# Patient Record
Sex: MALE | Race: BLACK OR AFRICAN AMERICAN | URBAN - METROPOLITAN AREA
[De-identification: names, ages, dates, MRNs, and addresses within clinical notes are randomized per-mention and may not be internally consistent; named-entity substitution may affect disease eponyms.]

---

## 2017-02-02 ENCOUNTER — OUTPATIENT (OUTPATIENT)
Dept: OUTPATIENT SERVICES | Facility: HOSPITAL | Age: 77
LOS: 1 days | End: 2017-02-02
Payer: MEDICARE

## 2017-02-02 PROCEDURE — 71250 CT THORAX DX C-: CPT

## 2017-02-02 PROCEDURE — 71250 CT THORAX DX C-: CPT | Mod: 26

## 2017-02-23 ENCOUNTER — APPOINTMENT (OUTPATIENT)
Dept: THORACIC SURGERY | Facility: CLINIC | Age: 77
End: 2017-02-23

## 2017-02-23 VITALS
RESPIRATION RATE: 18 BRPM | BODY MASS INDEX: 22.45 KG/M2 | TEMPERATURE: 97.7 F | HEART RATE: 82 BPM | DIASTOLIC BLOOD PRESSURE: 67 MMHG | OXYGEN SATURATION: 99 % | WEIGHT: 152 LBS | SYSTOLIC BLOOD PRESSURE: 121 MMHG

## 2017-11-02 ENCOUNTER — APPOINTMENT (INPATIENT)
Dept: MRI IMAGING | Facility: HOSPITAL | Age: 77
DRG: 093 | End: 2017-11-02
Payer: MEDICARE

## 2017-11-02 ENCOUNTER — HOSPITAL ENCOUNTER (INPATIENT)
Facility: HOSPITAL | Age: 77
LOS: 1 days | Discharge: NURSING, REHAB OR LONG-TERM CARE | DRG: 093 | End: 2017-11-03
Attending: EMERGENCY MEDICINE | Admitting: INTERNAL MEDICINE
Payer: MEDICARE

## 2017-11-02 ENCOUNTER — APPOINTMENT (EMERGENCY)
Dept: CT IMAGING | Facility: HOSPITAL | Age: 77
DRG: 093 | End: 2017-11-02
Payer: MEDICARE

## 2017-11-02 DIAGNOSIS — R27.0 ATAXIA: ICD-10-CM

## 2017-11-02 DIAGNOSIS — R42 DIZZINESS: Primary | ICD-10-CM

## 2017-11-02 LAB
ALBUMIN SERPL BCP-MCNC: 4 G/DL (ref 3.5–5)
ALP SERPL-CCNC: 89 U/L (ref 46–116)
ALT SERPL W P-5'-P-CCNC: 51 U/L (ref 12–78)
ANION GAP BLD CALC-SCNC: 17 MMOL/L (ref 4–13)
ANION GAP SERPL CALCULATED.3IONS-SCNC: 7 MMOL/L (ref 4–13)
AST SERPL W P-5'-P-CCNC: 26 U/L (ref 5–45)
BASOPHILS # BLD AUTO: 0.02 THOUSANDS/ΜL (ref 0–0.1)
BASOPHILS NFR BLD AUTO: 0 % (ref 0–1)
BILIRUB SERPL-MCNC: 0.5 MG/DL (ref 0.2–1)
BUN BLD-MCNC: 8 MG/DL (ref 5–25)
BUN SERPL-MCNC: 9 MG/DL (ref 5–25)
CA-I BLD-SCNC: 1.35 MMOL/L (ref 1.12–1.32)
CALCIUM SERPL-MCNC: 10.1 MG/DL (ref 8.3–10.1)
CHLORIDE BLD-SCNC: 104 MMOL/L (ref 100–108)
CHLORIDE SERPL-SCNC: 105 MMOL/L (ref 100–108)
CO2 SERPL-SCNC: 32 MMOL/L (ref 21–32)
CREAT BLD-MCNC: 0.8 MG/DL (ref 0.6–1.3)
CREAT SERPL-MCNC: 0.87 MG/DL (ref 0.6–1.3)
EOSINOPHIL # BLD AUTO: 0.07 THOUSAND/ΜL (ref 0–0.61)
EOSINOPHIL NFR BLD AUTO: 1 % (ref 0–6)
ERYTHROCYTE [DISTWIDTH] IN BLOOD BY AUTOMATED COUNT: 14.6 % (ref 11.6–15.1)
GFR SERPL CREATININE-BSD FRML MDRD: 87 ML/MIN/1.73SQ M
GFR SERPL CREATININE-BSD FRML MDRD: 98 ML/MIN/1.73SQ M
GLUCOSE SERPL-MCNC: 107 MG/DL (ref 65–140)
GLUCOSE SERPL-MCNC: 141 MG/DL (ref 65–140)
GLUCOSE SERPL-MCNC: 143 MG/DL (ref 65–140)
GLUCOSE SERPL-MCNC: 67 MG/DL (ref 65–140)
HCT VFR BLD AUTO: 47.4 % (ref 36.5–49.3)
HCT VFR BLD CALC: 47 % (ref 36.5–49.3)
HGB BLD-MCNC: 15.6 G/DL (ref 12–17)
HGB BLDA-MCNC: 16 G/DL (ref 12–17)
LIPASE SERPL-CCNC: 95 U/L (ref 73–393)
LYMPHOCYTES # BLD AUTO: 1.85 THOUSANDS/ΜL (ref 0.6–4.47)
LYMPHOCYTES NFR BLD AUTO: 30 % (ref 14–44)
MCH RBC QN AUTO: 27.7 PG (ref 26.8–34.3)
MCHC RBC AUTO-ENTMCNC: 32.9 G/DL (ref 31.4–37.4)
MCV RBC AUTO: 84 FL (ref 82–98)
MONOCYTES # BLD AUTO: 0.44 THOUSAND/ΜL (ref 0.17–1.22)
MONOCYTES NFR BLD AUTO: 7 % (ref 4–12)
NEUTROPHILS # BLD AUTO: 3.81 THOUSANDS/ΜL (ref 1.85–7.62)
NEUTS SEG NFR BLD AUTO: 61 % (ref 43–75)
NRBC BLD AUTO-RTO: 0 /100 WBCS
PCO2 BLD: 27 MMOL/L (ref 21–32)
PLATELET # BLD AUTO: 160 THOUSANDS/UL (ref 149–390)
PMV BLD AUTO: 10.3 FL (ref 8.9–12.7)
POTASSIUM BLD-SCNC: 3.9 MMOL/L (ref 3.5–5.3)
POTASSIUM SERPL-SCNC: 4.3 MMOL/L (ref 3.5–5.3)
PROT SERPL-MCNC: 7.1 G/DL (ref 6.4–8.2)
RBC # BLD AUTO: 5.64 MILLION/UL (ref 3.88–5.62)
SODIUM BLD-SCNC: 143 MMOL/L (ref 136–145)
SODIUM SERPL-SCNC: 144 MMOL/L (ref 136–145)
SPECIMEN SOURCE: ABNORMAL
TROPONIN I SERPL-MCNC: <0.02 NG/ML
WBC # BLD AUTO: 6.22 THOUSAND/UL (ref 4.31–10.16)

## 2017-11-02 PROCEDURE — 82746 ASSAY OF FOLIC ACID SERUM: CPT | Performed by: INTERNAL MEDICINE

## 2017-11-02 PROCEDURE — 80053 COMPREHEN METABOLIC PANEL: CPT | Performed by: EMERGENCY MEDICINE

## 2017-11-02 PROCEDURE — 85025 COMPLETE CBC W/AUTO DIFF WBC: CPT | Performed by: EMERGENCY MEDICINE

## 2017-11-02 PROCEDURE — 70496 CT ANGIOGRAPHY HEAD: CPT

## 2017-11-02 PROCEDURE — 82948 REAGENT STRIP/BLOOD GLUCOSE: CPT

## 2017-11-02 PROCEDURE — 70551 MRI BRAIN STEM W/O DYE: CPT

## 2017-11-02 PROCEDURE — 84484 ASSAY OF TROPONIN QUANT: CPT | Performed by: EMERGENCY MEDICINE

## 2017-11-02 PROCEDURE — 99285 EMERGENCY DEPT VISIT HI MDM: CPT

## 2017-11-02 PROCEDURE — 82607 VITAMIN B-12: CPT | Performed by: INTERNAL MEDICINE

## 2017-11-02 PROCEDURE — 70498 CT ANGIOGRAPHY NECK: CPT

## 2017-11-02 PROCEDURE — 80047 BASIC METABLC PNL IONIZED CA: CPT

## 2017-11-02 PROCEDURE — 36415 COLL VENOUS BLD VENIPUNCTURE: CPT | Performed by: EMERGENCY MEDICINE

## 2017-11-02 PROCEDURE — 85014 HEMATOCRIT: CPT

## 2017-11-02 PROCEDURE — 93005 ELECTROCARDIOGRAM TRACING: CPT | Performed by: EMERGENCY MEDICINE

## 2017-11-02 PROCEDURE — 83690 ASSAY OF LIPASE: CPT | Performed by: EMERGENCY MEDICINE

## 2017-11-02 RX ORDER — LOSARTAN POTASSIUM 100 MG/1
100 TABLET ORAL DAILY
COMMUNITY
End: 2019-06-10 | Stop reason: SDUPTHER

## 2017-11-02 RX ORDER — IPRATROPIUM BROMIDE AND ALBUTEROL SULFATE 2.5; .5 MG/3ML; MG/3ML
3 SOLUTION RESPIRATORY (INHALATION) EVERY 6 HOURS PRN
Status: DISCONTINUED | OUTPATIENT
Start: 2017-11-02 | End: 2017-11-03 | Stop reason: HOSPADM

## 2017-11-02 RX ORDER — ASPIRIN 325 MG
325 TABLET, DELAYED RELEASE (ENTERIC COATED) ORAL DAILY
Status: DISCONTINUED | OUTPATIENT
Start: 2017-11-03 | End: 2017-11-03 | Stop reason: HOSPADM

## 2017-11-02 RX ORDER — ASPIRIN 81 MG/1
81 TABLET, CHEWABLE ORAL DAILY
Status: DISCONTINUED | OUTPATIENT
Start: 2017-11-03 | End: 2017-11-02

## 2017-11-02 RX ORDER — PRAVASTATIN SODIUM 40 MG
40 TABLET ORAL
Status: DISCONTINUED | OUTPATIENT
Start: 2017-11-03 | End: 2017-11-03 | Stop reason: HOSPADM

## 2017-11-02 RX ORDER — LOSARTAN POTASSIUM 50 MG/1
100 TABLET ORAL DAILY
Status: DISCONTINUED | OUTPATIENT
Start: 2017-11-03 | End: 2017-11-03 | Stop reason: HOSPADM

## 2017-11-02 RX ORDER — ROSUVASTATIN CALCIUM 5 MG/1
5 TABLET, COATED ORAL DAILY
COMMUNITY
End: 2019-02-27 | Stop reason: CLARIF

## 2017-11-02 RX ORDER — NICOTINE 21 MG/24HR
1 PATCH, TRANSDERMAL 24 HOURS TRANSDERMAL DAILY
Status: DISCONTINUED | OUTPATIENT
Start: 2017-11-03 | End: 2017-11-03 | Stop reason: HOSPADM

## 2017-11-02 RX ORDER — MECLIZINE HYDROCHLORIDE 25 MG/1
50 TABLET ORAL ONCE
Status: COMPLETED | OUTPATIENT
Start: 2017-11-02 | End: 2017-11-02

## 2017-11-02 RX ORDER — LOSARTAN POTASSIUM 25 MG/1
25 TABLET ORAL DAILY
Status: DISCONTINUED | OUTPATIENT
Start: 2017-11-02 | End: 2017-11-03

## 2017-11-02 RX ORDER — IPRATROPIUM BROMIDE AND ALBUTEROL SULFATE 2.5; .5 MG/3ML; MG/3ML
3 SOLUTION RESPIRATORY (INHALATION)
Status: DISCONTINUED | OUTPATIENT
Start: 2017-11-02 | End: 2017-11-02

## 2017-11-02 RX ADMIN — IOHEXOL 85 ML: 350 INJECTION, SOLUTION INTRAVENOUS at 14:50

## 2017-11-02 RX ADMIN — FLUTICASONE PROPIONATE AND SALMETEROL 1 PUFF: 50; 250 POWDER RESPIRATORY (INHALATION) at 21:08

## 2017-11-02 RX ADMIN — MECLIZINE HYDROCHLORIDE 50 MG: 25 TABLET ORAL at 15:14

## 2017-11-02 NOTE — H&P
History and Physical - [facility]    Patient Information: Alice Luke During 78 y o  male MRN: [id number]  Unit/Bed#: ED 12 Encounter: [id number]  Admitting Physician: Sanford Espinosa MD  PCP: Raquel Smith  Date of Admission:  11/02/17    Assessment/Plan:    Hospital Problem List:     Principal Problem:    Dizziness      Plan for the Primary Problem(s):  · Ataxia, persistent, with no other focal weakness identified, at this time unclear etiology, this seems to be of acute occurrence, the CT angio seems unremarkable, will put in for an MRI, start full-dose aspirin, blood pressure control  · Patient does not check his blood sugars at home so is not sure reveals hypoglycemic episodes, is on oral medications though cannot recall his all a stat A1c  · Neurology evaluation, telemetry monitoring, will hold off on cardiac workup for now will await Neurology input before the  · PT OT  · Continue the statin, p r n  bronchodilators, history of COPD not in exacerbation  · Will check folic acid N55 level  VTE Prophylaxis: lovenox  Anticipated Length of Stay:  Patient will be admitted on an Inpatient basis with an anticipated length of stay of  > 2 midnights  Justification for Hospital Stay:  Ataxia    Total Time for Visit, including Counseling / Coordination of Care: 45 minutes  Greater than 50% of this total time spent on direct patient counseling and coordination of care  N  Chief Complaint:   I kept on losing balance since yesterday    History of Present Illness:    Alice Chandler is a 78 y o  male who presents with initially what he describes as dizziness, on further questioning the patient mentions that what he primarily is having is loss of balance, which started roughly around 7-8 a o'clock in the evening yesterday, continued to have persistent symptoms, but then he went to bed, woke up with similar symptoms which continued and then he decided to come to the emergency room for further evaluation    His past history significant for COPD, patient does not mention a rescue inhaler as part of his medications, is on Spiriva and Advair though, most of his care is in [address] [place] [address] PCP  Also mentions he has hyperlipidemia and a very low dose of a statin, no other significant history mention by him, no cardiac events, no acute neurological events in the past   He smokes, 1-2 cigars a day as per him, occasionally more  At this time denies chest pain, palpitations, nausea, vomiting, headaches, visual disturbances, while lying in bed he does not have any symptoms, he denies having any vertiginous symptoms, also the symptoms do not get worse with movements of the head, no nystagmus noted  CT angio of the head and neck was done which showed no evidence of significant carotid stenosis, no large vessel occlusion, no large aneurysm  No long segment occlusion narrowing of pseudoaneurysm or dissection flap  Review of Systems:    Review of Systems   Constitutional: Negative for activity change and appetite change  HENT: Negative for congestion and dental problem  Eyes: Negative for discharge  Respiratory: Negative for apnea  Cardiovascular: Negative for chest pain  Gastrointestinal: Negative for abdominal distention  Endocrine: Negative for cold intolerance  Genitourinary: Negative for difficulty urinating  Musculoskeletal: Negative for arthralgias  Allergic/Immunologic: Negative for environmental allergies  Neurological: Negative for dizziness  Hematological: Negative for adenopathy  Past Medical and Surgical History:     Past Medical History:   Diagnosis Date    COPD [facility]chronic obstructive pulmonary disease) ([facility] [address] )     Hyperlipidemia     Hypertension        History reviewed  No pertinent surgical history  Meds/Allergies:    Prior to Admission medications    Medication Sig Start Date End Date Taking?  Authorizing Provider   fluticasone-salmeterol (ADVAIR) 250-50 mcg/dose inhaler Inhale 1 puff every 12 (twelve) hours   Yes Historical Provider, MD   losartan (COZAAR) 100 MG tablet Take 100 mg by mouth daily   Yes Historical Provider, MD   rosuvastatin (CRESTOR) 5 mg tablet Take 5 mg by mouth daily   Yes Historical Provider, MD   SAXagliptin-MetFORMIN ER (KOMBIGLYZE XR) 5-1000 MG TB24 Take by mouth   Yes Historical Provider, MD   tiotropium (SPIRIVA) 18 mcg inhalation capsule Place 18 mcg into inhaler and inhale daily   Yes Historical Provider, MD     I have reviewed home medications with patient personally  Allergies: No Known Allergies    Social History:     Marital Status: Single   Occupation:     History   Alcohol Use No     History   Smoking Status    Current Every Day Smoker    Types: Pipe   Smokeless Tobacco    Never Used     Comment: "5 loads of pipe a day"      History   Drug Use No       Family History:    non-contributory    Physical Exam:     Vitals:   Blood Pressure: 150/80 (11/02/17 1615)  Pulse: 66 (11/02/17 1615)  Temperature: 98 9 °F (37 2 °C) (11/02/17 1317)  Temp Source: Oral (11/02/17 1317)  Respirations: 18 (11/02/17 1615)  Height: 5' 10" (177 8 cm) (11/02/17 1317)  Weight - Scale: 68 5 kg (151 lb) (11/02/17 1317)  SpO2: 98 % (11/02/17 1615)    Physical Exam   Constitutional: He is oriented to person, place, and time  No distress  HENT:   Head: Normocephalic and atraumatic  Eyes: Pupils are equal, round, and reactive to light  Neck: No tracheal deviation present  Cardiovascular: Normal rate  No murmur heard  Pulmonary/Chest: No respiratory distress  He has no wheezes  Abdominal: He exhibits no distension  There is no tenderness  Musculoskeletal: He exhibits no edema  Neurological: He is alert and oriented to person, place, and time  No cranial nerve deficit  Skin: He is not diaphoretic  Psychiatric: He has a normal mood and affect  Additional Data:     Lab Results: I have personally reviewed pertinent reports          Results from last 7 days  Lab Units 11/02/17  1358 11/02/17  1347   WBC Thousand/uL  --  6 22   HEMOGLOBIN g/dL  --  15 6   I STAT HEMOGLOBIN g/dl 16 0  --    HEMATOCRIT %  --  47 4   PLATELETS Thousands/uL  --  160   NEUTROS PCT %  --  61   LYMPHS PCT %  --  30   MONOS PCT %  --  7   EOS PCT %  --  1       Results from last 7 days  Lab Units 11/02/17  1358 11/02/17  1347   SODIUM mmol/L  --  144   POTASSIUM mmol/L  --  4 3   CHLORIDE mmol/L  --  105   CO2 mmol/L  --  32   BUN mg/dL  --  9   CREATININE mg/dL  --  0 87   CALCIUM mg/dL  --  10 1   TOTAL PROTEIN g/dL  --  7 1   BILIRUBIN TOTAL mg/dL  --  0 50   ALK PHOS U/L  --  89   ALT U/L  --  51   AST U/L  --  26   GLUCOSE RANDOM mg/dL  --  143*   GLUCOSE, ISTAT mg/dl 141*  --            Imaging: I have personally reviewed pertinent reports  Cta Head And Neck With And Without Contrast    Result Date: 11/2/2017  Narrative: CTA NECK AND BRAIN WITH AND WITHOUT CONTRAST INDICATION: Visual loss COMPARISON:   None  TECHNIQUE:  Routine CT imaging of the Brain without contrast   Post contrast imaging was performed after administration of iodinated contrast through the neck and brain  Post contrast axial 0 625 mm images timed to opacify the arterial system  3D rendering was performed on an independent workstation  MIP reconstructions performed  Coronal reconstructions were performed of the noncontrast portion of the brain  Radiation dose length product (DLP) for this visit:  1937 mGy-cm   This examination, like all CT scans performed in the [facility], was performed utilizing techniques to minimize radiation dose exposure, including the use of iterative reconstruction and automated exposure control  IV Contrast:  85 mL of iohexol (OMNIPAQUE)     IMAGE QUALITY:   Diagnostic FINDINGS: NONCONTRAST BRAIN PARENCHYMA:  No intracranial mass, mass effect or midline shift  No acute intracranial hemorrhage  No CT signs of acute infarction       VENTRICLES AND EXTRA-AXIAL SPACES:  Normal for patient's age  VISUALIZED ORBITS AND PARANASAL SINUSES:  Unremarkable  CALVARIUM AND EXTRACRANIAL SOFT TISSUES:   Normal  CERVICAL VASCULATURE AORTIC ARCH AND GREAT VESSELS:  Normal aortic arch and great vessel origins  Normal visualized subclavian vessels  RIGHT VERTEBRAL ARTERY CERVICAL SEGMENT:  Normal origin  The vessel is normal in caliber throughout the neck  LEFT VERTEBRAL ARTERY CERVICAL SEGMENT:  Normal origin  The vessel is normal in caliber throughout the neck  The left vertebral artery is dominant RIGHT EXTRACRANIAL CAROTID SEGMENT:  There is mild atherosclerotic plaquing seen within the bulb  There is no evidence of foot significant carotid stenosis     LEFT EXTRACRANIAL CAROTID SEGMENT:  Normal caliber common carotid artery  Normal bifurcation and cervical internal carotid artery  No stenosis or dissection  There is marked tortuosity of the left internal carotid artery and its craniocervical portion NASCET criteria was used to determine the degree of internal carotid artery diameter stenosis  INTRACRANIAL VASCULATURE INTERNAL CAROTID ARTERIES:  The petrous portion of the right internal carotid artery is patent There is moderate plaquing seen within the right internal carotid artery in its cavernous portion without significant narrowing  In the left carotid artery appears unremarkable in its petrous portion There is moderate to plaquing seen within the left internal carotid artery in the cavernous portion  ANTERIOR CIRCULATION:  Symmetric A1 segments and anterior cerebral arteries with normal enhancement  Normal anterior communicating artery  MIDDLE CEREBRAL ARTERY CIRCULATION:  M1 segment and middle cerebral artery branches demonstrate normal enhancement bilaterally  DISTAL VERTEBRAL ARTERIES:  Normal distal vertebral arteries  Posterior inferior cerebellar artery origins are normal  Normal vertebral basilar junction  BASILAR ARTERY:  Basilar artery is normal in caliber    Normal superior cerebellar arteries  POSTERIOR CEREBRAL ARTERIES: Both posterior cerebral arteries arises from the basilar tip  Both arteries demonstrate normal flow-related enhancement  The posterior communicating arteries are small  There is a small infundibulum measuring 1 mm noted at  the origin of the left posterior communicating DURAL VENOUS SINUSES:  Normal  NON VASCULAR ANATOMY BONY STRUCTURES:  No acute osseous abnormality  SOFT TISSUES OF THE NECK:  Normal         THORACIC INLET:  Marked emphysema seen     Impression: No evidence of significant carotid stenosis by Nascet criteria No large vessel occlusion seen No large aneurysm seen Small left 1 to 2 mm infundibulum at the origin of the left posterior communicating artery There is no long segment occlusion, narrowing or pseudoaneurysm or dissection flap    Workstation performed: [id number]       EKG, Pathology, and Other Studies Reviewed on Admission:   · EKG:     Allscripts / Epic Records Reviewed: No     ** Please Note: This note has been constructed using a voice recognition system   **

## 2017-11-02 NOTE — ED NOTES
Sofi Vazquez gave verbal orders to observe patient walk the unit  Pt walked approximately 100 feet  Pt stated he felt slightly dizzy during ambulation  MD made aware of results        Romina Sampson RN  11/02/17 8373

## 2017-11-02 NOTE — CONSULTS
Consultation - Neurology   Vlad Chandler 78 y o  male MRN: [id number]  Unit/Bed#: ED 16 Encounter: [id number]      Physician Requesting Consult: Verner Breen, DO  Reason for Consult:  Ataxia    HPI: Vlad Chandler is a right handed  78 y o  male who  I am asked to see for the above complaints  He states that he was in his usual state of health yesterday afternoon  He states in the evening he went to make dinner and he felt unsteady on his feet  He did not fall but noticed a definite change in his balance  He denied any symptoms of vertigo or lightheadedness  He denied any associated symptoms of vision disturbance such as double vision or blurred vision and denied any localizing symptoms of numbness or weakness  He denied any headache  He denies any palpitations  He denied any history of similar symptoms  He states that for quite some time he has had urinary urgency and this has not changed  He denies any significant pain in his neck or back  Review of Systems:  Pertinent review of systems as per HPI, otherwise negative  Historical Information   Past Medical History:   Diagnosis Date    [facility]ronic obstructive pulmonary disease) ([facility] [address] )     Hyperlipidemia     Hypertension      History reviewed  No pertinent surgical history  Social History   History   Smoking Status    Current Every Day Smoker    Types: Pipe   Smokeless Tobacco    Never Used     Comment: "5 loads of pipe a day"      History   Alcohol Use No     History   Drug Use No       Family History:   History reviewed  No pertinent family history      No Known Allergies    Meds:  All current active meds have been reviewed    Scheduled Meds:  [START ON 11/3/2017] aspirin 325 mg Oral Daily   [START ON 11/3/2017] enoxaparin 40 mg Subcutaneous Daily   insulin lispro 1-6 Units Subcutaneous TID AC   losartan 25 mg Oral Daily   [START ON 11/3/2017] nicotine 1 patch Transdermal Daily     PRN Meds: ipratropium-albuterol    Physical Exam:   Objective Vitals:   Vitals:    11/02/17 1615   BP: 150/80   Pulse: 66   Resp: 18   Temp:    SpO2: 98%   ,Body mass index is 21 67 kg/m²  Patient was examined in emergency department bed  General appearance: Cooperative in no acute distress  Head & neck head is atraumatic and normocephalic  Neck is supple with full range of motion  Cardiovascular: Carotid arteries-no carotid bruits  Neurologic:   Mental status-the patient is awake alert and oriented without aphasia or apraxia  Other high cortical  intellectual functions are intact  CN: Visual fields are full to confrontation, disks are flat  Extraocular movements are full without nystagmus  Pupils are 3 mm and reactive  Face is symmetrical to light touch  Movements of facial expression move symmetrically  Hearing is normal to finger rub bilaterally  Soft palate lifts symmetrically  Shoulder shrug is symmetrical  Tongue is midline without atrophy  Motor: No drift is noted on arm extension  Strength is full in the upper and lower extremities with normal bulk and tone  Sensory: Intact to temperature and vibratory sensation in the upper and lower extremities bilaterally  Cortical function is intact  Coordination: Finger to nose testing is performed accurately  Romberg reveals increased sway  Gait reveals a wide-based unsteady gait  He is unable to tandem walk   Reflexes:  1+ in the right biceps triceps and brachioradialis regions, 2 in the left mild this regions, 3 at the right knee and 3+ at the left knee with crossed adductors, 1 at the right ankle and 2 at the left ankle  Toes are downgoing on the right and upgoing on the left    Call's positive on the left negative on the right    Lab Results:Lab Results:   CBC:   Results from last 7 days  Lab Units 11/02/17  1358 11/02/17  1347   WBC Thousand/uL  --  6 22   RBC Million/uL  --  5 64*   HEMOGLOBIN g/dL  --  15 6   I STAT HEMOGLOBIN g/dl 16 0  --    HEMATOCRIT %  --  47 4   MCV fL  --  84   PLATELETS Thousands/uL  --  160   , BMP/CMP:   Results from last 7 days  Lab Units 11/02/17  1358 11/02/17  1347   SODIUM mmol/L  --  144   POTASSIUM mmol/L  --  4 3   CHLORIDE mmol/L  --  105   CO2 mmol/L  --  32   ANION GAP mmol/L  --  7   BUN mg/dL  --  9   CREATININE mg/dL  --  0 87   GLUCOSE RANDOM mg/dL  --  143*   GLUCOSE, ISTAT mg/dl 141*  --    CALCIUM mg/dL  --  10 1   AST U/L  --  26   ALT U/L  --  51   ALK PHOS U/L  --  89   TOTAL PROTEIN g/dL  --  7 1   ALBUMIN g/dL  --  4 0   BILIRUBIN TOTAL mg/dL  --  0 50   EGFR ml/min/1 73sq m 87 98     I have personally reviewed pertinent reports  EEG, Echo, Pathology, and Other Studies: I have personally reviewed pertinent films in PACS CTA of the head failed to demonstrate any significant abnormalities    Family, was not present at the bedside for history and examination  Assessment:  1  Ataxia with long tract findings left greater than right, possible brainstem versus cerebellar CVA  Can't exclude underlying cervical myelopathy although the acute nature of his symptoms without trauma does not sound typical  2  Diabetes  3  Hypertension    Plan:  Agree with stroke pathway  Agree with MRI head and full-dose aspirin  If MRI brain demonstrates no acute change to consider MRI of the cervical spine      Tania Saenz MD  11/2/2017,5:29 PM    Dictation voice to text software has been used in the creation of this document

## 2017-11-03 ENCOUNTER — APPOINTMENT (INPATIENT)
Dept: MRI IMAGING | Facility: HOSPITAL | Age: 77
DRG: 093 | End: 2017-11-03
Payer: MEDICARE

## 2017-11-03 VITALS
BODY MASS INDEX: 21.34 KG/M2 | OXYGEN SATURATION: 99 % | HEART RATE: 78 BPM | SYSTOLIC BLOOD PRESSURE: 167 MMHG | DIASTOLIC BLOOD PRESSURE: 93 MMHG | WEIGHT: 149.03 LBS | HEIGHT: 70 IN | RESPIRATION RATE: 18 BRPM | TEMPERATURE: 98.3 F

## 2017-11-03 LAB
ANION GAP SERPL CALCULATED.3IONS-SCNC: 8 MMOL/L (ref 4–13)
ATRIAL RATE: 77 BPM
BUN SERPL-MCNC: 9 MG/DL (ref 5–25)
CALCIUM SERPL-MCNC: 10 MG/DL (ref 8.3–10.1)
CHLORIDE SERPL-SCNC: 108 MMOL/L (ref 100–108)
CHOLEST SERPL-MCNC: 78 MG/DL (ref 50–200)
CO2 SERPL-SCNC: 27 MMOL/L (ref 21–32)
CREAT SERPL-MCNC: 0.87 MG/DL (ref 0.6–1.3)
ERYTHROCYTE [DISTWIDTH] IN BLOOD BY AUTOMATED COUNT: 14.8 % (ref 11.6–15.1)
ERYTHROCYTE [SEDIMENTATION RATE] IN BLOOD: 1 MM/HOUR (ref 0–10)
FOLATE SERPL-MCNC: >20 NG/ML (ref 3.1–17.5)
GFR SERPL CREATININE-BSD FRML MDRD: 98 ML/MIN/1.73SQ M
GLUCOSE SERPL-MCNC: 103 MG/DL (ref 65–140)
GLUCOSE SERPL-MCNC: 120 MG/DL (ref 65–140)
GLUCOSE SERPL-MCNC: 63 MG/DL (ref 65–140)
GLUCOSE SERPL-MCNC: 90 MG/DL (ref 65–140)
HCT VFR BLD AUTO: 41.9 % (ref 36.5–49.3)
HDLC SERPL-MCNC: 45 MG/DL (ref 40–60)
HGB BLD-MCNC: 13.9 G/DL (ref 12–17)
LDLC SERPL CALC-MCNC: 27 MG/DL (ref 0–100)
MCH RBC QN AUTO: 27.9 PG (ref 26.8–34.3)
MCHC RBC AUTO-ENTMCNC: 33.2 G/DL (ref 31.4–37.4)
MCV RBC AUTO: 84 FL (ref 82–98)
P AXIS: 84 DEGREES
PLATELET # BLD AUTO: 141 THOUSANDS/UL (ref 149–390)
PMV BLD AUTO: 10.2 FL (ref 8.9–12.7)
POTASSIUM SERPL-SCNC: 3.9 MMOL/L (ref 3.5–5.3)
PR INTERVAL: 172 MS
QRS AXIS: 78 DEGREES
QRSD INTERVAL: 76 MS
QT INTERVAL: 348 MS
QTC INTERVAL: 393 MS
RBC # BLD AUTO: 4.99 MILLION/UL (ref 3.88–5.62)
SODIUM SERPL-SCNC: 143 MMOL/L (ref 136–145)
T WAVE AXIS: 89 DEGREES
TRIGL SERPL-MCNC: 29 MG/DL
TSH SERPL DL<=0.05 MIU/L-ACNC: 1.25 UIU/ML (ref 0.36–3.74)
VENTRICULAR RATE: 77 BPM
VIT B12 SERPL-MCNC: 1727 PG/ML (ref 100–900)
WBC # BLD AUTO: 4.68 THOUSAND/UL (ref 4.31–10.16)

## 2017-11-03 PROCEDURE — 80048 BASIC METABOLIC PNL TOTAL CA: CPT | Performed by: INTERNAL MEDICINE

## 2017-11-03 PROCEDURE — 85652 RBC SED RATE AUTOMATED: CPT | Performed by: PSYCHIATRY & NEUROLOGY

## 2017-11-03 PROCEDURE — 84425 ASSAY OF VITAMIN B-1: CPT | Performed by: INTERNAL MEDICINE

## 2017-11-03 PROCEDURE — 82948 REAGENT STRIP/BLOOD GLUCOSE: CPT

## 2017-11-03 PROCEDURE — 85027 COMPLETE CBC AUTOMATED: CPT | Performed by: INTERNAL MEDICINE

## 2017-11-03 PROCEDURE — 80061 LIPID PANEL: CPT | Performed by: INTERNAL MEDICINE

## 2017-11-03 PROCEDURE — 97162 PT EVAL MOD COMPLEX 30 MIN: CPT

## 2017-11-03 PROCEDURE — 84443 ASSAY THYROID STIM HORMONE: CPT | Performed by: PSYCHIATRY & NEUROLOGY

## 2017-11-03 PROCEDURE — 84165 PROTEIN E-PHORESIS SERUM: CPT | Performed by: PSYCHIATRY & NEUROLOGY

## 2017-11-03 PROCEDURE — 86618 LYME DISEASE ANTIBODY: CPT | Performed by: PSYCHIATRY & NEUROLOGY

## 2017-11-03 PROCEDURE — 72141 MRI NECK SPINE W/O DYE: CPT

## 2017-11-03 PROCEDURE — G8979 MOBILITY GOAL STATUS: HCPCS

## 2017-11-03 PROCEDURE — G8978 MOBILITY CURRENT STATUS: HCPCS

## 2017-11-03 RX ORDER — METFORMIN HYDROCHLORIDE EXTENDED-RELEASE TABLETS 1000 MG/1
1000 TABLET, FILM COATED, EXTENDED RELEASE ORAL
Qty: 30 TABLET | Refills: 0 | Status: SHIPPED | OUTPATIENT
Start: 2017-11-03 | End: 2019-02-27 | Stop reason: CLARIF

## 2017-11-03 RX ORDER — ASPIRIN 81 MG/1
81 TABLET ORAL DAILY
Qty: 30 TABLET | Refills: 0 | Status: SHIPPED | OUTPATIENT
Start: 2017-11-03

## 2017-11-03 RX ORDER — LANCETS
EACH MISCELLANEOUS
Qty: 100 EACH | Refills: 0 | Status: SHIPPED | OUTPATIENT
Start: 2017-11-03 | End: 2019-06-10

## 2017-11-03 RX ADMIN — TIOTROPIUM BROMIDE 18 MCG: 18 CAPSULE ORAL; RESPIRATORY (INHALATION) at 09:51

## 2017-11-03 RX ADMIN — LOSARTAN POTASSIUM 100 MG: 50 TABLET, FILM COATED ORAL at 09:49

## 2017-11-03 RX ADMIN — ASPIRIN 325 MG: 325 TABLET, DELAYED RELEASE ORAL at 09:49

## 2017-11-03 RX ADMIN — FLUTICASONE PROPIONATE AND SALMETEROL 1 PUFF: 50; 250 POWDER RESPIRATORY (INHALATION) at 09:50

## 2017-11-03 RX ADMIN — PRAVASTATIN SODIUM 40 MG: 40 TABLET ORAL at 16:44

## 2017-11-03 RX ADMIN — ENOXAPARIN SODIUM 40 MG: 40 INJECTION SUBCUTANEOUS at 09:49

## 2017-11-03 NOTE — DISCHARGE INSTRUCTIONS
Metformin (By mouth)   Metformin Hydrochloride (met-FOR-min nataliia-droe-KLOR-rolan)  Treats type 2 diabetes  Brand Name(s): Fortamet, Glucophage, Glucophage XR, Glumetza, Riomet   There may be other brand names for this medicine  When This Medicine Should Not Be Used: This medicine is not right for everyone  Do not use if you had an allergic reaction to metformin, or if you have severe kidney problems or metabolic acidosis  How to Use This Medicine:   Liquid, Tablet, Long Acting Tablet  · Take your medicine as directed  Your dose may need to be changed several times to find what works best for you  · It is best to take this medicine with food or milk  · Swallow the extended-release tablet whole  Do not crush, break, or chew it  Tell your doctor if you have trouble swallowing the tablets whole  · Measure the oral liquid medicine with a marked measuring spoon, oral syringe, or medicine cup  · Read and follow the patient instructions that come with this medicine  Talk to your doctor or pharmacist if you have any questions  · Missed dose: Take a dose as soon as you remember  If it is almost time for your next dose, wait until then and take a regular dose  Do not take extra medicine to make up for a missed dose  · Store the medicine in a closed container at room temperature, away from heat, moisture, and direct light  Drugs and Foods to Avoid:   Ask your doctor or pharmacist before using any other medicine, including over-the-counter medicines, vitamins, and herbal products  · Some medicines can affect how metformin works   Tell your doctor if you are using any of the following:  ¨ Acetazolamide  ¨ Dichlorphenamide  ¨ Diuretics (water pills)  ¨ Estrogen or birth control pills  ¨ Heart or blood pressure medicine  ¨ Isoniazid  ¨ Nicotinic acid  ¨ Phenothiazine medicine  ¨ Phenytoin  ¨ Steroid medicine  ¨ Thyroid medicine  ¨ Topiramate  ¨ Zonisamide  Warnings While Using This Medicine:   · Tell your doctor if you are pregnant or breastfeeding, or if you have heart or blood vessel disease, heart failure, blood circulation problems, kidney disease, liver disease, anemia, an adrenal gland or pituitary gland disorder, or a vitamin B12 deficiency  Tell your doctor if you had a heart attack  Tell your doctor if you drink alcohol  · Too much of this medicine can cause a rare, but serious condition called lactic acidosis  · Part of the extended-release tablet may pass in your stool  This is normal   · Make sure any doctor or dentist who treats you knows that you are using this medicine  You may need to stop using this medicine before you have surgery, an x-ray, CT scan, or other medical test   · Your doctor will do lab tests at regular visits to check on the effects of this medicine  Keep all appointments  · Keep all medicine out of the reach of children  Never share your medicine with anyone  Possible Side Effects While Using This Medicine:   Call your doctor right away if you notice any of these side effects:  · Allergic reaction: Itching or hives, swelling in your face or hands, swelling or tingling in your mouth or throat, chest tightness, trouble breathing  · Confusion, fast heartbeat, increased hunger, shakiness  · Fever or chills  · Stomach pain, nausea, vomiting, muscle pain or cramping  · Trouble breathing, slow heartbeat, lightheadedness, dizziness  · Unusual tiredness or weakness  If you notice these less serious side effects, talk with your doctor:   · Diarrhea, gas, nausea  If you notice other side effects that you think are caused by this medicine, tell your doctor  Call your doctor for medical advice about side effects  You may report side effects to FDA at 0-101-FDA-1171  © 2017 [employer] Information is for End User's use only and may not be sold, redistributed or otherwise used for commercial purposes  The above information is an  only   It is not intended as medical advice for individual conditions or treatments  Talk to your doctor, nurse or pharmacist before following any medical regimen to see if it is safe and effective for you

## 2017-11-03 NOTE — ED PROVIDER NOTES
History  Chief Complaint   Patient presents with    Dizziness     Pt c/o dizziness since yesterday evening  Pt states that he "feels off when walking" but no visual changes  68-year-old male patient presents to the emergency department for evaluation of ataxia  The patient states that while walking at home he feels unbalanced  The patient states feeling dizzy, cannot describe the dizziness is anything in particular but denies as himself spinning or the room spinning  On initial evaluation, the patient had an NIH stroke scale 0  There is no neurologic abnormalities noted  Because the patient's symptoms, the patient will have a CT scan of the head and neck done with and without contrast   Patient full lab work done  Patient be treated for vertigo and if CT scan is normal and labs are normal patient will be ambulated  DuringAmbulation the patient was off balance still patient be admitted to the hospital for evaluation of possible posterior circulation abnormality causing the patient's consistent ataxia        History provided by:  Patient   used: No    Dizziness   Quality:  Imbalance  Severity:  Mild  Onset quality:  Gradual  Timing:  Constant  Progression:  Worsening  Chronicity:  New  Context: bending over, head movement, physical activity and standing up    Relieved by:  Nothing  Worsened by:  Nothing  Associated symptoms: no blood in stool, no diarrhea, no headaches, no palpitations, no shortness of breath, no syncope, no vision changes, no vomiting and no weakness    Risk factors: no anemia        Prior to Admission Medications   Prescriptions Last Dose Informant Patient Reported? Taking?    SAXagliptin-MetFORMIN ER (KOMBIGLYZE XR) 5-1000 MG TB24   Yes Yes   Sig: Take by mouth   fluticasone-salmeterol (ADVAIR) 250-50 mcg/dose inhaler   Yes Yes   Sig: Inhale 1 puff every 12 (twelve) hours   losartan (COZAAR) 100 MG tablet   Yes Yes   Sig: Take 100 mg by mouth daily rosuvastatin (CRESTOR) 5 mg tablet   Yes Yes   Sig: Take 5 mg by mouth daily   tiotropium (SPIRIVA) 18 mcg inhalation capsule   Yes Yes   Sig: Place 18 mcg into inhaler and inhale daily      Facility-Administered Medications: None       Past Medical History:   Diagnosis Date    COPD (chronic obstructive pulmonary disease) (HCC)     Hyperlipidemia     Hypertension        History reviewed  No pertinent surgical history  History reviewed  No pertinent family history  I have reviewed and agree with the history as documented  Social History   Substance Use Topics    Smoking status: Current Every Day Smoker     Types: Pipe    Smokeless tobacco: Never Used      Comment: "5 loads of pipe a day"     Alcohol use No        Review of Systems   Respiratory: Negative for shortness of breath  Cardiovascular: Negative for palpitations and syncope  Gastrointestinal: Negative for blood in stool, diarrhea and vomiting  Neurological: Positive for dizziness  Negative for weakness and headaches  All other systems reviewed and are negative  Physical Exam  ED Triage Vitals [11/02/17 1317]   Temperature Pulse Respirations Blood Pressure SpO2   98 9 °F (37 2 °C) 91 20 156/84 98 %      Temp Source Heart Rate Source Patient Position - Orthostatic VS BP Location FiO2 (%)   Oral Monitor Sitting Left arm --      Pain Score       No Pain           Orthostatic Vital Signs  Vitals:    11/02/17 2300 11/03/17 0300 11/03/17 0700 11/03/17 1100   BP: 156/83 156/81 163/88 (!) 187/91   Pulse: 67 67 73 79   Patient Position - Orthostatic VS: Lying Lying Lying Lying       Physical Exam   Constitutional: He is oriented to person, place, and time  He appears well-developed and well-nourished  No distress  HENT:   Head: Normocephalic and atraumatic  Right Ear: External ear normal    Left Ear: External ear normal    Eyes: Conjunctivae and EOM are normal  Right eye exhibits no discharge  Left eye exhibits no discharge   No scleral icterus  Neck: Normal range of motion  Neck supple  No JVD present  No tracheal deviation present  No thyromegaly present  Cardiovascular: Normal rate and regular rhythm  Pulmonary/Chest: Effort normal and breath sounds normal  No stridor  No respiratory distress  He has no wheezes  He has no rales  Abdominal: Soft  Bowel sounds are normal  He exhibits no distension  There is no tenderness  Musculoskeletal: Normal range of motion  He exhibits no edema, tenderness or deformity  Neurological: He is alert and oriented to person, place, and time  No cranial nerve deficit  Coordination normal    Skin: Skin is warm and dry  He is not diaphoretic  Psychiatric: He has a normal mood and affect  His behavior is normal    Nursing note and vitals reviewed        ED Medications  Medications   fluticasone-salmeterol (ADVAIR) 250-50 mcg/dose inhaler 1 puff (1 puff Inhalation Given 11/3/17 0950)   losartan (COZAAR) tablet 100 mg (100 mg Oral Given 11/3/17 0949)   pravastatin (PRAVACHOL) tablet 40 mg (not administered)   tiotropium (SPIRIVA) capsule for inhaler 18 mcg (18 mcg Inhalation Given 11/3/17 0951)   ipratropium-albuterol (DUO-NEB) 0 5-2 5 mg/mL inhalation solution 3 mL (not administered)   aspirin (ECOTRIN) EC tablet 325 mg (325 mg Oral Given 11/3/17 0949)   insulin lispro (HumaLOG) 100 units/mL subcutaneous injection 1-6 Units (1 Units Subcutaneous Not Given 11/3/17 1155)   nicotine (NICODERM CQ) 14 mg/24hr TD 24 hr patch 1 patch (1 patch Transdermal Not Given 11/3/17 0951)   enoxaparin (LOVENOX) subcutaneous injection 40 mg (40 mg Subcutaneous Given 11/3/17 0949)   meclizine (ANTIVERT) tablet 50 mg (50 mg Oral Given 11/2/17 1514)   iohexol (OMNIPAQUE) 350 MG/ML injection (MULTI-DOSE) 85 mL (85 mL Intravenous Given 11/2/17 1450)       Diagnostic Studies  Results Reviewed     Procedure Component Value Units Date/Time    Lipid Panel with Direct LDL reflex [[id number]]  (Normal) Collected:  11/03/17 0547    Lab Status:  Final result Specimen:  Blood from Arm, Right Updated:  11/03/17 0627     Cholesterol 78 mg/dL      Triglycerides 29 mg/dL      HDL, Direct 45 mg/dL      LDL Calculated 27 mg/dL     Narrative:         Triglyceride:        Normal               <150 mg/dl        Borderline High     150-199 mg/dl        High               200-499 mg/dl        Very High           >499 mg/dl      Cholesterol:       Desirable         <200 mg/dl       Borderline High   200-239 mg/dl       High             >239 mg/dl      HDL Cholesterol:       High    >59 mg/dL       Low     <41 mg/dL      HDL Cholesterol:       High    >59 mg/dL       Low     <41 mg/dL      This screening LDL is a calculated result  It does not have the accuracy of the Direct Measured LDL in the monitoring of patients with hyperlipidemia and/or statin therapy  Direct Measure LDL ([id number]) must be ordered separately in these patients  Basic metabolic panel [[id number]] Collected:  11/03/17 0553    Lab Status:  Final result Specimen:  Blood from Arm, Right Updated:  11/03/17 2983     Sodium 143 mmol/L      Potassium 3 9 mmol/L      Chloride 108 mmol/L      CO2 27 mmol/L      Anion Gap 8 mmol/L      BUN 9 mg/dL      Creatinine 0 87 mg/dL      Glucose 103 mg/dL      Calcium 10 0 mg/dL      eGFR 98 ml/min/1 73sq m     Narrative:         National Kidney Disease Education Program recommendations are as follows:  GFR calculation is accurate only with a steady state creatinine  Chronic Kidney disease less than 60 ml/min/1 73 sq  meters  Kidney failure less than 15 ml/min/1 73 sq  meters      CBC [[id number]]  (Abnormal) Collected:  11/03/17 0553    Lab Status:  Final result Specimen:  Blood from Arm, Right Updated:  11/03/17 0603     WBC 4 68 Thousand/uL      RBC 4 99 Million/uL      Hemoglobin 13 9 g/dL      Hematocrit 41 9 %      MCV 84 fL      MCH 27 9 pg      MCHC 33 2 g/dL      RDW 14 8 %      Platelets 047 (L) Thousands/uL      MPV 10 2 fL     Vitamin B1, whole blood [[id number]] Collected:  11/03/17 0553    Lab Status: In process Specimen:  Blood from Arm, Right Updated:  11/03/17 0600    Folate [[id number]]  (Abnormal) Collected:  11/02/17 1347    Lab Status:  Final result Specimen:  Blood from Arm, Left Updated:  11/03/17 0025     Folate >20 0 (H) ng/mL     Vitamin B12 [[id number]]  (Abnormal) Collected:  11/02/17 1347    Lab Status:  Final result Specimen:  Blood from Arm, Left Updated:  11/03/17 0025     Vitamin B-12 1,727 (H) pg/mL     Fingerstick Glucose (POCT) [[id number]]  (Normal) Collected:  11/02/17 1709    Lab Status:  Final result Updated:  11/02/17 1710     POC Glucose 67 mg/dl     Lipase [[id number]]  (Normal) Collected:  11/02/17 1347    Lab Status:  Final result Specimen:  Blood from Arm, Left Updated:  11/02/17 1435     Lipase 95 u/L     Troponin I [[id number]]  (Normal) Collected:  11/02/17 1347    Lab Status:  Final result Specimen:  Blood from Arm, Left Updated:  11/02/17 1414     Troponin I <0 02 ng/mL     Narrative:         [employer] Chemistry analyzer 99% cutoff is > 0 04 ng/mL in network labs    o cTnI 99% cutoff is useful only when applied to patients in the clinical setting of myocardial ischemia  o cTnI 99% cutoff should be interpreted in the context of clinical history, ECG findings and possibly cardiac imaging to establish correct diagnosis  o cTnI 99% cutoff may be suggestive but clearly not indicative of a coronary event without the clinical setting of myocardial ischemia      Comprehensive metabolic panel [[id number]]  (Abnormal) Collected:  11/02/17 1347    Lab Status:  Final result Specimen:  Blood from Arm, Left Updated:  11/02/17 1410     Sodium 144 mmol/L      Potassium 4 3 mmol/L      Chloride 105 mmol/L      CO2 32 mmol/L      Anion Gap 7 mmol/L      BUN 9 mg/dL      Creatinine 0 87 mg/dL      Glucose 143 (H) mg/dL      Calcium 10 1 mg/dL      AST 26 U/L      ALT 51 U/L      Alkaline Phosphatase 89 U/L      Total Protein 7 1 g/dL      Albumin 4 0 g/dL      Total Bilirubin 0 50 mg/dL      eGFR 98 ml/min/1 73sq m     Narrative:         National Kidney Disease Education Program recommendations are as follows:  GFR calculation is accurate only with a steady state creatinine  Chronic Kidney disease less than 60 ml/min/1 73 sq  meters  Kidney failure less than 15 ml/min/1 73 sq  meters  POCT Chem 8+ [[id number]]  (Abnormal) Collected:  11/02/17 1358    Lab Status:  Final result Updated:  11/02/17 1403     SODIUM, I-STAT 143 mmol/l      Potassium, i-STAT 3 9 mmol/L      Chloride, istat 104 mmol/L      CO2, i-STAT 27 mmol/L      Anion Gap, Istat 17 (H) mmol/L      Calcium, Ionized i-STAT 1 35 (H) mmol/L      BUN, I-STAT 8 mg/dl      Creatinine, i-STAT 0 8 mg/dl      eGFR 87 ml/min/1 73sq m      Glucose, i-STAT 141 (H) mg/dl      Hct, i-STAT 47 %      Hgb, i-STAT 16 0 g/dl      Specimen Type VENOUS    CBC and differential [[id number]]  (Abnormal) Collected:  11/02/17 1347    Lab Status:  Final result Specimen:  Blood from Arm, Left Updated:  11/02/17 1355     WBC 6 22 Thousand/uL      RBC 5 64 (H) Million/uL      Hemoglobin 15 6 g/dL      Hematocrit 47 4 %      MCV 84 fL      MCH 27 7 pg      MCHC 32 9 g/dL      RDW 14 6 %      MPV 10 3 fL      Platelets 483 Thousands/uL      nRBC 0 /100 WBCs      Neutrophils Relative 61 %      Lymphocytes Relative 30 %      Monocytes Relative 7 %      Eosinophils Relative 1 %      Basophils Relative 0 %      Neutrophils Absolute 3 81 Thousands/µL      Lymphocytes Absolute 1 85 Thousands/µL      Monocytes Absolute 0 44 Thousand/µL      Eosinophils Absolute 0 07 Thousand/µL      Basophils Absolute 0 02 Thousands/µL                  MRI brain wo contrast   Final Result by Elyssa Santamaria MD (11/02 2039)   1  No MR evidence of acute ischemia  2  Diffuse periventricular, subcortical and deep white matter T2/FLAIR signal abnormality which could relate to microangiopathy although other white matter etiologies are not excluded     3  Mild diffuse brain volume loss/atrophy  Workstation performed: [id number]         CTA head and neck with and without contrast   Final Result by Mitch Alatorre MD (11/02 1537)      No evidence of significant carotid stenosis by Nascet criteria      No large vessel occlusion seen      No large aneurysm seen      Small left 1 to 2 mm infundibulum at the origin of the left posterior communicating artery         There is no long segment occlusion, narrowing or pseudoaneurysm or dissection flap                  Workstation performed: [id number]         MRI inpatient order    (Results Pending)              Procedures  Procedures       Phone Contacts  ED Phone Contact    ED Course  ED Course                                MDM  Number of Diagnoses or Management Options  Ataxia: new and requires workup  Dizziness: new and requires workup     Amount and/or Complexity of Data Reviewed  Clinical lab tests: ordered and reviewed  Tests in the radiology section of CPT®: ordered and reviewed  Decide to obtain previous medical records or to obtain history from someone other than the patient: yes  Review and summarize past medical records: yes  Independent visualization of images, tracings, or specimens: yes    Patient Progress  Patient progress: stable    The patient presented with a condition in which there was a high probability of imminent or life-threatening deterioration, and critical care services (excluding separately billable procedures) totalled 30-74 minutes          Disposition  Final diagnoses:   Dizziness   Ataxia     Time reflects when diagnosis was documented in both MDM as applicable and the Disposition within this note     Time User Action Codes Description Comment    11/2/2017  4:27 PM Christina Harris Add [R42] Dizziness     11/2/2017  4:27 PM Christina Harris Add [R27 0] Ataxia       ED Disposition     ED Disposition Condition Comment    Admit  Case was discussed with Tresa Wilson and the patient's admission status was agreed to be Admission Status: inpatient status to the service of Dr Vinita Merlos   Follow-up Information    None       Current Discharge Medication List      CONTINUE these medications which have NOT CHANGED    Details   fluticasone-salmeterol (ADVAIR) 250-50 mcg/dose inhaler Inhale 1 puff every 12 (twelve) hours      losartan (COZAAR) 100 MG tablet Take 100 mg by mouth daily      rosuvastatin (CRESTOR) 5 mg tablet Take 5 mg by mouth daily      SAXagliptin-MetFORMIN ER (KOMBIGLYZE XR) 5-1000 MG TB24 Take by mouth      tiotropium (SPIRIVA) 18 mcg inhalation capsule Place 18 mcg into inhaler and inhale daily           No discharge procedures on file      ED Provider  Electronically Signed by           Krys Palacios DO  11/03/17 1831

## 2017-11-03 NOTE — SOCIAL WORK
Cm met with patient at bedside, patient alert and oriented during interview  Patient reports being independent with ADL's, no dme, no vna, str  Patient reports residing alone in a private home  Patient reports having family supports does not drive and follows up with pcp in [place]  Patient reprots filling his prescriptions at [facility] and able to fill his prescriptions  Patient reports being receptive to sn vna for vital checks  Patient reports no additional needs, friend to transport patient home when ready

## 2017-11-03 NOTE — DISCHARGE SUMMARY
Discharge Summary - [facility]    Patient Information: Roosevelt Fung During 78 y o  male MRN: [id number]  Unit/Bed#: -01 Encounter: [id number]    Discharging Physician / Practitioner: Lanette Santos MD  PCP: Kaley Arroyo  Admission Date: 11/2/2017  Discharge Date: 11/03/17    Reason for Admission:  Ataxia    Discharge Diagnoses:     Principal Problem:    Dizziness  Resolved Problems:    * No resolved hospital problems  *      Consultations During Hospital Stay:  Neurology    Hospital Course:     Roosevelt Fung During is a 78 y o  male patient who originally presented to the hospital on 11/2/2017 due to gait dysfunction ataxia  CVA ruled out MRI of the brain showed no evidence of any acute CVA, MRI cervical spine initially was recommended as per Neurology, is getting the MRI now  He also recommended some blood work including sed rate ZULLY Lyme titer electrophoreses and TSH  Patient mentions that he wants to get this blood work done outpatient after he sees Neurology  Will give him information to make an appointment  He does mention that he took his diabetes medications and did not have his breakfast and then by the time he had lunch she was already having the symptoms, he is on glipitin which could cause hypoglycemia, could have been reason for his symptomatology though  TIA/CVA ruled out    Will continue baby aspirin though at this time hold off on statin to the time he sees Neurology LDL is acceptable his also physically very active  Will give him prescription for glucometer, lancets and testing strips to check sugars at least 3 times a week  Discussed this with him  His blood sugars have been in the 60s occasionally will discontinue glipitin at the time of discharge just given a prescription for metformin, highly encouraged him to check sugars and maintain a log also spoke with his family, he needs to take the log for his PCP appointment      Symptoms could have been related to hypoglycemia at home    He had a sooner than expected recovery will be discharged after 1 midnight    MRI cervical spine was also completed- Severe spondylosis and osteoarthritis  Hypertrophic bony changes in degenerative disc disease produce  Potentially significant foraminal stenosis bilateral C4-C5, right C5-6, bilateral C6-7 and C7-T1 level  Correlate for bilateral C5, right C6,   bilateral C7 and C8 radiculitis  Significant canal stenosis (6 mm) at the C3-4 level where edema/scoliosis is noted within the cord  I had a discussion with the patient in the presence of Neurology, this time no concerning physical exam findings, will follow up outpatient with neurology, also will provide with Neurosurgery contact information  Currently symptoms likely not related to these findings    Discharge Day Visit / Exam:     Subjective:  Feels well overall no new complaints  Vitals: Blood Pressure: (!) 187/91 (11/03/17 1100)  Pulse: 79 (11/03/17 1100)  Temperature: 98 6 °F (37 °C) (11/03/17 1100)  Temp Source: Oral (11/03/17 1100)  Respirations: 17 (11/03/17 1100)  Height: 5' 10" (177 8 cm) (11/02/17 2025)  Weight - Scale: 67 6 kg (149 lb 0 5 oz) (11/02/17 2025)  SpO2: 98 % (11/03/17 1100)  Exam:   Physical Exam  General elderly gentleman this time complete resolution of symptoms  CVS S1-S2 heard no rubs murmurs  Chest clear to auscultation adventitious sounds  Neuro nonfocal at baseline  Abdomen soft nontender no organomegaly  Had INT no pallor cyanosis    Discharge instructions/Information to patient and family:   See after visit summary for information provided to patient and family  Provisions for Follow-Up Care:  See after visit summary for information related to follow-up care and any pertinent home health orders  Disposition:   Home     Discharge Statement:  I spent 30 minutes discharging the patient  This time was spent on the day of discharge  I had direct contact with the patient on the day of discharge   Greater than 50% of the total time was spent examining patient, answering all patient questions, arranging and discussing plan of care with patient as well as directly providing post-discharge instructions  Additional time then spent on discharge activities  Discharge Medications:  See after visit summary for reconciled discharge medications provided to patient and family        ** Please Note: This note has been constructed using a voice recognition system **

## 2017-11-03 NOTE — PLAN OF CARE

## 2017-11-03 NOTE — CASE MANAGEMENT
Prudencio Flannery RN Registered Nurse Signed   Case Management Date of Service: 11/3/2017  6:28 AM           Initial Clinical Review     Admission: Date/Time/Statement: 11/2/17 @ 1628 Inpatient Written      Orders Placed This Encounter   Procedures    Inpatient Admission (expected length of stay for this patient is greater than two midnights)       Standing Status:   Standing       Number of Occurrences:   1       Order Specific Question:   Admitting Physician       Answer:   Heri Naranjo [[id number]]       Order Specific Question:   Level of Care       Answer:   Med Surg [16]       Order Specific Question:   Estimated length of stay       Answer:   More than 2 Midnights       Order Specific Question:   Certification       Answer:   I certify that inpatient services are medically necessary for this patient for a duration of greater than two midnights  See H&P and MD Progress Notes for additional information about the patient's course of treatment             ED: Date/Time/Mode of Arrival:             ED Arrival Information      Expected Arrival Acuity Means of Arrival Escorted By Service Admission Type     - 11/2/2017 13:03 Urgent Walk-In Self General Medicine Urgent     Arrival Complaint     DIZZINESS             Chief Complaint:        Chief Complaint   Patient presents with    Dizziness       Pt c/o dizziness since yesterday evening  Pt states that he "feels off when walking" but no visual changes          History of Illness: Isabel Zhou a 78 y  o  male who presents with initially what he describes as dizziness, on further questioning the patient mentions that what he primarily is having is loss of balance, which started roughly around 7-8 a o'clock in the evening yesterday, continued to have persistent symptoms, but then he went to bed, woke up with similar symptoms which continued and then he decided to come to the emergency room for further evaluation   His past history significant for COPD, patient does not mention a rescue inhaler as part of his medications, is on Spiriva and Advair though, most of his care is in [place] PCP  Rubi Alfaro mentions he has hyperlipidemia and a very low dose of a statin, no other significant history mention by him, no cardiac events, no acute neurological events in the past   He smokes, 1-2 cigars a day as per him, occasionally more   At this time denies chest pain, palpitations, nausea, vomiting, headaches, visual disturbances, while lying in bed he does not have any symptoms, he denies having any vertiginous symptoms, also the symptoms do not get worse with movements of the head, no nystagmus noted  CT angio of the head and neck was done which showed no evidence of significant carotid stenosis, no large vessel occlusion, no large aneurysm   No long segment occlusion narrowing of pseudoaneurysm or dissection flap      ED Vital Signs:            ED Triage Vitals [11/02/17 1317]   Temperature Pulse Respirations Blood Pressure SpO2   98 9 °F (37 2 °C) 91 20 156/84 98 %       Temp Source Heart Rate Source Patient Position - Orthostatic VS BP Location FiO2 (%)   Oral Monitor Sitting Left arm --       Pain Score           No Pain                Wt Readings from Last 1 Encounters:   11/02/17 67 6 kg (149 lb 0 5 oz)         Vital Signs (abnormal): wnl     Abnormal Labs/Diagnostic Test Results: gluc  143  MRI brain-    1  No MR evidence of acute ischemia  2  Diffuse periventricular, subcortical and deep white matter T2/FLAIR signal abnormality which could relate to microangiopathy although other white matter etiologies are not excluded    3  Mild diffuse brain volume loss/atrophy       CTA head and neck - wn      ED Treatment:              Medication Administration from 11/02/2017 1303 to 11/02/2017 2711        Date/Time Order Dose Route Action Action by Comments       11/02/2017 1514 meclizine (ANTIVERT) tablet 50 mg 50 mg Oral Given Reggie Blackmon, RN         11/02/2017 1450 iohexol (OMNIPAQUE) 350 MG/ML injection (MULTI-DOSE) 85 mL 85 mL Intravenous Given Emliy Molina         11/02/2017 1713 insulin lispro (HumaLOG) 100 units/mL subcutaneous injection 1-6 Units 1 Units Subcutaneous Not Given Kristin Kaiser RN               Past Medical/Surgical History: Active Ambulatory Problems     Diagnosis Date Noted    No Active Ambulatory Problems           Resolved Ambulatory Problems     Diagnosis Date Noted    No Resolved Ambulatory Problems           Past Medical History:   Diagnosis Date    COPD (chronic obstructive pulmonary disease) (HCC)      Hyperlipidemia      Hypertension           Admitting Diagnosis: Dizziness [R42]  Ataxia [R27 0]     Age/Sex: 78 y o  male     Assessment/Plan: Hospital Problem List:      Principal Problem:    Dizziness        Plan for the Primary Problem(s):  · Ataxia, persistent, with no other focal weakness identified, at this time unclear etiology, this seems to be of acute occurrence, the CT angio seems unremarkable, will put in for an MRI, start full-dose aspirin, blood pressure control  · Patient does not check his blood sugars at home so is not sure reveals hypoglycemic episodes, is on oral medications though cannot recall his all a stat A1c  · Neurology evaluation, telemetry monitoring, will hold off on cardiac workup for now will await Neurology input before the  · PT OT  · Continue the statin, p r n  bronchodilators, history of COPD not in exacerbation    · Will check folic acid R06 level      VTE Prophylaxis: lovenox  Anticipated Length of Stay: Dereje Huang will be admitted on an Inpatient basis with an anticipated length of stay of  > 2 midnights    Justification for Hospital Stay:  Ataxia        Admission Orders:  Scheduled Meds:   aspirin 325 mg Oral Daily   enoxaparin 40 mg Subcutaneous Daily   fluticasone-salmeterol 1 puff Inhalation Q12H [facility]   insulin lispro 1-6 Units Subcutaneous TID AC   losartan 100 mg Oral Daily   losartan 25 mg Oral Daily   nicotine 1 patch Transdermal Daily   pravastatin 40 mg Oral Daily With Dinner   tiotropium 18 mcg Inhalation Daily      Continuous Infusions:    PRN Meds: ipratropium-albuterol      Fingerstick ac and hs   Cons carb diet   Up w/ assist   Tele   OT PT eval   SCD  Neuro consult   11/3 Vit B1, cbc , bmp,lipid profile   plt ct 141     Neuro consult   Assessment:  1  Ataxia with long tract findings left greater than right, possible brainstem versus cerebellar CVA   Can't exclude underlying cervical myelopathy although the acute nature of his symptoms without trauma does not sound typical  2  Diabetes  3  Hypertension     Plan:  Agree with stroke pathway   Agree with MRI head and full-dose aspirin  If MRI brain demonstrates no acute change to consider MRI of the cervical spine        NEW ORDER:  Neuro checks         [address] [place] [address] in the [facility] by Eloy Huggins for 2017  Network Utilization Review Department  Phone: [phone]; Fax [phone]  ATTENTION: The Network Utilization Review Department is now centralized for our 7 Facilities  Make a note that we have a new phone and fax numbers for our Department  Please call with any questions or concerns to [phone] and carefully follow the prompts so that you are directed to the right person  All voicemails are confidential  Fax any determinations, approvals, denials, and requests for initial or continue stay review clinical to [phone]  Due to HIGH CALL volume, it would be easier if you could please send faxed requests to expedite your requests and in part, help us provide discharge notifications faster

## 2017-11-03 NOTE — PLAN OF CARE
Problem: PHYSICAL THERAPY ADULT  Goal: Performs mobility at highest level of function for planned discharge setting  See evaluation for individualized goals  Treatment/Interventions: Functional transfer training, LE strengthening/ROM, Elevations, Therapeutic exercise, Gait training, Patient/family training  Equipment Recommended:  (none )       See flowsheet documentation for full assessment, interventions and recommendations  Prognosis: Good  Problem List: Decreased strength, Impaired balance  Assessment: Pt is 78 y o  male seen for PT evaluation on 11/3/2017 s/p admit to [facility] on 11/2/2017 w/ Dizziness  PT consulted to assess pt's functional mobility and d/c needs  Order placed for PT eval and tx, w/ up w/ A order  Comorbidities affecting pt's physical performance at time of assessment include: ataxia and COPD  PTA, pt was independent w/ all functional mobility w/ no assistive device , ambulates community distances and elevations, ambulates unrestricted distances and all terrain, has 5 PAULA, retired and works out at [employer]  Personal factors affecting pt at time of IE include: inability to navigate community distances, unable to perform physical activity and loss of balance with higher level balance tasks including attaining tandem position bilaterally and attempting single leg stance bilaterally  Please find objective findings from PT assessment regarding body systems outlined above with impairments and limitations including impaired balance and gait deviations, as well as mobility assessment (tolerance to 275  feet of ambulation on level surface area in ernst)  The following objective measures performed on IE also reveal limitations: Barthel Index: 85/100  Pt's clinical presentation is currently evolving seen in pt's presentation of tolerance to only 275 feet of ambulation, on telemetry monitoring and previous reports of dizziness initiating 2 days ago   Pt to benefit from continued PT tx to address deficits as defined above and maximize level of functional independent mobility and consistency  From PT/mobility standpoint, recommendation at time of d/c would be Home PT vs  anticipate no needs and home independently w/ no skilled acute PT needs pending progress in order to facilitate return to PLOF  Barriers to Discharge: None     Recommendation: Home independently (vs home with PT )     PT - OK to Discharge: Yes    See flowsheet documentation for full assessment

## 2017-11-03 NOTE — CASE MANAGEMENT
Initial Clinical Review    Admission: Date/Time/Statement: 11/2/17 @ 1628     Orders Placed This Encounter   Procedures    Inpatient Admission (expected length of stay for this patient is greater than two midnights)     Standing Status:   Standing     Number of Occurrences:   1     Order Specific Question:   Admitting Physician     Answer:   Slime Dukes [[id number]]     Order Specific Question:   Level of Care     Answer:   Med Surg [16]     Order Specific Question:   Estimated length of stay     Answer:   More than 2 Midnights     Order Specific Question:   Certification     Answer:   I certify that inpatient services are medically necessary for this patient for a duration of greater than two midnights  See H&P and MD Progress Notes for additional information about the patient's course of treatment  ED: Date/Time/Mode of Arrival:   ED Arrival Information     Expected Arrival Acuity Means of Arrival Escorted By Service Admission Type    - 11/2/2017 13:03 Urgent Walk-In Self General Medicine Urgent    Arrival Complaint    DIZZINESS          Chief Complaint:   Chief Complaint   Patient presents with    Dizziness     Pt c/o dizziness since yesterday evening  Pt states that he "feels off when walking" but no visual changes  History of Illness: Greta Chandler is a 78 y o  male who presents with initially what he describes as dizziness, on further questioning the patient mentions that what he primarily is having is loss of balance, which started roughly around 7-8 a o'clock in the evening yesterday, continued to have persistent symptoms, but then he went to bed, woke up with similar symptoms which continued and then he decided to come to the emergency room for further evaluation  His past history significant for COPD, patient does not mention a rescue inhaler as part of his medications, is on Spiriva and Advair though, most of his care is in [place] PCP    Also mentions he has hyperlipidemia and a very low dose of a statin, no other significant history mention by him, no cardiac events, no acute neurological events in the past   He smokes, 1-2 cigars a day as per him, occasionally more  At this time denies chest pain, palpitations, nausea, vomiting, headaches, visual disturbances, while lying in bed he does not have any symptoms, he denies having any vertiginous symptoms, also the symptoms do not get worse with movements of the head, no nystagmus noted  CT angio of the head and neck was done which showed no evidence of significant carotid stenosis, no large vessel occlusion, no large aneurysm  No long segment occlusion narrowing of pseudoaneurysm or dissection flap  ED Vital Signs:   ED Triage Vitals [11/02/17 1317]   Temperature Pulse Respirations Blood Pressure SpO2   98 9 °F (37 2 °C) 91 20 156/84 98 %      Temp Source Heart Rate Source Patient Position - Orthostatic VS BP Location FiO2 (%)   Oral Monitor Sitting Left arm --      Pain Score       No Pain        Wt Readings from Last 1 Encounters:   11/02/17 67 6 kg (149 lb 0 5 oz)       Vital Signs (abnormal): wnl    Abnormal Labs/Diagnostic Test Results: gluc  143  MRI brain-   1  No MR evidence of acute ischemia  2  Diffuse periventricular, subcortical and deep white matter T2/FLAIR signal abnormality which could relate to microangiopathy although other white matter etiologies are not excluded    3  Mild diffuse brain volume loss/atrophy      CTA head and neck - wnll      ED Treatment:   Medication Administration from 11/02/2017 1303 to 11/02/2017 1937       Date/Time Order Dose Route Action Action by Comments     11/02/2017 1514 meclizine (ANTIVERT) tablet 50 mg 50 mg Oral Given Julio Luo RN      11/02/2017 1450 iohexol (OMNIPAQUE) 350 MG/ML injection (MULTI-DOSE) 85 mL 85 mL Intravenous Given Ancelmowinsanto Butt      11/02/2017 1713 insulin lispro (HumaLOG) 100 units/mL subcutaneous injection 1-6 Units 1 Units Subcutaneous Not Given Julio STEPHANE Luo           Past Medical/Surgical History: Active Ambulatory Problems     Diagnosis Date Noted    No Active Ambulatory Problems     Resolved Ambulatory Problems     Diagnosis Date Noted    No Resolved Ambulatory Problems     Past Medical History:   Diagnosis Date    COPD (chronic obstructive pulmonary disease) ([facility] [address] )     Hyperlipidemia     Hypertension        Admitting Diagnosis: Dizziness [R42]  Ataxia [R27 0]    Age/Sex: 78 y o  male    Assessment/Plan: Hospital Problem List:      Principal Problem:    Dizziness        Plan for the Primary Problem(s):  · Ataxia, persistent, with no other focal weakness identified, at this time unclear etiology, this seems to be of acute occurrence, the CT angio seems unremarkable, will put in for an MRI, start full-dose aspirin, blood pressure control  · Patient does not check his blood sugars at home so is not sure reveals hypoglycemic episodes, is on oral medications though cannot recall his all a stat A1c  · Neurology evaluation, telemetry monitoring, will hold off on cardiac workup for now will await Neurology input before the  · PT OT  · Continue the statin, p r n  bronchodilators, history of COPD not in exacerbation  · Will check folic acid C04 level      VTE Prophylaxis: lovenox  Anticipated Length of Stay:  Patient will be admitted on an Inpatient basis with an anticipated length of stay of  > 2 midnights     Justification for Hospital Stay:  Ataxia       Admission Orders:  Scheduled Meds:   aspirin 325 mg Oral Daily   enoxaparin 40 mg Subcutaneous Daily   fluticasone-salmeterol 1 puff Inhalation Q12H MARIANO   insulin lispro 1-6 Units Subcutaneous TID AC   losartan 100 mg Oral Daily   losartan 25 mg Oral Daily   nicotine 1 patch Transdermal Daily   pravastatin 40 mg Oral Daily With Dinner   tiotropium 18 mcg Inhalation Daily     Continuous Infusions:    PRN Meds: ipratropium-albuterol     Fingerstick ac and hs   Cons carb diet   Up w/ assist   Tele   OT PT eval   SCD  Neuro consult   11/3 Vit B1, cbc , bmp,lipid profile   plt ct 141    Neuro consult   Assessment:  1  Ataxia with long tract findings left greater than right, possible brainstem versus cerebellar CVA  Can't exclude underlying cervical myelopathy although the acute nature of his symptoms without trauma does not sound typical  2  Diabetes  3  Hypertension     Plan:  Agree with stroke pathway    Agree with MRI head and full-dose aspirin  If MRI brain demonstrates no acute change to consider MRI of the cervical spine

## 2017-11-03 NOTE — PHYSICAL THERAPY NOTE
PHYSICAL THERAPY EVALUATION          Patient Name: Ruthie French During  Today's Date: 11/3/2017       11/03/17 1400   Note Type   Note type Eval only   Pain Assessment   Pain Assessment No/denies pain   Pain Score No Pain   Home Living   Type of [address] Bed/bath upstairs;Stairs to enter with rails  (5 PAULA w/HR; 9 steps to lower level w/HR; 5 steps to main )   Bathroom Shower/Tub Tub/shower unit   Bathroom Toilet Standard   Bathroom Accessibility Accessible   Home Equipment (none as per pt report )   Additional Comments Pt reported onset of "dizziness and staggering" on Wednesday evening  Pt reported that his symptoms and dizziness are "getting better   "   Prior Function   Level of [place] Independent with ADLs and functional mobility   Lives With Other (Comment)  (pt is alone on weekdays; wife is home on weekends)   Receives Help From [facility] [place], [address] in the last 6 months 0  (none as per pt report )   Vocational Retired   Restrictions/Precautions   Wells Argelia Bearing Precautions Per Order No   Braces or Orthoses Other (Comment)  (none as per pt report )   Other Precautions Telemetry; Fall Risk   General   Additional Pertinent History PMH: ataxia, COPD   Family/Caregiver Present No  (friend came to visit at end of PT session )   Cognition   Overall Cognitive Status WFL   Arousal/Participation Alert   Orientation Level Oriented X4   Memory Within functional limits   Following Commands Follows all commands and directions without difficulty   RUE Assessment   RUE Assessment WNL  (strength: grossly graded: 4/5; cervical AROM: WFL )   LUE Assessment   LUE Assessment WNL  (strength: grossly graded: 4/5)   RLE Assessment   RLE Assessment WNL  (strength: grossly graded: hip: 4/5; knee and ankle: 4+/5)   LLE Assessment   LLE Assessment WNL  (strength: grossly graded: hip: 4/5; knee and ankle: 4+/5)   Coordination   Movements are Fluid and Coordinated 1  (finger tip to thumb: intact)   Sensation WFL   Light Touch   RLE Light Touch Grossly intact   LLE Light Touch Grossly intact   Bed Mobility   Rolling R 7  Independent   Rolling L 7  Independent   Supine to Sit 7  Independent   Additional items (no c/o dizziness with sidelying<>supine<>sitting )   Sit to Supine 7  Independent   Additional items (no c/o dizziness with all bed mobility skills)   Additional Comments heel to shin: intact bilaterally  (no LOB with head turns all directions during ambulation)   Transfers   Sit to Stand 6  Modified independent   Stand to Sit 6  Modified independent   Additional Comments (-) Romberg;   (unable to attain R and L single leg stance without assist)   Ambulation/Elevation   Gait pattern Wide LILLIE  (minimal postural sway with 180 degree turns )   Gait Assistance 5  Supervision   Additional items Assist x 1;Verbal cues   Assistive Device None   Distance 275 feet    Stair Management Assistance 5  Supervision   Additional items Verbal cues   Stair Management Technique One rail R;Alternating pattern   Number of Stairs (3 steps (6 inch step) x 4 trials; 3 steps(4 1/2 step) x 1 )   Additional items (pt descended one trial of 3 (4 1/2 inch) steps)   Curbs (no complaints of dizziness with all mobility )   Balance   Static Sitting Good   Dynamic Sitting Good   Static Standing Good   Dynamic Standing Fair +   Ambulatory Fair +   Higher level balance Tandem   Higher level balance rep range (required CGA to attain tandem position bilaterally )   Endurance Deficit   Endurance Deficit No   Activity Tolerance   Activity Tolerance Patient tolerated treatment well   Nurse Made Aware Yes, STEPHANE Lamb Files cleared pt for PT evaluation    Assessment   Prognosis Good   Problem List Decreased strength; Impaired balance   Assessment Pt is 78 y o  male seen for PT evaluation on 11/3/2017 s/p admit to [facility] on 11/2/2017 w/ Dizziness  PT consulted to assess pt's functional mobility and d/c needs  Order placed for PT eval and tx, w/ up w/ A order  Comorbidities affecting pt's physical performance at time of assessment include: ataxia and COPD  PTA, pt was independent w/ all functional mobility w/ no assistive device , ambulates community distances and elevations, ambulates unrestricted distances and all terrain, has 5 PAULA, retired and works out at [employer]  Personal factors affecting pt at time of IE include: inability to navigate community distances, unable to perform physical activity and loss of balance with higher level balance tasks including attaining tandem position bilaterally and attempting single leg stance bilaterally  Please find objective findings from PT assessment regarding body systems outlined above with impairments and limitations including impaired balance and gait deviations, as well as mobility assessment (tolerance to 275  feet of ambulation on level surface area in ernst)  The following objective measures performed on IE also reveal limitations: Barthel Index: 85/100  Pt's clinical presentation is currently evolving seen in pt's presentation of tolerance to only 275 feet of ambulation, on telemetry monitoring and previous reports of dizziness initiating 2 days ago  Pt to benefit from continued PT tx to address deficits as defined above and maximize level of functional independent mobility and consistency  From PT/mobility standpoint, recommendation at time of d/c would be Home PT vs  anticipate no needs and home independently w/ no skilled acute PT needs pending progress in order to facilitate return to PLOF     Barriers to Discharge None   Goals   Patient Goals "to return home"   [facility] Expiration Date 11/13/17   Short Term Goal #1 n 7-10 days: Ambulate > 400 ft  with no assistive device  modified independent w/o LOB and w/ normalized gait pattern 100% of the time, Navigate 14 stairs modified independent with unilateral handrail to facilitate return to previous living environment and Increase static and dynamic standing balance 1/2 grade to decrease risk for falls   Plan   Treatment/Interventions Functional transfer training;LE strengthening/ROM; Elevations; Therapeutic exercise;Gait training;Patient/family training   PT Frequency (3-5x/wk)   Recommendation   Recommendation Home independently  (vs home with PT )   Equipment Recommended (none )   PT - OK to Discharge Yes   Barthel Index   Feeding 10   Bathing 5   Grooming Score 5   Dressing Score 5   Bladder Score 10   Bowels Score 10   Toilet Use Score 10   Transfers (Bed/Chair) Score 15   Mobility (Level Surface) Score 10   Stairs Score 5   Barthel Index Score 85   Junious Kos, PT
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
## 2017-11-06 LAB
ALBUMIN SERPL ELPH-MCNC: 4.12 G/DL (ref 3.5–5)
ALBUMIN SERPL ELPH-MCNC: 63.4 % (ref 52–65)
ALPHA1 GLOB SERPL ELPH-MCNC: 0.31 G/DL (ref 0.1–0.4)
ALPHA1 GLOB SERPL ELPH-MCNC: 4.8 % (ref 2.5–5)
ALPHA2 GLOB SERPL ELPH-MCNC: 0.73 G/DL (ref 0.4–1.2)
ALPHA2 GLOB SERPL ELPH-MCNC: 11.3 % (ref 7–13)
B BURGDOR IGG SER IA-ACNC: 0.32
B BURGDOR IGM SER IA-ACNC: 0.49
BETA GLOB ABNORMAL SERPL ELPH-MCNC: 0.4 G/DL (ref 0.4–0.8)
BETA1 GLOB SERPL ELPH-MCNC: 6.2 % (ref 5–13)
BETA2 GLOB SERPL ELPH-MCNC: 4.9 % (ref 2–8)
BETA2+GAMMA GLOB SERPL ELPH-MCNC: 0.32 G/DL (ref 0.2–0.5)
GAMMA GLOB ABNORMAL SERPL ELPH-MCNC: 0.61 G/DL (ref 0.5–1.6)
GAMMA GLOB SERPL ELPH-MCNC: 9.4 % (ref 12–22)
IGG/ALB SER: 1.73 {RATIO} (ref 1.1–1.8)
PROT PATTERN SERPL ELPH-IMP: ABNORMAL
PROT SERPL-MCNC: 6.5 G/DL (ref 6.4–8.2)

## 2017-11-07 LAB — VIT B1 BLD-SCNC: 55.6 NMOL/L (ref 66.5–200)

## 2018-02-25 ENCOUNTER — FORM ENCOUNTER (OUTPATIENT)
Age: 78
End: 2018-02-25

## 2018-02-26 ENCOUNTER — APPOINTMENT (OUTPATIENT)
Dept: CT IMAGING | Facility: HOSPITAL | Age: 78
End: 2018-02-26

## 2018-02-26 ENCOUNTER — OUTPATIENT (OUTPATIENT)
Dept: OUTPATIENT SERVICES | Facility: HOSPITAL | Age: 78
LOS: 1 days | End: 2018-02-26
Payer: MEDICARE

## 2018-02-26 PROCEDURE — 71250 CT THORAX DX C-: CPT | Mod: 26

## 2018-02-26 PROCEDURE — 71250 CT THORAX DX C-: CPT

## 2018-03-09 ENCOUNTER — APPOINTMENT (OUTPATIENT)
Dept: THORACIC SURGERY | Facility: CLINIC | Age: 78
End: 2018-03-09

## 2018-07-11 ENCOUNTER — OUTPATIENT (OUTPATIENT)
Dept: OUTPATIENT SERVICES | Facility: HOSPITAL | Age: 78
LOS: 1 days | End: 2018-07-11
Payer: MEDICARE

## 2018-07-11 PROCEDURE — A9552: CPT

## 2018-07-11 PROCEDURE — 82962 GLUCOSE BLOOD TEST: CPT

## 2018-07-11 PROCEDURE — 78815 PET IMAGE W/CT SKULL-THIGH: CPT

## 2018-07-11 PROCEDURE — 78815 PET IMAGE W/CT SKULL-THIGH: CPT | Mod: 26

## 2018-07-12 LAB — GLUCOSE BLDC GLUCOMTR-MCNC: 101 MG/DL — HIGH (ref 70–99)

## 2018-08-11 ENCOUNTER — HOSPITAL ENCOUNTER (EMERGENCY)
Facility: HOSPITAL | Age: 78
Discharge: HOME | End: 2018-08-11
Attending: EMERGENCY MEDICINE | Admitting: EMERGENCY MEDICINE
Payer: MEDICARE

## 2018-08-11 VITALS
RESPIRATION RATE: 16 BRPM | OXYGEN SATURATION: 100 % | DIASTOLIC BLOOD PRESSURE: 78 MMHG | SYSTOLIC BLOOD PRESSURE: 127 MMHG | TEMPERATURE: 98.1 F | HEART RATE: 90 BPM

## 2018-08-11 DIAGNOSIS — R42 DIZZINESS: Primary | ICD-10-CM

## 2018-08-11 PROCEDURE — 99283 EMERGENCY DEPT VISIT LOW MDM: CPT

## 2018-08-11 NOTE — ED PROVIDER NOTES
History  Chief Complaint   Patient presents with    Gait Problem     pt co of increased unsteady gait onset baout 7 weeks ago, pt saw neurologist and had MRI done, no dizziness, pt had some ear issues that started 6 weeks ago  Unsteady gait x 7 weeks, constant, progressive  No aggravating or alleviating factors  Just feels slightly off balance  Saw PCP for same and neurology  Had outpt MRI last week  Here requesting results of MRI  No HA, neck pain or stiffness, injury, fever, chills, or focal weakness  No h/o gait instability in past  Currently symptomatic  Prior to Admission Medications   Prescriptions Last Dose Informant Patient Reported? Taking? Lancets (ONETOUCH ULTRASOFT) lancets   No No   SiG needle ,1 8 mm depth   aspirin (ECOTRIN LOW STRENGTH) 81 mg EC tablet   No No   Sig: Take 1 tablet by mouth daily   fluticasone-salmeterol (ADVAIR) 250-50 mcg/dose inhaler   Yes No   Sig: Inhale 1 puff every 12 (twelve) hours   losartan (COZAAR) 100 MG tablet   Yes No   Sig: Take 100 mg by mouth daily   metFORMIN (FORTAMET) 1000 MG (OSM) 24 hr tablet   No No   Sig: Take 1 tablet by mouth daily with breakfast   rosuvastatin (CRESTOR) 5 mg tablet   Yes No   Sig: Take 5 mg by mouth daily   tiotropium (SPIRIVA) 18 mcg inhalation capsule   Yes No   Sig: Place 18 mcg into inhaler and inhale daily      Facility-Administered Medications: None       Past Medical History:   Diagnosis Date    COPD (chronic obstructive pulmonary disease) (HCC)     Hyperlipidemia     Hypertension        History reviewed  No pertinent surgical history  History reviewed  No pertinent family history  I have reviewed and agree with the history as documented  Social History   Substance Use Topics    Smoking status: Current Every Day Smoker     Types: Pipe    Smokeless tobacco: Never Used      Comment: "5 loads of pipe a day"     Alcohol use No        Review of Systems   Constitutional: Negative      Neurological: Positive for dizziness  Negative for tremors, seizures, syncope, facial asymmetry, speech difficulty, weakness, light-headedness, numbness and headaches  All other systems reviewed and are negative  Physical Exam  Physical Exam   Constitutional: He is oriented to person, place, and time  He appears well-developed and well-nourished  HENT:   Head: Normocephalic and atraumatic  Right Ear: External ear normal    Left Ear: External ear normal    Mouth/Throat: Oropharynx is clear and moist    Eyes: EOM are normal  Pupils are equal, round, and reactive to light  Neck: Normal range of motion  Neck supple  No JVD present  Cardiovascular: Normal rate and regular rhythm  Pulmonary/Chest: Effort normal and breath sounds normal  No stridor  Musculoskeletal: Normal range of motion  He exhibits no edema or deformity  Neurological: He is alert and oriented to person, place, and time  He displays normal reflexes  No cranial nerve deficit or sensory deficit  He exhibits normal muscle tone  Coordination normal    Normal gait, able to walk heel-to-toe without difficulty or assistance  No pronator drift  Normal finger-to-nose testing  Skin: Skin is warm and dry  Nursing note and vitals reviewed        Vital Signs  ED Triage Vitals   Temperature Pulse Respirations Blood Pressure SpO2   08/11/18 1306 08/11/18 1304 08/11/18 1304 08/11/18 1304 08/11/18 1304   98 1 °F (36 7 °C) 90 16 127/78 100 %      Temp Source Heart Rate Source Patient Position - Orthostatic VS BP Location FiO2 (%)   08/11/18 1306 08/11/18 1304 08/11/18 1304 08/11/18 1304 --   Oral Monitor Sitting Right arm       Pain Score       08/11/18 1304       No Pain           Vitals:    08/11/18 1304   BP: 127/78   Pulse: 90   Patient Position - Orthostatic VS: Sitting       Visual Acuity      ED Medications  Medications - No data to display    Diagnostic Studies  Results Reviewed     None                 No orders to display Procedures  Procedures       Phone Contacts  ED Phone Contact    ED Course           Identification of Seniors at Risk      Most Recent Value   (ISAR) Identification of Seniors at Risk   Before the illness or injury that brought you to the Emergency, did you need someone to help you on a regular basis? 0 Filed at: 08/11/2018 1305   In the last 24 hours, have you needed more help than usual?  0 Filed at: 08/11/2018 1305   Have you been hospitalized for one or more nights during the past 6 months? 0 Filed at: 08/11/2018 1305   In general, do you see well?  0 Filed at: 08/11/2018 1305   In general, do you have serious problems with your memory? 0 Filed at: 08/11/2018 1305   Do you take more than three different medications every day? 1 Filed at: 08/11/2018 1305   ISAR Score  1 Filed at: 08/11/2018 1305                          [facility]  Number of Diagnoses or Management Options  Dizziness:   Diagnosis management comments: mdm - pt with 7 weeks mild difficulty with balance/gait  Grossly normal gait on exam  I obtained the MRI results from his brain Mri from last week via [facility] in [place] which reveals no acute intracranial process but multiple chronic findings which do not provide an explanation for pts sxs  I gave him a copy of the report  He has f/u with PCP and neuro for further eval/tx  At this time given a normal neuro exam and recent normal MRI of brain I think d/c home is safe and appropriate          Amount and/or Complexity of Data Reviewed  Obtain history from someone other than the patient: yes  Review and summarize past medical records: yes      CritCare Time    Disposition  Final diagnoses:   Dizziness     Time reflects when diagnosis was documented in both MDM as applicable and the Disposition within this note     Time User Action Codes Description Comment    8/11/2018  2:48 PM Severiano Cable Add [R42] Dizziness       ED Disposition     ED Disposition Condition Comment    Discharge  Odalis Burden During discharge to home/self care  Condition at discharge: Stable        Follow-up Information     Follow up With Specialties Details Why Edwin Dennis MD Internal Medicine In 1 week  [address]  [phone]            Patient's Medications   Discharge Prescriptions    No medications on file     No discharge procedures on file      ED Provider  Electronically Signed by           Yamilet Baugh MD  08/11/18 9179

## 2018-08-11 NOTE — DISCHARGE INSTRUCTIONS
Dizziness   WHAT YOU NEED TO KNOW:   Dizziness is a feeling of being off balance or unsteady  Common causes of dizziness are an inner ear fluid imbalance or a lack of oxygen in your blood  Dizziness may be acute (lasts 3 days or less) or chronic (lasts longer than 3 days)  You may have dizzy spells that last from seconds to a few hours  DISCHARGE INSTRUCTIONS:   Return to the emergency department if:   · You have a headache and a stiff neck  · You have shaking chills and a fever  · You vomit over and over with no relief  · Your vomit or bowel movements are red or black  · You have pain in your chest, back, or abdomen  · You have numbness, especially in your face, arms, or legs  · You have trouble moving your arms or legs  · You are confused  Contact your healthcare provider if:   · You have a fever  · Your symptoms do not get better with treatment  · You have questions or concerns about your condition or care  Manage your symptoms:   · Do not drive  or operate heavy machinery when you are dizzy  · Get up slowly  from sitting or lying down  · Drink plenty of liquids  Liquids help prevent dehydration  Ask how much liquid to drink each day and which liquids are best for you  Follow up with your healthcare provider as directed:  Write down your questions so you remember to ask them during your visits  © 2017 [address]  Information is for End User's use only and may not be sold, redistributed or otherwise used for commercial purposes  All illustrations and images included in CareNotes® are the copyrighted property of A [employer] M [employer]  or Eloy Huggins  The above information is an  only  It is not intended as medical advice for individual conditions or treatments  Talk to your doctor, nurse or pharmacist before following any medical regimen to see if it is safe and effective for you

## 2018-11-21 ENCOUNTER — APPOINTMENT (OUTPATIENT)
Dept: CT IMAGING | Facility: HOSPITAL | Age: 78
End: 2018-11-21
Payer: MEDICARE

## 2018-11-21 ENCOUNTER — OUTPATIENT (OUTPATIENT)
Dept: OUTPATIENT SERVICES | Facility: HOSPITAL | Age: 78
LOS: 1 days | End: 2018-11-21
Payer: MEDICARE

## 2018-11-21 PROCEDURE — 71250 CT THORAX DX C-: CPT | Mod: 26

## 2018-11-21 PROCEDURE — 71250 CT THORAX DX C-: CPT

## 2019-01-09 ENCOUNTER — HOSPITAL ENCOUNTER (EMERGENCY)
Facility: HOSPITAL | Age: 79
Discharge: HOME | End: 2019-01-09
Attending: EMERGENCY MEDICINE
Payer: MEDICARE

## 2019-01-09 VITALS
SYSTOLIC BLOOD PRESSURE: 162 MMHG | HEIGHT: 70 IN | HEART RATE: 94 BPM | BODY MASS INDEX: 21.33 KG/M2 | RESPIRATION RATE: 19 BRPM | OXYGEN SATURATION: 98 % | TEMPERATURE: 98.3 F | WEIGHT: 149 LBS | DIASTOLIC BLOOD PRESSURE: 92 MMHG

## 2019-01-09 DIAGNOSIS — E11.42 DIABETIC POLYNEUROPATHY (HCC): Primary | ICD-10-CM

## 2019-01-09 LAB
ANION GAP SERPL CALCULATED.3IONS-SCNC: 10 MMOL/L (ref 4–13)
BASOPHILS # BLD AUTO: 0.01 THOUSANDS/ΜL (ref 0–0.1)
BASOPHILS NFR BLD AUTO: 0 % (ref 0–1)
BILIRUB UR QL STRIP: NEGATIVE
BUN SERPL-MCNC: 13 MG/DL (ref 5–25)
CALCIUM SERPL-MCNC: 9.9 MG/DL (ref 8.3–10.1)
CHLORIDE SERPL-SCNC: 108 MMOL/L (ref 100–108)
CLARITY UR: CLEAR
CO2 SERPL-SCNC: 28 MMOL/L (ref 21–32)
COLOR UR: YELLOW
CREAT SERPL-MCNC: 0.83 MG/DL (ref 0.6–1.3)
EOSINOPHIL # BLD AUTO: 0.09 THOUSAND/ΜL (ref 0–0.61)
EOSINOPHIL NFR BLD AUTO: 2 % (ref 0–6)
ERYTHROCYTE [DISTWIDTH] IN BLOOD BY AUTOMATED COUNT: 14.6 % (ref 11.6–15.1)
GFR SERPL CREATININE-BSD FRML MDRD: 99 ML/MIN/1.73SQ M
GLUCOSE SERPL-MCNC: 83 MG/DL (ref 65–140)
GLUCOSE UR STRIP-MCNC: NEGATIVE MG/DL
HCT VFR BLD AUTO: 41.7 % (ref 36.5–49.3)
HGB BLD-MCNC: 13.7 G/DL (ref 12–17)
HGB UR QL STRIP.AUTO: NEGATIVE
IMM GRANULOCYTES # BLD AUTO: 0.03 THOUSAND/UL (ref 0–0.2)
IMM GRANULOCYTES NFR BLD AUTO: 1 % (ref 0–2)
KETONES UR STRIP-MCNC: NEGATIVE MG/DL
LEUKOCYTE ESTERASE UR QL STRIP: NEGATIVE
LYMPHOCYTES # BLD AUTO: 1.94 THOUSANDS/ΜL (ref 0.6–4.47)
LYMPHOCYTES NFR BLD AUTO: 33 % (ref 14–44)
MCH RBC QN AUTO: 27.4 PG (ref 26.8–34.3)
MCHC RBC AUTO-ENTMCNC: 32.9 G/DL (ref 31.4–37.4)
MCV RBC AUTO: 83 FL (ref 82–98)
MONOCYTES # BLD AUTO: 0.48 THOUSAND/ΜL (ref 0.17–1.22)
MONOCYTES NFR BLD AUTO: 8 % (ref 4–12)
NEUTROPHILS # BLD AUTO: 3.25 THOUSANDS/ΜL (ref 1.85–7.62)
NEUTS SEG NFR BLD AUTO: 56 % (ref 43–75)
NITRITE UR QL STRIP: NEGATIVE
NRBC BLD AUTO-RTO: 0 /100 WBCS
PH UR STRIP.AUTO: 7 [PH] (ref 4.5–8)
PLATELET # BLD AUTO: 154 THOUSANDS/UL (ref 149–390)
PMV BLD AUTO: 10.9 FL (ref 8.9–12.7)
POTASSIUM SERPL-SCNC: 3.9 MMOL/L (ref 3.5–5.3)
PROT UR STRIP-MCNC: NEGATIVE MG/DL
RBC # BLD AUTO: 5 MILLION/UL (ref 3.88–5.62)
SODIUM SERPL-SCNC: 146 MMOL/L (ref 136–145)
SP GR UR STRIP.AUTO: 1.02 (ref 1–1.03)
UROBILINOGEN UR QL STRIP.AUTO: 0.2 E.U./DL
WBC # BLD AUTO: 5.8 THOUSAND/UL (ref 4.31–10.16)

## 2019-01-09 PROCEDURE — 85025 COMPLETE CBC W/AUTO DIFF WBC: CPT | Performed by: EMERGENCY MEDICINE

## 2019-01-09 PROCEDURE — 99283 EMERGENCY DEPT VISIT LOW MDM: CPT

## 2019-01-09 PROCEDURE — 36415 COLL VENOUS BLD VENIPUNCTURE: CPT | Performed by: EMERGENCY MEDICINE

## 2019-01-09 PROCEDURE — 80048 BASIC METABOLIC PNL TOTAL CA: CPT | Performed by: EMERGENCY MEDICINE

## 2019-01-09 PROCEDURE — 81003 URINALYSIS AUTO W/O SCOPE: CPT | Performed by: EMERGENCY MEDICINE

## 2019-01-09 NOTE — ED PROVIDER NOTES
History  Chief Complaint   Patient presents with    Leg Pain     Pt c/o bilateral leg pain, hand pain and neck pain  Pt states he is diabetic  Patient is a 45-year-old male with a past medical history significant for hypertension, hyperlipidemia, diabetes who presents with pain in bilateral hands and feet  Patient reports that in the last month he has developed burning/tingling pain in both hands and feet that is tender even to light touch, constant with waxing and waning, sometimes makes buttoning buttons or time shoes difficult  Also complains in triage of neck pain, though he reports that this neck pain is chronic and unchanged  He is undergoing physical therapy for the neck and back pain at this time  Denies any fevers, chills, chest pain, shortness of breath, increased thirst or urination, trauma  Prior to Admission Medications   Prescriptions Last Dose Informant Patient Reported? Taking? Lancets (ONETOUCH ULTRASOFT) lancets   No No   SiG needle ,1 8 mm depth   aspirin (ECOTRIN LOW STRENGTH) 81 mg EC tablet   No No   Sig: Take 1 tablet by mouth daily   fluticasone-salmeterol (ADVAIR) 250-50 mcg/dose inhaler   Yes No   Sig: Inhale 1 puff every 12 (twelve) hours   losartan (COZAAR) 100 MG tablet   Yes No   Sig: Take 100 mg by mouth daily   metFORMIN (FORTAMET) 1000 MG (OSM) 24 hr tablet   No No   Sig: Take 1 tablet by mouth daily with breakfast   rosuvastatin (CRESTOR) 5 mg tablet   Yes No   Sig: Take 5 mg by mouth daily   tiotropium (SPIRIVA) 18 mcg inhalation capsule   Yes No   Sig: Place 18 mcg into inhaler and inhale daily      Facility-Administered Medications: None       Past Medical History:   Diagnosis Date    COPD (chronic obstructive pulmonary disease) (HCC)     Hyperlipidemia     Hypertension        History reviewed  No pertinent surgical history  History reviewed  No pertinent family history  I have reviewed and agree with the history as documented      Social History Substance Use Topics    Smoking status: Current Every Day Smoker     Types: Pipe    Smokeless tobacco: Never Used      Comment: "5 loads of pipe a day"     Alcohol use No        Review of Systems   Constitutional: Negative for chills and fever  HENT: Negative for congestion and rhinorrhea  Eyes: Negative for photophobia and visual disturbance  Respiratory: Negative for chest tightness and shortness of breath  Cardiovascular: Negative for chest pain and palpitations  Gastrointestinal: Negative for abdominal pain, diarrhea, nausea and vomiting  Endocrine: Negative for polydipsia, polyphagia and polyuria  Genitourinary: Negative for dysuria and hematuria  Musculoskeletal: Negative for gait problem  Bilateral hand and bilateral foot pain   Skin: Negative for pallor and rash  Neurological: Negative for dizziness, weakness, light-headedness and headaches  Physical Exam  Physical Exam   Constitutional: He is oriented to person, place, and time  He appears well-developed and well-nourished  No distress  HENT:   Head: Normocephalic and atraumatic  Right Ear: External ear normal    Left Ear: External ear normal    Nose: Nose normal    Mouth/Throat: Oropharynx is clear and moist  No oropharyngeal exudate  Eyes: Pupils are equal, round, and reactive to light  Conjunctivae and EOM are normal    Neck: Normal range of motion  Neck supple  Cardiovascular: Normal rate, regular rhythm, normal heart sounds and intact distal pulses  Exam reveals no gallop and no friction rub  No murmur heard  Pulmonary/Chest: Effort normal and breath sounds normal  No respiratory distress  He has no wheezes  He has no rales  He exhibits no tenderness  Abdominal: Soft  Bowel sounds are normal  He exhibits no distension  There is no tenderness  Musculoskeletal: Normal range of motion  He exhibits no edema or tenderness     Tender to bilateral hands and bilateral feet even with light touch, can make a fist with both hands and plantar and dorsi-flex both feet without difficulty   Neurological: He is alert and oriented to person, place, and time  No cranial nerve deficit or sensory deficit  Coordination and gait normal  GCS eye subscore is 4  GCS verbal subscore is 5  GCS motor subscore is 6  Moves all 4 extremities equally  Objectively, sensation intact over bilateral hands and feet, though patient reports that it feels tingly   Skin: Skin is warm and dry  He is not diaphoretic  No erythema  Psychiatric: He has a normal mood and affect  His behavior is normal    Nursing note and vitals reviewed  Vital Signs  ED Triage Vitals [01/09/19 1443]   Temperature Pulse Respirations Blood Pressure SpO2   98 3 °F (36 8 °C) 94 19 162/92 98 %      Temp Source Heart Rate Source Patient Position - Orthostatic VS BP Location FiO2 (%)   Oral Monitor Sitting Left arm --      Pain Score       3           Vitals:    01/09/19 1443   BP: 162/92   Pulse: 94   Patient Position - Orthostatic VS: Sitting       Visual Acuity      ED Medications  Medications - No data to display    Diagnostic Studies  Results Reviewed     Procedure Component Value Units Date/Time    Basic metabolic panel [[id number]]  (Abnormal) Collected:  01/09/19 1550    Lab Status:  Final result Specimen:  Blood from Arm, Right Updated:  01/09/19 1613     Sodium 146 (H) mmol/L      Potassium 3 9 mmol/L      Chloride 108 mmol/L      CO2 28 mmol/L      ANION GAP 10 mmol/L      BUN 13 mg/dL      Creatinine 0 83 mg/dL      Glucose 83 mg/dL      Calcium 9 9 mg/dL      eGFR 99 ml/min/1 73sq m     Narrative:         National Kidney Disease Education Program recommendations are as follows:  GFR calculation is accurate only with a steady state creatinine  Chronic Kidney disease less than 60 ml/min/1 73 sq  meters  Kidney failure less than 15 ml/min/1 73 sq  meters      CBC and differential [[id number]] Collected:  01/09/19 1550    Lab Status:  Final result Specimen:  Blood from Arm, Right Updated:  01/09/19 1557     WBC 5 80 Thousand/uL      RBC 5 00 Million/uL      Hemoglobin 13 7 g/dL      Hematocrit 41 7 %      MCV 83 fL      MCH 27 4 pg      MCHC 32 9 g/dL      RDW 14 6 %      MPV 10 9 fL      Platelets 732 Thousands/uL      nRBC 0 /100 WBCs      Neutrophils Relative 56 %      Immat GRANS % 1 %      Lymphocytes Relative 33 %      Monocytes Relative 8 %      Eosinophils Relative 2 %      Basophils Relative 0 %      Neutrophils Absolute 3 25 Thousands/µL      Immature Grans Absolute 0 03 Thousand/uL      Lymphocytes Absolute 1 94 Thousands/µL      Monocytes Absolute 0 48 Thousand/µL      Eosinophils Absolute 0 09 Thousand/µL      Basophils Absolute 0 01 Thousands/µL     UA w Reflex to Microscopic [[id number]] Collected:  01/09/19 1540    Lab Status:  Final result Specimen:  Urine from Urine, Clean Catch Updated:  01/09/19 1546     Color, UA Yellow     Clarity, UA Clear     Specific [place], UA 1 020     pH, UA 7 0     Leukocytes, UA Negative     Nitrite, UA Negative     Protein, UA Negative mg/dl      Glucose, UA Negative mg/dl      Ketones, UA Negative mg/dl      Urobilinogen, UA 0 2 E U /dl      Bilirubin, UA Negative     Blood, UA Negative                 No orders to display              Procedures  Procedures       Phone Contacts  ED Phone Contact    ED Course  ED Course as of Jan 09 2109 Wed Jan 09, 2019   1558 Ketones, UA: Negative   1613 Glucose, Random: 94   8029 Anion Gap: 10           Identification of Seniors at Risk      Most Recent Value   (ISAR) Identification of Seniors at Risk   Before the illness or injury that brought you to the Emergency, did you need someone to help you on a regular basis? 0 Filed at: 01/09/2019 1444   In the last 24 hours, have you needed more help than usual?  0 Filed at: 01/09/2019 1444   Have you been hospitalized for one or more nights during the past 6 months?   0 Filed at: 01/09/2019 1444   In general, do you see well?  0 Filed at: 01/09/2019 1444   In general, do you have serious problems with your memory? 0 Filed at: 01/09/2019 1444   Do you take more than three different medications every day? 1 Filed at: 01/09/2019 1444   ISAR Score  1 Filed at: 01/09/2019 1444                          [facility]  Number of Diagnoses or Management Options  Diabetic polyneuropathy [facility]):   Diagnosis management comments: Assessment and plan:  Clinically nonfocal neurological exam, presenting like diabetic polyneuropathy  Will check labs to rule out DKA  Instructed patient to follow up with his primary care provider, if symptoms continue consider starting Neurontin  CritCare Time    Disposition  Final diagnoses:   Diabetic polyneuropathy ([address] )     Time reflects when diagnosis was documented in both MDM as applicable and the Disposition within this note     Time User Action Codes Description Comment    1/9/2019  4:14 PM Starr Reyes Add [E11 42] Diabetic polyneuropathy [facility])       ED Disposition     ED Disposition Condition Comment    Discharge  Brina Perez During discharge to home/self care      Condition at discharge: Good        Follow-up Information     Follow up With Specialties Details Why Contact Janey Fournier  Schedule an appointment as soon as possible for a visit in 3 days for re-evaluation [facility]  [phone]            Discharge Medication List as of 1/9/2019  4:16 PM      CONTINUE these medications which have NOT CHANGED    Details   aspirin (ECOTRIN LOW STRENGTH) 81 mg EC tablet Take 1 tablet by mouth daily, Starting Fri 11/3/2017, Print      fluticasone-salmeterol (ADVAIR) 250-50 mcg/dose inhaler Inhale 1 puff every 12 (twelve) hours, Historical Med      Lancets (ONETOUCH ULTRASOFT) lancets 28G needle ,1 8 mm depth, Print      losartan (COZAAR) 100 MG tablet Take 100 mg by mouth daily, Historical Med      metFORMIN (FORTAMET) 1000 MG (OSM) 24 hr tablet Take 1 tablet by mouth daily with breakfast, Starting Fri 11/3/2017, Print      rosuvastatin (CRESTOR) 5 mg tablet Take 5 mg by mouth daily, Historical Med      tiotropium (SPIRIVA) 18 mcg inhalation capsule Place 18 mcg into inhaler and inhale daily, Historical Med           No discharge procedures on file      ED Provider  Electronically Signed by           Tonya Woody DO  01/09/19 6029

## 2019-01-09 NOTE — DISCHARGE INSTRUCTIONS
Follow-up with your primary care provider  If this sensation and pain in your hands and feet get worse, your primary care provider may consider starting you on Neurontin  Continue your current diabetes management  Return to the emergency department for the following, but not limited to nausea, vomiting, increase in urination, fevers, abdominal pain, change in your mental status  Diabetic Peripheral Neuropathy   WHAT YOU NEED TO KNOW:   Diabetic peripheral neuropathy (DPN) is damage to the nerves in your arms, hands, legs, and feet  DPN is most common in the legs and feet and can increase your risk for foot ulcers  Nerve pain caused by DPN can limit your mobility, and affect your quality of life  DISCHARGE INSTRUCTIONS:   Return to the emergency department if:   · Your legs or feet start to turn blue or black  · You have a wound that does not heal or is red, swollen, or draining fluid  Contact your healthcare provider if:   · You begin to have symptoms  · Your blood sugar level is higher or lower than healthcare providers have told you it should be  · You have redness, calluses, or sores on your feet  · You have questions or concerns about your condition or care  Medicines:   · Medicine  may be given to decrease nerve pain  · Take your medicine as directed  Contact your healthcare provider if you think your medicine is not helping or if you have side effects  Tell him or her if you are allergic to any medicine  Keep a list of the medicines, vitamins, and herbs you take  Include the amounts, and when and why you take them  Bring the list or the pill bottles to follow-up visits  Carry your medicine list with you in case of an emergency  Control your blood sugar:  Keep your blood sugar levels as close to normal as possible by taking your medicines as directed  Check your blood sugar levels as often as directed   Contact your healthcare provider if your levels are higher than they should be        · Follow the meal plan  that your healthcare or dietitian gave you  This meal plan can help you control your blood sugar and decrease your symptoms  · Exercise regularly  Exercise can help keep your blood sugar level steady and help you manage your weight  Exercise for at least 30 minutes, 5 days a week  Ask your healthcare provider about the best exercise plan for you  Use caution when you exercise if you have decreased feeling in your feet  · Maintain a healthy weight  Ask your healthcare provider how much you should weigh  A healthy weight can help you control your diabetes  Ask him to help you create a weight loss plan if you are overweight  Even a 10 to 15 pound weight loss can help you manage your blood sugar level  · Limit alcohol  Alcohol affects your blood sugar level and can make it harder to manage your diabetes  Women should limit alcohol to 1 drink a day  Men should limit alcohol to 2 drinks a day  A drink of alcohol is 12 ounces of beer, 5 ounces of wine, or 1½ ounces of liquor  Care for your feet:  Check your feet each day for cuts, scratches, calluses, or other wounds  Look for redness and swelling, and feel for warmth  Wear shoes that fit well  Check your shoes for rocks or other objects that can hurt your feet  Do not walk barefoot or wear shoes without socks  Wear cotton socks to help keep your feet dry  Do not smoke:  Nicotine can worsen your symptoms and make it more difficult to manage your diabetes  Do not use e-cigarettes or smokeless tobacco in place of cigarettes or to help you quit  They still contain nicotine  Ask your healthcare provider for information if you currently smoke and need help quitting  Follow up with your healthcare provider as directed: You will need to have your feet checked at least once each year  Write down your questions so you remember to ask them during your visits    © 2017 [address] [employer] Information is for End User's use only and may not be sold, redistributed or otherwise used for commercial purposes  All illustrations and images included in CareNotes® are the copyrighted property of A [employer] M [employer]  or Eloy Huggins  The above information is an  only  It is not intended as medical advice for individual conditions or treatments  Talk to your doctor, nurse or pharmacist before following any medical regimen to see if it is safe and effective for you

## 2019-02-14 ENCOUNTER — HOSPITAL ENCOUNTER (EMERGENCY)
Facility: HOSPITAL | Age: 79
Discharge: HOME | End: 2019-02-14
Attending: EMERGENCY MEDICINE | Admitting: EMERGENCY MEDICINE
Payer: MEDICARE

## 2019-02-14 VITALS
OXYGEN SATURATION: 97 % | SYSTOLIC BLOOD PRESSURE: 153 MMHG | HEART RATE: 98 BPM | DIASTOLIC BLOOD PRESSURE: 71 MMHG | RESPIRATION RATE: 20 BRPM | TEMPERATURE: 98.8 F

## 2019-02-14 DIAGNOSIS — E11.40 DIABETIC NEUROPATHY (HCC): Primary | ICD-10-CM

## 2019-02-14 LAB — GLUCOSE SERPL-MCNC: 136 MG/DL (ref 65–140)

## 2019-02-14 PROCEDURE — 82948 REAGENT STRIP/BLOOD GLUCOSE: CPT

## 2019-02-14 PROCEDURE — 99284 EMERGENCY DEPT VISIT MOD MDM: CPT

## 2019-02-14 RX ORDER — GABAPENTIN 300 MG/1
600 CAPSULE ORAL 3 TIMES DAILY
Qty: 30 CAPSULE | Refills: 0 | Status: SHIPPED | OUTPATIENT
Start: 2019-02-14 | End: 2019-02-27 | Stop reason: SDUPTHER

## 2019-02-14 RX ORDER — TRAMADOL HYDROCHLORIDE 50 MG/1
50 TABLET ORAL EVERY 6 HOURS PRN
Qty: 15 TABLET | Refills: 0 | Status: SHIPPED | OUTPATIENT
Start: 2019-02-14 | End: 2019-02-27

## 2019-02-27 ENCOUNTER — APPOINTMENT (OUTPATIENT)
Dept: LAB | Facility: CLINIC | Age: 79
End: 2019-02-27
Payer: MEDICARE

## 2019-02-27 ENCOUNTER — OFFICE VISIT (OUTPATIENT)
Dept: INTERNAL MEDICINE CLINIC | Facility: CLINIC | Age: 79
End: 2019-02-27
Payer: MEDICARE

## 2019-02-27 VITALS
WEIGHT: 138 LBS | OXYGEN SATURATION: 96 % | DIASTOLIC BLOOD PRESSURE: 86 MMHG | SYSTOLIC BLOOD PRESSURE: 128 MMHG | HEIGHT: 68 IN | HEART RATE: 85 BPM | BODY MASS INDEX: 20.92 KG/M2

## 2019-02-27 DIAGNOSIS — G89.29 CHRONIC BILATERAL LOW BACK PAIN WITHOUT SCIATICA: ICD-10-CM

## 2019-02-27 DIAGNOSIS — N40.1 BENIGN PROSTATIC HYPERPLASIA WITH LOWER URINARY TRACT SYMPTOMS, SYMPTOM DETAILS UNSPECIFIED: ICD-10-CM

## 2019-02-27 DIAGNOSIS — J43.9 PULMONARY EMPHYSEMA, UNSPECIFIED EMPHYSEMA TYPE (HCC): Chronic | ICD-10-CM

## 2019-02-27 DIAGNOSIS — E11.40 TYPE 2 DIABETES MELLITUS WITH DIABETIC NEUROPATHY, WITHOUT LONG-TERM CURRENT USE OF INSULIN (HCC): Chronic | ICD-10-CM

## 2019-02-27 DIAGNOSIS — I10 ESSENTIAL HYPERTENSION: Chronic | ICD-10-CM

## 2019-02-27 DIAGNOSIS — E78.5 HYPERLIPIDEMIA ASSOCIATED WITH TYPE 2 DIABETES MELLITUS (HCC): Chronic | ICD-10-CM

## 2019-02-27 DIAGNOSIS — M48.02 DEGENERATIVE CERVICAL SPINAL STENOSIS: Chronic | ICD-10-CM

## 2019-02-27 DIAGNOSIS — M54.50 CHRONIC BILATERAL LOW BACK PAIN WITHOUT SCIATICA: ICD-10-CM

## 2019-02-27 DIAGNOSIS — E11.40 TYPE 2 DIABETES MELLITUS WITH DIABETIC NEUROPATHY, WITHOUT LONG-TERM CURRENT USE OF INSULIN (HCC): Primary | Chronic | ICD-10-CM

## 2019-02-27 DIAGNOSIS — J44.9 CHRONIC OBSTRUCTIVE PULMONARY DISEASE, UNSPECIFIED COPD TYPE (HCC): Chronic | ICD-10-CM

## 2019-02-27 DIAGNOSIS — E11.69 HYPERLIPIDEMIA ASSOCIATED WITH TYPE 2 DIABETES MELLITUS (HCC): Chronic | ICD-10-CM

## 2019-02-27 DIAGNOSIS — Z74.2 NEED FOR HOME HEALTH CARE: ICD-10-CM

## 2019-02-27 DIAGNOSIS — F43.21 ADJUSTMENT DISORDER WITH DEPRESSED MOOD: ICD-10-CM

## 2019-02-27 DIAGNOSIS — M47.22 OSTEOARTHRITIS OF SPINE WITH RADICULOPATHY, CERVICAL REGION: Chronic | ICD-10-CM

## 2019-02-27 PROBLEM — N40.0 BPH (BENIGN PROSTATIC HYPERPLASIA): Status: ACTIVE | Noted: 2019-02-27

## 2019-02-27 PROBLEM — R42 DIZZINESS: Status: RESOLVED | Noted: 2017-11-02 | Resolved: 2019-02-27

## 2019-02-27 PROBLEM — M47.812 CERVICAL SPONDYLOSIS: Chronic | Status: ACTIVE | Noted: 2019-02-27

## 2019-02-27 LAB
ALBUMIN SERPL BCP-MCNC: 4 G/DL (ref 3.5–5)
ALP SERPL-CCNC: 87 U/L (ref 46–116)
ALT SERPL W P-5'-P-CCNC: 40 U/L (ref 12–78)
ANION GAP SERPL CALCULATED.3IONS-SCNC: 4 MMOL/L (ref 4–13)
AST SERPL W P-5'-P-CCNC: 20 U/L (ref 5–45)
BILIRUB SERPL-MCNC: 0.55 MG/DL (ref 0.2–1)
BUN SERPL-MCNC: 12 MG/DL (ref 5–25)
CALCIUM SERPL-MCNC: 10 MG/DL (ref 8.3–10.1)
CHLORIDE SERPL-SCNC: 111 MMOL/L (ref 100–108)
CHOLEST SERPL-MCNC: 112 MG/DL (ref 50–200)
CO2 SERPL-SCNC: 29 MMOL/L (ref 21–32)
CREAT SERPL-MCNC: 0.77 MG/DL (ref 0.6–1.3)
GFR SERPL CREATININE-BSD FRML MDRD: 102 ML/MIN/1.73SQ M
GLUCOSE SERPL-MCNC: 88 MG/DL (ref 65–140)
HDLC SERPL-MCNC: 53 MG/DL (ref 40–60)
LDLC SERPL CALC-MCNC: 51 MG/DL (ref 0–100)
LEFT EYE DIABETIC RETINOPATHY: ABNORMAL
LEFT EYE IMAGE QUALITY: ABNORMAL
LEFT EYE MACULAR EDEMA: ABNORMAL
LEFT EYE OTHER RETINOPATHY: ABNORMAL
NONHDLC SERPL-MCNC: 59 MG/DL
POTASSIUM SERPL-SCNC: 4 MMOL/L (ref 3.5–5.3)
PROT SERPL-MCNC: 6.7 G/DL (ref 6.4–8.2)
RIGHT EYE DIABETIC RETINOPATHY: ABNORMAL
RIGHT EYE IMAGE QUALITY: ABNORMAL
RIGHT EYE MACULAR EDEMA: ABNORMAL
RIGHT EYE OTHER RETINOPATHY: ABNORMAL
SEVERITY (EYE EXAM): ABNORMAL
SODIUM SERPL-SCNC: 144 MMOL/L (ref 136–145)
TRIGL SERPL-MCNC: 41 MG/DL

## 2019-02-27 PROCEDURE — 83036 HEMOGLOBIN GLYCOSYLATED A1C: CPT

## 2019-02-27 PROCEDURE — 92250 FUNDUS PHOTOGRAPHY W/I&R: CPT | Performed by: INTERNAL MEDICINE

## 2019-02-27 PROCEDURE — 99204 OFFICE O/P NEW MOD 45 MIN: CPT | Performed by: INTERNAL MEDICINE

## 2019-02-27 PROCEDURE — 80061 LIPID PANEL: CPT

## 2019-02-27 PROCEDURE — 36415 COLL VENOUS BLD VENIPUNCTURE: CPT

## 2019-02-27 PROCEDURE — 80053 COMPREHEN METABOLIC PANEL: CPT

## 2019-02-27 RX ORDER — GABAPENTIN 300 MG/1
300 CAPSULE ORAL 3 TIMES DAILY
Qty: 90 CAPSULE | Refills: 5 | Status: SHIPPED | OUTPATIENT
Start: 2019-02-27 | End: 2019-03-13

## 2019-02-27 RX ORDER — SAXAGLIPTIN AND METFORMIN HYDROCHLORIDE 5; 1000 MG/1; MG/1
1 TABLET, FILM COATED, EXTENDED RELEASE ORAL DAILY
COMMUNITY
Start: 2018-12-19 | End: 2019-07-07 | Stop reason: HOSPADM

## 2019-02-27 RX ORDER — PRAVASTATIN SODIUM 40 MG
1 TABLET ORAL DAILY
Refills: 0 | COMMUNITY
Start: 2019-01-31 | End: 2019-05-06 | Stop reason: SDUPTHER

## 2019-02-27 RX ORDER — TAMSULOSIN HYDROCHLORIDE 0.4 MG/1
0.4 CAPSULE ORAL
COMMUNITY
Start: 2018-12-24 | End: 2019-06-10 | Stop reason: SDUPTHER

## 2019-02-27 RX ORDER — AMLODIPINE BESYLATE 5 MG/1
5 TABLET ORAL DAILY
Refills: 0 | COMMUNITY
Start: 2019-01-31 | End: 2019-06-11

## 2019-02-27 RX ORDER — DILTIAZEM HYDROCHLORIDE 180 MG/1
180 CAPSULE, EXTENDED RELEASE ORAL DAILY
COMMUNITY
End: 2019-09-13 | Stop reason: SDUPTHER

## 2019-02-27 NOTE — PROGRESS NOTES
INTERNAL MEDICINE INITIAL OFFICE VISIT  [facility] - [facility] OF [address] [place] [address]    NAME: Blu Cohen During  AGE: 70 y o  SEX: male  : 1940     DATE: 2019     Assessment and Plan:     1  Type 2 diabetes mellitus with diabetic neuropathy, without long-term current use of insulin ([facility] )    Check up-to-date laboratory testing  According to patient's home blood sugar readings, his diabetes is well controlled  At his age with his medical comorbidities would recommend A1c goal of less than 8%  Will prescribe gabapentin 300 mg 3 times daily  Will perform eye exam in the office today  - Hemoglobin A1C; Future  - Lipid panel; Future  - Comprehensive metabolic panel; Future  - Microalbumin / creatinine urine ratio; Future  - IRIS Diabetic eye exam    2  Essential hypertension    Blood pressure is well controlled  Continue current antihypertensives  3  Hyperlipidemia associated with type 2 diabetes mellitus ([facility] )    Continue statin as prescribed  Check up-to-date lipid panel     - gabapentin (NEURONTIN) 300 mg capsule; Take 1 capsule (300 mg total) by mouth 3 (three) times a day  Dispense: 90 capsule; Refill: 5    4  Pulmonary emphysema, unspecified emphysema type ([facility] )    Highly recommend he quit smoking  Will set patient up with pulmonologist   Continue inhalers as prescribed  5  Benign prostatic hyperplasia with lower urinary tract symptoms, symptom details unspecified    Patient's symptoms currently appear well controlled on Flomax  Patient is requesting referral to urologist in this area  - Ambulatory referral to Urology; Future    6  Adjustment disorder with depressed mood    No medications recommended at this time  Routine physical activity encouraged  Discussed hobbies that would be beneficial for the patient to engage in     7  Chronic bilateral low back pain without sciatica  8  Osteoarthritis of spine with radiculopathy, cervical region  9   Degenerative cervical spinal stenosis    Patient recently completed a course of physical therapy  Stretching exercises are recommended  Follow up with Neurology as scheduled  Chief Complaint:     Chief Complaint   Patient presents with   Travis Goetz Establish Care     new pt      History of Present Illness:     Patient presents to establish care  Patient has been retired but was formally working in [place] and had continued to see several his physicians in [place]  The travel was getting too much for him  Patient has a past medical history of type 2 diabetes with diabetic neuropathy, hypertension, hyperlipidemia, BPH, COPD, current tobacco use, cervical and lumbar degenerative disc disease/spinal stenosis  Patient was seeing an Erica Hammonds 16 and Throat doctor, neurologist, orthopedic surgeon, urologist, and pulmonologist along with the primary care physician in [place]  Patient wants to establish with all new specialist in this area  Patient is currently wearing a back brace and has recently completed physical therapy of his neck and back  Patient has been having more difficulty doing simple tasks around the house due to his neuropathy and spine problems  Patient is interested in a home health aide/attendant  Patient's diabetes has been very well controlled according to his home blood sugars  Patient denies any history of diabetic nephropathy or retinopathy  Patient is overdue for an eye exam   Patient is not currently prescribed insulin  Patient checks his blood sugars on a regular basis and also monitor his blood pressure on a regular basis  Patient takes his medications as prescribed  Patient denies any recent falls  Patient states he feels weaker than he does in the past and he attributes this to decreased physical activity from his spinal problems  Patient has been down and depressed more so because of the aging process and losing level once  Patient has no thoughts of hurting himself or hurting others    Patient is not interested in medications for depression  Patient was started on gabapentin 100 mg to help with the neuropathy and that was not helping  Patient went to the emergency department where he was given gabapentin 300 mg 3 times a day and he states that has helped his nerve pain much better and is asking for refill  The following portions of the patient's history were reviewed and updated as appropriate: allergies, current medications, past family history, past medical history, past social history, past surgical history and problem list      Review of Systems:     Review of Systems   Constitutional: Positive for appetite change  Negative for chills, fatigue and fever  HENT: Negative for congestion, hearing loss, postnasal drip, rhinorrhea, sore throat, tinnitus and trouble swallowing  Eyes: Negative for pain, discharge, redness and visual disturbance  Respiratory: Negative for cough, chest tightness, shortness of breath and wheezing  Cardiovascular: Negative for chest pain, palpitations and leg swelling  Gastrointestinal: Negative for abdominal distention, abdominal pain, blood in stool, constipation, diarrhea, nausea and vomiting  Endocrine: Negative for cold intolerance, heat intolerance, polydipsia, polyphagia and polyuria  Genitourinary: Negative for difficulty urinating, dysuria, frequency, hematuria and urgency  Musculoskeletal: Positive for arthralgias, back pain, gait problem, neck pain and neck stiffness  Negative for joint swelling and myalgias  Skin: Negative for color change and rash  Neurological: Positive for weakness  Negative for dizziness, tremors, seizures, syncope, speech difficulty, light-headedness, numbness and headaches  Hematological: Negative for adenopathy  Does not bruise/bleed easily  Psychiatric/Behavioral: Negative for agitation, behavioral problems, confusion, hallucinations, sleep disturbance and suicidal ideas  The patient is not nervous/anxious  Past Medical History:     Past Medical History:   Diagnosis Date    BPH (benign prostatic hyperplasia)     [facility]chronic obstructive [facility] disease) [facility]     [facility]     DDD (degenerative disc disease), cervical     [facility]     Foraminal stenosis of cervical region     Hyperlipidemia     Hypertension       Past Surgical History:     Past Surgical History:   Procedure Laterality Date    OTHER SURGICAL HISTORY      thoracic      Social History:   He reports that he has been smoking pipe  He has smoked for the past 61 00 years  He uses smokeless tobacco  He reports that he does not drink alcohol or use drugs  Family History:   History reviewed  No pertinent family history       Current Medications:     Current Outpatient Medications:     amLODIPine (NORVASC) 5 mg tablet, Take 5 mg by mouth daily, Disp: , Rfl: 0    aspirin (ECOTRIN LOW STRENGTH) 81 mg EC tablet, Take 1 tablet by mouth daily, Disp: 30 tablet, Rfl: 0    diltiazem (TIAZAC) 180 MG 24 hr capsule, Take 180 mg by mouth daily, Disp: , Rfl:     fluticasone-salmeterol (ADVAIR) 250-50 mcg/dose inhaler, Inhale 1 puff every 12 (twelve) hours, Disp: , Rfl:     gabapentin (NEURONTIN) 300 mg capsule, Take 1 capsule (300 mg total) by mouth 3 (three) times a day, Disp: 90 capsule, Rfl: 5    ipratropium-albuterol (COMBIVENT RESPIMAT) inhaler, Inhale 1 puff as needed, Disp: , Rfl:     KOMBIGLYZE XR 5-1000 MG TB24, Take 1 tablet by mouth daily, Disp: , Rfl:     Lancets (ONETOUCH ULTRASOFT) lancets, 28G needle ,1 8 mm depth, Disp: 100 each, Rfl: 0    losartan (COZAAR) 100 MG tablet, Take 100 mg by mouth daily, Disp: , Rfl:     Multiple Vitamin (MULTI-VITAMINS PO), Take by mouth daily, Disp: , Rfl:     pravastatin (PRAVACHOL) 40 mg tablet, Take 1 tablet by mouth daily, Disp: , Rfl: 0    tamsulosin (FLOMAX) 0 4 mg, Take 0 4 mg by mouth daily at bedtime, Disp: , Rfl:     tiotropium (SPIRIVA) 18 mcg inhalation capsule, Place 18 mcg into inhaler and inhale daily, Disp: , Rfl:      Allergies:   No Known Allergies     Physical Exam:     /86 (BP Location: Left arm, Patient Position: Sitting, Cuff Size: Standard)   Pulse 85   Ht 5' 7 5" (1 715 m)   Wt 62 6 kg (138 lb)   SpO2 96%   BMI 21 29 kg/m²     Physical Exam   Constitutional: He is oriented to person, place, and time  He appears well-developed and well-nourished  No distress  Eyes: Conjunctivae are normal  Right eye exhibits no discharge  Left eye exhibits no discharge  No scleral icterus  Neck: Neck supple  No JVD present  No thyromegaly present  Cardiovascular: Normal rate, regular rhythm, normal heart sounds and intact distal pulses  Exam reveals no gallop and no friction rub  Pulses are weak pulses  No murmur heard  Pulses:       Dorsalis pedis pulses are 1+ on the right side, and 1+ on the left side  Posterior tibial pulses are 1+ on the right side, and 1+ on the left side  Pulmonary/Chest: Effort normal and breath sounds normal  No respiratory distress  He has no wheezes  He has no rales  He exhibits no tenderness  Abdominal: Soft  Bowel sounds are normal  He exhibits no distension and no mass  There is no tenderness  There is no rebound and no guarding  Musculoskeletal: Normal range of motion  He exhibits deformity (lumbar and cervical and thoracic paraspinal muscle hypertonicity)  He exhibits no edema  Feet:   Right Foot:   Skin Integrity: Negative for ulcer, skin breakdown, erythema, warmth, callus or dry skin  Left Foot:   Skin Integrity: Negative for ulcer, skin breakdown, erythema, warmth, callus or dry skin  Lymphadenopathy:     He has no cervical adenopathy  Neurological: He is alert and oriented to person, place, and time  A sensory deficit is present  No cranial nerve deficit  Coordination normal    Mild atrophy in bilateral thighs   Skin: Skin is warm and dry  He is not diaphoretic  Psychiatric: He has a normal mood and affect  His behavior is normal    Vitals reviewed  Diabetic Foot Exam  Patient's shoes and socks removed  Right Foot/Ankle   Right Foot Inspection  Skin Exam: skin normal and skin intact no dry skin, no warmth, no callus, no erythema, no maceration, no abnormal color, no pre-ulcer, no ulcer and no callus                          Toe Exam: ROM and strength within normal limits  Sensory     Proprioception: diminished and intact   Monofilament testing: diminished  Vascular  Capillary refills: < 3 seconds  The right DP pulse is 1+  The right PT pulse is 1+  Left Foot/Ankle  Left Foot Inspection  Skin Exam: skin normal and skin intactno dry skin, no warmth, no erythema, no maceration, normal color, no pre-ulcer, no ulcer and no callus                         Toe Exam: ROM and strength within normal limits                   Sensory     Proprioception: diminished  Monofilament: diminished  Vascular  Capillary refills: < 3 seconds  The left DP pulse is 1+  The left PT pulse is 1+  Assign Risk Category:  No deformity present;  Loss of protective sensation; Weak pulses       Risk: 1    Zi Allen DO  MEDICAL [address]

## 2019-02-27 NOTE — PATIENT INSTRUCTIONS
Diabetic Peripheral Neuropathy   WHAT YOU NEED TO KNOW:   Diabetic peripheral neuropathy (DPN) is damage to the nerves in your arms, hands, legs, and feet  DPN is most common in the legs and feet and can increase your risk for foot ulcers  Nerve pain caused by DPN can limit your mobility, and affect your quality of life  DISCHARGE INSTRUCTIONS:   Return to the emergency department if:   · Your legs or feet start to turn blue or black  · You have a wound that does not heal or is red, swollen, or draining fluid  Contact your healthcare provider if:   · You begin to have symptoms  · Your blood sugar level is higher or lower than healthcare providers have told you it should be  · You have redness, calluses, or sores on your feet  · You have questions or concerns about your condition or care  Medicines:   · Medicine  may be given to decrease nerve pain  · Take your medicine as directed  Contact your healthcare provider if you think your medicine is not helping or if you have side effects  Tell him or her if you are allergic to any medicine  Keep a list of the medicines, vitamins, and herbs you take  Include the amounts, and when and why you take them  Bring the list or the pill bottles to follow-up visits  Carry your medicine list with you in case of an emergency  Control your blood sugar:  Keep your blood sugar levels as close to normal as possible by taking your medicines as directed  Check your blood sugar levels as often as directed  Contact your healthcare provider if your levels are higher than they should be  · Follow the meal plan  that your healthcare or dietitian gave you  This meal plan can help you control your blood sugar and decrease your symptoms  · Exercise regularly  Exercise can help keep your blood sugar level steady and help you manage your weight  Exercise for at least 30 minutes, 5 days a week  Ask your healthcare provider about the best exercise plan for you   Use caution when you exercise if you have decreased feeling in your feet  · Maintain a healthy weight  Ask your healthcare provider how much you should weigh  A healthy weight can help you control your diabetes  Ask him to help you create a weight loss plan if you are overweight  Even a 10 to 15 pound weight loss can help you manage your blood sugar level  · Limit alcohol  Alcohol affects your blood sugar level and can make it harder to manage your diabetes  Women should limit alcohol to 1 drink a day  Men should limit alcohol to 2 drinks a day  A drink of alcohol is 12 ounces of beer, 5 ounces of wine, or 1½ ounces of liquor  Care for your feet:  Check your feet each day for cuts, scratches, calluses, or other wounds  Look for redness and swelling, and feel for warmth  Wear shoes that fit well  Check your shoes for rocks or other objects that can hurt your feet  Do not walk barefoot or wear shoes without socks  Wear cotton socks to help keep your feet dry  Do not smoke:  Nicotine can worsen your symptoms and make it more difficult to manage your diabetes  Do not use e-cigarettes or smokeless tobacco in place of cigarettes or to help you quit  They still contain nicotine  Ask your healthcare provider for information if you currently smoke and need help quitting  Follow up with your healthcare provider as directed: You will need to have your feet checked at least once each year  Write down your questions so you remember to ask them during your visits  © 2017 [address] [employer] Information is for End User's use only and may not be sold, redistributed or otherwise used for commercial purposes  All illustrations and images included in CareNotes® are the copyrighted property of [employer]  or Eloy Huggins  The above information is an  only  It is not intended as medical advice for individual conditions or treatments   Talk to your doctor, nurse or pharmacist before following any medical regimen to see if it is safe and effective for you

## 2019-02-28 ENCOUNTER — TELEPHONE (OUTPATIENT)
Dept: INTERNAL MEDICINE CLINIC | Facility: CLINIC | Age: 79
End: 2019-02-28

## 2019-02-28 ENCOUNTER — APPOINTMENT (OUTPATIENT)
Dept: LAB | Facility: CLINIC | Age: 79
End: 2019-02-28
Payer: MEDICARE

## 2019-02-28 DIAGNOSIS — M54.50 CHRONIC BILATERAL LOW BACK PAIN WITHOUT SCIATICA: Primary | ICD-10-CM

## 2019-02-28 DIAGNOSIS — G89.29 CHRONIC BILATERAL LOW BACK PAIN WITHOUT SCIATICA: Primary | ICD-10-CM

## 2019-02-28 LAB
CREAT UR-MCNC: 164 MG/DL
EST. AVERAGE GLUCOSE BLD GHB EST-MCNC: 134 MG/DL
HBA1C MFR BLD: 6.3 % (ref 4.2–6.3)
MICROALBUMIN UR-MCNC: 47.7 MG/L (ref 0–20)
MICROALBUMIN/CREAT 24H UR: 29 MG/G CREATININE (ref 0–30)

## 2019-02-28 PROCEDURE — 82570 ASSAY OF URINE CREATININE: CPT

## 2019-02-28 PROCEDURE — 82043 UR ALBUMIN QUANTITATIVE: CPT

## 2019-02-28 NOTE — TELEPHONE ENCOUNTER
Called and notified pt of results   Pt  stated he was aware and will follow up wit [facility] associate

## 2019-02-28 NOTE — TELEPHONE ENCOUNTER
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

## 2019-02-28 NOTE — TELEPHONE ENCOUNTER
Yamile Quintanilla called about receiving Fax; she works with [employer]  Please call with what type of service is needed      P#: [phone]

## 2019-02-28 NOTE — TELEPHONE ENCOUNTER
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

## 2019-02-28 NOTE — TELEPHONE ENCOUNTER
----- Message from Josh Campos DO sent at 2/28/2019  7:31 AM EST -----  Call patient and let him know diabetes is well controlled with A1C 6 3%   Cholesterol is normal  Kidney function is normal

## 2019-03-04 ENCOUNTER — TELEPHONE (OUTPATIENT)
Dept: INTERNAL MEDICINE CLINIC | Facility: CLINIC | Age: 79
End: 2019-03-04

## 2019-03-04 NOTE — TELEPHONE ENCOUNTER
CALLED KAREN AND WAS NOTIFIED AND INFORMED ME SHE ALSO TOLD SHAVONNE AYSHA  DOES NOT REALLY WANT PHY   THERAPY SO HER NEXT VISIT WILL BE FOR D/C

## 2019-03-04 NOTE — TELEPHONE ENCOUNTER
She completed admission for occupational therapy and home health aid, which started yesterday    OT will evaluate  tomorrow    Home health to start Thursday and Friday    Patient's main concern is skills for his hands    which is needed due to necropathy        He's denying pain from neuropathy    But does have discomfort  Jose Maria Po He takes  gabapentin 300 mg BID    Would  set parameters for pain  Jose Maria Po Usually, if more then a 4 they call   Would  want to increase this ? ?? And for OX sat    If lower then 90    they call , is Dr Maria Guadalupe Cabral with that ? ??  Harish Sites like a call back today w/'s parameters    Please

## 2019-03-06 ENCOUNTER — APPOINTMENT (OUTPATIENT)
Dept: CT IMAGING | Facility: HOSPITAL | Age: 79
End: 2019-03-06

## 2019-03-11 ENCOUNTER — TELEPHONE (OUTPATIENT)
Dept: INTERNAL MEDICINE CLINIC | Facility: CLINIC | Age: 79
End: 2019-03-11

## 2019-03-11 NOTE — TELEPHONE ENCOUNTER
Derek Sultana from [facility] called  Patient has been discharged from physical therapy  Occupational therapy and [address] are still coming in     [address]

## 2019-03-13 ENCOUNTER — OFFICE VISIT (OUTPATIENT)
Dept: NEUROLOGY | Facility: CLINIC | Age: 79
End: 2019-03-13
Payer: MEDICARE

## 2019-03-13 VITALS
HEART RATE: 76 BPM | WEIGHT: 138 LBS | DIASTOLIC BLOOD PRESSURE: 76 MMHG | SYSTOLIC BLOOD PRESSURE: 112 MMHG | BODY MASS INDEX: 20.92 KG/M2 | HEIGHT: 68 IN

## 2019-03-13 DIAGNOSIS — A52.11 PROGRESSIVE LOCOMOTOR ATAXIA: ICD-10-CM

## 2019-03-13 DIAGNOSIS — M48.02 DEGENERATIVE CERVICAL SPINAL STENOSIS: Primary | Chronic | ICD-10-CM

## 2019-03-13 DIAGNOSIS — E11.40 TYPE 2 DIABETES MELLITUS WITH DIABETIC NEUROPATHY, WITHOUT LONG-TERM CURRENT USE OF INSULIN (HCC): Chronic | ICD-10-CM

## 2019-03-13 DIAGNOSIS — F43.21 ADJUSTMENT DISORDER WITH DEPRESSED MOOD: ICD-10-CM

## 2019-03-13 PROCEDURE — 99215 OFFICE O/P EST HI 40 MIN: CPT | Performed by: PSYCHIATRY & NEUROLOGY

## 2019-03-13 RX ORDER — GABAPENTIN 300 MG/1
300 CAPSULE ORAL 2 TIMES DAILY
Qty: 90 CAPSULE | Refills: 5
Start: 2019-03-13 | End: 2019-04-22 | Stop reason: ALTCHOICE

## 2019-03-13 RX ORDER — DULOXETIN HYDROCHLORIDE 30 MG/1
30 CAPSULE, DELAYED RELEASE ORAL DAILY
Qty: 30 CAPSULE | Refills: 1 | Status: SHIPPED | OUTPATIENT
Start: 2019-03-13 | End: 2019-04-22 | Stop reason: SDUPTHER

## 2019-03-13 RX ORDER — BLOOD SUGAR DIAGNOSTIC
STRIP MISCELLANEOUS
Refills: 0 | COMMUNITY
Start: 2019-03-11 | End: 2019-06-11 | Stop reason: SDUPTHER

## 2019-03-13 NOTE — PROGRESS NOTES
Consultation - Neurology   Rubén Peter During 70 y o  male MRN: [id number]  Unit/Bed#:  Encounter: [id number]      Physician Requesting Consult: Dr John Clay  Chief complaint     HPI:    Review of Systems:  Review of Systems   Constitutional: Positive for appetite change and fatigue  Negative for fever  HENT: Negative  Negative for hearing loss, tinnitus, trouble swallowing and voice change  Eyes: Negative  Negative for photophobia, pain and visual disturbance  Respiratory: Positive for apnea and shortness of breath  On CPAP   Cardiovascular: Positive for leg swelling  Negative for chest pain and palpitations  Gastrointestinal: Negative  Negative for abdominal pain, constipation, diarrhea, nausea and vomiting  Endocrine: Negative  Negative for cold intolerance and heat intolerance  Genitourinary: Positive for enuresis, frequency and urgency  Negative for dysuria  Musculoskeletal: Positive for back pain, gait problem and neck pain  Negative for myalgias  Occasional Muscle Cramping of lower legs   Skin: Negative  Negative for rash  Neurological: Positive for weakness and numbness  Negative for dizziness, tremors, seizures, syncope, facial asymmetry, speech difficulty, light-headedness and headaches  Hematological: Negative  Does not bruise/bleed easily  Psychiatric/Behavioral: Positive for dysphoric mood and sleep disturbance  Negative for confusion, decreased concentration and hallucinations         Historical Information   Past Medical History:   Diagnosis Date    BPH (benign prostatic hyperplasia)     Chemical exposure     911    [facility]     COPD [facility]chronic obstructive pulmonary disease) [facility]     DDD (degenerative disc disease), cervical     [facility]     Foraminal stenosis of cervical region     Hyperlipidemia     Hypertension      Past Surgical History:   Procedure Laterality Date    ARTHROSCOPY KNEE Bilateral  LUNG SURGERY Left     scraping of left    ROTATOR CUFF REPAIR Bilateral      Social History   Social History     Tobacco Use   Smoking Status Current Every Day Smoker    Years: 61 00    Types: Pipe   Smokeless Tobacco Never Used   Tobacco Comment    "5 loads of pipe a day"      Social History     Substance and Sexual Activity   Alcohol Use No     Social History     Substance and Sexual Activity   Drug Use No       Family History:   Family History   Problem Relation Age of Onset    No Known Problems Mother     No Known Problems Father        No Known Allergies    Meds:  All current active meds have been reviewed    Scheduled Meds:  PRN Meds:     Physical Exam:       Objective   Vitals:   Vitals:    03/13/19 1035   BP: 112/76   BP Location: Left arm   Patient Position: Sitting   Cuff Size: Adult   Pulse: 76   Weight: 62 6 kg (138 lb)   Height: 5' 8" (1 727 m)   ,Body mass index is 20 98 kg/m²  General appearance: Cooperative in no acute distress  Head & neck head is atraumatic and normocephalic  Neck is supple with full range of motion  Cardiovascular: Carotid arteriesno carotid bruits  Assessment:      Plan:              Kathaleen Bence, MA  3/13/2019,10:47 AM    Dictation voice to text software has been used in the creation of this document

## 2019-03-13 NOTE — PROGRESS NOTES
Progress Note - Neurology   Zackary Contreras During 70 y o  male MRN: [id number]  Unit/Bed#:  Encounter: [id number]      Subjective:   Patient is a 30-year-old right-handed gentleman who was seen by me last in November of 2017 while he was hospitalized had significant gait ataxia, and advised outpatient follow-up  Patient had an MRI of the brain during that time which showed evidence of chronic microvascular angiopathy but no evidence of any lacunar CVA  Subsequently patient also had an MRI of the cervical spine which showed multilevel degenerative spondylitic changes and significant osteophytic changes throughout but most significant at C3-C4 causing effacement of the spinal thecal sac  Patient subsequently was followed up with his neurologist in [place] and continues to experience gait imbalance, ambulates with the help of a cane, as well as complaining of tingling numbness more prominently in both his hands with worsening weakness in the lower extremities  He denies any central neurological symptoms such as headaches, blurred vision, speech dysfunction or diplopia  Also denies any vertigo or dizziness  Patient does have chronic neck and low back pain for which he is also followed up with Orthopedic surgery and claims he recently had an MRI of the cervical and lumbar spine done in December at [facility]   Patient also is feeling depressed due to his current condition  Claims his blood pressure and blood sugar under relatively good control at this time  He also denies alcohol use  Patient claims he smokes pipe on a regular basis  ROS:   Review of Systems   Constitutional: Positive for appetite change and fatigue  Negative for fever  HENT: Negative  Negative for hearing loss, tinnitus, trouble swallowing and voice change  Eyes: Negative  Negative for photophobia and pain  Respiratory: Positive for apnea and shortness of breath  Cardiovascular: Positive for leg swelling   Negative for palpitations  Gastrointestinal: Negative  Negative for nausea and vomiting  Endocrine: Negative  Negative for cold intolerance and heat intolerance  Genitourinary: Positive for enuresis, frequency and urgency  Negative for dysuria  Musculoskeletal: Positive for back pain, gait problem and neck pain  Negative for myalgias  Skin: Negative  Negative for rash  Neurological: Positive for weakness and numbness  Negative for dizziness, tremors, seizures, syncope, facial asymmetry, speech difficulty, light-headedness and headaches  Hematological: Negative  Does not bruise/bleed easily  Psychiatric/Behavioral: Negative  Negative for confusion, hallucinations and sleep disturbance  Vitals:   Vitals:    03/13/19 1035   BP: 112/76   BP Location: Left arm   Patient Position: Sitting   Cuff Size: Adult   Pulse: 76   Weight: 62 6 kg (138 lb)   Height: 5' 8" (1 727 m)   ,Body mass index is 20 98 kg/m²      MEDS:      Current Outpatient Medications:     amLODIPine (NORVASC) 5 mg tablet, Take 5 mg by mouth daily, Disp: , Rfl: 0    aspirin (ECOTRIN LOW STRENGTH) 81 mg EC tablet, Take 1 tablet by mouth daily, Disp: 30 tablet, Rfl: 0    diltiazem (TIAZAC) 180 MG 24 hr capsule, Take 180 mg by mouth daily, Disp: , Rfl:     fluticasone-salmeterol (ADVAIR) 250-50 mcg/dose inhaler, Inhale 1 puff every 12 (twelve) hours, Disp: , Rfl:     gabapentin (NEURONTIN) 300 mg capsule, Take 1 capsule (300 mg total) by mouth 2 (two) times a day, Disp: 90 capsule, Rfl: 5    ipratropium-albuterol (COMBIVENT RESPIMAT) inhaler, Inhale 1 puff as needed, Disp: , Rfl:     KOMBIGLYZE XR 5-1000 MG TB24, Take 1 tablet by mouth daily, Disp: , Rfl:     Lancets (ONETOUCH ULTRASOFT) lancets, 28G needle ,1 8 mm depth, Disp: 100 each, Rfl: 0    losartan (COZAAR) 100 MG tablet, Take 100 mg by mouth daily, Disp: , Rfl:     Multiple Vitamin (MULTI-VITAMINS PO), Take by mouth daily, Disp: , Rfl:     ONETOUCH VERIO test strip, , Disp: , Rfl: 0    pravastatin (PRAVACHOL) 40 mg tablet, Take 1 tablet by mouth daily, Disp: , Rfl: 0    tamsulosin (FLOMAX) 0 4 mg, Take 0 4 mg by mouth daily at bedtime, Disp: , Rfl:     tiotropium (SPIRIVA) 18 mcg inhalation capsule, Place 18 mcg into inhaler and inhale daily, Disp: , Rfl:     DULoxetine (CYMBALTA) 30 mg delayed release capsule, Take 1 capsule (30 mg total) by mouth daily, Disp: 30 capsule, Rfl: 1    thiamine 50 MG tablet, Take 1 tablet (50 mg total) by mouth daily, Disp: 30 tablet, Rfl: 3  :    Physical Exam:  General appearance: alert, appears stated age and cooperative  Head: Normocephalic, without obvious abnormality, atraumatic    Patient is alert awake oriented, high functions are intact, speech is fluent  No evidence of any aphasia or dysarthria  Cranial nerve examination reveals visual fields are full to threat, pupils equal and reactive, extraocular movements intact, fundi showed sharp disc margins, sensation in the V1 V2 V3 distribution is symmetric, no obvious facial asymmetry noted,Hearing is preserved, tongue is midline and gag is adequate, shoulder shrug is symmetric bilaterally  Motor examination reveals normal tone and bulk, no evidence of any drift to the outstretched extremities, patient has evidence of weakness affecting the small muscles of the right hand, in the 4/5 range, as well as weakness in the lower extremities left greater than right in the 4+/5 range  Distally there is no evidence of any footdrop  Deep tendon reflexes are prominent in both lower extremities as compared to the upper, and toes are silent  Sensory examination to pinprick and light touch is diminished in the left hand as compared to the right, and affected in both feet including proprioception and vibration  patient does not extinguish double simultaneous stimuli    Coordination patient has evidence of finger-to-nose and knee to shin dysmetria bilaterally more prominent on the left side as compared to the right, with evidence of dysdiadochokinesia  Gait is ataxic, patient ambulates with the help of a cane extremely unsteady mostly at turns  No bruits were appreciable in the neck, there is no evidence of any cervical paraspinal tenderness, mild lumbosacral tenderness was noted  Lab Results: I have personally reviewed pertinent reports  Imaging Studies: I have personally reviewed pertinent reports  Assessment:  1  Progressively worsening gait ataxia with evidence of cervical radiculopathy and cervical myelopathy secondary to cervical spondylosis  2  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  3  Mood disorder  Plan:  Patient has been on gabapentin which does not give him adequate relief and also has noticed some pedal edema and is advised to lower the dose to twice a day  Will add Cymbalta 30 mg at bedtime to see if it helps with the neuropathy symptoms in the hands and the feet  Patient is also advised EMG studies of both upper extremities, recently completed a course of physical therapy is encouraged to continue home exercises, and will attempt to get his MRI results from [facility], done recently of the cervical spine  Patient's B1 level was also slightly low in 2017, will start the patient on thiamine 50 mg daily  He is advised to return back to see me in 1 month  Will require cervical decompression in the near future  3/13/2019,3:10 PM    Dictation voice to text software has been used in the creation of this document  Please consider this in light of any contextual or grammatical errors

## 2019-03-20 ENCOUNTER — APPOINTMENT (OUTPATIENT)
Dept: CT IMAGING | Facility: HOSPITAL | Age: 79
End: 2019-03-20

## 2019-03-28 ENCOUNTER — CONSULT (OUTPATIENT)
Dept: PULMONOLOGY | Facility: CLINIC | Age: 79
End: 2019-03-28
Payer: MEDICARE

## 2019-03-28 ENCOUNTER — TELEPHONE (OUTPATIENT)
Dept: INTERNAL MEDICINE CLINIC | Facility: CLINIC | Age: 79
End: 2019-03-28

## 2019-03-28 VITALS
OXYGEN SATURATION: 97 % | HEIGHT: 72 IN | WEIGHT: 137 LBS | HEART RATE: 84 BPM | DIASTOLIC BLOOD PRESSURE: 70 MMHG | SYSTOLIC BLOOD PRESSURE: 104 MMHG | BODY MASS INDEX: 18.56 KG/M2

## 2019-03-28 DIAGNOSIS — E11.40 TYPE 2 DIABETES MELLITUS WITH DIABETIC NEUROPATHY, WITHOUT LONG-TERM CURRENT USE OF INSULIN (HCC): Primary | Chronic | ICD-10-CM

## 2019-03-28 DIAGNOSIS — J44.9 CHRONIC OBSTRUCTIVE PULMONARY DISEASE, UNSPECIFIED COPD TYPE (HCC): Chronic | ICD-10-CM

## 2019-03-28 DIAGNOSIS — R91.1 LUNG NODULE: Primary | ICD-10-CM

## 2019-03-28 PROCEDURE — 99204 OFFICE O/P NEW MOD 45 MIN: CPT | Performed by: INTERNAL MEDICINE

## 2019-04-08 ENCOUNTER — OFFICE VISIT (OUTPATIENT)
Dept: INTERNAL MEDICINE CLINIC | Facility: CLINIC | Age: 79
End: 2019-04-08
Payer: MEDICARE

## 2019-04-08 VITALS
HEART RATE: 92 BPM | OXYGEN SATURATION: 96 % | WEIGHT: 138.8 LBS | BODY MASS INDEX: 18.82 KG/M2 | SYSTOLIC BLOOD PRESSURE: 142 MMHG | DIASTOLIC BLOOD PRESSURE: 90 MMHG

## 2019-04-08 DIAGNOSIS — M47.22 OSTEOARTHRITIS OF SPINE WITH RADICULOPATHY, CERVICAL REGION: Chronic | ICD-10-CM

## 2019-04-08 DIAGNOSIS — M54.50 CHRONIC BILATERAL LOW BACK PAIN WITHOUT SCIATICA: ICD-10-CM

## 2019-04-08 DIAGNOSIS — R60.0 LOWER EXTREMITY EDEMA: ICD-10-CM

## 2019-04-08 DIAGNOSIS — G89.29 CHRONIC BILATERAL LOW BACK PAIN WITHOUT SCIATICA: ICD-10-CM

## 2019-04-08 DIAGNOSIS — E11.40 TYPE 2 DIABETES MELLITUS WITH DIABETIC NEUROPATHY, WITHOUT LONG-TERM CURRENT USE OF INSULIN (HCC): Primary | Chronic | ICD-10-CM

## 2019-04-08 PROCEDURE — 99213 OFFICE O/P EST LOW 20 MIN: CPT | Performed by: INTERNAL MEDICINE

## 2019-04-09 ENCOUNTER — TELEPHONE (OUTPATIENT)
Dept: INTERNAL MEDICINE CLINIC | Facility: CLINIC | Age: 79
End: 2019-04-09

## 2019-04-11 ENCOUNTER — TELEPHONE (OUTPATIENT)
Dept: INTERNAL MEDICINE CLINIC | Facility: CLINIC | Age: 79
End: 2019-04-11

## 2019-04-17 ENCOUNTER — HOSPITAL ENCOUNTER (OUTPATIENT)
Dept: PULMONOLOGY | Facility: HOSPITAL | Age: 79
Discharge: HOME | End: 2019-04-17
Attending: INTERNAL MEDICINE
Payer: MEDICARE

## 2019-04-17 ENCOUNTER — HOSPITAL ENCOUNTER (OUTPATIENT)
Dept: CT IMAGING | Facility: HOSPITAL | Age: 79
Discharge: HOME | End: 2019-04-17
Attending: INTERNAL MEDICINE
Payer: MEDICARE

## 2019-04-17 ENCOUNTER — PROCEDURE VISIT (OUTPATIENT)
Dept: NEUROLOGY | Facility: CLINIC | Age: 79
End: 2019-04-17
Payer: MEDICARE

## 2019-04-17 DIAGNOSIS — R20.2 TINGLING OF BOTH UPPER EXTREMITIES: Primary | ICD-10-CM

## 2019-04-17 DIAGNOSIS — J44.9 CHRONIC OBSTRUCTIVE PULMONARY DISEASE, UNSPECIFIED COPD TYPE (HCC): ICD-10-CM

## 2019-04-17 DIAGNOSIS — R91.1 LUNG NODULE: ICD-10-CM

## 2019-04-17 PROCEDURE — 95911 NRV CNDJ TEST 9-10 STUDIES: CPT | Performed by: PHYSICAL MEDICINE & REHABILITATION

## 2019-04-17 PROCEDURE — 94729 DIFFUSING CAPACITY: CPT

## 2019-04-17 PROCEDURE — 94726 PLETHYSMOGRAPHY LUNG VOLUMES: CPT | Performed by: INTERNAL MEDICINE

## 2019-04-17 PROCEDURE — 94760 N-INVAS EAR/PLS OXIMETRY 1: CPT

## 2019-04-17 PROCEDURE — 94729 DIFFUSING CAPACITY: CPT | Performed by: INTERNAL MEDICINE

## 2019-04-17 PROCEDURE — 94060 EVALUATION OF WHEEZING: CPT | Performed by: INTERNAL MEDICINE

## 2019-04-17 PROCEDURE — 95886 MUSC TEST DONE W/N TEST COMP: CPT | Performed by: PHYSICAL MEDICINE & REHABILITATION

## 2019-04-17 PROCEDURE — 94727 GAS DIL/WSHOT DETER LNG VOL: CPT

## 2019-04-17 PROCEDURE — 71250 CT THORAX DX C-: CPT

## 2019-04-17 PROCEDURE — 94060 EVALUATION OF WHEEZING: CPT

## 2019-04-17 RX ORDER — ALBUTEROL SULFATE 2.5 MG/3ML
2.5 SOLUTION RESPIRATORY (INHALATION) ONCE
Status: COMPLETED | OUTPATIENT
Start: 2019-04-17 | End: 2019-04-17

## 2019-04-17 RX ADMIN — ALBUTEROL SULFATE 2.5 MG: 2.5 SOLUTION RESPIRATORY (INHALATION) at 12:13

## 2019-04-22 ENCOUNTER — OFFICE VISIT (OUTPATIENT)
Dept: NEUROLOGY | Facility: CLINIC | Age: 79
End: 2019-04-22
Payer: MEDICARE

## 2019-04-22 VITALS
HEART RATE: 76 BPM | BODY MASS INDEX: 20.31 KG/M2 | WEIGHT: 134 LBS | DIASTOLIC BLOOD PRESSURE: 64 MMHG | SYSTOLIC BLOOD PRESSURE: 118 MMHG | HEIGHT: 68 IN

## 2019-04-22 DIAGNOSIS — E11.40 TYPE 2 DIABETES MELLITUS WITH DIABETIC NEUROPATHY, WITHOUT LONG-TERM CURRENT USE OF INSULIN (HCC): Chronic | ICD-10-CM

## 2019-04-22 PROCEDURE — 99213 OFFICE O/P EST LOW 20 MIN: CPT | Performed by: PSYCHIATRY & NEUROLOGY

## 2019-04-22 RX ORDER — DULOXETIN HYDROCHLORIDE 60 MG/1
60 CAPSULE, DELAYED RELEASE ORAL DAILY
Qty: 30 CAPSULE | Refills: 1 | Status: SHIPPED | OUTPATIENT
Start: 2019-04-22 | End: 2019-06-10 | Stop reason: SDUPTHER

## 2019-04-23 ENCOUNTER — TELEPHONE (OUTPATIENT)
Dept: NEUROLOGY | Facility: CLINIC | Age: 79
End: 2019-04-23

## 2019-04-23 ENCOUNTER — TELEPHONE (OUTPATIENT)
Dept: PULMONOLOGY | Facility: CLINIC | Age: 79
End: 2019-04-23

## 2019-04-23 ENCOUNTER — TELEPHONE (OUTPATIENT)
Dept: INTERNAL MEDICINE CLINIC | Facility: CLINIC | Age: 79
End: 2019-04-23

## 2019-04-23 DIAGNOSIS — M48.02 DEGENERATIVE CERVICAL SPINAL STENOSIS: Primary | ICD-10-CM

## 2019-04-29 ENCOUNTER — OFFICE VISIT (OUTPATIENT)
Dept: PULMONOLOGY | Facility: CLINIC | Age: 79
End: 2019-04-29
Payer: MEDICARE

## 2019-04-29 VITALS
WEIGHT: 135 LBS | HEART RATE: 82 BPM | DIASTOLIC BLOOD PRESSURE: 68 MMHG | SYSTOLIC BLOOD PRESSURE: 98 MMHG | HEIGHT: 68 IN | BODY MASS INDEX: 20.46 KG/M2 | OXYGEN SATURATION: 98 %

## 2019-04-29 DIAGNOSIS — G47.33 OSA (OBSTRUCTIVE SLEEP APNEA): ICD-10-CM

## 2019-04-29 DIAGNOSIS — R93.89 ABNORMAL CHEST CT: ICD-10-CM

## 2019-04-29 DIAGNOSIS — R06.02 SHORTNESS OF BREATH: Primary | ICD-10-CM

## 2019-04-29 DIAGNOSIS — Z72.0 TOBACCO ABUSE: ICD-10-CM

## 2019-04-29 DIAGNOSIS — J43.9 PULMONARY EMPHYSEMA, UNSPECIFIED EMPHYSEMA TYPE (HCC): Chronic | ICD-10-CM

## 2019-04-29 PROCEDURE — 99214 OFFICE O/P EST MOD 30 MIN: CPT | Performed by: PHYSICIAN ASSISTANT

## 2019-04-30 ENCOUNTER — TELEPHONE (OUTPATIENT)
Dept: NEUROLOGY | Facility: CLINIC | Age: 79
End: 2019-04-30

## 2019-05-01 ENCOUNTER — TELEPHONE (OUTPATIENT)
Dept: INTERNAL MEDICINE CLINIC | Facility: CLINIC | Age: 79
End: 2019-05-01

## 2019-05-06 DIAGNOSIS — E11.69 HYPERLIPIDEMIA ASSOCIATED WITH TYPE 2 DIABETES MELLITUS (HCC): Primary | ICD-10-CM

## 2019-05-06 DIAGNOSIS — E78.5 HYPERLIPIDEMIA ASSOCIATED WITH TYPE 2 DIABETES MELLITUS (HCC): Primary | ICD-10-CM

## 2019-05-06 RX ORDER — PRAVASTATIN SODIUM 40 MG
TABLET ORAL
Qty: 90 TABLET | Refills: 1 | Status: SHIPPED | OUTPATIENT
Start: 2019-05-06 | End: 2020-01-16 | Stop reason: SDUPTHER

## 2019-05-08 ENCOUNTER — CONSULT (OUTPATIENT)
Dept: NEUROSURGERY | Facility: CLINIC | Age: 79
End: 2019-05-08
Payer: MEDICARE

## 2019-05-08 ENCOUNTER — TELEPHONE (OUTPATIENT)
Dept: PULMONOLOGY | Facility: CLINIC | Age: 79
End: 2019-05-08

## 2019-05-08 VITALS
TEMPERATURE: 97.5 F | HEART RATE: 83 BPM | HEIGHT: 68 IN | SYSTOLIC BLOOD PRESSURE: 123 MMHG | WEIGHT: 131 LBS | DIASTOLIC BLOOD PRESSURE: 70 MMHG | BODY MASS INDEX: 19.85 KG/M2 | RESPIRATION RATE: 16 BRPM

## 2019-05-08 DIAGNOSIS — G95.20 SPINAL CORD COMPRESSION (HCC): ICD-10-CM

## 2019-05-08 DIAGNOSIS — G82.50 QUADRIPARESIS (HCC): ICD-10-CM

## 2019-05-08 DIAGNOSIS — M47.12 OTHER SPONDYLOSIS WITH MYELOPATHY, CERVICAL REGION: Primary | ICD-10-CM

## 2019-05-08 DIAGNOSIS — M48.02 DEGENERATIVE CERVICAL SPINAL STENOSIS: ICD-10-CM

## 2019-05-08 PROCEDURE — 99205 OFFICE O/P NEW HI 60 MIN: CPT | Performed by: NEUROLOGICAL SURGERY

## 2019-05-08 RX ORDER — CEFAZOLIN SODIUM 1 G/50ML
1000 SOLUTION INTRAVENOUS ONCE
Status: CANCELLED | OUTPATIENT
Start: 2019-05-08 | End: 2019-05-08

## 2019-05-08 RX ORDER — CHLORHEXIDINE GLUCONATE 0.12 MG/ML
15 RINSE ORAL ONCE
Status: CANCELLED | OUTPATIENT
Start: 2019-05-08 | End: 2019-05-08

## 2019-05-09 ENCOUNTER — TELEPHONE (OUTPATIENT)
Dept: UROLOGY | Facility: MEDICAL CENTER | Age: 79
End: 2019-05-09

## 2019-05-09 ENCOUNTER — TELEPHONE (OUTPATIENT)
Dept: PULMONOLOGY | Facility: CLINIC | Age: 79
End: 2019-05-09

## 2019-05-14 ENCOUNTER — HOSPITAL ENCOUNTER (OUTPATIENT)
Dept: NON INVASIVE DIAGNOSTICS | Facility: CLINIC | Age: 79
Discharge: HOME | End: 2019-05-14
Payer: MEDICARE

## 2019-05-14 DIAGNOSIS — R06.02 SHORTNESS OF BREATH: ICD-10-CM

## 2019-05-14 PROCEDURE — 93306 TTE W/DOPPLER COMPLETE: CPT | Performed by: INTERNAL MEDICINE

## 2019-05-14 PROCEDURE — 93306 TTE W/DOPPLER COMPLETE: CPT

## 2019-05-16 ENCOUNTER — TELEPHONE (OUTPATIENT)
Dept: UROLOGY | Facility: CLINIC | Age: 79
End: 2019-05-16

## 2019-05-16 ENCOUNTER — CONSULT (OUTPATIENT)
Dept: UROLOGY | Facility: CLINIC | Age: 79
End: 2019-05-16
Payer: MEDICARE

## 2019-05-16 VITALS
HEART RATE: 94 BPM | BODY MASS INDEX: 19.88 KG/M2 | WEIGHT: 131.2 LBS | DIASTOLIC BLOOD PRESSURE: 64 MMHG | SYSTOLIC BLOOD PRESSURE: 118 MMHG | HEIGHT: 68 IN

## 2019-05-16 DIAGNOSIS — N32.81 OVERACTIVE BLADDER: ICD-10-CM

## 2019-05-16 DIAGNOSIS — N40.1 BENIGN PROSTATIC HYPERPLASIA WITH LOWER URINARY TRACT SYMPTOMS, SYMPTOM DETAILS UNSPECIFIED: Primary | ICD-10-CM

## 2019-05-16 DIAGNOSIS — C61 PROSTATE CANCER (HCC): ICD-10-CM

## 2019-05-16 PROCEDURE — 99204 OFFICE O/P NEW MOD 45 MIN: CPT | Performed by: UROLOGY

## 2019-05-17 ENCOUNTER — TELEPHONE (OUTPATIENT)
Dept: UROLOGY | Facility: CLINIC | Age: 79
End: 2019-05-17

## 2019-05-20 ENCOUNTER — APPOINTMENT (OUTPATIENT)
Dept: LAB | Facility: CLINIC | Age: 79
End: 2019-05-20
Payer: MEDICARE

## 2019-05-20 ENCOUNTER — TRANSCRIBE ORDERS (OUTPATIENT)
Dept: LAB | Facility: CLINIC | Age: 79
End: 2019-05-20

## 2019-05-20 DIAGNOSIS — G95.20 SPINAL CORD COMPRESSION (HCC): Primary | ICD-10-CM

## 2019-05-20 DIAGNOSIS — M48.02 DEGENERATIVE CERVICAL SPINAL STENOSIS: ICD-10-CM

## 2019-05-20 DIAGNOSIS — Z01.818 PRE-PROCEDURAL EXAMINATION: ICD-10-CM

## 2019-05-20 DIAGNOSIS — G82.50 QUADRIPARESIS (HCC): ICD-10-CM

## 2019-05-20 DIAGNOSIS — C61 PROSTATE CANCER (HCC): ICD-10-CM

## 2019-05-20 DIAGNOSIS — G95.20 SPINAL CORD COMPRESSION (HCC): ICD-10-CM

## 2019-05-20 DIAGNOSIS — M47.12 OTHER SPONDYLOSIS WITH MYELOPATHY, CERVICAL REGION: ICD-10-CM

## 2019-05-20 LAB
ABO GROUP BLD: NORMAL
ALBUMIN SERPL BCP-MCNC: 3.8 G/DL (ref 3.5–5)
ALP SERPL-CCNC: 84 U/L (ref 46–116)
ALT SERPL W P-5'-P-CCNC: 37 U/L (ref 12–78)
ANION GAP SERPL CALCULATED.3IONS-SCNC: 9 MMOL/L (ref 4–13)
APTT PPP: 44 SECONDS (ref 26–38)
AST SERPL W P-5'-P-CCNC: 13 U/L (ref 5–45)
BASOPHILS # BLD AUTO: 0.01 THOUSANDS/ΜL (ref 0–0.1)
BASOPHILS NFR BLD AUTO: 0 % (ref 0–1)
BILIRUB SERPL-MCNC: 0.6 MG/DL (ref 0.2–1)
BLD GP AB SCN SERPL QL: NEGATIVE
BUN SERPL-MCNC: 16 MG/DL (ref 5–25)
CALCIUM SERPL-MCNC: 9.9 MG/DL (ref 8.3–10.1)
CHLORIDE SERPL-SCNC: 111 MMOL/L (ref 100–108)
CO2 SERPL-SCNC: 29 MMOL/L (ref 21–32)
CREAT SERPL-MCNC: 0.93 MG/DL (ref 0.6–1.3)
EOSINOPHIL # BLD AUTO: 0.06 THOUSAND/ΜL (ref 0–0.61)
EOSINOPHIL NFR BLD AUTO: 1 % (ref 0–6)
ERYTHROCYTE [DISTWIDTH] IN BLOOD BY AUTOMATED COUNT: 14.9 % (ref 11.6–15.1)
EST. AVERAGE GLUCOSE BLD GHB EST-MCNC: 126 MG/DL
GFR SERPL CREATININE-BSD FRML MDRD: 92 ML/MIN/1.73SQ M
GLUCOSE P FAST SERPL-MCNC: 128 MG/DL (ref 65–99)
HBA1C MFR BLD: 6 % (ref 4.2–6.3)
HCT VFR BLD AUTO: 42.9 % (ref 36.5–49.3)
HGB BLD-MCNC: 13.9 G/DL (ref 12–17)
IMM GRANULOCYTES # BLD AUTO: 0.01 THOUSAND/UL (ref 0–0.2)
IMM GRANULOCYTES NFR BLD AUTO: 0 % (ref 0–2)
INR PPP: 1.26 (ref 0.86–1.17)
LYMPHOCYTES # BLD AUTO: 0.95 THOUSANDS/ΜL (ref 0.6–4.47)
LYMPHOCYTES NFR BLD AUTO: 23 % (ref 14–44)
MCH RBC QN AUTO: 27.9 PG (ref 26.8–34.3)
MCHC RBC AUTO-ENTMCNC: 32.4 G/DL (ref 31.4–37.4)
MCV RBC AUTO: 86 FL (ref 82–98)
MONOCYTES # BLD AUTO: 0.31 THOUSAND/ΜL (ref 0.17–1.22)
MONOCYTES NFR BLD AUTO: 7 % (ref 4–12)
NEUTROPHILS # BLD AUTO: 2.88 THOUSANDS/ΜL (ref 1.85–7.62)
NEUTS SEG NFR BLD AUTO: 69 % (ref 43–75)
NRBC BLD AUTO-RTO: 0 /100 WBCS
PLATELET # BLD AUTO: 142 THOUSANDS/UL (ref 149–390)
PMV BLD AUTO: 11 FL (ref 8.9–12.7)
POTASSIUM SERPL-SCNC: 4 MMOL/L (ref 3.5–5.3)
PROT SERPL-MCNC: 6.3 G/DL (ref 6.4–8.2)
PROTHROMBIN TIME: 15.9 SECONDS (ref 11.8–14.2)
RBC # BLD AUTO: 4.99 MILLION/UL (ref 3.88–5.62)
RH BLD: POSITIVE
SODIUM SERPL-SCNC: 149 MMOL/L (ref 136–145)
SPECIMEN EXPIRATION DATE: NORMAL
WBC # BLD AUTO: 4.22 THOUSAND/UL (ref 4.31–10.16)

## 2019-05-20 PROCEDURE — 36415 COLL VENOUS BLD VENIPUNCTURE: CPT

## 2019-05-20 PROCEDURE — 86900 BLOOD TYPING SEROLOGIC ABO: CPT

## 2019-05-20 PROCEDURE — 80053 COMPREHEN METABOLIC PANEL: CPT

## 2019-05-20 PROCEDURE — 85610 PROTHROMBIN TIME: CPT

## 2019-05-20 PROCEDURE — 86850 RBC ANTIBODY SCREEN: CPT

## 2019-05-20 PROCEDURE — 85025 COMPLETE CBC W/AUTO DIFF WBC: CPT

## 2019-05-20 PROCEDURE — 83036 HEMOGLOBIN GLYCOSYLATED A1C: CPT

## 2019-05-20 PROCEDURE — 86901 BLOOD TYPING SEROLOGIC RH(D): CPT

## 2019-05-20 PROCEDURE — 85730 THROMBOPLASTIN TIME PARTIAL: CPT

## 2019-05-24 ENCOUNTER — TELEPHONE (OUTPATIENT)
Dept: INTERNAL MEDICINE CLINIC | Facility: CLINIC | Age: 79
End: 2019-05-24

## 2019-05-29 ENCOUNTER — TELEPHONE (OUTPATIENT)
Dept: UROLOGY | Facility: MEDICAL CENTER | Age: 79
End: 2019-05-29

## 2019-05-29 DIAGNOSIS — N39.0 URINARY TRACT INFECTION WITHOUT HEMATURIA, SITE UNSPECIFIED: Primary | ICD-10-CM

## 2019-05-29 RX ORDER — NITROFURANTOIN 25; 75 MG/1; MG/1
100 CAPSULE ORAL 2 TIMES DAILY
Qty: 14 CAPSULE | Refills: 0 | Status: SHIPPED | OUTPATIENT
Start: 2019-05-29 | End: 2019-06-05

## 2019-06-07 ENCOUNTER — CONSULT (OUTPATIENT)
Dept: INTERNAL MEDICINE CLINIC | Facility: CLINIC | Age: 79
End: 2019-06-07
Payer: MEDICARE

## 2019-06-07 ENCOUNTER — APPOINTMENT (OUTPATIENT)
Dept: LAB | Facility: HOSPITAL | Age: 79
End: 2019-06-07
Attending: NEUROLOGICAL SURGERY
Payer: MEDICARE

## 2019-06-07 VITALS
WEIGHT: 132.2 LBS | DIASTOLIC BLOOD PRESSURE: 86 MMHG | OXYGEN SATURATION: 98 % | HEART RATE: 82 BPM | BODY MASS INDEX: 20.03 KG/M2 | HEIGHT: 68 IN | SYSTOLIC BLOOD PRESSURE: 136 MMHG

## 2019-06-07 DIAGNOSIS — G82.50 QUADRIPARESIS (HCC): ICD-10-CM

## 2019-06-07 DIAGNOSIS — G95.20 SPINAL CORD COMPRESSION (HCC): ICD-10-CM

## 2019-06-07 DIAGNOSIS — M47.12 OTHER SPONDYLOSIS WITH MYELOPATHY, CERVICAL REGION: ICD-10-CM

## 2019-06-07 DIAGNOSIS — G47.33 OSA (OBSTRUCTIVE SLEEP APNEA): ICD-10-CM

## 2019-06-07 DIAGNOSIS — J43.9 PULMONARY EMPHYSEMA, UNSPECIFIED EMPHYSEMA TYPE (HCC): Chronic | ICD-10-CM

## 2019-06-07 DIAGNOSIS — I10 ESSENTIAL HYPERTENSION: Chronic | ICD-10-CM

## 2019-06-07 DIAGNOSIS — Z01.818 PRE-OPERATIVE EXAMINATION: Primary | ICD-10-CM

## 2019-06-07 DIAGNOSIS — E11.40 TYPE 2 DIABETES MELLITUS WITH DIABETIC NEUROPATHY, WITHOUT LONG-TERM CURRENT USE OF INSULIN (HCC): Chronic | ICD-10-CM

## 2019-06-07 DIAGNOSIS — M48.02 DEGENERATIVE CERVICAL SPINAL STENOSIS: ICD-10-CM

## 2019-06-07 DIAGNOSIS — Z01.818 PRE-PROCEDURAL EXAMINATION: ICD-10-CM

## 2019-06-07 PROBLEM — N28.9: Status: ACTIVE | Noted: 2019-04-17

## 2019-06-07 PROBLEM — N28.9: Status: ACTIVE | Noted: 2019-06-07

## 2019-06-07 PROCEDURE — 93000 ELECTROCARDIOGRAM COMPLETE: CPT | Performed by: INTERNAL MEDICINE

## 2019-06-07 PROCEDURE — 99214 OFFICE O/P EST MOD 30 MIN: CPT | Performed by: INTERNAL MEDICINE

## 2019-06-07 PROCEDURE — 87081 CULTURE SCREEN ONLY: CPT

## 2019-06-09 LAB — MRSA NOSE QL CULT: NORMAL

## 2019-06-10 DIAGNOSIS — E11.40 TYPE 2 DIABETES MELLITUS WITH DIABETIC NEUROPATHY, WITHOUT LONG-TERM CURRENT USE OF INSULIN (HCC): Chronic | ICD-10-CM

## 2019-06-10 DIAGNOSIS — I10 ESSENTIAL HYPERTENSION: ICD-10-CM

## 2019-06-10 DIAGNOSIS — N40.1 BENIGN PROSTATIC HYPERPLASIA WITH LOWER URINARY TRACT SYMPTOMS, SYMPTOM DETAILS UNSPECIFIED: Primary | ICD-10-CM

## 2019-06-10 RX ORDER — DULOXETIN HYDROCHLORIDE 60 MG/1
60 CAPSULE, DELAYED RELEASE ORAL DAILY
Qty: 90 CAPSULE | Refills: 3 | Status: SHIPPED | OUTPATIENT
Start: 2019-06-10 | End: 2020-04-20 | Stop reason: SDUPTHER

## 2019-06-10 RX ORDER — LANCETS 33 GAUGE
EACH MISCELLANEOUS
Qty: 100 EACH | Refills: 3 | OUTPATIENT
Start: 2019-06-10

## 2019-06-10 RX ORDER — LOSARTAN POTASSIUM 100 MG/1
100 TABLET ORAL DAILY
Qty: 90 TABLET | Refills: 3 | Status: SHIPPED | OUTPATIENT
Start: 2019-06-10 | End: 2020-07-13

## 2019-06-10 RX ORDER — LOSARTAN POTASSIUM 100 MG/1
TABLET ORAL
Qty: 90 TABLET | Refills: 1 | OUTPATIENT
Start: 2019-06-10

## 2019-06-10 RX ORDER — DULOXETIN HYDROCHLORIDE 60 MG/1
CAPSULE, DELAYED RELEASE ORAL
Qty: 90 CAPSULE | Refills: 1 | OUTPATIENT
Start: 2019-06-10

## 2019-06-10 RX ORDER — TAMSULOSIN HYDROCHLORIDE 0.4 MG/1
0.4 CAPSULE ORAL
Qty: 90 CAPSULE | Refills: 3 | Status: SHIPPED | OUTPATIENT
Start: 2019-06-10 | End: 2020-09-02

## 2019-06-10 RX ORDER — DULOXETIN HYDROCHLORIDE 60 MG/1
CAPSULE, DELAYED RELEASE ORAL
Qty: 30 CAPSULE | Refills: 1 | Status: SHIPPED | OUTPATIENT
Start: 2019-06-10 | End: 2019-06-10 | Stop reason: SDUPTHER

## 2019-06-10 RX ORDER — LANCETS 33 GAUGE
EACH MISCELLANEOUS
Qty: 100 EACH | Refills: 3 | Status: SHIPPED | OUTPATIENT
Start: 2019-06-10 | End: 2019-06-11 | Stop reason: SDUPTHER

## 2019-06-10 RX ORDER — TAMSULOSIN HYDROCHLORIDE 0.4 MG/1
0.4 CAPSULE ORAL
Qty: 90 CAPSULE | Refills: 1 | OUTPATIENT
Start: 2019-06-10

## 2019-06-11 ENCOUNTER — DOCUMENTATION (OUTPATIENT)
Dept: NEUROSURGERY | Facility: CLINIC | Age: 79
End: 2019-06-11

## 2019-06-11 DIAGNOSIS — E11.40 TYPE 2 DIABETES MELLITUS WITH DIABETIC NEUROPATHY, WITHOUT LONG-TERM CURRENT USE OF INSULIN (HCC): Primary | Chronic | ICD-10-CM

## 2019-06-11 DIAGNOSIS — E11.40 TYPE 2 DIABETES MELLITUS WITH DIABETIC NEUROPATHY, WITHOUT LONG-TERM CURRENT USE OF INSULIN (HCC): Chronic | ICD-10-CM

## 2019-06-11 RX ORDER — LANCETS 33 GAUGE
EACH MISCELLANEOUS
Qty: 100 EACH | Refills: 3 | Status: SHIPPED | OUTPATIENT
Start: 2019-06-11 | End: 2020-09-01

## 2019-06-17 ENCOUNTER — DOCUMENTATION (OUTPATIENT)
Dept: NEUROSURGERY | Facility: CLINIC | Age: 79
End: 2019-06-17

## 2019-06-18 ENCOUNTER — APPOINTMENT (INPATIENT)
Dept: RADIOLOGY | Facility: HOSPITAL | Age: 79
DRG: 472 | End: 2019-06-18
Payer: MEDICARE

## 2019-06-18 ENCOUNTER — HOSPITAL ENCOUNTER (INPATIENT)
Facility: HOSPITAL | Age: 79
LOS: 2 days | DRG: 472 | End: 2019-06-20
Attending: NEUROLOGICAL SURGERY | Admitting: NEUROLOGICAL SURGERY
Payer: MEDICARE

## 2019-06-18 ENCOUNTER — ANESTHESIA EVENT (OUTPATIENT)
Dept: PERIOP | Facility: HOSPITAL | Age: 79
DRG: 472 | End: 2019-06-18
Payer: MEDICARE

## 2019-06-18 ENCOUNTER — APPOINTMENT (OUTPATIENT)
Dept: RADIOLOGY | Facility: HOSPITAL | Age: 79
DRG: 472 | End: 2019-06-18
Payer: MEDICARE

## 2019-06-18 ENCOUNTER — ANESTHESIA (OUTPATIENT)
Dept: PERIOP | Facility: HOSPITAL | Age: 79
DRG: 472 | End: 2019-06-18
Payer: MEDICARE

## 2019-06-18 DIAGNOSIS — G95.9 CERVICAL MYELOPATHY (HCC): ICD-10-CM

## 2019-06-18 DIAGNOSIS — G95.20 SPINAL CORD COMPRESSION (HCC): ICD-10-CM

## 2019-06-18 DIAGNOSIS — G82.50 QUADRIPARESIS (HCC): Primary | ICD-10-CM

## 2019-06-18 LAB
ABO GROUP BLD: NORMAL
BASOPHILS # BLD AUTO: 0.01 THOUSANDS/ΜL (ref 0–0.1)
BASOPHILS NFR BLD AUTO: 0 % (ref 0–1)
BLD GP AB SCN SERPL QL: NEGATIVE
EOSINOPHIL # BLD AUTO: 0 THOUSAND/ΜL (ref 0–0.61)
EOSINOPHIL NFR BLD AUTO: 0 % (ref 0–6)
ERYTHROCYTE [DISTWIDTH] IN BLOOD BY AUTOMATED COUNT: 14.9 % (ref 11.6–15.1)
GLUCOSE SERPL-MCNC: 104 MG/DL (ref 65–140)
GLUCOSE SERPL-MCNC: 113 MG/DL (ref 65–140)
GLUCOSE SERPL-MCNC: 142 MG/DL (ref 65–140)
GLUCOSE SERPL-MCNC: 153 MG/DL (ref 65–140)
GLUCOSE SERPL-MCNC: 155 MG/DL (ref 65–140)
HCT VFR BLD AUTO: 41.2 % (ref 36.5–49.3)
HGB BLD-MCNC: 13.3 G/DL (ref 12–17)
IMM GRANULOCYTES # BLD AUTO: 0.04 THOUSAND/UL (ref 0–0.2)
IMM GRANULOCYTES NFR BLD AUTO: 1 % (ref 0–2)
LYMPHOCYTES # BLD AUTO: 0.77 THOUSANDS/ΜL (ref 0.6–4.47)
LYMPHOCYTES NFR BLD AUTO: 10 % (ref 14–44)
MCH RBC QN AUTO: 27.7 PG (ref 26.8–34.3)
MCHC RBC AUTO-ENTMCNC: 32.3 G/DL (ref 31.4–37.4)
MCV RBC AUTO: 86 FL (ref 82–98)
MONOCYTES # BLD AUTO: 0.13 THOUSAND/ΜL (ref 0.17–1.22)
MONOCYTES NFR BLD AUTO: 2 % (ref 4–12)
NEUTROPHILS # BLD AUTO: 6.69 THOUSANDS/ΜL (ref 1.85–7.62)
NEUTS SEG NFR BLD AUTO: 87 % (ref 43–75)
NRBC BLD AUTO-RTO: 0 /100 WBCS
PLATELET # BLD AUTO: 141 THOUSANDS/UL (ref 149–390)
PMV BLD AUTO: 10.1 FL (ref 8.9–12.7)
RBC # BLD AUTO: 4.8 MILLION/UL (ref 3.88–5.62)
RH BLD: POSITIVE
SPECIMEN EXPIRATION DATE: NORMAL
WBC # BLD AUTO: 7.64 THOUSAND/UL (ref 4.31–10.16)

## 2019-06-18 PROCEDURE — 86900 BLOOD TYPING SEROLOGIC ABO: CPT | Performed by: NEUROLOGICAL SURGERY

## 2019-06-18 PROCEDURE — 22854 INSJ BIOMECHANICAL DEVICE: CPT | Performed by: NEUROLOGICAL SURGERY

## 2019-06-18 PROCEDURE — 86901 BLOOD TYPING SEROLOGIC RH(D): CPT | Performed by: NEUROLOGICAL SURGERY

## 2019-06-18 PROCEDURE — 22845 INSERT SPINE FIXATION DEVICE: CPT | Performed by: NEUROLOGICAL SURGERY

## 2019-06-18 PROCEDURE — 86850 RBC ANTIBODY SCREEN: CPT | Performed by: NEUROLOGICAL SURGERY

## 2019-06-18 PROCEDURE — C1713 ANCHOR/SCREW BN/BN,TIS/BN: HCPCS | Performed by: NEUROLOGICAL SURGERY

## 2019-06-18 PROCEDURE — G8979 MOBILITY GOAL STATUS: HCPCS

## 2019-06-18 PROCEDURE — 63081 REMOVE VERT BODY DCMPRN CRVL: CPT | Performed by: NEUROLOGICAL SURGERY

## 2019-06-18 PROCEDURE — G8978 MOBILITY CURRENT STATUS: HCPCS

## 2019-06-18 PROCEDURE — 72040 X-RAY EXAM NECK SPINE 2-3 VW: CPT

## 2019-06-18 PROCEDURE — NC001 PR NO CHARGE: Performed by: NEUROLOGICAL SURGERY

## 2019-06-18 PROCEDURE — 97163 PT EVAL HIGH COMPLEX 45 MIN: CPT

## 2019-06-18 PROCEDURE — 4A11X4G MONITORING OF PERIPHERAL NERVOUS ELECTRICAL ACTIVITY, INTRAOPERATIVE, EXTERNAL APPROACH: ICD-10-PCS | Performed by: NEUROLOGICAL SURGERY

## 2019-06-18 PROCEDURE — 0RB30ZZ EXCISION OF CERVICAL VERTEBRAL DISC, OPEN APPROACH: ICD-10-PCS | Performed by: NEUROLOGICAL SURGERY

## 2019-06-18 PROCEDURE — 82948 REAGENT STRIP/BLOOD GLUCOSE: CPT

## 2019-06-18 PROCEDURE — 22554 ARTHRD ANT NTRBD MIN DSC CRV: CPT | Performed by: NEUROLOGICAL SURGERY

## 2019-06-18 PROCEDURE — 85025 COMPLETE CBC W/AUTO DIFF WBC: CPT | Performed by: PHYSICIAN ASSISTANT

## 2019-06-18 PROCEDURE — 0RG10A0 FUSION OF CERVICAL VERTEBRAL JOINT WITH INTERBODY FUSION DEVICE, ANTERIOR APPROACH, ANTERIOR COLUMN, OPEN APPROACH: ICD-10-PCS | Performed by: NEUROLOGICAL SURGERY

## 2019-06-18 PROCEDURE — 20936 SP BONE AGRFT LOCAL ADD-ON: CPT | Performed by: NEUROLOGICAL SURGERY

## 2019-06-18 DEVICE — BONE GRAFT SUBSTITUTE, FOAM PACK, BETA-TRICALCIUM PHOSPHATE AND TYPE I BOVINE COLLAGEN
Type: IMPLANTABLE DEVICE | Site: SPINE CERVICAL | Status: FUNCTIONAL
Brand: VITOSS

## 2019-06-18 DEVICE — IMPLANT 6240964 ANATOMIC 16X14X9MM
Type: IMPLANTABLE DEVICE | Site: SPINE CERVICAL | Status: FUNCTIONAL
Brand: VERTE-STACK® SPINAL SYSTEM

## 2019-06-18 DEVICE — SCREW 7713515 ZEVO VAR SD 3.5MM X 15MM
Type: IMPLANTABLE DEVICE | Site: SPINE CERVICAL | Status: FUNCTIONAL
Brand: ZEVO™ ANTERIOR CERVICAL PLATE SYSTEM

## 2019-06-18 RX ORDER — ACETAMINOPHEN 325 MG/1
650 TABLET ORAL EVERY 6 HOURS SCHEDULED
Status: DISCONTINUED | OUTPATIENT
Start: 2019-06-18 | End: 2019-06-20 | Stop reason: HOSPADM

## 2019-06-18 RX ORDER — ONDANSETRON 2 MG/ML
INJECTION INTRAMUSCULAR; INTRAVENOUS AS NEEDED
Status: DISCONTINUED | OUTPATIENT
Start: 2019-06-18 | End: 2019-06-18 | Stop reason: SURG

## 2019-06-18 RX ORDER — GABAPENTIN 100 MG/1
100 CAPSULE ORAL EVERY 8 HOURS
Status: DISCONTINUED | OUTPATIENT
Start: 2019-06-18 | End: 2019-06-20 | Stop reason: HOSPADM

## 2019-06-18 RX ORDER — CHLORHEXIDINE GLUCONATE 0.12 MG/ML
15 RINSE ORAL ONCE
Status: COMPLETED | OUTPATIENT
Start: 2019-06-18 | End: 2019-06-18

## 2019-06-18 RX ORDER — DOCUSATE SODIUM 100 MG/1
100 CAPSULE, LIQUID FILLED ORAL 2 TIMES DAILY
Status: DISCONTINUED | OUTPATIENT
Start: 2019-06-18 | End: 2019-06-20 | Stop reason: HOSPADM

## 2019-06-18 RX ORDER — HYDROMORPHONE HCL/PF 1 MG/ML
SYRINGE (ML) INJECTION AS NEEDED
Status: DISCONTINUED | OUTPATIENT
Start: 2019-06-18 | End: 2019-06-18 | Stop reason: SURG

## 2019-06-18 RX ORDER — CEFAZOLIN SODIUM 1 G/50ML
1000 SOLUTION INTRAVENOUS EVERY 8 HOURS
Status: COMPLETED | OUTPATIENT
Start: 2019-06-18 | End: 2019-06-19

## 2019-06-18 RX ORDER — SODIUM CHLORIDE, SODIUM LACTATE, POTASSIUM CHLORIDE, CALCIUM CHLORIDE 600; 310; 30; 20 MG/100ML; MG/100ML; MG/100ML; MG/100ML
125 INJECTION, SOLUTION INTRAVENOUS CONTINUOUS
Status: DISCONTINUED | OUTPATIENT
Start: 2019-06-18 | End: 2019-06-19

## 2019-06-18 RX ORDER — ONDANSETRON 2 MG/ML
4 INJECTION INTRAMUSCULAR; INTRAVENOUS ONCE AS NEEDED
Status: DISCONTINUED | OUTPATIENT
Start: 2019-06-18 | End: 2019-06-18 | Stop reason: HOSPADM

## 2019-06-18 RX ORDER — SUCCINYLCHOLINE/SOD CL,ISO/PF 100 MG/5ML
SYRINGE (ML) INTRAVENOUS AS NEEDED
Status: DISCONTINUED | OUTPATIENT
Start: 2019-06-18 | End: 2019-06-18 | Stop reason: SURG

## 2019-06-18 RX ORDER — TAMSULOSIN HYDROCHLORIDE 0.4 MG/1
0.4 CAPSULE ORAL
Status: DISCONTINUED | OUTPATIENT
Start: 2019-06-18 | End: 2019-06-20 | Stop reason: HOSPADM

## 2019-06-18 RX ORDER — ONDANSETRON 2 MG/ML
4 INJECTION INTRAMUSCULAR; INTRAVENOUS EVERY 6 HOURS PRN
Status: DISCONTINUED | OUTPATIENT
Start: 2019-06-18 | End: 2019-06-20 | Stop reason: HOSPADM

## 2019-06-18 RX ORDER — CEFAZOLIN SODIUM 1 G/50ML
1000 SOLUTION INTRAVENOUS ONCE
Status: COMPLETED | OUTPATIENT
Start: 2019-06-18 | End: 2019-06-18

## 2019-06-18 RX ORDER — LIDOCAINE HYDROCHLORIDE AND EPINEPHRINE 10; 10 MG/ML; UG/ML
INJECTION, SOLUTION INFILTRATION; PERINEURAL AS NEEDED
Status: DISCONTINUED | OUTPATIENT
Start: 2019-06-18 | End: 2019-06-18 | Stop reason: HOSPADM

## 2019-06-18 RX ORDER — SENNOSIDES 8.6 MG
1 TABLET ORAL DAILY
Status: DISCONTINUED | OUTPATIENT
Start: 2019-06-18 | End: 2019-06-20 | Stop reason: HOSPADM

## 2019-06-18 RX ORDER — PROPOFOL 10 MG/ML
INJECTION, EMULSION INTRAVENOUS CONTINUOUS PRN
Status: DISCONTINUED | OUTPATIENT
Start: 2019-06-18 | End: 2019-06-18 | Stop reason: SURG

## 2019-06-18 RX ORDER — LIDOCAINE HYDROCHLORIDE 20 MG/ML
INJECTION, SOLUTION INFILTRATION; PERINEURAL AS NEEDED
Status: DISCONTINUED | OUTPATIENT
Start: 2019-06-18 | End: 2019-06-18 | Stop reason: SURG

## 2019-06-18 RX ORDER — KETAMINE HCL IN NACL, ISO-OSM 100MG/10ML
SYRINGE (ML) INJECTION AS NEEDED
Status: DISCONTINUED | OUTPATIENT
Start: 2019-06-18 | End: 2019-06-18 | Stop reason: SURG

## 2019-06-18 RX ORDER — MAGNESIUM HYDROXIDE 1200 MG/15ML
LIQUID ORAL AS NEEDED
Status: DISCONTINUED | OUTPATIENT
Start: 2019-06-18 | End: 2019-06-18 | Stop reason: HOSPADM

## 2019-06-18 RX ORDER — LABETALOL 20 MG/4 ML (5 MG/ML) INTRAVENOUS SYRINGE
5
Status: COMPLETED | OUTPATIENT
Start: 2019-06-18 | End: 2019-06-18

## 2019-06-18 RX ORDER — CLINDAMYCIN HYDROCHLORIDE 150 MG/1
300 CAPSULE ORAL EVERY 8 HOURS SCHEDULED
Status: DISCONTINUED | OUTPATIENT
Start: 2019-06-19 | End: 2019-06-20 | Stop reason: HOSPADM

## 2019-06-18 RX ORDER — VANCOMYCIN HYDROCHLORIDE 1 G/20ML
INJECTION, POWDER, LYOPHILIZED, FOR SOLUTION INTRAVENOUS AS NEEDED
Status: DISCONTINUED | OUTPATIENT
Start: 2019-06-18 | End: 2019-06-18 | Stop reason: HOSPADM

## 2019-06-18 RX ORDER — PROPOFOL 10 MG/ML
INJECTION, EMULSION INTRAVENOUS AS NEEDED
Status: DISCONTINUED | OUTPATIENT
Start: 2019-06-18 | End: 2019-06-18 | Stop reason: SURG

## 2019-06-18 RX ORDER — METHOCARBAMOL 500 MG/1
500 TABLET, FILM COATED ORAL EVERY 6 HOURS SCHEDULED
Status: DISCONTINUED | OUTPATIENT
Start: 2019-06-18 | End: 2019-06-20 | Stop reason: HOSPADM

## 2019-06-18 RX ORDER — FENTANYL CITRATE 50 UG/ML
INJECTION, SOLUTION INTRAMUSCULAR; INTRAVENOUS AS NEEDED
Status: DISCONTINUED | OUTPATIENT
Start: 2019-06-18 | End: 2019-06-18 | Stop reason: SURG

## 2019-06-18 RX ORDER — HYDROMORPHONE HCL/PF 1 MG/ML
0.5 SYRINGE (ML) INJECTION
Status: DISCONTINUED | OUTPATIENT
Start: 2019-06-18 | End: 2019-06-18 | Stop reason: HOSPADM

## 2019-06-18 RX ORDER — DEXAMETHASONE SODIUM PHOSPHATE 4 MG/ML
INJECTION, SOLUTION INTRA-ARTICULAR; INTRALESIONAL; INTRAMUSCULAR; INTRAVENOUS; SOFT TISSUE AS NEEDED
Status: DISCONTINUED | OUTPATIENT
Start: 2019-06-18 | End: 2019-06-18 | Stop reason: SURG

## 2019-06-18 RX ORDER — LOSARTAN POTASSIUM 50 MG/1
100 TABLET ORAL DAILY
Status: DISCONTINUED | OUTPATIENT
Start: 2019-06-19 | End: 2019-06-20 | Stop reason: HOSPADM

## 2019-06-18 RX ORDER — PREGABALIN 100 MG/1
100 CAPSULE ORAL ONCE
Status: COMPLETED | OUTPATIENT
Start: 2019-06-18 | End: 2019-06-18

## 2019-06-18 RX ORDER — SODIUM CHLORIDE 9 MG/ML
INJECTION, SOLUTION INTRAVENOUS CONTINUOUS PRN
Status: DISCONTINUED | OUTPATIENT
Start: 2019-06-18 | End: 2019-06-18 | Stop reason: SURG

## 2019-06-18 RX ORDER — PANTOPRAZOLE SODIUM 40 MG/1
40 TABLET, DELAYED RELEASE ORAL
Status: DISCONTINUED | OUTPATIENT
Start: 2019-06-19 | End: 2019-06-20 | Stop reason: HOSPADM

## 2019-06-18 RX ORDER — INSULIN GLARGINE 100 [IU]/ML
9 INJECTION, SOLUTION SUBCUTANEOUS
Status: DISCONTINUED | OUTPATIENT
Start: 2019-06-19 | End: 2019-06-18

## 2019-06-18 RX ORDER — OXYCODONE HYDROCHLORIDE 10 MG/1
10 TABLET ORAL EVERY 4 HOURS PRN
Status: DISCONTINUED | OUTPATIENT
Start: 2019-06-18 | End: 2019-06-20 | Stop reason: HOSPADM

## 2019-06-18 RX ORDER — ACETAMINOPHEN 325 MG/1
975 TABLET ORAL ONCE
Status: COMPLETED | OUTPATIENT
Start: 2019-06-18 | End: 2019-06-18

## 2019-06-18 RX ORDER — OXYCODONE HYDROCHLORIDE 5 MG/1
5 TABLET ORAL EVERY 4 HOURS PRN
Status: DISCONTINUED | OUTPATIENT
Start: 2019-06-18 | End: 2019-06-20 | Stop reason: HOSPADM

## 2019-06-18 RX ORDER — OXYCODONE HYDROCHLORIDE 5 MG/1
5 TABLET ORAL ONCE
Status: COMPLETED | OUTPATIENT
Start: 2019-06-18 | End: 2019-06-18

## 2019-06-18 RX ORDER — THIAMINE MONONITRATE (VIT B1) 100 MG
50 TABLET ORAL DAILY
Status: DISCONTINUED | OUTPATIENT
Start: 2019-06-19 | End: 2019-06-20 | Stop reason: HOSPADM

## 2019-06-18 RX ADMIN — HYDROMORPHONE HYDROCHLORIDE 1 MG: 1 INJECTION, SOLUTION INTRAMUSCULAR; INTRAVENOUS; SUBCUTANEOUS at 08:53

## 2019-06-18 RX ADMIN — GABAPENTIN 100 MG: 100 CAPSULE ORAL at 23:33

## 2019-06-18 RX ADMIN — METHOCARBAMOL TABLETS 500 MG: 500 TABLET, COATED ORAL at 23:33

## 2019-06-18 RX ADMIN — CEFAZOLIN SODIUM 1000 MG: 1 SOLUTION INTRAVENOUS at 17:59

## 2019-06-18 RX ADMIN — DOCUSATE SODIUM 100 MG: 100 CAPSULE, LIQUID FILLED ORAL at 18:49

## 2019-06-18 RX ADMIN — Medication 100 MG: at 08:31

## 2019-06-18 RX ADMIN — ACETAMINOPHEN 975 MG: 325 TABLET, FILM COATED ORAL at 08:10

## 2019-06-18 RX ADMIN — SODIUM CHLORIDE, SODIUM LACTATE, POTASSIUM CHLORIDE, AND CALCIUM CHLORIDE: .6; .31; .03; .02 INJECTION, SOLUTION INTRAVENOUS at 08:28

## 2019-06-18 RX ADMIN — LABETALOL 20 MG/4 ML (5 MG/ML) INTRAVENOUS SYRINGE 5 MG: at 12:15

## 2019-06-18 RX ADMIN — INSULIN HUMAN 15 UNITS: 100 INJECTION, SUSPENSION SUBCUTANEOUS at 23:37

## 2019-06-18 RX ADMIN — Medication 50 MG: at 08:33

## 2019-06-18 RX ADMIN — LABETALOL 20 MG/4 ML (5 MG/ML) INTRAVENOUS SYRINGE 5 MG: at 12:33

## 2019-06-18 RX ADMIN — ACETAMINOPHEN 650 MG: 325 TABLET, FILM COATED ORAL at 18:49

## 2019-06-18 RX ADMIN — HYDROMORPHONE HYDROCHLORIDE 1 MG: 1 INJECTION, SOLUTION INTRAMUSCULAR; INTRAVENOUS; SUBCUTANEOUS at 08:38

## 2019-06-18 RX ADMIN — LIDOCAINE HYDROCHLORIDE 5 ML: 20 INJECTION, SOLUTION INFILTRATION; PERINEURAL at 08:31

## 2019-06-18 RX ADMIN — PROPOFOL 150 MG: 10 INJECTION, EMULSION INTRAVENOUS at 08:31

## 2019-06-18 RX ADMIN — SODIUM CHLORIDE, SODIUM LACTATE, POTASSIUM CHLORIDE, AND CALCIUM CHLORIDE 125 ML/HR: .6; .31; .03; .02 INJECTION, SOLUTION INTRAVENOUS at 13:48

## 2019-06-18 RX ADMIN — SODIUM CHLORIDE: 0.9 INJECTION, SOLUTION INTRAVENOUS at 08:40

## 2019-06-18 RX ADMIN — PROPOFOL 130 MCG/KG/MIN: 10 INJECTION, EMULSION INTRAVENOUS at 08:33

## 2019-06-18 RX ADMIN — ONDANSETRON 4 MG: 2 INJECTION INTRAMUSCULAR; INTRAVENOUS at 11:17

## 2019-06-18 RX ADMIN — FENTANYL CITRATE 100 MCG: 50 INJECTION INTRAMUSCULAR; INTRAVENOUS at 08:31

## 2019-06-18 RX ADMIN — CEFAZOLIN SODIUM 1000 MG: 1 SOLUTION INTRAVENOUS at 08:42

## 2019-06-18 RX ADMIN — LABETALOL 20 MG/4 ML (5 MG/ML) INTRAVENOUS SYRINGE 5 MG: at 12:47

## 2019-06-18 RX ADMIN — METHOCARBAMOL TABLETS 500 MG: 500 TABLET, COATED ORAL at 18:49

## 2019-06-18 RX ADMIN — DEXAMETHASONE SODIUM PHOSPHATE 4 MG: 4 INJECTION, SOLUTION INTRAMUSCULAR; INTRAVENOUS at 08:42

## 2019-06-18 RX ADMIN — LABETALOL 20 MG/4 ML (5 MG/ML) INTRAVENOUS SYRINGE 5 MG: at 12:58

## 2019-06-18 RX ADMIN — TAMSULOSIN HYDROCHLORIDE 0.4 MG: 0.4 CAPSULE ORAL at 23:33

## 2019-06-18 RX ADMIN — PREGABALIN 100 MG: 100 CAPSULE ORAL at 08:09

## 2019-06-18 RX ADMIN — CHLORHEXIDINE GLUCONATE 0.12% ORAL RINSE 15 ML: 1.2 LIQUID ORAL at 08:08

## 2019-06-18 RX ADMIN — ACETAMINOPHEN 650 MG: 325 TABLET, FILM COATED ORAL at 23:33

## 2019-06-18 RX ADMIN — OXYCODONE HYDROCHLORIDE 5 MG: 5 TABLET ORAL at 08:09

## 2019-06-19 LAB
ANION GAP SERPL CALCULATED.3IONS-SCNC: 8 MMOL/L (ref 4–13)
BUN SERPL-MCNC: 13 MG/DL (ref 5–25)
CALCIUM SERPL-MCNC: 9.9 MG/DL (ref 8.3–10.1)
CHLORIDE SERPL-SCNC: 106 MMOL/L (ref 100–108)
CO2 SERPL-SCNC: 29 MMOL/L (ref 21–32)
CREAT SERPL-MCNC: 0.66 MG/DL (ref 0.6–1.3)
GFR SERPL CREATININE-BSD FRML MDRD: 109 ML/MIN/1.73SQ M
GLUCOSE SERPL-MCNC: 103 MG/DL (ref 65–140)
GLUCOSE SERPL-MCNC: 110 MG/DL (ref 65–140)
GLUCOSE SERPL-MCNC: 137 MG/DL (ref 65–140)
GLUCOSE SERPL-MCNC: 81 MG/DL (ref 65–140)
GLUCOSE SERPL-MCNC: 91 MG/DL (ref 65–140)
GLUCOSE SERPL-MCNC: 97 MG/DL (ref 65–140)
POTASSIUM SERPL-SCNC: 4 MMOL/L (ref 3.5–5.3)
SODIUM SERPL-SCNC: 143 MMOL/L (ref 136–145)

## 2019-06-19 PROCEDURE — 80048 BASIC METABOLIC PNL TOTAL CA: CPT | Performed by: PHYSICIAN ASSISTANT

## 2019-06-19 PROCEDURE — 97110 THERAPEUTIC EXERCISES: CPT

## 2019-06-19 PROCEDURE — 97530 THERAPEUTIC ACTIVITIES: CPT

## 2019-06-19 PROCEDURE — 97116 GAIT TRAINING THERAPY: CPT

## 2019-06-19 PROCEDURE — 82948 REAGENT STRIP/BLOOD GLUCOSE: CPT

## 2019-06-19 PROCEDURE — 99024 POSTOP FOLLOW-UP VISIT: CPT | Performed by: PHYSICIAN ASSISTANT

## 2019-06-19 RX ORDER — DILTIAZEM HYDROCHLORIDE 120 MG/1
120 CAPSULE, COATED, EXTENDED RELEASE ORAL DAILY
Status: DISCONTINUED | OUTPATIENT
Start: 2019-06-19 | End: 2019-06-20 | Stop reason: HOSPADM

## 2019-06-19 RX ORDER — LABETALOL 20 MG/4 ML (5 MG/ML) INTRAVENOUS SYRINGE
10 EVERY 4 HOURS PRN
Status: DISCONTINUED | OUTPATIENT
Start: 2019-06-19 | End: 2019-06-20 | Stop reason: HOSPADM

## 2019-06-19 RX ADMIN — METHOCARBAMOL TABLETS 500 MG: 500 TABLET, COATED ORAL at 07:25

## 2019-06-19 RX ADMIN — GABAPENTIN 100 MG: 100 CAPSULE ORAL at 07:24

## 2019-06-19 RX ADMIN — ACETAMINOPHEN 650 MG: 325 TABLET, FILM COATED ORAL at 18:32

## 2019-06-19 RX ADMIN — CLINDAMYCIN HYDROCHLORIDE 300 MG: 150 CAPSULE ORAL at 07:24

## 2019-06-19 RX ADMIN — DOCUSATE SODIUM 100 MG: 100 CAPSULE, LIQUID FILLED ORAL at 08:13

## 2019-06-19 RX ADMIN — PANTOPRAZOLE SODIUM 40 MG: 40 TABLET, DELAYED RELEASE ORAL at 07:25

## 2019-06-19 RX ADMIN — ACETAMINOPHEN 650 MG: 325 TABLET, FILM COATED ORAL at 07:25

## 2019-06-19 RX ADMIN — METHOCARBAMOL TABLETS 500 MG: 500 TABLET, COATED ORAL at 12:26

## 2019-06-19 RX ADMIN — Medication 50 MG: at 08:10

## 2019-06-19 RX ADMIN — ACETAMINOPHEN 650 MG: 325 TABLET, FILM COATED ORAL at 12:26

## 2019-06-19 RX ADMIN — CEFAZOLIN SODIUM 1000 MG: 1 SOLUTION INTRAVENOUS at 01:37

## 2019-06-19 RX ADMIN — METHOCARBAMOL TABLETS 500 MG: 500 TABLET, COATED ORAL at 18:32

## 2019-06-19 RX ADMIN — LOSARTAN POTASSIUM 100 MG: 50 TABLET, FILM COATED ORAL at 08:14

## 2019-06-19 RX ADMIN — CLINDAMYCIN HYDROCHLORIDE 300 MG: 150 CAPSULE ORAL at 14:32

## 2019-06-19 RX ADMIN — GABAPENTIN 100 MG: 100 CAPSULE ORAL at 22:06

## 2019-06-19 RX ADMIN — NIFEDIPINE 120 MG: 10 CAPSULE ORAL at 08:14

## 2019-06-19 RX ADMIN — TAMSULOSIN HYDROCHLORIDE 0.4 MG: 0.4 CAPSULE ORAL at 22:06

## 2019-06-19 RX ADMIN — INSULIN HUMAN 15 UNITS: 100 INJECTION, SUSPENSION SUBCUTANEOUS at 22:06

## 2019-06-19 RX ADMIN — SODIUM CHLORIDE, SODIUM LACTATE, POTASSIUM CHLORIDE, AND CALCIUM CHLORIDE 125 ML/HR: .6; .31; .03; .02 INJECTION, SOLUTION INTRAVENOUS at 01:38

## 2019-06-19 RX ADMIN — LABETALOL 20 MG/4 ML (5 MG/ML) INTRAVENOUS SYRINGE 10 MG: at 22:23

## 2019-06-19 RX ADMIN — CLINDAMYCIN HYDROCHLORIDE 300 MG: 150 CAPSULE ORAL at 22:06

## 2019-06-19 RX ADMIN — SENNOSIDES 8.6 MG: 8.6 TABLET, FILM COATED ORAL at 08:13

## 2019-06-19 RX ADMIN — TIOTROPIUM BROMIDE 18 MCG: 18 CAPSULE ORAL; RESPIRATORY (INHALATION) at 08:17

## 2019-06-19 RX ADMIN — GABAPENTIN 100 MG: 100 CAPSULE ORAL at 14:32

## 2019-06-19 RX ADMIN — DOCUSATE SODIUM 100 MG: 100 CAPSULE, LIQUID FILLED ORAL at 18:32

## 2019-06-20 ENCOUNTER — HOSPITAL ENCOUNTER (INPATIENT)
Facility: HOSPITAL | Age: 79
LOS: 17 days | Discharge: NURSING, REHAB OR LONG-TERM CARE | DRG: 559 | End: 2019-07-07
Payer: MEDICARE

## 2019-06-20 VITALS
WEIGHT: 140.87 LBS | OXYGEN SATURATION: 99 % | RESPIRATION RATE: 16 BRPM | HEIGHT: 69 IN | SYSTOLIC BLOOD PRESSURE: 165 MMHG | HEART RATE: 64 BPM | BODY MASS INDEX: 20.87 KG/M2 | DIASTOLIC BLOOD PRESSURE: 82 MMHG | TEMPERATURE: 98.4 F

## 2019-06-20 DIAGNOSIS — Z98.1 S/P SPINAL FUSION: ICD-10-CM

## 2019-06-20 DIAGNOSIS — Z91.89 AT RISK FOR CONSTIPATION: ICD-10-CM

## 2019-06-20 DIAGNOSIS — F32.A DEPRESSION: ICD-10-CM

## 2019-06-20 DIAGNOSIS — R32 URINARY INCONTINENCE: ICD-10-CM

## 2019-06-20 DIAGNOSIS — I10 ESSENTIAL HYPERTENSION: Primary | Chronic | ICD-10-CM

## 2019-06-20 DIAGNOSIS — M79.2 NEUROPATHIC PAIN: ICD-10-CM

## 2019-06-20 DIAGNOSIS — J43.9 PULMONARY EMPHYSEMA, UNSPECIFIED EMPHYSEMA TYPE (HCC): Chronic | ICD-10-CM

## 2019-06-20 DIAGNOSIS — E11.40 TYPE 2 DIABETES MELLITUS WITH DIABETIC NEUROPATHY, WITHOUT LONG-TERM CURRENT USE OF INSULIN (HCC): Chronic | ICD-10-CM

## 2019-06-20 DIAGNOSIS — R52 PAIN: ICD-10-CM

## 2019-06-20 DIAGNOSIS — G95.9 CERVICAL MYELOPATHY (HCC): ICD-10-CM

## 2019-06-20 DIAGNOSIS — F41.9 ANXIETY: ICD-10-CM

## 2019-06-20 DIAGNOSIS — H04.123 DRY EYES: ICD-10-CM

## 2019-06-20 DIAGNOSIS — F17.290 PIPE SMOKER: ICD-10-CM

## 2019-06-20 DIAGNOSIS — E55.9 VITAMIN D INSUFFICIENCY: ICD-10-CM

## 2019-06-20 PROBLEM — K59.00 CONSTIPATION: Status: ACTIVE | Noted: 2019-06-20

## 2019-06-20 PROBLEM — Z78.9 IMPAIRED MOBILITY AND ADLS: Status: ACTIVE | Noted: 2019-06-20

## 2019-06-20 PROBLEM — Z74.09 IMPAIRED MOBILITY AND ADLS: Status: ACTIVE | Noted: 2019-06-20

## 2019-06-20 LAB
ANION GAP SERPL CALCULATED.3IONS-SCNC: 6 MMOL/L (ref 4–13)
BUN SERPL-MCNC: 14 MG/DL (ref 5–25)
CALCIUM SERPL-MCNC: 9.7 MG/DL (ref 8.3–10.1)
CHLORIDE SERPL-SCNC: 108 MMOL/L (ref 100–108)
CO2 SERPL-SCNC: 30 MMOL/L (ref 21–32)
CREAT SERPL-MCNC: 0.76 MG/DL (ref 0.6–1.3)
GFR SERPL CREATININE-BSD FRML MDRD: 102 ML/MIN/1.73SQ M
GLUCOSE SERPL-MCNC: 112 MG/DL (ref 65–140)
GLUCOSE SERPL-MCNC: 129 MG/DL (ref 65–140)
GLUCOSE SERPL-MCNC: 41 MG/DL (ref 65–140)
GLUCOSE SERPL-MCNC: 70 MG/DL (ref 65–140)
GLUCOSE SERPL-MCNC: 75 MG/DL (ref 65–140)
POTASSIUM SERPL-SCNC: 3.3 MMOL/L (ref 3.5–5.3)
SODIUM SERPL-SCNC: 144 MMOL/L (ref 136–145)

## 2019-06-20 PROCEDURE — 97530 THERAPEUTIC ACTIVITIES: CPT

## 2019-06-20 PROCEDURE — 99024 POSTOP FOLLOW-UP VISIT: CPT | Performed by: PHYSICIAN ASSISTANT

## 2019-06-20 PROCEDURE — 82948 REAGENT STRIP/BLOOD GLUCOSE: CPT

## 2019-06-20 PROCEDURE — 80048 BASIC METABOLIC PNL TOTAL CA: CPT | Performed by: PHYSICIAN ASSISTANT

## 2019-06-20 PROCEDURE — NC001 PR NO CHARGE: Performed by: PHYSICIAN ASSISTANT

## 2019-06-20 PROCEDURE — 99223 1ST HOSP IP/OBS HIGH 75: CPT

## 2019-06-20 PROCEDURE — 97167 OT EVAL HIGH COMPLEX 60 MIN: CPT

## 2019-06-20 PROCEDURE — G8987 SELF CARE CURRENT STATUS: HCPCS

## 2019-06-20 PROCEDURE — G8988 SELF CARE GOAL STATUS: HCPCS

## 2019-06-20 PROCEDURE — NC001 PR NO CHARGE

## 2019-06-20 PROCEDURE — 94760 N-INVAS EAR/PLS OXIMETRY 1: CPT

## 2019-06-20 PROCEDURE — 97116 GAIT TRAINING THERAPY: CPT

## 2019-06-20 PROCEDURE — 97110 THERAPEUTIC EXERCISES: CPT

## 2019-06-20 RX ORDER — ACETAMINOPHEN 325 MG/1
650 TABLET ORAL EVERY 6 HOURS SCHEDULED
Status: DISCONTINUED | OUTPATIENT
Start: 2019-06-20 | End: 2019-06-20

## 2019-06-20 RX ORDER — OXYCODONE HYDROCHLORIDE 5 MG/1
5 TABLET ORAL EVERY 4 HOURS PRN
Status: CANCELLED | OUTPATIENT
Start: 2019-06-20

## 2019-06-20 RX ORDER — ALBUTEROL SULFATE 90 UG/1
2 AEROSOL, METERED RESPIRATORY (INHALATION) EVERY 4 HOURS PRN
Status: DISCONTINUED | OUTPATIENT
Start: 2019-06-20 | End: 2019-07-07 | Stop reason: HOSPADM

## 2019-06-20 RX ORDER — HYDRALAZINE HYDROCHLORIDE 25 MG/1
25 TABLET, FILM COATED ORAL EVERY 8 HOURS PRN
Status: DISCONTINUED | OUTPATIENT
Start: 2019-06-20 | End: 2019-07-07 | Stop reason: HOSPADM

## 2019-06-20 RX ORDER — POLYVINYL ALCOHOL 14 MG/ML
1 SOLUTION/ DROPS OPHTHALMIC 4 TIMES DAILY
Status: DISCONTINUED | OUTPATIENT
Start: 2019-06-20 | End: 2019-07-07 | Stop reason: HOSPADM

## 2019-06-20 RX ORDER — GABAPENTIN 100 MG/1
100 CAPSULE ORAL 3 TIMES DAILY
Status: DISCONTINUED | OUTPATIENT
Start: 2019-06-20 | End: 2019-06-21

## 2019-06-20 RX ORDER — ONDANSETRON 2 MG/ML
4 INJECTION INTRAMUSCULAR; INTRAVENOUS EVERY 6 HOURS PRN
Status: CANCELLED | OUTPATIENT
Start: 2019-06-20

## 2019-06-20 RX ORDER — LOSARTAN POTASSIUM 50 MG/1
100 TABLET ORAL DAILY
Status: DISCONTINUED | OUTPATIENT
Start: 2019-06-21 | End: 2019-07-07 | Stop reason: HOSPADM

## 2019-06-20 RX ORDER — ACETAMINOPHEN 325 MG/1
650 TABLET ORAL EVERY 6 HOURS SCHEDULED
Status: CANCELLED | OUTPATIENT
Start: 2019-06-20

## 2019-06-20 RX ORDER — ACETAMINOPHEN 325 MG/1
650 TABLET ORAL 3 TIMES DAILY
Status: DISCONTINUED | OUTPATIENT
Start: 2019-06-20 | End: 2019-07-05

## 2019-06-20 RX ORDER — OXYCODONE HYDROCHLORIDE 5 MG/1
5 TABLET ORAL EVERY 4 HOURS PRN
Status: DISCONTINUED | OUTPATIENT
Start: 2019-06-20 | End: 2019-07-05

## 2019-06-20 RX ORDER — SENNOSIDES 8.6 MG
1 TABLET ORAL DAILY
Status: CANCELLED | OUTPATIENT
Start: 2019-06-21

## 2019-06-20 RX ORDER — DOCUSATE SODIUM 100 MG/1
100 CAPSULE, LIQUID FILLED ORAL 2 TIMES DAILY
Status: CANCELLED | OUTPATIENT
Start: 2019-06-20

## 2019-06-20 RX ORDER — POTASSIUM CHLORIDE 20 MEQ/1
40 TABLET, EXTENDED RELEASE ORAL ONCE
Status: COMPLETED | OUTPATIENT
Start: 2019-06-20 | End: 2019-06-20

## 2019-06-20 RX ORDER — HEPARIN SODIUM 5000 [USP'U]/ML
5000 INJECTION, SOLUTION INTRAVENOUS; SUBCUTANEOUS EVERY 8 HOURS SCHEDULED
Status: DISCONTINUED | OUTPATIENT
Start: 2019-06-20 | End: 2019-07-07 | Stop reason: HOSPADM

## 2019-06-20 RX ORDER — LABETALOL 20 MG/4 ML (5 MG/ML) INTRAVENOUS SYRINGE
10 EVERY 4 HOURS PRN
Status: CANCELLED | OUTPATIENT
Start: 2019-06-20

## 2019-06-20 RX ORDER — DOCUSATE SODIUM 100 MG/1
100 CAPSULE, LIQUID FILLED ORAL 2 TIMES DAILY
Status: DISCONTINUED | OUTPATIENT
Start: 2019-06-20 | End: 2019-07-07 | Stop reason: HOSPADM

## 2019-06-20 RX ORDER — LOSARTAN POTASSIUM 50 MG/1
100 TABLET ORAL DAILY
Status: CANCELLED | OUTPATIENT
Start: 2019-06-21

## 2019-06-20 RX ORDER — FLUTICASONE FUROATE AND VILANTEROL 100; 25 UG/1; UG/1
1 POWDER RESPIRATORY (INHALATION) DAILY
Status: DISCONTINUED | OUTPATIENT
Start: 2019-06-20 | End: 2019-07-07 | Stop reason: HOSPADM

## 2019-06-20 RX ORDER — DILTIAZEM HYDROCHLORIDE 120 MG/1
120 CAPSULE, COATED, EXTENDED RELEASE ORAL DAILY
Status: DISCONTINUED | OUTPATIENT
Start: 2019-06-21 | End: 2019-06-27

## 2019-06-20 RX ORDER — SENNOSIDES 8.6 MG
1 TABLET ORAL DAILY
Status: DISCONTINUED | OUTPATIENT
Start: 2019-06-21 | End: 2019-07-07 | Stop reason: HOSPADM

## 2019-06-20 RX ORDER — HEPARIN SODIUM 5000 [USP'U]/ML
5000 INJECTION, SOLUTION INTRAVENOUS; SUBCUTANEOUS EVERY 8 HOURS SCHEDULED
Status: CANCELLED | OUTPATIENT
Start: 2019-06-20

## 2019-06-20 RX ORDER — POLYETHYLENE GLYCOL 3350 17 G/17G
17 POWDER, FOR SOLUTION ORAL DAILY PRN
Status: DISCONTINUED | OUTPATIENT
Start: 2019-06-20 | End: 2019-06-20 | Stop reason: SDUPTHER

## 2019-06-20 RX ORDER — DILTIAZEM HYDROCHLORIDE 120 MG/1
120 CAPSULE, COATED, EXTENDED RELEASE ORAL DAILY
Status: CANCELLED | OUTPATIENT
Start: 2019-06-21

## 2019-06-20 RX ORDER — PANTOPRAZOLE SODIUM 40 MG/1
40 TABLET, DELAYED RELEASE ORAL
Status: CANCELLED | OUTPATIENT
Start: 2019-06-21

## 2019-06-20 RX ORDER — POLYVINYL ALCOHOL 14 MG/ML
1 SOLUTION/ DROPS OPHTHALMIC
Status: DISCONTINUED | OUTPATIENT
Start: 2019-06-20 | End: 2019-07-07 | Stop reason: HOSPADM

## 2019-06-20 RX ORDER — HEPARIN SODIUM 5000 [USP'U]/ML
5000 INJECTION, SOLUTION INTRAVENOUS; SUBCUTANEOUS EVERY 8 HOURS SCHEDULED
Status: DISCONTINUED | OUTPATIENT
Start: 2019-06-20 | End: 2019-06-20 | Stop reason: HOSPADM

## 2019-06-20 RX ORDER — THIAMINE MONONITRATE (VIT B1) 100 MG
50 TABLET ORAL DAILY
Status: CANCELLED | OUTPATIENT
Start: 2019-06-21

## 2019-06-20 RX ORDER — BISACODYL 10 MG
10 SUPPOSITORY, RECTAL RECTAL DAILY PRN
Status: DISCONTINUED | OUTPATIENT
Start: 2019-06-20 | End: 2019-07-07 | Stop reason: HOSPADM

## 2019-06-20 RX ORDER — GABAPENTIN 100 MG/1
100 CAPSULE ORAL EVERY 8 HOURS
Status: DISCONTINUED | OUTPATIENT
Start: 2019-06-20 | End: 2019-06-20

## 2019-06-20 RX ORDER — POLYETHYLENE GLYCOL 3350 17 G/17G
17 POWDER, FOR SOLUTION ORAL DAILY PRN
Status: DISCONTINUED | OUTPATIENT
Start: 2019-06-20 | End: 2019-07-07 | Stop reason: HOSPADM

## 2019-06-20 RX ORDER — OXYCODONE HYDROCHLORIDE 10 MG/1
10 TABLET ORAL EVERY 4 HOURS PRN
Status: DISCONTINUED | OUTPATIENT
Start: 2019-06-20 | End: 2019-07-05

## 2019-06-20 RX ORDER — METHOCARBAMOL 500 MG/1
500 TABLET, FILM COATED ORAL EVERY 6 HOURS SCHEDULED
Status: DISCONTINUED | OUTPATIENT
Start: 2019-06-20 | End: 2019-06-20

## 2019-06-20 RX ORDER — PANTOPRAZOLE SODIUM 40 MG/1
40 TABLET, DELAYED RELEASE ORAL
Status: DISCONTINUED | OUTPATIENT
Start: 2019-06-21 | End: 2019-07-07 | Stop reason: HOSPADM

## 2019-06-20 RX ORDER — CLINDAMYCIN HYDROCHLORIDE 150 MG/1
300 CAPSULE ORAL EVERY 8 HOURS SCHEDULED
Status: COMPLETED | OUTPATIENT
Start: 2019-06-20 | End: 2019-07-03

## 2019-06-20 RX ORDER — TAMSULOSIN HYDROCHLORIDE 0.4 MG/1
0.4 CAPSULE ORAL
Status: CANCELLED | OUTPATIENT
Start: 2019-06-20

## 2019-06-20 RX ORDER — LIDOCAINE HYDROCHLORIDE 20 MG/ML
JELLY TOPICAL EVERY 4 HOURS PRN
Status: DISCONTINUED | OUTPATIENT
Start: 2019-06-20 | End: 2019-07-07 | Stop reason: HOSPADM

## 2019-06-20 RX ORDER — POLYETHYLENE GLYCOL 3350 17 G/17G
17 POWDER, FOR SOLUTION ORAL DAILY PRN
Status: DISCONTINUED | OUTPATIENT
Start: 2019-06-20 | End: 2019-06-20 | Stop reason: HOSPADM

## 2019-06-20 RX ORDER — DULOXETIN HYDROCHLORIDE 60 MG/1
60 CAPSULE, DELAYED RELEASE ORAL DAILY
Status: DISCONTINUED | OUTPATIENT
Start: 2019-06-20 | End: 2019-07-07 | Stop reason: HOSPADM

## 2019-06-20 RX ORDER — METHOCARBAMOL 500 MG/1
500 TABLET, FILM COATED ORAL EVERY 6 HOURS SCHEDULED
Status: CANCELLED | OUTPATIENT
Start: 2019-06-20

## 2019-06-20 RX ORDER — OXYCODONE HYDROCHLORIDE 10 MG/1
10 TABLET ORAL EVERY 4 HOURS PRN
Status: CANCELLED | OUTPATIENT
Start: 2019-06-20

## 2019-06-20 RX ORDER — PRAVASTATIN SODIUM 10 MG
10 TABLET ORAL
Status: DISCONTINUED | OUTPATIENT
Start: 2019-06-20 | End: 2019-07-07 | Stop reason: HOSPADM

## 2019-06-20 RX ORDER — METHOCARBAMOL 500 MG/1
500 TABLET, FILM COATED ORAL 3 TIMES DAILY
Status: DISCONTINUED | OUTPATIENT
Start: 2019-06-20 | End: 2019-06-22

## 2019-06-20 RX ORDER — CLINDAMYCIN HYDROCHLORIDE 150 MG/1
300 CAPSULE ORAL EVERY 8 HOURS SCHEDULED
Status: CANCELLED | OUTPATIENT
Start: 2019-06-20 | End: 2019-07-03

## 2019-06-20 RX ORDER — POLYETHYLENE GLYCOL 3350 17 G/17G
17 POWDER, FOR SOLUTION ORAL DAILY PRN
Status: CANCELLED | OUTPATIENT
Start: 2019-06-20

## 2019-06-20 RX ORDER — THIAMINE MONONITRATE (VIT B1) 100 MG
50 TABLET ORAL DAILY
Status: DISCONTINUED | OUTPATIENT
Start: 2019-06-21 | End: 2019-07-07 | Stop reason: HOSPADM

## 2019-06-20 RX ORDER — TAMSULOSIN HYDROCHLORIDE 0.4 MG/1
0.4 CAPSULE ORAL
Status: DISCONTINUED | OUTPATIENT
Start: 2019-06-20 | End: 2019-06-22

## 2019-06-20 RX ORDER — ALBUTEROL SULFATE 2.5 MG/3ML
2.5 SOLUTION RESPIRATORY (INHALATION)
Status: DISCONTINUED | OUTPATIENT
Start: 2019-06-20 | End: 2019-06-20

## 2019-06-20 RX ORDER — ONDANSETRON 2 MG/ML
4 INJECTION INTRAMUSCULAR; INTRAVENOUS EVERY 6 HOURS PRN
Status: DISCONTINUED | OUTPATIENT
Start: 2019-06-20 | End: 2019-07-07 | Stop reason: HOSPADM

## 2019-06-20 RX ORDER — GABAPENTIN 100 MG/1
100 CAPSULE ORAL EVERY 8 HOURS
Status: CANCELLED | OUTPATIENT
Start: 2019-06-20

## 2019-06-20 RX ADMIN — METHOCARBAMOL 500 MG: 500 TABLET, FILM COATED ORAL at 17:31

## 2019-06-20 RX ADMIN — HEPARIN SODIUM 5000 UNITS: 5000 INJECTION INTRAVENOUS; SUBCUTANEOUS at 08:23

## 2019-06-20 RX ADMIN — ACETAMINOPHEN 650 MG: 325 TABLET ORAL at 22:35

## 2019-06-20 RX ADMIN — DULOXETINE HYDROCHLORIDE 60 MG: 60 CAPSULE, DELAYED RELEASE ORAL at 17:32

## 2019-06-20 RX ADMIN — ACETAMINOPHEN 650 MG: 325 TABLET, FILM COATED ORAL at 13:17

## 2019-06-20 RX ADMIN — CLINDAMYCIN HYDROCHLORIDE 300 MG: 150 CAPSULE ORAL at 06:31

## 2019-06-20 RX ADMIN — TIOTROPIUM BROMIDE 18 MCG: 18 CAPSULE ORAL; RESPIRATORY (INHALATION) at 08:16

## 2019-06-20 RX ADMIN — SENNOSIDES 8.6 MG: 8.6 TABLET, FILM COATED ORAL at 08:16

## 2019-06-20 RX ADMIN — POLYVINYL ALCOHOL 1 DROP: 14 SOLUTION/ DROPS OPHTHALMIC at 18:53

## 2019-06-20 RX ADMIN — FLUTICASONE FUROATE AND VILANTEROL TRIFENATATE 1 PUFF: 100; 25 POWDER RESPIRATORY (INHALATION) at 18:52

## 2019-06-20 RX ADMIN — NIFEDIPINE 120 MG: 10 CAPSULE ORAL at 08:16

## 2019-06-20 RX ADMIN — ACETAMINOPHEN 650 MG: 325 TABLET ORAL at 17:33

## 2019-06-20 RX ADMIN — GABAPENTIN 100 MG: 100 CAPSULE ORAL at 13:18

## 2019-06-20 RX ADMIN — TAMSULOSIN HYDROCHLORIDE 0.4 MG: 0.4 CAPSULE ORAL at 22:37

## 2019-06-20 RX ADMIN — CLINDAMYCIN HYDROCHLORIDE 300 MG: 150 CAPSULE ORAL at 13:18

## 2019-06-20 RX ADMIN — DOCUSATE SODIUM 100 MG: 100 CAPSULE, LIQUID FILLED ORAL at 08:14

## 2019-06-20 RX ADMIN — GABAPENTIN 100 MG: 100 CAPSULE ORAL at 17:31

## 2019-06-20 RX ADMIN — LOSARTAN POTASSIUM 100 MG: 50 TABLET, FILM COATED ORAL at 08:16

## 2019-06-20 RX ADMIN — ACETAMINOPHEN 650 MG: 325 TABLET, FILM COATED ORAL at 00:34

## 2019-06-20 RX ADMIN — HEPARIN SODIUM 5000 UNITS: 5000 INJECTION INTRAVENOUS; SUBCUTANEOUS at 22:37

## 2019-06-20 RX ADMIN — POTASSIUM CHLORIDE 40 MEQ: 1500 TABLET, EXTENDED RELEASE ORAL at 17:32

## 2019-06-20 RX ADMIN — GABAPENTIN 100 MG: 100 CAPSULE ORAL at 06:31

## 2019-06-20 RX ADMIN — CLINDAMYCIN HYDROCHLORIDE 300 MG: 150 CAPSULE ORAL at 22:36

## 2019-06-20 RX ADMIN — METHOCARBAMOL TABLETS 500 MG: 500 TABLET, COATED ORAL at 00:34

## 2019-06-20 RX ADMIN — POLYVINYL ALCOHOL 1 DROP: 14 SOLUTION/ DROPS OPHTHALMIC at 22:37

## 2019-06-20 RX ADMIN — Medication 50 MG: at 08:15

## 2019-06-20 RX ADMIN — DOCUSATE SODIUM 100 MG: 100 CAPSULE, LIQUID FILLED ORAL at 17:32

## 2019-06-20 RX ADMIN — PRAVASTATIN SODIUM 10 MG: 10 TABLET ORAL at 17:32

## 2019-06-20 RX ADMIN — ACETAMINOPHEN 650 MG: 325 TABLET, FILM COATED ORAL at 06:31

## 2019-06-20 RX ADMIN — POLYETHYLENE GLYCOL 3350 17 G: 17 POWDER, FOR SOLUTION ORAL at 08:16

## 2019-06-20 RX ADMIN — GABAPENTIN 100 MG: 100 CAPSULE ORAL at 22:36

## 2019-06-20 RX ADMIN — METHOCARBAMOL TABLETS 500 MG: 500 TABLET, COATED ORAL at 06:31

## 2019-06-20 RX ADMIN — METHOCARBAMOL TABLETS 500 MG: 500 TABLET, COATED ORAL at 13:18

## 2019-06-20 RX ADMIN — PANTOPRAZOLE SODIUM 40 MG: 40 TABLET, DELAYED RELEASE ORAL at 06:31

## 2019-06-20 RX ADMIN — METHOCARBAMOL 500 MG: 500 TABLET, FILM COATED ORAL at 22:36

## 2019-06-20 NOTE — RESPIRATORY THERAPY NOTE
RT Protocol Note  Shey Blas During 70 y o  male MRN: [id number]  Unit/Bed#: -01 Encounter: [id number]    Assessment    Principal Problem:    Cervical myelopathy ([facility] )      Home Pulmonary Medications:  Advair, Combivent, Spiriva       Past Medical History:   Diagnosis Date    BPH (benign prostatic hyperplasia)     Cancer ([facility] ) 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    Chemical exposure     911    [facility]     CPAP (continuous positive airway pressure) dependence     DDD (degenerative disc disease), cervical     [facility]     Foraminal stenosis of cervical region     Hyperlipidemia     Hypertension     SHIVANI (obstructive sleep apnea)     Overactive bladder      Social History     Socioeconomic History    Marital status: /Civil Union     Spouse name: Not on file    Number of children: 3    Years of education: Not on file    Highest education level: Not on file   Occupational History    Occupation: retired   Social Needs    Financial resource strain: Not on file    Food insecurity:     Worry: Not on file     Inability: Not on file    Transportation needs:     Medical: Not on file     Non-medical: Not on file   Tobacco Use    Smoking status: Current Every Day Smoker     Years: 61 00     Types: Pipe    Smokeless tobacco: Never Used    Tobacco comment: very light smoker   Substance and Sexual Activity    Alcohol use: No    Drug use: No    Sexual activity: Never   Lifestyle    Physical activity:     Days per week: 0 days     Minutes per session: 0 min    Stress:  To some extent   Relationships    Social connections:     Talks on phone: Not on file     Gets together: Not on file     Attends Jew service: Not on file     Active member of club or organization: Not on file     Attends meetings of clubs or organizations: Not on file     Relationship status: Not on file    Intimate partner violence:     Fear of current or ex partner: Not on file Emotionally abused: Not on file     Physically abused: Not on file     Forced sexual activity: Not on file   Other Topics Concern    Not on file   Social History Narrative    Not on file       Subjective    Subjective Data: pt currently in no resp distress  Pt has no complaints about his breathing  BS clear, and SpO2 100% on room air  Will order PRN Albuterol MDI, and continue all other inhalers ordered  Objective    Physical Exam:   Assessment Type: Assess only  General Appearance: Alert, Awake  Respiratory Pattern: Normal  Chest Assessment: Chest expansion symmetrical  Bilateral Breath Sounds: Clear, Diminished  O2 Device: room air    Vitals:  Blood pressure (!) 172/96, pulse 72, temperature 99 1 °F (37 3 °C), temperature source Oral, resp  rate 18, height 5' 9" (1 753 m), weight 63 3 kg (139 lb 8 8 oz), SpO2 100 %  Imaging and other studies: I have personally reviewed pertinent reports  O2 Device: room air     Plan: Continue all inhalers and Albuterol MDI prn             Resp Comments: pt assessed per resp protocol  pt presents with cervical myelopathy  PMH: COPD  Pt takes Advair, Combivent inhaler, and Spiriva at home  + smoking hx

## 2019-06-20 NOTE — CONSULTS
Consultation - Arden Miller During 70 y o  male MRN: [id number]    Unit/Bed#: -01 Encounter: [id number]        History of Present Illness      HPI: Arden Miller During is a 70y o  year old male With a history of DM2, HTN, COPD, lung cancer with VIVIANA lobectomy, pulmonary nodules, SHIVANI, HLD, depression, urinary incontinence/BPH, cervical myelopathy/radiculopathy  Recent MRI of the cervical spine showed cervical spinal stenosis with cord compression  He has been having progressive quadraparesis and therefore underwent a C3/4 ACDF with C4 hemicorpectomy, C3-4 anterior instrumentation/fusion with Dr Marleen Garcia on 6/18/19  Post-operatively he did well albeit some uncontrolled HTN which was treated with IV Labetolol  Patient currently denies CP, SOB, dizziness, N/V/D  Last BM was 3 days ago  He says numbness/extremity strength improved quite a bit since surgery  LUE/LLE have always been weaker than right  Still has some numbness hands/feet he says moderate  Denies issues with voiding  ROS:  As in HPI, otherwise negative 12 point ROS       Historical Information   Past Medical History:   Diagnosis Date    BPH (benign prostatic hyperplasia)     [facility] ) 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    Chemical exposure     911    COPD (chronic obstructive pulmonary disease) (HCC)     CPAP (continuous positive airway pressure) dependence     DDD (degenerative disc disease), cervical     Diabetes mellitus (HCC)     Foraminal stenosis of cervical region     Hyperlipidemia     Hypertension     SHIVANI (obstructive sleep apnea)     Overactive bladder      Past Surgical History:   Procedure Laterality Date    ARTHROSCOPY KNEE Bilateral     COLONOSCOPY      LUNG SURGERY Left     scraping of left    CA ARTHRODESIS ANT INTERBODY INC DISCECTOMY, CERVICAL BELOW C2 Bilateral 6/18/2019    Procedure: C3/4  ACDF WITH  C4 HEMICORPECTOMY, C3-4 ANTERIOR INSTRUMENTATION AND FUSION (NEUROMONITORING);   Surgeon: Adriana Bradshaw MD;  Location: [facility];  Service: Neurosurgery    ROTATOR CUFF REPAIR Bilateral      Social History   Social History     Substance and Sexual Activity   Alcohol Use No     Social History     Substance and Sexual Activity   Drug Use No     Social History     Tobacco Use   Smoking Status Current Every Day Smoker    Years: 61 00    Types: Pipe   Smokeless Tobacco Never Used   Tobacco Comment    very light smoker     Family History   Problem Relation Age of Onset    No Known Problems Mother     No Known Problems Father        Meds/Allergies   current meds:  Current Facility-Administered Medications   Medication Dose Route Frequency    acetaminophen (TYLENOL) tablet 650 mg  650 mg Oral TID    albuterol (PROVENTIL HFA,VENTOLIN HFA) inhaler 2 puff  2 puff Inhalation Q4H PRN    bisacodyl (DULCOLAX) rectal suppository 10 mg  10 mg Rectal Daily PRN    clindamycin (CLEOCIN) capsule 300 mg  300 mg Oral Q8H [address]    [START ON 6/21/2019] diltiazem (CARDIZEM CD) 24 hr capsule 120 mg  120 mg Oral Daily    docusate sodium (COLACE) capsule 100 mg  100 mg Oral BID    DULoxetine (CYMBALTA) delayed release capsule 60 mg  60 mg Oral Daily    fluticasone-vilanterol (BREO ELLIPTA) 100-25 mcg/inh inhaler 1 puff  1 puff Inhalation Daily    gabapentin (NEURONTIN) capsule 100 mg  100 mg Oral TID    heparin (porcine) subcutaneous injection 5,000 Units  5,000 Units Subcutaneous Q8H [address]    hydrALAZINE (APRESOLINE) tablet 25 mg  25 mg Oral Q8H PRN    insulin lispro (HumaLOG) 100 units/mL subcutaneous injection 1-5 Units  1-5 Units Subcutaneous TID AC    insulin lispro (HumaLOG) 100 units/mL subcutaneous injection 1-5 Units  1-5 Units Subcutaneous HS    lidocaine (URO-JET) 2 % urethral/mucosal gel   Topical Q4H PRN    [START ON 6/21/2019] losartan (COZAAR) tablet 100 mg  100 mg Oral Daily    [START ON 6/21/2019] metFORMIN (GLUCOPHAGE) tablet 500 mg  500 mg Oral Daily With Breakfast    methocarbamol (ROBAXIN) tablet 500 mg  500 mg Oral TID    ondansetron (ZOFRAN) injection 4 mg  4 mg Intravenous Q6H PRN    oxyCODONE (ROXICODONE) immediate release tablet 10 mg  10 mg Oral Q4H PRN    oxyCODONE (ROXICODONE) IR tablet 5 mg  5 mg Oral Q4H PRN    [START ON 6/21/2019] pantoprazole (PROTONIX) EC tablet 40 mg  40 mg Oral Daily Before Breakfast    polyethylene glycol (MIRALAX) packet 17 g  17 g Oral Daily PRN    polyethylene glycol (MIRALAX) packet 17 g  17 g Oral Daily PRN    pravastatin (PRAVACHOL) tablet 10 mg  10 mg Oral Daily With Dinner    [START ON 6/21/2019] senna (SENOKOT) tablet 8 6 mg  1 tablet Oral Daily    tamsulosin (FLOMAX) capsule 0 4 mg  0 4 mg Oral HS    [START ON 6/21/2019] thiamine (VITAMIN B1) tablet 50 mg  50 mg Oral Daily    [START ON 6/21/2019] tiotropium (SPIRIVA) capsule for inhaler 18 mcg  18 mcg Inhalation Daily       PTA meds:   Medications Prior to Admission   Medication    aspirin (ECOTRIN LOW STRENGTH) 81 mg EC tablet    diltiazem (TIAZAC) 180 MG 24 hr capsule    DULoxetine (CYMBALTA) 60 mg delayed release capsule    fluticasone-salmeterol (ADVAIR) 250-50 mcg/dose inhaler    glucose blood (ONETOUCH VERIO) test strip    ipratropium-albuterol (COMBIVENT RESPIMAT) inhaler    KOMBIGLYZE XR 5-1000 MG TB24    losartan (COZAAR) 100 MG tablet    Multiple Vitamin (MULTI-VITAMINS PO)    [facility]    pravastatin (PRAVACHOL) 40 mg tablet    tamsulosin (FLOMAX) 0 4 mg    thiamine 50 MG tablet    tiotropium (SPIRIVA) 18 mcg inhalation capsule     No Known Allergies    Objective   Vitals: Blood pressure (!) 172/96, pulse 72, temperature 99 1 °F (37 3 °C), temperature source Oral, resp  rate 18, height 5' 9" (1 753 m), weight 63 3 kg (139 lb 8 8 oz), SpO2 100 %  Physical Exam      General appearance: NAD, conversive  Eyes: No icterus; conjunctiva normal  HENT: oropharynx clear; mucous membranes moist  Neck: dressing dry; +collar present     Lungs: CTA, normal respiratory effort, no retractions, expiratory effort normal  CV: regular rate, no rubs/murmurs/gallops  ABD: soft; NT/ND; +BS  EXT: Right foot with trace edema and some mild edema of left foot  Skin: normal turgor, normal texture, no rashes  Psych: affect normal, No anxiety/depression  Neuro: AAOx3; UE 4/5 R>L, LEs 4+/5 R>L      Lab Results:   Results from last 7 days   Lab Units 06/18/19  1426   WBC Thousand/uL 7 64   HEMOGLOBIN g/dL 13 3   HEMATOCRIT % 41 2   PLATELETS Thousands/uL 141*     Results from last 7 days   Lab Units 06/20/19  0511 06/19/19  0454   SODIUM mmol/L 144 143   POTASSIUM mmol/L 3 3* 4 0   CHLORIDE mmol/L 108 106   CO2 mmol/L 30 29   BUN mg/dL 14 13   CREATININE mg/dL 0 76 0 66   CALCIUM mg/dL 9 7 9 9                 Results from last 7 days   Lab Units 06/20/19  1057 06/20/19  0723 06/19/19  2106   POC GLUCOSE mg/dl 129 70 97       Labs reviewed    Imaging: reviewed  EKG, Pathology, and Other Studies: I have personally reviewed pertinent reports  VTE Prophylaxis: Heparin    Code Status: Level 1 - Full Code     Reviewed with patient their advanced directive wishes while being in hospital during this encounter  Patient demonstrates understanding of the directives they wish and these are in line with what is noted in the current hospital record  Level 1: Full Code    Assessment/Plan      Cervical spinal stenosis/cord compression with progressive quadraparesis; s/p C3/4 ACDF with C4 hemicorpectomy, C3-4 anterior instrumentation/fusion 6/18/19 Marie [facility]):  continue cervical collar; on Clindamycin for infection prophylaxis  Pain control per primary service  Had previously been on Cymbalta for neuropathic sx  DM2: at home he takes Kombiglyze XR 5-1000mg qd and no insulin  Currently on NPH qhs and Lispro WM = will stop both and start Metformin 500mg qam   Continue DM diet and Accuechecks/QID with SSI      HTN:  at home, takes Cardizem 180mg qd, Losartan 100mg qd (was not on Norvasc he says); here on Card 120mg qd, Losartan 100mg qd   Will go up on Card if needed to 180mg qd  Add prn Hydralazine    COPD; hx lung cancer/VIVIANA lobectomy, pulmonary nodules: follows with pulm as OP and takes Advair 250-50 one puff BID/Spiriva one puff daily and prn Combivent  Will place on Breo as substitute for Advair, continue Spiriva and use Albuterol inhaler prn    SHIVANI: supposed to use CPAP but has been noncompliant    HLD: at home on Pravachol    Urinary incontinence/BPH: continue Flomax    Thrombocytopenia: mild; will watch    Hypokalemia: not replaced today so will give Kdur 40meq x 1 now and repeat AM      Counseling / Coordination of Care  Total time spent: At least 60 minutes, with more than 50% spent counseling/coordinating care  Counseling includes discussion with patient re: progress  and discussion with patient of his/her current medical state/information  Coordination of patient's care was performed in conjunction with primary service  Time invested included review of patient's labs, vitals, and management of their comorbidities with continued monitoring  In addition, this patient was discussed with medical team including physician and advanced extenders  The care of the patient was extensively discussed and appropriate treatment plan was formulated unique for this patient  ** Please Note: Dragon 360 Dictation voice to text software may have been used in the creation of this document   **

## 2019-06-21 ENCOUNTER — TELEPHONE (OUTPATIENT)
Dept: NEUROSURGERY | Facility: CLINIC | Age: 79
End: 2019-06-21

## 2019-06-21 ENCOUNTER — TRANSITIONAL CARE MANAGEMENT (OUTPATIENT)
Dept: INTERNAL MEDICINE CLINIC | Facility: CLINIC | Age: 79
End: 2019-06-21

## 2019-06-21 PROBLEM — Z91.89 AT RISK FOR CONSTIPATION: Status: ACTIVE | Noted: 2019-06-21

## 2019-06-21 PROBLEM — Z91.89 AT RISK FOR CONSTIPATION: Status: RESOLVED | Noted: 2019-06-21 | Resolved: 2019-06-21

## 2019-06-21 PROBLEM — F32.A DEPRESSION: Status: ACTIVE | Noted: 2019-06-21

## 2019-06-21 PROBLEM — N31.9 NEUROGENIC BLADDER: Status: ACTIVE | Noted: 2019-06-21

## 2019-06-21 PROBLEM — E55.9 VITAMIN D INSUFFICIENCY: Status: ACTIVE | Noted: 2019-06-21

## 2019-06-21 LAB
25(OH)D3 SERPL-MCNC: 25.8 NG/ML (ref 30–100)
ALBUMIN SERPL BCP-MCNC: 3.1 G/DL (ref 3.5–5)
ALP SERPL-CCNC: 75 U/L (ref 46–116)
ALT SERPL W P-5'-P-CCNC: 31 U/L (ref 12–78)
ANION GAP SERPL CALCULATED.3IONS-SCNC: 6 MMOL/L (ref 4–13)
AST SERPL W P-5'-P-CCNC: 12 U/L (ref 5–45)
BASOPHILS # BLD AUTO: 0.02 THOUSANDS/ΜL (ref 0–0.1)
BASOPHILS NFR BLD AUTO: 0 % (ref 0–1)
BILIRUB SERPL-MCNC: 0.74 MG/DL (ref 0.2–1)
BUN SERPL-MCNC: 12 MG/DL (ref 5–25)
CALCIUM SERPL-MCNC: 10 MG/DL (ref 8.3–10.1)
CHLORIDE SERPL-SCNC: 108 MMOL/L (ref 100–108)
CO2 SERPL-SCNC: 31 MMOL/L (ref 21–32)
CREAT SERPL-MCNC: 0.77 MG/DL (ref 0.6–1.3)
EOSINOPHIL # BLD AUTO: 0.11 THOUSAND/ΜL (ref 0–0.61)
EOSINOPHIL NFR BLD AUTO: 2 % (ref 0–6)
ERYTHROCYTE [DISTWIDTH] IN BLOOD BY AUTOMATED COUNT: 15 % (ref 11.6–15.1)
GFR SERPL CREATININE-BSD FRML MDRD: 102 ML/MIN/1.73SQ M
GLUCOSE SERPL-MCNC: 101 MG/DL (ref 65–140)
GLUCOSE SERPL-MCNC: 104 MG/DL (ref 65–140)
GLUCOSE SERPL-MCNC: 114 MG/DL (ref 65–140)
GLUCOSE SERPL-MCNC: 116 MG/DL (ref 65–140)
GLUCOSE SERPL-MCNC: 118 MG/DL (ref 65–140)
HCT VFR BLD AUTO: 42.5 % (ref 36.5–49.3)
HGB BLD-MCNC: 13.8 G/DL (ref 12–17)
IMM GRANULOCYTES # BLD AUTO: 0.01 THOUSAND/UL (ref 0–0.2)
IMM GRANULOCYTES NFR BLD AUTO: 0 % (ref 0–2)
LYMPHOCYTES # BLD AUTO: 1.72 THOUSANDS/ΜL (ref 0.6–4.47)
LYMPHOCYTES NFR BLD AUTO: 38 % (ref 14–44)
MCH RBC QN AUTO: 27.6 PG (ref 26.8–34.3)
MCHC RBC AUTO-ENTMCNC: 32.5 G/DL (ref 31.4–37.4)
MCV RBC AUTO: 85 FL (ref 82–98)
MONOCYTES # BLD AUTO: 0.49 THOUSAND/ΜL (ref 0.17–1.22)
MONOCYTES NFR BLD AUTO: 11 % (ref 4–12)
NEUTROPHILS # BLD AUTO: 2.24 THOUSANDS/ΜL (ref 1.85–7.62)
NEUTS SEG NFR BLD AUTO: 49 % (ref 43–75)
NRBC BLD AUTO-RTO: 0 /100 WBCS
PLATELET # BLD AUTO: 146 THOUSANDS/UL (ref 149–390)
PMV BLD AUTO: 10.8 FL (ref 8.9–12.7)
POTASSIUM SERPL-SCNC: 4.2 MMOL/L (ref 3.5–5.3)
PROT SERPL-MCNC: 5.8 G/DL (ref 6.4–8.2)
RBC # BLD AUTO: 5 MILLION/UL (ref 3.88–5.62)
SODIUM SERPL-SCNC: 145 MMOL/L (ref 136–145)
WBC # BLD AUTO: 4.59 THOUSAND/UL (ref 4.31–10.16)

## 2019-06-21 PROCEDURE — 80053 COMPREHEN METABOLIC PANEL: CPT

## 2019-06-21 PROCEDURE — 97162 PT EVAL MOD COMPLEX 30 MIN: CPT

## 2019-06-21 PROCEDURE — 97535 SELF CARE MNGMENT TRAINING: CPT

## 2019-06-21 PROCEDURE — 99233 SBSQ HOSP IP/OBS HIGH 50: CPT

## 2019-06-21 PROCEDURE — 97116 GAIT TRAINING THERAPY: CPT

## 2019-06-21 PROCEDURE — 85025 COMPLETE CBC W/AUTO DIFF WBC: CPT

## 2019-06-21 PROCEDURE — 97530 THERAPEUTIC ACTIVITIES: CPT

## 2019-06-21 PROCEDURE — 82306 VITAMIN D 25 HYDROXY: CPT

## 2019-06-21 PROCEDURE — 82948 REAGENT STRIP/BLOOD GLUCOSE: CPT

## 2019-06-21 PROCEDURE — 97167 OT EVAL HIGH COMPLEX 60 MIN: CPT

## 2019-06-21 RX ORDER — GABAPENTIN 100 MG/1
200 CAPSULE ORAL ONCE
Status: COMPLETED | OUTPATIENT
Start: 2019-06-21 | End: 2019-06-21

## 2019-06-21 RX ORDER — GABAPENTIN 100 MG/1
200 CAPSULE ORAL 2 TIMES DAILY
Status: DISCONTINUED | OUTPATIENT
Start: 2019-06-22 | End: 2019-06-24

## 2019-06-21 RX ORDER — MELATONIN
2000 DAILY
Status: DISCONTINUED | OUTPATIENT
Start: 2019-06-21 | End: 2019-07-07 | Stop reason: HOSPADM

## 2019-06-21 RX ORDER — GABAPENTIN 300 MG/1
300 CAPSULE ORAL
Status: DISCONTINUED | OUTPATIENT
Start: 2019-06-22 | End: 2019-06-24

## 2019-06-21 RX ORDER — NICOTINE 21 MG/24HR
21 PATCH, TRANSDERMAL 24 HOURS TRANSDERMAL DAILY
Status: DISCONTINUED | OUTPATIENT
Start: 2019-06-21 | End: 2019-07-07 | Stop reason: HOSPADM

## 2019-06-21 RX ORDER — POLYETHYLENE GLYCOL 3350 17 G/17G
17 POWDER, FOR SOLUTION ORAL DAILY
Status: DISCONTINUED | OUTPATIENT
Start: 2019-06-21 | End: 2019-07-07 | Stop reason: HOSPADM

## 2019-06-21 RX ORDER — LANOLIN ALCOHOL/MO/W.PET/CERES
3 CREAM (GRAM) TOPICAL
Status: DISCONTINUED | OUTPATIENT
Start: 2019-06-21 | End: 2019-07-07 | Stop reason: HOSPADM

## 2019-06-21 RX ADMIN — DOCUSATE SODIUM 100 MG: 100 CAPSULE, LIQUID FILLED ORAL at 08:22

## 2019-06-21 RX ADMIN — GABAPENTIN 100 MG: 100 CAPSULE ORAL at 05:39

## 2019-06-21 RX ADMIN — TIOTROPIUM BROMIDE 18 MCG: 18 CAPSULE ORAL; RESPIRATORY (INHALATION) at 08:23

## 2019-06-21 RX ADMIN — LOSARTAN POTASSIUM 100 MG: 50 TABLET, FILM COATED ORAL at 08:29

## 2019-06-21 RX ADMIN — CLINDAMYCIN HYDROCHLORIDE 300 MG: 150 CAPSULE ORAL at 15:04

## 2019-06-21 RX ADMIN — METHOCARBAMOL 500 MG: 500 TABLET, FILM COATED ORAL at 05:39

## 2019-06-21 RX ADMIN — METHOCARBAMOL 500 MG: 500 TABLET, FILM COATED ORAL at 15:17

## 2019-06-21 RX ADMIN — ACETAMINOPHEN 650 MG: 325 TABLET ORAL at 21:15

## 2019-06-21 RX ADMIN — POLYETHYLENE GLYCOL 3350 17 G: 17 POWDER, FOR SOLUTION ORAL at 08:23

## 2019-06-21 RX ADMIN — HEPARIN SODIUM 5000 UNITS: 5000 INJECTION INTRAVENOUS; SUBCUTANEOUS at 05:40

## 2019-06-21 RX ADMIN — HEPARIN SODIUM 5000 UNITS: 5000 INJECTION INTRAVENOUS; SUBCUTANEOUS at 21:15

## 2019-06-21 RX ADMIN — DILTIAZEM HYDROCHLORIDE 120 MG: 120 CAPSULE, COATED, EXTENDED RELEASE ORAL at 08:28

## 2019-06-21 RX ADMIN — POLYVINYL ALCOHOL 1 DROP: 14 SOLUTION/ DROPS OPHTHALMIC at 11:26

## 2019-06-21 RX ADMIN — METHOCARBAMOL 500 MG: 500 TABLET, FILM COATED ORAL at 21:15

## 2019-06-21 RX ADMIN — CLINDAMYCIN HYDROCHLORIDE 300 MG: 150 CAPSULE ORAL at 21:15

## 2019-06-21 RX ADMIN — FLUTICASONE FUROATE AND VILANTEROL TRIFENATATE 1 PUFF: 100; 25 POWDER RESPIRATORY (INHALATION) at 08:23

## 2019-06-21 RX ADMIN — POLYVINYL ALCOHOL 1 DROP: 14 SOLUTION/ DROPS OPHTHALMIC at 17:33

## 2019-06-21 RX ADMIN — DULOXETINE HYDROCHLORIDE 60 MG: 60 CAPSULE, DELAYED RELEASE ORAL at 08:21

## 2019-06-21 RX ADMIN — HYDRALAZINE HYDROCHLORIDE 25 MG: 25 TABLET ORAL at 21:16

## 2019-06-21 RX ADMIN — TAMSULOSIN HYDROCHLORIDE 0.4 MG: 0.4 CAPSULE ORAL at 21:15

## 2019-06-21 RX ADMIN — POLYVINYL ALCOHOL 1 DROP: 14 SOLUTION/ DROPS OPHTHALMIC at 08:23

## 2019-06-21 RX ADMIN — DOCUSATE SODIUM 100 MG: 100 CAPSULE, LIQUID FILLED ORAL at 17:32

## 2019-06-21 RX ADMIN — THIAMINE HCL TAB 100 MG 50 MG: 100 TAB at 08:21

## 2019-06-21 RX ADMIN — GABAPENTIN 200 MG: 100 CAPSULE ORAL at 22:23

## 2019-06-21 RX ADMIN — METFORMIN HYDROCHLORIDE 500 MG: 500 TABLET ORAL at 08:21

## 2019-06-21 RX ADMIN — MELATONIN 3 MG: at 21:15

## 2019-06-21 RX ADMIN — GABAPENTIN 100 MG: 100 CAPSULE ORAL at 21:15

## 2019-06-21 RX ADMIN — ACETAMINOPHEN 650 MG: 325 TABLET ORAL at 05:39

## 2019-06-21 RX ADMIN — POLYVINYL ALCOHOL 1 DROP: 14 SOLUTION/ DROPS OPHTHALMIC at 21:19

## 2019-06-21 RX ADMIN — NICOTINE 21 MG: 21 PATCH, EXTENDED RELEASE TRANSDERMAL at 15:18

## 2019-06-21 RX ADMIN — HEPARIN SODIUM 5000 UNITS: 5000 INJECTION INTRAVENOUS; SUBCUTANEOUS at 15:04

## 2019-06-21 RX ADMIN — PANTOPRAZOLE SODIUM 40 MG: 40 TABLET, DELAYED RELEASE ORAL at 05:39

## 2019-06-21 RX ADMIN — SENNOSIDES 8.6 MG: 8.6 TABLET, FILM COATED ORAL at 08:22

## 2019-06-21 RX ADMIN — VITAMIN D, TAB 1000IU (100/BT) 2000 UNITS: 25 TAB at 11:28

## 2019-06-21 RX ADMIN — PRAVASTATIN SODIUM 10 MG: 10 TABLET ORAL at 17:32

## 2019-06-21 RX ADMIN — GABAPENTIN 100 MG: 100 CAPSULE ORAL at 15:04

## 2019-06-21 RX ADMIN — CLINDAMYCIN HYDROCHLORIDE 300 MG: 150 CAPSULE ORAL at 05:39

## 2019-06-21 RX ADMIN — ACETAMINOPHEN 650 MG: 325 TABLET ORAL at 15:05

## 2019-06-21 NOTE — ASSESSMENT & PLAN NOTE
Overall management but Medicine  Patient on combination metformin medication previously  Insulin lispro at their discretion, other insulin and orals at their discretion   D/C only on Metformin 500mg qday and diabetic diet

## 2019-06-21 NOTE — TREATMENT PLAN
Individualized Plan of [address] During 70 y o  male MRN: [id number]  Unit/Bed#: -01 Encounter: [id number]     PATIENT INFORMATION  ADMISSION DATE: 6/20/2019  3:30 PM RASHMI CATEGORY:Spinal Cord Dysfunction:  Non-Traumatic:  04 130 Other Non-Traumatic Cervical myelopathy   ADMISSION DIAGNOSIS: Cervical myelopathy EXPECTED LOS: 14 days     MEDICAL/FUNCTIONAL PROGNOSIS  Pre-admit screens and post-admit evaluations reviewed and are consistent  Based on my assessment of the patient's medical conditions and current functional status, the prognosis for attaining medical and functional goals or the IRF stay is:  Good  Patient is appropriate for acute rehabilitation  Medical Goals:   Patient will be medically stable for discharge back to community setting upon completion or acute rehab program and patient/family will be able to manage medical conditions and comorbid conditions with medications and appropriate follow up upon completion of rehab program     Cardiopulmonary function: Ensure cardiopulmonary stability and optimize cardiopulmonary function not only at rest but with activity as patient's activity level significantly increases in acute rehab compared with prior to transfer in preparation for safe discharge from [facility]  Must closely and frequently monitor blood pressure and HR to ensure adequate cardiac output during ADLs and ambulation as patient is at increased risk for orthostatic hypotension/syncope and potential injury if not monitored for and managed adequately  Blood pressure management:    Frequent monitoring of blood pressure with appropriate adjustments in blood pressure medication management to optimize blood pressure control and prevent/limit renal complications  Monitoring impact of blood pressure and side-effects of blood pressure medications at rest and with activity    Hypoxia prevention: Ensure appropriate level of oxygenation at rest and with activity to avoid symptomatic hypoxia, maximize functional performance, and decrease risk of atelectasis/pneumonia through close and frequent monitoring, providing appropriate respiratory treatments (such as incentive spirometry), and when necessary provide/adjust respiratory medications  DM: Patient will improve/maintain blood sugar control to ensure optimal healing and decrease risks of complications associated with DM through medication monitoring, adjustments as indicated, and optimal dietary intake and education  Pain management:  Pain will improve with frequent evaluation of pain, careful adjustments in medications, frequent re-evaluation of patient's pain and medical/neurologic status to ensure optimal pain control, avoidance of potential serious and even life-threatening side-effects and drug interactions, as well as weaning pain medications as soon as possible to decrease risk of short and long-term use  Neurologic Disorder:  cervical myelopathy causing impaired mobility, ADLs, and gait:  intensive skilled therapies with physical therapy and occupational therapy with close oversight and management by rehab specialized physician in acute rehabilitation setting to most expeditiously and effectively improve functional mobility, transfers, upper and lower body strengthening, conditioning, balance, and gait training with appropriate assistive device  Patient will have optimal supervision and management of patient's underlying neurologic disorder with specialized rehabilitation physician during this period of recovery to ensure most expeditious and optimal recovery with decreased risks of fall/injury and other complications including acute worsening of neuro disorder, decrease risk of VTE, PNA, and skin ulceration  Cognitive impairment: intensive skilled therapies including speech language pathology and occupation therapy to evaluate and treat deficits in cognition    Close oversight and management by rehab specialized physician of cognitive deficits and potential confounding factors (prevention of, monitoring for, and if found treatment of possible complications such as infections, as well as optimally managing sleep, mood, and medications which can negatively impact cognition and functional recovery)  Inpatient rehabilitation education/teaching: To be provided to patient and typically family/caregiver (if able to be identified) by all skilled therapists, rehab nursing, case management, and rehab specialized physician to ensure optimal recovery and decrease risks of complications in both acute rehabilitation setting as well as after discharge  Neurogenic bladder:  Appropriate neurogenic bladder management with appropriate toileting program from rehab nursing and staff with oversight management by rehabilitation physicians which include appropriate monitoring and possible adjustments in medications to with goals to optimize bladder function and decrease risk of bladder retention, incontinence, and urinary tract infection  Bowel dysfunction: Appropriate bowel management with appropriate toileting program from rehab nursing and staff with oversight management by rehabilitation physicians which include adjustments in medications to optimize appropriate bowel function and prevent/decrease risk of constipation and bowel obstruction  Skin incisions/wounds: Appropriate skin checks for wound/skin evaluation including evaluation of healing, worsening of wounds, or signs of infection  Wound care management from rehab nursing, wound care nursing, physicians  Ensure frequent appropriate turning, positioning in bed, in chair, when mobilizing, and when appropriate with use of appropriate devices to optimize healing and decrease risk of worsening or new skin breakdown  ANTICIPATED DISCHARGE DISPOSITION AND SERVICES  COMMUNITY SETTING: Home - alone  24-hr caregiver is not available        ANTICIPATED FOLLOW-UP SERVICE:   Outpatient Therapy PT, OT v Home Health Services: PT, OT     DISCIPLINE SPECIFIC PLANS:  Required Disciplines & Services: PT, OT, Rehabilitation Physician, Rehabillitation Nursing, Case Management, Dietary, Respiratory,     ANTICIPATED FUNCTIONAL OUTCOMES:  ADL: Patient will be modified independent with ADLs upon completion of rehab program   Bladder/Bowel: Patient will be modified independent with bladder/bowel management upon completion of rehab program   Transfers: Patient will be modified independent with transfers upon completion of rehab program   Locomotion: Patient will be modified independent with locomotion upon completion of rehab program     DISCHARGE PLANNING NEEDS  Equipment needs: To be determined at team conference  REHAB ANTICIPATED PARTICIPATION RESTRICTIONS:  Uncertain at this time  To be determined closer to discharge

## 2019-06-21 NOTE — PROGRESS NOTES
06/21/19 0901   Patient Data   Rehab Impairment other non traumatic   Etiologic Diagnosis cervical spinal cord compression   Date of Onset 06/18/19   Home Setup   Type of Home Split Level   Method of Entry Stairs;Hand Rail Bilateral  (too wide to reach at same time)   Number of Stairs 6   Number of Stairs in Home 9  (+5 +5 RHR up and L HR down)   In Home Hand Rail Right   Second Floor Bathroom Full   Available Equipment Single Point [facility] Level of Function   Self-Care 2  Needed Some Help - Patient needed a partial assistance from another person to complete activities  Indoor-Mobility (Ambulation) 3  Independent - Patient completed the activities by him/herself, with or without an assistive device, with no assistance from a helper  Stairs 3  Independent - Patient completed the activities by him/herself, with or without an assistive device, with no assistance from a helper  Functional Cognition 3  Independent - Patient completed the activities by him/herself, with or without an assistive device, with no assistance from a helper  Prior Assistance Needed for Driving;Household Chores/Cleaning;Meal Preparation; Shopping   Patient Preference   Nickname (Patient Preference) Lucita Holland   Psychosocial   Psychosocial (WDL) WDL   Restrictions/Precautions   Precautions Bed/chair alarms; Fall Risk;Supervision on toilet/commode;Spinal precautions   Pain Assessment   Pain Assessment 0-10   Pain Score 4   Pain Type Acute pain   Pain Location Shoulder   Pain Orientation Left   Pain Descriptors Aching   Pain Onset Ongoing   Hospital Pain Intervention(s) Repositioned; Ambulation/increased activity   Response to Interventions 4/10   QI: Roll Left and Right   Assistance Needed Supervision   Roll Left and Right CARE Score 4   QI: Sit to Lying   Assistance Needed Incidental touching   Sit to Lying CARE Score 4   QI: Lying to Sitting on Side of Bed   Assistance Needed Incidental touching   Lying to Sitting on Side of Bed CARE Score 4   QI: Sit to Stand   Assistance Needed Physical assistance   Assistance Provided by [place] Less than 25%   Sit to Stand CARE Score 3   QI: Chair/Bed-to-Chair Transfer   Assistance Needed Physical assistance   Assistance Provided by [place] Less than 25%   Chair/Bed-to-Chair Transfer CARE Score 3   QI: Car Transfer   Reason if not Attempted Safety concerns   Car Transfer CARE Score 88   Transfer Bed/Chair/Wheelchair   Limitations Noted In Balance; Endurance;UE Strength;LE Strength   Adaptive Equipment None   Stand Pivot Minimal Assist   Sit to Stand Minimal   Stand to Sit Minimal   Supine to Sit   (CGA)   Sit to Supine   (CGA)   Findings dec standing balance/dec righting reactions   QI: Walk 10 Feet   Assistance Needed Physical assistance; Adaptive equipment   Assistance Provided by [place] Total assistance   Comment CFA   Walk 10 Feet CARE Score 1   QI: Walk 50 Feet with Two Turns   Assistance Needed Physical assistance   Assistance Provided by [place] Total assistance   Comment CFA   Walk 50 Feet with Two Turns CARE Score 1   QI: Walk 150 Feet   Assistance Needed Physical assistance   Assistance Provided by [place] Total assistance   Comment CFA   Walk 150 Feet CARE Score 1   QI: Walking 10 Feet on Uneven Surfaces   Reason if not Attempted Safety concerns   Walking 10 Feet on Uneven Surfaces CARE Score 88   Ambulation   Does the patient walk? 2  Yes   Primary Discharge Mode of Locomotion Walk   Walk Assist Level Minimum Assist;Moderate Assist;Chair Follow   Gait Pattern Inconsistant Analisa; Slow Analisa;Decreased foot clearance; Step through; Improper weight shift   Assist Device Hand Hold   Distance Walked (feet) 100 ft  (150, 300, 350 x2, 700 x1, 120)   Limitations Noted In Balance; Endurance; Heel Strike;Speed;Strength; Other   Findings session foucsed on walking with HHA, first LUE and then RUE; balance seemed better with RUE HHA however this also could be due to practice  walking with sneakers on, gait belt   Pt ed on fall risk factors, safety precautions, how limited c-spine ROM affects how we use vision for balance  Pt demonstrated understanding of pt education  practiced walking overw eighted dowels and all sizes of half bolsters  walking forward, backwards, laterally both directions  ModA for 1 LOB, otherwise Mack  walking with gait belt, no HHA> gait speed ranged from 0 8 - 1 1 m/s, indicating fall risk at this time  Discussed safety precautions and potential use of RW for home  To cont to use no device in therapy to work on balance /righting reactions  Walking (FIM) 1 - Patient requires assist of two people   Wheelchair mobility   QI: Does the patient use a wheelchair? 0  No   QI: 1 Step (Curb)   Reason if not Attempted Safety concerns   1 Step (Curb) CARE Score 88   QI: 4 Steps   Assistance Needed Physical assistance; Adaptive equipment   Assistance Provided by [place] Total assistance   Comment Ax2   4 Steps CARE Score 1   QI: 12 Steps   Reason if not Attempted Safety concerns   12 Steps CARE Score 88   Stairs   Type  Steps   # of Steps 8   Weight Bearing Precautions Fall Risk   Assist Devices Single Rail;Bilateral Rail   Findings reciprocal pattern up and down, [place] 2nd person for safety   Stairs (FIM) 1 - Patient requires assist of two people   Comprehension   QI: Comprehension 4  Undestands: Clear comprehension without cues or repetitions   Comprehension (FIM) 6 - Understands complex/abstract but requires more time   Expression   QI: Expression 4  Express complex messages without difficulty and with speech that is clear and easy to [place]   Expression (FIM) 7 - Expresses complex/abstract ideas in a reasonable time w/o devices or helper     Social Interaction   Social Interaction (FIM) 6 - Interacts appropriately with others BUT requires extra  time   Problem Solving   Problem solving (FIM) 5 - Solves complex problems But requires cues from helper   Memory   Memory (FIM) 5 - Needs cueing reminders <10% RLE Assessment   RLE Assessment   (gross 4/5)   LLE Assessment   LLE Assessment   (gross 4/5)   Sensation   Light Touch Partial deficits in the RUE;Partial deficits in the LUE   Cognition   Arousal/Participation Cooperative   Objective Measure   PT Measure(s) focused on balance/strength/ activity tolerance with walking  stretching bilat calves; bilat ankle PROM WNL  Discharge Information   Patient's Discharge Plan to dc home    Patient's Rehab Expectations to get better to go home   Barriers to Discharge Home Limited Family Support;Decreased Strength;Decreased Endurance; Safety Considerations  (PAULA, balance, fall risk)   Impressions Pt presents s/p C3-C4 anterior cervical decompression and fusion with  C4 hemicorpectomy with deficits in BUE strength/AROM/fine motor control/sensation  Pt also presents with deficits in righting reactions, balance, foot clearance at times and is at inc fall risk  Pt presents with adequate BLE strength and activity tolerance to fully participate in therapy and is highly motivated to get better  Pt reports living alone and have limited help; currently anticipating need for A with all household chores and transportation  Currently pt will need cont skilled PT to progress balance, righitng reactions and UE function to perform self-care tasks in order to reach Shireen goals to return home safely  Lack of caregiver availability is a barrier to home, along with PAULA and steps inside  Pt may need a RW upon d/c to be Shireen, however will need to assess feasibility of this with his split level house  Pt will benefit from skilled PT to progress gait speed, righting reactions, safety and (I) to prepare for dc home      PT Therapy Minutes   PT Time In 0900   PT Time Out 1030   PT Total Time (minutes) 90   PT Mode of treatment - Individual (minutes) 90   PT Mode of treatment - Concurrent (minutes) 0   PT Mode of treatment - Group (minutes) 0   PT Mode of treatment - Co-treat (minutes) 0   PT Mode of Teatment - Total time(minutes) 90 minutes

## 2019-06-21 NOTE — ASSESSMENT & PLAN NOTE
Much improved control; weaned off opiates   Somatic postsurgical, arthritic, muscle spasms, neuropathic   Discharge on:  APAP PRN  Continue gabapentin to 400 mg t i d    Cymbalta can help as well

## 2019-06-21 NOTE — ASSESSMENT & PLAN NOTE
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 and fusion with C4 hemicorpectomy by Dr Derrick Hilton on 6/18/19  Clindamycin for infectious prophylaxis per Neurosurgery  Neurosurgery cleared patient to resume aspirin if he needs it  Patient states he was on aspirin chronically for prevention; patient denies history of heart attack, stroke, stenting; but further review of hx notes he has been on asp for considerable time and was seen recently by PCP > recommend resuming asp 81 mg qday and follow-up with PCP after SNF  Postop day 14 Tuesday July 2nd > neurosurgery follow-up on that date with removal of staples  Cervical spine precautions, cervical collar on at all times  Monitor incision for infection or dehiscence > healing appropriately   Completed acute comprehensive interdisciplinary inpatient rehabilitation to include intensive skilled therapies (PT, OT) as outlined with oversight and management by rehabilitation physician as well as inpatient rehab level nursing, case management and weekly interdisciplinary team meetings     Follow-up with Neurosurgery after discharge

## 2019-06-21 NOTE — ASSESSMENT & PLAN NOTE
Improved  - Colace/Senna BID, miralax daily   - PRN bowel regimen (Miralax PRN/Dulcolax supp PRN)  - monitor for signs and symptoms of obstruction/ileus > not currently; hold stimulant lax if occurs  - Limiting constipating medications if possible  - Ensure adequate hydration

## 2019-06-21 NOTE — ASSESSMENT & PLAN NOTE
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

## 2019-06-21 NOTE — ASSESSMENT & PLAN NOTE
Overall management by Medicine  Losartan 100 mg daily  Patient also on diltiazem 180 mg now > adjustments for Medicine

## 2019-06-21 NOTE — PROGRESS NOTES
[facility]ational [facility] Initial Evaluation     06/21/19 0700   Patient Data   Rehab Impairment other non traumatic   Etiologic Diagnosis cervical spinal cord compression   Date of Onset 06/18/19   Home Setup   Type of Home Split Level   Second Floor Bathroom Full;Tub; Shower;Grab Bars   Available Equipment Shower Chair   Prior IADL Participation   Money Management Identify Money;Estimate Costs;Estimate Change;Combine Bills;Manage Checkbook   Meal Preparation Partial Participation   Laundry Partial Participation   Prior Level of Function   Self-Care 2  Needed Some Help - Patient needed a partial assistance from another person to complete activities  Indoor-Mobility (Ambulation) 3  Independent - Patient completed the activities by him/herself, with or without an assistive device, with no assistance from a helper  Stairs 3  Independent - Patient completed the activities by him/herself, with or without an assistive device, with no assistance from a helper  Functional Cognition 3  Independent - Patient completed the activities by him/herself, with or without an assistive device, with no assistance from a helper  Prior Assistance Needed for Driving;Household Chores/Cleaning   Restrictions/Precautions   Braces or Orthoses C/S Collar   Pain Assessment   Pain Assessment No/denies pain   Pain Score No Pain   QI: Eating   Assistance Needed Physical assistance   Assistance Provided by [place] Less than 25%   Eating CARE Score 3   Eating Assessment   Findings Pt presents with Decreased dexterity to manipulate fork  unable to maintain advanced grasp on fork  Pt requires heavy trunk compensations to bring food to mouth  Pt provided with AE to increase independence but will require further training for b/l UE use and reduce compensation      Eating (FIM) 4 - Patient completes 75%-90% of tube feed tasks   QI: Oral Hygiene   Assistance Needed Physical assistance   Assistance Provided by [place] Less than 25% Comment in stance at sink   Oral Hygiene CARE Score 3   Grooming   Able To Wash/Dry Hands;Brush/Clean Teeth;Wash/Dry Face; Initiate Tasks; Acquire Items   Findings requires assist/set up for mangement of tools  CGA in stance at sink  difficulty brushing hair, poor grasp on comb, may require AE   Grooming (FIM) 4 - Patient completed 3/4  tasks   QI: Shower/Bathe Self   Assistance Needed Physical assistance   Assistance Provided by [place] 25%-49%   Shower/Bathe Self CARE Score 3   Bathing   Assessed Bath Style Sponge Bath   Anticipated D/C Bath Style Tub   Able to Adjust Water Temperature No   Able to Wash/Rinse/Dry (body part) Left Arm;Right Arm;L Upper Leg;R Upper Leg;Chest;Abdomen;Perineal Area; Buttocks   Limitations Noted in Balance; Endurance; Coordination;ROM;Strength   Positioning Standing;Seated   Findings  assit for bathing feet and buttocks  Pt reports using shower chair at home in shower with grab bars  Poor control w/ UE for managing washcloth  Pt reports that recently Pt required assist from [facility] for bathing w/ worsening UE strength and control  CGA in stance at sink   Bathing (FIM) 3 - Patient completes 5/10  6/10 or 7/10 parts   QI: Upper Body Dressing   Assistance Needed Physical assistance   Assistance Provided by [place] 50%-74%   Upper Body Dressing CARE Score 2   QI: Lower Body Dressing   Assistance Needed Physical assistance   Assistance Provided by [place] Less than 25%   Lower Body Dressing CARE Score 3   QI: Putting On/Taking Off Footwear   Assistance Needed Physical assistance   Assistance Provided by [place] 50%-74%   Comment assit for tying shoes  Pt is able to brin gfoot over knee for donning/doffing      Putting On/Taking Off Footwear CARE Score 2   QI: Picking Up Object   Reason if not Attempted Safety concerns   Picking Up Object CARE Score 88   Dressing/Undressing Clothing   UB Dressing (FIM) 3 - Patient completes  50-74% of all tasks   LB Dressing (FIM) 4 - Patient completes 75% of all tasks QI: [address] Needed Physical assistance   Assistance Provided by [place] 50%-74%   Toileting Hygiene CARE Score 2   Toileting   Able to [address] down yes, up yes  Manage Hygiene Bowel   Toileting (FIM) 3 - Patient completes  50-74% of all tasks   Bowel/Bladder Management   QI: Bladder Continence 0  Always continent (no documented incontinence)   Bladder Management (FIM) 5 - [place] sets up supplies within patient reach (ie  urinal)   QI: Bowel Continence 0  Always continent   Bowel Management (FIM) 5 - [place] empties any equipment   QI: Roll Left and Right   Assistance Needed Supervision   Roll Left and Right CARE Score 4   QI: Sit to Lying   Assistance Needed Supervision   Sit to Lying CARE Score 4   QI: Lying to Sitting on Side of Bed   Assistance Needed Physical assistance   Assistance Provided by [place] Less than 25%   Lying to Sitting on Side of Bed CARE Score 3   QI: Sit to Stand   Assistance Needed Physical assistance   Assistance Provided by [place] Less than 25%   Sit to Stand CARE Score 3   QI: Chair/Bed-to-Chair Transfer   Assistance Needed Physical assistance   Assistance Provided by [place] Less than 25%   Chair/Bed-to-Chair Transfer CARE Score 3   Transfer Bed/Chair/Wheelchair   Adaptive Equipment None   Stand Pivot Minimal Assist   Findings CGA hands on hips  pt rpeort susing SPC at home  Pt reports no falls w/ frequent difficulty w/ balance  Reporting "i wopuld have trouble stepping in a straight line "   Bed, Chair, Wheelchair Transfer (FIM) 4 - Patient completes 75% of all tasks   QI: Toilet Transfer   Assistance Needed Physical assistance   Assistance Provided by [place] 25%-49%   Toilet Transfer CARE Score 3   Toilet Transfer   Surface Assessed Standard Toilet   Toilet Transfer (FIM) 3 - [place] needs to lift, boost or assist to stand OR sit   Tub/Shower Transfer   Not Assessed Sponge Bath   Comprehension   QI: Comprehension 4   Undestands: Clear comprehension without cues or repetitions   Comprehension (FIM) 6 - Understands complex/abstract but requires more time   Expression   QI: Expression 4  Express complex messages without difficulty and with speech that is clear and easy to [place]   Expression (FIM) 7 - Expresses complex/abstract ideas in a reasonable time w/o devices or helper  Social Interaction   Social Interaction (FIM) 6 - Interacts appropriately with others BUT requires extra  time   Problem Solving   Problem solving (FIM) 5 - Solves basic problems 90% of time   Memory   Memory (FIM) 6 - Uses memory book   RUE Assessment   RUE Assessment X   AROM - RUE   R Shoulder Flexion 3/4 + range   R Shoulder Extension Near Full AROM against Gravity   R Elbow Flexion WFl   R Elbow Extension WFL   R Wrist Flexion Near full AROM   R Wrist Extension 3/4 range   R Digit Flexion Limited distal MIP,DIP flexion   R Digit Extension WFL   Strength - RUE   R Gross Arm Strength   (Gross grasp: 24,24,24KG  pincher 8 0,8 0,8 0 lat pinch 10 6)   AROM - LUE   L Shoulder Flexion 3/4 + range   L Shoulder Extension Near Full AROM against Gravity   L Elbow Flexion WFL   L Elbow Extension WFL   L Wrist Flexion Near full AROM   L Wrist Extension 3/4 range   L Digit Flexion Limited distal MIP,DIP flexion   L Digit Extension WFL   Strength - LUE   L Gross Arm Strength   (Gross grasp: 18,24,18KG  pincher 7 3,7 6,7 5, lat pinch 7 6)   Coordination   Movements are Fluid and Coordinated 0   Coordination and Movement Description Dysmetria, cogwheel, stiffness, impaired isolation of muscle groups in UE   Sensation   Light Touch Partial deficits in the RUE;Partial deficits in the LUE   Sharp/Dull Partial deficits in the RUE;Partial deficits in the LUE   Stereognosis Partial deficits in the RUE;Partial deficits in the LUE   Cognition   Arousal/Participation Alert; Cooperative   Attention Attends with cues to redirect   Orientation Level Oriented X4   Memory Decreased short term memory;Decreased recall of recent events   Following Commands Follows one step commands with increased time or repetition   Perception   Inattention/Neglect Appears intact   Objective Measure   OT Measure(s) 9 HOLE PEG TEST: R UE 1:38 77 L UE 2:29 93  Discharge Information   Impressions Pt is a 70 y o  male seen for OT evaluation following admission to 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 who underwent C3-C4 anterior cervical decompression and fusion with  C4 hemicorpectomy by Dr Nieves Files on 6/18/19  Pt is in [employer] collar, to be worn at all times  Pt with PHM significant for diabetes mellitus 2, hypertension, hyperlipidemia,lung cancer status post left upper lobectomy,  pulmonary nodules, COPD, obstructive sleep apnea, grade 1 diastolic dysfunction left ventricle, BPH  OT evaluation and assessment of strength, ADL function, and functional transfers completed  Pt reports prior [place] w/ ADLs, IADLs with use of no DME  However reports increasing difficulties with ADLs as symptoms  Pt lives with wife and is unable to provide physical assistance at time of discharge  She works during the week in [employer]  Pt reports 0 falls in the last 6 months  Personal factors affecting the patient at this time include: co-morbidities, dec caregiver support, home alone, fall risk and [place] collar   Pt is currently limited due to the following deficits impacting occupational performance,  activity tolerance, endurance, standing balance/tolerance, UE strength, UE ROM, FMC, GMC, insight and sensation   The patient has shown a decline from prior level of function  The patient participates in identification of personal goals to address in Occupational Therapy  Pt benefits from from skilled OT services for I/ADL retraining to support the ability to safely participate in daily occupations safely and independently in the most least restrictive way   From OT standpoint, Pt demonstrates Good rehab potential to meet LTG's  Pt is a good rehab candidate, Anticipate 2 week LOS length of stay  Occupational Therapy plan of care to include sessions focusing on ADL retraining, [employer] collar management, UE/hand strengthening, UEsensory retraining, [facility],  training, Home safety, Fall prevention, DME Training, and IADL management including medication management      OT Therapy Minutes   OT Time In 0700   OT Time Out 0840   OT Total Time (minutes) 100   OT Mode of treatment - Individual (minutes) 90   OT Mode of treatment - Concurrent (minutes) 10   OT Mode of treatment - Group (minutes) 0   OT Mode of treatment - Co-treat (minutes) 0   OT Mode of Teatment - Total time(minutes) 100 minutes

## 2019-06-21 NOTE — H&P
PHYSICAL MEDICINE AND REHABILITATION H&P/ADMISSION NOTE  Brittni Flores During 70 y o  male MRN: [id number]  Unit/Bed#: -01 Encounter: [id number]     Rehab Diagnosis: Spinal Cord Dysfunction:  Non-Traumatic:  04 130 Other Non-Traumatic Cervical myelopathy    Etiologic Diagnosis:  Cervical myelopathy     Overall Assessment & Plan with Risks of Comorbidities:  26-year-old male with a past medical history of  diabetes mellitus 2, hypertension, hyperlipidemia,  active smoker,lung cancer status post left upper lobectomy,  pulmonary nodules, COPD, obstructive sleep apnea,  only grade 1 diastolic dysfunction left ventricle, BPH who developed progressive 4 limb weakness, sensory changes, and gait imbalance 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 who underwent C3-C4 anterior cervical decompression and fusion with  C4 hemicorpectomy by Dr Jose Maxwell on 6/18/19  Postoperative course notable for pain, hypertension, with significant decline ADLs and ambulation  Patient was evaluated by skilled therapies and is appropriate for admission to acute rehabilitation center  * Cervical myelopathy (HCC)  Assessment & Plan  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 and fusion with C4 hemicorpectomy by Dr Jose Maxwell on 6/18/19  Clindamycin for infectious prophylaxis per Neurosurgery  Holding anti-platelet per Neurosurgery  Postop day 14 is Tuesday July 2nd  KWADWO drain management if necessary  Cervical spine precautions, cervical collar on at all times  Monitor incision for infection or dehiscence  Follow-up with Neurosurgery if needed during course after discharge    Pain  Assessment & Plan  Somatic postsurgical, arthritic, muscle spasms, neuropathic   Continue current management for now:  Scheduled acetaminophen monitor LFTs  Gabapentin 100 mg t i d   Robaxin 500 mg t i d  Discontinue were adjusted as appropriate  Oxycodone 5-10 mg q 4 hours p r n    Cymbalta can help as well   Hold for oversedation, AMS, or RR<12    Patient apparently was on Cymbalta in the past follow-up       Neurogenic bladder  Assessment & Plan  Urinary frequency sounds like neurogenic OAB  PVRs, bladder scans, measure urine outputs to help determine  Continue chronic flomax for now  Monitor for infection, incontinence, retention    Constipation  Assessment & Plan  - Colace/Senna BID  - PRN bowel regimen (Miralax PRN/Dulcolax supp PRN)  - monitor for signs and symptoms of obstruction/ileus > not currently; hold stimulant lax if occurs  - Limiting constipating medications if possible  - Ensure adequate hydration        At risk for venous thromboembolism (VTE)  Assessment & Plan  Heparin and SCDs    BPH (benign prostatic hyperplasia)  Assessment & Plan  Monitor for retention, incontinence, infection  Continue Flomax    SHIVANI (obstructive sleep apnea)  Assessment & Plan  CPAP    COPD (chronic obstructive pulmonary disease) ([facility])  Assessment & Plan  Monitor oxygen with and without activity  Management overall per Medicine  Encourage deep breathing and incentive spirometry  Provide albuteral neb for now with increased congestion recently off chronic meds  Spiriva, as needed albuterol  Home regimen, Advair, Spiriva, Combivent PRN  Follow-up with pulmonology after discharge    Essential hypertension  Assessment & Plan  Blood pressure elevated fair amount recently  Overall management by Medicine  Losartan 100 mg daily  Patient also on diltiazem chronically    Hyperlipidemia associated with type 2 diabetes mellitus ([facility] )  Assessment & Plan  Resume statin at discretion Medicine    Type 2 diabetes mellitus with diabetic neuropathy, without long-term current use of insulin ([facility])  Assessment & Plan  Overall management but Medicine  Patient on combination metformin medication previously  Insulin lispro at their discretion, other insulin and orals at their discretion        # Skin:   - Nursing to turn patient Q2H if not adequately ambulatory, monitor for skin breakdown, rashes, and wounds if applicable     # Diet/Hydration:    Diabetic    Disposition:   Home with [facility] 2-3 weeks from admission    Follow-up:   PCP, PMR, NSx, Urology    CODE: Level 1: Full Code    Restrictions include: Fall precautions     ---------------------------------------------------------------------------------------------------------------------    Chief Complaint:  Bilateral arm pain     History of Present Illness:   68-year-old male with a past medical history of  diabetes mellitus 2, hypertension, hyperlipidemia,  active smoker,lung cancer status post left upper lobectomy,  pulmonary nodules, COPD, obstructive sleep apnea,  only grade 1 diastolic dysfunction left ventricle, BPH who developed progressive 4 limb weakness, sensory changes, and gait imbalance 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 who underwent C3-C4 anterior cervical decompression and fusion with  C4 hemicorpectomy by Dr Cecilia Koroma on 6/18/19  Postoperative course notable for pain, hypertension, with significant decline ADLs and ambulation  Patient was evaluated by skilled therapies and is appropriate for admission to acute rehabilitation center  On evaluation, patient reports some improving bilateral arm strength and dexterity  Patient notes some tingling burning pain in both arms at times but not to bed  Patient notes mild neck pain worse with movements  He reports pain medications adequately helpful  Patient reports having a firm stool earlier today  Patient reports chronic urinary incontinence mixed with some continence daily over last several months which is stable since surgery  Patient denies dysuria but states that his urologist treat him for a urine infection not so distant past   He reports progressive gait imbalance over last half year  Patient states he feels his balance already may be slightly better  Patient notes some increased cough and production of sputum over the last day  He denies increased shortness of breath  Patient denies fever, chills, sweats, nausea, chest pain, calf pain, or other new complaints  Review of Systems:     Complete review of systems obtained  Please see HPI for details with other significant symptoms or history listed here: Otherwise, 14 point review of systems completed and was otherwise unremarkable      OBJECTIVE:    Physical Exam:  Temp:  [98 2 °F (36 8 °C)-99 1 °F (37 3 °C)] 98 4 °F (36 9 °C)  HR:  [65-72] 65  Resp:  [18] 18  BP: (152-172)/(72-97) 152/72  General: Awake, alert in NAD  HENT: NCAT, MMM,   Neck: Supple, no lymphadenopathy, cervical collar in place anterior dressing in place  Respiratory:  Decreased lung sounds at bases with occasional wheeze without significant rales or rhonchi  Cardiovascular: Regular rate and rhythm, no murmurs, rubs, or gallops  Gastrointestinal: Soft, non-tender, non-distended, normoactive bowel sounds  Genitourinary: No byrnes  SkiN/MSK/Extremities:  Distal extremities appropriately warm, normal cap refill distally, no cyanosis or calf edema, no calf tenderness to palpation  Neurologic/Psych:   MENTAL STATUS: awake, oriented to person, place, time, and situation  Affect: Euthymic   CN II-XII: grossly intact   Strength/MMT:  Right proximal upper extremity strength 5-out of 5, right distal upper extremity strength 4 5, left proximal upper extremity strength 5/5, left distal upper extremity strength 4+ over 5, right lower extremity 5-out of 5, left lower extremity 5/5  Finger to nose testing fairly preserved; right greater than left dysdiadochokinesia      Laboratory:    Results from last 7 days   Lab Units 06/21/19  0542 06/18/19  1426   HEMOGLOBIN g/dL 13 8 13 3   HEMATOCRIT % 42 5 41 2   WBC Thousand/uL 4 59 7 64     Results from last 7 days   Lab Units 06/21/19  0542 06/20/19  0511 06/19/19  0454   BUN mg/dL 12 14 13   POTASSIUM mmol/L 4 2 3 3* 4 0   CHLORIDE mmol/L 108 108 106   CREATININE mg/dL 0 77 0 76 0 66 AST U/L 12  --   --    ALT U/L 31  --   --             Wt Readings from Last 1 Encounters:   06/20/19 63 3 kg (139 lb 8 8 oz)     Estimated body mass index is 20 61 kg/m² as calculated from the following:    Height as of this encounter: 5' 9" (1 753 m)  Weight as of this encounter: 63 3 kg (139 lb 8 8 oz)  Imaging: reviewed    Per EMR:  Past Medical History:   Past Surgical History:   Family History:   Social history:   Past Medical History:   Diagnosis Date    BPH (benign prostatic hyperplasia)     Cancer ([facility] ) 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    Chemical exposure     911    COPD [facility]ch[facility]) [facility])     CPAP (continuous positive airway pressure) dependence     DDD (degenerative disc disease), cervical     Diabetes mellitus ([facility] )     Foraminal stenosis of cervical region     Hyperlipidemia     Hypertension     SHIVANI (obstructive sleep apnea)     Overactive bladder     Past Surgical History:   Procedure Laterality Date    ARTHROSCOPY KNEE Bilateral     COLONOSCOPY      LUNG SURGERY Left     scraping of left    PA ARTHRODESIS ANT INTERBODY INC DISCECTOMY, CERVICAL BELOW C2 Bilateral 6/18/2019    Procedure: C3/4  ACDF WITH  C4 HEMICORPECTOMY, C3-4 ANTERIOR INSTRUMENTATION AND FUSION (NEUROMONITORING);   Surgeon: Kirt Pickett MD;  [facility]: [facility];  Service: Neurosurgery    ROTATOR CUFF REPAIR Bilateral      Family History   Problem Relation Age of Onset    No Known Problems Mother     No Known Problems Father       Social History     Socioeconomic History    Marital status: /Civil Union     Spouse name: None    Number of children: 3    Years of education: None    Highest education level: None   Occupational History    Occupation: retired   Social Needs    Financial resource strain: None    Food insecurity:     Worry: None     Inability: None    Transportation needs:     Medical: None     Non-medical: None   Tobacco Use    Smoking status: Current Every Day Smoker     Years: 61 00     Types: Pipe    Smokeless tobacco: Never Used    Tobacco comment: very light smoker   Substance and Sexual Activity    Alcohol use: Not Currently    Drug use: No    Sexual activity: Not Currently   Lifestyle    Physical activity:     Days per week: 0 days     Minutes per session: 0 min    Stress:  To some extent   Relationships    Social connections:     Talks on phone: None     Gets together: None     Attends [employer] service: None     Active member of club or organization: None     Attends meetings of clubs or organizations: None     Relationship status: None    Intimate partner violence:     Fear of current or ex partner: None     Emotionally abused: None     Physically abused: None     Forced sexual activity: None   Other Topics Concern    None   Social History Narrative    None          Current Medical Diagnosis Medications Allergies   Patient Active Problem List   Diagnosis    Type 2 diabetes mellitus with diabetic neuropathy, without long-term current use of insulin ([facility] )    Hyperlipidemia associated with type 2 diabetes mellitus ([facility] )    Essential hypertension    BPH (benign prostatic hyperplasia)    Degenerative cervical spinal stenosis    Cervical spondylosis    COPD (chronic obstructive pulmonary disease) ([facility] )    Adjustment disorder with depressed mood    Progressive locomotor ataxia    Other spondylosis with myelopathy, cervical region    Quadriparesis ([facility] )    Spinal cord compression ([facility] )    Prostate cancer ([facility] )    Overactive bladder    Lesion of native kidney    SHIVANI (obstructive sleep apnea)    Impaired mobility and ADLs    Cervical myelopathy ([facility] )    At risk for venous thromboembolism (VTE)    Pain    Constipation    Neurogenic bladder    Depression      Current Facility-Administered Medications:     acetaminophen (TYLENOL) tablet 650 mg, 650 mg, Oral, TID, Swapna Persaud MD, 650 mg at 06/21/19 0539    albuterol (PROVENTIL HFA,VENTOLIN HFA) inhaler 2 puff, 2 puff, Inhalation, Q4H PRN, Angelica Deng MD    bisacodyl (DULCOLAX) rectal suppository 10 mg, 10 mg, Rectal, Daily PRN, Angelica Deng MD    clindamycin (CLEOCIN) capsule 300 mg, 300 mg, Oral, Q8H [address], Angelica Deng MD, 300 mg at 06/21/19 0539    diltiazem (CARDIZEM CD) 24 hr capsule 120 mg, 120 mg, Oral, Daily, Angelica Deng MD    docusate sodium (COLACE) capsule 100 mg, 100 mg, Oral, BID, Angelica Deng MD, 100 mg at 06/20/19 1732    DULoxetine (CYMBALTA) delayed release capsule 60 mg, 60 mg, Oral, Daily, Angelica Deng MD, 60 mg at 06/20/19 1732    fluticasone-vilanterol (BREO ELLIPTA) 100-25 mcg/inh inhaler 1 puff, 1 puff, Inhalation, Daily, Angelica Deng MD, 1 puff at 06/20/19 1852    gabapentin (NEURONTIN) capsule 100 mg, 100 mg, Oral, TID, Angelica Deng MD, 100 mg at 06/21/19 0539    heparin (porcine) subcutaneous injection 5,000 Units, 5,000 Units, Subcutaneous, Q8H [address], Angelica Deng MD, 5,000 Units at 06/21/19 0540    hydrALAZINE (APRESOLINE) tablet 25 mg, 25 mg, Oral, Q8H PRN, SONJA Walters    insulin lispro (HumaLOG) 100 units/mL subcutaneous injection 1-5 Units, 1-5 Units, Subcutaneous, TID AC **AND** Fingerstick Glucose (POCT), , , TID AC, Angelica Deng MD    insulin lispro (HumaLOG) 100 units/mL subcutaneous injection 1-5 Units, 1-5 Units, Subcutaneous, HS, Angelica Deng MD    lidocaine (URO-JET) 2 % urethral/mucosal gel, , Topical, Q4H PRN, Angelica Deng MD    losartan (COZAAR) tablet 100 mg, 100 mg, Oral, Daily, Angelica Deng MD    metFORMIN (GLUCOPHAGE) tablet 500 mg, 500 mg, Oral, Daily With Breakfast, SONJA Walters    methocarbamol (ROBAXIN) tablet 500 mg, 500 mg, Oral, TID, Angelica Deng MD, 500 mg at 06/21/19 0539    ondansetron (ZOFRAN) injection 4 mg, 4 mg, Intravenous, Q6H PRN, Angelica Deng MD    oxyCODONE (ROXICODONE) immediate release tablet 10 mg, 10 mg, Oral, Q4H PRN, Angelica Deng MD    oxyCODONE (ROXICODONE) IR tablet 5 mg, 5 mg, Oral, Q4H PRN, Joy Eldridge MD    pantoprazole (PROTONIX) EC tablet 40 mg, 40 mg, Oral, Daily Before Breakfast, Joy Eldridge MD, 40 mg at 06/21/19 0539    polyethylene glycol (MIRALAX) packet 17 g, 17 g, Oral, Daily PRN, Joy Eldridge MD    polyethylene glycol [facility]) packet 17 g, 17 g, Oral, Daily, Joy Eldridge MD    polyvinyl alcohol (LIQUIFILM TEARS) 1 4 % ophthalmic solution 1 drop, 1 drop, Both Eyes, 4x Daily, Joy Eldridge MD, 1 drop at 06/20/19 2237    polyvinyl alcohol (LIQUIFILM TEARS) 1 4 % ophthalmic solution 1 drop, 1 drop, Both Eyes, Q1H PRN, Joy Eldridge MD    pravastatin (PRAVACHOL) tablet 10 mg, 10 mg, Oral, Daily With Skipper , MD, 10 mg at 06/20/19 1732    senna (SENOKOT) tablet 8 6 mg, 1 tablet, Oral, Daily, Joy Eldridge MD    [facility]) capsule 0 4 mg, 0 4 mg, Oral, HS, Joy Eldridge MD, 0 4 mg at 06/20/19 2237    thiamine (VITAMIN B1) tablet 50 mg, 50 mg, Oral, Daily, Joy Eldridge MD    tiotropium [facility]) capsule for inhaler 18 mcg, 18 mcg, Inhalation, Daily, Joy Eldridge MD No Known Allergies        Social History per encounter and chart review:  Patient is  but lives only with his wife on the weekends  Patients wife works during the week and stays in [place] during this period of time  He lives in a two-story home with biceps at the 1st floor with additional 9 steps down to lower level living area and then of 5 stairs to main living area  Patient will not have 24 hour supervision/ physical assistance available at time of discharge except when wife is there  Patient does have occasional help from home health aides  Patient continues to smoke pipe 1 or 2 times per day  He denies significant history of alcohol use or illicit drug use  Prior Level of Function:    Modified independent with ADLs, ambulation, and cognition      Current Level of Function:   transfers min assist to supervision, ambulation 100 feet Min assist, lower body dressing max assist, bed mobility Min assist     Potential Barriers to Discharge:  Co-morbidities (see above), new functional deficits,   Weakness, imbalance, pain, cardiopulmonary deficits, limited  adequate functional caregiver support expected after discharge, deconditioning  Tolerance for three hours of therapy per therapy day: adequate     Rehabilitation Prognosis: good   Patient is  but lives only with his wife on the weekends  Patients wife works during the week and stays in [place] during this period of time  He lives in a two-story home with biceps at the 1st floor with additional 9 steps down to lower level living area and then of 5 stairs to main living area  Patient will not have 24 hour supervision/ physical assistance available at time of discharge except when wife is there  Patient does have occasional help from home health aides  Patient continues to smoke pipe 1 or 2 times per day  He denies significant history of alcohol use or illicit drug use  Prior Level of Function:    Modified independent with ADLs, ambulation, and cognition  Current Level of Function:   transfers min assist to supervision, ambulation 100 feet Min assist, lower body dressing max assist, bed mobility Min assist     Potential Barriers to Discharge:  Co-morbidities (see above), new functional deficits,   Weakness, imbalance, pain, cardiopulmonary deficits, limited  adequate functional caregiver support expected after discharge, deconditioning  Tolerance for three hours of therapy per therapy day: adequate     Rehabilitation Prognosis: good       Rehabilitation Plan/Therapy Interventions:  This patient will have close medical and rehabilitation oversight from a physical medicine and rehabilitation physician and if needed an internal medicine physician to manage the complexity of medical issues to optimize health, ability to participate in therapy, quality of life, and functional outcomes  This patient requires 24 hour rehabilitation nursing to address bowel and bladder management, (pain management if listed below), medication administration, positioning/skin monitoring, fall/injury prevention, and VTE prophylaxis  Physical and occupational therapy: Patient requires PT, OT to improve functional mobility, transfers, upper and lower body strengthening, conditioning, balance, and gait training with appropriate assistive device  PT and OT will be provided approximately 5 times per week for 90 minutes per day  Skilled therapists and nursing will also provide patient/family safety education and training  / will work to ensure proper communication between patient (+/- family) and staff regarding the overall rehabilitation and medical process while patient is in the acute rehabilitation center  / will work with patient (and if applicable family and community resources) to optimize safe discharge  Discharge Planning:    Estimated length of stay:   14 days  Family/Patient Goals:  Patient/family's goals: Return to previous home/apartment  Mobility Goals:   Modified independent    Activities of Daily Living (ADLs) Goals:  Modified independent    Rehabilitation and discharge goals discussed with the patient and/or family  Case Managment and Social Work to review patient/family resources and to coordinate Discharge Planning  Patient and Family Education and Training:  Rehabilitation and discharge goals discussed with the patient and/or family  Patient/family education/training needs to be discussed in weekly team meeting  Other equipment:  To be determined    Medical Necessity Criteria for [facility] Admission:  The preadmission screen, post-admission physical evaluation, overall plan of care and admissions order demonstrate a reasonable expectation that the following criteria were met at the time of admission to the [facility]  (See "Specific areas of management and oversight in [facility] setting" for additional details on medical necessity as outlined below)  1  The patient requires active and ongoing therapeutic intervention of multiple therapy disciplines (physical therapy, occupational therapy, speech-language pathology, or prosthetics/orthotics), one of which is physical or occupational therapy  2  Patient requires an intensive rehabilitation therapy program, as defined in Chapter 1, section 110 2 2 of the CMS Medicare Policy Manual  This intensive rehabilitation therapy program will consist of at least 3 hours of therapy per day at least 5 days per week or at least 15 hours of intensive rehabilitation therapy within a 7 consecutive day period, beginning with the date of admission to the [facility]  3  The patient is reasonably expected to actively participate in, and benefit significantly from, the intensive rehabilitation therapy program as defined in Chapter 1, section 110 2 2 of the CMS Medicare Policy Manual at this time of admission to the [facility]  He can reasonably be expected to make measurable improvement (that will be of practical value to improve the patients functional capacity or adaptation to impairments) as a result of the rehabilitation treatment, as defined in section 110 3, and such improvement can be expected to be made within the prescribed period of time  As noted in the CMS Medicare Policy Manual, the patient need not be expected to achieve complete independence in the domain of self-care nor be expected to return to his or her prior level of functioning in order to meet this standard  4  The patient must require physician supervision by a rehabilitation physician   As such, a rehabilitation physician will conduct face-to-face visits with the patient at least 3 days per week throughout the patients stay in the [facility] to assess the patient both medically and functionally, as well as to modify the course of treatment as needed to maximize the patients capacity to benefit from the rehabilitation process  5  The patient requires an intensive and coordinated interdisciplinary approach to providing rehabilitation, as defined in Chapter 1, section 110 2 5 of the CMS Medicare Policy Manual  This will be achieved through periodic team conferences, conducted at least once in a 7-day period, and comprising of an interdisciplinary team of medical professionals consisting of: a rehabilitation physician, registered nurse,  and/or , and a licensed/certified therapist from each therapy discipline involved in treating the patient  Changes Since Pre-admission Assessment: None -This patient's participation in rehab continues to be reasonable, necessary and appropriate  CMS Required Post-Admission Physician Evaluation Elements  History and Physical, including medical history, functional history and active comorbidities as in above text  PostAdmission Physician Evaluation:  The patient has the potential to make improvement and is in need of physical, occupational, and/or therapy services  The patient may also need nutritional services  Given the patient's complex medical condition and risk of further medical complications, rehabilitative services cannot be safely provided at a lower level of care, such as a skilled nursing facility  I have reviewed the patient's functional and medical status at the time of the preadmission screening and they are the same as on the day of this admission  I acknowledge that I have personally performed a full physical examination on this patient within 24 hours of admission  The patient demonstrated understanding the rehabilitation program and the discharge process after we discussed them       Agree in entirety: yes  Minor adaptions: none    Major changes: none    Specific areas of management and oversight in [facility] setting:  Cardiopulmonary function: Ensure cardiopulmonary stability and optimize cardiopulmonary function not only at rest but with activity as patient's activity level significantly increases in acute rehab compared with prior to transfer in preparation for safe discharge from [facility]  Must closely and frequently monitor blood pressure and HR to ensure adequate cardiac output during ADLs and ambulation as patient is at increased risk for orthostatic hypotension/syncope and potential injury if not monitored for and managed adequately  Blood pressure management:    Frequent monitoring of blood pressure with appropriate adjustments in blood pressure medication management to optimize blood pressure control and prevent/limit renal complications  Monitoring impact of blood pressure and side-effects of blood pressure medications at rest and with activity  Hypoxia prevention: Ensure appropriate level of oxygenation at rest and with activity to avoid symptomatic hypoxia, maximize functional performance, and decrease risk of atelectasis/pneumonia through close and frequent monitoring, providing appropriate respiratory treatments (such as incentive spirometry), and when necessary provide/adjust respiratory medications  DM: Patient will improve/maintain blood sugar control to ensure optimal healing and decrease risks of complications associated with DM through medication monitoring, adjustments as indicated, and optimal dietary intake and education  Pain management:  Pain will improve with frequent evaluation of pain, careful adjustments in medications, frequent re-evaluation of patient's pain and medical/neurologic status to ensure optimal pain control, avoidance of potential serious and even life-threatening side-effects and drug interactions, as well as weaning pain medications as soon as possible to decrease risk of short and long-term use       Neurologic Disorder:  cervical myelopathy causing impaired mobility, ADLs, and gait:  intensive skilled therapies with physical therapy and occupational therapy with close oversight and management by rehab specialized physician in acute rehabilitation setting to most expeditiously and effectively improve functional mobility, transfers, upper and lower body strengthening, conditioning, balance, and gait training with appropriate assistive device  Patient will have optimal supervision and management of patient's underlying neurologic disorder with specialized rehabilitation physician during this period of recovery to ensure most expeditious and optimal recovery with decreased risks of fall/injury and other complications including acute worsening of neuro disorder, decrease risk of VTE, PNA, and skin ulceration  Cognitive impairment: intensive skilled therapies including speech language pathology and occupation therapy to evaluate and treat deficits in cognition  Close oversight and management by rehab specialized physician of cognitive deficits and potential confounding factors (prevention of, monitoring for, and if found treatment of possible complications such as infections, as well as optimally managing sleep, mood, and medications which can negatively impact cognition and functional recovery)  Inpatient rehabilitation education/teaching: To be provided to patient and typically family/caregiver (if able to be identified) by all skilled therapists, rehab nursing, case management, and rehab specialized physician to ensure optimal recovery and decrease risks of complications in both acute rehabilitation setting as well as after discharge  Neurogenic bladder:  Appropriate neurogenic bladder management with appropriate toileting program from rehab nursing and staff with oversight management by rehabilitation physicians which include appropriate monitoring and possible adjustments in medications to with goals to optimize bladder function and decrease risk of bladder retention, incontinence, and urinary tract infection  Bowel dysfunction: Appropriate bowel management with appropriate toileting program from rehab nursing and staff with oversight management by rehabilitation physicians which include adjustments in medications to optimize appropriate bowel function and prevent/decrease risk of constipation and bowel obstruction  Skin incisions/wounds: Appropriate skin checks for wound/skin evaluation including evaluation of healing, worsening of wounds, or signs of infection  Wound care management from rehab nursing, wound care nursing, physicians  Ensure frequent appropriate turning, positioning in bed, in chair, when mobilizing, and when appropriate with use of appropriate devices to optimize healing and decrease risk of worsening or new skin breakdown  Vitor Thrasher MD, [facility]  Physical Medicine and Rehabilitation  Brain Injury Medicine    ** Please Note: Fluency Direct voice to text software may have been used in the creation of this document   **

## 2019-06-21 NOTE — ASSESSMENT & PLAN NOTE
Monitor oxygen with and without activity  Management overall per Medicine  Encourage deep breathing and incentive spirometry  Breo, Spiriva, as needed albuterol during ARC course   Home regimen, Advair, Spiriva, Combivent PRN  Follow-up with pulmonology after discharge

## 2019-06-21 NOTE — PLAN OF CARE
Problem: Potential for Falls  Goal: Patient will remain free of falls  Description  INTERVENTIONS:  - Assess patient frequently for physical needs  -  Identify cognitive and physical deficits and behaviors that affect risk of falls  -  [place] fall precautions as indicated by assessment   - Educate patient/family on patient safety including physical limitations  - Instruct patient to call for assistance with activity based on assessment  - Modify environment to reduce risk of injury  - Consider OT/PT consult to assist with strengthening/mobility  Outcome: Progressing     Problem: Nutrition/Hydration-ADULT  Goal: Nutrient/Hydration intake appropriate for improving, restoring or maintaining nutritional needs  Description  Monitor and assess patient's nutrition/hydration status for malnutrition (ex- brittle hair, bruises, dry skin, pale skin and conjunctiva, muscle wasting, smooth red tongue, and disorientation)  Collaborate with interdisciplinary team and initiate plan and interventions as ordered  Monitor patient's weight and dietary intake as ordered or per policy  Utilize nutrition screening tool and intervene per policy  Determine patient's food preferences and provide high-protein, high-caloric foods as appropriate       INTERVENTIONS:  - Monitor oral intake, urinary output, labs, and treatment plans  - Assess nutrition and hydration status and recommend course of action  - Evaluate amount of meals eaten  - Assist patient with eating if necessary   - Allow adequate time for meals  - Recommend/ encourage appropriate diets, oral nutritional supplements, and vitamin/mineral supplements  - Order, calculate, and assess calorie counts as needed  - Recommend, monitor, and adjust tube feedings and TPN/PPN based on assessed needs  - Assess need for intravenous fluids  - Provide specific nutrition/hydration education as appropriate  - Include patient/family/caregiver in decisions related to nutrition  Outcome: Progressing     Problem: PAIN - ADULT  Goal: Verbalizes/displays adequate comfort level or baseline comfort level  Description  Interventions:  - Encourage patient to monitor pain and request assistance  - Assess pain using appropriate pain scale  - Administer analgesics based on type and severity of pain and evaluate response  - Implement non-pharmacological measures as appropriate and evaluate response  - Consider cultural and social influences on pain and pain management  - Notify physician/advanced practitioner if interventions unsuccessful or patient reports new pain  Outcome: Progressing     Problem: INFECTION - ADULT  Goal: Absence or prevention of progression during hospitalization  Description  INTERVENTIONS:  - Assess and monitor for signs and symptoms of infection  - Monitor lab/diagnostic results  - Monitor all insertion sites, i e  indwelling lines, tubes, and drains  - Monitor endotracheal (as able) and nasal secretions for changes in amount and color  - [place] appropriate cooling/warming therapies per order  - Administer medications as ordered  - Instruct and encourage patient and family to use good hand hygiene technique  - Identify and instruct in appropriate isolation precautions for identified infection/condition  Outcome: Progressing     Problem: SAFETY ADULT  Goal: Patient will remain free of falls  Description  INTERVENTIONS:  - Assess patient frequently for physical needs  -  Identify cognitive and physical deficits and behaviors that affect risk of falls    -  [place] fall precautions as indicated by assessment   - Educate patient/family on patient safety including physical limitations  - Instruct patient to call for assistance with activity based on assessment  - Modify environment to reduce risk of injury  - Consider OT/PT consult to assist with strengthening/mobility  Outcome: Progressing  Goal: Maintain or return to baseline ADL function  Description  INTERVENTIONS:  -  Assess patient's ability to carry out ADLs; assess patient's baseline for ADL function and identify physical deficits which impact ability to perform ADLs (bathing, care of mouth/teeth, toileting, grooming, dressing, etc )  - Assess/evaluate cause of self-care deficits   - Assess range of motion  - Assess patient's mobility; develop plan if impaired  - Assess patient's need for assistive devices and provide as appropriate  - Encourage maximum independence but intervene and supervise when necessary  ¯ Involve family in performance of ADLs  ¯ Assess for home care needs following discharge   ¯ Request OT consult to assist with ADL evaluation and planning for discharge  ¯ Provide patient education as appropriate  Outcome: Progressing  Goal: Maintain or return mobility status to optimal level  Description  INTERVENTIONS:  - Assess patient's baseline mobility status (ambulation, transfers, stairs, etc )    - Identify cognitive and physical deficits and behaviors that affect mobility  - Identify mobility aids required to assist with transfers and/or ambulation (gait belt, sit-to-stand, lift, walker, cane, etc )  - [place] fall precautions as indicated by assessment  - Record patient progress and toleration of activity level on Mobility SBAR; progress patient to next Phase/Stage  - Instruct patient to call for assistance with activity based on assessment  - Request Rehabilitation consult to assist with strengthening/weightbearing, etc   Outcome: Progressing     Problem: DISCHARGE PLANNING  Goal: Discharge to home or other facility with appropriate resources  Description  INTERVENTIONS:  - Identify barriers to discharge w/patient and caregiver  - Arrange for needed discharge resources and transportation as appropriate  - Identify discharge learning needs (meds, wound care, etc )  - Arrange for interpretive services to assist at discharge as needed  - Refer to Case Management Department for coordinating discharge planning if the patient needs post-hospital services based on physician/advanced practitioner order or complex needs related to functional status, cognitive ability, or social support system  Outcome: Progressing     Problem: Prexisting or High Potential for Compromised Skin Integrity  Goal: Skin integrity is maintained or improved  Description  INTERVENTIONS:  - Identify patients at risk for skin breakdown  - Assess and monitor skin integrity  - Assess and monitor nutrition and hydration status  - Monitor labs (i e  albumin)  - Assess for incontinence   - Turn and reposition patient  - Assist with mobility/ambulation  - Relieve pressure over bony prominences  - Avoid friction and shearing  - Provide appropriate hygiene as needed including keeping skin clean and dry  - Evaluate need for skin moisturizer/barrier cream  - Collaborate with interdisciplinary team (i e  Nutrition, Rehabilitation, etc )   - Patient/family teaching  Outcome: Progressing

## 2019-06-21 NOTE — PLAN OF CARE
Problem: Potential for Falls  Goal: Patient will remain free of falls  Description  INTERVENTIONS:  - Assess patient frequently for physical needs  -  Identify cognitive and physical deficits and behaviors that affect risk of falls  -  [place] fall precautions as indicated by assessment   - Educate patient/family on patient safety including physical limitations  - Instruct patient to call for assistance with activity based on assessment  - Modify environment to reduce risk of injury  - Consider OT/PT consult to assist with strengthening/mobility  Outcome: Progressing     Problem: Nutrition/Hydration-ADULT  Goal: Nutrient/Hydration intake appropriate for improving, restoring or maintaining nutritional needs  Description  Monitor and assess patient's nutrition/hydration status for malnutrition (ex- brittle hair, bruises, dry skin, pale skin and conjunctiva, muscle wasting, smooth red tongue, and disorientation)  Collaborate with interdisciplinary team and initiate plan and interventions as ordered  Monitor patient's weight and dietary intake as ordered or per policy  Utilize nutrition screening tool and intervene per policy  Determine patient's food preferences and provide high-protein, high-caloric foods as appropriate       INTERVENTIONS:  - Monitor oral intake, urinary output, labs, and treatment plans  - Assess nutrition and hydration status and recommend course of action  - Evaluate amount of meals eaten  - Assist patient with eating if necessary   - Allow adequate time for meals  - Recommend/ encourage appropriate diets, oral nutritional supplements, and vitamin/mineral supplements  - Order, calculate, and assess calorie counts as needed  - Recommend, monitor, and adjust tube feedings and TPN/PPN based on assessed needs  - Assess need for intravenous fluids  - Provide specific nutrition/hydration education as appropriate  - Include patient/family/caregiver in decisions related to nutrition  Outcome: Progressing     Problem: PAIN - ADULT  Goal: Verbalizes/displays adequate comfort level or baseline comfort level  Description  Interventions:  - Encourage patient to monitor pain and request assistance  - Assess pain using appropriate pain scale  - Administer analgesics based on type and severity of pain and evaluate response  - Implement non-pharmacological measures as appropriate and evaluate response  - Consider cultural and social influences on pain and pain management  - Notify physician/advanced practitioner if interventions unsuccessful or patient reports new pain  Outcome: Progressing     Problem: INFECTION - ADULT  Goal: Absence or prevention of progression during hospitalization  Description  INTERVENTIONS:  - Assess and monitor for signs and symptoms of infection  - Monitor lab/diagnostic results  - Monitor all insertion sites, i e  indwelling lines, tubes, and drains  - Monitor endotracheal (as able) and nasal secretions for changes in amount and color  - [place] appropriate cooling/warming therapies per order  - Administer medications as ordered  - Instruct and encourage patient and family to use good hand hygiene technique  - Identify and instruct in appropriate isolation precautions for identified infection/condition  Outcome: Progressing     Problem: SAFETY ADULT  Goal: Patient will remain free of falls  Description  INTERVENTIONS:  - Assess patient frequently for physical needs  -  Identify cognitive and physical deficits and behaviors that affect risk of falls    -  [place] fall precautions as indicated by assessment   - Educate patient/family on patient safety including physical limitations  - Instruct patient to call for assistance with activity based on assessment  - Modify environment to reduce risk of injury  - Consider OT/PT consult to assist with strengthening/mobility  Outcome: Progressing  Goal: Maintain or return to baseline ADL function  Description  INTERVENTIONS:  -  Assess patient's ability to carry out ADLs; assess patient's baseline for ADL function and identify physical deficits which impact ability to perform ADLs (bathing, care of mouth/teeth, toileting, grooming, dressing, etc )  - Assess/evaluate cause of self-care deficits   - Assess range of motion  - Assess patient's mobility; develop plan if impaired  - Assess patient's need for assistive devices and provide as appropriate  - Encourage maximum independence but intervene and supervise when necessary  ¯ Involve family in performance of ADLs  ¯ Assess for home care needs following discharge   ¯ Request OT consult to assist with ADL evaluation and planning for discharge  ¯ Provide patient education as appropriate  Outcome: Progressing  Goal: Maintain or return mobility status to optimal level  Description  INTERVENTIONS:  - Assess patient's baseline mobility status (ambulation, transfers, stairs, etc )    - Identify cognitive and physical deficits and behaviors that affect mobility  - Identify mobility aids required to assist with transfers and/or ambulation (gait belt, sit-to-stand, lift, walker, cane, etc )  - [place] fall precautions as indicated by assessment  - Record patient progress and toleration of activity level on Mobility SBAR; progress patient to next Phase/Stage  - Instruct patient to call for assistance with activity based on assessment  - Request Rehabilitation consult to assist with strengthening/weightbearing, etc   Outcome: Progressing     Problem: DISCHARGE PLANNING  Goal: Discharge to home or other facility with appropriate resources  Description  INTERVENTIONS:  - Identify barriers to discharge w/patient and caregiver  - Arrange for needed discharge resources and transportation as appropriate  - Identify discharge learning needs (meds, wound care, etc )  - Arrange for interpretive services to assist at discharge as needed  - Refer to Case Management Department for coordinating discharge planning if the patient needs post-hospital services based on physician/advanced practitioner order or complex needs related to functional status, cognitive ability, or social support system  Outcome: Progressing

## 2019-06-21 NOTE — PROGRESS NOTES
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; s/p C3/4 ACDF with C4 hemicorpectomy, C3-4 anterior instrumentation/fusion 6/18/19 Gurinder Jordan):  continue cervical collar; on Clindamycin for infection prophylaxis  Pain control per primary service  Had previously been on Cymbalta for neuropathic sx      DM2: at home he takes Kombiglyze XR 5-1000mg qd and no insulin  Metformin started today  Continue DM diet and Accuechecks/QID with SSI      HTN:  at home, takes Cardizem 180mg qd, Losartan 100mg qd (was not on Norvasc he says); here on Card 120mg qd, Losartan 100mg qd  Will go up on Card if needed to 180mg qd  Add prn Hydralazine      COPD; hx lung cancer/VIVIANA lobectomy, pulmonary nodules: follows with pulm as OP and takes Advair 250-50 one puff BID/Spiriva one puff daily and prn Combivent  Will place on Breo as substitute for Advair, continue Spiriva and use Albuterol inhaler prn     SHIVANI: supposed to use CPAP but has been noncompliant     HLD: at home on Pravachol     Urinary incontinence/BPH: continue Flomax     Thrombocytopenia: mild; will watch     Hypokalemia: Potassium normalized after replacement to 4 2  Will repeat Monday  Subjective/ HPI: Patient seen and examined  Patients overnight issues or events were reviewed with nursing or staff during rounds or morning huddle session  New or overnight issues include the following: Hypokalemia: Improved after replacement  Will continue to monitor  DM type 2: Insulin stopped and metformin started  Will monitor blood sugars with this change  HTN: Has been elevated but appears to be trending down today  Continue to monitor trend and adjust medications as needed  Patient denies any current complaints      ROS: GI: denies abdominal pain, change bowel habits or reflux symptoms  Neuro: Denies any headache, new vision changes, new neuropathies,new weaknesses   Respiratory: No Cough, SOB, denies wheeze  Cardiovascular: No CP, palpitations , denies perception of rapid heartbeat  : denies any new urinary burning or frequency    Review of Scheduled Meds:    Current Facility-Administered Medications:  acetaminophen 650 mg Oral TID Be Cook MD   albuterol 2 puff Inhalation Q4H PRN Be Cook MD   bisacodyl 10 mg Rectal Daily PRN Be Cook MD   clindamycin 300 mg Oral [facility] Be Cook MD   diltiazem 120 mg Oral Daily Be Cook MD   docusate sodium 100 mg Oral BID Be Cook MD   DULoxetine 60 mg Oral Daily Be Cook MD   fluticasone-vilanterol 1 puff Inhalation Daily Be Cook MD   gabapentin 100 mg Oral TID Be Cook MD   heparin (porcine) 5,000 Units Subcutaneous [facility] Be Cook MD   hydrALAZINE 25 mg Oral Q8H PRN SONJA Donis   insulin lispro 1-5 Units Subcutaneous TID [facility] Be Cook MD   insulin lispro 1-5 Units Subcutaneous HS Be Cook MD   lidocaine  Topical Q4H PRN Be Cook MD   losartan 100 mg Oral Daily Be Cook MD   metFORMIN 500 mg Oral Daily With Breakfast SONJA Donis   methocarbamol 500 mg Oral TID Be Cook MD   ondansetron 4 mg Intravenous Q6H PRN Be Cook MD   oxyCODONE 10 mg Oral Q4H PRN Be Cook MD   oxyCODONE 5 mg Oral Q4H PRN Be Cook MD   pantoprazole 40 mg Oral Daily Before Breakfast Be Cook MD   polyethylene glycol 17 g Oral Daily PRN Be Cook MD   polyethylene glycol 17 g Oral Daily Be Cook MD   polyvinyl alcohol 1 drop Both Eyes 4x Daily Be Cook MD   polyvinyl alcohol 1 drop Both Eyes Q1H PRN Be Cook MD   pravastatin 10 mg Oral Daily With Adamaris Jorgensen MD   senna 1 tablet Oral Daily Be Cook MD   tamsulosin 0 4 mg Oral HS Dori Pouch Jamal Hull MD   thiamine 50 mg Oral Daily Alpa Jennings MD   tiotropium 18 mcg Inhalation Daily Alpa Jennings MD       Labs:     Results from last 7 days   Lab Units 06/21/19  0542 06/18/19  1426   WBC Thousand/uL 4 59 7 64   HEMOGLOBIN g/dL 13 8 13 3   HEMATOCRIT % 42 5 41 2   PLATELETS Thousands/uL 146* 141*     Results from last 7 days   Lab Units 06/21/19  0542 06/20/19  0511   SODIUM mmol/L 145 144   POTASSIUM mmol/L 4 2 3 3*   CHLORIDE mmol/L 108 108   CO2 mmol/L 31 30   BUN mg/dL 12 14   CREATININE mg/dL 0 77 0 76   CALCIUM mg/dL 10 0 9 7                Results from last 7 days   Lab Units 06/21/19  0639 06/20/19  2054 06/20/19  1719   POC GLUCOSE mg/dl 114 112 75       Imaging:     No orders to display       *Labs reviewed  *Radiology studies reviewed  *Medications reviewed and reconciled as needed  *Please refer to order section for additional ordered labs studies  *Case discussed with primary attending during morning huddle case rounds    Physical Examination:  Vitals:   Vitals:    06/20/19 1927 06/20/19 2041 06/21/19 0526 06/21/19 0828   BP:  161/97 152/72 130/90   BP Location:  Right arm Right arm    Pulse:  66 65    Resp:  18 18    Temp:  98 2 °F (36 8 °C) 98 4 °F (36 9 °C)    TempSrc:  Oral Oral    SpO2: 100% 99% 100%    Weight:       Height:           General Appearance: NAD, conversive  Eyes: No icterus; conjunctiva normal, PERRLA  HENT: oropharynx clear; mucous membranes moist; no ulcerations, normal hard and soft palette  Neck: trachea midline, range of motion full   Supple, no lymphadenopathy or thyromegaly  Lungs: CTA, normal respiratory effort, no retractions, expiratory effort normal  CV: regular rate, no rubs no gallops, PMI normal place and intensity  ABD: soft non tender, no masses , no hepatic or splenomegaly  EXT: DP pulses intact, no lymphadenopathy, no edema  Skin: normal turgor, normal texture, no rash, no ulcers  Psych: affect normal, No anxiety,   Neuro: AAOx3            Total time spent: At least 40 minutes, with more than 50% spent counseling/coordinating care  Counseling includes discussion with patient re: progress  and discussion with patient of his/her current medical state/information  Coordination of patient's care was performed in conjunction with primary service  Time invested included review of patient's labs, vitals, and management of their comorbidities with continued monitoring  In addition, this patient was discussed with medical team including physician and advanced extenders  The care of the patient was extensively discussed and appropriate treatment plan was formulated unique for this patient  ** Please Note: Dragon 360 Dictation voice to text software may have been used in the creation of this document   **

## 2019-06-21 NOTE — SOCIAL WORK
Met with Pt to review rehab routine, and CM role  Pt shared that his home is a split level, with 4STE  Pt shared that there are small flights of stairs in the home, as well  Pts daughter resides in [facility], but his son, Lissett Chandler, [phone], is only 20miles away  Pt would like his son added as a secondary emergency contact, too  CM stated she would take care of that immediately  Pt was working with [facility], [phone], and would like to again, if [facility] is the recommendation  Pt also inquired about a service that would assist with household responsibilities  CM stated she would provide him with HHA info  The only DME Pt reports is a prong cane  Pt shared that he obtains short term medications at the [employer] near his home, but for long term meds, he uses [facility]  Pt was interested in the [facility] meds to beds program prior to d/c  Pt was informed about the team meeting process, as well as potential LOS  Following to assist with d/c planning needs  CM provided Pt with HHA info for his area, as well as [facility]

## 2019-06-21 NOTE — PROGRESS NOTES
Pt alert and able to make needs known  OOB in recliner chair with call bell and bedside table within reach  Voices no c/o's pain or discomfort at this time  Vista collar remains in place  Dressing to incision CDI  Bladder scan performed per order  Will continue to monitor

## 2019-06-22 PROBLEM — G47.00 INSOMNIA: Status: ACTIVE | Noted: 2019-06-22

## 2019-06-22 PROBLEM — R32 URINARY INCONTINENCE: Status: ACTIVE | Noted: 2019-06-21

## 2019-06-22 PROBLEM — F41.9 ANXIETY: Status: ACTIVE | Noted: 2019-06-22

## 2019-06-22 PROBLEM — F17.290 PIPE SMOKER: Status: ACTIVE | Noted: 2019-06-22

## 2019-06-22 LAB
BILIRUB UR QL STRIP: NEGATIVE
CLARITY UR: CLEAR
COLOR UR: YELLOW
GLUCOSE SERPL-MCNC: 113 MG/DL (ref 65–140)
GLUCOSE SERPL-MCNC: 122 MG/DL (ref 65–140)
GLUCOSE SERPL-MCNC: 158 MG/DL (ref 65–140)
GLUCOSE SERPL-MCNC: 89 MG/DL (ref 65–140)
GLUCOSE UR STRIP-MCNC: NEGATIVE MG/DL
HGB UR QL STRIP.AUTO: NEGATIVE
KETONES UR STRIP-MCNC: NEGATIVE MG/DL
LEUKOCYTE ESTERASE UR QL STRIP: NEGATIVE
NITRITE UR QL STRIP: NEGATIVE
PH UR STRIP.AUTO: 7 [PH]
PROT UR STRIP-MCNC: NEGATIVE MG/DL
SP GR UR STRIP.AUTO: 1.01 (ref 1–1.03)
UROBILINOGEN UR QL STRIP.AUTO: 0.2 E.U./DL

## 2019-06-22 PROCEDURE — 97112 NEUROMUSCULAR REEDUCATION: CPT

## 2019-06-22 PROCEDURE — 82948 REAGENT STRIP/BLOOD GLUCOSE: CPT

## 2019-06-22 PROCEDURE — 94660 CPAP INITIATION&MGMT: CPT

## 2019-06-22 PROCEDURE — 97110 THERAPEUTIC EXERCISES: CPT

## 2019-06-22 PROCEDURE — 94760 N-INVAS EAR/PLS OXIMETRY 1: CPT

## 2019-06-22 PROCEDURE — 97530 THERAPEUTIC ACTIVITIES: CPT

## 2019-06-22 PROCEDURE — 97116 GAIT TRAINING THERAPY: CPT

## 2019-06-22 PROCEDURE — 97535 SELF CARE MNGMENT TRAINING: CPT

## 2019-06-22 PROCEDURE — 99231 SBSQ HOSP IP/OBS SF/LOW 25: CPT | Performed by: PHYSICAL MEDICINE & REHABILITATION

## 2019-06-22 PROCEDURE — 81003 URINALYSIS AUTO W/O SCOPE: CPT

## 2019-06-22 RX ORDER — TAMSULOSIN HYDROCHLORIDE 0.4 MG/1
0.4 CAPSULE ORAL
Status: DISCONTINUED | OUTPATIENT
Start: 2019-06-22 | End: 2019-07-07 | Stop reason: HOSPADM

## 2019-06-22 RX ADMIN — CLINDAMYCIN HYDROCHLORIDE 300 MG: 150 CAPSULE ORAL at 21:25

## 2019-06-22 RX ADMIN — MELATONIN 3 MG: at 21:25

## 2019-06-22 RX ADMIN — GABAPENTIN 200 MG: 100 CAPSULE ORAL at 14:05

## 2019-06-22 RX ADMIN — GABAPENTIN 200 MG: 100 CAPSULE ORAL at 06:04

## 2019-06-22 RX ADMIN — POLYETHYLENE GLYCOL 3350 17 G: 17 POWDER, FOR SOLUTION ORAL at 08:36

## 2019-06-22 RX ADMIN — DILTIAZEM HYDROCHLORIDE 120 MG: 120 CAPSULE, COATED, EXTENDED RELEASE ORAL at 08:35

## 2019-06-22 RX ADMIN — POLYVINYL ALCOHOL 1 DROP: 14 SOLUTION/ DROPS OPHTHALMIC at 21:26

## 2019-06-22 RX ADMIN — LOSARTAN POTASSIUM 100 MG: 50 TABLET, FILM COATED ORAL at 08:30

## 2019-06-22 RX ADMIN — DOCUSATE SODIUM 100 MG: 100 CAPSULE, LIQUID FILLED ORAL at 08:35

## 2019-06-22 RX ADMIN — POLYVINYL ALCOHOL 1 DROP: 14 SOLUTION/ DROPS OPHTHALMIC at 08:42

## 2019-06-22 RX ADMIN — HEPARIN SODIUM 5000 UNITS: 5000 INJECTION INTRAVENOUS; SUBCUTANEOUS at 14:05

## 2019-06-22 RX ADMIN — PRAVASTATIN SODIUM 10 MG: 10 TABLET ORAL at 16:42

## 2019-06-22 RX ADMIN — DULOXETINE HYDROCHLORIDE 60 MG: 60 CAPSULE, DELAYED RELEASE ORAL at 08:29

## 2019-06-22 RX ADMIN — INSULIN LISPRO 1 UNITS: 100 INJECTION, SOLUTION INTRAVENOUS; SUBCUTANEOUS at 16:42

## 2019-06-22 RX ADMIN — TIOTROPIUM BROMIDE 18 MCG: 18 CAPSULE ORAL; RESPIRATORY (INHALATION) at 08:42

## 2019-06-22 RX ADMIN — METFORMIN HYDROCHLORIDE 500 MG: 500 TABLET ORAL at 08:29

## 2019-06-22 RX ADMIN — ACETAMINOPHEN 650 MG: 325 TABLET ORAL at 14:05

## 2019-06-22 RX ADMIN — HEPARIN SODIUM 5000 UNITS: 5000 INJECTION INTRAVENOUS; SUBCUTANEOUS at 21:26

## 2019-06-22 RX ADMIN — TAMSULOSIN HYDROCHLORIDE 0.4 MG: 0.4 CAPSULE ORAL at 17:37

## 2019-06-22 RX ADMIN — VITAMIN D, TAB 1000IU (100/BT) 2000 UNITS: 25 TAB at 08:29

## 2019-06-22 RX ADMIN — POLYVINYL ALCOHOL 1 DROP: 14 SOLUTION/ DROPS OPHTHALMIC at 17:37

## 2019-06-22 RX ADMIN — CLINDAMYCIN HYDROCHLORIDE 300 MG: 150 CAPSULE ORAL at 14:05

## 2019-06-22 RX ADMIN — SENNOSIDES 8.6 MG: 8.6 TABLET, FILM COATED ORAL at 08:35

## 2019-06-22 RX ADMIN — CLINDAMYCIN HYDROCHLORIDE 300 MG: 150 CAPSULE ORAL at 06:03

## 2019-06-22 RX ADMIN — NICOTINE 21 MG: 21 PATCH, EXTENDED RELEASE TRANSDERMAL at 08:32

## 2019-06-22 RX ADMIN — POLYVINYL ALCOHOL 1 DROP: 14 SOLUTION/ DROPS OPHTHALMIC at 12:09

## 2019-06-22 RX ADMIN — ACETAMINOPHEN 650 MG: 325 TABLET ORAL at 06:02

## 2019-06-22 RX ADMIN — PANTOPRAZOLE SODIUM 40 MG: 40 TABLET, DELAYED RELEASE ORAL at 06:04

## 2019-06-22 RX ADMIN — ACETAMINOPHEN 650 MG: 325 TABLET ORAL at 21:25

## 2019-06-22 RX ADMIN — HEPARIN SODIUM 5000 UNITS: 5000 INJECTION INTRAVENOUS; SUBCUTANEOUS at 06:04

## 2019-06-22 RX ADMIN — GABAPENTIN 300 MG: 300 CAPSULE ORAL at 21:25

## 2019-06-22 RX ADMIN — THIAMINE HCL TAB 100 MG 50 MG: 100 TAB at 08:32

## 2019-06-22 RX ADMIN — FLUTICASONE FUROATE AND VILANTEROL TRIFENATATE 1 PUFF: 100; 25 POWDER RESPIRATORY (INHALATION) at 08:42

## 2019-06-22 NOTE — PROGRESS NOTES
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; s/p C3/4 ACDF with C4 hemicorpectomy, C3-4 anterior instrumentation/fusion 6/18/19 Pollo Reese):  continue cervical collar; on Clindamycin for infection prophylaxis  Pain control per primary service  Had previously been on Cymbalta for neuropathic sx      DM2: at home he takes Kombiglyze XR 5-1000mg qd and no insulin  Metformin started today  Continue DM diet and Accuechecks/QID with SSI      HTN:  at home, takes Cardizem 180mg qd, Losartan 100mg qd (was not on Norvasc he says); here on Card 120mg qd, Losartan 100mg qd  Will go up on Card if needed to 180mg qd  Add prn Hydralazine      COPD; hx lung cancer/VIVIANA lobectomy, pulmonary nodules: follows with pulm as OP and takes Advair 250-50 one puff BID/Spiriva one puff daily and prn Combivent  Will place on Breo as substitute for Advair, continue Spiriva and use Albuterol inhaler prn     SHIVANI: supposed to use CPAP but has been noncompliant     HLD: at home on Pravachol     Urinary incontinence/BPH: continue Flomax     Thrombocytopenia: mild; will watch     Hypokalemia: Potassium normalized after replacement to 4 2  Will repeat Monday  Subjective/ HPI: Patient seen and examined  Patients overnight issues or events were reviewed with nursing or staff during rounds or morning huddle session  New or overnight issues include the following: Hypokalemia: Improved after replacement  Will continue to monitor  DM type 2: Insulin stopped and metformin started  Will monitor blood sugars with this change  HTN:  Stable  Continue to monitor trend and adjust medications as needed  Patient denies any current complaints      ROS:     GI: denies abdominal pain, change bowel habits or reflux symptoms  Neuro: Denies any headache, new vision changes, new neuropathies,new weaknesses   Respiratory: No Cough, SOB, denies wheeze  Cardiovascular: No CP, palpitations , denies perception of rapid heartbeat  : denies any new urinary burning or frequency    Review of Scheduled Meds:    Current Facility-Administered Medications:  acetaminophen 650 mg Oral TID Cherylene Deans, MD   albuterol 2 puff Inhalation Q4H PRN Cherylene Deans, MD   bisacodyl 10 mg Rectal Daily PRN Cherylene Deans, MD   cholecalciferol 2,000 Units Oral Daily Cherylene Deans, MD   clindamycin 300 mg Oral [facility] Cherylene Deans, MD   diltiazem 120 mg Oral Daily Cherylene Deans, MD   docusate sodium 100 mg Oral BID Cherylene Deans, MD   DULoxetine 60 mg Oral Daily Cherylene Deans, MD   fluticasone-vilanterol 1 puff Inhalation Daily Cherylene Deans, MD   gabapentin 200 mg Oral BID Cherylene Deans, MD   gabapentin 300 mg Oral HS Cherylene Deans, MD   heparin (porcine) 5,000 Units Subcutaneous [facility] Cherylene Deans, MD   hydrALAZINE 25 mg Oral Q8H PRN Earlene Market, CRNP   insulin lispro 1-5 Units [facility]cutaneous [facility] Cherylene Deans, MD   insulin lispro 1-5 Units Subcutaneous HS Cherylene Deans, MD   lidocaine  Topical Q4H PRN Cherylene Deans, MD   losartan 100 mg Oral Daily Cherylene Deans, MD   melatonin 3 mg Oral HS Mery Grewal PA-C   metFORMIN 500 mg Oral Daily With Breakfast Earlene Market, CRNP   nicotine 21 mg Transdermal Daily Mery Grewal PA-C   ondansetron 4 mg Intravenous Q6H PRN Cherylene Deans, MD   oxyCODONE 10 mg Oral Q4H PRN Cherylene Deans, MD   oxyCODONE 5 mg Oral Q4H PRN Cherylene Deans, MD   pantoprazole 40 mg Oral Daily Before Breakfast Cherylene Deans, MD   polyethylene glycol 17 g Oral Daily PRN Cherylene Deans, MD   polyethylene glycol 17 g Oral Daily Cherylene Deans, MD   polyvinyl alcohol 1 drop Both Eyes 4x Daily Cherylene Deans, MD   polyvinyl alcohol 1 drop Both Eyes Q1H PRN Cherylene Deans, MD   pravastatin 10 mg Oral Daily With Idris Gimenez MD   senna 1 tablet Oral Daily Gaviota Kim MD   tamsulosin 0 4 mg Oral HS Gaviota Kim MD   thiamine 50 mg Oral Daily Gaviota Kim MD   tiotropium 18 mcg Inhalation Daily Gaviota Kim MD       Labs:     Results from last 7 days   Lab Units 06/21/19  0542 06/18/19  1426   WBC Thousand/uL 4 59 7 64   HEMOGLOBIN g/dL 13 8 13 3   HEMATOCRIT % 42 5 41 2   PLATELETS Thousands/uL 146* 141*     Results from last 7 days   Lab Units 06/21/19  0542 06/20/19  0511   SODIUM mmol/L 145 144   POTASSIUM mmol/L 4 2 3 3*   CHLORIDE mmol/L 108 108   CO2 mmol/L 31 30   BUN mg/dL 12 14   CREATININE mg/dL 0 77 0 76   CALCIUM mg/dL 10 0 9 7                  Results from last 7 days   Lab Units 06/22/19  0651 06/21/19  2112 06/21/19  1603   POC GLUCOSE mg/dl 122 118 101       Imaging:     No orders to display       *Labs reviewed  *Radiology studies reviewed  *Medications reviewed and reconciled as needed  *Please refer to order section for additional ordered labs studies  *Case discussed with primary attending during morning huddle case rounds    Physical Examination:  Vitals:   Vitals:    06/21/19 1348 06/21/19 2052 06/21/19 2220 06/22/19 0546   BP: 141/89 (!) 175/91 165/93 133/97   BP Location: Right arm Right arm Left arm Left arm   Pulse: 80 70 70 78   Resp: 16 18 18 18   Temp: 99 °F (37 2 °C) 98 8 °F (37 1 °C) 98 6 °F (37 °C) 97 6 °F (36 4 °C)   TempSrc: Oral Oral Oral Oral   SpO2: 99% 100% 98% 99%   Weight:       Height:           General Appearance: NAD, conversive  Eyes: No icterus; conjunctiva normal, PERRLA  HENT: oropharynx clear; mucous membranes moist; no ulcerations, normal hard and soft palette  Neck: trachea midline, range of motion full   Supple, no lymphadenopathy or thyromegaly  Lungs: CTA, normal respiratory effort, no retractions, expiratory effort normal  CV: regular rate, no rubs no gallops, PMI normal place and intensity  ABD: soft non tender, no masses , no hepatic or splenomegaly  EXT: DP pulses intact, no lymphadenopathy, no edema  Skin: normal turgor, normal texture, no rash, no ulcers  Psych: affect normal, No anxiety,   Neuro: AAOx3            Total time spent: At least 40 minutes, with more than 50% spent counseling/coordinating care  Counseling includes discussion with patient re: progress  and discussion with patient of his/her current medical state/information  Coordination of patient's care was performed in conjunction with primary service  Time invested included review of patient's labs, vitals, and management of their comorbidities with continued monitoring  In addition, this patient was discussed with medical team including physician and advanced extenders  The care of the patient was extensively discussed and appropriate treatment plan was formulated unique for this patient  ** Please Note: Dragon 360 Dictation voice to text software may have been used in the creation of this document   **

## 2019-06-22 NOTE — PROGRESS NOTES
PM&R Progress Note:    Subjective: Patient seen face to face briefly  No acute issues  Reviewed with patient negative UA results  Continues to urinate freq at night - condom cath was worn last night  Review of Systems: Pertinent positives in subjective, all other ROS reviewed are negative  Objective:   /97 (BP Location: Left arm)   Pulse 78   Temp 97 6 °F (36 4 °C) (Oral)   Resp 18   Ht 5' 9" (1 753 m)   Wt 63 3 kg (139 lb 8 8 oz)   SpO2 99%   BMI 20 61 kg/m²   Aspen collar in place     Assessment/Plan: Ihsan Pan During is a 70 y o  male s/p ACDF 2/2 cervical myelopathy         -Continue current rehabilitation plan of care to maximize function     -Continue current medical plan of care  Discussed with internal medicine consultants  -Urinary freq: UA negative  Changed timing of flomax to after dinner  Will observe if this helps with excessive overnight voiding  Likely a normal sequelae of post op status          Radha Cutler MD  PM&R Attending

## 2019-06-22 NOTE — ASSESSMENT & PLAN NOTE
Improved  Optimal management of bladder dysfunction as outlined > monitor with adjustments in pain meds/urination meds   Melatonin 3 mg q h s  PRN   Optimize pain management as outlined  Recommend appropriate sleep hygiene: patient counselled on this:   Sleep only as much as necessary to feel rested and then get up or sit up in bed, maintain regular sleep schedule (same bedtime and wake time everyday), do not force sleep, avoid caffeinated beverages after lunch, avoid alcohol at home near bedtime, do not smoke particularly in evening, do not go to bed hungry, adjust bedroom environment so you are comfortable prior to settling down for sleep, deal with concerns or worries before bedtime   Make a list of things to work on for next day so anxiety is reduced at night, exercise regularly when cleared by physician

## 2019-06-22 NOTE — NURSING NOTE
Patient requested condom catheter for during the night so he can get some sleep  Voiding 200+ light yellow urine every hour

## 2019-06-22 NOTE — PROGRESS NOTES
06/22/19 0830   Pain Assessment   Pain Assessment No/denies pain   Restrictions/Precautions   Precautions Bed/chair alarms; Fall Risk;Supervision on toilet/commode;Spinal precautions   Braces or Orthoses C/S Collar   Subjective   Subjective pt states he doesn't feel as good as he did yesterday   QI: Sit to Stand   Assistance Needed Physical assistance   Assistance Provided by [place] Less than 25%   Sit to Stand CARE Score 3   QI: Chair/Bed-to-Chair Transfer   Assistance Needed Physical assistance   Assistance Provided by [place] Less than 25%   Chair/Bed-to-Chair Transfer CARE Score 3   Transfer Bed/Chair/Wheelchair   Limitations Noted In Balance; Coordination; Endurance   Adaptive Equipment None   Stand Pivot Contact Guard;Minimal Assist   Sit to Stand Contact Guard;Minimal Assist   Stand to Sit Contact Guard   Findings instructed on use of hands, pt does not want to use hands with STS at this time   Bed, Chair, Wheelchair Transfer (FIM) 4 - Patient requires steadying assist or light touching   QI: Car Transfer   Reason if not Attempted Safety concerns   Car Transfer CARE Score 88   QI: Walk 10 Feet   Assistance Needed Physical assistance   Assistance Provided by [place] 25%-49%   Walk 10 Feet CARE Score 3   QI: Walk 50 Feet with Two Turns   Assistance Needed Physical assistance   Assistance Provided by [place] 25%-49%   Walk 50 Feet with Two Turns CARE Score 3   QI: Walk 150 Feet   Assistance Needed Physical assistance   Assistance Provided by [place] 25%-49%   Walk 150 Feet CARE Score 3   QI: Walking 10 Feet on Uneven Surfaces   Reason if not Attempted Safety concerns   Walking 10 Feet on Uneven Surfaces CARE Score 88   Ambulation   Does the patient walk? 2  Yes   Primary Discharge Mode of Locomotion Walk   Walk Assist Level Minimum Assist   Gait Pattern Inconsistant Analisa; Slow Analisa;Decreased foot clearance; Lateral deviation; Improper weight shift   Assist Device   (no AD, hands on hips)   Distance Walked (feet) 350 ft  (x1, up to 500' )   Limitations Noted In Balance; Endurance; Safety;Speed;Strength; Heel Strike   Findings notable unsteady gait, increase lateral deviation  reaching for handrail in ernst when unsteady  weakness in LEs reported by pt during longer distances   Walking (FIM) 3 - Patient completes 50 - 74% of all tasks, needs more than steadying or light touch AND distance 150 feet or more, no rest   QI: Wheel 50 Feet with Two Turns   Reason if not Attempted Activity not applicable   Wheel 50 Feet with Two Turns CARE Score 9   QI: Wheel 150 Feet   Reason if not Attempted Activity not applicable   Wheel 498 Feet CARE Score 9   Wheelchair mobility   QI: Does the patient use a wheelchair? 0  No   QI: 1 Step (Curb)   Reason if not Attempted Safety concerns   1 Step (Curb) CARE Score 88   QI: Toilet Transfer   Assistance Needed Incidental touching   Assistance Provided by [place] Less than 25%   Toilet Transfer CARE Score 3   Toilet Transfer   Findings pt stood to urinate x2 during session   Toilet Transfer (FIM) 4 - Patient requires steadying assist or light touching   Therapeutic Interventions   Balance fwd and lateral stepping over weight dowls and varying size bolsters  amb with ball toss  fwd/bwd walking   Equipment Use   NuStep L1 12mins, LEs only   Assessment   Treatment Assessment PT session focused on dynamic gait and balance to decrease fall risk  pt had to use bathroom several times during session  pt with urgency once and rushing into bathroom and demonstrated impaired balance needing hands on A to stabilize  pt educated on knowing limitations to decrease fall risk and to notify staff of needing a rest   pt reported feeling less steady today than yesterday  cont to work with no AD during therapy to improve balance and righting reactions  pt A back to recliner at end of session  Problem List Decreased strength;Decreased endurance; Impaired balance;Decreased mobility; Decreased coordination;Decreased safety awareness;Orthopedic restrictions   Barriers to Discharge Inaccessible home environment;Decreased caregiver support   PT Barriers   Physical Impairment Decreased strength;Decreased endurance; Impaired balance;Decreased mobility; Decreased coordination;Decreased safety awareness;Orthopedic restrictions   Functional Limitation Walking;Transfers;Standing;Stair negotiation;Car transfers   Plan   Treatment/Interventions Functional transfer training;LE strengthening/ROM; Endurance training;Patient/family training;Bed mobility;Gait training   Progress Progressing toward goals   Recommendation   Recommendation Home PT;Outpatient PT; Home with family support   Equipment Recommended Walker  (LRAD)   PT Therapy Minutes   PT Time In 0830   PT Time Out 1000   PT Total Time (minutes) 90   PT Mode of treatment - Individual (minutes) 90   PT Mode of treatment - Concurrent (minutes) 0   PT Mode of treatment - Group (minutes) 0   PT Mode of treatment - Co-treat (minutes) 0   PT Mode of Teatment - Total time(minutes) 90 minutes   Therapy Time missed   Time missed?  No

## 2019-06-22 NOTE — PROGRESS NOTES
Physical Medicine and Rehabilitation Progress Note  Ivelisse Hooper During 70 y o  male MRN: [id number]  Unit/Bed#: [facility] 970-01 Encounter: [id number]    Chief Complaints:  Urinary frequency and incontinence    Subjective/Interval Events:   Patient continues to report significant hard to do with urinary frequency and incontinence  He states this significantly impacts asleep overnight  Patient notes some sensitivity to light touch increased tingling burning pain in his hands  Patient denies muscle spasms  He reports some anxiety at times  Patient denies dysuria, fever, chills, sweats  Patient reports strength, sensation, balance are all improving  He denies lightheadedness, shortness of breath, increased cough, or other new complaints  ROS: A 10-point ROS was performed  Negative except as listed above  Extended discussion today held with patient regarding current condition, medical history, medical/rehabilitation management, mood/coping, and disposition  Overall Assessment/relevant history:  72-year-old male with a past medical history of  diabetes mellitus 2, hypertension, hyperlipidemia,  active smoker,lung cancer status post left upper lobectomy,  pulmonary nodules, COPD, obstructive sleep apnea,  only grade 1 diastolic dysfunction left ventricle, BPH who developed progressive 4 limb weakness, sensory changes, and gait imbalance 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 who underwent C3-C4 anterior cervical decompression and fusion with  C4 hemicorpectomy by Dr Cortez Mohs on 6/18/19  Postoperative course notable for pain, hypertension, with significant decline ADLs and ambulation    Patient was evaluated by skilled therapies and is appropriate for admission to acute rehabilitation center      Functional status on admission to [facility]:  OT:  Bathing, upper body dressing, toileting, toilet transfers moderate assist, eating, grooming, lower body dressing, general transfers min assist  PT:  Ambulation min assist 100 feet but scored as total assist because of 2nd person chair follow    Functional status (recent):    See above    Functional status goal:  Modified independent for ADLs and ambulation     Urinary incontinence  Assessment & Plan  Significant urinary frequency, incontinence > possible neurogenic overactive spastic bladder  > significantly impairing sleep, function, quality of life  > obtain urinalysis for consideration of urinary tract infection - note patient on clindamycin for infectious prophylaxis pressure neurosurgeon  > toileting program  > consider anti spasmodic pending urinalysis  PVRs, bladder scans, measure urine outputs to help determine  Continue chronic flomax for now  Monitor for infection, incontinence, retention  Follow-up with Urology after discharge    * Cervical myelopathy [facility])  Assessment & Plan  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 and fusion with C4 hemicorpectomy by Dr Leslie Olson on 19  Clindamycin for infectious prophylaxis per Neurosurgery  Holding anti-platelet per Neurosurgery  Postop day 14 is   KWADWO drain management if necessary  Cervical spine precautions, cervical collar on at all times  Monitor incision for infection or dehiscence  Follow-up with Neurosurgery if needed during course after discharge    Anxiety  Assessment & Plan  Supportive counseling, psychology consult during course  Gabapentin may be helpful for this as well as pain  Optimize sleep  Counseled on and continue to encourage deep breathing/relaxation/behavioral management techniques:     Deep breathin seconds in, 5 seconds out, 5 times per hour when awake and PRN when experiencing pain or anxiety  Avoid holding breath and tightening muscles and instead breathe slowly and deeply      Insomnia  Assessment & Plan  Largely related to urinary frequency/incontinent  Optimal management of bladder dysfunction as outlined  Melatonin 3 mg q h s    Optimize pain management as outlined  Recommend appropriate sleep hygiene: patient counselled on this:   Sleep only as much as necessary to feel rested and then get up or sit up in bed, maintain regular sleep schedule (same bedtime and wake time everyday), do not force sleep, avoid caffeinated beverages after lunch, avoid alcohol at home near bedtime, do not smoke particularly in evening, do not go to bed hungry, adjust bedroom environment so you are comfortable prior to settling down for sleep, deal with concerns or worries before bedtime  Make a list of things to work on for next day so anxiety is reduced at night, exercise regularly when cleared by physician      Pain  Assessment & Plan  Suboptimal neuropathic pain control  Somatic postsurgical, arthritic, muscle spasms, neuropathic   Continue current management for now:  Scheduled acetaminophen monitor LFTs  Increase gabapentin to 200-200-300 mg daily   D/C Robaxin without significant spasm complaints  Continue Oxycodone 5-10 mg q 4 hours p r n  Cymbalta can help as well   Hold for oversedation, AMS, or RR<12    Patient apparently was on Cymbalta in the past follow-up       Constipation  Assessment & Plan  Improved  - Colace/Senna BID, miralax daily   - PRN bowel regimen (Miralax PRN/Dulcolax supp PRN)  - monitor for signs and symptoms of obstruction/ileus > not currently; hold stimulant lax if occurs  - Limiting constipating medications if possible  - Ensure adequate hydration        At risk for venous thromboembolism (VTE)  Assessment & Plan  Heparin and SCDs    BPH (benign prostatic hyperplasia)  Assessment & Plan  Monitor for retention, incontinence, infection  Continue Flomax    SHIVANI (obstructive sleep apnea)  Assessment & Plan  CPAP    COPD (chronic obstructive pulmonary disease) ([facility])  Assessment & Plan  Monitor oxygen with and without activity  Management overall per Medicine  Encourage deep breathing and incentive spirometry  Provide albuteral neb for now with increased congestion recently off chronic meds  Spiriva, as needed albuterol  Home regimen, Advair, Spiriva, Combivent PRN  Follow-up with pulmonology after discharge    Essential hypertension  Assessment & Plan  Blood pressure elevated fair amount recently  Overall management by Medicine  Losartan 100 mg daily  Patient also on diltiazem chronically    Hyperlipidemia associated with type 2 diabetes mellitus ([facility] [address] )  Assessment & Plan  Resume statin at discretion Medicine    Type 2 diabetes mellitus with diabetic neuropathy, without long-term current use of insulin ([facility] )  Assessment & Plan  Overall management but Medicine  Patient on combination metformin medication previously  Insulin lispro at their discretion, other insulin and orals at their discretion     Pipe smoker  Assessment & Plan  Patient started on nicotine patch per Internal Medicine    Vitamin D insufficiency  Assessment & Plan  2000 units D3 for now daily follow-up with PCP    # Skin:   - Nursing to turn patient Q2H if not adequately ambulatory, monitor for skin breakdown, rashes, and wounds if applicable     # Diet/Hydration:    Diabetic     Disposition:   Home with [facility] 2-3 weeks from admission     Follow-up:   PCP, PMR, NSx, Urology     CODE: Level 1: Full Code     Restrictions include: Fall precautions     ---------------------------------------------------------------------------------------------------------------------    Objective:     Allergies and Medications per EMR    Physical Exam:  Temp:  [98 4 °F (36 9 °C)-99 °F (37 2 °C)] 98 6 °F (37 °C)  HR:  [65-80] 70  Resp:  [16-18] 18  BP: (130-175)/(72-93) 165/93  General: Awake, alert in NAD  HENT:  MMM,   Neck: cervical collar in place anterior dressing in place  Respiratory:  Improving lung sounds at bases without wheeze without significant rales or rhonchi  Cardiovascular: Regular rate and rhythm, no murmurs, rubs, or gallops  Gastrointestinal: Soft, non-tender, non-distended, normoactive bowel sounds  Genitourinary: No byrnes  SkiN/MSK/Extremities:  no calf edema, no calf tenderness to palpation  Neurologic/Psych:   MENTAL STATUS: awake, orientation intact; appropriate wakefulness, able to follow commands appropriately  Affect: Somewhat anxious at times  Strength/MMT:  Right proximal upper extremity strength 5-out of 5, right distal upper extremity strength 4/5, left proximal upper extremity strength 5/5, left distal upper extremity strength 5- over 5, right lower extremity 5-out of 5, left lower extremity 5/5    Physical exam performed, documentation above reviewed and updated if appropriate on relevant date of encounter:   6/21/2019    Diagnostic Studies: reviewed, no new imaging  No results found      Laboratory:    Results from last 7 days   Lab Units 06/21/19  0542 06/18/19  1426   HEMOGLOBIN g/dL 13 8 13 3   HEMATOCRIT % 42 5 41 2   WBC Thousand/uL 4 59 7 64     Results from last 7 days   Lab Units 06/21/19  0542 06/20/19  0511 06/19/19  0454   BUN mg/dL 12 14 13   POTASSIUM mmol/L 4 2 3 3* 4 0   CHLORIDE mmol/L 108 108 106   CREATININE mg/dL 0 77 0 76 0 66   AST U/L 12  --   --    ALT U/L 31  --   --             Patient Active Problem List   Diagnosis    Type 2 diabetes mellitus with diabetic neuropathy, without long-term current use of insulin ([facility])    Hyperlipidemia associated with type 2 diabetes mellitus ([facility] [address] )    Essential hypertension    BPH (benign prostatic hyperplasia)    Degenerative cervical spinal stenosis    Cervical spondylosis    COPD (chronic obstructive pulmonary disease) ([facility])    Adjustment disorder with depressed mood    Progressive locomotor ataxia    Other spondylosis with myelopathy, cervical region    Quadriparesis ([address]    Spinal cord compression ([facility] [address] )    Prostate cancer ([facility] [address] )    Overactive bladder    Lesion of native kidney    SHIVANI (obstructive sleep apnea)    Impaired mobility and ADLs    Cervical myelopathy (HCC)    At risk for venous thromboembolism (VTE)  Pain    Constipation    Urinary incontinence    Depression    Vitamin D insufficiency    Pipe smoker    Insomnia    Anxiety       ** Please Note: Fluency Direct voice to text software may have been used in the creation of this document  **    35 minutes or greater spent face-to-face with patient during this encounter and with coordination of care  Extended discussion today held with patient regarding current condition, medical history, mood, medical/rehabilitation management, and disposition       Sea Escamilla MD, [facility]  Physical Medicine and Rehabilitation  Brain Injury Medicine

## 2019-06-22 NOTE — PLAN OF CARE
Problem: Potential for Falls  Goal: Patient will remain free of falls  Description  INTERVENTIONS:  - Assess patient frequently for physical needs  -  Identify cognitive and physical deficits and behaviors that affect risk of falls  -  [place] fall precautions as indicated by assessment   - Educate patient/family on patient safety including physical limitations  - Instruct patient to call for assistance with activity based on assessment  - Modify environment to reduce risk of injury  - Consider OT/PT consult to assist with strengthening/mobility  Outcome: Progressing     Problem: Nutrition/Hydration-ADULT  Goal: Nutrient/Hydration intake appropriate for improving, restoring or maintaining nutritional needs  Description  Monitor and assess patient's nutrition/hydration status for malnutrition (ex- brittle hair, bruises, dry skin, pale skin and conjunctiva, muscle wasting, smooth red tongue, and disorientation)  Collaborate with interdisciplinary team and initiate plan and interventions as ordered  Monitor patient's weight and dietary intake as ordered or per policy  Utilize nutrition screening tool and intervene per policy  Determine patient's food preferences and provide high-protein, high-caloric foods as appropriate       INTERVENTIONS:  - Monitor oral intake, urinary output, labs, and treatment plans  - Assess nutrition and hydration status and recommend course of action  - Evaluate amount of meals eaten  - Assist patient with eating if necessary   - Allow adequate time for meals  - Recommend/ encourage appropriate diets, oral nutritional supplements, and vitamin/mineral supplements  - Order, calculate, and assess calorie counts as needed  - Recommend, monitor, and adjust tube feedings and TPN/PPN based on assessed needs  - Assess need for intravenous fluids  - Provide specific nutrition/hydration education as appropriate  - Include patient/family/caregiver in decisions related to nutrition  Outcome: Progressing     Problem: PAIN - ADULT  Goal: Verbalizes/displays adequate comfort level or baseline comfort level  Description  Interventions:  - Encourage patient to monitor pain and request assistance  - Assess pain using appropriate pain scale  - Administer analgesics based on type and severity of pain and evaluate response  - Implement non-pharmacological measures as appropriate and evaluate response  - Consider cultural and social influences on pain and pain management  - Notify physician/advanced practitioner if interventions unsuccessful or patient reports new pain  Outcome: Progressing     Problem: INFECTION - ADULT  Goal: Absence or prevention of progression during hospitalization  Description  INTERVENTIONS:  - Assess and monitor for signs and symptoms of infection  - Monitor lab/diagnostic results  - Monitor all insertion sites, i e  indwelling lines, tubes, and drains  - Monitor endotracheal (as able) and nasal secretions for changes in amount and color  - [place] appropriate cooling/warming therapies per order  - Administer medications as ordered  - Instruct and encourage patient and family to use good hand hygiene technique  - Identify and instruct in appropriate isolation precautions for identified infection/condition  Outcome: Progressing     Problem: SAFETY ADULT  Goal: Patient will remain free of falls  Description  INTERVENTIONS:  - Assess patient frequently for physical needs  -  Identify cognitive and physical deficits and behaviors that affect risk of falls    -  [place] fall precautions as indicated by assessment   - Educate patient/family on patient safety including physical limitations  - Instruct patient to call for assistance with activity based on assessment  - Modify environment to reduce risk of injury  - Consider OT/PT consult to assist with strengthening/mobility  Outcome: Progressing  Goal: Maintain or return to baseline ADL function  Description  INTERVENTIONS:  -  Assess patient's ability to carry out ADLs; assess patient's baseline for ADL function and identify physical deficits which impact ability to perform ADLs (bathing, care of mouth/teeth, toileting, grooming, dressing, etc )  - Assess/evaluate cause of self-care deficits   - Assess range of motion  - Assess patient's mobility; develop plan if impaired  - Assess patient's need for assistive devices and provide as appropriate  - Encourage maximum independence but intervene and supervise when necessary  ¯ Involve family in performance of ADLs  ¯ Assess for home care needs following discharge   ¯ Request OT consult to assist with ADL evaluation and planning for discharge  ¯ Provide patient education as appropriate  Outcome: Progressing  Goal: Maintain or return mobility status to optimal level  Description  INTERVENTIONS:  - Assess patient's baseline mobility status (ambulation, transfers, stairs, etc )    - Identify cognitive and physical deficits and behaviors that affect mobility  - Identify mobility aids required to assist with transfers and/or ambulation (gait belt, sit-to-stand, lift, walker, cane, etc )  - [place] fall precautions as indicated by assessment  - Record patient progress and toleration of activity level on Mobility SBAR; progress patient to next Phase/Stage  - Instruct patient to call for assistance with activity based on assessment  - Request Rehabilitation consult to assist with strengthening/weightbearing, etc   Outcome: Progressing     Problem: DISCHARGE PLANNING  Goal: Discharge to home or other facility with appropriate resources  Description  INTERVENTIONS:  - Identify barriers to discharge w/patient and caregiver  - Arrange for needed discharge resources and transportation as appropriate  - Identify discharge learning needs (meds, wound care, etc )  - Arrange for interpretive services to assist at discharge as needed  - Refer to Case Management Department for coordinating discharge planning if the patient needs post-hospital services based on physician/advanced practitioner order or complex needs related to functional status, cognitive ability, or social support system  Outcome: Progressing     Problem: Prexisting or High Potential for Compromised Skin Integrity  Goal: Skin integrity is maintained or improved  Description  INTERVENTIONS:  - Identify patients at risk for skin breakdown  - Assess and monitor skin integrity  - Assess and monitor nutrition and hydration status  - Monitor labs (i e  albumin)  - Assess for incontinence   - Turn and reposition patient  - Assist with mobility/ambulation  - Relieve pressure over bony prominences  - Avoid friction and shearing  - Provide appropriate hygiene as needed including keeping skin clean and dry  - Evaluate need for skin moisturizer/barrier cream  - Collaborate with interdisciplinary team (i e  Nutrition, Rehabilitation, etc )   - Patient/family teaching  Outcome: Progressing

## 2019-06-22 NOTE — ASSESSMENT & PLAN NOTE
Improving   Supportive counseling, psychology consult during course  Gabapentin may be helpful for this as well as pain  Optimize sleep  Counseled on and continue to encourage deep breathing/relaxation/behavioral management techniques:     Deep breathin seconds in, 5 seconds out, 5 times per hour when awake and PRN when experiencing pain or anxiety    Avoid holding breath and tightening muscles and instead breathe slowly and deeply

## 2019-06-22 NOTE — PROGRESS NOTES
06/22/19 1230   Pain Assessment   Pain Assessment No/denies pain   Pain Score No Pain   Restrictions/Precautions   Precautions Bed/chair alarms; Fall Risk;Supervision on toilet/commode;Spinal precautions   Braces or Orthoses C/S Collar   QI: Sit to Stand   Assistance Needed Physical assistance   Assistance Provided by [place] Less than 25%   Sit to Stand CARE Score 3   QI: Chair/Bed-to-Chair Transfer   Assistance Needed Physical assistance   Assistance Provided by [place] 25%-49%   Chair/Bed-to-Chair Transfer CARE Score 3   Transfer Bed/Chair/Wheelchair   Limitations Noted In Balance; Endurance;Problem Solving;LE Strength   Stand Pivot Minimal Assist   Sit to Stand Minimal   Stand to Sit Minimal   Supine to Sit Minimal   Bed, Chair, Wheelchair Transfer (FIM) 3 - [place] needs to lift, boost or assist to stand OR sit   QI: [address] Needed Physical assistance   Assistance Provided by [place] 25%-49%   Comment Pt able to pull pants up/down but noted to be unsteady while in stance requiring hands on assistance   Toileting Hygiene CARE Score 3   Toileting   Able to Pull Clothing down yes, up yes  Able to Manage Clothing Closures Yes   Manage Hygiene Bladder   Limitations Noted In Balance; Coordination; Safety   Toileting (FIM) 4 - Patient requires steadying assist or light touching   Cognition   Overall Cognitive Status Impaired   Arousal/Participation Alert; Cooperative   Attention Attends with cues to redirect   Orientation Level Oriented X4   Memory Decreased short term memory;Decreased recall of precautions   Following Commands Follows all commands and directions without difficulty   Activity Tolerance   Activity Tolerance Patient tolerated treatment well   Assessment   Treatment Assessment Pt engaged in OT treatment sessionw [facility] focus on [facility], in hand manipulation, UE strengthening  Pt utlized button hook to trial donning dress shirt  Pt reports he wears button up shirts every Sunday   Pt utlized mirror for visual feedback and to prevent neck flexion while buttoning shirt  Pt christen to button buttons on sleeveb ut unable to do shirt due to position of buttons on L side of men's shirt Pt had to use LUE to manipulate button hook and pt with decreased coordination  Discussed sewing buttons into holes and using velcro sewn into shirt to eleminate need to use button hook  Pt states he may consider but wants to continue wtih strengthening to improve   Pt given theraputty wtih focus on gross grasp, three jaw dell and lateral pinch grasp patterns  Pt was given HEP to complete in room  Also trialed cutting with knife with built up handle  Pt reports peeling vegetables and chopping vegetables is difficult  Pt shown cutting board with prongs to help stablize fruit/vegetables for cutting/peeling  Will attempt to create cutting board to allow patient to trial  Pt christen to cut theraputty medium soft with built up knife, with increased time and assistance  Pt with several LOB during fxnl mobililty without device  Pt required hands on assistance for balance support   Prognosis Good   Problem List Decreased strength;Decreased range of motion;Decreased endurance; Impaired balance;Decreased safety awareness   Plan   Treatment/Interventions ADL retraining;Functional transfer training;LE strengthening/ROM; Therapeutic exercise; Endurance training;Patient/family training;Equipment eval/education; Compensatory technique education;Gait training   Progress Progressing toward goals   Recommendation   OT Discharge Recommendation   (pending progress)   OT Therapy Minutes   OT Time In 1230   OT Time Out 1400   OT Total Time (minutes) 90   OT Mode of treatment - Individual (minutes) 90   OT Mode of treatment - Concurrent (minutes) 0   OT Mode of treatment - Group (minutes) 0   OT Mode of treatment - Co-treat (minutes) 0   OT Mode of Teatment - Total time(minutes) 90 minutes   Therapy Time missed   Time missed?  No

## 2019-06-23 LAB
GLUCOSE SERPL-MCNC: 104 MG/DL (ref 65–140)
GLUCOSE SERPL-MCNC: 109 MG/DL (ref 65–140)
GLUCOSE SERPL-MCNC: 122 MG/DL (ref 65–140)
GLUCOSE SERPL-MCNC: 142 MG/DL (ref 65–140)

## 2019-06-23 PROCEDURE — 82948 REAGENT STRIP/BLOOD GLUCOSE: CPT

## 2019-06-23 RX ADMIN — POLYVINYL ALCOHOL 1 DROP: 14 SOLUTION/ DROPS OPHTHALMIC at 21:18

## 2019-06-23 RX ADMIN — ACETAMINOPHEN 650 MG: 325 TABLET ORAL at 21:17

## 2019-06-23 RX ADMIN — PANTOPRAZOLE SODIUM 40 MG: 40 TABLET, DELAYED RELEASE ORAL at 06:01

## 2019-06-23 RX ADMIN — FLUTICASONE FUROATE AND VILANTEROL TRIFENATATE 1 PUFF: 100; 25 POWDER RESPIRATORY (INHALATION) at 09:13

## 2019-06-23 RX ADMIN — NICOTINE 21 MG: 21 PATCH, EXTENDED RELEASE TRANSDERMAL at 08:02

## 2019-06-23 RX ADMIN — GABAPENTIN 200 MG: 100 CAPSULE ORAL at 14:10

## 2019-06-23 RX ADMIN — CLINDAMYCIN HYDROCHLORIDE 300 MG: 150 CAPSULE ORAL at 05:59

## 2019-06-23 RX ADMIN — TIOTROPIUM BROMIDE 18 MCG: 18 CAPSULE ORAL; RESPIRATORY (INHALATION) at 07:57

## 2019-06-23 RX ADMIN — POLYVINYL ALCOHOL 1 DROP: 14 SOLUTION/ DROPS OPHTHALMIC at 12:07

## 2019-06-23 RX ADMIN — PRAVASTATIN SODIUM 10 MG: 10 TABLET ORAL at 16:22

## 2019-06-23 RX ADMIN — DULOXETINE HYDROCHLORIDE 60 MG: 60 CAPSULE, DELAYED RELEASE ORAL at 07:53

## 2019-06-23 RX ADMIN — TAMSULOSIN HYDROCHLORIDE 0.4 MG: 0.4 CAPSULE ORAL at 18:32

## 2019-06-23 RX ADMIN — POLYVINYL ALCOHOL 1 DROP: 14 SOLUTION/ DROPS OPHTHALMIC at 18:32

## 2019-06-23 RX ADMIN — DILTIAZEM HYDROCHLORIDE 120 MG: 120 CAPSULE, COATED, EXTENDED RELEASE ORAL at 09:13

## 2019-06-23 RX ADMIN — HEPARIN SODIUM 5000 UNITS: 5000 INJECTION INTRAVENOUS; SUBCUTANEOUS at 21:18

## 2019-06-23 RX ADMIN — GABAPENTIN 200 MG: 100 CAPSULE ORAL at 06:01

## 2019-06-23 RX ADMIN — METFORMIN HYDROCHLORIDE 500 MG: 500 TABLET ORAL at 07:53

## 2019-06-23 RX ADMIN — CLINDAMYCIN HYDROCHLORIDE 300 MG: 150 CAPSULE ORAL at 21:17

## 2019-06-23 RX ADMIN — THIAMINE HCL TAB 100 MG 50 MG: 100 TAB at 07:53

## 2019-06-23 RX ADMIN — ACETAMINOPHEN 650 MG: 325 TABLET ORAL at 06:00

## 2019-06-23 RX ADMIN — GABAPENTIN 300 MG: 300 CAPSULE ORAL at 21:18

## 2019-06-23 RX ADMIN — CLINDAMYCIN HYDROCHLORIDE 300 MG: 150 CAPSULE ORAL at 14:10

## 2019-06-23 RX ADMIN — HEPARIN SODIUM 5000 UNITS: 5000 INJECTION INTRAVENOUS; SUBCUTANEOUS at 06:00

## 2019-06-23 RX ADMIN — POLYVINYL ALCOHOL 1 DROP: 14 SOLUTION/ DROPS OPHTHALMIC at 09:14

## 2019-06-23 RX ADMIN — ACETAMINOPHEN 650 MG: 325 TABLET ORAL at 14:10

## 2019-06-23 RX ADMIN — LOSARTAN POTASSIUM 100 MG: 50 TABLET, FILM COATED ORAL at 07:53

## 2019-06-23 RX ADMIN — VITAMIN D, TAB 1000IU (100/BT) 2000 UNITS: 25 TAB at 07:52

## 2019-06-23 RX ADMIN — HEPARIN SODIUM 5000 UNITS: 5000 INJECTION INTRAVENOUS; SUBCUTANEOUS at 14:10

## 2019-06-23 RX ADMIN — MELATONIN 3 MG: at 21:17

## 2019-06-23 NOTE — PROGRESS NOTES
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; s/p C3/4 ACDF with C4 hemicorpectomy, C3-4 anterior instrumentation/fusion 6/18/19 Donaldo Felix):  continue cervical collar; on Clindamycin for infection prophylaxis  Pain control per primary service  Had previously been on Cymbalta for neuropathic sx      DM2: at home he takes Kombiglyze XR 5-1000mg qd and no insulin  Metformin started today  Continue DM diet and Accuechecks/QID with SSI      HTN:  at home, takes Cardizem 180mg qd, Losartan 100mg qd (was not on Norvasc he says); here on Card 120mg qd, Losartan 100mg qd  Will go up on Card if needed to 180mg qd  Add prn Hydralazine      COPD; hx lung cancer/VIVIANA lobectomy, pulmonary nodules: follows with pulm as OP and takes Advair 250-50 one puff BID/Spiriva one puff daily and prn Combivent  Will place on Breo as substitute for Advair, continue Spiriva and use Albuterol inhaler prn     SHIVANI: supposed to use CPAP but has been noncompliant     HLD: at home on Pravachol     Urinary incontinence/BPH: continue Flomax     Thrombocytopenia: mild; will watch     Hypokalemia: Potassium normalized after replacement to 4 2  Will repeat Monday  Subjective/ HPI: Patient seen and examined  Patients overnight issues or events were reviewed with nursing or staff during rounds or morning huddle session  New or overnight issues include the following: Hypokalemia: Improved after replacement  Will continue to monitor  DM type 2: Insulin stopped and metformin started  Will monitor blood sugars with this change  HTN:  Stable  Continue to monitor trend and adjust medications as needed  Patient denies any current complaints      ROS:     GI: denies abdominal pain, change bowel habits or reflux symptoms  Neuro: Denies any headache, new vision changes, new neuropathies,new weaknesses   Respiratory: No Cough, SOB, denies wheeze  Cardiovascular: No CP, palpitations , denies perception of rapid heartbeat  : denies any new urinary burning or frequency    Review of Scheduled Meds:    Current Facility-Administered Medications:  acetaminophen 650 mg Oral TID Lisa Shows, MD   albuterol 2 puff Inhalation Q4H PRN Lisa Shows, MD   bisacodyl 10 mg Rectal Daily PRN Lisa Shows, MD   cholecalciferol 2,000 Units Oral Daily Lisa Shows, MD   clindamycin 300 mg Oral [facility] Lisa Shows, MD   diltiazem 120 mg Oral Daily Lisa Shows, MD   docusate sodium 100 mg Oral BID Lisa Shows, MD   DULoxetine 60 mg Oral Daily Lisa Shows, MD   fluticasone-vilanterol 1 puff Inhalation Daily Lisa Shows, MD   gabapentin 200 mg Oral BID Lisa Shows, MD   gabapentin 300 mg Oral HS Ilsa Shows, MD   heparin (porcine) 5,000 Units Subcutaneous [facility] Lisa Shows, MD   hydrALAZINE 25 mg Oral Q8H PRN SONJA Keller   insulin lispro 1-5 Units Subcutaneous TID [facility] [place] Lisa Shows, MD   insulin lispro 1-5 Units Subcutaneous HS Lisa Shows, MD   lidocaine  Topical Q4H PRN Lisa Shows, MD   losartan 100 mg Oral Daily Lisa Shows, MD   melatonin 3 mg Oral HS Mery Grewal PA-C   metFORMIN 500 mg Oral Daily With Breakfast SONJA Keller   nicotine 21 mg Transdermal Daily Mery Grewal PA-C   ondansetron 4 mg Intravenous Q6H PRN Lisa Shows, MD   oxyCODONE 10 mg Oral Q4H PRN Lisa Shows, MD   oxyCODONE 5 mg Oral Q4H PRN Lisa Shows, MD   pantoprazole 40 mg Oral Daily Before Breakfast Lisa Shows, MD   polyethylene glycol 17 g Oral Daily PRN Lisa Shows, MD   polyethylene glycol 17 g Oral Daily Lisa Shows, MD   polyvinyl alcohol 1 drop Both Eyes 4x Daily Lisa Shows, MD   polyvinyl alcohol 1 drop Both Eyes Q1H PRN Lisa Shows, MD   pravastatin 10 mg Oral Daily With Chastity Bernal MD   senna 1 tablet Oral Daily Radha John MD   tamsulosin 0 4 mg Oral After Rahul Seth MD   thiamine 50 mg Oral Daily Radha John MD   tiotropium 18 mcg Inhalation Daily Radha John MD       Labs:     Results from last 7 days   Lab Units 06/21/19  0542 06/18/19  1426   WBC Thousand/uL 4 59 7 64   HEMOGLOBIN g/dL 13 8 13 3   HEMATOCRIT % 42 5 41 2   PLATELETS Thousands/uL 146* 141*     Results from last 7 days   Lab Units 06/21/19  0542 06/20/19  0511   SODIUM mmol/L 145 144   POTASSIUM mmol/L 4 2 3 3*   CHLORIDE mmol/L 108 108   CO2 mmol/L 31 30   BUN mg/dL 12 14   CREATININE mg/dL 0 77 0 76   CALCIUM mg/dL 10 0 9 7                  Results from last 7 days   Lab Units 06/23/19  0701 06/22/19  2105 06/22/19  1546   POC GLUCOSE mg/dl 104 113 158*       Imaging:     No orders to display       *Labs reviewed  *Radiology studies reviewed  *Medications reviewed and reconciled as needed  *Please refer to order section for additional ordered labs studies  *Case discussed with primary attending during morning huddle case rounds    Physical Examination:  Vitals:   Vitals:    06/22/19 1332 06/22/19 2037 06/22/19 2235 06/23/19 0521   BP: 124/86 145/91  138/78   BP Location: Left arm Left arm  Left arm   Pulse:  82  79   Resp:  18  18   Temp:  97 9 °F (36 6 °C)  97 6 °F (36 4 °C)   TempSrc:  Oral  Oral   SpO2:  100% 99% 100%   Weight:       Height:           General Appearance: NAD, conversive  Eyes: No icterus; conjunctiva normal, PERRLA  HENT: oropharynx clear; mucous membranes moist; no ulcerations, normal hard and soft palette  Neck: trachea midline, range of motion full   Supple, no lymphadenopathy or thyromegaly  Lungs: CTA, normal respiratory effort, no retractions, expiratory effort normal  CV: regular rate, no rubs no gallops, PMI normal place and intensity  ABD: soft non tender, no masses , no hepatic or splenomegaly  EXT: DP pulses intact, no lymphadenopathy, no edema  Skin: normal turgor, normal texture, no rash, no ulcers  Psych: affect normal, No anxiety,   Neuro: AAOx3            Total time spent: At least 40 minutes, with more than 50% spent counseling/coordinating care  Counseling includes discussion with patient re: progress  and discussion with patient of his/her current medical state/information  Coordination of patient's care was performed in conjunction with primary service  Time invested included review of patient's labs, vitals, and management of their comorbidities with continued monitoring  In addition, this patient was discussed with medical team including physician and advanced extenders  The care of the patient was extensively discussed and appropriate treatment plan was formulated unique for this patient  ** Please Note: Dragon 360 Dictation voice to text software may have been used in the creation of this document   **

## 2019-06-24 LAB
ANION GAP SERPL CALCULATED.3IONS-SCNC: 2 MMOL/L (ref 4–13)
BASOPHILS # BLD AUTO: 0.02 THOUSANDS/ΜL (ref 0–0.1)
BASOPHILS NFR BLD AUTO: 1 % (ref 0–1)
BUN SERPL-MCNC: 17 MG/DL (ref 5–25)
CALCIUM SERPL-MCNC: 10 MG/DL (ref 8.3–10.1)
CHLORIDE SERPL-SCNC: 106 MMOL/L (ref 100–108)
CO2 SERPL-SCNC: 34 MMOL/L (ref 21–32)
CREAT SERPL-MCNC: 0.85 MG/DL (ref 0.6–1.3)
EOSINOPHIL # BLD AUTO: 0.09 THOUSAND/ΜL (ref 0–0.61)
EOSINOPHIL NFR BLD AUTO: 2 % (ref 0–6)
ERYTHROCYTE [DISTWIDTH] IN BLOOD BY AUTOMATED COUNT: 15 % (ref 11.6–15.1)
GFR SERPL CREATININE-BSD FRML MDRD: 98 ML/MIN/1.73SQ M
GLUCOSE SERPL-MCNC: 105 MG/DL (ref 65–140)
GLUCOSE SERPL-MCNC: 108 MG/DL (ref 65–140)
GLUCOSE SERPL-MCNC: 114 MG/DL (ref 65–140)
GLUCOSE SERPL-MCNC: 129 MG/DL (ref 65–140)
GLUCOSE SERPL-MCNC: 150 MG/DL (ref 65–140)
HCT VFR BLD AUTO: 43 % (ref 36.5–49.3)
HGB BLD-MCNC: 13.6 G/DL (ref 12–17)
IMM GRANULOCYTES # BLD AUTO: 0.01 THOUSAND/UL (ref 0–0.2)
IMM GRANULOCYTES NFR BLD AUTO: 0 % (ref 0–2)
LYMPHOCYTES # BLD AUTO: 1.41 THOUSANDS/ΜL (ref 0.6–4.47)
LYMPHOCYTES NFR BLD AUTO: 37 % (ref 14–44)
MCH RBC QN AUTO: 26.9 PG (ref 26.8–34.3)
MCHC RBC AUTO-ENTMCNC: 31.6 G/DL (ref 31.4–37.4)
MCV RBC AUTO: 85 FL (ref 82–98)
MONOCYTES # BLD AUTO: 0.35 THOUSAND/ΜL (ref 0.17–1.22)
MONOCYTES NFR BLD AUTO: 9 % (ref 4–12)
NEUTROPHILS # BLD AUTO: 1.97 THOUSANDS/ΜL (ref 1.85–7.62)
NEUTS SEG NFR BLD AUTO: 51 % (ref 43–75)
NRBC BLD AUTO-RTO: 0 /100 WBCS
PLATELET # BLD AUTO: 161 THOUSANDS/UL (ref 149–390)
PMV BLD AUTO: 10.3 FL (ref 8.9–12.7)
POTASSIUM SERPL-SCNC: 4.5 MMOL/L (ref 3.5–5.3)
RBC # BLD AUTO: 5.05 MILLION/UL (ref 3.88–5.62)
SODIUM SERPL-SCNC: 142 MMOL/L (ref 136–145)
WBC # BLD AUTO: 3.85 THOUSAND/UL (ref 4.31–10.16)

## 2019-06-24 PROCEDURE — 99232 SBSQ HOSP IP/OBS MODERATE 35: CPT

## 2019-06-24 PROCEDURE — 97112 NEUROMUSCULAR REEDUCATION: CPT

## 2019-06-24 PROCEDURE — 80048 BASIC METABOLIC PNL TOTAL CA: CPT | Performed by: PHYSICIAN ASSISTANT

## 2019-06-24 PROCEDURE — 85025 COMPLETE CBC W/AUTO DIFF WBC: CPT | Performed by: PHYSICIAN ASSISTANT

## 2019-06-24 PROCEDURE — 94660 CPAP INITIATION&MGMT: CPT

## 2019-06-24 PROCEDURE — 97535 SELF CARE MNGMENT TRAINING: CPT

## 2019-06-24 PROCEDURE — 97116 GAIT TRAINING THERAPY: CPT

## 2019-06-24 PROCEDURE — 97530 THERAPEUTIC ACTIVITIES: CPT

## 2019-06-24 PROCEDURE — 99222 1ST HOSP IP/OBS MODERATE 55: CPT | Performed by: NURSE PRACTITIONER

## 2019-06-24 PROCEDURE — 82948 REAGENT STRIP/BLOOD GLUCOSE: CPT

## 2019-06-24 RX ORDER — GABAPENTIN 400 MG/1
400 CAPSULE ORAL
Status: DISCONTINUED | OUTPATIENT
Start: 2019-06-24 | End: 2019-07-07 | Stop reason: HOSPADM

## 2019-06-24 RX ORDER — OXYBUTYNIN CHLORIDE 5 MG/1
5 TABLET, EXTENDED RELEASE ORAL DAILY
Status: DISCONTINUED | OUTPATIENT
Start: 2019-06-24 | End: 2019-07-07 | Stop reason: HOSPADM

## 2019-06-24 RX ORDER — GABAPENTIN 300 MG/1
300 CAPSULE ORAL 2 TIMES DAILY
Status: DISCONTINUED | OUTPATIENT
Start: 2019-06-24 | End: 2019-06-28

## 2019-06-24 RX ADMIN — GABAPENTIN 200 MG: 100 CAPSULE ORAL at 05:55

## 2019-06-24 RX ADMIN — DILTIAZEM HYDROCHLORIDE 120 MG: 120 CAPSULE, COATED, EXTENDED RELEASE ORAL at 08:44

## 2019-06-24 RX ADMIN — DOCUSATE SODIUM 100 MG: 100 CAPSULE, LIQUID FILLED ORAL at 17:26

## 2019-06-24 RX ADMIN — CLINDAMYCIN HYDROCHLORIDE 300 MG: 150 CAPSULE ORAL at 05:56

## 2019-06-24 RX ADMIN — TIOTROPIUM BROMIDE 18 MCG: 18 CAPSULE ORAL; RESPIRATORY (INHALATION) at 08:46

## 2019-06-24 RX ADMIN — POLYVINYL ALCOHOL 1 DROP: 14 SOLUTION/ DROPS OPHTHALMIC at 17:28

## 2019-06-24 RX ADMIN — TAMSULOSIN HYDROCHLORIDE 0.4 MG: 0.4 CAPSULE ORAL at 17:26

## 2019-06-24 RX ADMIN — CLINDAMYCIN HYDROCHLORIDE 300 MG: 150 CAPSULE ORAL at 21:34

## 2019-06-24 RX ADMIN — METFORMIN HYDROCHLORIDE 500 MG: 500 TABLET ORAL at 08:44

## 2019-06-24 RX ADMIN — VITAMIN D, TAB 1000IU (100/BT) 2000 UNITS: 25 TAB at 08:39

## 2019-06-24 RX ADMIN — FLUTICASONE FUROATE AND VILANTEROL TRIFENATATE 1 PUFF: 100; 25 POWDER RESPIRATORY (INHALATION) at 08:45

## 2019-06-24 RX ADMIN — POLYVINYL ALCOHOL 1 DROP: 14 SOLUTION/ DROPS OPHTHALMIC at 21:38

## 2019-06-24 RX ADMIN — HEPARIN SODIUM 5000 UNITS: 5000 INJECTION INTRAVENOUS; SUBCUTANEOUS at 05:56

## 2019-06-24 RX ADMIN — NICOTINE 21 MG: 21 PATCH, EXTENDED RELEASE TRANSDERMAL at 08:48

## 2019-06-24 RX ADMIN — PRAVASTATIN SODIUM 10 MG: 10 TABLET ORAL at 16:44

## 2019-06-24 RX ADMIN — POLYVINYL ALCOHOL 1 DROP: 14 SOLUTION/ DROPS OPHTHALMIC at 08:45

## 2019-06-24 RX ADMIN — DULOXETINE HYDROCHLORIDE 60 MG: 60 CAPSULE, DELAYED RELEASE ORAL at 08:39

## 2019-06-24 RX ADMIN — INSULIN LISPRO 1 UNITS: 100 INJECTION, SOLUTION INTRAVENOUS; SUBCUTANEOUS at 11:38

## 2019-06-24 RX ADMIN — HEPARIN SODIUM 5000 UNITS: 5000 INJECTION INTRAVENOUS; SUBCUTANEOUS at 14:59

## 2019-06-24 RX ADMIN — GABAPENTIN 400 MG: 400 CAPSULE ORAL at 21:35

## 2019-06-24 RX ADMIN — PANTOPRAZOLE SODIUM 40 MG: 40 TABLET, DELAYED RELEASE ORAL at 05:55

## 2019-06-24 RX ADMIN — THIAMINE HCL TAB 100 MG 50 MG: 100 TAB at 08:40

## 2019-06-24 RX ADMIN — CLINDAMYCIN HYDROCHLORIDE 300 MG: 150 CAPSULE ORAL at 14:57

## 2019-06-24 RX ADMIN — GABAPENTIN 300 MG: 300 CAPSULE ORAL at 14:59

## 2019-06-24 RX ADMIN — HEPARIN SODIUM 5000 UNITS: 5000 INJECTION INTRAVENOUS; SUBCUTANEOUS at 21:35

## 2019-06-24 RX ADMIN — ACETAMINOPHEN 650 MG: 325 TABLET ORAL at 05:55

## 2019-06-24 RX ADMIN — MELATONIN 3 MG: at 21:35

## 2019-06-24 RX ADMIN — POLYVINYL ALCOHOL 1 DROP: 14 SOLUTION/ DROPS OPHTHALMIC at 12:00

## 2019-06-24 RX ADMIN — OXYBUTYNIN CHLORIDE 5 MG: 5 TABLET, EXTENDED RELEASE ORAL at 12:37

## 2019-06-24 RX ADMIN — LOSARTAN POTASSIUM 100 MG: 50 TABLET, FILM COATED ORAL at 08:42

## 2019-06-24 NOTE — PROGRESS NOTES
06/24/19 0700   Pain Assessment   Pain Assessment No/denies pain   Pain Score No Pain   Restrictions/Precautions   Precautions Fall Risk;Spinal precautions;Supervision on toilet/commode;Bed/chair alarms   ROM Restrictions   (cervical spinal precautions)   Braces or Orthoses C/S Collar   QI: Eating   Assistance Needed Set-up / clean-up   Assistance Provided by [place] No physical assistance   Eating CARE Score 5   Eating Assessment   Findings req A for managing packaging and cutting food even with use of built up handles for fork/knife  Eating (FIM) 5 - Patient needs help to open contianers or set up tray   QI: Oral Hygiene   Assistance Needed Incidental touching;Physical assistance   Assistance Provided by [place]iller Less than 25%   Comment ext time to manage cap of tooth paste   Oral Hygiene CARE Score 3   Grooming   Able To Initiate Tasks; Acquire Items;Comb/Brush Hair;Brush/Clean Teeth;Wash/Dry Face;Wash/Dry Hands   Limitation Noted In Coordination;Strength;Timeliness   Findings completed in stance at sink with incidental A for steadying while reaching for grooming items   Grooming (FIM) 4 - Patient requires steadying assist or light touching   Bathing   Assessed Bath Style Shower   Anticipated D/C Bath Style Tub   Able to Gather/Transport Yes   Able to Adjust Water Temperature No   Able to Wash/Rinse/Dry (body part) Left Arm;Right Arm;L Upper Leg;R Upper Leg;L Lower Leg/Foot;R Lower Leg/Foot;Chest;Abdomen;Perineal Area; Buttocks   Limitations Noted in Balance; Endurance; Safety;ROM;Strength   Positioning Standing;Seated   Adaptive Equipment Tub Bench; Shower Zeo  Completed bathing in shower with [employer] donned and incision covered with gauze and tegaderm to keep protected  ANNETTA collar ordered to be utilized during future sessions  Pt completed majority of bathing while seated on bench 2* dec balance, dec endurance, and dec safety with reaching to bathe LEs   Pt utilizes grab bars for UE support in stance during buttocks bathing  Overall, pt req cuing to maintain cervical spinal precautions and CGA in stance  Pt dropped wash clothe occasionally but able to manage items for bathing task  Bathing (FIM) 4 - Patient requires steadying assist or light touching   Tub/Shower Transfer   Limitations Noted In Balance; Coordination; Safety;ROM;LE Strength   Adaptive Equipment Grab Bars;Seat with Back   Assessed Shower   Findings Pt utilized grab bars to side step into shower for bathing this session  Pt req modA at this time to maintain balance  Shower Transfer (FIM) 3 - [place] needs to lift, boost or assist to stand OR sit   QI: Upper Body Dressing   Assistance Needed Physical assistance   Assistance Provided by [place] 25%-49%   Comment A to adjust down in back   Upper Body Dressing CARE Score 3   QI: Lower Body Dressing   Assistance Needed Physical assistance   Assistance Provided by [place] Less than 25%   Comment cuing to utilized leg cross tech for donning pants over BLEs and CGA in stance for CM over hips  Lower Body Dressing CARE Score 3   QI: Putting On/Taking Off Footwear   Assistance Needed Physical assistance   Assistance Provided by [place] 50%-74%   Comment Pt able to don socks over toes using leg cross tech but unable to maintain grasp to pull socks over heels  Pt req A to stabilize shoe while he pushed foot into and A to adjust at heel  Reports use of shoe horn at baseline  Putting On/Taking Off Footwear CARE Score 2   QI: Picking Up Object   Reason if not Attempted Safety concerns   Picking Up Object CARE Score 88   Dressing/Undressing Clothing   Able to  Obtain Clothing;Store removed clothing   Limitations Noted In Balance; Endurance; Sequencing;Strength;ROM   Positioning Sit Edge Of Bed;Standing   UB Dressing (FIM) 4 - Patient completes 75% of all tasks   LB Dressing (FIM) 2 - Patient completes 25-49% of all tasks   QI: Sit to Stand   Assistance Needed Incidental touching Assistance Provided by [employer] Less than 25%   Sit to Stand CARE Score 3   QI: Chair/Bed-to-Chair Transfer   Assistance Needed Physical assistance   Assistance Provided by [employer] 25%-49%   Chair/Bed-to-Chair Transfer CARE Score 3   Transfer Bed/Chair/Wheelchair   Limitations Noted In Balance; Endurance   Adaptive Equipment None   Findings Completed all mobility and trasnfers with no AD this session  Pt cont to req for grab bars and furniture while woalking req cuing to limit   Bed, Chair, Wheelchair Transfer (FIM) 4 - Patient completes 75% of all tasks   QI: [address] Needed Physical assistance   Assistance Provided by [employer] Less than 25%   Comment CGA for CM over hips   [address] Score 3   Toileting   Able to [address] down yes, up yes  Able to Manage Clothing Closures Yes   Manage Hygiene Bladder   Limitations Noted In Balance; Safety   Toileting (FIM) 4 - Patient requires steadying assist or light touching   QI: Toilet Transfer   Comment urinated in stance req UE support on back wall to maintain balance  Educated on urinating while seated to inc safety  Coordination   Fine Motor Engaged in nuts/bolt activity with focus on [address] [place] coordination and strength with pincer and three jaw dell grasp for inc indep with ADL and IADL tasks  Pt able to manage larger nuts with no noted droppage  As size dec, pt with inc difficulty and reports inc fatigue with task in hand  Cognition   Overall Cognitive Status Impaired   Arousal/Participation Alert; Cooperative   Attention Attends with cues to redirect   Orientation Level Oriented X4   Memory Decreased short term memory;Decreased recall of precautions   Following Commands Follows all commands and directions without difficulty   Activity Tolerance   Activity Tolerance Patient tolerated treatment well   Assessment   Treatment Assessment Pt participated in skilled OT session focusing on ADL retraining, functional transfers, and grasp coordination/strength  Pt cont to demo inconsistency with maintaining cervical spinal precautions as pt attempts to lift chin and turn head to locate items  While removing collar to change pads, RN present to help pt maintain neutral position  ANNETTA shower collar ordered for future ADL sessions  During ext stance, pt req cuing to take seated rest break and to bring awareness to s/s of fatigue including knee flexion and hunched posture  Pt would benefit from cont therapy focusing on ADL retraining, C/S collar management, maintenance of cervical spinal precautions, standing tolerance, dynamic balance, and IADL management  Cont with POC  Prognosis Good   Problem List Decreased strength;Decreased range of motion;Decreased endurance; Impaired balance;Decreased safety awareness   Barriers to Discharge Inaccessible home environment;Decreased caregiver support   Plan   Treatment/Interventions ADL retraining;Functional transfer training; Therapeutic exercise; Endurance training;Equipment eval/education   Progress Progressing toward goals   Recommendation   OT Discharge Recommendation   (pending progress - pt must be mod(I) for home)   OT Therapy Minutes   OT Time In 0700   OT Time Out 0830   OT Total Time (minutes) 90   OT Mode of treatment - Individual (minutes) 90   OT Mode of treatment - Concurrent (minutes) 0   OT Mode of treatment - Group (minutes) 0   OT Mode of treatment - Co-treat (minutes) 0   OT Mode of Teatment - Total time(minutes) 90 minutes   Therapy Time missed   Time missed?  No

## 2019-06-24 NOTE — CONSULTS
UROLOGY CONSULTATION NOTE     Patient Identifiers: Yosef Chandler (MRN [id number])  Service Requesting Consultation: PMR  Service Providing Consultation:  Urology, SONJA Cantor    Date of Service: 6/24/2019  Inpatient consult to Urology  Consult performed by: SONJA Cantor  Consult ordered by: Flory Wellington MD      Reason for Consultation:  Urinary incontinence    ASSESSMENT:     Benign prostatic hyperplasia  · Tamsulosin 0 4 mg p o  Daily  · Monitor urinary output  · PVR q shift x 24 hours    Urinary urgency  · Secondary to above  · Oxybutynin Er 5 mg daily  · Patient will follow up in outpatient setting for further evaluation investigation for urinary symptoms including possible initiation of PTNS therapy  · Maintain adequate hydration  · Ensure complete bladder emptying with urination    Diabetes type 2   · Managed by medicine  · Insulin as managed by Medicine     Prostate cancer  · Most recent PSA performed 5/16/2019 was less than 0 1    History of Present Illness:     Yosef Chandler is a 70 y o  old with a history of diabetes type 2, hypertension, hyperlipidemia, lung cancer status post upper lobe lobectomy, COPD, benign prostatic hyperplasia and is status post C3-C4 anterior cervical decompression and fusion of C4 devonte corpectomy on 06/18/2019 secondary to cervical spondylitic myelopathy  Patient also has a history of prostate cancer with insertion of seeds and radiation therapy  Is currently admitted to Physical Medicine rehab for skilled physical and occupational therapies  Prior to hospitalization, patient reports chronic urinary incontinence and has been seen by Dr Lantigua (Urology) in the past   Most recent PSA was performed 05/16/2019 which was less than 0 1  At his previous office appointment discussion took place regarding PTNS therapy for urinary incontinence  Patient opted to have procedure for cervical spine completed before pursuing treatment for his urinary incontinence  Patient also reports erectile dysfunction  Patient currently denies dysuria, hematuria and urinary frequency  He does report significant urinary urgency and reports sensation of complete bladder emptying with urinating in the toilet  He reports using the urinal does not allow him to empty his bladder completely  Patient reports taking tamsulosin 0 4 mg p o  Daily for the last many years which has helped with his urinary symptoms  Past Medical, Past Surgical History:     Past Medical History:   Diagnosis Date    BPH (benign prostatic hyperplasia)     Cancer ([facility] [address] ) 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    Chemical exposure     911    [facility]ch[facility]     CPAP (continuous positive airway pressure) dependence     DDD (degenerative disc disease), cervical     Diabetes mellitus ([facility])     Foraminal stenosis of cervical region     Hyperlipidemia     Hypertension     SHIVANI (obstructive sleep apnea)     Overactive bladder    :    Past Surgical History:   Procedure Laterality Date    ARTHROSCOPY KNEE Bilateral     COLONOSCOPY      LUNG SURGERY Left     scraping of left    MA ARTHRODESIS ANT INTERBODY INC DISCECTOMY, CERVICAL BELOW C2 Bilateral 6/18/2019    Procedure: C3/4  ACDF WITH  C4 HEMICORPECTOMY, C3-4 ANTERIOR INSTRUMENTATION AND FUSION (NEUROMONITORING);   Surgeon: Jazlyn Herring MD;  Location: [facility];  Service: Neurosurgery    ROTATOR CUFF REPAIR Bilateral    :    Medications, Allergies:     Current Facility-Administered Medications   Medication Dose Route Frequency    acetaminophen (TYLENOL) tablet 650 mg  650 mg Oral TID    albuterol (PROVENTIL HFA,VENTOLIN HFA) inhaler 2 puff  2 puff Inhalation Q4H PRN    bisacodyl (DULCOLAX) rectal suppository 10 mg  10 mg Rectal Daily PRN    cholecalciferol (VITAMIN D3) tablet 2,000 Units  2,000 Units Oral Daily    clindamycin (CLEOCIN) capsule 300 mg  300 mg Oral Q8H [address]    diltiazem (CARDIZEM CD) 24 hr capsule 120 mg  120 mg Oral Daily    docusate sodium (COLACE) capsule 100 mg  100 mg Oral BID    DULoxetine (CYMBALTA) delayed release capsule 60 mg  60 mg Oral Daily    fluticasone-vilanterol (BREO ELLIPTA) 100-25 mcg/inh inhaler 1 puff  1 puff Inhalation Daily    gabapentin (NEURONTIN) capsule 200 mg  200 mg Oral BID    gabapentin (NEURONTIN) capsule 300 mg  300 mg Oral HS    heparin (porcine) subcutaneous injection 5,000 Units  5,000 Units Subcutaneous Q8H [address]    hydrALAZINE (APRESOLINE) tablet 25 mg  25 mg Oral Q8H PRN    insulin lispro (HumaLOG) 100 units/mL subcutaneous injection 1-5 Units  1-5 Units Subcutaneous TID AC    insulin lispro (HumaLOG) 100 units/mL subcutaneous injection 1-5 Units  1-5 Units Subcutaneous HS    lidocaine (URO-JET) 2 % urethral/mucosal gel   Topical Q4H PRN    losartan (COZAAR) tablet 100 mg  100 mg Oral Daily    melatonin tablet 3 mg  3 mg Oral HS    metFORMIN (GLUCOPHAGE) tablet 500 mg  500 mg Oral Daily With Breakfast    nicotine (NICODERM CQ) 21 mg/24 hr TD 24 hr patch 21 mg  21 mg Transdermal Daily    ondansetron (ZOFRAN) injection 4 mg  4 mg Intravenous Q6H PRN    oxybutynin (DITROPAN-XL) 24 hr tablet 5 mg  5 mg Oral Daily    oxyCODONE (ROXICODONE) immediate release tablet 10 mg  10 mg Oral Q4H PRN    oxyCODONE (ROXICODONE) IR tablet 5 mg  5 mg Oral Q4H PRN    pantoprazole (PROTONIX) EC tablet 40 mg  40 mg Oral Daily Before Breakfast    polyethylene glycol (MIRALAX) packet 17 g  17 g Oral Daily PRN    polyethylene glycol (MIRALAX) packet 17 g  17 g Oral Daily    polyvinyl alcohol (LIQUIFILM TEARS) 1 4 % ophthalmic solution 1 drop  1 drop Both Eyes 4x Daily    polyvinyl alcohol (LIQUIFILM TEARS) 1 4 % ophthalmic solution 1 drop  1 drop Both Eyes Q1H PRN    pravastatin (PRAVACHOL) tablet 10 mg  10 mg Oral Daily With Dinner    senna (SENOKOT) tablet 8 6 mg  1 tablet Oral Daily    tamsulosin (FLOMAX) capsule 0 4 mg  0 4 mg Oral After Dinner    thiamine (VITAMIN B1) tablet 50 mg  50 mg Oral Daily    tiotropium (SPIRIVA) capsule for inhaler 18 mcg  18 mcg Inhalation Daily       Allergies:  No Known Allergies:    Social and Family History:   Social History:   Social History     Tobacco Use    Smoking status: Current Every Day Smoker     Years: 61 00     Types: Pipe    Smokeless tobacco: Never Used    Tobacco comment: very light smoker   Substance Use Topics    Alcohol use: Not Currently    Drug use: No        Social History     Tobacco Use   Smoking Status Current Every Day Smoker    Years: 61 00    Types: Pipe   Smokeless Tobacco Never Used   Tobacco Comment    very light smoker       Family History:  Family History   Problem Relation Age of Onset    No Known Problems Mother     No Known Problems Father    :     Review of Systems:     General: Fever, chills, or night sweats: negative  Cardiac: Negative for chest pain  Pulmonary: Negative for shortness of breath  Gastrointestinal: Abdominal pain negative  Nausea, vomiting, or diarrhea negative,  Genitourinary: See HPI above  Patient does not have hematuria  All other systems queried were negative  Physical Exam:   General: Patient is pleasant and in NAD  Awake and alert  /80 (BP Location: Right arm)   Pulse 90   Temp 98 4 °F (36 9 °C) (Oral)   Resp 18   Ht 5' 9" (1 753 m)   Wt 63 3 kg (139 lb 8 8 oz)   SpO2 100%   BMI 20 61 kg/m² Temp (24hrs), Av 5 °F (36 9 °C), Min:98 3 °F (36 8 °C), Max:98 8 °F (37 1 °C)  current; Temperature: 98 4 °F (36 9 °C)  I/O last 24 hours: In: 580 [P O :580]  Out: 2975 [Urine:2975]  Skin: warm, dry, intact  Cardiac: S1S2, HRR, Peripheral edema: negative  Pulmonary: Non-labored breathing  Abdomen: Soft, non-tender, non-distended  No surgical scars  No masses, tenderness, hernias noted  Musculoskeletal: AROM with no joint deformity or tenderness    Neurology: alert, oriented x3, affect appropriate, no focal neurological deficits and moves all extremities well  Genitourinary: Negative CVA tenderness, negative suprapubic tenderness  AYALA: None    Labs:     Lab Results   Component Value Date    HGB 13 8 06/21/2019    HCT 42 5 06/21/2019    WBC 4 59 06/21/2019     (L) 06/21/2019       Lab Results   Component Value Date    K 4 2 06/21/2019     06/21/2019    CO2 31 06/21/2019    BUN 12 06/21/2019    CREATININE 0 77 06/21/2019    CALCIUM 10 0 06/21/2019    GLUCOSE 141 (H) 11/02/2017       Thank you for allowing me to participate in this patients care  Please do not hesitate to call with any additional questions      SONJA Magaña

## 2019-06-24 NOTE — PROGRESS NOTES
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; s/p C3/4 ACDF with C4 hemicorpectomy, C3-4 anterior instrumentation/fusion 6/18/19 Ervin Alfred):  continue cervical collar; on Clindamycin for infection prophylaxis  Pain control per primary service  Had previously been on Cymbalta for neuropathic sx      DM2: at home he takes Kombiglyze XR 5-1000mg qd and no insulin  Continue Metformin 500mg daily  Continue DM diet and Accuechecks/QID with SSI      HTN:  at home, takes Cardizem 180mg qd, Losartan 100mg qd (was not on Norvasc he says); here on Card 120mg qd, Losartan 100mg qd  Blood pressure improved      COPD; hx lung cancer/VIVIANA lobectomy, pulmonary nodules: follows with pulm as OP and takes Advair 250-50 one puff BID/Spiriva one puff daily and prn Combivent  Will place on Breo as substitute for Advair, continue Spiriva and use Albuterol inhaler prn     SHIVANI: supposed to use CPAP but has been noncompliant     HLD: at home on Pravachol     Urinary incontinence/BPH: continue Flomax     Thrombocytopenia: mild; will watch     Hypokalemia: BMP pending  Subjective/ HPI: Patient seen and examined  Patients overnight issues or events were reviewed with nursing or staff during rounds or morning huddle session  New or overnight issues include the following: Hypokalemia: Improved after replacement  Will continue to monitor - BMP pending for today  DM type 2: Insulin stopped and metformin started  Blood sugars well controlled  HTN:  Stable  Continue to monitor trend and adjust medications as needed  Patient denies any current complaints      ROS:     GI: denies abdominal pain, change bowel habits or reflux symptoms  Neuro: Denies any headache, new vision changes, new neuropathies,new weaknesses   Respiratory: No Cough, SOB, denies wheeze  Cardiovascular: No CP, palpitations , denies perception of rapid heartbeat  : denies any new urinary burning or frequency    Review of Scheduled Meds:    Current Facility-Administered Medications:  acetaminophen 650 mg Oral TID Spencer Baer MD   albuterol 2 puff Inhalation Q4H PRN Spencer Baer MD   bisacodyl 10 mg Rectal Daily PRN Spencer Baer MD   cholecalciferol 2,000 Units Oral Daily Spencer Baer MD   clindamycin 300 mg Oral [facility] Spencer Baer MD   diltiazem 120 mg Oral Daily Spencer Baer MD   docusate sodium 100 mg Oral BID Spencer Baer MD   DULoxetine 60 mg Oral Daily Spencer Baer MD   fluticasone-vilanterol 1 puff Inhalation Daily Spencer Baer MD   gabapentin 200 mg Oral BID Spencer Baer MD   gabapentin 300 mg Oral HS Spencer Baer MD   heparin (porcine) 5,000 Units Subcutaneous [facility] Spencer Baer MD   hydrALAZINE 25 mg Oral Q8H PRN ISAAC LouNP   insulin lispro 1-5 Units Subcutaneous TID [facility] Spencer Baer MD   insulin lispro 1-5 Units Subcutaneous HS Spencer Baer MD   lidocaine  Topical Q4H PRN Spencer Baer MD   losartan 100 mg Oral Daily Spencer Baer MD   melatonin 3 mg Oral HS Mery Grewal PA-C   metFORMIN 500 mg Oral Daily With Breakfast SONJA Lou   nicotine 21 mg Transdermal Daily Mery Grewal PA-C   ondansetron 4 mg Intravenous Q6H PRN Spencer Baer MD   oxyCODONE 10 mg Oral Q4H PRN Spencer Baer MD   oxyCODONE 5 mg Oral Q4H PRN Spencer Baer MD   pantoprazole 40 mg Oral Daily Before Breakfast Spencer Baer MD   polyethylene glycol 17 g Oral Daily PRN Spencer Baer MD   polyethylene glycol 17 g Oral Daily Spencer Baer MD   polyvinyl alcohol 1 drop Both Eyes 4x Daily Spencer Baer MD   polyvinyl alcohol 1 drop Both Eyes Q1H PRN Spencer Baer MD   pravastatin 10 mg Oral Daily With Loy Cox MD   senna 1 tablet Oral Daily Madelynjaziel Eisenberg Jenaro Madison MD   tamsulosin 0 4 mg Oral After Salomón Gay MD   thiamine 50 mg Oral Daily Manual MD Joey   tiotropium 18 mcg Inhalation Daily Manual MD Joey       Labs:     Results from last 7 days   Lab Units 06/21/19  0542 06/18/19  1426   WBC Thousand/uL 4 59 7 64   HEMOGLOBIN g/dL 13 8 13 3   HEMATOCRIT % 42 5 41 2   PLATELETS Thousands/uL 146* 141*     Results from last 7 days   Lab Units 06/21/19  0542 06/20/19  0511   SODIUM mmol/L 145 144   POTASSIUM mmol/L 4 2 3 3*   CHLORIDE mmol/L 108 108   CO2 mmol/L 31 30   BUN mg/dL 12 14   CREATININE mg/dL 0 77 0 76   CALCIUM mg/dL 10 0 9 7                  Results from last 7 days   Lab Units 06/24/19  0632 06/23/19 2053 06/23/19  1551   POC GLUCOSE mg/dl 105 122 109       Imaging:     No orders to display       *Labs reviewed  *Radiology studies reviewed  *Medications reviewed and reconciled as needed  *Please refer to order section for additional ordered labs studies  *Case discussed with primary attending during morning huddle case rounds    Physical Examination:  Vitals:   Vitals:    06/24/19 0844 06/24/19 0921 06/24/19 0922 06/24/19 0923   BP: 134/90 140/84 138/82 122/80   BP Location:  Right arm Right arm Right arm   Pulse:  92 95 90   Resp:       Temp:       TempSrc:       SpO2:       Weight:       Height:           General Appearance: NAD, conversive  Eyes: No icterus; conjunctiva normal, PERRLA  HENT: oropharynx clear; mucous membranes moist; no ulcerations, normal hard and soft palette  Neck: trachea midline, range of motion full   Supple, no lymphadenopathy or thyromegaly  Lungs: CTA, normal respiratory effort, no retractions, expiratory effort normal  CV: regular rate, no rubs no gallops, PMI normal place and intensity  ABD: soft non tender, no masses , no hepatic or splenomegaly  EXT: DP pulses intact, no lymphadenopathy, no edema  Skin: normal turgor, normal texture, no rash, no ulcers  Psych: affect normal, No anxiety,   Neuro: AAOx3 Total time spent: At least 40 minutes, with more than 50% spent counseling/coordinating care  Counseling includes discussion with patient re: progress  and discussion with patient of his/her current medical state/information  Coordination of patient's care was performed in conjunction with primary service  Time invested included review of patient's labs, vitals, and management of their comorbidities with continued monitoring  In addition, this patient was discussed with medical team including physician and advanced extenders  The care of the patient was extensively discussed and appropriate treatment plan was formulated unique for this patient  ** Please Note: Dragon 360 Dictation voice to text software may have been used in the creation of this document   **

## 2019-06-24 NOTE — PLAN OF CARE
Problem: SAFETY ADULT  Goal: Maintain or return to baseline ADL function  Description  INTERVENTIONS:  -  Assess patient's ability to carry out ADLs; assess patient's baseline for ADL function and identify physical deficits which impact ability to perform ADLs (bathing, care of mouth/teeth, toileting, grooming, dressing, etc )  - Assess/evaluate cause of self-care deficits   - Assess range of motion  - Assess patient's mobility; develop plan if impaired  - Assess patient's need for assistive devices and provide as appropriate  - Encourage maximum independence but intervene and supervise when necessary  ¯ Involve family in performance of ADLs  ¯ Assess for home care needs following discharge   ¯ Request OT consult to assist with ADL evaluation and planning for discharge  ¯ Provide patient education as appropriate  Outcome: Progressing     Problem: Nutrition/Hydration-ADULT  Goal: Nutrient/Hydration intake appropriate for improving, restoring or maintaining nutritional needs  Description  Monitor and assess patient's nutrition/hydration status for malnutrition (ex- brittle hair, bruises, dry skin, pale skin and conjunctiva, muscle wasting, smooth red tongue, and disorientation)  Collaborate with interdisciplinary team and initiate plan and interventions as ordered  Monitor patient's weight and dietary intake as ordered or per policy  Utilize nutrition screening tool and intervene per policy  Determine patient's food preferences and provide high-protein, high-caloric foods as appropriate       INTERVENTIONS:  - Monitor oral intake, urinary output, labs, and treatment plans  - Assess nutrition and hydration status and recommend course of action  - Evaluate amount of meals eaten  - Assist patient with eating if necessary   - Allow adequate time for meals  - Recommend/ encourage appropriate diets, oral nutritional supplements, and vitamin/mineral supplements  - Order, calculate, and assess calorie counts as needed  - Recommend, monitor, and adjust tube feedings and TPN/PPN based on assessed needs  - Assess need for intravenous fluids  - Provide specific nutrition/hydration education as appropriate  - Include patient/family/caregiver in decisions related to nutrition  6/24/2019 1447 by Gilberto Banks, RN  Outcome: Progressing  6/24/2019 1447 by Gilberto Banks, RN  Outcome: Progressing

## 2019-06-24 NOTE — SOCIAL WORK
Pt requested that CM find info re: his podiatrist, as he believes he has a follow up appt at the end of this week  CM learned that Pt had a referral for a Mariann Montelongo DPM; #[phone]  PCT Dr Jaylen Newby, [phone], to inform them that Pt was unable to keep his appt  CM will provide Pt with the info to call and reschedule at d/c

## 2019-06-24 NOTE — PROGRESS NOTES
06/24/19 0930   Pain Assessment   Pain Assessment No/denies pain   Pain Score No Pain   Restrictions/Precautions   Precautions Bed/chair alarms; Fall Risk;Spinal precautions;Supervision on toilet/commode   Braces or Orthoses C/S Collar   Cognition   Overall Cognitive Status WFL   Arousal/Participation Alert; Cooperative   Subjective   Subjective Pt reports not sleeping well due to frequent urination with byrnes cath t/o the night  Pt is agreeable to session  QI: Sit to Stand   Assistance Needed Physical assistance   Assistance Provided by [place] Less than 25%   Sit to Stand CARE Score 3   QI: Chair/Bed-to-Chair Transfer   Assistance Needed Physical assistance   Assistance Provided by [place] Less than 25%   Chair/Bed-to-Chair Transfer CARE Score 3   Transfer Bed/Chair/Wheelchair   Limitations Noted In Balance; Coordination; Endurance   Adaptive Equipment None   Stand Pivot Contact Guard   Sit to [facility]   Stand to [facility]   Findings Pt required verbal cues for hand placement and agreed to push from/ reach for chair during transfers  QI: Walk 10 Feet   Assistance Needed Physical assistance   Assistance Provided by [place] 25%-49%   Walk 10 Feet CARE Score 3   QI: Walk 50 Feet with Two Turns   Assistance Needed Physical assistance   Assistance Provided by [place] 25%-49%   Walk 50 Feet with Two Turns CARE Score 3   QI: Walk 150 Feet   Assistance Needed Physical assistance   Assistance Provided by [place] 25%-49%   Walk 150 Feet CARE Score 3   Ambulation   Does the patient walk? 2  Yes   Primary Discharge Mode of Locomotion Walk   Walk Assist Level Minimum Assist   Gait Pattern Inconsistant Analisa;Decreased foot clearance; Slow Analisa; Step through; Improper weight shift   Assist Device Other  (Hands on Hips )   Distance Walked (feet) 150 ft  (x2, 350x2, 500, 700)   Limitations Noted In Balance; Endurance; Heel Strike;Speed;Strength   Findings Session focused on walking with Hands on Hips assist for all mobility and dynamic balance activities  Walking (FIM) 3 - Patient completes 50 - 74% of all tasks, needs more than steadying or light touch AND distance 150 feet or more, no rest   Wheelchair mobility   QI: Does the patient use a wheelchair? 0  No   QI: 4 Steps   Assistance Needed Physical assistance; Adaptive equipment   Assistance Provided by [place] 25%-49%   Comment LHR descending, RHR ascending, reciprocal pattern    4 Steps CARE Score 3   QI: 12 Steps   Assistance Needed Adaptive equipment;Physical assistance   Assistance Provided by [place] 25%-49%   Comment LHR descending, RHR ascending, reciprocal pattern    12 Steps CARE Score 3   Stairs   Type Stairs   # of Steps 12  (FF)   Weight Bearing Precautions Fall Risk   Assist Devices Single Rail   Findings Pt Mack/ CGA for stair negotiation with LHR descending, RHR ascending, reciprocal pattern    Stairs (FIM) 3 - Patient completes 50 - 74% of all tasks, needs more than steadying or light touch AND goes up and down full flight (12- 14 stairs)   QI: Toilet Transfer   Assistance Needed Physical assistance; Adaptive equipment   Assistance Provided by [place] 25%-49%   Comment Mack for steadying during toileting tasks    Toilet Transfer CARE Score 3   Toilet Transfer   Surface Assessed Standard Toilet   Limitations Noted In Balance; Endurance;LE Strength; Sequencing   Adaptive Equipment Grab Bar  (RW )   Findings Pt stood to urinate at beginning and end of session  Toilet Transfer (FIM) 3 - Patient completes 50 - 74% of all tasks   Therapeutic Interventions   Flexibility Seated BLE hamstring and gastroc stretch x 4 mins    Balance Ambulation with No AD hands on hips assist, fwd/bwd walk, side stepping, marching in hallway, ambulation over bolsters of varying height, walking around bolsters, ambulation with 2# wt on RLE and the LLE x 150ft each      Assessment   Treatment Assessment Pt participated in skilled PT session with focus on dynamic balance and ambulation to dec risk for falls and stair negotiation  Pt cont to ambulate with unsteady gait at times and requires hands on hips assists for steadying  Pt with inc ambulation distances and no sig LOB noted during ambulation or dynamic balance activities  Pt required hands on assist during toileting due to impaired balance  Pt will cont to benefit from skilled PT with focus on inc balance, dynamic gait activities to inc overall safety and maximize function  Family/Caregiver Present no    Barriers to Discharge Inaccessible home environment;Decreased caregiver support   PT Barriers   Physical Impairment Decreased strength;Decreased endurance; Impaired balance;Decreased mobility; Decreased coordination;Orthopedic restrictions   Functional Limitation Walking;Transfers;Standing;Stair negotiation;Car transfers   Plan   Treatment/Interventions Functional transfer training;LE strengthening/ROM; Elevations; Therapeutic exercise; Endurance training;Patient/family training;Equipment eval/education;Gait training   Progress Progressing toward goals   Recommendation   Recommendation Other (Comment)  (TBD pending pt progress )   Equipment Recommended Walker   PT Therapy Minutes   PT Time In 0930   PT Time Out 1100   PT Total Time (minutes) 90   PT Mode of treatment - Individual (minutes) 90   PT Mode of treatment - Concurrent (minutes) 0   PT Mode of treatment - Group (minutes) 0   PT Mode of treatment - Co-treat (minutes) 0   PT Mode of Teatment - Total time(minutes) 90 minutes   Therapy Time missed   Time missed?  No

## 2019-06-25 LAB
ANION GAP SERPL CALCULATED.3IONS-SCNC: 2 MMOL/L (ref 4–13)
BASOPHILS # BLD AUTO: 0.01 THOUSANDS/ΜL (ref 0–0.1)
BASOPHILS NFR BLD AUTO: 0 % (ref 0–1)
BUN SERPL-MCNC: 18 MG/DL (ref 5–25)
CALCIUM SERPL-MCNC: 10.6 MG/DL (ref 8.3–10.1)
CHLORIDE SERPL-SCNC: 106 MMOL/L (ref 100–108)
CO2 SERPL-SCNC: 33 MMOL/L (ref 21–32)
CREAT SERPL-MCNC: 0.93 MG/DL (ref 0.6–1.3)
EOSINOPHIL # BLD AUTO: 0.16 THOUSAND/ΜL (ref 0–0.61)
EOSINOPHIL NFR BLD AUTO: 4 % (ref 0–6)
ERYTHROCYTE [DISTWIDTH] IN BLOOD BY AUTOMATED COUNT: 15.1 % (ref 11.6–15.1)
GFR SERPL CREATININE-BSD FRML MDRD: 92 ML/MIN/1.73SQ M
GLUCOSE P FAST SERPL-MCNC: 94 MG/DL (ref 65–99)
GLUCOSE SERPL-MCNC: 103 MG/DL (ref 65–140)
GLUCOSE SERPL-MCNC: 103 MG/DL (ref 65–140)
GLUCOSE SERPL-MCNC: 128 MG/DL (ref 65–140)
GLUCOSE SERPL-MCNC: 180 MG/DL (ref 65–140)
GLUCOSE SERPL-MCNC: 94 MG/DL (ref 65–140)
HCT VFR BLD AUTO: 42.4 % (ref 36.5–49.3)
HGB BLD-MCNC: 13.7 G/DL (ref 12–17)
IMM GRANULOCYTES # BLD AUTO: 0.02 THOUSAND/UL (ref 0–0.2)
IMM GRANULOCYTES NFR BLD AUTO: 1 % (ref 0–2)
LYMPHOCYTES # BLD AUTO: 1.75 THOUSANDS/ΜL (ref 0.6–4.47)
LYMPHOCYTES NFR BLD AUTO: 42 % (ref 14–44)
MCH RBC QN AUTO: 27.5 PG (ref 26.8–34.3)
MCHC RBC AUTO-ENTMCNC: 32.3 G/DL (ref 31.4–37.4)
MCV RBC AUTO: 85 FL (ref 82–98)
MONOCYTES # BLD AUTO: 0.45 THOUSAND/ΜL (ref 0.17–1.22)
MONOCYTES NFR BLD AUTO: 11 % (ref 4–12)
NEUTROPHILS # BLD AUTO: 1.71 THOUSANDS/ΜL (ref 1.85–7.62)
NEUTS SEG NFR BLD AUTO: 42 % (ref 43–75)
NRBC BLD AUTO-RTO: 0 /100 WBCS
PLATELET # BLD AUTO: 157 THOUSANDS/UL (ref 149–390)
PMV BLD AUTO: 10.7 FL (ref 8.9–12.7)
POTASSIUM SERPL-SCNC: 4.9 MMOL/L (ref 3.5–5.3)
RBC # BLD AUTO: 4.98 MILLION/UL (ref 3.88–5.62)
SODIUM SERPL-SCNC: 141 MMOL/L (ref 136–145)
WBC # BLD AUTO: 4.1 THOUSAND/UL (ref 4.31–10.16)

## 2019-06-25 PROCEDURE — 97110 THERAPEUTIC EXERCISES: CPT

## 2019-06-25 PROCEDURE — G0515 COGNITIVE SKILLS DEVELOPMENT: HCPCS | Performed by: OCCUPATIONAL THERAPY ASSISTANT

## 2019-06-25 PROCEDURE — 97112 NEUROMUSCULAR REEDUCATION: CPT

## 2019-06-25 PROCEDURE — 97530 THERAPEUTIC ACTIVITIES: CPT

## 2019-06-25 PROCEDURE — 94760 N-INVAS EAR/PLS OXIMETRY 1: CPT

## 2019-06-25 PROCEDURE — 82948 REAGENT STRIP/BLOOD GLUCOSE: CPT

## 2019-06-25 PROCEDURE — 97535 SELF CARE MNGMENT TRAINING: CPT | Performed by: OCCUPATIONAL THERAPY ASSISTANT

## 2019-06-25 PROCEDURE — 94660 CPAP INITIATION&MGMT: CPT

## 2019-06-25 PROCEDURE — 80048 BASIC METABOLIC PNL TOTAL CA: CPT | Performed by: INTERNAL MEDICINE

## 2019-06-25 PROCEDURE — 85025 COMPLETE CBC W/AUTO DIFF WBC: CPT | Performed by: INTERNAL MEDICINE

## 2019-06-25 PROCEDURE — 97530 THERAPEUTIC ACTIVITIES: CPT | Performed by: OCCUPATIONAL THERAPY ASSISTANT

## 2019-06-25 RX ORDER — BISACODYL 10 MG
10 SUPPOSITORY, RECTAL RECTAL ONCE
Status: DISCONTINUED | OUTPATIENT
Start: 2019-06-25 | End: 2019-07-05

## 2019-06-25 RX ADMIN — ACETAMINOPHEN 650 MG: 325 TABLET ORAL at 06:24

## 2019-06-25 RX ADMIN — LOSARTAN POTASSIUM 100 MG: 50 TABLET, FILM COATED ORAL at 08:33

## 2019-06-25 RX ADMIN — OXYBUTYNIN CHLORIDE 5 MG: 5 TABLET, EXTENDED RELEASE ORAL at 08:33

## 2019-06-25 RX ADMIN — DOCUSATE SODIUM 100 MG: 100 CAPSULE, LIQUID FILLED ORAL at 17:15

## 2019-06-25 RX ADMIN — THIAMINE HCL TAB 100 MG 50 MG: 100 TAB at 08:34

## 2019-06-25 RX ADMIN — GABAPENTIN 400 MG: 400 CAPSULE ORAL at 21:24

## 2019-06-25 RX ADMIN — POLYVINYL ALCOHOL 1 DROP: 14 SOLUTION/ DROPS OPHTHALMIC at 11:41

## 2019-06-25 RX ADMIN — FLUTICASONE FUROATE AND VILANTEROL TRIFENATATE 1 PUFF: 100; 25 POWDER RESPIRATORY (INHALATION) at 08:38

## 2019-06-25 RX ADMIN — CLINDAMYCIN HYDROCHLORIDE 300 MG: 150 CAPSULE ORAL at 06:25

## 2019-06-25 RX ADMIN — TAMSULOSIN HYDROCHLORIDE 0.4 MG: 0.4 CAPSULE ORAL at 17:15

## 2019-06-25 RX ADMIN — PRAVASTATIN SODIUM 10 MG: 10 TABLET ORAL at 17:15

## 2019-06-25 RX ADMIN — PANTOPRAZOLE SODIUM 40 MG: 40 TABLET, DELAYED RELEASE ORAL at 06:26

## 2019-06-25 RX ADMIN — VITAMIN D, TAB 1000IU (100/BT) 2000 UNITS: 25 TAB at 08:33

## 2019-06-25 RX ADMIN — ACETAMINOPHEN 650 MG: 325 TABLET ORAL at 21:24

## 2019-06-25 RX ADMIN — POLYETHYLENE GLYCOL 3350 17 G: 17 POWDER, FOR SOLUTION ORAL at 08:32

## 2019-06-25 RX ADMIN — NICOTINE 21 MG: 21 PATCH, EXTENDED RELEASE TRANSDERMAL at 08:37

## 2019-06-25 RX ADMIN — TIOTROPIUM BROMIDE 18 MCG: 18 CAPSULE ORAL; RESPIRATORY (INHALATION) at 08:38

## 2019-06-25 RX ADMIN — GABAPENTIN 300 MG: 300 CAPSULE ORAL at 14:06

## 2019-06-25 RX ADMIN — DOCUSATE SODIUM 100 MG: 100 CAPSULE, LIQUID FILLED ORAL at 08:34

## 2019-06-25 RX ADMIN — INSULIN LISPRO 1 UNITS: 100 INJECTION, SOLUTION INTRAVENOUS; SUBCUTANEOUS at 17:15

## 2019-06-25 RX ADMIN — HEPARIN SODIUM 5000 UNITS: 5000 INJECTION INTRAVENOUS; SUBCUTANEOUS at 21:24

## 2019-06-25 RX ADMIN — POLYVINYL ALCOHOL 1 DROP: 14 SOLUTION/ DROPS OPHTHALMIC at 08:38

## 2019-06-25 RX ADMIN — GABAPENTIN 300 MG: 300 CAPSULE ORAL at 06:26

## 2019-06-25 RX ADMIN — CLINDAMYCIN HYDROCHLORIDE 300 MG: 150 CAPSULE ORAL at 21:24

## 2019-06-25 RX ADMIN — HEPARIN SODIUM 5000 UNITS: 5000 INJECTION INTRAVENOUS; SUBCUTANEOUS at 06:26

## 2019-06-25 RX ADMIN — DILTIAZEM HYDROCHLORIDE 120 MG: 120 CAPSULE, COATED, EXTENDED RELEASE ORAL at 08:37

## 2019-06-25 RX ADMIN — POLYVINYL ALCOHOL 1 DROP: 14 SOLUTION/ DROPS OPHTHALMIC at 17:15

## 2019-06-25 RX ADMIN — HEPARIN SODIUM 5000 UNITS: 5000 INJECTION INTRAVENOUS; SUBCUTANEOUS at 14:06

## 2019-06-25 RX ADMIN — MELATONIN 3 MG: at 21:24

## 2019-06-25 RX ADMIN — CLINDAMYCIN HYDROCHLORIDE 300 MG: 150 CAPSULE ORAL at 14:06

## 2019-06-25 RX ADMIN — METFORMIN HYDROCHLORIDE 500 MG: 500 TABLET ORAL at 08:33

## 2019-06-25 RX ADMIN — POLYVINYL ALCOHOL 1 DROP: 14 SOLUTION/ DROPS OPHTHALMIC at 21:24

## 2019-06-25 RX ADMIN — SENNOSIDES 8.6 MG: 8.6 TABLET, FILM COATED ORAL at 08:34

## 2019-06-25 RX ADMIN — DULOXETINE HYDROCHLORIDE 60 MG: 60 CAPSULE, DELAYED RELEASE ORAL at 08:34

## 2019-06-25 NOTE — PROGRESS NOTES
06/25/19 1415   Pain Assessment   Pain Assessment No/denies pain   Pain Score No Pain   Restrictions/Precautions   Precautions Bed/chair alarms; Fall Risk;Spinal precautions;Supervision on toilet/commode   Weight Bearing Restrictions No   ROM Restrictions Yes  (Cervical spinal precautions  )   Braces or Orthoses C/S Collar   Cognition   Overall Cognitive Status WFL   Arousal/Participation Alert; Cooperative   Attention Attends with cues to redirect   Orientation Level Oriented X4   Memory Decreased recall of precautions   Following Commands Follows one step commands with increased time or repetition   Comments patient forgetful at times; repeats self often    Subjective   Subjective Patient eager to particiapte in PT session    QI: Sit to Stand   Assistance Needed Incidental touching   Assistance Provided by [place] Less than 25%   Sit to Stand CARE Score 3   QI: Chair/Bed-to-Chair Transfer   Assistance Needed Incidental touching   Assistance Provided by [place] Less than 25%   Chair/Bed-to-Chair Transfer CARE Score 3   Transfer Bed/Chair/Wheelchair   Limitations Noted In Balance; Coordination;LE Strength; Endurance   Adaptive Equipment None  (hands on hips )   Stand Pivot Contact Guard   Sit to Stand Contact Guard   Stand to [facility]   Findings session performed with hands on hips; no use of RW however patient appears unsteady and at risk for falls    Bed, Chair, Wheelchair Transfer (FIM) 4 - Patient requires steadying assist or light touching   QI: Walk 10 Feet   Assistance Needed Incidental touching   Assistance Provided by [place] Less than 25%   Walk 10 Feet CARE Score 3   QI: Walk 50 Feet with Two Turns   Assistance Needed Incidental touching   Assistance Provided by [place] Less than 25%   Walk 50 Feet with Two Turns CARE Score 3   QI: Walk 150 Feet   Assistance Needed Incidental touching   Assistance Provided by [place] Less than 25%   Walk 150 Feet CARE Score 3   Ambulation   Does the patient walk?  2  Yes   Primary Discharge Mode of Locomotion Walk   Walk Assist Level Contact Guard   Gait Pattern Antalgic; Inconsistant Analisa;Decreased foot clearance;Narrow LILLIE; Improper weight shift   Assist Device Other  (hands on hips )   Distance Walked (feet) 300 ft  (x2)   Limitations Noted In Balance; Coordination; Heel Strike; Endurance; Sequencing;Speed;Strength;Swing   Walking (FIM) 4 - Patient requires steadying assist or light touching AND distance 150 feet or more, no rest   QI: Toilet Transfer   Assistance Needed Incidental touching   Assistance Provided by [place] Less than 25%   Toilet Transfer CARE Score 3   Toilet Transfer   Surface Assessed Standard Toilet   Limitations Noted In Balance;Confidence; Sequencing;UE Strength;LE Strength   Adaptive Equipment Grab Bar   Positioning Concerns LE Support; Safety   Toilet Transfer (FIM) 4 - Patient requires steadying assist or light touching   Equipment Use   NuStep L2, 16 minutes BUE/LE    Assessment   Treatment Assessment Patient engaged in PT treatment session focusing on increasing overall strength and tolerance to activity  Patient motivated to participate and eager for additional session  Patient performing NuStep activity to inc strength and improve coordination  Gait without RW remains unsteady with shuffled pattern noted at times  Balance declines with turns and dual tasking; continue to train with and without RW to improve balance and determine most appropriate AD for home  At this time, patient remains unsafe to dc home as he will need to achieve mod(I) goals 2* dec caregiver support at home  He continues to benefit from skilled PT intervention to progress functional mobility (I) and safety  Problem List Decreased strength;Decreased endurance; Impaired balance;Decreased mobility; Decreased coordination;Orthopedic restrictions; Impaired sensation   Barriers to Discharge Inaccessible home environment;Decreased caregiver support   PT Barriers   Functional Limitation Standing;Transfers; Walking   Plan   Treatment/Interventions Functional transfer training;LE strengthening/ROM; Therapeutic exercise; Endurance training;Patient/family training;Equipment eval/education;Gait training; Compensatory technique education   Progress Progressing toward goals   Recommendation   Recommendation Other (Comment)  (TBD pending progress)   Equipment Recommended   (TBD )   PT Therapy Minutes   PT Time In 1445   PT Time Out 1530   PT Total Time (minutes) 45   PT Mode of treatment - Individual (minutes) 45   PT Mode of treatment - Concurrent (minutes) 0   PT Mode of treatment - Group (minutes) 0   PT Mode of treatment - Co-treat (minutes) 0   PT Mode of Teatment - Total time(minutes) 45 minutes   Therapy Time missed   Time missed?  No

## 2019-06-25 NOTE — PROGRESS NOTES
06/25/19 1030   Pain Assessment   Pain Assessment No/denies pain   Pain Score No Pain   Restrictions/Precautions   Precautions Cardiac/sternal;Fall Risk;Spinal precautions;Supervision on toilet/commode   Braces or Orthoses C/S Collar   Cognition   Arousal/Participation Alert; Cooperative   Subjective   Subjective Pt agreeable to session stating he likes to get up and moving, does not want to sit around  QI: Sit to Stand   Assistance Needed Adaptive equipment; Incidental touching   Assistance Provided by [place] Less than 25%   Comment w RW    Sit to Stand CARE Score 3   QI: Chair/Bed-to-Chair Transfer   Assistance Needed Adaptive equipment; Incidental touching   Assistance Provided by [place] Less than 25%   Comment W RW    Chair/Bed-to-Chair Transfer CARE Score 3   Transfer Bed/Chair/Wheelchair   Limitations Noted In Balance; Coordination; Endurance   Adaptive Equipment Roller Walker   Stand Pivot Contact Guard   Sit to [employer]   Stand to [employer]   Findings Pt transfer with RW this session CGA  Bed, Chair, Wheelchair Transfer (FIM) 4 - Patient requires steadying assist or light touching   QI: Car Transfer   Assistance Needed Incidental touching; Adaptive equipment   Assistance Provided by [place] Less than 25%   Comment Cues for hand placement  Car Transfer CARE Score 3   QI: Walk 10 Feet   Assistance Needed Incidental touching; Adaptive equipment   Assistance Provided by [place] Less than 25%   Comment CGA with RW    Walk 10 Feet CARE Score 3   QI: Walk 50 Feet with Two Turns   Assistance Needed Adaptive equipment; Incidental touching   Assistance Provided by [place] Less than 25%   Comment CGA RW    Walk 50 Feet with Two Turns CARE Score 3   QI: Walk 150 Feet   Assistance Needed Adaptive equipment; Incidental touching   Assistance Provided by [place] Less than 25%   Comment CGA RW    Walk 150 Feet CARE Score 3   QI: Walking 10 Feet on Uneven Surfaces   Assistance Needed Physical assistance; Adaptive equipment   Assistance Provided by [place] Less than 25%   Comment 400 ft outside B entrance  Walking 10 Feet on Uneven Surfaces CARE Score 3   Ambulation   Does the patient walk? 2  Yes   Primary Discharge Mode of Locomotion Walk   Walk Assist Level Contact Guard   Gait Pattern Inconsistant Analisa;Narrow LILLIE;Step through; Improper weight shift   Assist Device Roller Walker   Distance Walked (feet) 350 ft  (150x2, 400 outside B entrance )   Limitations Noted In Balance; Coordination; Heel Strike; Endurance;Speed   Findings CGA with RW indoors, Mack with RW outside B entrance  Walking (FIM) 4 - Patient requires steadying assist or light touching AND distance 150 feet or more, no rest   Wheelchair mobility   QI: Does the patient use a wheelchair? 0  No   QI: 1 Step (Curb)   Assistance Needed Adaptive equipment; Incidental touching   Assistance Provided by [place] Less than 25%   Comment With RW curb outside B entrance x2    1 Step (Curb) CARE Score 3   QI: 4 Steps   Assistance Needed Adaptive equipment;Physical assistance   Assistance Provided by [place] 25%-49%   Comment LHR reciprocal pattern down, RHR reciprocal pattern up  4 Steps CARE Score 3   QI: 12 Steps   Assistance Needed Adaptive equipment;Physical assistance   Assistance Provided by [place] 25%-49%   Comment LHR reciprocal pattern down, RHR reciprocal pattern up     12 Steps CARE Score 3   Stairs   Type Stairs;Curb;Ramp   # of Steps 12  (FF x2 with rest break between trials  )   Weight Bearing Precautions Fall Risk   Assist Devices Single Rail   Findings LHR reciprocal pattern down, RHR reciprocal pattern up  Stairs (FIM) 3 - Patient completes 50 - 74% of all tasks, needs more than steadying or light touch AND goes up and down full flight (12- 14 stairs)   QI: Toilet Transfer   Assistance Needed Adaptive equipment; Incidental touching   Assistance Provided by [place] Less than 25%   Toilet Transfer CARE Score 3 Toilet Transfer   Surface Assessed Standard Toilet   Limitations Noted In Balance; Endurance;LE Strength; Sequencing   Adaptive Equipment Grab Bar  (RWv )   Positioning Concerns Safety   Toilet Transfer (FIM) 4 - Patient requires steadying assist or light touching   Therapeutic Interventions   Balance Static standing ball toss x2min, static staning x1 min regular stance, static standing x1 min narrow LILLIE  Assessment   Treatment Assessment Pt participated in skilled PT session with focus on inc balance, functional mobility, community ambulation  Pt cont to ambulate CGA with RW requiring cues to stay within walker, poor carryover from earlier session  Pt stated he is interested in hiring home health aide which pt was given handout by CM about different options  Pt states he will need help preparing food and doing [place] chores  Pt also stated he has worked with home PT in the past and would like home PT as he will have a hard time getting to appointments  Pt lives alone and near by son works in [place] so he is not around much to help  Pt wants someone to help with bathing at home as well  Pt stated he will talk to son about setting up a ride from [facility] once he gets d/c date but if not will like an uber or other ride arrangement  Pt advised to not go up and down stairs carrying laundry or other items  Pt progressing and cont to benefit from skilled PT to inc functional ind and decrease caregiver burden  Family/Caregiver Present no    Barriers to Discharge Decreased caregiver support; Inaccessible home environment   PT Barriers   Physical Impairment Decreased strength; Impaired balance;Decreased endurance;Decreased mobility; Decreased coordination;Orthopedic restrictions;Decreased safety awareness   Functional Limitation Standing;Transfers; Walking;Car transfers;Stair negotiation   Plan   Treatment/Interventions Functional transfer training;LE strengthening/ROM; Elevations; Therapeutic exercise; Endurance training;Patient/family training;Equipment eval/education;Gait training   Progress Progressing toward goals   Recommendation   Recommendation Other (Comment)  (TBD )   Equipment Recommended Walker   PT Therapy Minutes   PT Time In 1030   PT Time Out 1130   PT Total Time (minutes) 60   PT Mode of treatment - Individual (minutes) 60   PT Mode of treatment - Concurrent (minutes) 0   PT Mode of treatment - Group (minutes) 0   PT Mode of treatment - Co-treat (minutes) 0   PT Mode of Teatment - Total time(minutes) 60 minutes   Therapy Time missed   Time missed?  No

## 2019-06-25 NOTE — PROGRESS NOTES
06/25/19 0830   Pain Assessment   Pain Assessment No/denies pain   Pain Score No Pain   Restrictions/Precautions   Precautions Bed/chair alarms; Fall Risk;Spinal precautions;Supervision on toilet/commode   Braces or Orthoses C/S Collar   Cognition   Arousal/Participation Alert; Cooperative   Subjective   Subjective Pt reports feeling well and slept better last night  PT agreeable to session  QI: Sit to Stand   Assistance Needed Incidental touching; Adaptive equipment   Assistance Provided by [place] Less than 25%   Comment with RW, CGA/Mack  without AD  Sit to Stand CARE Score 3   QI: Chair/Bed-to-Chair Transfer   Assistance Needed Physical assistance;Verbal cues; Adaptive equipment   Assistance Provided by [place] Less than 25%   Comment CGA with RW, Mack without AD for steadying  Chair/Bed-to-Chair Transfer CARE Score 3   Transfer Bed/Chair/Wheelchair   Limitations Noted In Balance; Coordination; Endurance   Adaptive Equipment Roller Walker;None   Stand Pivot Contact Guard;Minimal Assist   Sit to [employer]   Stand to [employer], Chair, Wheelchair Transfer (FIM) 4 - Patient requires steadying assist or light touching   QI: Walk 10 Feet   Assistance Needed Incidental touching;Physical assistance; Adaptive equipment   Assistance Provided by [place] 25%-49%   Comment CGA with RW, Mack no device hands on hips    Walk 10 Feet CARE Score 3   QI: Walk 50 Feet with Two [address] equipment; Incidental touching;Physical assistance   Assistance Provided by [place] 25%-49%   Comment CGA with RW, Mack no device hands on hips    Walk 50 Feet with Two Turns CARE Score 3   QI: Walk 150 Feet   Assistance Needed Adaptive equipment; Incidental touching;Physical assistance   Assistance Provided by [place] 25%-49%   Comment CGA with RW, Mack no device hands on hips    Walk 150 Feet CARE Score 3   Ambulation   Does the patient walk? 2   Yes   Primary Discharge Mode of Locomotion Walk   Walk Assist Level Contact Guard;Minimum Assist   Gait Pattern Inconsistant Analisa;Narrow LILLIE;Step through; Improper weight shift; Slow Analisa   Assist Device Roller Walker; Other  (Hands on hips )   Distance Walked (feet) 350 ft  (x2 with RW, 150x2 RW, 700x2 no AD )   Limitations Noted In Balance; Coordination; Heel Strike; Endurance;Speed   Findings Pt ambulated CGA level with RW with cues to stay within RW during turns  as pt likes to lean on walker outside LILLIE  Pt cont to ambulate with unsteady gait with No AD Mack hands on hips assist to correct decreased righting reactions  Walking (FIM) 4 - Patient requires steadying assist or light touching AND distance 150 feet or more, no rest   Wheelchair mobility   QI: Does the patient use a wheelchair? 0  No   QI: Toilet Transfer   Assistance Needed Adaptive equipment; Incidental touching   Assistance Provided by [place] Less than 25%   Comment Urinated while standing during session requiring CGA for steadying during urination and clothing management  Toilet Transfer CARE Score 3   Toilet Transfer   Surface Assessed Standard Toilet   Limitations Noted In Balance; Endurance;LE Strength; Sequencing   Adaptive Equipment Grab Bar  (RW )   Positioning Concerns Safety   Findings Pt stood to urinate 2x this session once at beginning and once urgently in gym  Toilet Transfer (FIM) 4 - Patient requires steadying assist or light touching   Therapeutic Interventions   Balance Reaching and stacking cups in cabnient with no UE support, washing hands at sink, ambulation while performing ball toss fwd and [id number], ambulation while marching, ambulation fwd/bwd, cone taps no UE support to focus on control  Assessment   Treatment Assessment Pt participated in skilled PT session with focus on inc balance and functional mobility both with and without an assistive device to dec risk of falls  Pt cont with unsteadiness with ambulation with No RW and functions at CGA/ with RW   Pt with less cueing needed for hand placement during STS this session but cont to require cues to stay within walker during transfer and especially during turning  Pt with urgency to urinate during session and CGA for steadying during toileting  Pt cont to require steadying assist during balance activities and Hands on hips when ambulating with no AD  Pt cont to benefit from skilled PT to improve balance and righting reactions to dec fall risk and inc functional ind as pt lives alone  Family/Caregiver Present no    Barriers to Discharge Inaccessible home environment;Decreased caregiver support   PT Barriers   Physical Impairment Decreased strength;Decreased endurance; Impaired balance;Decreased mobility; Decreased coordination;Decreased safety awareness;Orthopedic restrictions   Functional Limitation Standing;Walking;Transfers;Car transfers;Stair negotiation   Plan   Treatment/Interventions Functional transfer training;LE strengthening/ROM; Elevations; Therapeutic exercise; Endurance training;Patient/family training;Equipment eval/education;Gait training   Progress Progressing toward goals   Recommendation   Recommendation Other (Comment)  (TBD )   Equipment Recommended Walker   PT Therapy Minutes   PT Time In 0830   PT Time Out 0930   PT Total Time (minutes) 60   PT Mode of treatment - Individual (minutes) 60   PT Mode of treatment - Concurrent (minutes) 0   PT Mode of treatment - Group (minutes) 0   PT Mode of treatment - Co-treat (minutes) 0   PT Mode of Teatment - Total time(minutes) 60 minutes   Therapy Time missed   Time missed?  No

## 2019-06-25 NOTE — PROGRESS NOTES
Occupational Therapy Treatment Note:       06/25/19 1230   Pain Assessment   Pain Assessment No/denies pain   Pain Score No Pain   Restrictions/Precautions   Precautions Bed/chair alarms; Fall Risk;Spinal precautions;Supervision on toilet/commode   Weight Bearing Restrictions No   ROM Restrictions Yes  (Cervical spinal precautions  )   Braces or Orthoses C/S Collar   Grooming   Able To Initiate Tasks; Acquire Items; Wash/Dry Hands   Limitation Noted In Coordination; Safety;Strength  (Balance  )   Adaptive Equipment Other  (RW)   Findings Pt completed hand washing while in stance at sink with CGA/CS and no noted LOB; encouraging VCs warranted to utilize soap  QI: Sit to Stand   Assistance Needed Adaptive equipment;Set-up / clean-up; Verbal cues; Incidental touching   Assistance Provided by [place] Less than 25%   Comment RW; VCs warranted for hand placement in order to ensure good safety  Sit to Stand CARE Score 3   QI: Chair/Bed-to-Chair Transfer   Assistance Needed Adaptive equipment;Set-up / clean-up; Verbal cues; Incidental touching   Assistance Provided by [place] Less than 25%   Comment RW; VCs warranted to keep feet within RW, to back up completely to surface transferring to, and to bring RW completely with  Chair/Bed-to-Chair Transfer CARE Score 3   Transfer Bed/Chair/Wheelchair   Limitations Noted In Balance; Coordination; Endurance;Problem Solving;LE Strength   Adaptive Equipment Roller Walker   Stand Pivot Contact Guard   Sit to Stand Minimal  (CGA)   Stand to Sit Minimal  (CGA)   Findings Pt completed functional mobility short distances using RW with CGA and VCs to walk within RW in order to ensure good safety      Bed, Chair, Wheelchair Transfer (FIM) 4 - Patient requires steadying assist or light touching   QI: [address] Needed Adaptive equipment;Set-up / clean-up;Supervision;Verbal cues   Assistance Provided by [place] Less than 25%   Comment Pt completed clothing mgmt over hips while in brief unsupported stance with CS and no noted LOB; VCs warranted to initially gain balance at RW prior in order to ensure good safety  Toileting Hygiene CARE Score 3   Toileting   Able to [address] down yes, up yes  Able to Manage Clothing Closures Other  (None needed; elastic waisted pants  )   Manage Hygiene Bladder   Limitations Noted In Balance; Safety;LE Strength   Adaptive Equipment Other  (RW)   Toileting (FIM) 5 - Patient requires supervision/monitoring  (CS)   QI: Toilet Transfer   Assistance Needed Adaptive equipment;Set-up / [place]; Incidental touching   Assistance Provided by [place] Less than 25%   Comment Using RW and grab bars <> standard height toilet  Toilet Transfer CARE Score 3   Toilet Transfer   Surface Assessed Standard Toilet   Transfer Technique Standard   Limitations Noted In Balance; Endurance; Safety;LE Strength   Adaptive Equipment Grab Bar  (RW)   Toilet Transfer (FIM) 4 - Patient requires steadying assist or light touching   Health Management   Health Management Level of Assistance Maximum assistance;Dependent   Health Management Pt reported at baseline he was independent with medication mgmt and would take pills directly from pill container, with medications seperated in 3 specific bins (am, pm, PRN - ie inhaler), using child safety lids; suggesting pt utilize pill organizer for increased safety as anticipate medications may change, and discussed option of requesting non safety lids from Pharmacy if posing difficult to manage  Pt completed simulated medication mgmt task using morning/noon/evening/bedtime style pill organizer with 1/7 correct despite providing with initial demonstration/VCs for technique  Pt able to manipulate small beads and child safety lids with S when provided with increased time; approx minimal droppage noted of beads (simulated pills)  Instructed pt on compensatory technique of tapping pills out onto washcloth for decreased droppage   Had pt return beads to respective containers upon completion for continued [address] improvement  Cognition   Overall Cognitive Status Impaired   Arousal/Participation Alert; Cooperative   Attention Attends with cues to redirect   Orientation Level Oriented X4   Memory Decreased short term memory;Decreased recall of precautions   Following Commands Follows one step commands with increased time or repetition   Activity Tolerance   Activity Tolerance Patient tolerated treatment well   Assessment   Treatment Assessment OT tx sessions focused on transfers, standing balance, functional mobility, toileting, FMC, and IADL task of medication mgmt  Refer above for details on pt performance  Pt would benefit from continued skilled OT services in order to achieve highest functional abilities  Prognosis Good   Problem List Decreased strength;Decreased range of motion;Decreased endurance; Impaired balance;Decreased mobility; Decreased coordination;Decreased cognition;Decreased safety awareness   Barriers to Discharge Inaccessible home environment;Decreased caregiver support   Plan   Treatment/Interventions ADL retraining;Functional transfer training; Endurance training;Cognitive reorientation;Patient/family training;Equipment eval/education; Compensatory technique education;OT   Progress Progressing toward goals   Recommendation   OT Discharge Recommendation Other (Comment)  (Pending pt progress - must be [place] to return home  )   OT Therapy Minutes   OT Time In 1230   OT Time Out 1330   OT Total Time (minutes) 60   OT Mode of treatment - Individual (minutes) 60   OT Mode of treatment - Concurrent (minutes) 0   OT Mode of treatment - Group (minutes) 0   OT Mode of treatment - Co-treat (minutes) 0   OT Mode of Teatment - Total time(minutes) 60 minutes   Therapy Time missed   Time missed?  No   Anthony Blackman, [address]

## 2019-06-25 NOTE — PLAN OF CARE
Problem: Potential for Falls  Goal: Patient will remain free of falls  Description  INTERVENTIONS:  - Assess patient frequently for physical needs  -  Identify cognitive and physical deficits and behaviors that affect risk of falls  -  [place] fall precautions as indicated by assessment   - Educate patient/family on patient safety including physical limitations  - Instruct patient to call for assistance with activity based on assessment  - Modify environment to reduce risk of injury  - Consider OT/PT consult to assist with strengthening/mobility  Outcome: Progressing     Problem: Nutrition/Hydration-ADULT  Goal: Nutrient/Hydration intake appropriate for improving, restoring or maintaining nutritional needs  Description  Monitor and assess patient's nutrition/hydration status for malnutrition (ex- brittle hair, bruises, dry skin, pale skin and conjunctiva, muscle wasting, smooth red tongue, and disorientation)  Collaborate with interdisciplinary team and initiate plan and interventions as ordered  Monitor patient's weight and dietary intake as ordered or per policy  Utilize nutrition screening tool and intervene per policy  Determine patient's food preferences and provide high-protein, high-caloric foods as appropriate       INTERVENTIONS:  - Monitor oral intake, urinary output, labs, and treatment plans  - Assess nutrition and hydration status and recommend course of action  - Evaluate amount of meals eaten  - Assist patient with eating if necessary   - Allow adequate time for meals  - Recommend/ encourage appropriate diets, oral nutritional supplements, and vitamin/mineral supplements  - Order, calculate, and assess calorie counts as needed  - Recommend, monitor, and adjust tube feedings and TPN/PPN based on assessed needs  - Assess need for intravenous fluids  - Provide specific nutrition/hydration education as appropriate  - Include patient/family/caregiver in decisions related to nutrition  Outcome: Progressing     Problem: PAIN - ADULT  Goal: Verbalizes/displays adequate comfort level or baseline comfort level  Description  Interventions:  - Encourage patient to monitor pain and request assistance  - Assess pain using appropriate pain scale  - Administer analgesics based on type and severity of pain and evaluate response  - Implement non-pharmacological measures as appropriate and evaluate response  - Consider cultural and social influences on pain and pain management  - Notify physician/advanced practitioner if interventions unsuccessful or patient reports new pain  Outcome: Progressing     Problem: INFECTION - ADULT  Goal: Absence or prevention of progression during hospitalization  Description  INTERVENTIONS:  - Assess and monitor for signs and symptoms of infection  - Monitor lab/diagnostic results  - Monitor all insertion sites, i e  indwelling lines, tubes, and drains  - Monitor endotracheal (as able) and nasal secretions for changes in amount and color  - [place] appropriate cooling/warming therapies per order  - Administer medications as ordered  - Instruct and encourage patient and family to use good hand hygiene technique  - Identify and instruct in appropriate isolation precautions for identified infection/condition  Outcome: Progressing     Problem: SAFETY ADULT  Goal: Patient will remain free of falls  Description  INTERVENTIONS:  - Assess patient frequently for physical needs  -  Identify cognitive and physical deficits and behaviors that affect risk of falls    -  [place] fall precautions as indicated by assessment   - Educate patient/family on patient safety including physical limitations  - Instruct patient to call for assistance with activity based on assessment  - Modify environment to reduce risk of injury  - Consider OT/PT consult to assist with strengthening/mobility  Outcome: Progressing  Goal: Maintain or return to baseline ADL function  Description  INTERVENTIONS:  -  Assess patient's ability to carry out ADLs; assess patient's baseline for ADL function and identify physical deficits which impact ability to perform ADLs (bathing, care of mouth/teeth, toileting, grooming, dressing, etc )  - Assess/evaluate cause of self-care deficits   - Assess range of motion  - Assess patient's mobility; develop plan if impaired  - Assess patient's need for assistive devices and provide as appropriate  - Encourage maximum independence but intervene and supervise when necessary  ¯ Involve family in performance of ADLs  ¯ Assess for home care needs following discharge   ¯ Request OT consult to assist with ADL evaluation and planning for discharge  ¯ Provide patient education as appropriate  Outcome: Progressing  Goal: Maintain or return mobility status to optimal level  Description  INTERVENTIONS:  - Assess patient's baseline mobility status (ambulation, transfers, stairs, etc )    - Identify cognitive and physical deficits and behaviors that affect mobility  - Identify mobility aids required to assist with transfers and/or ambulation (gait belt, sit-to-stand, lift, walker, cane, etc )  - [place] fall precautions as indicated by assessment  - Record patient progress and toleration of activity level on Mobility SBAR; progress patient to next Phase/Stage  - Instruct patient to call for assistance with activity based on assessment  - Request Rehabilitation consult to assist with strengthening/weightbearing, etc   Outcome: Progressing     Problem: DISCHARGE PLANNING  Goal: Discharge to home or other facility with appropriate resources  Description  INTERVENTIONS:  - Identify barriers to discharge w/patient and caregiver  - Arrange for needed discharge resources and transportation as appropriate  - Identify discharge learning needs (meds, wound care, etc )  - Arrange for interpretive services to assist at discharge as needed  - Refer to Case Management Department for coordinating discharge planning if the patient needs post-hospital services based on physician/advanced practitioner order or complex needs related to functional status, cognitive ability, or social support system  Outcome: Progressing     Problem: Prexisting or High Potential for Compromised Skin Integrity  Goal: Skin integrity is maintained or improved  Description  INTERVENTIONS:  - Identify patients at risk for skin breakdown  - Assess and monitor skin integrity  - Assess and monitor nutrition and hydration status  - Monitor labs (i e  albumin)  - Assess for incontinence   - Turn and reposition patient  - Assist with mobility/ambulation  - Relieve pressure over bony prominences  - Avoid friction and shearing  - Provide appropriate hygiene as needed including keeping skin clean and dry  - Evaluate need for skin moisturizer/barrier cream  - Collaborate with interdisciplinary team (i e  Nutrition, Rehabilitation, etc )   - Patient/family teaching  Outcome: Progressing
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
## 2019-06-26 LAB
GLUCOSE SERPL-MCNC: 102 MG/DL (ref 65–140)
GLUCOSE SERPL-MCNC: 112 MG/DL (ref 65–140)
GLUCOSE SERPL-MCNC: 128 MG/DL (ref 65–140)
GLUCOSE SERPL-MCNC: 130 MG/DL (ref 65–140)

## 2019-06-26 PROCEDURE — 82948 REAGENT STRIP/BLOOD GLUCOSE: CPT

## 2019-06-26 PROCEDURE — 97110 THERAPEUTIC EXERCISES: CPT

## 2019-06-26 PROCEDURE — 94760 N-INVAS EAR/PLS OXIMETRY 1: CPT

## 2019-06-26 PROCEDURE — 97535 SELF CARE MNGMENT TRAINING: CPT

## 2019-06-26 PROCEDURE — 97112 NEUROMUSCULAR REEDUCATION: CPT

## 2019-06-26 PROCEDURE — 94660 CPAP INITIATION&MGMT: CPT

## 2019-06-26 PROCEDURE — 99232 SBSQ HOSP IP/OBS MODERATE 35: CPT

## 2019-06-26 PROCEDURE — 97116 GAIT TRAINING THERAPY: CPT

## 2019-06-26 PROCEDURE — 97530 THERAPEUTIC ACTIVITIES: CPT

## 2019-06-26 RX ADMIN — LOSARTAN POTASSIUM 100 MG: 50 TABLET, FILM COATED ORAL at 08:48

## 2019-06-26 RX ADMIN — MELATONIN 3 MG: at 21:09

## 2019-06-26 RX ADMIN — HEPARIN SODIUM 5000 UNITS: 5000 INJECTION INTRAVENOUS; SUBCUTANEOUS at 21:08

## 2019-06-26 RX ADMIN — PANTOPRAZOLE SODIUM 40 MG: 40 TABLET, DELAYED RELEASE ORAL at 06:15

## 2019-06-26 RX ADMIN — THIAMINE HCL TAB 100 MG 50 MG: 100 TAB at 08:43

## 2019-06-26 RX ADMIN — DULOXETINE HYDROCHLORIDE 60 MG: 60 CAPSULE, DELAYED RELEASE ORAL at 08:42

## 2019-06-26 RX ADMIN — ACETAMINOPHEN 650 MG: 325 TABLET ORAL at 06:15

## 2019-06-26 RX ADMIN — POLYVINYL ALCOHOL 1 DROP: 14 SOLUTION/ DROPS OPHTHALMIC at 21:16

## 2019-06-26 RX ADMIN — POLYVINYL ALCOHOL 1 DROP: 14 SOLUTION/ DROPS OPHTHALMIC at 12:10

## 2019-06-26 RX ADMIN — HEPARIN SODIUM 5000 UNITS: 5000 INJECTION INTRAVENOUS; SUBCUTANEOUS at 06:15

## 2019-06-26 RX ADMIN — DILTIAZEM HYDROCHLORIDE 120 MG: 120 CAPSULE, COATED, EXTENDED RELEASE ORAL at 08:47

## 2019-06-26 RX ADMIN — GABAPENTIN 300 MG: 300 CAPSULE ORAL at 06:15

## 2019-06-26 RX ADMIN — HEPARIN SODIUM 5000 UNITS: 5000 INJECTION INTRAVENOUS; SUBCUTANEOUS at 14:07

## 2019-06-26 RX ADMIN — ACETAMINOPHEN 650 MG: 325 TABLET ORAL at 14:07

## 2019-06-26 RX ADMIN — GABAPENTIN 400 MG: 400 CAPSULE ORAL at 21:08

## 2019-06-26 RX ADMIN — TAMSULOSIN HYDROCHLORIDE 0.4 MG: 0.4 CAPSULE ORAL at 17:20

## 2019-06-26 RX ADMIN — NICOTINE 21 MG: 21 PATCH, EXTENDED RELEASE TRANSDERMAL at 08:45

## 2019-06-26 RX ADMIN — POLYVINYL ALCOHOL 1 DROP: 14 SOLUTION/ DROPS OPHTHALMIC at 17:20

## 2019-06-26 RX ADMIN — GABAPENTIN 300 MG: 300 CAPSULE ORAL at 14:07

## 2019-06-26 RX ADMIN — OXYBUTYNIN CHLORIDE 5 MG: 5 TABLET, EXTENDED RELEASE ORAL at 08:42

## 2019-06-26 RX ADMIN — DOCUSATE SODIUM 100 MG: 100 CAPSULE, LIQUID FILLED ORAL at 17:20

## 2019-06-26 RX ADMIN — CLINDAMYCIN HYDROCHLORIDE 300 MG: 150 CAPSULE ORAL at 06:15

## 2019-06-26 RX ADMIN — CLINDAMYCIN HYDROCHLORIDE 300 MG: 150 CAPSULE ORAL at 14:07

## 2019-06-26 RX ADMIN — TIOTROPIUM BROMIDE 18 MCG: 18 CAPSULE ORAL; RESPIRATORY (INHALATION) at 10:35

## 2019-06-26 RX ADMIN — PRAVASTATIN SODIUM 10 MG: 10 TABLET ORAL at 17:20

## 2019-06-26 RX ADMIN — POLYVINYL ALCOHOL 1 DROP: 14 SOLUTION/ DROPS OPHTHALMIC at 08:46

## 2019-06-26 RX ADMIN — CLINDAMYCIN HYDROCHLORIDE 300 MG: 150 CAPSULE ORAL at 21:08

## 2019-06-26 RX ADMIN — ACETAMINOPHEN 650 MG: 325 TABLET ORAL at 21:09

## 2019-06-26 RX ADMIN — METFORMIN HYDROCHLORIDE 500 MG: 500 TABLET ORAL at 08:44

## 2019-06-26 RX ADMIN — VITAMIN D, TAB 1000IU (100/BT) 2000 UNITS: 25 TAB at 08:42

## 2019-06-26 RX ADMIN — FLUTICASONE FUROATE AND VILANTEROL TRIFENATATE 1 PUFF: 100; 25 POWDER RESPIRATORY (INHALATION) at 08:46

## 2019-06-26 NOTE — PROGRESS NOTES
06/26/19 1230   Pain Assessment   Pain Assessment No/denies pain   Pain Score No Pain   Restrictions/Precautions   Precautions Bed/chair alarms;Cognitive; Fall Risk;Spinal precautions   Weight Bearing Restrictions No   ROM Restrictions   (cervical spinal precautions)   Braces or Orthoses C/S Collar   QI: Sit to Stand   Assistance Needed Incidental touching   Assistance Provided by [facility] Less than 25%   Comment CGA; impulsive   Sit to Stand CARE Score 3   QI: Chair/Bed-to-Chair Transfer   Assistance Needed Incidental touching   Assistance Provided by [facility] 25%-49%   Comment Jayshree with no AD, CGA with RW   Chair/Bed-to-Chair Transfer CARE Score 3   Transfer Bed/Chair/Wheelchair   Limitations Noted In Balance; Endurance;Problem Solving   Adaptive Equipment Roller Walker   Findings Pt completed transfers and mobility with and without AD this session  Pt does req slight inc in A for mobilty without AD especially with distraction from people in hallway and conversation  Bed, Chair, Wheelchair Transfer (FIM) 4 - Patient completes 75% of all tasks   QI: [address] Needed Incidental touching   Assistance Provided by [facility] Less than 25%   [address] Score 3   Toileting   Able to [address] down yes, up yes  Manage Hygiene Bladder   Limitations Noted In Balance; Safety   Toileting (FIM) 4 - Patient requires steadying assist or light touching   QI: Toilet Transfer   Comment in stance for urination; again provided education on benefits of completing seated as pt cont to be unsteady    Health Management   Health Management Level of Assistance Maximum verbal cues   Health Management COLLAR MANAGEMENT: Pt seated at table to perform pad change and pad cleaning  Pt req A at this time to remove C/S collar and apply ANNETTA collar 2* dec shoulder ROM and inabity to maintain cervical precautions  Pt req MAX cuing to recall steps of removing pads and reapplying   With ext time 2* dec sensation and [facility], pt able to manipulate pads and velcro to change pads  Pt then washed pads in stance at sink with CGA for balance  Exercise Tools   UE Ergometer Tolerated 3min x 2 prograde on SciFit to promote endurance and UE functional coordination for inc indep with task completion and mobilty  Pt positioned to limit GH flexion past 90* to ensure cervical precautions maintained  Pt reported no pain and able to maintain conversation throughout low resistance exercise  Coordination   Fine Motor Completed sorting task with items of varying size and shape to promote pincer and tripod grasp for inc indep with grooming and feeding tasks  Pt completed grasp with both hands at same time with minimal droppage noted  Pt with inc coordination with repetition  Cognition   Overall Cognitive Status Impaired   Arousal/Participation Alert; Cooperative   Attention Attends with cues to redirect   Orientation Level Oriented X4   Memory Decreased recall of precautions   Following Commands Follows one step commands with increased time or repetition   Activity Tolerance   Activity Tolerance Patient tolerated treatment well   Assessment   Treatment Assessment Pt participated in skilled OT session focusing on functional mobility with and without AD, [facility], C/S collar management, toileting, and endurance work  Pt cont to req cuing and re-edu on cervical precautions because pt cont to attempt to turn head and raise/lower chin  Pt cont to report that he has no support for IADL management  Pt would benefit from cont therapy focusing on C/S collar management, dynamic balance, functional transfer safety, and IADL completion including laundry  Cont with POC  Prognosis Good   Problem List Decreased strength;Decreased endurance; Impaired balance;Decreased mobility; Decreased coordination;Orthopedic restrictions; Impaired sensation   Barriers to Discharge Inaccessible home environment;Decreased caregiver support   Plan   Treatment/Interventions ADL retraining;Functional transfer training; Therapeutic exercise; Endurance training   Progress Progressing toward goals   Recommendation   OT Discharge Recommendation   (pending progress - pt must be mod(I) for dc home)   OT Therapy Minutes   OT Time In 1230   OT Time Out 1400   OT Total Time (minutes) 90   OT Mode of treatment - Individual (minutes) 90   OT Mode of treatment - Concurrent (minutes) 0   OT Mode of treatment - Group (minutes) 0   OT Mode of treatment - Co-treat (minutes) 0   OT Mode of Teatment - Total time(minutes) 90 minutes   Therapy Time missed   Time missed?  No

## 2019-06-26 NOTE — PROGRESS NOTES
Physical Medicine and Rehabilitation Progress Note  Gennaro Lloyd During 70 y o  male MRN: [id number]  Unit/Bed#: [facility] 970-01 Encounter: [id number]    Chief Complaints:  Pain in hands     Subjective/Interval Events:   Patient reports some ongoing pain in both hands and sensitivity in hands more so than bilateral arms  Patient reports strength and sensation about the same  Patient reports neck pain slowly improving  He reports still having fair amount of urinary incontinence which can be quite bothersome and impact sleep  He has been using a condom catheter at night  Patient denies dysuria, fever, chills, sweats, shortness of breath, constipation, or other complaints  ROS: A 10-point ROS was performed  Negative except as listed above  Overall Assessment/relevant history:  70-year-old male with a past medical history of  diabetes mellitus 2, hypertension, hyperlipidemia,  active smoker,lung cancer status post left upper lobectomy,  pulmonary nodules, COPD, obstructive sleep apnea,  only grade 1 diastolic dysfunction left ventricle, BPH who developed progressive 4 limb weakness, sensory changes, and gait imbalance 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 who underwent C3-C4 anterior cervical decompression and fusion with  C4 hemicorpectomy by Dr Aftab Lee on 6/18/19  Postoperative course notable for pain, hypertension, with significant decline ADLs and ambulation    Patient was evaluated by skilled therapies and is appropriate for admission to acute rehabilitation center      Functional status on admission to [facility]:  OT:  Bathing, upper body dressing, toileting, toilet transfers moderate assist, eating, grooming, lower body dressing, general transfers min assist  PT:  Ambulation min assist 100 feet but scored as total assist because of 2nd person chair follow    Functional status (recent):    Lower body dressing max assist, bathing, grooming, toileting, upper body dressing Min assist, transfers Min mod assist    Functional status goal:  Modified independent for ADLs and ambulation     Urinary incontinence  Assessment & Plan  Remains significant > urinary frequency, incontinence > possible neurogenic overactive spastic bladder  > significantly impairing sleep, function, quality of life  > U/A  non-infectious  > urology c/s appreciated > starting oxybutynin > monitor for changes  > toileting program  PVRs, bladder scans, measure urine outputs to help determine  Continue chronic flomax for now  Monitor for infection, incontinence, retention  Follow-up with Urology after discharge    * Cervical myelopathy [facility])  Assessment & Plan  Function improving   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 and fusion with C4 hemicorpectomy by Dr Leslie Olson on 19  Clindamycin for infectious prophylaxis per Neurosurgery  Holding anti-platelet per Neurosurgery  Postop day 14 is   KWADWO drain management if necessary  Cervical spine precautions, cervical collar on at all times  Monitor incision for infection or dehiscence  Recommend acute comprehensive interdisciplinary inpatient rehabilitation to include intensive skilled therapies (PT, OT) as outlined with oversight and management by rehabilitation physician as well as inpatient rehab level nursing, case management and weekly interdisciplinary team meetings  Follow-up with Neurosurgery if needed during course after discharge    Anxiety  Assessment & Plan  Stable to improving   Supportive counseling, psychology consult during course  Gabapentin may be helpful for this as well as pain  Optimize sleep  Counseled on and continue to encourage deep breathing/relaxation/behavioral management techniques:     Deep breathin seconds in, 5 seconds out, 5 times per hour when awake and PRN when experiencing pain or anxiety    Avoid holding breath and tightening muscles and instead breathe slowly and deeply      Insomnia  Assessment & Plan  Largely related to urinary frequency/incontinent   Optimal management of bladder dysfunction as outlined > monitor with adjustments in pain meds/urination meds   Melatonin 3 mg q h s  Optimize pain management as outlined  Recommend appropriate sleep hygiene: patient counselled on this:   Sleep only as much as necessary to feel rested and then get up or sit up in bed, maintain regular sleep schedule (same bedtime and wake time everyday), do not force sleep, avoid caffeinated beverages after lunch, avoid alcohol at home near bedtime, do not smoke particularly in evening, do not go to bed hungry, adjust bedroom environment so you are comfortable prior to settling down for sleep, deal with concerns or worries before bedtime  Make a list of things to work on for next day so anxiety is reduced at night, exercise regularly when cleared by physician      Pain  Assessment & Plan  Suboptimal neuropathic pain control stil   Somatic postsurgical, arthritic, muscle spasms, neuropathic   Continue current management for now:  Scheduled acetaminophen monitor LFTs (wnl 6/21)   Increase gabapentin to [phone] daily   Continue Oxycodone 5-10 mg q 4 hours p r n  Cymbalta can help as well   Hold for oversedation, AMS, or RR<12    Patient apparently was on Cymbalta in the past follow-up       Constipation  Assessment & Plan  Improved  - Colace/Senna BID, miralax daily   - PRN bowel regimen (Miralax PRN/Dulcolax supp PRN)  - monitor for signs and symptoms of obstruction/ileus > not currently; hold stimulant lax if occurs  - Limiting constipating medications if possible  - Ensure adequate hydration        At risk for venous thromboembolism (VTE)  Assessment & Plan  Heparin and SCDs    BPH (benign prostatic hyperplasia)  Assessment & Plan  Monitor for retention, incontinence, infection  Continue Flomax    SHIVANI (obstructive sleep apnea)  Assessment & Plan  CPAP    COPD (chronic obstructive pulmonary disease) ([facility])  Assessment & Plan  Monitor oxygen with and without activity  Management overall per Medicine  Encourage deep breathing and incentive spirometry  Provide albuteral neb for now with increased congestion recently off chronic meds  Spiriva, as needed albuterol  Home regimen, Advair, Spiriva, Combivent PRN  Follow-up with pulmonology after discharge    Essential hypertension  Assessment & Plan  Blood pressure elevated fair amount recently  Overall management by Medicine  Losartan 100 mg daily  Patient also on diltiazem chronically    Hyperlipidemia associated with type 2 diabetes mellitus ([facility] [address] )  Assessment & Plan  Resume statin at discretion Medicine    Type 2 diabetes mellitus with diabetic neuropathy, without long-term current use of insulin ([facility] )  Assessment & Plan  Overall management but Medicine  Patient on combination metformin medication previously  Insulin lispro at their discretion, other insulin and orals at their discretion     Pipe smoker  Assessment & Plan  Patient started on nicotine patch per Internal Medicine    Vitamin D insufficiency  Assessment & Plan  2000 units D3 for now daily follow-up with PCP    # Skin:   - Nursing to turn patient Q2H if not adequately ambulatory, monitor for skin breakdown, rashes, and wounds if applicable     # Diet/Hydration:    Diabetic     Disposition:   Home with [facility] 2-3 weeks from admission     Follow-up:   PCP, PMR, NSx, Urology     CODE: Level 1: Full Code     Restrictions include: Fall precautions     ---------------------------------------------------------------------------------------------------------------------    Objective:     Allergies and Medications per EMR    Physical Exam:  Temperature 98 4° pulse 77 blood pressure 152/75 SpO2 100% on room air  General: Awake, alert in NAD  HENT:  MMM,   Neck:  Surgical incision healing appropriately without signs of infection   Respiratory:  clear to auscultation without wheeze without significant rales or rhonchi  Cardiovascular: Regular rate and rhythm, no murmurs, rubs, or gallops  Gastrointestinal: Soft, non-tender, non-distended, normoactive bowel sounds  Genitourinary: No byrnes  SkiN/MSK/Extremities:  no calf edema, no calf tenderness to palpation  Neurologic/Psych:   MENTAL STATUS: awake, cognition stable, appropriate wakefulness  Affect:  Euthymic  Strength/MMT:   unchanged    Physical exam performed, documentation above reviewed and updated if appropriate on relevant date of encounter:   06/24/2019    Diagnostic Studies: reviewed, no new imaging  No results found      Laboratory:    Results from last 7 days   Lab Units 06/24/19  1219 06/21/19  0542   HEMOGLOBIN g/dL 13 6 13 8   HEMATOCRIT % 43 0 42 5   WBC Thousand/uL 3 85* 4 59     Results from last 7 days   Lab Units 06/24/19  1219 06/21/19  0542   BUN mg/dL 17 12   POTASSIUM mmol/L 4 5 4 2   CHLORIDE mmol/L 106 108   CREATININE mg/dL 0 85 0 77   AST U/L  --  12   ALT U/L  --  31            Patient Active Problem List   Diagnosis    Type 2 diabetes mellitus with diabetic neuropathy, without long-term current use of insulin ([facility])    Hyperlipidemia associated with type 2 diabetes mellitus ([address] )    Essential hypertension    BPH (benign prostatic hyperplasia)    Degenerative cervical spinal stenosis    Cervical spondylosis    COPD (chronic obstructive pulmonary disease) ([facility])    Adjustment disorder with depressed mood    Progressive locomotor ataxia    Other spondylosis with myelopathy, cervical region    Quadriparesis ([address] )    Spinal cord compression ([facility] [address] )    Prostate cancer ([facility] [address] )    Overactive bladder    Lesion of native kidney    SHIVANI (obstructive sleep apnea)    Impaired mobility and ADLs    Cervical myelopathy ([facility])    At risk for venous thromboembolism (VTE)    Pain    Constipation    Urinary incontinence    Depression    Vitamin D insufficiency    Pipe smoker    Insomnia    Anxiety       ** Please Note: Fluency Direct voice to text software may have been used in the creation of this document   **       Tracy Tobias MD, [facility]  Physical Medicine and Rehabilitation  Brain Injury Medicine

## 2019-06-26 NOTE — PCC PHYSICAL THERAPY
7/2/2019: Greg cont to make progress in physical therapy  Balance and righting reactions are improving  Pt is ambulating CS level with assistive device (RW and with SPC this morning)  Pt completing 24 stairs CGA level with LHR decending and RHR ascending with reciprocal pattern  Pt SPT and STS transfer at CGA/CS level with occ steadying assist  Pt limitations cont to be dec balance and righting reactions  Activity tolerance has increased  Pt barriers to home include: inaccessable home environment with 6STE single HR (BHR but unable to reach both at same time) and decreased caregiver support  Pt lives with wife but they have had "complications" so she is only home on weekends  She works and lives in [place] during the week  Pt enters home with living room, kitchen and dining room on first level  5 stairs up to bathroom and bedrooms  Spends most of his time on ground level and will have to negotiate 5 stairs up to bathroom, 9 stairs down to basement where laundry is  SHR in home LHR descending, RHR ascending  Pt son in today 7/2/19 for family meeting to discuss SNF placement  6/26/2019: Reece Garcia has made good progress since starting physical therapy  He is ambulating at [facility] level with RW and is CGA for SPT transfer with RW  Pt is eager to engage in therapy sessions and requests extra time if able  Pt limitations include dec balance and righting reactions, dec strength in LE, and dec activity tolerance  Cont to address limitations prior to d/c  Pt barriers to d/c include: dec caregiver support and inaccessible home environemt  Pt looking for HHA to come in 2 days a week for a few hours to help with [place] chores and bathing  CM gave information to pt and informed that HHA will not be covered by insurance  Pt stated his son lives close but works in [place] so he is not available at all times  Pt also spoke of daughter who works but occ when she gets a day off will come to pt's house and sometimes spend the night  Pt will be alone as wife works in [place] during the week  Pt is progressing and will cont to benefit from PT to address physical impairments

## 2019-06-26 NOTE — PROGRESS NOTES
06/26/19 0830   Pain Assessment   Pain Assessment No/denies pain   Pain Score No Pain   Restrictions/Precautions   Precautions Bed/chair alarms; Fall Risk;Spinal precautions;Supervision on toilet/commode   Weight Bearing Restrictions No   ROM Restrictions Yes  (C-spine precautions )   Braces or Orthoses C/S Collar   Cognition   Overall Cognitive Status WFL   Arousal/Participation Alert; Cooperative   Subjective   Subjective Pt states he slept well last night and feels rested  Pt eager to participate in PT session  QI: Sit to Stand   Assistance Needed Adaptive equipment; Incidental touching   Assistance Provided by [place] Less than 25%   Comment with and without RW    Sit to Stand CARE Score 3   QI: Chair/Bed-to-Chair Transfer   Assistance Needed Incidental touching; Adaptive equipment   Assistance Provided by [place] Less than 25%   Comment with and without RW    Chair/Bed-to-Chair Transfer CARE Score 3   Transfer Bed/Chair/Wheelchair   Limitations Noted In Balance; Coordination;LE Strength; Endurance   Adaptive Equipment Roller Walker; Other  (Hands on Hips )   Stand Pivot Contact Guard   Sit to Stand Contact Guard   Stand to Sit Contact Guard   Findings Pt CGA with RW and CGA/Mack without AD    Bed, Chair, Wheelchair Transfer (FIM) 4 - Patient requires steadying assist or light touching   QI: Walk 10 Feet   Assistance Needed Incidental touching;Physical assistance; Adaptive equipment   Assistance Provided by [place] Less than 25%   Comment CGA with and without AD    Walk 10 Feet CARE Score 3   QI: Walk 50 Feet with Two Turns   Assistance Needed Adaptive equipment;Physical assistance; Incidental touching   Assistance Provided by [place] Less than 25%   Comment CGA with and without AD    Walk 50 Feet with Two Turns CARE Score 3   QI: Walk 150 Feet   Assistance Needed Incidental touching;Physical assistance; Adaptive equipment   Assistance Provided by [place] Less than 25%   Comment CGA with and without AD     Walk 150 Feet CARE Score 3   Ambulation   Does the patient walk? 2  Yes   Primary Discharge Mode of Locomotion Walk   Walk Assist Level Contact Guard   Gait Pattern Inconsistant Analisa;Decreased foot clearance; Step through;Narrow LILLIE; Improper weight shift   Assist Device [employer]; Other  (Hands on Hips )   Distance Walked (feet) 350 ft  (x2 no AD, 150x2 no AD, 700x2 RW )   Limitations Noted In Balance; Coordination; Heel Strike; Sequencing;Speed;Strength;Swing   Findings Better device management around turns today with pt keeping walker inside LILLIE  Walking (FIM) 4 - Patient requires steadying assist or light touching AND distance 150 feet or more, no rest   Wheelchair mobility   QI: Does the patient use a wheelchair? 0  No   QI: 4 Steps   Assistance Needed Adaptive equipment; Incidental touching   Assistance Provided by [place] Less than 25%   Comment LHR reciprocal down, RHR reciprocal up CGA    4 Steps CARE Score 3   QI: 12 Steps   Assistance Needed Incidental touching; Adaptive equipment   Assistance Provided by [place] Less than 25%   Comment LHR reciprocal down, RHR reciprocal up CGA    12 Steps CARE Score 3   Stairs   Type Stairs   # of Steps 12  (FFx2)   Weight Bearing Precautions Fall Risk   Assist Devices Single Rail   Findings LHR reciprocal down, RHR reciprocal up CGA    QI: Toilet Transfer   Assistance Needed Incidental touching   Assistance Provided by [place] Less than 25%   Toilet Transfer CARE Score 3   Toilet Transfer   Surface Assessed Standard Toilet   Limitations Noted In Balance; Endurance; Sequencing;LE Strength   Adaptive Equipment Grab Bar   Positioning Concerns Safety   Toilet Transfer (FIM) 4 - Patient requires steadying assist or light touching   Therapeutic Interventions   Strengthening Seated ther ex in recliner chair in room during nusring administering medication 2x12 BLE: LAQ, marching, man resisted h/s curl    Balance Ambulation without RW, Fwd/Bwd/side stepping, Ambulation and step ups on 6in  stairs with 3# on single LE then alternating wt to other LE  Static standing on green foam 1minx2, use of bathroom with no AD to practice toileting tasks and clothing management  Assessment   Treatment Assessment Pt participated in skilled PT with focus on inc balance, LE strengthening and functional mobility with AD and without AD to work balance  Pt cont with unsteady ambulation without RW but ambulation with RW improving to CS/CGA level  Pt with better device management during turns this session with better carryover from instruction yesterday  Pt stated today he has daughter who can stop by during the week and spend an occ overnight at pt's home but cont to want HHA to spend a few hours 2 days a week to help with HHC and bathing  Pt spoke to CM during session about setting up HHA and home PT  Pt urinated once during session with urgency but able to make it back to room from PT gym to use bathroom  Cont to trial with and without AD during session to work balance and determine appropriate AD upon discharge  Pt cont to benefit from skilled PT to inc balance and reach Marissa goals for safe d/c home  Family/Caregiver Present no    Barriers to Discharge Inaccessible home environment;Decreased caregiver support   PT Barriers   Physical Impairment Decreased strength;Decreased endurance; Impaired balance;Decreased mobility; Decreased coordination;Decreased safety awareness;Orthopedic restrictions   Functional Limitation Car transfers;Stair negotiation;Standing;Walking;Transfers   Plan   Treatment/Interventions Functional transfer training;LE strengthening/ROM; Elevations; Therapeutic exercise; Endurance training;Patient/family training;Gait training; Compensatory technique education   Progress Progressing toward goals   Recommendation   Recommendation Other (Comment)  (TBD pending pt progress )   Equipment Recommended Other (Comment)  (TBD )   PT Therapy Minutes   PT Time In 0830   PT Time Out 1000   PT Total Time (minutes) 90   PT Mode of treatment - Individual (minutes) 90   PT Mode of treatment - Concurrent (minutes) 0   PT Mode of treatment - Group (minutes) 0   PT Mode of treatment - Co-treat (minutes) 0   PT Mode of Teatment - Total time(minutes) 90 minutes   Therapy Time missed   Time missed?  No

## 2019-06-26 NOTE — PCC OCCUPATIONAL THERAPY
Pt is making fair progress with occupational therapy  Pt continues with poor BUE sensation, BUE ataxia, and impaired safety awareness and memory impacting ability to engage in ADL and IADL tasks  Pt continues to demonstrate need for assistance with c/s collar management  Pt unable to maintain cervical precautions while transferring into shower collar or transferring pads  Pt remains limited with functional transfers to [facility]  Pt must be mod I for d/c home as he has limited friend support and will be alone with occasional assistance for transport only  Pt is currently unable to d/c home safely  Pt would benefit from continued subacute rehab to address barriers and maximize independence

## 2019-06-26 NOTE — PCC CARE MANAGEMENT
Pt is participating well with therapy and continues to make gains  Pt and his family agree that a skilled nursing facility is necessary, and referrals have been made per their wishes  Following to assist with d/c planning needs

## 2019-06-26 NOTE — PROGRESS NOTES
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; s/p C3/4 ACDF with C4 hemicorpectomy, C3-4 anterior instrumentation/fusion 6/18/19 Niles Tamayo):  continue cervical collar; on Clindamycin for infection prophylaxis  Pain control per primary service  Had previously been on Cymbalta for neuropathic sx      DM2: at home he takes Kombiglyze XR 5-1000mg qd and no insulin  Continue Metformin 500mg daily  Continue DM diet and Accuchecks/QID with SSI      HTN:  at home, takes Cardizem 180mg qd, Losartan 100mg qd (was not on Norvasc he says); here on Card 120mg qd, Losartan 100mg qd  Blood pressure improved      COPD; hx lung cancer/VIVIANA lobectomy, pulmonary nodules: follows with pulm as OP and takes Advair 250-50 one puff BID/Spiriva one puff daily and prn Combivent  On Breo as substitute for Advair, continue Spiriva and use Albuterol inhaler prn     SHIVANI: supposed to use CPAP but has been noncompliant     HLD: at home on Pravachol     Urinary incontinence/BPH/urgency: continue Flomax, Oxybutynin ER 5mg qd; Uro is following         Subjective/ HPI:  Patients overnight issues or events were reviewed with nursing or staff during rounds or morning huddle session  No new or overnight issues  Hypokalemia resolved  DM/HTN:  Stable  Patient denies any current complaints      ROS:     GI: denies abdominal pain, change bowel habits or reflux symptoms  Neuro: Denies any headache, new vision changes, new neuropathies,new weaknesses   Respiratory: No Cough, SOB, denies wheeze  Cardiovascular: No CP, palpitations , denies perception of rapid heartbeat  : denies any new urinary burning or frequency    Review of Scheduled Meds:    Current Facility-Administered Medications:  acetaminophen 650 mg Oral TID Swapna Persaud MD   albuterol 2 puff Inhalation Q4H PRN Swapna Persaud MD   bisacodyl 10 mg Rectal Daily PRN Swapna Persaud MD   bisacodyl 10 mg Rectal Once Swapna Persaud MD   cholecalciferol 2,000 Units Oral Daily Swapna Persaud MD   clindamycin 300 mg Oral [facility] Swapna Persaud MD   diltiazem 120 mg Oral Daily Swapna Persaud MD   docusate sodium 100 mg Oral BID Swapna Persaud MD   DULoxetine 60 mg Oral Daily Swapna Persaud MD   fluticasone-vilanterol 1 puff Inhalation Daily Swapna Persaud MD   gabapentin 300 mg Oral BID Swapna Persaud MD   gabapentin 400 mg Oral HS Swapna Persaud MD   heparin (porcine) 5,000 Units Subcutaneous [facility] Swapna Persaud MD   hydrALAZINE 25 mg Oral Q8H PRN SONJA Zavala   insulin lispro 1-5 Units Subcutaneous TID [facility] Swapna Persaud MD   insulin lispro 1-5 Units Subcutaneous HS Swapna Persaud MD   lidocaine  Topical Q4H PRN Swapna Persaud MD   losartan 100 mg Oral Daily Swapna Persaud MD   melatonin 3 mg Oral HS Mery Grewal PA-C   metFORMIN 500 mg Oral Daily With Breakfast SONJA Zavala   nicotine 21 mg Transdermal Daily Mery Grewal PA-C   ondansetron 4 mg Intravenous Q6H PRN Swapna Persaud MD   oxybutynin 5 mg Oral Daily SONJA Gan   oxyCODONE 10 mg Oral Q4H PRN Swapna Persaud MD   oxyCODONE 5 mg Oral Q4H PRN Swapna Persaud MD   pantoprazole 40 mg Oral Daily Before Breakfast Swapna Persaud MD   polyethylene glycol 17 g Oral Daily PRN Swapna Persaud MD   polyethylene glycol 17 g Oral Daily Swapna Persaud MD   polyvinyl alcohol 1 drop Both Eyes 4x Daily Swapna Persaud MD   polyvinyl alcohol 1 drop Both Eyes Q1H PRN Swapna Persaud MD   pravastatin 10 mg Oral Daily With Shaye Richter MD   senna 1 tablet Oral Daily Swapna Persaud MD   tamsulosin 0 4 mg Oral After Isidoro Ruano MD   thiamine 50 mg Oral Daily Swapna Persaud MD   tiotropium 18 mcg Inhalation Daily Swapna Persaud MD       Labs:     Results from last 7 days   Lab Units 06/25/19  0559 06/24/19  1219   WBC Thousand/uL 4 10* 3 85*   HEMOGLOBIN g/dL 13 7 13 6   HEMATOCRIT % 42 4 43 0   PLATELETS Thousands/uL 157 161     Results from last 7 days   Lab Units 06/25/19  0559 06/24/19  1219   SODIUM mmol/L 141 142   POTASSIUM mmol/L 4 9 4 5   CHLORIDE mmol/L 106 106   CO2 mmol/L 33* 34*   BUN mg/dL 18 17   CREATININE mg/dL 0 93 0 85   CALCIUM mg/dL 10 6* 10 0                  Results from last 7 days   Lab Units 06/26/19  0632 06/25/19  2054 06/25/19  1614   POC GLUCOSE mg/dl 112 128 180*       Imaging:     No orders to display       *Labs reviewed  *Radiology studies reviewed  *Medications reviewed and reconciled as needed  *Please refer to order section for additional ordered labs studies  *Case discussed with primary attending during morning huddle case rounds    Physical Examination:  Vitals:   Vitals:    06/25/19 2052 06/25/19 2230 06/26/19 0549 06/26/19 0847   BP: 135/81  152/87 130/82   BP Location: Left arm  Left arm    Pulse: 90  80    Resp: 18  18    Temp: 97 8 °F (36 6 °C)  97 5 °F (36 4 °C)    TempSrc: Oral  Oral    SpO2: 100% 98% 99%    Weight:   63 2 kg (139 lb 5 3 oz)    Height:           General Appearance: NAD, conversive  Eyes: No icterus; conjunctiva normal  HENT: oropharynx clear; mucous membranes moist  Neck: collar on  Lungs: CTA, normal respiratory effort, no retractions, expiratory effort normal  CV: regular rate, no rubs/murmurs/gallops  ABD: soft; ND/NT; +BS  EXT: no edema  Skin: normal turgor, normal texture, no rashes  Psych: affect normal, No anxiety/depression   Neuro: AAOx3;  UE 4/5 R>L, LEs 4+/5 R>L          Total time spent: At least 40 minutes, with more than 50% spent counseling/coordinating care  Counseling includes discussion with patient re: progress  and discussion with patient of his/her current medical state/information  Coordination of patient's care was performed in conjunction with primary service   Time invested included review of patient's labs, vitals, and management of their comorbidities with continued monitoring  In addition, this patient was discussed with medical team including physician and advanced extenders  The care of the patient was extensively discussed and appropriate treatment plan was formulated unique for this patient  ** Please Note: Dragon 360 Dictation voice to text software may have been used in the creation of this document   **

## 2019-06-26 NOTE — PLAN OF CARE
Problem: Potential for Falls  Goal: Patient will remain free of falls  Description  INTERVENTIONS:  - Assess patient frequently for physical needs  -  Identify cognitive and physical deficits and behaviors that affect risk of falls  -  [place] fall precautions as indicated by assessment   - Educate patient/family on patient safety including physical limitations  - Instruct patient to call for assistance with activity based on assessment  - Modify environment to reduce risk of injury  - Consider OT/PT consult to assist with strengthening/mobility  Outcome: Progressing     Problem: Nutrition/Hydration-ADULT  Goal: Nutrient/Hydration intake appropriate for improving, restoring or maintaining nutritional needs  Description  Monitor and assess patient's nutrition/hydration status for malnutrition (ex- brittle hair, bruises, dry skin, pale skin and conjunctiva, muscle wasting, smooth red tongue, and disorientation)  Collaborate with interdisciplinary team and initiate plan and interventions as ordered  Monitor patient's weight and dietary intake as ordered or per policy  Utilize nutrition screening tool and intervene per policy  Determine patient's food preferences and provide high-protein, high-caloric foods as appropriate       INTERVENTIONS:  - Monitor oral intake, urinary output, labs, and treatment plans  - Assess nutrition and hydration status and recommend course of action  - Evaluate amount of meals eaten  - Assist patient with eating if necessary   - Allow adequate time for meals  - Recommend/ encourage appropriate diets, oral nutritional supplements, and vitamin/mineral supplements  - Order, calculate, and assess calorie counts as needed  - Recommend, monitor, and adjust tube feedings and TPN/PPN based on assessed needs  - Assess need for intravenous fluids  - Provide specific nutrition/hydration education as appropriate  - Include patient/family/caregiver in decisions related to nutrition  Outcome: Progressing     Problem: PAIN - ADULT  Goal: Verbalizes/displays adequate comfort level or baseline comfort level  Description  Interventions:  - Encourage patient to monitor pain and request assistance  - Assess pain using appropriate pain scale  - Administer analgesics based on type and severity of pain and evaluate response  - Implement non-pharmacological measures as appropriate and evaluate response  - Consider cultural and social influences on pain and pain management  - Notify physician/advanced practitioner if interventions unsuccessful or patient reports new pain  Outcome: Progressing     Problem: INFECTION - ADULT  Goal: Absence or prevention of progression during hospitalization  Description  INTERVENTIONS:  - Assess and monitor for signs and symptoms of infection  - Monitor lab/diagnostic results  - Monitor all insertion sites, i e  indwelling lines, tubes, and drains  - Monitor endotracheal (as able) and nasal secretions for changes in amount and color  - [place] appropriate cooling/warming therapies per order  - Administer medications as ordered  - Instruct and encourage patient and family to use good hand hygiene technique  - Identify and instruct in appropriate isolation precautions for identified infection/condition  Outcome: Progressing     Problem: SAFETY ADULT  Goal: Patient will remain free of falls  Description  INTERVENTIONS:  - Assess patient frequently for physical needs  -  Identify cognitive and physical deficits and behaviors that affect risk of falls    -  [place] fall precautions as indicated by assessment   - Educate patient/family on patient safety including physical limitations  - Instruct patient to call for assistance with activity based on assessment  - Modify environment to reduce risk of injury  - Consider OT/PT consult to assist with strengthening/mobility  Outcome: Progressing  Goal: Maintain or return to baseline ADL function  Description  INTERVENTIONS:  -  Assess patient's ability to carry out ADLs; assess patient's baseline for ADL function and identify physical deficits which impact ability to perform ADLs (bathing, care of mouth/teeth, toileting, grooming, dressing, etc )  - Assess/evaluate cause of self-care deficits   - Assess range of motion  - Assess patient's mobility; develop plan if impaired  - Assess patient's need for assistive devices and provide as appropriate  - Encourage maximum independence but intervene and supervise when necessary  ¯ Involve family in performance of ADLs  ¯ Assess for home care needs following discharge   ¯ Request OT consult to assist with ADL evaluation and planning for discharge  ¯ Provide patient education as appropriate  Outcome: Progressing  Goal: Maintain or return mobility status to optimal level  Description  INTERVENTIONS:  - Assess patient's baseline mobility status (ambulation, transfers, stairs, etc )    - Identify cognitive and physical deficits and behaviors that affect mobility  - Identify mobility aids required to assist with transfers and/or ambulation (gait belt, sit-to-stand, lift, walker, cane, etc )  - [place] fall precautions as indicated by assessment  - Record patient progress and toleration of activity level on Mobility SBAR; progress patient to next Phase/Stage  - Instruct patient to call for assistance with activity based on assessment  - Request Rehabilitation consult to assist with strengthening/weightbearing, etc   Outcome: Progressing     Problem: DISCHARGE PLANNING  Goal: Discharge to home or other facility with appropriate resources  Description  INTERVENTIONS:  - Identify barriers to discharge w/patient and caregiver  - Arrange for needed discharge resources and transportation as appropriate  - Identify discharge learning needs (meds, wound care, etc )  - Arrange for interpretive services to assist at discharge as needed  - Refer to Case Management Department for coordinating discharge planning if the patient needs post-hospital services based on physician/advanced practitioner order or complex needs related to functional status, cognitive ability, or social support system  Outcome: Progressing     Problem: Prexisting or High Potential for Compromised Skin Integrity  Goal: Skin integrity is maintained or improved  Description  INTERVENTIONS:  - Identify patients at risk for skin breakdown  - Assess and monitor skin integrity  - Assess and monitor nutrition and hydration status  - Monitor labs (i e  albumin)  - Assess for incontinence   - Turn and reposition patient  - Assist with mobility/ambulation  - Relieve pressure over bony prominences  - Avoid friction and shearing  - Provide appropriate hygiene as needed including keeping skin clean and dry  - Evaluate need for skin moisturizer/barrier cream  - Collaborate with interdisciplinary team (i e  Nutrition, Rehabilitation, etc )   - Patient/family teaching  Outcome: Progressing

## 2019-06-26 NOTE — PROGRESS NOTES
Physical Medicine and Rehabilitation Progress Note  Gennaro Lloyd During 70 y o  male MRN: [id number]  Unit/Bed#: [facility] 970-01 Encounter: [id number]    Chief Complaints:  Hand sensitivity    Subjective/Interval Events:   Patient reports less urinary frequency overnight and better sleep  Patient reports overall neck pain and coordination function are improving  He reports still overly sensitive hand tingling at times  Patient denies constipation, lightheadedness, shortness of breath, fever, chills, sweats, calf pain, or other complaints  ROS: A 10-point ROS was performed  Negative except as listed above  Overall Assessment/relevant history:  79-year-old male with a past medical history of  diabetes mellitus 2, hypertension, hyperlipidemia,  active smoker,lung cancer status post left upper lobectomy,  pulmonary nodules, COPD, obstructive sleep apnea,  only grade 1 diastolic dysfunction left ventricle, BPH who developed progressive 4 limb weakness, sensory changes, and gait imbalance 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 who underwent C3-C4 anterior cervical decompression and fusion with  C4 hemicorpectomy by Dr Aftab Lee on 6/18/19  Postoperative course notable for pain, hypertension, with significant decline ADLs and ambulation    Patient was evaluated by skilled therapies and is appropriate for admission to acute rehabilitation center      Functional status on admission to [facility]  OT:  Bathing, upper body dressing, toileting, toilet transfers moderate assist, eating, grooming, lower body dressing, general transfers min assist  PT:  Ambulation min assist 100 feet but scored as total assist because of 2nd person chair follow    Functional status (recent):    Transfers and ambulation Min assist; toileting supervision    Functional status goal:  Modified independent for ADLs and ambulation     Urinary incontinence  Assessment & Plan  Some improvements overnight  Recent urinary frequency, incontinence > possible neurogenic overactive spastic bladder  > significantly impairing sleep, function, quality of life  > U/A  non-infectious  > urology c/s appreciated > oxybutynin recently started > monitor for changes  > toileting program  PVRs, bladder scans, measure urine outputs to help determine  Continue chronic flomax for now  Monitor for infection, incontinence, retention  Follow-up with Urology after discharge    * Cervical myelopathy [facility])  Assessment & Plan  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 and fusion with C4 hemicorpectomy by Dr Giovanni Leigh on 19  Clindamycin for infectious prophylaxis per Neurosurgery  Holding anti-platelet per Neurosurgery  Postop day 14 is   KWADWO drain management if necessary  Cervical spine precautions, cervical collar on at all times  Monitor incision for infection or dehiscence  Recommend acute comprehensive interdisciplinary inpatient rehabilitation to include intensive skilled therapies (PT, OT) as outlined with oversight and management by rehabilitation physician as well as inpatient rehab level nursing, case management and weekly interdisciplinary team meetings  Follow-up with Neurosurgery if needed during course after discharge    Anxiety  Assessment & Plan  Improving  Supportive counseling, psychology consult during course  Gabapentin may be helpful for this as well as pain  Optimize sleep  Counseled on and continue to encourage deep breathing/relaxation/behavioral management techniques:     Deep breathin seconds in, 5 seconds out, 5 times per hour when awake and PRN when experiencing pain or anxiety  Avoid holding breath and tightening muscles and instead breathe slowly and deeply      Insomnia  Assessment & Plan  Some improvements  Optimal management of bladder dysfunction as outlined > monitor with adjustments in pain meds/urination meds   Melatonin 3 mg q h s    Optimize pain management as outlined  Recommend appropriate sleep hygiene: patient counselled on this:   Sleep only as much as necessary to feel rested and then get up or sit up in bed, maintain regular sleep schedule (same bedtime and wake time everyday), do not force sleep, avoid caffeinated beverages after lunch, avoid alcohol at home near bedtime, do not smoke particularly in evening, do not go to bed hungry, adjust bedroom environment so you are comfortable prior to settling down for sleep, deal with concerns or worries before bedtime  Make a list of things to work on for next day so anxiety is reduced at night, exercise regularly when cleared by physician      Pain  Assessment & Plan  Ongoing suboptimal neuropathic pain control  Somatic postsurgical, arthritic, muscle spasms, neuropathic   Continue current management for now:  Scheduled acetaminophen monitor LFTs (wnl 6/21)   Increase gabapentin to 400 mg t i d  Continue Oxycodone 5-10 mg q 4 hours p r n  Cymbalta can help as well   Hold for oversedation, AMS, or RR<12    Patient apparently was on Cymbalta in the past follow-up       Constipation  Assessment & Plan  Improved  - Colace/Senna BID, miralax daily   - PRN bowel regimen (Miralax PRN/Dulcolax supp PRN)  - monitor for signs and symptoms of obstruction/ileus > not currently; hold stimulant lax if occurs  - Limiting constipating medications if possible  - Ensure adequate hydration        At risk for venous thromboembolism (VTE)  Assessment & Plan  Heparin and SCDs    BPH (benign prostatic hyperplasia)  Assessment & Plan  Monitor for retention, incontinence, infection  Continue Flomax    SHIVANI (obstructive sleep apnea)  Assessment & Plan  CPAP    COPD (chronic obstructive pulmonary disease) ([facility])  Assessment & Plan  Monitor oxygen with and without activity  Management overall per Medicine  Encourage deep breathing and incentive spirometry  Provide albuteral neb for now with increased congestion recently off chronic meds  Spiriva, as needed albuterol  Home regimen, Advair, Spiriva, Combivent PRN  Follow-up with pulmonology after discharge    Essential hypertension  Assessment & Plan  Blood pressure elevated fair amount recently  Overall management by Medicine  Losartan 100 mg daily  Patient also on diltiazem chronically    Hyperlipidemia associated with type 2 diabetes mellitus ([facility] )  Assessment & Plan  Resume statin at discretion Medicine    Type 2 diabetes mellitus with diabetic neuropathy, without long-term current use of insulin ([facility] )  Assessment & Plan  Overall management but Medicine  Patient on combination metformin medication previously  Insulin lispro at their discretion, other insulin and orals at their discretion     Pipe smoker  Assessment & Plan  Patient started on nicotine patch per Internal Medicine    Vitamin D insufficiency  Assessment & Plan  2000 units D3 for now daily follow-up with PCP    # Skin:   - Nursing to turn patient Q2H if not adequately ambulatory, monitor for skin breakdown, rashes, and wounds if applicable     # Diet/Hydration:    Diabetic     Disposition:   Home with [facility] 2-3 weeks from admission     Follow-up:   PCP, PMR, NSx, Urology     CODE: Level 1: Full Code     Restrictions include: Fall precautions     ---------------------------------------------------------------------------------------------------------------------    Objective:     Allergies and Medications per EMR    Physical Exam:  Vitals:    06/26/19 0847   BP: 130/82   Pulse:    Resp:    Temp:    SpO2:      General: Awake, alert in NAD  HENT:  MMM,    Respiratory:  clear to auscultation without wheeze without significant rales or rhonchi  Cardiovascular: Regular rate and rhythm, no murmurs, rubs, or gallops  Gastrointestinal: Soft, non-tender, non-distended, normoactive bowel sounds  Genitourinary: No byrnes  SkiN/MSK/Extremities:  no calf edema, no calf tenderness to palpation  Neurologic/Psych:   MENTAL STATUS: awake, orientation intact appropriate wakefulness   Affect:  Positive    Physical exam performed, documentation above reviewed and updated if appropriate on relevant date of encounter:   06/26/2019    Diagnostic Studies: reviewed, no new imaging  No results found  Laboratory:    Results from last 7 days   Lab Units 06/25/19  0559 06/24/19  1219 06/21/19  0542   HEMOGLOBIN g/dL 13 7 13 6 13 8   HEMATOCRIT % 42 4 43 0 42 5   WBC Thousand/uL 4 10* 3 85* 4 59     Results from last 7 days   Lab Units 06/25/19  0559 06/24/19  1219 06/21/19  0542   BUN mg/dL 18 17 12   POTASSIUM mmol/L 4 9 4 5 4 2   CHLORIDE mmol/L 106 106 108   CREATININE mg/dL 0 93 0 85 0 77   AST U/L  --   --  12   ALT U/L  --   --  31                Patient Active Problem List   Diagnosis    Type 2 diabetes mellitus with diabetic neuropathy, without long-term current use of insulin ([facility])    Hyperlipidemia associated with type 2 diabetes mellitus ([address] )    Essential hypertension    BPH (benign prostatic hyperplasia)    Degenerative cervical spinal stenosis    Cervical spondylosis    COPD (chronic obstructive pulmonary disease) ([facility])    Adjustment disorder with depressed mood    Progressive locomotor ataxia    Other spondylosis with myelopathy, cervical region    Quadriparesis ([address] )    Spinal cord compression ([address] )    Prostate cancer ([address] )    Overactive bladder    Lesion of native kidney    SHIVANI (obstructive sleep apnea)    Impaired mobility and ADLs    Cervical myelopathy ([facility])    At risk for venous thromboembolism (VTE)    Pain    Constipation    Urinary incontinence    Depression    Vitamin D insufficiency    Pipe smoker    Insomnia    Anxiety       ** Please Note: Fluency Direct voice to text software may have been used in the creation of this document   **       Krissy Payan MD, [facility]  Physical Medicine and Rehabilitation  Brain Injury Medicine

## 2019-06-26 NOTE — PROGRESS NOTES
06/26/19 1445   Pain Assessment   Pain Assessment No/denies pain   Pain Score No Pain   Restrictions/Precautions   Precautions Bed/chair alarms;Cognitive; Fall Risk;Supervision on toilet/commode   Weight Bearing Restrictions No   ROM Restrictions   (cervical spinal precautions)   Braces or Orthoses C/S Collar   Cognition   Overall Cognitive Status WFL   Arousal/Participation Alert; Cooperative   Attention Attends with cues to redirect   Orientation Level Oriented X4   Memory Decreased recall of recent events   Following Commands Follows multistep commands with increased time or repetition   Subjective   Subjective patient with no new complaints    QI: Sit to Stand   Assistance Needed Incidental touching   Assistance Provided by [place] No physical assistance   Sit to Stand CARE Score 4   QI: Chair/Bed-to-Chair Transfer   Assistance Needed Incidental touching   Assistance Provided by [place] Less than 25%   Chair/Bed-to-Chair Transfer CARE Score 3   Transfer Bed/Chair/Wheelchair   Limitations Noted In Balance; Coordination  (device management )   Adaptive Equipment Roller Walker   Stand Pivot Contact Guard   Sit to [employer]   Stand to [employer] [place], Chair, Wheelchair Transfer (FIM) 4 - Patient requires steadying assist or light touching   QI: Walk 10 Feet   Assistance Needed Incidental touching   Assistance Provided by [place] Less than 25%   Comment CGA with verbal instruction required    Walk 10 Feet CARE Score 3   QI: Walk 50 Feet with Two Turns   Assistance Needed Incidental touching   Assistance Provided by [place] Less than 25%   Comment CGA with verbal instruction required    Walk 50 Feet with Two Turns CARE Score 3   QI: Walk 150 Feet   Assistance Needed Incidental touching   Assistance Provided by [place] Less than 25%   Comment CGA with verbal instruction required    Walk 150 Feet CARE Score 3   Ambulation   Does the patient walk? 2   Yes   Primary Discharge Mode of Locomotion Walk   Walk Assist Level Contact Guard   Gait Pattern Inconsistant Analisa   Assist Device Roller Walker   Distance Walked (feet) 200 ft   Limitations Noted In Balance; Coordination;Device Management; Sequencing;Strength;Swing   Walking (FIM) 4 - Patient requires steadying assist or light touching AND distance 150 feet or more, no rest   QI: Toilet Transfer   Assistance Needed Incidental touching   Assistance Provided by [place] Less than 25%   Comment CGA with verbal instruction required    Toilet Transfer CARE Score 3   Toilet Transfer   Surface Assessed Standard Toilet   Limitations Noted In Balance;Problem Solving   Positioning Concerns Safety   Toilet Transfer (FIM) 4 - Patient requires steadying assist or light touching   Equipment Use   NuStep patient performed 30 minutes prior to session    Assessment   Treatment Assessment Patient finishing on NuStep for additional time for strengthening, improving coordination and increasing tolerance to activity  During ambulation back to room and within room activities, patient utilizing RW to increase stability  Patient required verbal instruction for proper use mostly on turns  Patient also with scissoring on turns in which he was encouraged to lead with LE closest to target  He will continue to require reinforcement for appropriate AD use  Continue to trial activity without use of RW for improvement of balance, righting reactions and core stability  He continues to benefit from skilled PT intervention to progress functional mobility (I) and safety  Family/Caregiver Present son present at end of session    Problem List Decreased strength;Decreased endurance; Impaired balance;Decreased mobility; Decreased coordination; Impaired sensation;Orthopedic restrictions;Pain   Barriers to Discharge Inaccessible home environment;Decreased caregiver support   PT Barriers   Functional Limitation Car transfers;Stair negotiation;Standing;Transfers; Walking   Plan   Treatment/Interventions Functional transfer training;LE strengthening/ROM; Elevations; Therapeutic exercise; Endurance training;Patient/family training;Equipment eval/education; Bed mobility;Gait training; Compensatory technique education   Progress Progressing toward goals   Recommendation   Recommendation Other (Comment)  (TBD pending progress)   Equipment Recommended   (TBD )   PT Therapy Minutes   PT Time In 1445   PT Time Out 1500   PT Total Time (minutes) 15   PT Mode of treatment - Individual (minutes) 15   PT Mode of treatment - Concurrent (minutes) 0   PT Mode of treatment - Group (minutes) 0   PT Mode of treatment - Co-treat (minutes) 0   PT Mode of Teatment - Total time(minutes) 15 minutes   Therapy Time missed   Time missed?  No

## 2019-06-26 NOTE — SOCIAL WORK
Pt requested info re: funding for HHA  [facility], [phone], and learned that Pt was already in their system, but until he is home, they cannot assess for services  CM can call when Pt is d/c'd, and inform them of such

## 2019-06-26 NOTE — PROGRESS NOTES
Pt alert and able to make needs known  OOB in recliner chair with call bell and bedside table within reach  Vista collar and dressing remain intact  Pt voices no c/o's pain or discomfort at this time  Will continue to monitor

## 2019-06-27 LAB
ANION GAP SERPL CALCULATED.3IONS-SCNC: 3 MMOL/L (ref 4–13)
BASOPHILS # BLD AUTO: 0.03 THOUSANDS/ΜL (ref 0–0.1)
BASOPHILS NFR BLD AUTO: 1 % (ref 0–1)
BUN SERPL-MCNC: 20 MG/DL (ref 5–25)
CALCIUM SERPL-MCNC: 10.6 MG/DL (ref 8.3–10.1)
CHLORIDE SERPL-SCNC: 105 MMOL/L (ref 100–108)
CO2 SERPL-SCNC: 32 MMOL/L (ref 21–32)
CREAT SERPL-MCNC: 0.82 MG/DL (ref 0.6–1.3)
EOSINOPHIL # BLD AUTO: 0.14 THOUSAND/ΜL (ref 0–0.61)
EOSINOPHIL NFR BLD AUTO: 4 % (ref 0–6)
ERYTHROCYTE [DISTWIDTH] IN BLOOD BY AUTOMATED COUNT: 15.2 % (ref 11.6–15.1)
GFR SERPL CREATININE-BSD FRML MDRD: 99 ML/MIN/1.73SQ M
GLUCOSE P FAST SERPL-MCNC: 93 MG/DL (ref 65–99)
GLUCOSE SERPL-MCNC: 100 MG/DL (ref 65–140)
GLUCOSE SERPL-MCNC: 103 MG/DL (ref 65–140)
GLUCOSE SERPL-MCNC: 113 MG/DL (ref 65–140)
GLUCOSE SERPL-MCNC: 165 MG/DL (ref 65–140)
GLUCOSE SERPL-MCNC: 93 MG/DL (ref 65–140)
HCT VFR BLD AUTO: 40.7 % (ref 36.5–49.3)
HGB BLD-MCNC: 13.1 G/DL (ref 12–17)
IMM GRANULOCYTES # BLD AUTO: 0.01 THOUSAND/UL (ref 0–0.2)
IMM GRANULOCYTES NFR BLD AUTO: 0 % (ref 0–2)
LYMPHOCYTES # BLD AUTO: 1.82 THOUSANDS/ΜL (ref 0.6–4.47)
LYMPHOCYTES NFR BLD AUTO: 45 % (ref 14–44)
MCH RBC QN AUTO: 27.6 PG (ref 26.8–34.3)
MCHC RBC AUTO-ENTMCNC: 32.2 G/DL (ref 31.4–37.4)
MCV RBC AUTO: 86 FL (ref 82–98)
MONOCYTES # BLD AUTO: 0.41 THOUSAND/ΜL (ref 0.17–1.22)
MONOCYTES NFR BLD AUTO: 10 % (ref 4–12)
NEUTROPHILS # BLD AUTO: 1.6 THOUSANDS/ΜL (ref 1.85–7.62)
NEUTS SEG NFR BLD AUTO: 40 % (ref 43–75)
NRBC BLD AUTO-RTO: 0 /100 WBCS
PLATELET # BLD AUTO: 171 THOUSANDS/UL (ref 149–390)
PMV BLD AUTO: 10.6 FL (ref 8.9–12.7)
POTASSIUM SERPL-SCNC: 4.3 MMOL/L (ref 3.5–5.3)
RBC # BLD AUTO: 4.75 MILLION/UL (ref 3.88–5.62)
SODIUM SERPL-SCNC: 140 MMOL/L (ref 136–145)
WBC # BLD AUTO: 4.01 THOUSAND/UL (ref 4.31–10.16)

## 2019-06-27 PROCEDURE — 97110 THERAPEUTIC EXERCISES: CPT

## 2019-06-27 PROCEDURE — 97530 THERAPEUTIC ACTIVITIES: CPT

## 2019-06-27 PROCEDURE — 80048 BASIC METABOLIC PNL TOTAL CA: CPT | Performed by: INTERNAL MEDICINE

## 2019-06-27 PROCEDURE — 97116 GAIT TRAINING THERAPY: CPT

## 2019-06-27 PROCEDURE — 82948 REAGENT STRIP/BLOOD GLUCOSE: CPT

## 2019-06-27 PROCEDURE — 97535 SELF CARE MNGMENT TRAINING: CPT

## 2019-06-27 PROCEDURE — 85025 COMPLETE CBC W/AUTO DIFF WBC: CPT | Performed by: INTERNAL MEDICINE

## 2019-06-27 PROCEDURE — 97112 NEUROMUSCULAR REEDUCATION: CPT

## 2019-06-27 PROCEDURE — 94760 N-INVAS EAR/PLS OXIMETRY 1: CPT

## 2019-06-27 PROCEDURE — 99233 SBSQ HOSP IP/OBS HIGH 50: CPT

## 2019-06-27 RX ORDER — DILTIAZEM HYDROCHLORIDE 180 MG/1
180 CAPSULE, COATED, EXTENDED RELEASE ORAL DAILY
Status: DISCONTINUED | OUTPATIENT
Start: 2019-06-28 | End: 2019-07-07 | Stop reason: HOSPADM

## 2019-06-27 RX ADMIN — PRAVASTATIN SODIUM 10 MG: 10 TABLET ORAL at 17:53

## 2019-06-27 RX ADMIN — ACETAMINOPHEN 650 MG: 325 TABLET ORAL at 06:55

## 2019-06-27 RX ADMIN — ACETAMINOPHEN 650 MG: 325 TABLET ORAL at 21:27

## 2019-06-27 RX ADMIN — METFORMIN HYDROCHLORIDE 500 MG: 500 TABLET ORAL at 09:16

## 2019-06-27 RX ADMIN — VITAMIN D, TAB 1000IU (100/BT) 2000 UNITS: 25 TAB at 09:15

## 2019-06-27 RX ADMIN — GABAPENTIN 300 MG: 300 CAPSULE ORAL at 06:55

## 2019-06-27 RX ADMIN — INSULIN LISPRO 1 UNITS: 100 INJECTION, SOLUTION INTRAVENOUS; SUBCUTANEOUS at 12:20

## 2019-06-27 RX ADMIN — POLYVINYL ALCOHOL 1 DROP: 14 SOLUTION/ DROPS OPHTHALMIC at 09:20

## 2019-06-27 RX ADMIN — GABAPENTIN 400 MG: 400 CAPSULE ORAL at 21:21

## 2019-06-27 RX ADMIN — PANTOPRAZOLE SODIUM 40 MG: 40 TABLET, DELAYED RELEASE ORAL at 06:55

## 2019-06-27 RX ADMIN — TIOTROPIUM BROMIDE 18 MCG: 18 CAPSULE ORAL; RESPIRATORY (INHALATION) at 09:20

## 2019-06-27 RX ADMIN — DULOXETINE HYDROCHLORIDE 60 MG: 60 CAPSULE, DELAYED RELEASE ORAL at 09:31

## 2019-06-27 RX ADMIN — HEPARIN SODIUM 5000 UNITS: 5000 INJECTION INTRAVENOUS; SUBCUTANEOUS at 13:26

## 2019-06-27 RX ADMIN — CLINDAMYCIN HYDROCHLORIDE 300 MG: 150 CAPSULE ORAL at 13:26

## 2019-06-27 RX ADMIN — LOSARTAN POTASSIUM 100 MG: 50 TABLET, FILM COATED ORAL at 09:17

## 2019-06-27 RX ADMIN — DILTIAZEM HYDROCHLORIDE 120 MG: 120 CAPSULE, COATED, EXTENDED RELEASE ORAL at 09:20

## 2019-06-27 RX ADMIN — CLINDAMYCIN HYDROCHLORIDE 300 MG: 150 CAPSULE ORAL at 21:20

## 2019-06-27 RX ADMIN — TAMSULOSIN HYDROCHLORIDE 0.4 MG: 0.4 CAPSULE ORAL at 17:53

## 2019-06-27 RX ADMIN — CLINDAMYCIN HYDROCHLORIDE 300 MG: 150 CAPSULE ORAL at 06:55

## 2019-06-27 RX ADMIN — POLYVINYL ALCOHOL 1 DROP: 14 SOLUTION/ DROPS OPHTHALMIC at 12:20

## 2019-06-27 RX ADMIN — POLYVINYL ALCOHOL 1 DROP: 14 SOLUTION/ DROPS OPHTHALMIC at 17:53

## 2019-06-27 RX ADMIN — GABAPENTIN 300 MG: 300 CAPSULE ORAL at 13:26

## 2019-06-27 RX ADMIN — NICOTINE 21 MG: 21 PATCH, EXTENDED RELEASE TRANSDERMAL at 09:24

## 2019-06-27 RX ADMIN — OXYBUTYNIN CHLORIDE 5 MG: 5 TABLET, EXTENDED RELEASE ORAL at 09:15

## 2019-06-27 RX ADMIN — ACETAMINOPHEN 650 MG: 325 TABLET ORAL at 13:25

## 2019-06-27 RX ADMIN — POLYVINYL ALCOHOL 1 DROP: 14 SOLUTION/ DROPS OPHTHALMIC at 21:21

## 2019-06-27 RX ADMIN — MELATONIN 3 MG: at 21:21

## 2019-06-27 RX ADMIN — HEPARIN SODIUM 5000 UNITS: 5000 INJECTION INTRAVENOUS; SUBCUTANEOUS at 21:20

## 2019-06-27 RX ADMIN — HEPARIN SODIUM 5000 UNITS: 5000 INJECTION INTRAVENOUS; SUBCUTANEOUS at 06:55

## 2019-06-27 RX ADMIN — THIAMINE HCL TAB 100 MG 50 MG: 100 TAB at 09:15

## 2019-06-27 RX ADMIN — FLUTICASONE FUROATE AND VILANTEROL TRIFENATATE 1 PUFF: 100; 25 POWDER RESPIRATORY (INHALATION) at 09:20

## 2019-06-27 NOTE — PROGRESS NOTES
06/27/19 0920   Pain Assessment   Pain Assessment No/denies pain   Pain Score No Pain   Restrictions/Precautions   Precautions Bed/chair alarms;Cognitive; Fall Risk;Spinal precautions;Supervision on toilet/commode   Braces or Orthoses C/S Collar   Cognition   Overall Cognitive Status Impaired   Arousal/Participation Cooperative   Attention Attends with cues to redirect   Memory Decreased short term memory;Decreased recall of precautions   Following Commands Follows multistep commands with increased time or repetition   Subjective   Subjective pt trying to use urinal start of session with PCA, functional incontinence and helped pt get changed   QI: Sit to [address] Provided by [place] No physical assistance   Sit to Stand CARE Score 4   QI: Chair/Bed-to-Chair Transfer   Assistance Needed Incidental touching   Assistance Provided by [place] Less than 25%   Comment CGA for initial standing balance and completing txs all the way   Chair/Bed-to-Chair Transfer CARE Score 3   Transfer Bed/Chair/Wheelchair   Limitations Noted In Balance; Coordination;Problem Solving   Adaptive Equipment None   Stand Pivot Contact Guard   Sit to Stand Supervision   Stand to Sit Supervision   Findings CGA for initial balance and completing txs all the way before sitting   Bed, Chair, Wheelchair Transfer (FIM) 4 - Patient requires steadying assist or light touching   QI: Walk 10 Feet   Assistance Needed Incidental touching   Assistance Provided by [place] Less than 25%   Walk 10 Feet CARE Score 3   QI: Walk 50 Feet with Two Turns   Assistance Needed Incidental touching   Assistance Provided by [place] Less than 25%   Walk 50 Feet with Two Turns CARE Score 3   QI: Walk 150 Feet   Assistance Needed Physical assistance   Assistance Provided by [place] 25%-49%   Comment LOB x1 during tx session, needing more than CGA for safety   Walk 150 Feet CARE Score 3   Ambulation   Does the patient walk? 2   Yes   Primary Discharge Mode of Locomotion Walk   Walk Assist Level Contact Guard;Minimum Assist   Gait Pattern Inconsistant Analisa;Decreased foot clearance; Forward Flexion; Lateral deviation; Improper weight shift   Assist Device   (no AD)   Distance Walked (feet) 700 ft  (x3)   Limitations Noted In Balance; Coordination; Heel Strike; Safety;Speed;Strength;Swing   Findings pt with lateral deviation with longer distances and trying to turn his head during amb  pt had LOB x1 needing Jayshree to correct  pt with increase fwd flex and increase gait speed, causing increase LOB  Walking (FIM) 3 - Patient completes 50 - 74% of all tasks, needs more than steadying or light touch AND distance 150 feet or more, no rest   QI: Wheel 50 Feet with Two Turns   Reason if not Attempted Activity not applicable   Wheel 50 Feet with Two Turns CARE Score 9   QI: Wheel 150 Feet   Reason if not Attempted Activity not applicable   Wheel 359 Feet CARE Score 9   Wheelchair mobility   QI: Does the patient use a wheelchair? 0  No   QI: 4 Steps   Assistance Needed Verbal cues; Supervision; Adaptive equipment   Assistance Provided by [place] No physical assistance   4 Steps CARE Score 4   QI: 12 Steps   Assistance Needed Verbal cues; Supervision; Adaptive equipment   Assistance Provided by [place] No physical assistance   12 Steps CARE Score 4   Stairs   Type Stairs   # of Steps 24   Weight Bearing Precautions Fall Risk;Cervical   Assist Devices Single Rail   Findings R HR (L HR down) reciprocal pattern first attempt   nonreciprocal second attempt,  educated pt on nonreciprocal pattern for safety    Stairs (FIM) 5 - Patient requires supervision/monitoring AND goes up and down full flight (12- 14 stairs)   QI: Toilet Transfer   Assistance Needed Incidental touching   Assistance Provided by [place] Less than 25%   Comment urgency, decrease safety awareness and balance   Toilet Transfer CARE Score 3   Toilet Transfer   Surface Assessed Standard Toilet   Findings 200mL Toilet Transfer (FIM) 4 - Patient requires steadying assist or light touching   Therapeutic Interventions   Balance stepping over wt dowls and varying size bolsters  sidestepping 20'x2, standing on R balance disc with ball toss, LOB, Jayshree to stabilize   Other CGA wtih initial stand start of session when changing pants due to functional incontinence   Assessment   Treatment Assessment pt cont to remain a high fall risk due to poor safety awareness and righting reactions to safely amb with no AD   pt wtih increase ant wt shift during amb and balance exs causing pt to lose balance fwd   pt likes to talk alot and becomes easily distracted during session and unable to self correct unsteady gait  pt educated on fall risk and safety concerns for d/c   will cont to wrok on balance and righting reactions and improving mobility to decrease fall risk at home  Problem List Decreased strength;Decreased endurance; Impaired balance;Decreased mobility; Decreased coordination; Impaired sensation;Orthopedic restrictions;Pain;Decreased cognition;Decreased safety awareness   Barriers to Discharge Inaccessible home environment;Decreased caregiver support   PT Barriers   Physical Impairment Decreased strength;Decreased endurance; Impaired balance;Decreased mobility; Decreased coordination;Decreased safety awareness;Orthopedic restrictions   Functional Limitation Car transfers;Stair negotiation;Standing;Transfers; Walking   Plan   Treatment/Interventions Functional transfer training;LE strengthening/ROM; Endurance training;Patient/family training;Gait training   Progress Progressing toward goals   Recommendation   Recommendation Outpatient PT; Home with family support   Equipment Recommended Walker   PT Therapy Minutes   PT Time In 0920   PT Time Out 1030   PT Total Time (minutes) 70   PT Mode of treatment - Individual (minutes) 70   PT Mode of treatment - Concurrent (minutes) 0   PT Mode of treatment - Group (minutes) 0   PT Mode of treatment - Co-treat (minutes) 0   PT Mode of Teatment - Total time(minutes) 70 minutes   Therapy Time missed   Time missed?  No Spine appears normal, movement of extremities grossly intact.

## 2019-06-27 NOTE — TEAM CONFERENCE
Acute RehabilitationTeam Conference Note  Date: 6/27/2019   Time: 11:09 AM       Patient Name:  Ivelisse Chandler       Medical Record Number: [id number]   YOB: 1940  Sex:  Male          Room/Bed:  [facility]01  Payor Info:  Payor: MEDICARE / Plan: MEDICARE A AND B / Product Type: Medicare A & B Fee for Service /      Admitting Diagnosis: Cervical spinal cord compression ([facility] ) [G95 20]   Admit Date/Time:  6/20/2019  3:30 PM  Admission Comments: No comment available     Primary Diagnosis:  Cervical myelopathy (HCC)  Principal Problem: Cervical myelopathy ([facility] )    Patient Active Problem List    Diagnosis Date Noted    Pipe smoker 06/22/2019    Insomnia 06/22/2019    Anxiety 06/22/2019    Urinary incontinence 06/21/2019    Depression 06/21/2019    Vitamin D insufficiency 06/21/2019    Impaired mobility and ADLs 06/20/2019    Cervical myelopathy ([facility] ) 06/20/2019    At risk for venous thromboembolism (VTE) 06/20/2019    Pain 06/20/2019    Constipation 06/20/2019    SHIVANI (obstructive sleep apnea) 06/07/2019    Prostate cancer ([facility] ) 05/16/2019    Overactive bladder 05/16/2019    Other spondylosis with myelopathy, cervical region 05/08/2019    Quadriparesis ([facility] ) 05/08/2019    Spinal cord compression ([facility] ) 05/08/2019    Lesion of native kidney 04/17/2019    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    Type 2 diabetes mellitus with diabetic neuropathy, without long-term current use of insulin ([facility] ) 02/27/2019    Hyperlipidemia associated with type 2 diabetes mellitus ([facility] ) 02/27/2019    Essential hypertension 02/27/2019    BPH (benign prostatic hyperplasia) 02/27/2019    Cervical spondylosis 02/27/2019    COPD (chronic obstructive pulmonary disease) ([address] ) 02/27/2019    Adjustment disorder with depressed mood 02/27/2019    Degenerative cervical spinal stenosis 02/27/2017       Physical Therapy:    Weight Bearing Status: Full Weight Bearing  Transfers: Contact Guard, Minimal Assistance  Bed Mobility: Supervision  Amulation Distance (ft): 350 feet  Ambulation: Contact Guard, Minimal Assistance  Assistive Device for Ambulation: Roller Walker  Number of Stairs: 12  Assistive Device for Stairs: [place]ft Hand Rail  Stair Assistance: Minimal Assistance  Ramp: Contact Guard  Assistive Device for Ramp: Roller Walker  Discharge Recommendations: Home with:  [address] with[de-identified] Family Support, Home Physical Therapy    6/26/2019: Tj Avila has made good progress since starting physical therapy  He is ambulating at [facility] 62 level with RW and is CGA for SPT transfer with RW  Pt is eager to engage in therapy sessions and requests extra time if able  Pt limitations include dec balance and righting reactions, dec strength in LE, and dec activity tolerance  Cont to address limitations prior to d/c  Pt barriers to d/c include: dec caregiver support and inaccessible home environemt  Pt looking for HHA to come in 2 days a week for a few hours to help with [place] chores and bathing  CM gave information to pt and informed that HHA will not be covered by insurance  Pt stated his son lives close but works in [employer] so he is not available at all times  Pt also spoke of daughter who works but occ when she gets a day off will come to pt's house and sometimes spend the night  Pt will be alone as wife works in [employer] during the week  Pt is progressing and will cont to benefit from PT to address physical impairments  Occupational Therapy:  Eating: Supervision  Grooming: Contact Guard  Bathing: Contact Guard  Bathing: Contact Guard  Upper Body Dressing: Contact Guard  Lower Body Dressing: Moderate Assistance  Toileting: Contact Guard  Tub/Shower Transfer:  Moderate Assistance  Toilet Transfer: Contact Guard  Cognition: Exceptions to WNL  Cognition: Decreased Memory, Decreased Safety  Orientation: Person, Place, Time, Situation  Discharge Recommendations: (pending pt progress)       Pt is demonstrating fair progress with occupational therapy and is progressing toward mod(I) level goals for ADL, IADL, and functional transfers/mobility  Pt continues to present with impairments in balance, act tolerance, BUE sensation, BUE coordination, BUE strength, safety awareness, divided attention, memory  Occupational performance remains limited and pt continues to require assistance for c/s collar management, functional transfers, bathing, dressing, and all IADLs  Pt must be mod(I) for d/c home as pt reports no family or friend support at this time for IADL completion  Pt currently unsafe to d/c  Pt will continue to benefit from skilled acute rehab OT services to address above mentioned barriers and maximize functional independence in baseline areas of occupation to meet established treatment goals with overall decreased burden of care  Speech Therapy:           No notes on file    Nursing Notes:  Appetite: Good  Diet Type: Diabetic                      Diet Patient/Family Education Complete: Yes    Type of Wound (LDA): (incision)                       Bladder: 5 - Supervision     Bladder Patient/Family Education: Yes  Bowel: 6 - Modified [place]     Bowel Patient/Family Education: Yes  Pain Location: Neck  Pain Orientation: Left  Pain Score: 0                       Hospital Pain Intervention(s): Repositioned, Ambulation/increased activity  Pain Patient/Family Education: Yes  Medication Management/Safety  Injectable: Insulin  Safe Administration: Yes  Medication Patient/Family Education Complete: Yes    Patient with Hx: Cervical spinal stenosis/cord compression with progressive quadraparesis; s/p C3/4 ACDF with C4 hemicorpectomy, C3-4 anterior instrumentation/fusion 6/18/19 [place])  Has Vista collar on at all times  Hx:  DM, on Metformin 500mg daily  Continue DM diet and Accuchecks/QID with SSI    Patient also with hx: COPD; hx lung cancer/VIVIANA lobectomy, pulmonary nodules, follows with pulm as OP and takes Advair 250-50 one puff BID/Spiriva one puff daily and prn Combivent  Hx: Urinary incontinence/BPH/urgency, taking Flomax  This week we will continue to monitor labs and vitals signs  We will monitor incision for s/s of infection  We will continue pain management and safety precautions to keep patient free from falls         Case Management:     Discharge Planning  Goal Length of Stay: 7  Living Arrangements: Spouse/significant other, Children  Support Systems: Spouse/significant other, Children, Friends/neighbors, Family members  Assistance Needed: PT/OT/ST  Type of Current Residence: Private residence  [address]: No  Pt is participating well with therapy and making good gains  Pt expects to return home but is concerned about being able to perform IADLS  Following to assist w/dc planning needs    Is the patient actively participating in therapies? yes  List any modifications to the treatment plan:     Barriers Interventions   Balance, weakness, coordination, sensation in hands Therapy exercises   Lives alone Mod I goals with assist for I adls   Carryover with spinal precautions Cueing, safety education techniques             Is the patient making expected progress toward goals?  yes  List any update or changes to goals:     Medical Goals: Patient will be medically stable for discharge to [facility] upon completion of rehab program and Patient will be able to manage medical conditions and comorbid conditions with medications and follow up upon completion of rehab program    Weekly Team Goals:   Rehab Team Goals  Bowel/Bladder Team Goal: Patient will be independent with bladder/bowel management with least restrictive device upon completion of rehab program  Transfer Team Goal: Patient will be independent with transfers with least restrictive device upon completion of rehab program  Locomotion Team Goal: Patient will be independent with locomotion with least restrictive device upon completion of rehab program    Discussion: pt presents with the above barriers and is making gains  Pt functioning at min a with all functional mobility except for lower body is a max a  Team is recommending outpt case mgmt services with contd [facility] for rn pt and ot needs  Anticipated Discharge Date:  7/6/19  [facility] Team Members Present: The following team members are supervising care for this patient and were present during this Weekly Team Conference      Physician: Dr Ti Huerta MD  : JAZZ Silva  Registered Nurse: Mir Domínguez, RN, BSN, CRRN  Physical Therapist: Harsha Tan DPT  Occupational Therapist: Gaurav Devlin MS, OTR/L  Speech Therapist:   Other: Raquel Gaitan, RN, BSN  [address] [place] [address] and Hospice

## 2019-06-27 NOTE — PCC NURSING
Patient with Hx: Cervical spinal stenosis/cord compression with progressive quadraparesis; s/p C3/4 ACDF with C4 hemicorpectomy, C3-4 anterior instrumentation/fusion 6/18/19 [place])  Has Vista collar on at all times  Hx:  DM, on Metformin 500mg daily  Continue DM diet and Accuchecks/QID with SSI  Patient also with hx: COPD; hx lung cancer/VIVIANA lobectomy, pulmonary nodules, follows with pulm as OP and takes Advair 250-50 one puff BID/Spiriva one puff daily and prn Combivent  Hx: Urinary incontinence/BPH/urgency, taking Flomax  Pt prefers to use condom cath at night- as frequent voiding keeps him awake  This week we will continue to monitor labs and vitals signs  We will monitor incision for s/s of infection   We will continue pain management and safety precautions to keep patient free from falls

## 2019-06-27 NOTE — PROGRESS NOTES
06/27/19 0700   Pain Assessment   Pain Assessment No/denies pain   Pain Score No Pain   Restrictions/Precautions   Precautions Bed/chair alarms;Cognitive; Fall Risk;Impulsive;Spinal precautions;Supervision on toilet/commode   Weight Bearing Restrictions No   ROM Restrictions   (cervical spinal precautions)   Braces or Orthoses C/S Collar   QI: Eating   Assistance Needed Supervision;Set-up / [address] Provided by [place] No physical assistance   Comment ext time   Eating CARE Score 4   Eating Assessment   Findings Pt cont to have difficutly with [address] limiting indep with managing packaging and feeding  Eating (FIM) 5 - Patient needs help to open contianers or set up tray   QI: Oral Hygiene   Assistance Needed Incidental touching   Assistance Provided by [place] No physical assistance   Comment CS/CGA in stance for oral hygiene  Pt attempts to crouch to spit in sink  Pt edu on performing oral care sitted to limit need to sqaut inc risk of fall  Oral Hygiene CARE Score 4   Grooming   Able To Initiate Tasks; Acquire Items; Shave;Comb/Brush Hair;Wash/Dry Face;Brush/Clean Teeth;Wash/Dry Hands   Limitation Noted In Coordination;Problem Solving; Safety;Strength  (carryover)   Findings Pt cont to req CGA in stance for grooming 2* dec balance, endurance, and safety with task  Pt requesting to shave this session  Pt unable to maintain cervical precautions even with C/S collar donned as pt raises chin and attempts to twist neck laterally  Pt provided with repetitive cuing with minimal carryover  A provided by therapist to shave on neck so that pt could focus on neutral positioning  Pt with inc gross grasp on comb and tooth brush this session but cont to report no awareness of item in hand when vision occluded      Grooming (FIM) 4 - Patient completed  4/5 tasks   QI: Shower/Bathe Self   Assistance Needed Physical assistance   Assistance Provided by Mary Alice Less than 25%   Comment steadying A in stance and constant cuing to maintain UE support on grab bar as pt has dec balance in stance for li and buttocks hygiene  Pt able to maintain grasp on wash clothe throughout  Cuing required to perform leg cross tech instead of straining to reach feet with forward flexion to maintain cervical spine integrity  Shower/Bathe Self CARE Score 3   Bathing   Assessed Bath Style Shower   Anticipated D/C Bath Style Tub   Able to Gather/Transport Yes   Able to [employer] Temperature Yes   Able to Wash/Rinse/Dry (body part) Left Arm;Right Arm;L Upper Leg;R Upper Leg;L Lower Leg/Foot;R Lower Leg/Foot;Chest;Abdomen;Perineal Area; Buttocks   Limitations Noted in Balance; Endurance;Problem Solving;ROM;Safety;Strength   Positioning Seated;Standing   Adaptive Equipment Tub Bench; Shower Bars;Hand Held Shower   Bathing (FIM) 4 - Patient requires steadying assist or light touching   Tub/Shower Transfer   Limitations Noted In Balance; Endurance;Problem Solving; Safety   Adaptive Equipment Grab Bars;Seat with Back   Assessed Shower   Findings grab bars for UE support necessary during side step in walk-in shower 2* dec balance and dec awareness of foot placement when maneuvring around tub bench  Shower Transfer (FIM) 4 - Patient requires steadying assist or light touching   QI: Upper Body Dressing   Assistance Needed Physical assistance   Assistance Provided by [place] 25%-49%   Comment A to bring shirt over head this session 2* dec UE ROM and C/S bulk   Upper Body Dressing CARE Score 3   QI: Lower Body Dressing   Assistance Needed Physical assistance   Assistance Provided by [place] Less than 25%   Comment CGA in stance for CM over hips   Lower Body Dressing CARE Score 3   QI: Putting On/Taking Off Footwear   Assistance Needed Physical assistance   Assistance Provided by [place] 50%-74%   Comment able to remove socks with leg cross tech   Pt cont to attempt to perform forward reach to feet on ground to don sock req cuing to perform leg cross tech to ensure safety of cervical spine  Pt unable to pull sock over heel and adjust up calf 2* dec grasp strength in b/l hands  Able to slide feet into sneakers but unable to manage laces 2* dec FMC  May benefit from elastic laces  Putting On/Taking Off Footwear CARE Score 2   QI: Picking Up Object   Assistance Needed Physical assistance; Adaptive equipment   Assistance Provided by [place] Less than 25%   Comment steadying A in stance while retrieving bean bags from floor with [address]  Pt reports having reacher at home but does not use  Pt req ext time to manage trigger of reacher 2* dec grasp strength (R<L)  With ext time, pt retrieved all bean bags of varying weight and size  At end of session, pt dropped item and attempted to retrieve from ground req verbal intervention and re-education on risk of fall 2* dec balance and risk of injury to cervical spine  Picking Up Object CARE Score 3   Dressing/Undressing Clothing   Able to  Obtain Clothing;Store removed clothing   Limitations Noted In Balance; Endurance; Safety;Strength;ROM   Positioning In Bed;Standing   UB Dressing (FIM) 3 - Patient completes  50-74% of all tasks   LB Dressing (FIM) 3 - Patient completes  50-74% of all tasks   QI: Roll Left and Right   Assistance Needed Supervision   Assistance Provided by [place] No physical assistance   Comment cuing for log roll   Roll Left and Right CARE Score 4   QI: Lying to Sitting on Side of Bed   Assistance Needed Supervision   Assistance Provided by [place] No physical assistance   Comment moves quickly req cuing to slow pace   Lying to Sitting on Side of Bed CARE Score 4   QI: Sit to Stand   Assistance Needed Incidental touching   Assistance Provided by [place] No physical assistance   Comment impulsive   Sit to Stand CARE Score 4   QI: Chair/Bed-to-Chair Transfer   Assistance Needed Physical assistance   Assistance Provided by [place] Less than 25%   Comment CGA for transfers with no AD this session   Pt does cont to present with inc unsteadiness when attempting to divide attention   Chair/Bed-to-Chair Transfer CARE Score 3   Transfer Bed/Chair/Wheelchair   Positioning Concerns Cognition   Limitations Noted In Balance; Endurance   Adaptive Equipment None   Bed, Chair, Wheelchair Transfer (FIM) 4 - Patient requires steadying assist or light touching   Kitchen Mobility   Kitchen-Mobility Level   (no AD)   Kitchen Activity Retrieve items;Transport items   Kitchen Mobility Comments Pt able to retrieve cup from [facility] cabinet but unable to retrieve pitcher from fridge 2* dec grasp and UE strength  Pt edu on keeping common items on counterop to limit need to reach low or high inc risk of fall  Provided with edu on using countertops to transfer larger items instead of attempting to carry with hands  Health Management   Health Management Initiated edu regarding safety and compensation with dec hand sensation  Pt made aware of risk of burning hands in kitchen and during bathing as pt with dec awareness of heat  Will req cont conversation and reinforcement to inc carryover with new learning   Exercise Tools   Theraputty Completed bead retrieval from [facility] theraputty to [facility] with pincer grasp and overall grasp strength as pt cont to have noted difficulty with pulling up socks, managing packaging, and manipulating grooming items  Pt req ext time to manipulate putty but has dec droppage when grasping small beads this session  Pt cont to report fatigue in hands during FM tasks but feels that L hand is showing improvements  Cognition   Overall Cognitive Status Impaired   Arousal/Participation Alert; Cooperative   Attention Attends with cues to redirect   Orientation Level Oriented X4   Memory Decreased recall of recent events   Following Commands Follows multistep commands with increased time or repetition   Comments difficulty with divided attention and repeats stories throughout session   Minimal carryover with maintaining cervical spinal precautions  Additional Activities   Additional Activities Comments Pt inconsistent during conversation regarding family support after d/c  During AM session, pt reporting that his wife is not around often  And that even when present, she is unwilling to assist with any ADLs or IADLs as they do not have an amicable marriage  Pt hesitant to ask her or children for A even after edu on current level of A necessary to maintain pt safety  Activity Tolerance   Activity Tolerance Patient tolerated treatment well   Medical Staff Made Aware STEPHANE Rdz present during session to change bandage over neck incision  Assessment   Treatment Assessment Pt participated in skilled OT session focusing on ADL retraining, FM coordination and strength, item retrieval, dynamic balance, and item transport  Pt cont to have difficulty maintain cervical spinal precautions as pt lifts chin and attempts to twist head side to side during hair washing and grooming tasks  Pt provided with constant cuing with no follow through  At this time, pt unable to manage C/S collar 2* dec FMC, dec shoulder ROM, dec insight, dec carryover with precautions  Pt would benefit from cont therapy focusing on functional transfers, IADL completion, standing tolerance/balance, and safety awareness during functional tasks  Cont with POC  Prognosis Fair   Problem List Decreased strength;Decreased endurance; Impaired balance;Decreased mobility; Decreased coordination; Impaired sensation;Orthopedic restrictions;Pain;Decreased cognition;Decreased safety awareness   Barriers to Discharge Inaccessible home environment;Decreased caregiver support   Plan   Treatment/Interventions ADL retraining;Functional transfer training; Therapeutic exercise; Endurance training;Patient/family training;Equipment eval/education   Progress Progressing toward goals   OT Therapy Minutes   OT Time In 0700   OT Time Out 0900   OT Total Time (minutes) 120   OT Mode of treatment - Individual (minutes) 120   OT Mode of treatment - Concurrent (minutes) 0   OT Mode of treatment - Group (minutes) 0   OT Mode of treatment - Co-treat (minutes) 0   OT Mode of Teatment - Total time(minutes) 120 minutes   Therapy Time missed   Time missed?  No

## 2019-06-27 NOTE — SOCIAL WORK
Met with Pt to review team meeting  Pt is in agreement with the d/c plan for 7 6  Pt requested assistance in finding other HHA agencies in his area  CM stated she would look some up, and give him the names/numbers  CM provided Pt with the numbers for [facility] [address], [facility], [facility], and [facility]

## 2019-06-27 NOTE — PROGRESS NOTES
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; s/p C3/4 ACDF with C4 hemicorpectomy, C3-4 anterior instrumentation/fusion 6/18/19 Jaime Villasenor):  continue cervical collar; on Clindamycin for infection prophylaxis  Pain control per primary service  Had previously been on Cymbalta for neuropathic sx      DM2: at home he takes Kombiglyze XR 5-1000mg qd and no insulin  Continue Metformin 500mg daily  Continue DM diet and Accuchecks/QID with SSI      HTN:  at home, takes Cardizem 180mg qd, Losartan 100mg qd (was not on Norvasc he says); here on Card 120mg qd, Losartan 100mg qd  Blood pressure improved but could be better so will increase his Card back to 180 mg qd starting tomorrow      COPD; hx lung cancer/VIVIANA lobectomy, pulmonary nodules: follows with pulm as OP and takes Advair 250-50 one puff BID/Spiriva one puff daily and prn Combivent  On Breo as substitute for Advair, continue Spiriva and use Albuterol inhaler prn     SHIVANI: supposed to use CPAP but has been noncompliant     HLD: at home on Pravachol     Urinary incontinence/BPH/urgency: continue Flomax, Oxybutynin ER 5mg qd; Uro is following         Subjective/ HPI:  Patients overnight issues or events were reviewed with nursing or staff during rounds or morning huddle session  No new or overnight issues  Hypokalemia resolved  DM/HTN:  Stable  Patient denies any current complaints      ROS:     GI: denies abdominal pain, change bowel habits or reflux symptoms  Neuro: Denies any headache, new vision changes, new neuropathies,new weaknesses   Respiratory: No Cough, SOB, denies wheeze  Cardiovascular: No CP, palpitations , denies perception of rapid heartbeat  : denies any new urinary burning or frequency    Review of Scheduled Meds:    Current Facility-Administered Medications:  acetaminophen 650 mg Oral TID Mishel Solares MD   albuterol 2 puff Inhalation Q4H PRN Mishel Solares MD   bisacodyl 10 mg Rectal Daily PRN Mishel Solares MD   bisacodyl 10 mg Rectal Once Mishel Solares MD   cholecalciferol 2,000 Units Oral Daily Mishel Solares MD   clindamycin 300 mg Oral [facility] Mishel Solares MD   diltiazem 120 mg Oral Daily Mishel Solares MD   docusate sodium 100 mg Oral BID Mishel Solares MD   DULoxetine 60 mg Oral Daily Mishel Solares MD   fluticasone-vilanterol 1 puff Inhalation Daily Mishel Solares MD   gabapentin 300 mg Oral BID Mishel Solares MD   gabapentin 400 mg Oral HS Mishel Solares MD   heparin (porcine) 5,000 Units Subcutaneous [facility] Mishel Solares MD   hydrALAZINE 25 mg Oral Q8H PRN ISAAC ShuklaNP   insulin lispro 1-5 Units Subcutaneous TID [facility] Mishel Solares MD   insulin lispro 1-5 Units Subcutaneous HS Mishel Solares MD   lidocaine  Topical Q4H PRN Mishel Solares MD   losartan 100 mg Oral Daily Mishel Solares MD   melatonin 3 mg Oral HS Mery Grewal PA-C   metFORMIN 500 mg Oral Daily With Breakfast Nano Santoyo, SONJA   nicotine 21 mg Transdermal Daily Mery Grewal PA-C   ondansetron 4 mg Intravenous Q6H PRN Mishel Solares MD   oxybutynin 5 mg Oral Daily SONJA Lozano   oxyCODONE 10 mg Oral Q4H PRN Mishel Solares MD   oxyCODONE 5 mg Oral Q4H PRN Mishel Solares MD   pantoprazole 40 mg Oral Daily Before Breakfast Mishel Solares MD   polyethylene glycol 17 g Oral Daily PRN Mishel Solares MD   polyethylene glycol 17 g Oral Daily Mishel Solares MD   polyvinyl alcohol 1 drop Both Eyes 4x Daily Mishel Solares MD   polyvinyl alcohol 1 drop Both Eyes Q1H PRN Mishel Solares MD   pravastatin 10 mg Oral Daily With Lindsey Edward MD   senna 1 tablet Oral Daily Mishel Solares MD   tamsulosin 0 4 mg Oral After David Ordaz MD   thiamine 50 mg Oral Daily Mishel Solares MD tiotropium 18 mcg Inhalation Daily Zahira Sharma MD       Labs:     Results from last 7 days   Lab Units 06/27/19  0629 06/25/19  0559   WBC Thousand/uL 4 01* 4 10*   HEMOGLOBIN g/dL 13 1 13 7   HEMATOCRIT % 40 7 42 4   PLATELETS Thousands/uL 171 157     Results from last 7 days   Lab Units 06/27/19  0629 06/25/19  0559   SODIUM mmol/L 140 141   POTASSIUM mmol/L 4 3 4 9   CHLORIDE mmol/L 105 106   CO2 mmol/L 32 33*   BUN mg/dL 20 18   CREATININE mg/dL 0 82 0 93   CALCIUM mg/dL 10 6* 10 6*                  Results from last 7 days   Lab Units 06/27/19  0659 06/26/19  2122 06/26/19  1621   POC GLUCOSE mg/dl 100 130 128       Imaging:     No orders to display       *Labs reviewed  *Radiology studies reviewed  *Medications reviewed and reconciled as needed  *Please refer to order section for additional ordered labs studies  *Case discussed with primary attending during morning huddle case rounds    Physical Examination:  Vitals:   Vitals:    06/26/19 2040 06/26/19 2220 06/27/19 0618 06/27/19 0920   BP: 165/95  168/84 142/78   BP Location: Left arm  Right arm    Pulse: 83  73    Resp: 18  18    Temp: 98 1 °F (36 7 °C)  98 °F (36 7 °C)    TempSrc: Oral  Oral    SpO2: 97% 96% 98%    Weight:       Height:           General Appearance: NAD, conversive  Eyes: No icterus; conjunctiva normal  HENT: oropharynx clear; mucous membranes moist  Neck: collar on  Lungs: CTA, normal respiratory effort, no retractions, expiratory effort normal  CV: regular rate, no rubs/murmurs/gallops  ABD: soft; ND/NT; +BS  EXT: no edema  Skin: normal turgor, normal texture, no rashes  Psych: affect normal, No anxiety/depression   Neuro: AAOx3;  UE 4/5 R>L, LEs 4+/5 R>L          Total time spent: At least 40 minutes, with more than 50% spent counseling/coordinating care  Counseling includes discussion with patient re: progress  and discussion with patient of his/her current medical state/information   Coordination of patient's care was performed in conjunction with primary service  Time invested included review of patient's labs, vitals, and management of their comorbidities with continued monitoring  In addition, this patient was discussed with medical team including physician and advanced extenders  The care of the patient was extensively discussed and appropriate treatment plan was formulated unique for this patient  ** Please Note: Dragon 360 Dictation voice to text software may have been used in the creation of this document   **

## 2019-06-27 NOTE — PROGRESS NOTES
06/27/19 1330   Pain Assessment   Pain Assessment No/denies pain   Pain Descriptors Aching;Discomfort   Pain Frequency Intermittent   Restrictions/Precautions   Precautions Bed/chair alarms; Fall Risk;Spinal precautions;Supervision on toilet/commode   Braces or Orthoses C/S Collar   Subjective   Subjective pt ready for therapy at this time; no complaints   QI: Sit to [address] Provided by [place] No physical assistance   Sit to Stand CARE Score 4   QI: Chair/Bed-to-Chair Transfer   Assistance Needed Incidental touching;Supervision; Adaptive equipment   Assistance Provided by [place] Less than 25%   Comment CS/CGA with RW   Chair/Bed-to-Chair Transfer CARE Score 3   Transfer Bed/Chair/Wheelchair   Limitations Noted In Balance; Coordination   Adaptive Equipment Roller Walker   Stand Pivot Supervision;Contact Guard   [facility]   Stand to [facility]   Findings practiced this session with    Bed, Chair, Wheelchair Transfer (FIM) 4 - Patient requires steadying assist or light touching   QI: Walk 10 Feet   Assistance Needed Incidental touching;Supervision; Adaptive equipment;Verbal cues   Assistance Provided by [place] No physical assistance   Walk 10 Feet CARE Score 4   QI: Walk 50 Feet with Two Turns   Assistance Needed Incidental touching;Supervision; Adaptive equipment   Assistance Provided by [place] No physical assistance   Walk 50 Feet with Two Turns CARE Score 4   QI: Walk 150 Feet   Assistance Needed Incidental touching;Supervision;Verbal cues; Adaptive equipment   Assistance Provided by [place] No physical assistance   Walk 150 Feet CARE Score 4   QI: Walking 10 Feet on Uneven Surfaces   Reason if not Attempted Safety concerns   Walking 10 Feet on Uneven Surfaces CARE Score 88   Ambulation   Does the patient walk? 2   Yes   Primary Discharge Mode of Locomotion Walk   Walk Assist Level Close Supervision;Contact Guard   Gait Pattern Inconsistant Analisa;Decreased foot clearance; Improper weight shift   Assist Device Roller Renee Kent Walked (feet) 500 ft  (x2)   Limitations Noted In Balance; Coordination;Device Management; Heel Strike; Safety;Speed   Findings practiced with RW and 400x1 no AD   pt with more control with speed with RW, VCs to keep close for stability   Walking (FIM) 4 - Patient requires steadying assist or light touching AND distance 150 feet or more, no rest   QI: Wheel 50 Feet with Two Turns   Reason if not Attempted Activity not applicable   Wheel 50 Feet with Two Turns CARE Score 9   QI: Wheel 150 Feet   Reason if not Attempted Activity not applicable   Wheel 626 Feet CARE Score 9   Wheelchair mobility   QI: Does the patient use a wheelchair? 0  No   QI: Toilet Transfer   Assistance Needed Incidental touching   Assistance Provided by [place] Less than 25%   Toilet Transfer CARE Score 3   Toilet Transfer   Surface Assessed Standard Toilet   Findings stood to urinate   Toilet Transfer (FIM) 4 - Patient requires steadying assist or light touching   Therapeutic Interventions   Balance obstacle negotiate with RW   Equipment Use   NuStep 11mins than 10mins, break to use restroom   Assessment   Treatment Assessment pt cont to work on safety with device during amb  pt wtih improved stability and control wtih RW this session  educated pt on potential use of RW upon d/c for fall prevention and ind, but will cont focus on therapy with no AD for balance and righting reactions  Family/Caregiver Present no   Problem List Decreased strength;Decreased endurance; Impaired balance;Decreased mobility; Decreased coordination; Impaired sensation;Orthopedic restrictions;Pain;Decreased cognition;Decreased safety awareness   Barriers to Discharge Inaccessible home environment;Decreased caregiver support   PT Barriers   Physical Impairment Decreased strength;Decreased endurance; Impaired balance;Decreased mobility; Decreased coordination;Decreased safety awareness;Orthopedic restrictions   Functional Limitation Car transfers;Stair negotiation;Standing;Transfers; Walking   Plan   Treatment/Interventions Functional transfer training;LE strengthening/ROM; Endurance training;Patient/family training;Gait training   Progress Progressing toward goals   Recommendation   Recommendation Outpatient PT; Home with family support   Equipment Recommended Walker   PT Therapy Minutes   PT Time In 1330   PT Time Out 1430   PT Total Time (minutes) 60   PT Mode of treatment - Individual (minutes) 30   PT Mode of treatment - Concurrent (minutes) 30   PT Mode of treatment - Group (minutes) 0   PT Mode of treatment - Co-treat (minutes) 0   PT Mode of Teatment - Total time(minutes) 60 minutes   Therapy Time missed   Time missed?  No

## 2019-06-28 LAB
GLUCOSE SERPL-MCNC: 107 MG/DL (ref 65–140)
GLUCOSE SERPL-MCNC: 107 MG/DL (ref 65–140)
GLUCOSE SERPL-MCNC: 128 MG/DL (ref 65–140)
GLUCOSE SERPL-MCNC: 128 MG/DL (ref 65–140)

## 2019-06-28 PROCEDURE — 97530 THERAPEUTIC ACTIVITIES: CPT

## 2019-06-28 PROCEDURE — 82948 REAGENT STRIP/BLOOD GLUCOSE: CPT

## 2019-06-28 PROCEDURE — 99232 SBSQ HOSP IP/OBS MODERATE 35: CPT

## 2019-06-28 PROCEDURE — 97110 THERAPEUTIC EXERCISES: CPT

## 2019-06-28 PROCEDURE — 97112 NEUROMUSCULAR REEDUCATION: CPT

## 2019-06-28 PROCEDURE — 97116 GAIT TRAINING THERAPY: CPT

## 2019-06-28 PROCEDURE — 97535 SELF CARE MNGMENT TRAINING: CPT

## 2019-06-28 RX ORDER — GABAPENTIN 400 MG/1
400 CAPSULE ORAL 2 TIMES DAILY
Status: DISCONTINUED | OUTPATIENT
Start: 2019-06-28 | End: 2019-07-07 | Stop reason: HOSPADM

## 2019-06-28 RX ADMIN — OXYBUTYNIN CHLORIDE 5 MG: 5 TABLET, EXTENDED RELEASE ORAL at 08:14

## 2019-06-28 RX ADMIN — PRAVASTATIN SODIUM 10 MG: 10 TABLET ORAL at 16:46

## 2019-06-28 RX ADMIN — METFORMIN HYDROCHLORIDE 500 MG: 500 TABLET ORAL at 08:14

## 2019-06-28 RX ADMIN — LOSARTAN POTASSIUM 100 MG: 50 TABLET, FILM COATED ORAL at 08:15

## 2019-06-28 RX ADMIN — CLINDAMYCIN HYDROCHLORIDE 300 MG: 150 CAPSULE ORAL at 14:32

## 2019-06-28 RX ADMIN — DOCUSATE SODIUM 100 MG: 100 CAPSULE, LIQUID FILLED ORAL at 17:40

## 2019-06-28 RX ADMIN — NICOTINE 21 MG: 21 PATCH, EXTENDED RELEASE TRANSDERMAL at 08:16

## 2019-06-28 RX ADMIN — PANTOPRAZOLE SODIUM 40 MG: 40 TABLET, DELAYED RELEASE ORAL at 06:00

## 2019-06-28 RX ADMIN — CLINDAMYCIN HYDROCHLORIDE 300 MG: 150 CAPSULE ORAL at 05:56

## 2019-06-28 RX ADMIN — GABAPENTIN 400 MG: 400 CAPSULE ORAL at 14:32

## 2019-06-28 RX ADMIN — DILTIAZEM HYDROCHLORIDE 180 MG: 180 CAPSULE, COATED, EXTENDED RELEASE ORAL at 08:18

## 2019-06-28 RX ADMIN — HEPARIN SODIUM 5000 UNITS: 5000 INJECTION INTRAVENOUS; SUBCUTANEOUS at 22:12

## 2019-06-28 RX ADMIN — FLUTICASONE FUROATE AND VILANTEROL TRIFENATATE 1 PUFF: 100; 25 POWDER RESPIRATORY (INHALATION) at 12:28

## 2019-06-28 RX ADMIN — POLYVINYL ALCOHOL 1 DROP: 14 SOLUTION/ DROPS OPHTHALMIC at 22:16

## 2019-06-28 RX ADMIN — POLYVINYL ALCOHOL 1 DROP: 14 SOLUTION/ DROPS OPHTHALMIC at 08:17

## 2019-06-28 RX ADMIN — MELATONIN 3 MG: at 22:13

## 2019-06-28 RX ADMIN — ACETAMINOPHEN 650 MG: 325 TABLET ORAL at 14:31

## 2019-06-28 RX ADMIN — TIOTROPIUM BROMIDE 18 MCG: 18 CAPSULE ORAL; RESPIRATORY (INHALATION) at 08:19

## 2019-06-28 RX ADMIN — ACETAMINOPHEN 650 MG: 325 TABLET ORAL at 06:00

## 2019-06-28 RX ADMIN — THIAMINE HCL TAB 100 MG 50 MG: 100 TAB at 08:14

## 2019-06-28 RX ADMIN — HEPARIN SODIUM 5000 UNITS: 5000 INJECTION INTRAVENOUS; SUBCUTANEOUS at 05:55

## 2019-06-28 RX ADMIN — CLINDAMYCIN HYDROCHLORIDE 300 MG: 150 CAPSULE ORAL at 22:14

## 2019-06-28 RX ADMIN — GABAPENTIN 300 MG: 300 CAPSULE ORAL at 06:02

## 2019-06-28 RX ADMIN — ACETAMINOPHEN 650 MG: 325 TABLET ORAL at 22:13

## 2019-06-28 RX ADMIN — POLYVINYL ALCOHOL 1 DROP: 14 SOLUTION/ DROPS OPHTHALMIC at 12:28

## 2019-06-28 RX ADMIN — POLYVINYL ALCOHOL 1 DROP: 14 SOLUTION/ DROPS OPHTHALMIC at 17:54

## 2019-06-28 RX ADMIN — DULOXETINE HYDROCHLORIDE 60 MG: 60 CAPSULE, DELAYED RELEASE ORAL at 08:14

## 2019-06-28 RX ADMIN — TAMSULOSIN HYDROCHLORIDE 0.4 MG: 0.4 CAPSULE ORAL at 17:40

## 2019-06-28 RX ADMIN — GABAPENTIN 400 MG: 400 CAPSULE ORAL at 22:14

## 2019-06-28 RX ADMIN — HEPARIN SODIUM 5000 UNITS: 5000 INJECTION INTRAVENOUS; SUBCUTANEOUS at 14:32

## 2019-06-28 RX ADMIN — VITAMIN D, TAB 1000IU (100/BT) 2000 UNITS: 25 TAB at 08:14

## 2019-06-28 NOTE — PLAN OF CARE
Problem: Potential for Falls  Goal: Patient will remain free of falls  Description  INTERVENTIONS:  - Assess patient frequently for physical needs  -  Identify cognitive and physical deficits and behaviors that affect risk of falls  -  [place] fall precautions as indicated by assessment   - Educate patient/family on patient safety including physical limitations  - Instruct patient to call for assistance with activity based on assessment  - Modify environment to reduce risk of injury  - Consider OT/PT consult to assist with strengthening/mobility  Outcome: Progressing     Problem: Nutrition/Hydration-ADULT  Goal: Nutrient/Hydration intake appropriate for improving, restoring or maintaining nutritional needs  Description  Monitor and assess patient's nutrition/hydration status for malnutrition (ex- brittle hair, bruises, dry skin, pale skin and conjunctiva, muscle wasting, smooth red tongue, and disorientation)  Collaborate with interdisciplinary team and initiate plan and interventions as ordered  Monitor patient's weight and dietary intake as ordered or per policy  Utilize nutrition screening tool and intervene per policy  Determine patient's food preferences and provide high-protein, high-caloric foods as appropriate       INTERVENTIONS:  - Monitor oral intake, urinary output, labs, and treatment plans  - Assess nutrition and hydration status and recommend course of action  - Evaluate amount of meals eaten  - Assist patient with eating if necessary   - Allow adequate time for meals  - Recommend/ encourage appropriate diets, oral nutritional supplements, and vitamin/mineral supplements  - Order, calculate, and assess calorie counts as needed  - Recommend, monitor, and adjust tube feedings and TPN/PPN based on assessed needs  - Assess need for intravenous fluids  - Provide specific nutrition/hydration education as appropriate  - Include patient/family/caregiver in decisions related to nutrition  Outcome: Progressing     Problem: PAIN - ADULT  Goal: Verbalizes/displays adequate comfort level or baseline comfort level  Description  Interventions:  - Encourage patient to monitor pain and request assistance  - Assess pain using appropriate pain scale  - Administer analgesics based on type and severity of pain and evaluate response  - Implement non-pharmacological measures as appropriate and evaluate response  - Consider cultural and social influences on pain and pain management  - Notify physician/advanced practitioner if interventions unsuccessful or patient reports new pain  Outcome: Progressing     Problem: INFECTION - ADULT  Goal: Absence or prevention of progression during hospitalization  Description  INTERVENTIONS:  - Assess and monitor for signs and symptoms of infection  - Monitor lab/diagnostic results  - Monitor all insertion sites, i e  indwelling lines, tubes, and drains  - Monitor endotracheal (as able) and nasal secretions for changes in amount and color  - [place] appropriate cooling/warming therapies per order  - Administer medications as ordered  - Instruct and encourage patient and family to use good hand hygiene technique  - Identify and instruct in appropriate isolation precautions for identified infection/condition  Outcome: Progressing     Problem: SAFETY ADULT  Goal: Patient will remain free of falls  Description  INTERVENTIONS:  - Assess patient frequently for physical needs  -  Identify cognitive and physical deficits and behaviors that affect risk of falls    -  [place] fall precautions as indicated by assessment   - Educate patient/family on patient safety including physical limitations  - Instruct patient to call for assistance with activity based on assessment  - Modify environment to reduce risk of injury  - Consider OT/PT consult to assist with strengthening/mobility  Outcome: Progressing  Goal: Maintain or return to baseline ADL function  Description  INTERVENTIONS:  -  Assess patient's ability to carry out ADLs; assess patient's baseline for ADL function and identify physical deficits which impact ability to perform ADLs (bathing, care of mouth/teeth, toileting, grooming, dressing, etc )  - Assess/evaluate cause of self-care deficits   - Assess range of motion  - Assess patient's mobility; develop plan if impaired  - Assess patient's need for assistive devices and provide as appropriate  - Encourage maximum independence but intervene and supervise when necessary  ¯ Involve family in performance of ADLs  ¯ Assess for home care needs following discharge   ¯ Request OT consult to assist with ADL evaluation and planning for discharge  ¯ Provide patient education as appropriate  Outcome: Progressing  Goal: Maintain or return mobility status to optimal level  Description  INTERVENTIONS:  - Assess patient's baseline mobility status (ambulation, transfers, stairs, etc )    - Identify cognitive and physical deficits and behaviors that affect mobility  - Identify mobility aids required to assist with transfers and/or ambulation (gait belt, sit-to-stand, lift, walker, cane, etc )  - [place] fall precautions as indicated by assessment  - Record patient progress and toleration of activity level on Mobility SBAR; progress patient to next Phase/Stage  - Instruct patient to call for assistance with activity based on assessment  - Request Rehabilitation consult to assist with strengthening/weightbearing, etc   Outcome: Progressing     Problem: DISCHARGE PLANNING  Goal: Discharge to home or other facility with appropriate resources  Description  INTERVENTIONS:  - Identify barriers to discharge w/patient and caregiver  - Arrange for needed discharge resources and transportation as appropriate  - Identify discharge learning needs (meds, wound care, etc )  - Arrange for interpretive services to assist at discharge as needed  - Refer to Case Management Department for coordinating discharge planning if the patient needs post-hospital services based on physician/advanced practitioner order or complex needs related to functional status, cognitive ability, or social support system  Outcome: Progressing     Problem: Prexisting or High Potential for Compromised Skin Integrity  Goal: Skin integrity is maintained or improved  Description  INTERVENTIONS:  - Identify patients at risk for skin breakdown  - Assess and monitor skin integrity  - Assess and monitor nutrition and hydration status  - Monitor labs (i e  albumin)  - Assess for incontinence   - Turn and reposition patient  - Assist with mobility/ambulation  - Relieve pressure over bony prominences  - Avoid friction and shearing  - Provide appropriate hygiene as needed including keeping skin clean and dry  - Evaluate need for skin moisturizer/barrier cream  - Collaborate with interdisciplinary team (i e  Nutrition, Rehabilitation, etc )   - Patient/family teaching  Outcome: Progressing

## 2019-06-28 NOTE — PROGRESS NOTES
Physical Medicine and Rehabilitation Progress Note  Anju Lal During 70 y o  male MRN: [id number]  Unit/Bed#: [facility] 970-01 Encounter: [id number]    Chief Complaints:  Decline in mobility    Subjective/Interval Events:   Patient reports hands less uncomfortable today  Patient reports neck pain improving  Patient reports sleeping somewhat better with still some frequency but not as bad as it was regarding urine  Patient denies dysuria  Patient denies lightheadedness, worsening strength or sensation, fever, chills, sweats, calf pain, shortness of breath, or other new complaints  ROS: A 10-point ROS was performed  Negative except as listed above  Overall Assessment/relevant history:  51-year-old male with a past medical history of  diabetes mellitus 2, hypertension, hyperlipidemia,  active smoker,lung cancer status post left upper lobectomy,  pulmonary nodules, COPD, obstructive sleep apnea,  only grade 1 diastolic dysfunction left ventricle, BPH who developed progressive 4 limb weakness, sensory changes, and gait imbalance 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 who underwent C3-C4 anterior cervical decompression and fusion with  C4 hemicorpectomy by Dr Derrick Hilton on 6/18/19  Postoperative course notable for pain, hypertension, with significant decline ADLs and ambulation    Patient was evaluated by skilled therapies and is appropriate for admission to acute rehabilitation center      Functional status on admission to [facility]:  OT:  Bathing, upper body dressing, toileting, toilet transfers moderate assist, eating, grooming, lower body dressing, general transfers min assist  PT:  Ambulation min assist 100 feet but scored as total assist because of 2nd person chair follow    Functional status (recent):    Transfers and ambulation Min assist; toileting supervision    Functional status goal:  Modified independent for ADLs and ambulation     Urinary incontinence  Assessment & Plan  Continues with some improvements  Recent urinary frequency, incontinence > possible neurogenic overactive spastic bladder  > significantly impairing sleep, function, quality of life  > U/A  non-infectious  > urology c/s appreciated > oxybutynin recently started > monitor for changes  > toileting program  PVRs, bladder scans, measure urine outputs to help determine  Continue chronic flomax for now  Monitor for infection, incontinence, retention  Follow-up with Urology after discharge    * Cervical myelopathy [facility])  Assessment & Plan  Improving strength and function  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 and fusion with C4 hemicorpectomy by Dr Celio Chua on 19  Clindamycin for infectious prophylaxis per Neurosurgery  Holding anti-platelet per Neurosurgery  Postop day 14 is   KWADWO drain management if necessary  Cervical spine precautions, cervical collar on at all times  Monitor incision for infection or dehiscence  Recommend acute comprehensive interdisciplinary inpatient rehabilitation to include intensive skilled therapies (PT, OT) as outlined with oversight and management by rehabilitation physician as well as inpatient rehab level nursing, case management and weekly interdisciplinary team meetings  Follow-up with Neurosurgery if needed during course after discharge    Dara to improve  Supportive counseling, psychology consult during course  Gabapentin may be helpful for this as well as pain  Optimize sleep  Counseled on and continue to encourage deep breathing/relaxation/behavioral management techniques:     Deep breathin seconds in, 5 seconds out, 5 times per hour when awake and PRN when experiencing pain or anxiety    Avoid holding breath and tightening muscles and instead breathe slowly and deeply      Insomnia  Assessment & Plan  Stable  Optimal management of bladder dysfunction as outlined > monitor with adjustments in pain meds/urination meds   Melatonin 3 mg q h s  Optimize pain management as outlined  Recommend appropriate sleep hygiene: patient counselled on this:   Sleep only as much as necessary to feel rested and then get up or sit up in bed, maintain regular sleep schedule (same bedtime and wake time everyday), do not force sleep, avoid caffeinated beverages after lunch, avoid alcohol at home near bedtime, do not smoke particularly in evening, do not go to bed hungry, adjust bedroom environment so you are comfortable prior to settling down for sleep, deal with concerns or worries before bedtime  Make a list of things to work on for next day so anxiety is reduced at night, exercise regularly when cleared by physician      Pain  Assessment & Plan  Some improvements in control  Somatic postsurgical, arthritic, muscle spasms, neuropathic   Continue current management for now:  Scheduled acetaminophen monitor LFTs (wnl 6/21)   Continue gabapentin 400 mg t i d  Continue Oxycodone 5-10 mg q 4 hours p r n  Cymbalta can help as well   Hold for oversedation, AMS, or RR<12    Patient apparently was on Cymbalta in the past follow-up       Constipation  Assessment & Plan  Improved  - Colace/Senna BID, miralax daily   - PRN bowel regimen (Miralax PRN/Dulcolax supp PRN)  - monitor for signs and symptoms of obstruction/ileus > not currently; hold stimulant lax if occurs  - Limiting constipating medications if possible  - Ensure adequate hydration        At risk for venous thromboembolism (VTE)  Assessment & Plan  Heparin and SCDs    BPH (benign prostatic hyperplasia)  Assessment & Plan  Monitor for retention, incontinence, infection  Continue Flomax    SHIVANI (obstructive sleep apnea)  Assessment & Plan  CPAP    COPD (chronic obstructive pulmonary disease) ([facility])  Assessment & Plan  Monitor oxygen with and without activity  Management overall per Medicine  Encourage deep breathing and incentive spirometry  Provide albuteral neb for now with increased congestion recently off chronic meds  Spiriva, as needed albuterol  Home regimen, Advair, Spiriva, Combivent PRN  Follow-up with pulmonology after discharge    Essential hypertension  Assessment & Plan  Overall management by Medicine  Losartan 100 mg daily  Patient also on diltiazem 180 mg now > adjustments for Medicine    Hyperlipidemia associated with type 2 diabetes mellitus ([facility] [address] )  Assessment & Plan  Resume statin at discretion Medicine    Type 2 diabetes mellitus with diabetic neuropathy, without long-term current use of insulin ([facility] )  Assessment & Plan  Overall management but Medicine  Patient on combination metformin medication previously  Insulin lispro at their discretion, other insulin and orals at their discretion     Pipe smoker  Assessment & Plan  Patient started on nicotine patch per Internal Medicine    Vitamin D insufficiency  Assessment & Plan  2000 units D3 for now daily follow-up with PCP    # Skin:   - Nursing to turn patient Q2H if not adequately ambulatory, monitor for skin breakdown, rashes, and wounds if applicable     # Diet/Hydration:    Diabetic     Disposition:   Home with [facility] 3 weeks from admission     Follow-up:   PCP, PMR, NSx, Urology     CODE: Level 1: Full Code     Restrictions include: Fall precautions     ---------------------------------------------------------------------------------------------------------------------    Objective:     Allergies and Medications per EMR    Physical Exam:  Temperature 98 F pulse 73 respiratory rate 18 blood pressure 168/84 SpO2 98% on room air  General: Awake, alert in NAD  HENT:  MMM,    Respiratory:  clear to auscultation without wheeze without significant rales or rhonchi  Cardiovascular: Regular rate and rhythm, no murmurs, rubs, or gallops  Gastrointestinal: Soft, non-tender, non-distended, normoactive bowel sounds  Genitourinary: No byrnes  SkiN/MSK/Extremities:  no calf edema, no calf tenderness to palpation  Neurologic/Psych: MENTAL STATUS: awake, orientation intact appropriate wakefulness   Affect:  Positive    Right upper extremity strength 5-out of 5, left proximal upper extremity strength 5/5, left distal upper extremity strength 4+ out of 5, lower extremity strength near full    Physical exam performed, documentation above reviewed and updated if appropriate on relevant date of encounter:   06/27/2019    Diagnostic Studies: reviewed, no new imaging  No results found      Laboratory:    Results from last 7 days   Lab Units 06/27/19  0629 06/25/19  0559 06/24/19  1219   HEMOGLOBIN g/dL 13 1 13 7 13 6   HEMATOCRIT % 40 7 42 4 43 0   WBC Thousand/uL 4 01* 4 10* 3 85*     Results from last 7 days   Lab Units 06/27/19  0629 06/25/19  0559 06/24/19  1219 06/21/19  0542   BUN mg/dL 20 18 17 12   POTASSIUM mmol/L 4 3 4 9 4 5 4 2   CHLORIDE mmol/L 105 106 106 108   CREATININE mg/dL 0 82 0 93 0 85 0 77   AST U/L  --   --   --  12   ALT U/L  --   --   --  31                Patient Active Problem List   Diagnosis    Type 2 diabetes mellitus with diabetic neuropathy, without long-term current use of insulin ([facility])    Hyperlipidemia associated with type 2 diabetes mellitus ([address] )    Essential hypertension    BPH (benign prostatic hyperplasia)    Degenerative cervical spinal stenosis    Cervical spondylosis    COPD (chronic obstructive pulmonary disease) ([facility])    Adjustment disorder with depressed mood    Progressive locomotor ataxia    Other spondylosis with myelopathy, cervical region    Quadriparesis ([address] )    Spinal cord compression ([facility] [address] )    Prostate cancer ([facility] [address] )    Overactive bladder    Lesion of native kidney    SHIVANI (obstructive sleep apnea)    Impaired mobility and ADLs    Cervical myelopathy ([facility])    At risk for venous thromboembolism (VTE)    Pain    Constipation    Urinary incontinence    Depression    Vitamin D insufficiency    Pipe smoker    Insomnia    Anxiety       ** Please Note: Fluency Direct voice to text software may have been used in the creation of this document  **    Total time spent: At least 35 minutes, with more than 50% spent counseling/coordinating care  Counseling includes discussion with patient re: progress in therapies, functional issues observed by therapy staff, and discussion with patient his/her current medical state/wellbeing  Coordination of patient's care was performed in conjunction with Internal Medicine service to monitor patient's labs, vitals, and management of their comorbidities  In addition, this patient was discussed by the interdisciplinary team in weekly case conference today  The care of the patient was extensively discussed with all care providers and an appropriate rehabilitation plan was formulated unique for this patient  Barriers were identified preventing progression of therapy and appropriate interventions were discussed with each discipline  Please see the team note for input from all disciplines regarding barriers, intervention, and discharge planning         Marleen Woodruff MD, [facility]  Physical Medicine and Rehabilitation  Brain Injury Medicine

## 2019-06-28 NOTE — PROGRESS NOTES
06/28/19 1400   Pain Assessment   Pain Assessment No/denies pain   Pain Score No Pain   Restrictions/Precautions   Precautions Bed/chair alarms; Fall Risk;Spinal precautions;Supervision on toilet/commode   Braces or Orthoses C/S Collar   Cognition   Overall Cognitive Status WFL   Arousal/Participation Alert; Cooperative   QI: Sit to Stand   Assistance Needed Incidental touching   Assistance Provided by [place] Less than 25%   Comment CGA STS without AD, CS with RW    Sit to Stand CARE Score 3   QI: Chair/Bed-to-Chair Transfer   Assistance Needed Incidental touching   Assistance Provided by [place] Less than 25%   Comment CGA STS without AD, CS with RW    Chair/Bed-to-Chair Transfer CARE Score 3   Transfer Bed/Chair/Wheelchair   Limitations Noted In Balance; Endurance;UE Strength;LE Strength   Adaptive Equipment Roller Walker;None   Stand Pivot Contact Guard   Sit to [employer]   Stand to [employer] [place], Chair, Wheelchair Transfer (FIM) 4 - Patient requires steadying assist or light touching   QI: Car Transfer   Assistance Needed Incidental touching;Verbal cues   Assistance Provided by [place] No physical assistance   Comment VCs for hand placement and to not push/pull on door with  STS txfr  Car Transfer CARE Score 4   QI: Walk 10 Feet   Assistance Needed Incidental touching   Assistance Provided by [place] Less than 25%   Walk 10 Feet CARE Score 3   QI: Walk 50 Feet with Two Turns   Assistance Needed Incidental touching   Assistance Provided by [place] Less than 25%   Walk 50 Feet with Two Turns CARE Score 3   QI: Walk 150 Feet   Assistance Needed Incidental touching   Assistance Provided by [place] Less than 25%   Walk 150 Feet CARE Score 3   Ambulation   Does the patient walk? 2  Yes   Primary Discharge Mode of Locomotion Walk   Walk Assist Level Contact Guard;Close Supervision   Gait Pattern Inconsistant Analisa; Slow Analisa; Improper weight shift; Step through   Assist Device Roller Rosa M Sol[place]; Other  (hands on hips )   Distance Walked (feet) 700 ft  (x2, 300RW, 150x2 RW )   Limitations Noted In Balance; Endurance; Heel Strike;Speed;Strength;Swing   Walking (FIM) 4 - Patient requires steadying assist or light touching AND distance 150 feet or more, no rest   Wheelchair mobility   QI: Does the patient use a wheelchair? 0  No   QI: 1 Step (Curb)   Assistance Needed Incidental touching; Adaptive equipment;Verbal cues   Assistance Provided by [place] Less than 25%   Comment VCs sequencing    1 Step (Curb) CARE Score 3   QI: 4 Steps   Assistance Needed Adaptive equipment; Incidental touching   Assistance Provided by [place] Less than 25%   4 Steps CARE Score 3   QI: 12 Steps   Assistance Needed Adaptive equipment; Incidental touching   Assistance Provided by [place] Less than 25%   12 Steps CARE Score 3   Stairs   Type Stairs   # of Steps 12  (FF)   Weight Bearing Precautions Fall Risk   Assist Devices Single Rail   Stairs (FIM) 4 - Patient requires steadying assist or light touching AND patient goes up and down full flight (12- 14 stairs)   QI: Toilet Transfer   Assistance Needed Adaptive equipment; Incidental touching   Assistance Provided by [place] No physical assistance   Toilet Transfer CARE Score 4   Toilet Transfer   Surface Assessed Standard Toilet   Limitations Noted In Balance; Safety;LE Strength   Adaptive Equipment Grab Bar   Toilet Transfer (FIM) 4 - Patient requires steadying assist or light touching   Therapeutic Interventions   Balance Toe taps 4in step no UE support, FWD/Bwd ambulation in hallway    Assessment   Treatment Assessment Pt engaged in skilled PT session with focus on inc balance, stair negotiation, ambulation and functional transfer  Pt reports he talked to son about picking pt up on d/c date which will be in a [employer] SUV  Pt required rest breaks this session stating legs were getting tired at end of walks   Pt ed on not overdoing activity as he starts feeling better and gettin adequate rest  Pt contacted several [facility] and will be chosing a compay to provide services  Pt cont with dec balance and righting reactions and will cont to benefit from skilled PT to inc overall safety and maximixe function  Family/Caregiver Present no   Barriers to Discharge Inaccessible home environment;Decreased caregiver support   PT Barriers   Physical Impairment Decreased strength;Decreased endurance; Impaired balance;Decreased mobility; Decreased safety awareness;Orthopedic restrictions   Functional Limitation Car transfers;Stair negotiation; Walking;Standing;Transfers   Plan   Treatment/Interventions Functional transfer training;LE strengthening/ROM; Elevations; Therapeutic exercise; Endurance training;Patient/family training;Equipment eval/education; Bed mobility;Gait training   Progress Progressing toward goals   Recommendation   Recommendation Home PT; Home with family support   Equipment Recommended Walker  (LRAD )   PT Therapy Minutes   PT Time In 1400   PT Time Out 1500   PT Total Time (minutes) 60   PT Mode of treatment - Individual (minutes) 60   PT Mode of treatment - Concurrent (minutes) 0   PT Mode of treatment - Group (minutes) 0   PT Mode of treatment - Co-treat (minutes) 0   PT Mode of Teatment - Total time(minutes) 60 minutes   Therapy Time missed   Time missed?  No

## 2019-06-28 NOTE — PROGRESS NOTES
06/28/19 1030   Pain Assessment   Pain Assessment No/denies pain   Pain Score No Pain   Restrictions/Precautions   Precautions Bed/chair alarms; Fall Risk;Supervision on toilet/commode;Spinal precautions   Braces or Orthoses C/S Collar   Cognition   Overall Cognitive Status WFL   Arousal/Participation Alert; Cooperative   QI: Sit to Stand   Assistance Needed Incidental touching   Assistance Provided by [place] Less than 25%   Comment CGA STS without AD, CS with RW    Sit to Stand CARE Score 3   QI: Chair/Bed-to-Chair Transfer   Assistance Needed Incidental touching   Assistance Provided by [place] Less than 25%   Comment CGA for steadying without AD, CS with RW    Chair/Bed-to-Chair Transfer CARE Score 3   Transfer Bed/Chair/Wheelchair   Limitations Noted In Balance; Coordination; Sequencing   Adaptive Equipment Roller Walker;None   Stand Pivot Contact Guard   Sit to [employer]   Stand to [facility] [place], Chair, Wheelchair Transfer (FIM) 4 - Patient requires steadying assist or light touching   QI: Walk 10 Feet   Assistance Needed Incidental touching   Assistance Provided by [place] Less than 25%   Comment CGA hands on hips without AD  Walk 10 Feet CARE Score 3   QI: Walk 50 Feet with Two Turns   Assistance Needed Incidental touching   Assistance Provided by [place] Less than 25%   Comment CGA hands on hips without AD  Walk 50 Feet with Two Turns CARE Score 3   QI: Walk 150 Feet   Assistance Needed Incidental touching   Assistance Provided by [place] Less than 25%   Comment CGA hands on hips without AD  Walk 150 Feet CARE Score 3   Ambulation   Does the patient walk? 2  Yes   Primary Discharge Mode of Locomotion Walk   Walk Assist Level Contact Guard;Close Supervision   Gait Pattern Inconsistant Analisa; Slow Analisa;Decreased foot clearance; Step through; Improper weight shift   Assist Device [place]; Other  (Hands on Hips )   Distance Walked (feet) 700 ft  (x3 Hands on hips, 150x2 RW  ) Limitations Noted In Balance; Endurance; Heel Strike; Sequencing   Walking (FIM) 4 - Patient requires steadying assist or light touching AND distance 150 feet or more, no rest   Wheelchair mobility   QI: Does the patient use a wheelchair? 0  No   QI: 4 Steps   Assistance Needed Adaptive equipment;Physical assistance; Incidental touching   Assistance Provided by [place] 25%-49%   Comment Reciprocal pattern LHR down, RHR up    4 Steps CARE Score 3   QI: 12 Steps   Assistance Needed Adaptive equipment;Physical assistance; Incidental touching   Assistance Provided by [place] 25%-49%   Comment Reciprocal pattern LHR down, RHR up    12 Steps CARE Score 3   Stairs   Type Stairs   # of Steps 22   Weight Bearing Precautions Fall Risk   Assist Devices Single Rail   Findings Pt completed stair negotiation primarily at [facility] levell  Mack one instance to correct LOB  Stairs (FIM) 4 - Patient requires steadying assist or light touching AND patient goes up and down full flight (12- 14 stairs)   QI: Toilet Transfer   Assistance Needed Adaptive equipment; Incidental touching   Assistance Provided by [place] Less than 25%   Comment Grab bar    Toilet Transfer CARE Score 3   Toilet Transfer   Surface Assessed Standard Toilet   Limitations Noted In Balance; Safety;LE Strength   Adaptive Equipment Grab Bar   Toilet Transfer (FIM) 4 - Patient requires steadying assist or light touching   Therapeutic Interventions   Flexibility BLE seated h/s gastroc stretch x4 min    Balance Ambulation around cones in ernst, Static standing balance red foam 1minx2, Standing balance ball toss on red [id number], Ambulation with ball toss fwd/bwd x 120ft  Assessment   Treatment Assessment Pt participated in skilled PT with focus on inc balance and overall safety with functional tx and ambulation  Pt with improvement in safety and balance while ambulation with RW  Pt cont with unsteady gait with ambulation w/o AD   Pt unsteadiness worse with distraction in hallway and cues to slow down for safety  Good carry over with RW management on turns this session  Balance is improving but cont to be limiting factor in progression  Pt will cont to benefit from skilled PT to inc overally safety and functional mobility to reach Shireen goals as pt will be home alone  Family/Caregiver Present no   Barriers to Discharge Inaccessible home environment;Decreased caregiver support   PT Barriers   Physical Impairment Decreased strength;Decreased endurance; Impaired balance;Decreased mobility; Decreased coordination;Decreased safety awareness;Orthopedic restrictions   Functional Limitation Car transfers;Stair negotiation;Standing;Transfers; Walking   Plan   Treatment/Interventions Functional transfer training;LE strengthening/ROM; Elevations; Therapeutic exercise; Endurance training;Patient/family training;Equipment eval/education;Gait training; Compensatory technique education   Progress Progressing toward goals   Recommendation   Recommendation Home with family support;Home PT   Equipment Recommended Other (Comment)  (TBD )   PT Therapy Minutes   PT Time In 1030   PT Time Out 1130   PT Total Time (minutes) 60   PT Mode of treatment - Individual (minutes) 60   PT Mode of treatment - Concurrent (minutes) 0   PT Mode of treatment - Group (minutes) 0   PT Mode of treatment - Co-treat (minutes) 0   PT Mode of Teatment - Total time(minutes) 60 minutes   Therapy Time missed   Time missed?  No

## 2019-06-28 NOTE — PROGRESS NOTES
Physical Medicine and Rehabilitation Progress Note  Lanette Balderas During 70 y o  male MRN: [id number]  Unit/Bed#: [facility] 970-01 Encounter: [id number]    Chief Complaints:  Tingling in hands    Subjective/Interval Events:   Patient reports overall continuing to improve  He reports tingling in hands particularly during day can still be bothersome although improving  He reports strength, coordination improving  Patient denies SOB, fever, chills, sweats, calf pain, lightheadedness  He reports urination is improving but he would still prefer nighttime condom cath for sleep  Patient denies other new complaints  ROS: A 10-point ROS was performed  Negative except as listed above  Overall Assessment/relevant history:  61-year-old male with a past medical history of  diabetes mellitus 2, hypertension, hyperlipidemia,  active smoker,lung cancer status post left upper lobectomy,  pulmonary nodules, COPD, obstructive sleep apnea,  only grade 1 diastolic dysfunction left ventricle, BPH who developed progressive 4 limb weakness, sensory changes, and gait imbalance 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 who underwent C3-C4 anterior cervical decompression and fusion with  C4 hemicorpectomy by Dr Swapnil Flores on 6/18/19  Postoperative course notable for pain, hypertension, with significant decline ADLs and ambulation  Patient was evaluated by skilled therapies and is appropriate for admission to acute rehabilitation center      Functional status on admission to [facility]:  OT:  Bathing, upper body dressing, toileting, toilet transfers moderate assist, eating, grooming, lower body dressing, general transfers min assist  PT:  Ambulation min assist 100 feet but scored as total assist because of 2nd person chair follow    Functional status (recent):     Upper and lower body dressing moderate assist, ambulation moderate assist to Min assist, grooming and bathing Min assist    Functional status goal:  Modified independent for ADLs and ambulation     Urinary incontinence  Assessment & Plan  Improving still finding urine catheter overnight helpful  Recent urinary frequency, incontinence > possible neurogenic overactive spastic bladder  > significantly impairing sleep, function, quality of life  > U/A  non-infectious  > urology c/s appreciated > oxybutynin recently started > monitor for changes  > toileting program  PVRs, bladder scans, measure urine outputs to help determine  Continue chronic flomax for now  Monitor for infection, incontinence, retention  Follow-up with Urology after discharge    * Cervical myelopathy [facility])  Assessment & Plan  Steady improvements  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 and fusion with C4 hemicorpectomy by Dr Shelia French on 19  Clindamycin for infectious prophylaxis per Neurosurgery  Holding anti-platelet per Neurosurgery  Postop day 14 is   KWADWO drain management if necessary  Cervical spine precautions, cervical collar on at all times  Monitor incision for infection or dehiscence  Recommend acute comprehensive interdisciplinary inpatient rehabilitation to include intensive skilled therapies (PT, OT) as outlined with oversight and management by rehabilitation physician as well as inpatient rehab level nursing, case management and weekly interdisciplinary team meetings  Follow-up with Neurosurgery if needed during course after discharge    Dara to improve  Supportive counseling, psychology consult during course  Gabapentin may be helpful for this as well as pain  Optimize sleep  Counseled on and continue to encourage deep breathing/relaxation/behavioral management techniques:     Deep breathin seconds in, 5 seconds out, 5 times per hour when awake and PRN when experiencing pain or anxiety    Avoid holding breath and tightening muscles and instead breathe slowly and deeply      Insomnia  Assessment & Plan  Improving with improving urinary function/condom cath   Optimal management of bladder dysfunction as outlined > monitor with adjustments in pain meds/urination meds   Melatonin 3 mg q h s  Optimize pain management as outlined  Recommend appropriate sleep hygiene: patient counselled on this:   Sleep only as much as necessary to feel rested and then get up or sit up in bed, maintain regular sleep schedule (same bedtime and wake time everyday), do not force sleep, avoid caffeinated beverages after lunch, avoid alcohol at home near bedtime, do not smoke particularly in evening, do not go to bed hungry, adjust bedroom environment so you are comfortable prior to settling down for sleep, deal with concerns or worries before bedtime  Make a list of things to work on for next day so anxiety is reduced at night, exercise regularly when cleared by physician      Pain  Assessment & Plan  Improving but still sub-optimal pain control   Somatic postsurgical, arthritic, muscle spasms, neuropathic   Continue current management for now:  Scheduled acetaminophen monitor LFTs (wnl 6/21)   Increase gabapentin to 400 mg t i d  Continue Oxycodone 5-10 mg q 4 hours p r n  Cymbalta can help as well   Hold for oversedation, AMS, or RR<12    Patient apparently was on Cymbalta in the past follow-up       Constipation  Assessment & Plan  Improved  - Colace/Senna BID, miralax daily   - PRN bowel regimen (Miralax PRN/Dulcolax supp PRN)  - monitor for signs and symptoms of obstruction/ileus > not currently; hold stimulant lax if occurs  - Limiting constipating medications if possible  - Ensure adequate hydration        At risk for venous thromboembolism (VTE)  Assessment & Plan  Heparin and SCDs    BPH (benign prostatic hyperplasia)  Assessment & Plan  Monitor for retention, incontinence, infection  Continue Flomax    SHIVANI (obstructive sleep apnea)  Assessment & Plan  CPAP    COPD (chronic obstructive pulmonary disease) ([facility])  Assessment & Plan  Monitor oxygen with and without activity  Management overall per Medicine  Encourage deep breathing and incentive spirometry  Provide albuteral neb for now with increased congestion recently off chronic meds  Spiriva, as needed albuterol  Home regimen, Advair, Spiriva, Combivent PRN  Follow-up with pulmonology after discharge    Essential hypertension  Assessment & Plan  Overall management by Medicine  Losartan 100 mg daily  Patient also on diltiazem 180 mg now > adjustments for Medicine    Hyperlipidemia associated with type 2 diabetes mellitus ([facility] )  Assessment & Plan  Resume statin at discretion Medicine    Type 2 diabetes mellitus with diabetic neuropathy, without long-term current use of insulin ([facility] )  Assessment & Plan  Overall management but Medicine  Patient on combination metformin medication previously  Insulin lispro at their discretion, other insulin and orals at their discretion     Pipe smoker  Assessment & Plan  Patient started on nicotine patch per Internal Medicine    Vitamin D insufficiency  Assessment & Plan  2000 units D3 for now daily follow-up with PCP    # Skin:   - Nursing to turn patient Q2H if not adequately ambulatory, monitor for skin breakdown, rashes, and wounds if applicable     # Diet/Hydration:    Diabetic     Disposition:   Home with [facility] 3 weeks from admission     Follow-up:   PCP, PMR, NSx, Urology     CODE: Level 1: Full Code     Restrictions include: Fall precautions     ---------------------------------------------------------------------------------------------------------------------    Objective:     Allergies and Medications per EMR    Physical Exam:  Vitals:    06/28/19 0818   BP: 146/92   Pulse:    Resp:    Temp:    SpO2:      General: Awake, alert in NAD  HENT:  MMM,    Respiratory:  clear to auscultation without wheeze without significant rales or rhonchi  Cardiovascular: Regular rate and rhythm, no murmurs, rubs, or gallops  Gastrointestinal: Soft, non-tender, non-distended, normoactive bowel sounds  Genitourinary: No byrnes  SkiN/MSK/Extremities:  no calf edema, no calf tenderness to palpation  Neurologic/Psych:   MENTAL STATUS: awake, orientation intact appropriate wakefulness  Affect:  Reasonably positive   Strength stable     Physical exam performed, documentation above reviewed and updated if appropriate on relevant date of encounter:   06/28/2019    Diagnostic Studies: reviewed, no new imaging  No results found  Laboratory:    Results from last 7 days   Lab Units 06/27/19  0629 06/25/19  0559 06/24/19  1219   HEMOGLOBIN g/dL 13 1 13 7 13 6   HEMATOCRIT % 40 7 42 4 43 0   WBC Thousand/uL 4 01* 4 10* 3 85*     Results from last 7 days   Lab Units 06/27/19  0629 06/25/19  0559 06/24/19  1219   BUN mg/dL 20 18 17   POTASSIUM mmol/L 4 3 4 9 4 5   CHLORIDE mmol/L 105 106 106   CREATININE mg/dL 0 82 0 93 0 85                Patient Active Problem List   Diagnosis    Type 2 diabetes mellitus with diabetic neuropathy, without long-term current use of insulin ([facility])    Hyperlipidemia associated with type 2 diabetes mellitus ([address] )    Essential hypertension    BPH (benign prostatic hyperplasia)    Degenerative cervical spinal stenosis    Cervical spondylosis    COPD (chronic obstructive pulmonary disease) ([facility])    Adjustment disorder with depressed mood    Progressive locomotor ataxia    Other spondylosis with myelopathy, cervical region    Quadriparesis ([address] )    Spinal cord compression ([address] )    Prostate cancer ([facility]    Overactive bladder    Lesion of native kidney    SHIVANI (obstructive sleep apnea)    Impaired mobility and ADLs    Cervical myelopathy ([facility])    At risk for venous thromboembolism (VTE)    Pain    Constipation    Urinary incontinence    Depression    Vitamin D insufficiency    Pipe smoker    Insomnia    Anxiety       ** Please Note: Fluency Direct voice to text software may have been used in the creation of this document  **    Total visit time:  At least 25 minutes, with more than 50% spent counseling/coordinating care  Counseling includes discussion with patient re: progress in therapies, functional issues observed by therapy staff, and discussion with patient regarding their current medical state and wellbeing  Coordination of patient's care was performed in conjunction with Internal Medicine service to monitor patient's labs, vitals, and management of their comorbidities         Rik Zhu MD, [facility]  Physical Medicine and Rehabilitation  Brain Injury Medicine

## 2019-06-28 NOTE — PROGRESS NOTES
06/28/19 0830   Pain Assessment   Pain Assessment No/denies pain   Pain Score No Pain   Restrictions/Precautions   Precautions Bed/chair alarms;Cognitive; Fall Risk;Impulsive;Spinal precautions;Supervision on toilet/commode   Weight Bearing Restrictions No   ROM Restrictions Yes  (cervical spinal precautions)   Braces or Orthoses C/S Collar   Grooming   Able To Wash/Dry Hands   Limitation Noted In Coordination; Safety;Timeliness   Findings CGA in stance at sink to wash/dry hands after toileting, no LOB  Grooming (FIM) 4 - Patient requires steadying assist or light touching   QI: Putting On/Taking Off Footwear   Assistance Needed Physical assistance   Assistance Provided by [place] Less than 25%   Comment While seated, pt doffed/donned socks and donned lace-up shoes utilizing cross-over method and shoe horn to adjust backs of shoes with supervision and verbal cues for technique  Pt required total assist to tie shoes  Putting On/Taking Off Footwear CARE Score 3   QI: Sit to Stand   Assistance Needed Incidental touching   Assistance Provided by [place] No physical assistance   Comment CGA sit>stand from recliner chair without RW, no LOB  Sit to Stand CARE Score 4   QI: Chair/Bed-to-Chair Transfer   Assistance Needed Incidental touching   Assistance Provided by [place] No physical assistance   Comment CGA provided for steadying due to decreased standing balance, no RW and no LOB  Chair/Bed-to-Chair Transfer CARE Score 4   Transfer Bed/Chair/Wheelchair   Adaptive Equipment None   Bed, Chair, Wheelchair Transfer (FIM) 4 - Patient requires steadying assist or light touching   QI: [address] Needed Supervision   Assistance Provided by [place] No physical assistance   Comment Pt able to complete CM of pants over hips in stance, with supervision  Toileting Hygiene CARE Score 4   Toileting   Able to [address] down yes, up yes     Able to [address] Yes   Manage Hygiene Bladder Limitations Noted In Balance; Coordination; Safety   Adaptive Equipment Grab Bar   Toileting (FIM) 5 - Patient requires supervision/monitoring   QI: Toilet Transfer   Comment pt stood to urinate no transfer completed   Toilet Transfer   Findings pt stood to urinate no transfer completed   Toilet Transfer (FIM) 0 - Activity does not occur   Coordination   Fine Motor Pt engaged in seated marbles task utilizing R hand digit opposition (alternating between D2-D5) for improved pad pinch pattern and proprioceptive/sensory awareness in light of paresthesias in B/L hands/digits  Pt required supervision and frequent verbal cues for sequencing to alternating between digits, F carryover, mod droppage  Pt engaged in beading of pipecleaner with supervision and verbal cues to maintain spinal precautions when beads dropped to floor, with therapist retrieving due to small nature of beads, LHR not appropriate  Focus was on fine motor control and bilateral integration  Pt stabilized pipecleaner with R hand and pushed beads on with L  Pt demo min droppage, upgraded task to soft nylon string with mod droppage and max difficulty threading beads  Cognition   Overall Cognitive Status WFL   Arousal/Participation Alert; Responsive; Cooperative   Attention Attends with cues to redirect   Orientation Level Oriented to person;Oriented to place;Oriented to situation   Memory Within functional limits   Following Commands Follows multistep commands with increased time or repetition   Additional Activities   Additional Activities Other (Comment)  (Sensory stimulation kit)   Additional Activities Comments Pt engaged in sensory stimulation kit, alternating hands to feel with digits/palms the difference between soft/hard and smooth/rough surfaces, such as toothbrush, silk, velvet, and feather, for sensory re-education of B/L hands due to paresthesias in B/L hands   Pt also engaged in sensory bin with vision occluded, describing out loud item characteristics and trying to determine item identity using stereognosis  During both tasks, pt demo greater sensory difficulty with L hand/digits than R, with pt unable to identify any items using stereognosis  Activity Tolerance   Activity Tolerance Patient tolerated treatment well   Assessment   Treatment Assessment Pt engaged in 90min skilled OT session focused on LB dressing, LHAE, fine motor control, sensory re-education, and cervical spinal precautions  Pt continues to demo F carryover of spinal precautions, requiring frequent verbal cues throughout session as well as therapist demonstration of precautions with inconsistent carryover, especially during LB dressing  Pt practiced using dressing stick and shoe horn while donning shoes with success but required frequent cues to utilize AE rather than bending down to floor  Pt continues to report paresthesias in B/L hands/digits which continues to limit fine motor skills  Pt demo greater difficulty with stereognosis and fine motor skills in L hand  Pt LOB x3 during functional mobility in stance during session, justifying CGA  Did not utilize RW this session due to fellow CAREY reporting pt unsteady/unsafe using RW in recent session  Pt continues to be limited by strength, adhering to cervical spinal precautions, standing balance, sensory integration, and fine motor skills  Pt would benefit from continued skilled OT focused on these limitations  Prognosis Good   Problem List Decreased strength;Decreased endurance;Decreased range of motion; Impaired balance;Decreased mobility; Decreased coordination;Decreased safety awareness;Orthopedic restrictions;Pain; Impaired sensation   Barriers to Discharge Inaccessible home environment;Decreased caregiver support   Plan   Treatment/Interventions ADL retraining;Functional transfer training;LE strengthening/ROM; Therapeutic exercise; Endurance training;Cognitive reorientation;Patient/family training;Equipment eval/education;OT;Compensatory technique education   Progress Progressing toward goals   OT Therapy Minutes   OT Time In 0830   OT Time Out 1000   OT Total Time (minutes) 90   OT Mode of treatment - Individual (minutes) 90   OT Mode of treatment - Concurrent (minutes) 0   OT Mode of treatment - Group (minutes) 0   OT Mode of treatment - Co-treat (minutes) 0   OT Mode of Teatment - Total time(minutes) 90 minutes   Therapy Time missed   Time missed?  No

## 2019-06-28 NOTE — PLAN OF CARE
Problem: Potential for Falls  Goal: Patient will remain free of falls  Description  INTERVENTIONS:  - Assess patient frequently for physical needs  -  Identify cognitive and physical deficits and behaviors that affect risk of falls  -  [place] fall precautions as indicated by assessment   - Educate patient/family on patient safety including physical limitations  - Instruct patient to call for assistance with activity based on assessment  - Modify environment to reduce risk of injury  - Consider OT/PT consult to assist with strengthening/mobility  Outcome: Progressing     Problem: Nutrition/Hydration-ADULT  Goal: Nutrient/Hydration intake appropriate for improving, restoring or maintaining nutritional needs  Description  Monitor and assess patient's nutrition/hydration status for malnutrition (ex- brittle hair, bruises, dry skin, pale skin and conjunctiva, muscle wasting, smooth red tongue, and disorientation)  Collaborate with interdisciplinary team and initiate plan and interventions as ordered  Monitor patient's weight and dietary intake as ordered or per policy  Utilize nutrition screening tool and intervene per policy  Determine patient's food preferences and provide high-protein, high-caloric foods as appropriate       INTERVENTIONS:  - Monitor oral intake, urinary output, labs, and treatment plans  - Assess nutrition and hydration status and recommend course of action  - Evaluate amount of meals eaten  - Assist patient with eating if necessary   - Allow adequate time for meals  - Recommend/ encourage appropriate diets, oral nutritional supplements, and vitamin/mineral supplements  - Order, calculate, and assess calorie counts as needed  - Recommend, monitor, and adjust tube feedings and TPN/PPN based on assessed needs  - Assess need for intravenous fluids  - Provide specific nutrition/hydration education as appropriate  - Include patient/family/caregiver in decisions related to nutrition  Outcome: Progressing     Problem: PAIN - ADULT  Goal: Verbalizes/displays adequate comfort level or baseline comfort level  Description  Interventions:  - Encourage patient to monitor pain and request assistance  - Assess pain using appropriate pain scale  - Administer analgesics based on type and severity of pain and evaluate response  - Implement non-pharmacological measures as appropriate and evaluate response  - Consider cultural and social influences on pain and pain management  - Notify physician/advanced practitioner if interventions unsuccessful or patient reports new pain  Outcome: Progressing     Problem: INFECTION - ADULT  Goal: Absence or prevention of progression during hospitalization  Description  INTERVENTIONS:  - Assess and monitor for signs and symptoms of infection  - Monitor lab/diagnostic results  - Monitor all insertion sites, i e  indwelling lines, tubes, and drains  - Monitor endotracheal (as able) and nasal secretions for changes in amount and color  - [place] appropriate cooling/warming therapies per order  - Administer medications as ordered  - Instruct and encourage patient and family to use good hand hygiene technique  - Identify and instruct in appropriate isolation precautions for identified infection/condition  Outcome: Progressing     Problem: SAFETY ADULT  Goal: Patient will remain free of falls  Description  INTERVENTIONS:  - Assess patient frequently for physical needs  -  Identify cognitive and physical deficits and behaviors that affect risk of falls    -  [place] fall precautions as indicated by assessment   - Educate patient/family on patient safety including physical limitations  - Instruct patient to call for assistance with activity based on assessment  - Modify environment to reduce risk of injury  - Consider OT/PT consult to assist with strengthening/mobility  Outcome: Progressing  Goal: Maintain or return to baseline ADL function  Description  INTERVENTIONS:  -  Assess patient's ability to carry out ADLs; assess patient's baseline for ADL function and identify physical deficits which impact ability to perform ADLs (bathing, care of mouth/teeth, toileting, grooming, dressing, etc )  - Assess/evaluate cause of self-care deficits   - Assess range of motion  - Assess patient's mobility; develop plan if impaired  - Assess patient's need for assistive devices and provide as appropriate  - Encourage maximum independence but intervene and supervise when necessary  ¯ Involve family in performance of ADLs  ¯ Assess for home care needs following discharge   ¯ Request OT consult to assist with ADL evaluation and planning for discharge  ¯ Provide patient education as appropriate  Outcome: Progressing  Goal: Maintain or return mobility status to optimal level  Description  INTERVENTIONS:  - Assess patient's baseline mobility status (ambulation, transfers, stairs, etc )    - Identify cognitive and physical deficits and behaviors that affect mobility  - Identify mobility aids required to assist with transfers and/or ambulation (gait belt, sit-to-stand, lift, walker, cane, etc )  - [place] fall precautions as indicated by assessment  - Record patient progress and toleration of activity level on Mobility SBAR; progress patient to next Phase/Stage  - Instruct patient to call for assistance with activity based on assessment  - Request Rehabilitation consult to assist with strengthening/weightbearing, etc   Outcome: Progressing     Problem: DISCHARGE PLANNING  Goal: Discharge to home or other facility with appropriate resources  Description  INTERVENTIONS:  - Identify barriers to discharge w/patient and caregiver  - Arrange for needed discharge resources and transportation as appropriate  - Identify discharge learning needs (meds, wound care, etc )  - Arrange for interpretive services to assist at discharge as needed  - Refer to Case Management Department for coordinating discharge planning if the patient needs post-hospital services based on physician/advanced practitioner order or complex needs related to functional status, cognitive ability, or social support system  Outcome: Progressing     Problem: Prexisting or High Potential for Compromised Skin Integrity  Goal: Skin integrity is maintained or improved  Description  INTERVENTIONS:  - Identify patients at risk for skin breakdown  - Assess and monitor skin integrity  - Assess and monitor nutrition and hydration status  - Monitor labs (i e  albumin)  - Assess for incontinence   - Turn and reposition patient  - Assist with mobility/ambulation  - Relieve pressure over bony prominences  - Avoid friction and shearing  - Provide appropriate hygiene as needed including keeping skin clean and dry  - Evaluate need for skin moisturizer/barrier cream  - Collaborate with interdisciplinary team (i e  Nutrition, Rehabilitation, etc )   - Patient/family teaching  Outcome: Progressing

## 2019-06-28 NOTE — PROGRESS NOTES
06/28/19 1540   Pain Assessment   Pain Assessment No/denies pain   Restrictions/Precautions   Precautions Bed/chair alarms; Fall Risk;Supervision on toilet/commode   Braces or Orthoses C/S Collar   Cognition   Arousal/Participation Cooperative   Subjective   Subjective pt reported feeling happy to have extra PT   QI: Sit to Stand   Assistance Needed Supervision   Sit to Stand CARE Score 4   QI: Chair/Bed-to-Chair Transfer   Assistance Needed Supervision   Chair/Bed-to-Chair Transfer CARE Score 4   Transfer Bed/Chair/Wheelchair   Limitations Noted In Balance; Endurance;UE Strength;LE Strength   Adaptive Equipment None   Stand Pivot Supervision   Sit to Stand Supervision   Stand to W  R  [place], Chair, Wheelchair Transfer (FIM) 5 - Patient requires supervision/monitoring   QI: Walk 10 Feet   Assistance Needed Incidental touching   Walk 10 Feet CARE Score 4   QI: Walk 50 Feet with Two Turns   Assistance Needed Incidental touching   Walk 50 Feet with Two Turns CARE Score 4   QI: Walk 150 Feet   Assistance Needed Incidental touching   Walk 150 Feet CARE Score 4   Ambulation   Does the patient walk? 2  Yes   Primary Discharge Mode of Locomotion Walk   Walk Assist Level Contact Guard  (with gait belt)   Gait Pattern Improper weight shift; Step through; Inconsistant Analisa; Slow Analisa   Assist Device Hand Hold  (none, )   Distance Walked (feet) 350 ft  (600, 150 x2)   Limitations Noted In Balance; Endurance; Heel Strike;Speed;Strength;Swing   Findings started with HHA, then with use of gait belt, CGA   worked on walking at faster speeds to get longer stride    Walking (FIM) 4 - Patient requires steadying assist or light touching AND distance 150 feet or more, no rest   Equipment Use   NuStep 16 min level 3 for first 2 min then level 2, BUE and BLE    Assessment   Treatment Assessment Pt cont to make progress with ambulatory balance, requiring CGA on gait belt; however due to cont deficits in righting reactions, pt is still at fall risk  Pt will need cont PT to further progress balance, safety, righting reactions and (I) to be able to dc home safely  Barriers to Discharge Decreased caregiver support   PT Barriers   Physical Impairment Decreased strength;Decreased range of motion;Decreased endurance; Impaired balance;Decreased mobility; Decreased coordination   Functional Limitation Car transfers;Stair negotiation;Standing;Transfers; Walking   Plan   Treatment/Interventions Functional transfer training;Elevations;LE strengthening/ROM; Therapeutic exercise; Endurance training;Patient/family training;Equipment eval/education; Bed mobility;Gait training   Progress Progressing toward goals   Recommendation   Recommendation Outpatient PT; Home with family support;Home PT   Equipment Recommended Walker  (LRAD)   PT Therapy Minutes   PT Time In 1540   PT Time Out 1608   PT Total Time (minutes) 28   PT Mode of treatment - Individual (minutes) 28   PT Mode of treatment - Concurrent (minutes) 0   PT Mode of treatment - Group (minutes) 0   PT Mode of treatment - Co-treat (minutes) 0   PT Mode of Teatment - Total time(minutes) 28 minutes   Therapy Time missed   Time missed?  No

## 2019-06-28 NOTE — PROGRESS NOTES
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; s/p C3/4 ACDF with C4 hemicorpectomy, C3-4 anterior instrumentation/fusion 6/18/19 Nakia Organ):  continue cervical collar; on Clindamycin for infection prophylaxis  Pain control per primary service  Had previously been on Cymbalta for neuropathic sx      DM2: at home he takes Kombiglyze XR 5-1000mg qd and no insulin  Continue Metformin 500mg daily = has good control currently  Continue DM diet and Accuchecks/QID with SSI      HTN:  at home, takes Cardizem 180mg qd, Losartan 100mg qd (was not on Norvasc he says); here on Card 120mg qd, Losartan 100mg qd  Blood pressure improving = increased his Card back to 180 mg qd starting today      COPD; hx lung cancer/VIVIANA lobectomy, pulmonary nodules/cough: follows with pulm as OP and takes Advair 250-50 one puff BID/Spiriva one puff daily and prn Combivent  On Breo as substitute for Advair, continue Spiriva and use Albuterol inhaler prn  Has intermitt cough but not bothersome  Had CT chest in April for lung nodule surveillance = for repeat 1 yr  Will watch     SHIVANI: supposed to use CPAP but has been noncompliant     HLD: at home on Pravachol     Urinary incontinence/BPH/urgency: continue Flomax, Oxybutynin ER 5mg qd; Uro is following         Subjective/ HPI:  Patients overnight issues or events were reviewed with nursing or staff during rounds or morning huddle session  No new or overnight issues  Hypokalemia resolved  DM/HTN:  Stable  Patient denies any current complaints      ROS:     GI: denies abdominal pain, change bowel habits or reflux symptoms  Neuro: Denies any headache, new vision changes, new neuropathies,new weaknesses   Respiratory: No Cough, SOB, denies wheeze  Cardiovascular: No CP, palpitations , denies perception of rapid heartbeat  : denies any new urinary burning or frequency    Review of Scheduled Meds:    Current Facility-Administered Medications:  acetaminophen 650 mg Oral TID James Hammer MD   albuterol 2 puff Inhalation Q4H PRN James Hammer MD   bisacodyl 10 mg Rectal Daily PRN James Hammer MD   bisacodyl 10 mg Rectal Once James Hammer MD   cholecalciferol 2,000 Units Oral Daily James Hammer MD   clindamycin 300 mg Oral [facility] James Hammer MD   diltiazem 180 mg Oral Daily Daren Ashford, ISAACNP   docusate sodium 100 mg Oral BID James Hammer MD   DULoxetine 60 mg Oral Daily James Hammer MD   fluticasone-vilanterol 1 puff Inhalation Daily James Hammer MD   gabapentin 400 mg Oral HS James Hammer MD   gabapentin 400 mg Oral BID James Hammer MD   heparin (porcine) 5,000 Units Subcutaneous [facility] James Hammer MD   hydrALAZINE 25 mg Oral Q8H PRN SONJA [place]   insulin lispro 1-5 Units Subcutaneous TID [facility] James Hammer MD   insulin lispro 1-5 Units Subcutaneous HS James Hammer MD   lidocaine  Topical Q4H PRN James Hammer MD   losartan 100 mg Oral Daily James Hammer MD   melatonin 3 mg Oral HS Mery Grewal PA-C   metFORMIN 500 mg Oral Daily With Breakfast SONJA [place]   nicotine 21 mg Transdermal Daily Mery Grewal PA-C   ondansetron 4 mg Intravenous Q6H PRN James Hammer MD   oxybutynin 5 mg Oral Daily MarvellSONJA [place]   oxyCODONE 10 mg Oral Q4H PRN James Hamemr MD   oxyCODONE 5 mg Oral Q4H PRKAE Hammer MD   pantoprazole 40 mg Oral Daily Before Breakfast James Hammer MD   polyethylene glycol 17 g Oral Daily PRN James Hammer MD   polyethylene glycol 17 g Oral Daily James Hammer MD   polyvinyl alcohol 1 drop Both Eyes 4x Daily James Hammer MD   polyvinyl alcohol 1 drop Both Eyes Q1H PRN James Hammer MD   pravastatin 10 mg Oral Daily With [employer] M Jenaro Madison MD   senna 1 tablet Oral Daily Manual MD Joey   tamsulosin 0 4 mg Oral After Sal[place]ón Gay MD   thiamine 50 mg Oral Daily Manual MD Joey   tiotropium 18 mcg Inhalation Daily Manual MD Joey       Labs:     Results from last 7 days   Lab Units 06/27/19  0629 06/25/19  0559   WBC Thousand/uL 4 01* 4 10*   HEMOGLOBIN g/dL 13 1 13 7   HEMATOCRIT % 40 7 42 4   PLATELETS Thousands/uL 171 157     Results from last 7 days   Lab Units 06/27/19  0629 06/25/19  0559   SODIUM mmol/L 140 141   POTASSIUM mmol/L 4 3 4 9   CHLORIDE mmol/L 105 106   CO2 mmol/L 32 33*   BUN mg/dL 20 18   CREATININE mg/dL 0 82 0 93   CALCIUM mg/dL 10 6* 10 6*                  Results from last 7 days   Lab Units 06/28/19  1048 06/28/19  0659 06/27/19  2109   POC GLUCOSE mg/dl 128 107 113       Imaging:     No orders to display       *Labs reviewed  *Radiology studies reviewed  *Medications reviewed and reconciled as needed  *Please refer to order section for additional ordered labs studies  *Case discussed with primary attending during morning huddle case rounds    Physical Examination:  Vitals:   Vitals:    06/27/19 2100 06/27/19 2243 06/28/19 0539 06/28/19 0818   BP: 134/79  133/77 146/92   BP Location: Left arm  Left arm    Pulse: 90  78    Resp: 19  18    Temp: 98 2 °F (36 8 °C)  98 6 °F (37 °C)    TempSrc: Oral  Oral    SpO2: 99% 99% 99%    Weight:       Height:           General Appearance: NAD, conversive  Eyes: No icterus; conjunctiva normal  HENT: oropharynx clear; mucous membranes moist  Neck: collar on  Lungs: CTA, normal respiratory effort, no retractions, expiratory effort normal  CV: regular rate, no rubs/murmurs/gallops  ABD: soft; ND/NT; +BS  EXT: no edema  Skin: normal turgor, normal texture, no rashes  Psych: affect normal, No anxiety/depression   Neuro: AAOx3;  UE 4/5 R>L, LEs 4+/5 R>L          Total time spent: At least 40 minutes, with more than 50% spent counseling/coordinating care   Counseling includes discussion with patient re: progress  and discussion with patient of his/her current medical state/information  Coordination of patient's care was performed in conjunction with primary service  Time invested included review of patient's labs, vitals, and management of their comorbidities with continued monitoring  In addition, this patient was discussed with medical team including physician and advanced extenders  The care of the patient was extensively discussed and appropriate treatment plan was formulated unique for this patient  ** Please Note: Dragon 360 Dictation voice to text software may have been used in the creation of this document   **

## 2019-06-29 LAB
GLUCOSE SERPL-MCNC: 106 MG/DL (ref 65–140)
GLUCOSE SERPL-MCNC: 110 MG/DL (ref 65–140)
GLUCOSE SERPL-MCNC: 137 MG/DL (ref 65–140)
GLUCOSE SERPL-MCNC: 151 MG/DL (ref 65–140)

## 2019-06-29 PROCEDURE — 97535 SELF CARE MNGMENT TRAINING: CPT

## 2019-06-29 PROCEDURE — 97110 THERAPEUTIC EXERCISES: CPT

## 2019-06-29 PROCEDURE — 97530 THERAPEUTIC ACTIVITIES: CPT

## 2019-06-29 PROCEDURE — 82948 REAGENT STRIP/BLOOD GLUCOSE: CPT

## 2019-06-29 PROCEDURE — 94660 CPAP INITIATION&MGMT: CPT

## 2019-06-29 RX ADMIN — GABAPENTIN 400 MG: 400 CAPSULE ORAL at 14:15

## 2019-06-29 RX ADMIN — HEPARIN SODIUM 5000 UNITS: 5000 INJECTION INTRAVENOUS; SUBCUTANEOUS at 14:15

## 2019-06-29 RX ADMIN — ACETAMINOPHEN 650 MG: 325 TABLET ORAL at 06:24

## 2019-06-29 RX ADMIN — ACETAMINOPHEN 650 MG: 325 TABLET ORAL at 14:15

## 2019-06-29 RX ADMIN — POLYVINYL ALCOHOL 1 DROP: 14 SOLUTION/ DROPS OPHTHALMIC at 17:37

## 2019-06-29 RX ADMIN — CLINDAMYCIN HYDROCHLORIDE 300 MG: 150 CAPSULE ORAL at 14:15

## 2019-06-29 RX ADMIN — HEPARIN SODIUM 5000 UNITS: 5000 INJECTION INTRAVENOUS; SUBCUTANEOUS at 21:29

## 2019-06-29 RX ADMIN — OXYBUTYNIN CHLORIDE 5 MG: 5 TABLET, EXTENDED RELEASE ORAL at 08:09

## 2019-06-29 RX ADMIN — TAMSULOSIN HYDROCHLORIDE 0.4 MG: 0.4 CAPSULE ORAL at 17:37

## 2019-06-29 RX ADMIN — GABAPENTIN 400 MG: 400 CAPSULE ORAL at 06:24

## 2019-06-29 RX ADMIN — FLUTICASONE FUROATE AND VILANTEROL TRIFENATATE 1 PUFF: 100; 25 POWDER RESPIRATORY (INHALATION) at 08:07

## 2019-06-29 RX ADMIN — MELATONIN 3 MG: at 21:29

## 2019-06-29 RX ADMIN — THIAMINE HCL TAB 100 MG 50 MG: 100 TAB at 08:09

## 2019-06-29 RX ADMIN — VITAMIN D, TAB 1000IU (100/BT) 2000 UNITS: 25 TAB at 08:09

## 2019-06-29 RX ADMIN — NICOTINE 21 MG: 21 PATCH, EXTENDED RELEASE TRANSDERMAL at 08:10

## 2019-06-29 RX ADMIN — GABAPENTIN 400 MG: 400 CAPSULE ORAL at 21:29

## 2019-06-29 RX ADMIN — DULOXETINE HYDROCHLORIDE 60 MG: 60 CAPSULE, DELAYED RELEASE ORAL at 08:08

## 2019-06-29 RX ADMIN — POLYVINYL ALCOHOL 1 DROP: 14 SOLUTION/ DROPS OPHTHALMIC at 11:55

## 2019-06-29 RX ADMIN — CLINDAMYCIN HYDROCHLORIDE 300 MG: 150 CAPSULE ORAL at 06:24

## 2019-06-29 RX ADMIN — POLYVINYL ALCOHOL 1 DROP: 14 SOLUTION/ DROPS OPHTHALMIC at 21:29

## 2019-06-29 RX ADMIN — METFORMIN HYDROCHLORIDE 500 MG: 500 TABLET ORAL at 08:09

## 2019-06-29 RX ADMIN — LOSARTAN POTASSIUM 100 MG: 50 TABLET, FILM COATED ORAL at 08:09

## 2019-06-29 RX ADMIN — POLYVINYL ALCOHOL 1 DROP: 14 SOLUTION/ DROPS OPHTHALMIC at 08:07

## 2019-06-29 RX ADMIN — PRAVASTATIN SODIUM 10 MG: 10 TABLET ORAL at 16:40

## 2019-06-29 RX ADMIN — TIOTROPIUM BROMIDE 18 MCG: 18 CAPSULE ORAL; RESPIRATORY (INHALATION) at 08:07

## 2019-06-29 RX ADMIN — PANTOPRAZOLE SODIUM 40 MG: 40 TABLET, DELAYED RELEASE ORAL at 06:24

## 2019-06-29 RX ADMIN — CLINDAMYCIN HYDROCHLORIDE 300 MG: 150 CAPSULE ORAL at 21:29

## 2019-06-29 RX ADMIN — INSULIN LISPRO 1 UNITS: 100 INJECTION, SOLUTION INTRAVENOUS; SUBCUTANEOUS at 11:56

## 2019-06-29 RX ADMIN — HEPARIN SODIUM 5000 UNITS: 5000 INJECTION INTRAVENOUS; SUBCUTANEOUS at 06:24

## 2019-06-29 RX ADMIN — DILTIAZEM HYDROCHLORIDE 180 MG: 180 CAPSULE, COATED, EXTENDED RELEASE ORAL at 08:09

## 2019-06-29 RX ADMIN — ACETAMINOPHEN 650 MG: 325 TABLET ORAL at 21:29

## 2019-06-29 NOTE — PLAN OF CARE
Problem: Potential for Falls  Goal: Patient will remain free of falls  Description  INTERVENTIONS:  - Assess patient frequently for physical needs  -  Identify cognitive and physical deficits and behaviors that affect risk of falls  -  [place] fall precautions as indicated by assessment   - Educate patient/family on patient safety including physical limitations  - Instruct patient to call for assistance with activity based on assessment  - Modify environment to reduce risk of injury  - Consider OT/PT consult to assist with strengthening/mobility  Outcome: Progressing     Problem: Nutrition/Hydration-ADULT  Goal: Nutrient/Hydration intake appropriate for improving, restoring or maintaining nutritional needs  Description  Monitor and assess patient's nutrition/hydration status for malnutrition (ex- brittle hair, bruises, dry skin, pale skin and conjunctiva, muscle wasting, smooth red tongue, and disorientation)  Collaborate with interdisciplinary team and initiate plan and interventions as ordered  Monitor patient's weight and dietary intake as ordered or per policy  Utilize nutrition screening tool and intervene per policy  Determine patient's food preferences and provide high-protein, high-caloric foods as appropriate       INTERVENTIONS:  - Monitor oral intake, urinary output, labs, and treatment plans  - Assess nutrition and hydration status and recommend course of action  - Evaluate amount of meals eaten  - Assist patient with eating if necessary   - Allow adequate time for meals  - Recommend/ encourage appropriate diets, oral nutritional supplements, and vitamin/mineral supplements  - Order, calculate, and assess calorie counts as needed  - Recommend, monitor, and adjust tube feedings and TPN/PPN based on assessed needs  - Assess need for intravenous fluids  - Provide specific nutrition/hydration education as appropriate  - Include patient/family/caregiver in decisions related to nutrition  Outcome: Progressing     Problem: PAIN - ADULT  Goal: Verbalizes/displays adequate comfort level or baseline comfort level  Description  Interventions:  - Encourage patient to monitor pain and request assistance  - Assess pain using appropriate pain scale  - Administer analgesics based on type and severity of pain and evaluate response  - Implement non-pharmacological measures as appropriate and evaluate response  - Consider cultural and social influences on pain and pain management  - Notify physician/advanced practitioner if interventions unsuccessful or patient reports new pain  Outcome: Progressing     Problem: INFECTION - ADULT  Goal: Absence or prevention of progression during hospitalization  Description  INTERVENTIONS:  - Assess and monitor for signs and symptoms of infection  - Monitor lab/diagnostic results  - Monitor all insertion sites, i e  indwelling lines, tubes, and drains  - Monitor endotracheal (as able) and nasal secretions for changes in amount and color  - [place] appropriate cooling/warming therapies per order  - Administer medications as ordered  - Instruct and encourage patient and family to use good hand hygiene technique  - Identify and instruct in appropriate isolation precautions for identified infection/condition  Outcome: Progressing     Problem: SAFETY ADULT  Goal: Patient will remain free of falls  Description  INTERVENTIONS:  - Assess patient frequently for physical needs  -  Identify cognitive and physical deficits and behaviors that affect risk of falls    -  [place] fall precautions as indicated by assessment   - Educate patient/family on patient safety including physical limitations  - Instruct patient to call for assistance with activity based on assessment  - Modify environment to reduce risk of injury  - Consider OT/PT consult to assist with strengthening/mobility  Outcome: Progressing  Goal: Maintain or return to baseline ADL function  Description  INTERVENTIONS:  -  Assess patient's ability to carry out ADLs; assess patient's baseline for ADL function and identify physical deficits which impact ability to perform ADLs (bathing, care of mouth/teeth, toileting, grooming, dressing, etc )  - Assess/evaluate cause of self-care deficits   - Assess range of motion  - Assess patient's mobility; develop plan if impaired  - Assess patient's need for assistive devices and provide as appropriate  - Encourage maximum independence but intervene and supervise when necessary  ¯ Involve family in performance of ADLs  ¯ Assess for home care needs following discharge   ¯ Request OT consult to assist with ADL evaluation and planning for discharge  ¯ Provide patient education as appropriate  Outcome: Progressing  Goal: Maintain or return mobility status to optimal level  Description  INTERVENTIONS:  - Assess patient's baseline mobility status (ambulation, transfers, stairs, etc )    - Identify cognitive and physical deficits and behaviors that affect mobility  - Identify mobility aids required to assist with transfers and/or ambulation (gait belt, sit-to-stand, lift, walker, cane, etc )  - [place] fall precautions as indicated by assessment  - Record patient progress and toleration of activity level on Mobility SBAR; progress patient to next Phase/Stage  - Instruct patient to call for assistance with activity based on assessment  - Request Rehabilitation consult to assist with strengthening/weightbearing, etc   Outcome: Progressing     Problem: DISCHARGE PLANNING  Goal: Discharge to home or other facility with appropriate resources  Description  INTERVENTIONS:  - Identify barriers to discharge w/patient and caregiver  - Arrange for needed discharge resources and transportation as appropriate  - Identify discharge learning needs (meds, wound care, etc )  - Arrange for interpretive services to assist at discharge as needed  - Refer to Case Management Department for coordinating discharge planning if the patient needs post-hospital services based on physician/advanced practitioner order or complex needs related to functional status, cognitive ability, or social support system  Outcome: Progressing     Problem: Prexisting or High Potential for Compromised Skin Integrity  Goal: Skin integrity is maintained or improved  Description  INTERVENTIONS:  - Identify patients at risk for skin breakdown  - Assess and monitor skin integrity  - Assess and monitor nutrition and hydration status  - Monitor labs (i e  albumin)  - Assess for incontinence   - Turn and reposition patient  - Assist with mobility/ambulation  - Relieve pressure over bony prominences  - Avoid friction and shearing  - Provide appropriate hygiene as needed including keeping skin clean and dry  - Evaluate need for skin moisturizer/barrier cream  - Collaborate with interdisciplinary team (i e  Nutrition, Rehabilitation, etc )   - Patient/family teaching  Outcome: Progressing

## 2019-06-29 NOTE — PROGRESS NOTES
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; s/p C3/4 ACDF with C4 hemicorpectomy, C3-4 anterior instrumentation/fusion 6/18/19 Jeyson Santacruz):  continue cervical collar; on Clindamycin for infection prophylaxis  Pain control per primary service  Had previously been on Cymbalta for neuropathic sx      DM2: at home he takes Kombiglyze XR 5-1000mg qd and no insulin  Continue Metformin 500mg daily = has good control currently  Continue DM diet and Accuchecks/QID with SSI      HTN:  at home, takes Cardizem 180mg qd, Losartan 100mg qd (was not on Norvasc he says); here on Card 120mg qd, Losartan 100mg qd  Blood pressure improving = increased his Card back to 180 mg qd starting yesterday 6/28/19      COPD; hx lung cancer/VIVIANA lobectomy, pulmonary nodules/cough: follows with pulm as OP and takes Advair 250-50 one puff BID/Spiriva one puff daily and prn Combivent  On Breo as substitute for Advair, continue Spiriva and use Albuterol inhaler prn  Has intermitt cough but not bothersome  Had CT chest in April for lung nodule surveillance = for repeat 1 yr  Will watch     SHIVANI: supposed to use CPAP but has been noncompliant     HLD: at home on Pravachol     Urinary incontinence/BPH/urgency: continue Flomax, Oxybutynin ER 5mg qd; Uro is following         Subjective/ HPI:  Patients overnight issues or events were reviewed with nursing or staff during rounds or morning huddle session  No new or overnight issues  Hypokalemia resolved  DM/HTN:  Stable  Patient denies any current complaints      ROS:     GI: denies abdominal pain, change bowel habits or reflux symptoms  Neuro: Denies any headache, new vision changes, new neuropathies,new weaknesses   Respiratory: No Cough, SOB, denies wheeze  Cardiovascular: No CP, palpitations , denies perception of rapid heartbeat  : denies any new urinary burning or frequency    Review of Scheduled Meds:    Current Facility-Administered Medications:  acetaminophen 650 mg Oral TID Angelica Deng MD   albuterol 2 puff Inhalation Q4H PRN Angelica Deng MD   bisacodyl 10 mg Rectal Daily PRN Angelica Deng MD   bisacodyl 10 mg Rectal Once Angelica Deng MD   cholecalciferol 2,000 Units Oral Daily Angelica Deng MD   clindamycin 300 mg Oral [facility] Angelica Deng MD   diltiazem 180 mg Oral Daily SONJA Walters   docusate sodium 100 mg Oral BID Angelica Deng MD   DULoxetine 60 mg Oral Daily Angelica Deng MD   fluticasone-vilanterol 1 puff Inhalation Daily Angelica Deng MD   gabapentin 400 mg Oral HS Angelica Deng MD   gabapentin 400 mg Oral BID Angelica Deng MD   heparin (porcine) 5,000 Units Subcutaneous [facility] Angelica Deng MD   hydrALAZINE 25 mg Oral Q8H PRN SONJA Walters   insulin lispro 1-5 Units Subcutaneous TID TRISR [facility] Angelica Deng MD   insulin lispro 1-5 Units Subcutaneous HS Angelica Deng MD   lidocaine  Topical Q4H PRN Angelica Deng MD   losartan 100 mg Oral Daily Angelica Deng MD   melatonin 3 mg Oral HS Mery Grewal PA-C   metFORMIN 500 mg Oral Daily With Breakfast SONJA Walters   nicotine 21 mg Transdermal Daily Mery Grewal PA-C   ondansetron 4 mg Intravenous Q6H PRN Angelica Deng MD   oxybutynin 5 mg Oral Daily SONJA Shi   oxyCODONE 10 mg Oral Q4H PRN Angelica Deng MD   oxyCODONE 5 mg Oral Q4H PRN Angelica Deng MD   pantoprazole 40 mg Oral Daily Before Breakfast Angelica Deng MD   polyethylene glycol 17 g Oral Daily PRN Angelica Deng MD   polyethylene glycol 17 g Oral Daily Angelica Deng MD   polyvinyl alcohol 1 drop Both Eyes 4x Daily Angelica Deng MD   polyvinyl alcohol 1 drop Both Eyes Q1H PRN Angelica Deng MD   pravastatin 10 mg Oral Daily With [employer] M Jamal Hull MD   senna 1 tablet Oral Daily Alpa Jennings MD   tamsulosin 0 4 mg Oral After Estee Evans MD   thiamine 50 mg Oral Daily Alpa Jennings MD   tiotropium 18 mcg Inhalation Daily Alpa Jennings MD       Labs:     Results from last 7 days   Lab Units 06/27/19  0629 06/25/19  0559   WBC Thousand/uL 4 01* 4 10*   HEMOGLOBIN g/dL 13 1 13 7   HEMATOCRIT % 40 7 42 4   PLATELETS Thousands/uL 171 157     Results from last 7 days   Lab Units 06/27/19  0629 06/25/19  0559   SODIUM mmol/L 140 141   POTASSIUM mmol/L 4 3 4 9   CHLORIDE mmol/L 105 106   CO2 mmol/L 32 33*   BUN mg/dL 20 18   CREATININE mg/dL 0 82 0 93   CALCIUM mg/dL 10 6* 10 6*                  Results from last 7 days   Lab Units 06/29/19  0641 06/28/19  2108 06/28/19  1642   POC GLUCOSE mg/dl 106 128 107       Imaging:     No orders to display       *Labs reviewed  *Radiology studies reviewed  *Medications reviewed and reconciled as needed  *Please refer to order section for additional ordered labs studies  *Case discussed with primary attending during morning huddle case rounds    Physical Examination:  Vitals:   Vitals:    06/28/19 1300 06/28/19 2016 06/29/19 0550 06/29/19 0809   BP: 129/82 159/84 141/86 136/81   BP Location: Right arm Right arm     Pulse: 86 83 70    Resp: 20 19 20    Temp: 98 2 °F (36 8 °C) 98 7 °F (37 1 °C) 97 8 °F (36 6 °C)    TempSrc: Oral Oral Oral    SpO2: 99% 98% 99%    Weight:       Height:           General Appearance: NAD, conversive  Eyes: No icterus; conjunctiva normal  HENT: oropharynx clear; mucous membranes moist  Neck: collar on  Lungs: CTA, normal respiratory effort, no retractions, expiratory effort normal  CV: regular rate, no rubs/murmurs/gallops  ABD: soft; ND/NT; +BS  EXT: no edema  Skin: normal turgor, normal texture, no rashes  Psych: affect normal, No anxiety/depression   Neuro: AAOx3;  UE 4/5 R>L, LEs 4+/5 R>L          Total time spent:   At least 40 minutes, with more than 50% spent counseling/coordinating care  Counseling includes discussion with patient re: progress  and discussion with patient of his/her current medical state/information  Coordination of patient's care was performed in conjunction with primary service  Time invested included review of patient's labs, vitals, and management of their comorbidities with continued monitoring  In addition, this patient was discussed with medical team including physician and advanced extenders  The care of the patient was extensively discussed and appropriate treatment plan was formulated unique for this patient  ** Please Note: Dragon 360 Dictation voice to text software may have been used in the creation of this document   **

## 2019-06-29 NOTE — PROGRESS NOTES
06/29/19 0930   Pain Assessment   Pain Assessment No/denies pain   Response to Interventions Pt does c/o continuous tingling through both hands, ngoc L hand   Restrictions/Precautions   Precautions Bed/chair alarms; Fall Risk;Supervision on toilet/commode   Weight Bearing Restrictions No   ROM Restrictions Yes  (cervical precautions)   Braces or Orthoses C/S Collar   Grooming   Able To Wash/Dry Hands   Limitation Noted In Coordination; Safety;Timeliness   Findings CG/CS in stance at sink   Grooming (FIM) 4 - Patient requires steadying assist or light touching   QI: Putting On/Taking Off Footwear   Assistance Needed Physical assistance   Assistance Provided by [place] Less than 25%   Comment Pt seated and demo inc ease for cross leg method, pt able to don/doff sneakers  Pt able to untie shoe laces to doff sneakers at end of session w/ inc time for FM/dexterity  Pt benefits from use of shoe horn  Pt requires VC to not bend to don/doff shoes to maintain cervical precautions  Putting On/Taking Off Footwear CARE Score 3   QI: Sit to Stand   Assistance Needed Incidental touching   Assistance Provided by [place] Less than 25%   Comment Pt CG/CS w/ no AD   Sit to Stand CARE Score 3   QI: Chair/Bed-to-Chair Transfer   Assistance Needed Incidental touching   Assistance Provided by [place] Less than 25%   Comment CG/CS w/ no AD, HHA as needed   Chair/Bed-to-Chair Transfer CARE Score 3   Transfer Bed/Chair/Wheelchair   Positioning Concerns Cognition   Limitations Noted In Balance; Coordination; Endurance   [employer] Equipment None;Hand Hold   Stand Pivot Contact Guard;Supervision   Sit to TXU Lety; Other  (CG)   Stand to Sit Supervision; Other  (CG)   Findings Pt CG/CS w/ no AD room<>OT gym, pt requires inc time due to impaired balance, no LOB noted this session     Bed, Chair, Wheelchair Transfer (FIM) 4 - Patient requires steadying assist or light touching   QI: [address] Provided by [place] No physical assistance   Toileting Hygiene CARE Score 4   Toileting   Able to [address] down yes, up yes  Able to Manage Clothing Closures Yes   Manage Hygiene Bladder   Limitations Noted In Balance; Coordination; Safety   Adaptive Equipment Grab Bar   Findings CS in stance for CM over hips   Toileting (FIM) 5 - Patient requires supervision/monitoring   QI: Toilet Transfer   Assistance Needed Incidental touching   Assistance Provided by [place] No physical assistance   Comment CG/CS w/ use of grab bar   Toilet Transfer CARE Score 4   Toilet Transfer   Surface Assessed Standard Toilet   Transfer Technique Standard   Limitations Noted In Balance; Safety   Adaptive Equipment Grab Bar   Findings Pt seated for urinating   Toilet Transfer (FIM) 4 - Patient requires steadying assist or light touching   Therapeutic Exercise - ROM   UE-ROM Yes   ROM- Right Upper Extremities   R Position Standing   Equipment [place]; Other (Comment)  (hand gripper)   R Weight/Reps/Sets 3x10 chest press, shoulder rolls   RUE ROM Comment Pt CG/CS in stance to engage in gentle strengthening/AROM for b/l UE w/ prolonged stretch, pt completed 3 sets 10 shoulder rolls  Focus of ther ex on proximal/scapular strengthening/mobility to inc pt overall FM strength/fxn  Pt tolerated all ther ex well w/ no c/o inc pain and no seated rest breaks required  ROM - Left Upper Extremities    L Position Standing   Equipment [place]; Other (Comment)  (hand gripper)   L Weight/Reps/Sets 3x10   LUE ROM Comment See above note for detail  Exercise Tools   Exercise Tools Yes   Hand Gripper Pt seated to engage in alternating b/l  strengthening with hand gripper 3 sets of 10 w/ forearm in neutral/elbow flexed and 3 sets of 10 w/ elbow extended and forearm in neutral  Pt tolerated ther ex well w/ no c/o inc pain      Coordination   Fine Motor Following proximal strengthening/stabilization, pt seated to engage in [facility] task of retrieving coins from glass bowl and placing onto table, then retrieving coins from bare table to place back into glass bowl 1 at a time w/ L hand only  Pt toelrated well and reports inc ease of task with repetiton  In Hand Manipulation Pt seated to engage in FM task of retrieving as many coins as possible from wash clothe in 1 minute w/ left hand only 3x  Pt noted w/ improved pincer grasp and translocation and able to hold 11 coins in L hand w/ 2 dropped, pt able to hold 19 in L hand w/ 1 drop, and 19 w/ 2 drops during 3rd trial  Pt demo inc ease for task w/ repetition  Pt states "I like this, it's challenging"  Cognition   Overall Cognitive Status WFL   Arousal/Participation Alert; Cooperative   Attention Attends with cues to redirect   Orientation Level Oriented X4   Memory Within functional limits   Following Commands Follows multistep commands with increased time or repetition   Activity Tolerance   Activity Tolerance Patient tolerated treatment well   Assessment   Treatment Assessment Pt participated in skilled OT Tx session w/ foucs on L hand FMC,  strength, light UE strengthening/AROM, toileting, and stand tolerance/dynamic balance  See above note for activity detail  Cont OT POC w/ focus on dynamic standing balance, FMC, higher level IADL completion, and light UE strengthening to maximize pt indep for ADL/IADL fxn and all fxnl transfers for D/C home alone  Prognosis Good   Problem List Decreased strength;Decreased range of motion;Decreased endurance; Impaired balance;Decreased coordination;Decreased mobility; Decreased safety awareness;Orthopedic restrictions   Barriers to Discharge Decreased caregiver support   Plan   Treatment/Interventions ADL retraining;Functional transfer training; Therapeutic exercise; Endurance training;Patient/family training   Progress Progressing toward goals   OT Therapy Minutes   OT Time In 0930   OT Time Out 1030   OT Total Time (minutes) 60   OT Mode of treatment - Individual (minutes) 60 OT Mode of treatment - Concurrent (minutes) 0   OT Mode of treatment - Group (minutes) 0   OT Mode of treatment - Co-treat (minutes) 0   OT Mode of Teatment - Total time(minutes) 60 minutes   Therapy Time missed   Time missed?  No

## 2019-06-30 LAB
GLUCOSE SERPL-MCNC: 104 MG/DL (ref 65–140)
GLUCOSE SERPL-MCNC: 104 MG/DL (ref 65–140)
GLUCOSE SERPL-MCNC: 110 MG/DL (ref 65–140)
GLUCOSE SERPL-MCNC: 156 MG/DL (ref 65–140)

## 2019-06-30 PROCEDURE — 97112 NEUROMUSCULAR REEDUCATION: CPT | Performed by: OCCUPATIONAL THERAPY ASSISTANT

## 2019-06-30 PROCEDURE — 82948 REAGENT STRIP/BLOOD GLUCOSE: CPT

## 2019-06-30 PROCEDURE — 97112 NEUROMUSCULAR REEDUCATION: CPT

## 2019-06-30 PROCEDURE — 97116 GAIT TRAINING THERAPY: CPT

## 2019-06-30 PROCEDURE — 94660 CPAP INITIATION&MGMT: CPT

## 2019-06-30 PROCEDURE — 97530 THERAPEUTIC ACTIVITIES: CPT | Performed by: OCCUPATIONAL THERAPY ASSISTANT

## 2019-06-30 PROCEDURE — 97530 THERAPEUTIC ACTIVITIES: CPT

## 2019-06-30 RX ADMIN — POLYVINYL ALCOHOL 1 DROP: 14 SOLUTION/ DROPS OPHTHALMIC at 22:04

## 2019-06-30 RX ADMIN — LOSARTAN POTASSIUM 100 MG: 50 TABLET, FILM COATED ORAL at 08:17

## 2019-06-30 RX ADMIN — TAMSULOSIN HYDROCHLORIDE 0.4 MG: 0.4 CAPSULE ORAL at 17:26

## 2019-06-30 RX ADMIN — VITAMIN D, TAB 1000IU (100/BT) 2000 UNITS: 25 TAB at 08:17

## 2019-06-30 RX ADMIN — THIAMINE HCL TAB 100 MG 50 MG: 100 TAB at 08:17

## 2019-06-30 RX ADMIN — HEPARIN SODIUM 5000 UNITS: 5000 INJECTION INTRAVENOUS; SUBCUTANEOUS at 14:18

## 2019-06-30 RX ADMIN — CLINDAMYCIN HYDROCHLORIDE 300 MG: 150 CAPSULE ORAL at 22:03

## 2019-06-30 RX ADMIN — POLYVINYL ALCOHOL 1 DROP: 14 SOLUTION/ DROPS OPHTHALMIC at 17:27

## 2019-06-30 RX ADMIN — ACETAMINOPHEN 650 MG: 325 TABLET ORAL at 22:03

## 2019-06-30 RX ADMIN — PANTOPRAZOLE SODIUM 40 MG: 40 TABLET, DELAYED RELEASE ORAL at 06:40

## 2019-06-30 RX ADMIN — FLUTICASONE FUROATE AND VILANTEROL TRIFENATATE 1 PUFF: 100; 25 POWDER RESPIRATORY (INHALATION) at 08:21

## 2019-06-30 RX ADMIN — POLYVINYL ALCOHOL 1 DROP: 14 SOLUTION/ DROPS OPHTHALMIC at 11:51

## 2019-06-30 RX ADMIN — PRAVASTATIN SODIUM 10 MG: 10 TABLET ORAL at 17:26

## 2019-06-30 RX ADMIN — INSULIN LISPRO 1 UNITS: 100 INJECTION, SOLUTION INTRAVENOUS; SUBCUTANEOUS at 22:04

## 2019-06-30 RX ADMIN — HEPARIN SODIUM 5000 UNITS: 5000 INJECTION INTRAVENOUS; SUBCUTANEOUS at 06:40

## 2019-06-30 RX ADMIN — POLYVINYL ALCOHOL 1 DROP: 14 SOLUTION/ DROPS OPHTHALMIC at 08:22

## 2019-06-30 RX ADMIN — DULOXETINE HYDROCHLORIDE 60 MG: 60 CAPSULE, DELAYED RELEASE ORAL at 08:17

## 2019-06-30 RX ADMIN — GABAPENTIN 400 MG: 400 CAPSULE ORAL at 22:03

## 2019-06-30 RX ADMIN — METFORMIN HYDROCHLORIDE 500 MG: 500 TABLET ORAL at 08:17

## 2019-06-30 RX ADMIN — NICOTINE 21 MG: 21 PATCH, EXTENDED RELEASE TRANSDERMAL at 08:20

## 2019-06-30 RX ADMIN — GABAPENTIN 400 MG: 400 CAPSULE ORAL at 06:39

## 2019-06-30 RX ADMIN — MELATONIN 3 MG: at 22:03

## 2019-06-30 RX ADMIN — TIOTROPIUM BROMIDE 18 MCG: 18 CAPSULE ORAL; RESPIRATORY (INHALATION) at 08:21

## 2019-06-30 RX ADMIN — CLINDAMYCIN HYDROCHLORIDE 300 MG: 150 CAPSULE ORAL at 14:18

## 2019-06-30 RX ADMIN — DILTIAZEM HYDROCHLORIDE 180 MG: 180 CAPSULE, COATED, EXTENDED RELEASE ORAL at 08:22

## 2019-06-30 RX ADMIN — OXYBUTYNIN CHLORIDE 5 MG: 5 TABLET, EXTENDED RELEASE ORAL at 08:17

## 2019-06-30 RX ADMIN — HEPARIN SODIUM 5000 UNITS: 5000 INJECTION INTRAVENOUS; SUBCUTANEOUS at 22:03

## 2019-06-30 RX ADMIN — GABAPENTIN 400 MG: 400 CAPSULE ORAL at 14:18

## 2019-06-30 RX ADMIN — CLINDAMYCIN HYDROCHLORIDE 300 MG: 150 CAPSULE ORAL at 06:40

## 2019-06-30 RX ADMIN — ACETAMINOPHEN 650 MG: 325 TABLET ORAL at 14:19

## 2019-06-30 RX ADMIN — ACETAMINOPHEN 650 MG: 325 TABLET ORAL at 06:40

## 2019-06-30 NOTE — PROGRESS NOTES
Occupational Therapy Treatment Note:       06/30/19 1300   Pain Assessment   Pain Assessment 0-10   Pain Score 1   Pain Location Nose   Restrictions/Precautions   Precautions Bed/chair alarms; Fall Risk;Spinal precautions;Supervision on toilet/commode   Weight Bearing Restrictions No   ROM Restrictions Yes  (Cervical spinal precautions  )   Braces or Orthoses C/S Collar   QI: Sit to [address] Provided by [place] No physical assistance   Comment CS no AD  Sit to Stand CARE Score 4   QI: Chair/Bed-to-Chair Transfer   Assistance Needed Incidental touching   Assistance Provided by [place] Less than 25%   Comment No AD  Chair/Bed-to-Chair Transfer CARE Score 3   Transfer Bed/Chair/Wheelchair   Limitations Noted In Balance; Coordination; Endurance   Adaptive Equipment None   Stand Pivot Contact Guard   Sit to Stand Supervision  (CS)   Stand to Sit Supervision  (CS)   Findings Pt completed functional mobility without use of an AD from pt room to OT gym with CGA  Bed, Chair, Wheelchair Transfer (FIM) 4 - Patient requires steadying assist or light touching   Money Management   Money Management Level of Assistance Minimum assistance   Money Management Pt completed simulated financial mgmt handout on billing with Jayshree; transitioned tx intervention to handwriting skills as pt demonstrating greater need to improve upon basic handwriting prior to continuing further with written handout completion  Cognition   Overall Cognitive Status WFL   Arousal/Participation Alert; Cooperative   Attention Attends with cues to redirect   Orientation Level Oriented X4   Memory Within functional limits   Following Commands Follows multistep commands with increased time or repetition   Additional Activities   Additional Activities Comments Pt engaged in handwriting activity using R hand (dominant hand) using standard pencil   Pt stated, "my handwriting is terrible, I can't write anything and need to ask people to help me " Had pt write out upper case letters of the alphabet 5x on lined paper with sample as reference  Instructed pt on using large print for optimal legibility  Letters overall legible however pt would benefit from continued practice and stated they are not at his baseline skill  VCs warranted to initiate rest breaks with hand fatigue as pt with noted decreased performance  Activity Tolerance   Activity Tolerance Patient tolerated treatment well   Assessment   Treatment Assessment OT tx session focused on transfers, standing balance, functional mobility, finance mgmt handout on billing, hand writing skills, and overall activity tolerance/endurance  Refer above for details on pt performance  Pt would benefit from continued skilled OT services in order to achieve highest functional abilities  Plan   Treatment/Interventions Functional transfer training; Endurance training;Patient/family training; Compensatory technique education;OT   Progress Progressing toward goals   Recommendation   OT Discharge Recommendation Other (Comment)  (Pending pt progress - pt must be Marissa for D/C home)   OT Therapy Minutes   OT Time In 1300   OT Time Out 1330   OT Total Time (minutes) 30   OT Mode of treatment - Individual (minutes) 30   OT Mode of treatment - Concurrent (minutes) 0   OT Mode of treatment - Group (minutes) 0   OT Mode of treatment - Co-treat (minutes) 0   OT Mode of Teatment - Total time(minutes) 30 minutes   Therapy Time missed   Time missed?  No   Santino Johnson, [address]

## 2019-06-30 NOTE — PROGRESS NOTES
06/30/19 0830   Pain Assessment   Pain Assessment No/denies pain   Pain Score No Pain   Restrictions/Precautions   Precautions Bed/chair alarms; Fall Risk;Spinal precautions;Supervision on toilet/commode   ROM Restrictions Yes  (Cervical spine precautions )   Braces or Orthoses C/S Collar   Cognition   Overall Cognitive Status WFL   Arousal/Participation Alert; Cooperative   Subjective   Subjective Pt eager to participate in PT session  QI: Sit to Stand   Assistance Needed Supervision   Sit to Stand CARE Score 4   QI: Chair/Bed-to-Chair Transfer   Assistance Needed Supervision; Adaptive equipment; Incidental touching   Assistance Provided by [place] Less than 25%   Comment S with RW, CGA with no AD  Chair/Bed-to-Chair Transfer CARE Score 3   Transfer Bed/Chair/Wheelchair   Limitations Noted In Balance; Coordination; Endurance   Adaptive Equipment Roller Walker;None   Stand Pivot Supervision;Contact Guard   Sit to TXU Lety   Stand to [place], Chair, Wheelchair Transfer (FIM) 4 - Patient requires steadying assist or light touching   QI: Walk 10 Feet   Assistance Needed Incidental touching; Adaptive equipment;Supervision   Assistance Provided by [place] Less than 25%   Comment CS with RW, CGA no AD   Walk 10 Feet CARE Score 3   QI: Walk 50 Feet with Two Turns   Assistance Needed Incidental touching; Adaptive equipment;Supervision   Assistance Provided by [place] Less than 25%   Comment CS with RW, CGA no AD   Walk 50 Feet with Two Turns CARE Score 3   QI: Walk 150 Feet   Assistance Needed Adaptive equipment; Incidental touching;Supervision   Assistance Provided by [place] Less than 25%   Comment CS with RW, CGA no AD   Walk 150 Feet CARE Score 3   Ambulation   Does the patient walk? 2  Yes   Primary Discharge Mode of Locomotion Walk   Walk Assist Level Contact Guard;Close Supervision   Gait Pattern Inconsistant Analisa;Narrow LILLIE;Step through; Improper weight shift   Assist Device Roller Walker;Hand Hold;Other  (None )   Distance Walked (feet) 700 ft  (HHA, 150x2 and 700 RW, 700 hands on hips )   Limitations Noted In Balance; Heel Strike; Endurance;Speed;Strength   Findings Pt ambulated with RW, HHA and hands on hips t/o session with inc safety and righting reactions  Pt cont to be unsteady at times but improving  Pt also ambulated 350 with cup of water with slow nathaniel and occ unsteadyness and need to regroup  Walking (FIM) 4 - Patient requires steadying assist or light touching AND distance 150 feet or more, no rest   Wheelchair mobility   QI: Does the patient use a wheelchair? 0  No   QI: 4 Steps   Assistance Needed Incidental touching;Supervision; Adaptive equipment   Assistance Provided by [place] No physical assistance   Comment Reciprocal LHR descending, reciprocal RHR ascending  4 Steps CARE Score 4   QI: 12 Steps   Assistance Needed Adaptive equipment; Incidental touching;Supervision   Assistance Provided by [place] No physical assistance   Comment Reciprocal LHR descending, reciprocal RHR ascending  12 Steps CARE Score 4   Stairs   Type Stairs   # of Steps 22   Weight Bearing Precautions Fall Risk   Assist Devices Single Rail   Findings Reciprocal LHR descending, reciprocal RHR ascending  Stairs (FIM) 4 - Patient requires steadying assist or light touching AND patient goes up and down full flight (12- 14 stairs)   QI: Toilet Transfer   Assistance Needed Adaptive equipment; Incidental touching   Assistance Provided by [place] No physical assistance   Comment Pt urinated in standing with steadying support during clothing management,    Toilet Transfer CARE Score 4   Toilet Transfer   Surface Assessed Standard Toilet   Limitations Noted In Balance   Adaptive Equipment Grab Bar   Findings Pt stood to urinate with CGA for steadying during clothing management  100ml  before session and one unmeasured occurance in bathroom by PT gym      Toilet Transfer (FIM) 4 - Patient requires steadying assist or light touching   Therapeutic Interventions   Balance dynamic gait with side stepping, FWD/Bwd and marching  Other ambulation with cup of water x350 ft  Ambulation with HHA and gait belt for safety  Assessment   Treatment Assessment Pt participated in skilled PT session with focus on inc balance, safety with functional transfer and ambulation to dec fall risk  gait without AD cont to be unsteady but pt without sig LOB this session  Unsteadiness worsens with drstractions and conversations in hallway  Trialed ambulation with HHA on R side and gait belt which pt perfomed with good safety and no sig LOB  Pt stood to urinate during session with CGA during clothing management and urination for steadying assistance  Pt quick to move at times moving before therpist is ready  Pt asking to progress in room taking himself to bathroom and getting rid of alarms  Pt educated on safety precautions associated with ambulation currently and pt with good understanding  Pt progressing on stair negotiation with CGA/Supervision this session  Pt cont to benefit from skilled pt to imp balance and righting reactions to dec fall risk and inc IND  Family/Caregiver Present no   Barriers to Discharge Inaccessible home environment;Decreased caregiver support   PT Barriers   Physical Impairment Decreased strength;Decreased endurance; Impaired balance;Decreased mobility; Decreased coordination;Decreased safety awareness;Orthopedic restrictions   Functional Limitation Stair negotiation;Transfers;Standing;Walking   Plan   Treatment/Interventions Functional transfer training;LE strengthening/ROM; Elevations; Therapeutic exercise; Endurance training;Patient/family training;Equipment eval/education;Gait training;Bed mobility   Progress Progressing toward goals   Recommendation   Recommendation Other (Comment)  (TBD depending pt progress  )   Equipment Recommended Other (Comment)  (TBD, LRAD )   PT Therapy Minutes   PT Time In 0830   PT Time Out 0930 PT Total Time (minutes) 60   PT Mode of treatment - Individual (minutes) 60   PT Mode of treatment - Concurrent (minutes) 0   PT Mode of treatment - Group (minutes) 0   PT Mode of treatment - Co-treat (minutes) 0   PT Mode of Teatment - Total time(minutes) 60 minutes   Therapy Time missed   Time missed?  No

## 2019-06-30 NOTE — PROGRESS NOTES
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; s/p C3/4 ACDF with C4 hemicorpectomy, C3-4 anterior instrumentation/fusion 6/18/19 Gurinder Jordan):  continue cervical collar; on Clindamycin for infection prophylaxis  Pain control per primary service  Had previously been on Cymbalta for neuropathic sx      DM2: at home he takes Kombiglyze XR 5-1000mg qd and no insulin  Continue Metformin 500mg daily = has good control currently  Continue DM diet and Accuchecks/QID with SSI      HTN:  at home, takes Cardizem 180mg qd, Losartan 100mg qd (was not on Norvasc he says)  Blood pressure improved since increased his Card back to 180 mg qd starting 6/28/19 = continue Losartan 100mg qd as well      COPD; hx lung cancer/VIVIANA lobectomy, pulmonary nodules/cough: follows with pulm as OP and takes Advair 250-50 one puff BID/Spiriva one puff daily and prn Combivent  On Breo as substitute for Advair, continue Spiriva and use Albuterol inhaler prn  Has intermitt cough but not bothersome  Had CT chest in April for lung nodule surveillance = for repeat 1 yr  Will watch     SHIVANI: supposed to use CPAP but has been noncompliant     HLD: at home on Pravachol     Urinary incontinence/BPH/urgency: continue Flomax, Oxybutynin ER 5mg qd; Uro is following         Subjective/ HPI:  Patients overnight issues or events were reviewed with nursing or staff during rounds or morning huddle session  No new or overnight issues  Hypokalemia resolved  DM stable, HTN:  Improved on increased dose of Cardizem  Patient denies any current complaints      ROS:     GI: denies abdominal pain, change bowel habits or reflux symptoms  Neuro: Denies any headache, new vision changes, new neuropathies,new weaknesses Respiratory: No Cough, SOB, denies wheeze  Cardiovascular: No CP, palpitations , denies perception of rapid heartbeat  : denies any new urinary burning or frequency    Review of Scheduled Meds:    Current Facility-Administered Medications:  acetaminophen 650 mg Oral TID Marci Fregoso MD   albuterol 2 puff Inhalation Q4H PRN Marci Fregoso MD   bisacodyl 10 mg Rectal Daily PRN Marci Fregoso MD   bisacodyl 10 mg Rectal Once Marci Fregoso MD   cholecalciferol 2,000 Units Oral Daily Marci Fregoso MD   clindamycin 300 mg Oral [facility] Marci Fregoso MD   diltiazem 180 mg Oral Daily SONJA Villanueva   docusate sodium 100 mg Oral BID Marci Fregoso MD   DULoxetine 60 mg Oral Daily Marci Fregoso MD   fluticasone-vilanterol 1 puff Inhalation Daily Marci Fregoso MD   gabapentin 400 mg Oral HS Marci Fregoso MD   gabapentin 400 mg Oral BID Marci Fregoso MD   heparin (porcine) 5,000 Units Subcutaneous [facility] Marci Fregoso MD   hydrALAZINE 25 mg Oral Q8H PRN SONJA Villanueva   insulin lispro 1-5 Units Subcutaneous TID [facility] Marci Fregoso MD   insulin lispro 1-5 Units Subcutaneous HS Marci Fregoso MD   lidocaine  Topical Q4H PRN Marci Fregoso MD   losartan 100 mg Oral Daily Marci Fregoso MD   melatonin 3 mg Oral HS Mery Grewal PA-C   metFORMIN 500 mg Oral Daily With Breakfast SONJA Villanueva   nicotine 21 mg Transdermal Daily Mery Grewal PA-C   ondansetron 4 mg Intravenous Q6H PRN Marci Fregoso MD   oxybutynin 5 mg Oral Daily SONJA Ramirez   oxyCODONE 10 mg Oral Q4H PRN Marci Fregoso MD   oxyCODONE 5 mg Oral Q4H PRN Marci Fregoso MD   pantoprazole 40 mg Oral Daily Before Breakfast Marci Fregoso MD   polyethylene glycol 17 g Oral Daily PRN Marci Fregoso MD   polyethylene glycol 17 g Oral Daily Marci Fregoso MD   polyvinyl alcohol 1 drop Both Eyes 4x Daily Marci Fregoso MD   polyvinyl alcohol 1 drop Both Eyes Q1H PRN Marci Fregoso MD   pravastatin 10 mg Oral Daily With Jeremiah West MD   senna 1 tablet Oral Daily Melvin Vargas MD   tamsulosin 0 4 mg Oral After Luigi Tamayo MD   thiamine 50 mg Oral Daily Melvin Vargas MD   tiotropium 18 mcg Inhalation Daily Melvin Vargas MD       Labs:     Results from last 7 days   Lab Units 06/27/19  0629 06/25/19  0559   WBC Thousand/uL 4 01* 4 10*   HEMOGLOBIN g/dL 13 1 13 7   HEMATOCRIT % 40 7 42 4   PLATELETS Thousands/uL 171 157     Results from last 7 days   Lab Units 06/27/19  0629 06/25/19  0559   SODIUM mmol/L 140 141   POTASSIUM mmol/L 4 3 4 9   CHLORIDE mmol/L 105 106   CO2 mmol/L 32 33*   BUN mg/dL 20 18   CREATININE mg/dL 0 82 0 93   CALCIUM mg/dL 10 6* 10 6*                  Results from last 7 days   Lab Units 06/30/19  0657 06/29/19  2135 06/29/19  1629   POC GLUCOSE mg/dl 104 110 137       Imaging:     No orders to display       *Labs reviewed  *Radiology studies reviewed  *Medications reviewed and reconciled as needed  *Please refer to order section for additional ordered labs studies  *Case discussed with primary attending during morning huddle case rounds    Physical Examination:  Vitals:   Vitals:    06/29/19 1338 06/29/19 2131 06/30/19 0602 06/30/19 0822   BP: 116/77 133/93 141/81 132/87   BP Location: Left arm Right arm Left arm    Pulse: 87 78 75    Resp: 20 19 18    Temp: (!) 97 4 °F (36 3 °C) 98 7 °F (37 1 °C) 99 1 °F (37 3 °C)    TempSrc: Axillary Oral Oral    SpO2: 100% 99% 100%    Weight:       Height:           General Appearance: NAD, conversive  Eyes: No icterus; conjunctiva normal  HENT: oropharynx clear; mucous membranes moist  Neck: collar on  Lungs: CTA, normal respiratory effort, no retractions, expiratory effort normal  CV: regular rate, no rubs/murmurs/gallops  ABD: soft; ND/NT; +BS  EXT: no edema  Skin: normal turgor, normal texture, no rashes  Psych: affect normal, No anxiety/depression   Neuro: AAOx3;  UE 4/5 R>L, LEs 4+/5 R>L          Total time spent:   At least 40 minutes, with more than 50% spent counseling/coordinating care  Counseling includes discussion with patient re: progress  and discussion with patient of his/her current medical state/information  Coordination of patient's care was performed in conjunction with primary service  Time invested included review of patient's labs, vitals, and management of their comorbidities with continued monitoring  In addition, this patient was discussed with medical team including physician and advanced extenders  The care of the patient was extensively discussed and appropriate treatment plan was formulated unique for this patient  ** Please Note: Dragon 360 Dictation voice to text software may have been used in the creation of this document   **

## 2019-06-30 NOTE — PLAN OF CARE
Problem: Potential for Falls  Goal: Patient will remain free of falls  Description  INTERVENTIONS:  - Assess patient frequently for physical needs  -  Identify cognitive and physical deficits and behaviors that affect risk of falls  -  [place] fall precautions as indicated by assessment   - Educate patient/family on patient safety including physical limitations  - Instruct patient to call for assistance with activity based on assessment  - Modify environment to reduce risk of injury  - Consider OT/PT consult to assist with strengthening/mobility  Outcome: Progressing     Problem: Nutrition/Hydration-ADULT  Goal: Nutrient/Hydration intake appropriate for improving, restoring or maintaining nutritional needs  Description  Monitor and assess patient's nutrition/hydration status for malnutrition (ex- brittle hair, bruises, dry skin, pale skin and conjunctiva, muscle wasting, smooth red tongue, and disorientation)  Collaborate with interdisciplinary team and initiate plan and interventions as ordered  Monitor patient's weight and dietary intake as ordered or per policy  Utilize nutrition screening tool and intervene per policy  Determine patient's food preferences and provide high-protein, high-caloric foods as appropriate       INTERVENTIONS:  - Monitor oral intake, urinary output, labs, and treatment plans  - Assess nutrition and hydration status and recommend course of action  - Evaluate amount of meals eaten  - Assist patient with eating if necessary   - Allow adequate time for meals  - Recommend/ encourage appropriate diets, oral nutritional supplements, and vitamin/mineral supplements  - Order, calculate, and assess calorie counts as needed  - Recommend, monitor, and adjust tube feedings and TPN/PPN based on assessed needs  - Assess need for intravenous fluids  - Provide specific nutrition/hydration education as appropriate  - Include patient/family/caregiver in decisions related to nutrition  Outcome: Progressing     Problem: PAIN - ADULT  Goal: Verbalizes/displays adequate comfort level or baseline comfort level  Description  Interventions:  - Encourage patient to monitor pain and request assistance  - Assess pain using appropriate pain scale  - Administer analgesics based on type and severity of pain and evaluate response  - Implement non-pharmacological measures as appropriate and evaluate response  - Consider cultural and social influences on pain and pain management  - Notify physician/advanced practitioner if interventions unsuccessful or patient reports new pain  Outcome: Progressing     Problem: INFECTION - ADULT  Goal: Absence or prevention of progression during hospitalization  Description  INTERVENTIONS:  - Assess and monitor for signs and symptoms of infection  - Monitor lab/diagnostic results  - Monitor all insertion sites, i e  indwelling lines, tubes, and drains  - Monitor endotracheal (as able) and nasal secretions for changes in amount and color  - [place] appropriate cooling/warming therapies per order  - Administer medications as ordered  - Instruct and encourage patient and family to use good hand hygiene technique  - Identify and instruct in appropriate isolation precautions for identified infection/condition  Outcome: Progressing     Problem: SAFETY ADULT  Goal: Patient will remain free of falls  Description  INTERVENTIONS:  - Assess patient frequently for physical needs  -  Identify cognitive and physical deficits and behaviors that affect risk of falls    -  [place] fall precautions as indicated by assessment   - Educate patient/family on patient safety including physical limitations  - Instruct patient to call for assistance with activity based on assessment  - Modify environment to reduce risk of injury  - Consider OT/PT consult to assist with strengthening/mobility  Outcome: Progressing  Goal: Maintain or return to baseline ADL function  Description  INTERVENTIONS:  -  Assess patient's ability to carry out ADLs; assess patient's baseline for ADL function and identify physical deficits which impact ability to perform ADLs (bathing, care of mouth/teeth, toileting, grooming, dressing, etc )  - Assess/evaluate cause of self-care deficits   - Assess range of motion  - Assess patient's mobility; develop plan if impaired  - Assess patient's need for assistive devices and provide as appropriate  - Encourage maximum independence but intervene and supervise when necessary  ¯ Involve family in performance of ADLs  ¯ Assess for home care needs following discharge   ¯ Request OT consult to assist with ADL evaluation and planning for discharge  ¯ Provide patient education as appropriate  Outcome: Progressing  Goal: Maintain or return mobility status to optimal level  Description  INTERVENTIONS:  - Assess patient's baseline mobility status (ambulation, transfers, stairs, etc )    - Identify cognitive and physical deficits and behaviors that affect mobility  - Identify mobility aids required to assist with transfers and/or ambulation (gait belt, sit-to-stand, lift, walker, cane, etc )  - [place] fall precautions as indicated by assessment  - Record patient progress and toleration of activity level on Mobility SBAR; progress patient to next Phase/Stage  - Instruct patient to call for assistance with activity based on assessment  - Request Rehabilitation consult to assist with strengthening/weightbearing, etc   Outcome: Progressing     Problem: DISCHARGE PLANNING  Goal: Discharge to home or other facility with appropriate resources  Description  INTERVENTIONS:  - Identify barriers to discharge w/patient and caregiver  - Arrange for needed discharge resources and transportation as appropriate  - Identify discharge learning needs (meds, wound care, etc )  - Arrange for interpretive services to assist at discharge as needed  - Refer to Case Management Department for coordinating discharge planning if the patient needs post-hospital services based on physician/advanced practitioner order or complex needs related to functional status, cognitive ability, or social support system  Outcome: Progressing     Problem: Prexisting or High Potential for Compromised Skin Integrity  Goal: Skin integrity is maintained or improved  Description  INTERVENTIONS:  - Identify patients at risk for skin breakdown  - Assess and monitor skin integrity  - Assess and monitor nutrition and hydration status  - Monitor labs (i e  albumin)  - Assess for incontinence   - Turn and reposition patient  - Assist with mobility/ambulation  - Relieve pressure over bony prominences  - Avoid friction and shearing  - Provide appropriate hygiene as needed including keeping skin clean and dry  - Evaluate need for skin moisturizer/barrier cream  - Collaborate with interdisciplinary team (i e  Nutrition, Rehabilitation, etc )   - Patient/family teaching  Outcome: Progressing

## 2019-07-01 LAB
ANION GAP SERPL CALCULATED.3IONS-SCNC: 2 MMOL/L (ref 4–13)
BASOPHILS # BLD AUTO: 0.02 THOUSANDS/ΜL (ref 0–0.1)
BASOPHILS NFR BLD AUTO: 1 % (ref 0–1)
BUN SERPL-MCNC: 19 MG/DL (ref 5–25)
CALCIUM SERPL-MCNC: 10.1 MG/DL (ref 8.3–10.1)
CHLORIDE SERPL-SCNC: 103 MMOL/L (ref 100–108)
CO2 SERPL-SCNC: 31 MMOL/L (ref 21–32)
CREAT SERPL-MCNC: 0.8 MG/DL (ref 0.6–1.3)
EOSINOPHIL # BLD AUTO: 0.08 THOUSAND/ΜL (ref 0–0.61)
EOSINOPHIL NFR BLD AUTO: 2 % (ref 0–6)
ERYTHROCYTE [DISTWIDTH] IN BLOOD BY AUTOMATED COUNT: 15 % (ref 11.6–15.1)
GFR SERPL CREATININE-BSD FRML MDRD: 100 ML/MIN/1.73SQ M
GLUCOSE SERPL-MCNC: 100 MG/DL (ref 65–140)
GLUCOSE SERPL-MCNC: 110 MG/DL (ref 65–140)
GLUCOSE SERPL-MCNC: 141 MG/DL (ref 65–140)
GLUCOSE SERPL-MCNC: 154 MG/DL (ref 65–140)
GLUCOSE SERPL-MCNC: 99 MG/DL (ref 65–140)
HCT VFR BLD AUTO: 43.7 % (ref 36.5–49.3)
HGB BLD-MCNC: 14.1 G/DL (ref 12–17)
IMM GRANULOCYTES # BLD AUTO: 0.01 THOUSAND/UL (ref 0–0.2)
IMM GRANULOCYTES NFR BLD AUTO: 0 % (ref 0–2)
LYMPHOCYTES # BLD AUTO: 1.64 THOUSANDS/ΜL (ref 0.6–4.47)
LYMPHOCYTES NFR BLD AUTO: 37 % (ref 14–44)
MCH RBC QN AUTO: 27.6 PG (ref 26.8–34.3)
MCHC RBC AUTO-ENTMCNC: 32.3 G/DL (ref 31.4–37.4)
MCV RBC AUTO: 86 FL (ref 82–98)
MONOCYTES # BLD AUTO: 0.33 THOUSAND/ΜL (ref 0.17–1.22)
MONOCYTES NFR BLD AUTO: 8 % (ref 4–12)
NEUTROPHILS # BLD AUTO: 2.32 THOUSANDS/ΜL (ref 1.85–7.62)
NEUTS SEG NFR BLD AUTO: 52 % (ref 43–75)
NRBC BLD AUTO-RTO: 0 /100 WBCS
PLATELET # BLD AUTO: 189 THOUSANDS/UL (ref 149–390)
PMV BLD AUTO: 10.6 FL (ref 8.9–12.7)
POTASSIUM SERPL-SCNC: 4.3 MMOL/L (ref 3.5–5.3)
RBC # BLD AUTO: 5.1 MILLION/UL (ref 3.88–5.62)
SODIUM SERPL-SCNC: 136 MMOL/L (ref 136–145)
WBC # BLD AUTO: 4.4 THOUSAND/UL (ref 4.31–10.16)

## 2019-07-01 PROCEDURE — 82948 REAGENT STRIP/BLOOD GLUCOSE: CPT

## 2019-07-01 PROCEDURE — 97112 NEUROMUSCULAR REEDUCATION: CPT

## 2019-07-01 PROCEDURE — 85025 COMPLETE CBC W/AUTO DIFF WBC: CPT | Performed by: PHYSICIAN ASSISTANT

## 2019-07-01 PROCEDURE — 97110 THERAPEUTIC EXERCISES: CPT

## 2019-07-01 PROCEDURE — 94760 N-INVAS EAR/PLS OXIMETRY 1: CPT

## 2019-07-01 PROCEDURE — 94660 CPAP INITIATION&MGMT: CPT

## 2019-07-01 PROCEDURE — 97535 SELF CARE MNGMENT TRAINING: CPT

## 2019-07-01 PROCEDURE — 80048 BASIC METABOLIC PNL TOTAL CA: CPT | Performed by: NURSE PRACTITIONER

## 2019-07-01 PROCEDURE — 99232 SBSQ HOSP IP/OBS MODERATE 35: CPT

## 2019-07-01 PROCEDURE — 97116 GAIT TRAINING THERAPY: CPT

## 2019-07-01 PROCEDURE — 97530 THERAPEUTIC ACTIVITIES: CPT

## 2019-07-01 RX ADMIN — ACETAMINOPHEN 650 MG: 325 TABLET ORAL at 21:42

## 2019-07-01 RX ADMIN — POLYVINYL ALCOHOL 1 DROP: 14 SOLUTION/ DROPS OPHTHALMIC at 13:11

## 2019-07-01 RX ADMIN — GABAPENTIN 400 MG: 400 CAPSULE ORAL at 13:10

## 2019-07-01 RX ADMIN — THIAMINE HCL TAB 100 MG 50 MG: 100 TAB at 09:34

## 2019-07-01 RX ADMIN — DILTIAZEM HYDROCHLORIDE 180 MG: 180 CAPSULE, COATED, EXTENDED RELEASE ORAL at 09:39

## 2019-07-01 RX ADMIN — POLYVINYL ALCOHOL 1 DROP: 14 SOLUTION/ DROPS OPHTHALMIC at 21:43

## 2019-07-01 RX ADMIN — MELATONIN 3 MG: at 21:42

## 2019-07-01 RX ADMIN — LOSARTAN POTASSIUM 100 MG: 50 TABLET, FILM COATED ORAL at 09:33

## 2019-07-01 RX ADMIN — ACETAMINOPHEN 650 MG: 325 TABLET ORAL at 05:19

## 2019-07-01 RX ADMIN — HEPARIN SODIUM 5000 UNITS: 5000 INJECTION INTRAVENOUS; SUBCUTANEOUS at 21:42

## 2019-07-01 RX ADMIN — PANTOPRAZOLE SODIUM 40 MG: 40 TABLET, DELAYED RELEASE ORAL at 05:19

## 2019-07-01 RX ADMIN — GABAPENTIN 400 MG: 400 CAPSULE ORAL at 21:42

## 2019-07-01 RX ADMIN — OXYBUTYNIN CHLORIDE 5 MG: 5 TABLET, EXTENDED RELEASE ORAL at 09:34

## 2019-07-01 RX ADMIN — NICOTINE 21 MG: 21 PATCH, EXTENDED RELEASE TRANSDERMAL at 09:34

## 2019-07-01 RX ADMIN — GABAPENTIN 400 MG: 400 CAPSULE ORAL at 05:18

## 2019-07-01 RX ADMIN — TIOTROPIUM BROMIDE 18 MCG: 18 CAPSULE ORAL; RESPIRATORY (INHALATION) at 09:38

## 2019-07-01 RX ADMIN — DULOXETINE HYDROCHLORIDE 60 MG: 60 CAPSULE, DELAYED RELEASE ORAL at 09:33

## 2019-07-01 RX ADMIN — HEPARIN SODIUM 5000 UNITS: 5000 INJECTION INTRAVENOUS; SUBCUTANEOUS at 13:11

## 2019-07-01 RX ADMIN — POLYVINYL ALCOHOL 1 DROP: 14 SOLUTION/ DROPS OPHTHALMIC at 09:38

## 2019-07-01 RX ADMIN — POLYVINYL ALCOHOL 1 DROP: 14 SOLUTION/ DROPS OPHTHALMIC at 17:30

## 2019-07-01 RX ADMIN — FLUTICASONE FUROATE AND VILANTEROL TRIFENATATE 1 PUFF: 100; 25 POWDER RESPIRATORY (INHALATION) at 09:38

## 2019-07-01 RX ADMIN — METFORMIN HYDROCHLORIDE 500 MG: 500 TABLET ORAL at 09:33

## 2019-07-01 RX ADMIN — PRAVASTATIN SODIUM 10 MG: 10 TABLET ORAL at 17:30

## 2019-07-01 RX ADMIN — TAMSULOSIN HYDROCHLORIDE 0.4 MG: 0.4 CAPSULE ORAL at 17:30

## 2019-07-01 RX ADMIN — CLINDAMYCIN HYDROCHLORIDE 300 MG: 150 CAPSULE ORAL at 21:42

## 2019-07-01 RX ADMIN — CLINDAMYCIN HYDROCHLORIDE 300 MG: 150 CAPSULE ORAL at 05:18

## 2019-07-01 RX ADMIN — ACETAMINOPHEN 650 MG: 325 TABLET ORAL at 13:09

## 2019-07-01 RX ADMIN — CLINDAMYCIN HYDROCHLORIDE 300 MG: 150 CAPSULE ORAL at 13:09

## 2019-07-01 RX ADMIN — VITAMIN D, TAB 1000IU (100/BT) 2000 UNITS: 25 TAB at 09:33

## 2019-07-01 RX ADMIN — HEPARIN SODIUM 5000 UNITS: 5000 INJECTION INTRAVENOUS; SUBCUTANEOUS at 05:21

## 2019-07-01 NOTE — PROGRESS NOTES
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; s/p C3/4 ACDF with C4 hemicorpectomy, C3-4 anterior instrumentation/fusion 6/18/19 Murali Stratford):  continue cervical collar; on Clindamycin for infection prophylaxis  Pain control per primary service  Had previously been on Cymbalta for neuropathic sx      DM2: at home he takes Kombiglyze XR 5-1000mg qd and no insulin  Continue Metformin 500mg daily = has good control currently  Continue DM diet and Accuchecks/QID with SSI      HTN:  at home, takes Cardizem 180mg qd, Losartan 100mg qd (was not on Norvasc he says) = continue same here     COPD; hx lung cancer/VIVIANA lobectomy, pulmonary nodules/cough: follows with pulm as OP and takes Advair 250-50 one puff BID/Spiriva one puff daily and prn Combivent  On Breo as substitute for Advair, continue Spiriva and use Albuterol inhaler prn  Has intermitt cough but not bothersome  Had CT chest in April for lung nodule surveillance = for repeat 1 yr  Will watch     SHIVANI: supposed to use CPAP but has been noncompliant     HLD: at home on Pravachol     Urinary incontinence/BPH/urgency: continue Flomax, Oxybutynin ER 5mg qd; Uro is following         Subjective/ HPI:  Patients overnight issues or events were reviewed with nursing or staff during rounds or morning huddle session  No new or overnight issues  Hypokalemia resolved  DM stable, HTN: stable on current tx  Patient denies any current complaints      ROS:     GI: denies abdominal pain, change bowel habits or reflux symptoms  Neuro: Denies any headache, new vision changes, new neuropathies,new weaknesses   Respiratory: No Cough, SOB, denies wheeze  Cardiovascular: No CP, palpitations , denies perception of rapid heartbeat  : denies any new urinary burning or frequency    Review of Scheduled Meds:    Current Facility-Administered Medications:  acetaminophen 650 mg Oral TID Anatoliy Brambila MD   albuterol 2 puff Inhalation Q4H PRN Anatoliy Brambila MD   bisacodyl 10 mg Rectal Daily PRN Anatoliy Brambila MD   bisacodyl 10 mg Rectal Once Anatoliy Brambila MD   cholecalciferol 2,000 Units Oral Daily Anatoliy Brambila MD   clindamycin 300 mg Oral On license of [facility] Anatoliy Brambila MD   diltiazem 180 mg Oral Daily SONJA Horner   docusate sodium 100 mg Oral BID Anatoliy Brambila MD   DULoxetine 60 mg Oral Daily Anatoliy Brambila MD   fluticasone-vilanterol 1 puff Inhalation Daily Anatoliy Brambila MD   gabapentin 400 mg Oral HS Anatoliy Brambila MD   gabapentin 400 mg Oral BID Anatoily Brambila MD   heparin (porcine) 5,000 Units Subcutaneous On license of [facility] Anatoliy Brambila MD   hydrALAZINE 25 mg Oral Q8H PRN SONJA Horner   insulin lispro 1-5 Units Subcutaneous TID [facility] [place] Anatoliy Brambila MD   insulin lispro 1-5 Units Subcutaneous HS Anatoliy Brambila MD   lidocaine  Topical Q4H PRN Anatoliy Brambila MD   losartan 100 mg Oral Daily Anatoliy Brambila MD   melatonin 3 mg Oral HS Mery Grewal PA-C   metFORMIN 500 mg Oral Daily With Breakfast SONJA Horner   nicotine 21 mg Transdermal Daily Mery Grewal PA-C   ondansetron 4 mg Intravenous Q6H PRN Anatoliy Brambila MD   oxybutynin 5 mg Oral Daily SONJA Valero   oxyCODONE 10 mg Oral Q4H PRN Anatoliy Brambila MD   oxyCODONE 5 mg Oral Q4H PRN Anatoliy Brambila MD   pantoprazole 40 mg Oral Daily Before Breakfast Anatoliy Brambila MD   polyethylene glycol 17 g Oral Daily PRN Anatoliy Brambila MD   polyethylene glycol 17 g Oral Daily Anatoliy Brambila MD   polyvinyl alcohol 1 drop Both Eyes 4x Daily Anatoliy Brambila MD   polyvinyl alcohol 1 drop Both Eyes Q1H PRN Anatoliy Brambila MD   pravastatin 10 mg Oral Daily With Hope De Jesus MD   senna 1 tablet Oral Daily Anatoliy Brambila MD   tamsulosin 0 4 mg Oral After [employer] Shayne Pratt MD   thiamine 50 mg Oral Daily Karen Rivas MD   tiotropium 18 mcg Inhalation Daily Karen Rivas MD       Labs:     Results from last 7 days   Lab Units 06/27/19  0629 06/25/19  0559   WBC Thousand/uL 4 01* 4 10*   HEMOGLOBIN g/dL 13 1 13 7   HEMATOCRIT % 40 7 42 4   PLATELETS Thousands/uL 171 157     Results from last 7 days   Lab Units 07/01/19  0611 06/27/19  0629   SODIUM mmol/L 136 140   POTASSIUM mmol/L 4 3 4 3   CHLORIDE mmol/L 103 105   CO2 mmol/L 31 32   BUN mg/dL 19 20   CREATININE mg/dL 0 80 0 82   CALCIUM mg/dL 10 1 10 6*                  Results from last 7 days   Lab Units 07/01/19  0642 06/30/19  2101 06/30/19  1558   POC GLUCOSE mg/dl 99 156* 104       Imaging:     No orders to display       *Labs reviewed  *Radiology studies reviewed  *Medications reviewed and reconciled as needed  *Please refer to order section for additional ordered labs studies  *Case discussed with primary attending during morning huddle case rounds    Physical Examination:  Vitals:   Vitals:    06/30/19 1313 06/30/19 2022 07/01/19 0608 07/01/19 0940   BP: 122/80 131/74 131/77 130/76   BP Location: Right arm Left arm Left arm Left arm   Pulse: 85 78 75    Resp: 18 18 18    Temp: 98 4 °F (36 9 °C) 98 4 °F (36 9 °C) 97 7 °F (36 5 °C)    TempSrc: Oral Oral Oral    SpO2: 99% 99% 100%    Weight:       Height:           General Appearance: NAD, conversive  Eyes: No icterus; conjunctiva normal  HENT: oropharynx clear; mucous membranes moist  Neck: collar on  Lungs: CTA, normal respiratory effort, no retractions, expiratory effort normal  CV: regular rate, no rubs/murmurs/gallops  ABD: soft; ND/NT; +BS  EXT: no edema  Skin: normal turgor, normal texture, no rashes  Psych: affect normal, No anxiety/depression   Neuro: AAOx3;  UE 4/5 R>L, LEs 4+/5 R>L          Total time spent: At least 40 minutes, with more than 50% spent counseling/coordinating care   Counseling includes discussion with patient re: progress  and discussion with patient of his/her current medical state/information  Coordination of patient's care was performed in conjunction with primary service  Time invested included review of patient's labs, vitals, and management of their comorbidities with continued monitoring  In addition, this patient was discussed with medical team including physician and advanced extenders  The care of the patient was extensively discussed and appropriate treatment plan was formulated unique for this patient  ** Please Note: Dragon 360 Dictation voice to text software may have been used in the creation of this document   **

## 2019-07-01 NOTE — SOCIAL WORK
PCT Pts son, Lissett During, [phone], to discuss SNF choices  CM could not leave a VM as the mailbox was full

## 2019-07-01 NOTE — PROGRESS NOTES
07/01/19 0700   Pain Assessment   Pain Assessment No/denies pain   Pain Score No Pain   Restrictions/Precautions   Precautions Fall Risk;Spinal precautions   Braces or Orthoses C/S Collar   QI: Oral Hygiene   Assistance Needed Incidental touching   Assistance Provided by [place] No physical assistance   Comment CGA during stance to complte oral hygiene care   Oral Hygiene CARE Score 4   Grooming   Able To Comb/Brush Hair;Wash/Dry Face;Brush/Clean Teeth;Wash/Dry Hands   Limitation Noted In Coordination; Sequencing;Strength   Grooming (FIM) 4 - Patient requires steadying assist or light touching   QI: Shower/Bathe Self   Assistance Needed Incidental touching   Assistance Provided by [place] Less than 25%   Comment Pt completed steadying assistance while in stance for showering  Pt instructed to maintain unialteral UE support on grab bars however pt still lets go with b/l hand release and requries CGA for balance support  Pt washed LE's while seated on shower chair   Shower/Bathe Self CARE Score 3   Bathing   Assessed Bath Style Shower   Able to [place] Yes   Able to Adjust Water Temperature Yes   Able to Wash/Rinse/Dry (body part) Left Arm;L Upper Leg;Right Arm;R Upper Leg;L Lower Leg/Foot;R Lower Leg/Foot;Chest;Abdomen;Perineal Area; Buttocks   Limitations Noted in Balance; Endurance;Problem Solving; Safety; Sequencing;Strength   Positioning Seated;Standing   Bathing (FIM) 4 - Patient requires steadying assist or light touching   Tub/Shower Transfer   Limitations Noted In Balance; Endurance;LE Strength   Adaptive Equipment Grab Bars;Seat with Back   Assessed Shower   Findings Pt engaged in stand pivot transfer with no device  Pt utlized grab bars to step into shower  Pt with cueing for safety      Tub Transfer (FIM) 4 - Patient requires steadying assist or light touching   Shower Transfer (FIM) 4 - Patient requires steadying assist or light touching   QI: Upper Body Dressing   Assistance Needed Physical assistance Assistance Provided by [place] 25%-49%   Comment Pt able to complete UB dressing at supervision level  Pt requires assist to don/doff shower collar  Pt able to change pads on shower collar while in [place] collar  Pt unable to maintain precautions however to change collars and thus requires therapist assist to stablize his spine   Upper Body Dressing CARE Score 3   QI: Lower Body Dressing   Assistance Needed Physical assistance   Assistance Provided by [place] Less than 25%   Comment Pt able to thread LE's into pants  Pt able to pull pants up over hips with CGA for balance support  Pt    Lower Body Dressing CARE Score 3   QI: Putting On/Taking Off Footwear   Assistance Needed Physical assistance   Assistance Provided by [place] 25%-49%   Comment Pt requires supervision to complete cross leg technique to don/doff socks  Pt requires increased time and verbal cues to maintain cervical spine precautiosn as pt attempts to tuck chin while completing sock management Pt declining use of shoe horn and unable to use his fingers to fix back of b/l shoes  Pt requires assist from therapist  Pt attempting to don shoe laces but due to decreased sesnsation and poor coordination, pt requires assist to tie shoe laces  Pt woudl benefit from trial of LHAE and use of elastic laces  Putting On/Taking Off Footwear CARE Score 3   Dressing/Undressing Clothing   Able to  [address]   Remove LB Clothes Pants;Socks   [facility] [address] LB Clothes Pants;Socks; Shoes   Limitations Noted In Balance; Coordination; Endurance;Timeliness;Strength; Safety   Positioning Supported Sit;Standing   UB Dressing (FIM) 4 - Patient requires steadying assist or light touching   LB Dressing (FIM) 4 - Patient completes 75% of all tasks   QI: Lying to Sitting on Side of Bed   Assistance Needed Supervision   Assistance Provided by [place] No physical assistance   Lying to Sitting on Side of Bed CARE Score 4   QI: Sit to Stand   Assistance Needed Incidental touching   Assistance Provided by [place] No physical assistance   Comment CGA for sit to stand with no device   Sit to Stand CARE Score 4   QI: Chair/Bed-to-Chair Transfer   Assistance Needed Physical assistance   Assistance Provided by [place] 25%-49%   Comment Pt requires min A for stand pivot transfers due to decreased balance and two episodes of minor LOB   Chair/Bed-to-Chair Transfer CARE Score 3   Transfer Bed/Chair/Wheelchair   Limitations Noted In Balance; Coordination; Endurance;LE Strength   Stand Pivot Minimal Assist   Sit to Stand Minimal  (cga)   Bed, Chair, Wheelchair Transfer (FIM) 4 - Patient completes 75% of all tasks   QI: [address] Needed Incidental touching   Assistance Provided by [place] Less than 25%   Toileting Hygiene CARE Score 3   Toileting   Able to [address] down yes, up yes  Able to [address] Yes   Manage Hygiene Bladder   Toileting (FIM) 4 - Patient requires steadying assist or light touching   Coordination   Fine Motor Pt engaged in fastner boards due to poor [address], poor sensation, poor prehension grasp patterns  Pt able to complete shoelace tying on fastener boards  Pt with poor FMC to isolate index finger to push lace through loop and poor sustained pincer grasp to pull lace through loop  Pt required extended time to complete tying 5 laces in 10 minutes  Pt engagedi n zipper board 5 reps  Pt with difficulty lining up two pieces of zipper and pinching zipper to pull up  Pt completed only 3 trials before becoming fatigued  Cognition   Overall Cognitive Status WFL   Arousal/Participation Alert; Cooperative   Attention Within functional limits   Orientation Level Oriented X4   Memory Within functional limits   Following Commands Follows multistep commands with increased time or repetition   Activity Tolerance   Activity Tolerance Patient tolerated treatment well   Assessment   Treatment Assessment Pt engaged in skilled OT session with focus on ADL routine and [facility] wtih focus on varying grasp patterns  Pt with noted improved endurance during ADL routine and improved ability to maintain  on clothing during clothing manipulation  Pt continues to demonstrate poor carryover of c/s collar management and maintain precautions  Pt is thus unsafe to manage collar independently and will require assistance upon d/c  Pt contineus with balance deficits and occasional minor LOB impacting safety with functional mobility and thus requriing assistance for mobility  Pt has no family support at home as pt wife is only present on weekends  Due to severe sensation deficits in hangs, cervical precautions, decreased balance, and decreased family support, pt is recommended to have continued rehab at [facility] in order to progress to mdo I level to d/c home  Prognosis Good   Problem List Decreased strength;Decreased endurance; Impaired balance;Decreased mobility; Decreased coordination   Plan   Treatment/Interventions ADL retraining;Functional transfer training;LE strengthening/ROM; Endurance training; Therapeutic exercise;Patient/family training;Equipment eval/education; Bed mobility; Compensatory technique education   Progress Progressing toward goals   Recommendation   OT Discharge Recommendation Short Term Rehab   OT Therapy Minutes   OT Time In 0700   OT Time Out 0830   OT Total Time (minutes) 90   OT Mode of treatment - Individual (minutes) 90   OT Mode of treatment - Concurrent (minutes) 0   OT Mode of treatment - Group (minutes) 0   OT Mode of treatment - Co-treat (minutes) 0   OT Mode of Teatment - Total time(minutes) 90 minutes   Therapy Time missed   Time missed?  No

## 2019-07-01 NOTE — PROGRESS NOTES
07/01/19 1235   Pain Assessment   Pain Score No Pain   Restrictions/Precautions   Precautions Fall Risk;Spinal precautions   Braces or Orthoses C/S Collar   Cognition   Arousal/Participation Alert; Cooperative   Attention Within functional limits   Memory Within functional limits   Following Commands Follows multistep commands with increased time or repetition   Subjective   Subjective No complaints reported, denies pain but would like to have his collar tightened  is ready to participate in therapy    QI: Sit to Stand   Assistance Needed Supervision; Incidental touching   Sit to Stand CARE Score 4   QI: Chair/Bed-to-Chair Transfer   Assistance Needed Supervision; Incidental touching   Chair/Bed-to-Chair Transfer CARE Score 4   Transfer Bed/Chair/Wheelchair   Adaptive Equipment Roller Walker;None   Stand Pivot Contact Guard   Sit to Stand Contact Guard;Supervision   Stand to Sit Contact Guard;Supervision   Findings CS/ CGA   Bed, Chair, Wheelchair Transfer (FIM) 4 - Patient requires steadying assist or light touching   QI: Car Transfer   Assistance Needed Incidental touching   Car Transfer CARE Score 4   QI: Walk 10 Feet   Assistance Needed Incidental touching   Walk 10 Feet CARE Score 4   QI: Walk 50 Feet with Two Turns   Assistance Needed Incidental touching   Walk 50 Feet with Two Turns CARE Score 4   QI: Walk 150 Feet   Assistance Needed Incidental touching   Walk 150 Feet CARE Score 4   QI: Walking 10 Feet on Uneven Surfaces   Assistance Needed Incidental touching   Walking 10 Feet on Uneven Surfaces CARE Score 4   Ambulation   Does the patient walk? 2  Yes   Primary Discharge Mode of Locomotion Walk   Walk Assist Level Contact Guard  (using gait belt )   Gait Pattern Inconsistant Analisa;[place] LILLIE; Trendelenburg; Improper weight shift  (L pelvic list at stance )   Assist Device Roller Walker; Other  (no AD)   Distance Walked (feet) 700 ft  (within the unit, 150 feet with RW)   Limitations Noted In Balance; Coordination; Endurance   Findings 400- 500 feet in [facility] with no AD,   Walking (FIM) 4 - Patient requires steadying assist or light touching AND distance 150 feet or more, no rest   Wheelchair mobility   QI: Does the patient use a wheelchair? 0  No   QI: 1 Step (Curb)   Assistance Needed Incidental touching   1 Step (Curb) CARE Score 4   QI: 4 Steps   Assistance Needed Incidental touching   4 Steps CARE Score 4   QI: 12 Steps   Assistance Needed Incidental touching   12 Steps CARE Score 4   Stairs   Type Stairs;Curb;Ramp   # of Steps 24  (FF)   Weight Bearing Precautions Fall Risk   Assist Devices Single Rail   Stairs (FIM) 4 - Patient requires steadying assist or light touching AND patient goes up and down full flight (12- 14 stairs)   Toilet Transfer   Findings emptied bladder x 2 standing with bathroom close to gym    Therapeutic Interventions   Strengthening functional sit<> stand without UE support (pt  holding small ball infront ) 10 reps; 2 sets, hip abd standin infront of counter and hip abd using green TB in sitting    Flexibility B hamstring and gastroc stretching    Balance belkis pad walking on balance disc, pt  has X 2-3 major LOB needing mod A but did better after 2 trials  Weaving trhough cones without AD   Equipment Use   NuStep Level 2 X 15 mins, LE only X 5 mins, B UE and LE X 10 mins    Assessment   Treatment Assessment Pt  tolerated session with no c/o pain reported  First part of session focused on gait training in different surfaces inclusing walking around and outside hospital premises without AD  Pt  had no LOB noted but tend to nima cross feet when he gets distracted  pt  also has to be reminded for cervical spinal precautions when trying to look around the environment  Pt  tends to turn his head around the C/S collar instead of truning his whole body/ upper body for spinal precautions  Pt  then participated in [address] as above and hip strengthening   Pt  noted to have a dip/ pelvic list with L LE stance with questionable LLD with pt  reported that somebody had told him before that his leg is shorter than the other vs, hip abd weakness  Pt  assisted back to recliner at end of session with alarms on and no complaints reported by pt  Cont with POC as tolerated    Problem List Decreased strength;Decreased endurance; Impaired balance;Decreased mobility;Orthopedic restrictions   Barriers to Discharge Inaccessible home environment;Decreased caregiver support   PT Barriers   Physical Impairment Decreased strength;Decreased endurance; Impaired balance;Decreased mobility;Orthopedic restrictions   Functional Limitation Stair negotiation;Standing;Transfers; Walking   Plan   Treatment/Interventions Functional transfer training;LE strengthening/ROM; Elevations; Therapeutic exercise; Endurance training;Patient/family training;Equipment eval/education; Bed mobility;Gait training   Recommendation   Recommendation   (TBD pending progress )   Equipment Recommended   (TBD pending progress)   PT Therapy Minutes   PT Time In 1235   PT Time Out 1405   PT Total Time (minutes) 90   PT Mode of treatment - Individual (minutes) 90   PT Mode of treatment - Concurrent (minutes) 0   PT Mode of treatment - Group (minutes) 0   PT Mode of treatment - Co-treat (minutes) 0   PT Mode of Teatment - Total time(minutes) 90 minutes   Therapy Time missed   Time missed?  No

## 2019-07-01 NOTE — PROGRESS NOTES
Physical Medicine and Rehabilitation Progress Note  Linnell Dance During 70 y o  male MRN: [id number]  Unit/Bed#: [facility] 970-01 Encounter: [id number]    Chief Complaints:  Decline in function    Subjective/Interval Events:   Patient reports being quite happy with the care he has been receiving here  Patient is very appreciative of staff yesterday and celebrating his birthday with him  Patient reports neck pain and hand pain/discomfort much improved  Patient reports sleeping the best he has since admission last night  Patient reports improved urinary function  He denies lightheadedness, fever, chills, sweats, nausea, calf pain, worsening strength or sensation, or other complaints  ROS: A 10-point ROS was performed  Negative except as listed above  Overall Assessment/relevant history:  61-year-old male with a past medical history of  diabetes mellitus 2, hypertension, hyperlipidemia,  active smoker,lung cancer status post left upper lobectomy,  pulmonary nodules, COPD, obstructive sleep apnea,  only grade 1 diastolic dysfunction left ventricle, BPH who developed progressive 4 limb weakness, sensory changes, and gait imbalance 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 who underwent C3-C4 anterior cervical decompression and fusion with  C4 hemicorpectomy by Dr Zackary Oliveira on 6/18/19  Postoperative course notable for pain, hypertension, with significant decline ADLs and ambulation    Patient was evaluated by skilled therapies and is appropriate for admission to acute rehabilitation center      Functional status on admission to [facility]:  OT:  Bathing, upper body dressing, toileting, toilet transfers moderate assist, eating, grooming, lower body dressing, general transfers min assist  PT:  Ambulation min assist 100 feet but scored as total assist because of 2nd person chair follow    Functional status (recent):    Transfers, ambulation, stairs min assist    Functional status goal:  Modified independent for ADLs and ambulation     * Cervical myelopathy (HCC)  Assessment & Plan  Slowly improving  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 and fusion with C4 hemicorpectomy by Dr Shepard Dad on 19  Clindamycin for infectious prophylaxis per Neurosurgery  Holding anti-platelet per Neurosurgery  Postop day 14 is  > follow-up with Neurosurgery tomorrow  Cervical spine precautions, cervical collar on at all times  Monitor incision for infection or dehiscence  Recommend acute comprehensive interdisciplinary inpatient rehabilitation to include intensive skilled therapies (PT, OT) as outlined with oversight and management by rehabilitation physician as well as inpatient rehab level nursing, case management and weekly interdisciplinary team meetings  Follow-up with Neurosurgery if needed during course after discharge    Urinary incontinence  Assessment & Plan  Improving still finding urine catheter overnight helpful  Recent urinary frequency, incontinence > possible neurogenic overactive spastic bladder  > significantly impairing sleep, function, quality of life  > U/A  non-infectious  > urology c/s appreciated > oxybutynin recently started > monitor for changes  > toileting program  PVRs, bladder scans, measure urine outputs to help determine  Continue chronic flomax for now  Monitor for infection, incontinence, retention  Follow-up with Urology after discharge    Anxiety  Assessment & Plan  Improving   Supportive counseling, psychology consult during course  Gabapentin may be helpful for this as well as pain  Optimize sleep  Counseled on and continue to encourage deep breathing/relaxation/behavioral management techniques:     Deep breathin seconds in, 5 seconds out, 5 times per hour when awake and PRN when experiencing pain or anxiety    Avoid holding breath and tightening muscles and instead breathe slowly and deeply      Insomnia  Assessment & Plan  Much improved with with improving urinary function/condom cath and improved pain   Optimal management of bladder dysfunction as outlined > monitor with adjustments in pain meds/urination meds   Melatonin 3 mg q h s  Optimize pain management as outlined  Recommend appropriate sleep hygiene: patient counselled on this:   Sleep only as much as necessary to feel rested and then get up or sit up in bed, maintain regular sleep schedule (same bedtime and wake time everyday), do not force sleep, avoid caffeinated beverages after lunch, avoid alcohol at home near bedtime, do not smoke particularly in evening, do not go to bed hungry, adjust bedroom environment so you are comfortable prior to settling down for sleep, deal with concerns or worries before bedtime  Make a list of things to work on for next day so anxiety is reduced at night, exercise regularly when cleared by physician      Pain  Assessment & Plan  Adequately controlled  Somatic postsurgical, arthritic, muscle spasms, neuropathic   Continue current management for now:  Scheduled acetaminophen monitor LFTs (wnl 6/21)   Continue gabapentin to 400 mg t i d  Continue Oxycodone 5-10 mg q 4 hours p r n  Cymbalta can help as well   Hold for oversedation, AMS, or RR<12    Patient apparently was on Cymbalta in the past follow-up       Constipation  Assessment & Plan  Improved  - Colace/Senna BID, miralax daily   - PRN bowel regimen (Miralax PRN/Dulcolax supp PRN)  - monitor for signs and symptoms of obstruction/ileus > not currently; hold stimulant lax if occurs  - Limiting constipating medications if possible  - Ensure adequate hydration        At risk for venous thromboembolism (VTE)  Assessment & Plan  Heparin and SCDs    BPH (benign prostatic hyperplasia)  Assessment & Plan  Monitor for retention, incontinence, infection  Continue Flomax    SHIVANI (obstructive sleep apnea)  Assessment & Plan  CPAP    COPD (chronic obstructive pulmonary disease) ([facility])  Assessment & Plan  Monitor oxygen with and without activity  Management overall per Medicine  Encourage deep breathing and incentive spirometry  Provide albuteral neb for now with increased congestion recently off chronic meds  Spiriva, as needed albuterol  Home regimen, Advair, Spiriva, Combivent PRN  Follow-up with pulmonology after discharge    Essential hypertension  Assessment & Plan  Overall management by Medicine  Losartan 100 mg daily  Patient also on diltiazem 180 mg now > adjustments for Medicine    Hyperlipidemia associated with type 2 diabetes mellitus ([facility] )  Assessment & Plan  Resume statin at discretion Medicine    Type 2 diabetes mellitus with diabetic neuropathy, without long-term current use of insulin ([facility] )  Assessment & Plan  Overall management but Medicine  Patient on combination metformin medication previously  Insulin lispro at their discretion, other insulin and orals at their discretion     Pipe smoker  Assessment & Plan  Patient started on nicotine patch per Internal Medicine    Vitamin D insufficiency  Assessment & Plan  2000 units D3 for now daily follow-up with PCP    # Skin:   - Nursing to turn patient Q2H if not adequately ambulatory, monitor for skin breakdown, rashes, and wounds if applicable     # Diet/Hydration:    Diabetic     Disposition:   Home with [facility] 3 weeks from admission     Follow-up:   PCP, PMR, NSx, Urology     CODE: Level 1: Full Code     Restrictions include: Fall precautions     ---------------------------------------------------------------------------------------------------------------------    Objective:     Allergies and Medications per EMR    Physical Exam:  Vitals:    07/01/19 0940   BP: 130/76   Pulse:    Resp:    Temp:    SpO2:      General: Awake, alert in NAD  HENT:  MMM, surgical incisions healing appropriately without signs of infection or dehiscence no significant discharge   Respiratory:  clear to auscultation without wheeze without significant rales or rhonchi  Cardiovascular: Regular rate and rhythm, no murmurs, rubs, or gallops  Gastrointestinal: Soft, non-tender, non-distended, normoactive bowel sounds  Genitourinary: No byrnes  SkiN/MSK/Extremities:  no calf edema, no calf tenderness to palpation  Neurologic/Psych:   MENTAL STATUS: awake, orientation intact, appropriate wakefulness, follows commands appropriately  Affect:  positive   Strength improving    Physical exam performed, documentation above reviewed and updated if appropriate on relevant date of encounter:   07/01/2019    Diagnostic Studies: reviewed, no new imaging  No results found      Laboratory:    Results from last 7 days   Lab Units 07/01/19  0948 06/27/19  0629 06/25/19  0559   HEMOGLOBIN g/dL 14 1 13 1 13 7   HEMATOCRIT % 43 7 40 7 42 4   WBC Thousand/uL 4 40 4 01* 4 10*     Results from last 7 days   Lab Units 07/01/19  0611 06/27/19  0629 06/25/19  0559   BUN mg/dL 19 20 18   POTASSIUM mmol/L 4 3 4 3 4 9   CHLORIDE mmol/L 103 105 106   CREATININE mg/dL 0 80 0 82 0 93                Patient Active Problem List   Diagnosis    Type 2 diabetes mellitus with diabetic neuropathy, without long-term current use of insulin ([facility])    Hyperlipidemia associated with type 2 diabetes mellitus ([facility] )    Essential hypertension    BPH (benign prostatic hyperplasia)    Degenerative cervical spinal stenosis    Cervical spondylosis    COPD (chronic obstructive pulmonary disease) ([facility])    Adjustment disorder with depressed mood    Progressive locomotor ataxia    Other spondylosis with myelopathy, cervical region    Quadriparesis ([facility] )    Spinal cord compression ([facility] )    Prostate cancer ([facility] )    Overactive bladder    Lesion of native kidney    SHIVANI (obstructive sleep apnea)    Impaired mobility and ADLs    Cervical myelopathy ([facility])    At risk for venous thromboembolism (VTE)    Pain    Constipation    Urinary incontinence    Depression    Vitamin D insufficiency    Pipe smoker    Insomnia    Anxiety       ** Please Note: Fluency Direct voice to text software may have been used in the creation of this document  **    Total visit time: At least 25 minutes, with more than 50% spent counseling/coordinating care  Counseling includes discussion with patient re: progress in therapies, functional issues observed by therapy staff, and discussion with patient regarding their current medical state and wellbeing  Coordination of patient's care was performed in conjunction with Internal Medicine service to monitor patient's labs, vitals, and management of their comorbidities         Lida Cook MD, [address] [facility]  Physical Medicine and Rehabilitation  Brain Injury Medicine

## 2019-07-02 LAB
GLUCOSE SERPL-MCNC: 111 MG/DL (ref 65–140)
GLUCOSE SERPL-MCNC: 127 MG/DL (ref 65–140)
GLUCOSE SERPL-MCNC: 165 MG/DL (ref 65–140)
GLUCOSE SERPL-MCNC: 96 MG/DL (ref 65–140)

## 2019-07-02 PROCEDURE — 99233 SBSQ HOSP IP/OBS HIGH 50: CPT

## 2019-07-02 PROCEDURE — 97530 THERAPEUTIC ACTIVITIES: CPT | Performed by: OCCUPATIONAL THERAPY ASSISTANT

## 2019-07-02 PROCEDURE — 99024 POSTOP FOLLOW-UP VISIT: CPT | Performed by: PHYSICIAN ASSISTANT

## 2019-07-02 PROCEDURE — 82948 REAGENT STRIP/BLOOD GLUCOSE: CPT

## 2019-07-02 PROCEDURE — 97116 GAIT TRAINING THERAPY: CPT

## 2019-07-02 PROCEDURE — 97112 NEUROMUSCULAR REEDUCATION: CPT

## 2019-07-02 PROCEDURE — 94760 N-INVAS EAR/PLS OXIMETRY 1: CPT

## 2019-07-02 PROCEDURE — 97110 THERAPEUTIC EXERCISES: CPT

## 2019-07-02 PROCEDURE — 94660 CPAP INITIATION&MGMT: CPT

## 2019-07-02 PROCEDURE — 97112 NEUROMUSCULAR REEDUCATION: CPT | Performed by: OCCUPATIONAL THERAPY ASSISTANT

## 2019-07-02 PROCEDURE — 97535 SELF CARE MNGMENT TRAINING: CPT | Performed by: OCCUPATIONAL THERAPY ASSISTANT

## 2019-07-02 RX ADMIN — POLYVINYL ALCOHOL 1 DROP: 14 SOLUTION/ DROPS OPHTHALMIC at 13:57

## 2019-07-02 RX ADMIN — CLINDAMYCIN HYDROCHLORIDE 300 MG: 150 CAPSULE ORAL at 22:10

## 2019-07-02 RX ADMIN — MELATONIN 3 MG: at 22:10

## 2019-07-02 RX ADMIN — ACETAMINOPHEN 650 MG: 325 TABLET ORAL at 06:52

## 2019-07-02 RX ADMIN — DOCUSATE SODIUM 100 MG: 100 CAPSULE, LIQUID FILLED ORAL at 17:01

## 2019-07-02 RX ADMIN — FLUTICASONE FUROATE AND VILANTEROL TRIFENATATE 1 PUFF: 100; 25 POWDER RESPIRATORY (INHALATION) at 08:28

## 2019-07-02 RX ADMIN — LOSARTAN POTASSIUM 100 MG: 50 TABLET, FILM COATED ORAL at 08:24

## 2019-07-02 RX ADMIN — VITAMIN D, TAB 1000IU (100/BT) 2000 UNITS: 25 TAB at 08:25

## 2019-07-02 RX ADMIN — PANTOPRAZOLE SODIUM 40 MG: 40 TABLET, DELAYED RELEASE ORAL at 08:38

## 2019-07-02 RX ADMIN — OXYBUTYNIN CHLORIDE 5 MG: 5 TABLET, EXTENDED RELEASE ORAL at 08:24

## 2019-07-02 RX ADMIN — POLYVINYL ALCOHOL 1 DROP: 14 SOLUTION/ DROPS OPHTHALMIC at 08:28

## 2019-07-02 RX ADMIN — CLINDAMYCIN HYDROCHLORIDE 300 MG: 150 CAPSULE ORAL at 13:54

## 2019-07-02 RX ADMIN — GABAPENTIN 400 MG: 400 CAPSULE ORAL at 06:52

## 2019-07-02 RX ADMIN — CLINDAMYCIN HYDROCHLORIDE 300 MG: 150 CAPSULE ORAL at 05:51

## 2019-07-02 RX ADMIN — ACETAMINOPHEN 650 MG: 325 TABLET ORAL at 13:54

## 2019-07-02 RX ADMIN — GABAPENTIN 400 MG: 400 CAPSULE ORAL at 13:54

## 2019-07-02 RX ADMIN — DULOXETINE HYDROCHLORIDE 60 MG: 60 CAPSULE, DELAYED RELEASE ORAL at 08:24

## 2019-07-02 RX ADMIN — POLYVINYL ALCOHOL 1 DROP: 14 SOLUTION/ DROPS OPHTHALMIC at 22:12

## 2019-07-02 RX ADMIN — INSULIN LISPRO 1 UNITS: 100 INJECTION, SOLUTION INTRAVENOUS; SUBCUTANEOUS at 16:53

## 2019-07-02 RX ADMIN — PRAVASTATIN SODIUM 10 MG: 10 TABLET ORAL at 17:01

## 2019-07-02 RX ADMIN — ACETAMINOPHEN 650 MG: 325 TABLET ORAL at 22:09

## 2019-07-02 RX ADMIN — TAMSULOSIN HYDROCHLORIDE 0.4 MG: 0.4 CAPSULE ORAL at 17:01

## 2019-07-02 RX ADMIN — METFORMIN HYDROCHLORIDE 500 MG: 500 TABLET ORAL at 08:24

## 2019-07-02 RX ADMIN — HEPARIN SODIUM 5000 UNITS: 5000 INJECTION INTRAVENOUS; SUBCUTANEOUS at 22:14

## 2019-07-02 RX ADMIN — NICOTINE 21 MG: 21 PATCH, EXTENDED RELEASE TRANSDERMAL at 08:30

## 2019-07-02 RX ADMIN — TIOTROPIUM BROMIDE 18 MCG: 18 CAPSULE ORAL; RESPIRATORY (INHALATION) at 08:28

## 2019-07-02 RX ADMIN — HEPARIN SODIUM 5000 UNITS: 5000 INJECTION INTRAVENOUS; SUBCUTANEOUS at 13:54

## 2019-07-02 RX ADMIN — HEPARIN SODIUM 5000 UNITS: 5000 INJECTION INTRAVENOUS; SUBCUTANEOUS at 05:51

## 2019-07-02 RX ADMIN — GABAPENTIN 400 MG: 400 CAPSULE ORAL at 22:10

## 2019-07-02 RX ADMIN — POLYVINYL ALCOHOL 1 DROP: 14 SOLUTION/ DROPS OPHTHALMIC at 17:06

## 2019-07-02 RX ADMIN — THIAMINE HCL TAB 100 MG 50 MG: 100 TAB at 08:24

## 2019-07-02 RX ADMIN — DILTIAZEM HYDROCHLORIDE 180 MG: 180 CAPSULE, COATED, EXTENDED RELEASE ORAL at 08:29

## 2019-07-02 NOTE — SOCIAL WORK
Met with Pt and his family, who provided options for SNF  They would like referrals sent to [facility], [facility], [facility], [facility], and [facility]  Referrals sent through [facility] provided the financial form to Pts family, and asked that they complete it, in the event any of the facilities require it

## 2019-07-02 NOTE — PROGRESS NOTES
Progress Note - Neurosurgery   Keon Ashlyn During 70 y o  male MRN: [id number]  Unit/Bed#: -01 Encounter: [id number]    Assessment:  1  Status post #14, from C3-4 ACDF  2  Hx of cervical myelopathy  3  History of cervical radiculopathy  4  Severe degenerative disease of cervical spine  5  Diabetes mellitus type 2  6  Hypertension    Plan:  · Exam GCS 15, HATCH, strength BUE 4/5 2/2 to chronic weakness/arm pain, strength 5/5 BLE, sensation intact to LT/PP X 4, Reflexes 3+ and symmetric BUE, 4+ and symmetric BLE, mild taylor's on left, no drift bilaterally  · Anterior cervical incision with steri-strips over incision, CDI  No signs of infection  · Continue regular neurologic checks  · Pain control per primary team   · Eval and mobilize per PT/OT  · DVT PPX:SCDs, Hepain  · Ongoing medical management per primary team    · Pt to continue to wear Cervical brace at all times  For showers switch to [place] collar  · Given that pt is 2 weeks post op can consider restarting ASA 81 mg     · Discussed with pt that he should be okay to have a regular shower but still monitor the incision to keep it CDI  Discussed with pt his future follow up appointment with neurosurgery  · Neurosurgery will see pt as needed during the remainder of his hospitalization  Pt has a scheduled follow up appointment with neurosurgery on July 31st 2019  Please call with any questions or concerns  Subjective/Objective   Chief Complaint:  "My hands are still a bit weak"/ 2  week postop visit s/p ACDF C3-C4     Subjective: Pt reports he has been progressing well post op thought he continues to have chronic neuropathy in bilateral hands and legs  He has not noted any significant changes in the numbness or tingling in his arms  He reports the pain in his arms is slightly decreased from prior  Pt denies having neck pain at this time  He reports the neck pain has decreased over the last 2 weeks   He also reports he has been compliant with wearing his cervical brace  Objective: Alert and awake, sitting in chair, NAD  I/O       06/30 0701 - 07/01 0700 07/01 0701 - 07/02 0700 07/02 0701 - 07/03 0700    P  O  600 480 360    Total Intake(mL/kg) 600 (9 5) 480 (7 6) 360 (5 7)    Urine (mL/kg/hr) 750 (0 5) 1500 (1) 400 (0 9)    Stool 0      Total Output 750 1500 400    Net -150 -1020 -40           Unmeasured Urine Occurrence 2 x 2 x     Unmeasured Stool Occurrence 1 x            Invasive Devices     None                 Physical Exam:  Vitals: Blood pressure 123/72, pulse 82, temperature 98 4 °F (36 9 °C), temperature source Oral, resp  rate 18, height 5' 9" (1 753 m), weight 63 2 kg (139 lb 5 3 oz), SpO2 99 %  ,Body mass index is 20 58 kg/m²  General appearance: alert, appears stated age, cooperative and no distress  Head: Normocephalic, without obvious abnormality, atraumatic  Eyes: EOMI, PERRL  Neck: supple, symmetrical, trachea midline, cervical brace in place  Lungs: non labored breathing  Heart: regular heart rate  Neurologic:   Mental status: Alert, oriented, thought content appropriate  Cranial nerves: grossly intact (Cranial nerves II-XII)  GCS 15, HATCH, strength BUE 4/5 2/2 to chronic weakness/arm pain, strength 5/5 BLE, sensation intact to LT/PP X 4, Reflexes 3+ and symmetric BUE, 4+ and symmetric BLE, mild taylor's on left, no drift bilaterally    -Anterior cervical incision with steri-strips over incision, CDI  No signs of infection       Lab Results:  Results from last 7 days   Lab Units 07/01/19  0948 06/27/19  0629   WBC Thousand/uL 4 40 4 01*   HEMOGLOBIN g/dL 14 1 13 1   HEMATOCRIT % 43 7 40 7   PLATELETS Thousands/uL 189 171   NEUTROS PCT % 52 40*   MONOS PCT % 8 10     Results from last 7 days   Lab Units 07/01/19  0611 06/27/19  0629   POTASSIUM mmol/L 4 3 4 3   CHLORIDE mmol/L 103 105   CO2 mmol/L 31 32   BUN mg/dL 19 20   CREATININE mg/dL 0 80 0 82   CALCIUM mg/dL 10 1 10 6*                     EKG, Pathology, and Other Studies: I have personally reviewed pertinent reports  VTE Pharmacologic Prophylaxis: Heparin    VTE Mechanical Prophylaxis: sequential compression device    PLEASE NOTE:  This encounter may have been completed utilizing the [employer]/W[employer] Direct Speech Voice Recognition Software  Grammatical errors, random word insertions, pronoun errors and incomplete sentences are occasional consequences of the system due to software limitations, ambient noise and hardware issues  These may be missed by proof reading prior to affixing electronic signature  Please do not hesitate to contact me directly if you have any questions or concerns about the content, text or information contained within the body of this dictation

## 2019-07-02 NOTE — PROGRESS NOTES
Physical Medicine and Rehabilitation Progress Note  Bernie Arenas During 70 y o  male MRN: [id number]  Unit/Bed#: [facility] 970-01 Encounter: [id number]    Chief Complaints:  Decline in mobility    Subjective/Interval Events:   Extended discussion held today with patient, patient's family,  regarding patient's overall condition, medical management, rehabilitation management, and disposition  Patient reports no new complaints reporting adequately controlled arm pain and neck pain  With still further improvements in sleep  Patient reports still some imbalance and incoordination with needing further help with therapist   Patient denies lightheadedness, shortness of breath, abdominal pain, calf pain, worsening strength or sensation, or other complaints  ROS: A 10-point ROS was performed  Negative except as listed above  Overall Assessment/relevant history:  82-year-old male with a past medical history of  diabetes mellitus 2, hypertension, hyperlipidemia,  active smoker,lung cancer status post left upper lobectomy,  pulmonary nodules, COPD, obstructive sleep apnea,  only grade 1 diastolic dysfunction left ventricle, BPH who developed progressive 4 limb weakness, sensory changes, and gait imbalance 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 who underwent C3-C4 anterior cervical decompression and fusion with  C4 hemicorpectomy by Dr David Ibarra on 6/18/19  Postoperative course notable for pain, hypertension, with significant decline ADLs and ambulation    Patient was evaluated by skilled therapies and is appropriate for admission to acute rehabilitation center      Functional status on admission to [facility]:  OT:  Bathing, upper body dressing, toileting, toilet transfers moderate assist, eating, grooming, lower body dressing, general transfers min assist  PT:  Ambulation min assist 100 feet but scored as total assist because of 2nd person chair follow    Functional status (recent):    Transfers, ambulation, stairs min assist    Functional status goal:  Modified independent for ADLs and ambulation     * Cervical myelopathy (HCC)  Assessment & Plan  Slow improvements ongoing with patient expected needed additional skilled therapies and recovery time after acute rehab setting prior to discharging home where he is alone most of the time  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 and fusion with C4 hemicorpectomy by Dr David Ibarra on 6/18/19  Clindamycin for infectious prophylaxis per Neurosurgery  Neurosurgery clear patient to resume aspirin if he needs it  Patient states he was on aspirin chronically for prevention; recommend discussing with Medicine to risk stratification of patient in context of newer studies; patient denies history of heart attack, stroke, stenting; certainly follow up with his outpatient PCP after discharge  Postop day 14 is Tuesday July 2nd > neurosurgery follow-up on that date with removal of staples  Cervical spine precautions, cervical collar on at all times  Monitor incision for infection or dehiscence  Recommend acute comprehensive interdisciplinary inpatient rehabilitation to include intensive skilled therapies (PT, OT) as outlined with oversight and management by rehabilitation physician as well as inpatient rehab level nursing, case management and weekly interdisciplinary team meetings  Follow-up with Neurosurgery if needed during course after discharge    Urinary incontinence  Assessment & Plan  Continues to improve  Condom catheter at night p r n    Recent urinary frequency, incontinence > possible neurogenic overactive spastic bladder  > significantly impairing sleep, function, quality of life  > U/A 6/22 non-infectious  > urology c/s appreciated > oxybutynin recently started > monitor for changes  > toileting program  PVRs, bladder scans, measure urine outputs to help determine  Continue chronic flomax for now  Monitor for infection, incontinence, retention  Follow-up with Urology after discharge    Anxiety  Assessment & Plan  Improving   Supportive counseling, psychology consult during course  Gabapentin may be helpful for this as well as pain  Optimize sleep  Counseled on and continue to encourage deep breathing/relaxation/behavioral management techniques:     Deep breathin seconds in, 5 seconds out, 5 times per hour when awake and PRN when experiencing pain or anxiety  Avoid holding breath and tightening muscles and instead breathe slowly and deeply      Insomnia  Assessment & Plan  Remains improved  Optimal management of bladder dysfunction as outlined > monitor with adjustments in pain meds/urination meds   Melatonin 3 mg q h s  Optimize pain management as outlined  Recommend appropriate sleep hygiene: patient counselled on this:   Sleep only as much as necessary to feel rested and then get up or sit up in bed, maintain regular sleep schedule (same bedtime and wake time everyday), do not force sleep, avoid caffeinated beverages after lunch, avoid alcohol at home near bedtime, do not smoke particularly in evening, do not go to bed hungry, adjust bedroom environment so you are comfortable prior to settling down for sleep, deal with concerns or worries before bedtime  Make a list of things to work on for next day so anxiety is reduced at night, exercise regularly when cleared by physician      Pain  Assessment & Plan  Remains better controlled  Somatic postsurgical, arthritic, muscle spasms, neuropathic   Continue current management for now:  Scheduled acetaminophen monitor LFTs (wnl )   Continue gabapentin to 400 mg t i d  Continue Oxycodone 5-10 mg q 4 hours p r n  Cymbalta can help as well   Hold for oversedation, AMS, or RR<12    Patient apparently was on Cymbalta in the past follow-up       Constipation  Assessment & Plan  Improved  - Colace/Senna BID, miralax daily   - PRN bowel regimen (Miralax PRN/Dulcolax supp PRN)  - monitor for signs and symptoms of obstruction/ileus > not currently; hold stimulant lax if occurs  - Limiting constipating medications if possible  - Ensure adequate hydration        At risk for venous thromboembolism (VTE)  Assessment & Plan  Heparin and SCDs    BPH (benign prostatic hyperplasia)  Assessment & Plan  Monitor for retention, incontinence, infection  Continue Flomax    SHIVANI (obstructive sleep apnea)  Assessment & Plan  CPAP    COPD (chronic obstructive pulmonary disease) ([facility])  Assessment & Plan  Monitor oxygen with and without activity  Management overall per Medicine  Encourage deep breathing and incentive spirometry  Provide albuteral neb for now with increased congestion recently off chronic meds  Spiriva, as needed albuterol  Home regimen, Advair, Spiriva, Combivent PRN  Follow-up with pulmonology after discharge    Essential hypertension  Assessment & Plan  Overall management by Medicine  Losartan 100 mg daily  Patient also on diltiazem 180 mg now > adjustments for Medicine    Hyperlipidemia associated with type 2 diabetes mellitus ([facility] )  Assessment & Plan  Resume statin at discretion Medicine    Type 2 diabetes mellitus with diabetic neuropathy, without long-term current use of insulin ([facility])  Assessment & Plan  Overall management but Medicine  Patient on combination metformin medication previously  Insulin lispro at their discretion, other insulin and orals at their discretion     Pipe smoker  Assessment & Plan  Patient started on nicotine patch per Internal Medicine    Vitamin D insufficiency  Assessment & Plan  2000 units D3 for now daily follow-up with PCP    # Skin:   - Nursing to turn patient Q2H if not adequately ambulatory, monitor for skin breakdown, rashes, and wounds if applicable     # Diet/Hydration:    Diabetic     Disposition:   Home with ELOS 3 weeks from admission     Follow-up:   PCP, PMR, NSx, Urology     CODE: Level 1: Full Code     Restrictions include:   Fall precautions ---------------------------------------------------------------------------------------------------------------------    Objective: Allergies and Medications per EMR    Physical Exam:  Vitals:    07/02/19 1301   BP: 123/72   Pulse: 82   Resp: 18   Temp: 98 4 °F (36 9 °C)   SpO2: 99%     General: Awake, alert in NAD  HENT:  MMM   Respiratory:  clear to auscultation without wheeze without significant rales or rhonchi  Cardiovascular: Regular rate and rhythm, no murmurs, rubs, or gallops  Gastrointestinal: Soft, non-tender, non-distended, normoactive bowel sounds  Genitourinary: No byrnes  SkiN/MSK/Extremities:  no calf edema, no calf tenderness to palpation  Neurologic/Psych:   MENTAL STATUS: awake, orientation intact, appropriate wakefulness, follows commands appropriately  Affect:  Euthymic  Strength stable    Physical exam performed, documentation above reviewed and updated if appropriate on relevant date of encounter:   07/02/2019    Diagnostic Studies: reviewed, no new imaging  No results found      Laboratory:    Results from last 7 days   Lab Units 07/01/19  0948 06/27/19  0629   HEMOGLOBIN g/dL 14 1 13 1   HEMATOCRIT % 43 7 40 7   WBC Thousand/uL 4 40 4 01*     Results from last 7 days   Lab Units 07/01/19  0611 06/27/19  0629   BUN mg/dL 19 20   POTASSIUM mmol/L 4 3 4 3   CHLORIDE mmol/L 103 105   CREATININE mg/dL 0 80 0 82                Patient Active Problem List   Diagnosis    Type 2 diabetes mellitus with diabetic neuropathy, without long-term current use of insulin ([facility])    Hyperlipidemia associated with type 2 diabetes mellitus ([facility] )    Essential hypertension    BPH (benign prostatic hyperplasia)    Degenerative cervical spinal stenosis    Cervical spondylosis    COPD (chronic obstructive pulmonary disease) ([facility])    Adjustment disorder with depressed mood    Progressive locomotor ataxia    Other spondylosis with myelopathy, cervical region    Quadriparesis ([facility] )    Spinal cord compression ([facility] )    Prostate cancer (HCC)    Overactive bladder    Lesion of native kidney    SHIVANI (obstructive sleep apnea)    Impaired mobility and ADLs    Cervical myelopathy (HCC)    At risk for venous thromboembolism (VTE)    Pain    Constipation    Urinary incontinence    Depression    Vitamin D insufficiency    Pipe smoker    Insomnia    Anxiety       ** Please Note: Fluency Direct voice to text software may have been used in the creation of this document  **    Total time spent: At least 35 minutes, with more than 50% spent counseling/coordinating care  Counseling includes discussion with patient and family re: progress in therapies, functional issues observed by therapy staff, and discussion with patient his/her current medical state/wellbeing  Coordination of patient's care was performed in conjunction with Internal Medicine service to monitor patient's labs, vitals, and management of their comorbidities  See above for additional details         Aris Linares MD, [facility]  Physical Medicine and Rehabilitation  Brain Injury Medicine

## 2019-07-02 NOTE — PROGRESS NOTES
Internal Medicine Progress Note  Patient: Rome Layne During  Age/sex: 70 y o  male  Medical Record #: [id number]      ASSESSMENT/PLAN: (Interval History)  Rome Layne During is seen and examined and management for following issues:    Cervical spinal stenosis/cord compression with progressive quadraparesis; s/p C3/4 ACDF with C4 hemicorpectomy, C3-4 anterior instrumentation/fusion 6/18/19 Vera Gong):  continue cervical collar; on Clindamycin for infection prophylaxis  Pain control per primary service  Had previously been on Cymbalta for neuropathic sx      DM2: at home he takes Kombiglyze XR 5-1000mg qd and no insulin  Continue Metformin 500mg daily = has good control currently  Continue DM diet and Accuchecks/QID with SSI      HTN:  at home, takes Cardizem 180mg qd, Losartan 100mg qd (was not on Norvasc he says) = continue same here; stable     COPD; hx lung cancer/VIVIANA lobectomy, pulmonary nodules/cough: follows with pulm as OP and takes Advair 250-50 one puff BID/Spiriva one puff daily and prn Combivent  On Breo as substitute for Advair, continue Spiriva and use Albuterol inhaler prn  Has intermitt cough but not bothersome  Had CT chest in April for lung nodule surveillance = for repeat 1 yr  Will watch     SHIVANI: supposed to use CPAP but has been noncompliant     HLD: at home on Pravachol     Urinary incontinence/BPH/urgency: continue Flomax, Oxybutynin ER 5mg qd; Uro is following         Subjective/ HPI:  Patients overnight issues or events were reviewed with nursing or staff during rounds or morning huddle session  No new or overnight issues  Hypokalemia resolved  DM stable, HTN: stable on current tx  Patient denies any current complaints      ROS:     GI: denies abdominal pain, change bowel habits or reflux symptoms  Neuro: Denies any headache, new vision changes, new neuropathies,new weaknesses   Respiratory: No Cough, SOB, denies wheeze  Cardiovascular: No CP, palpitations , denies perception of rapid heartbeat  : denies any new urinary burning or frequency    Review of Scheduled Meds:    Current Facility-Administered Medications:  acetaminophen 650 mg Oral TID Melvin Vargas MD   albuterol 2 puff Inhalation Q4H PRN Melvin Vargas MD   bisacodyl 10 mg Rectal Daily PRN Melvin Vargas MD   bisacodyl 10 mg Rectal Once Melvin Vargas MD   cholecalciferol 2,000 Units Oral Daily Melvin Vargas MD   clindamycin 300 mg Oral [facility] Melvin Vargas MD   diltiazem 180 mg Oral Daily SONJA Reid   docusate sodium 100 mg Oral BID Melvin Vargas MD   DULoxetine 60 mg Oral Daily Melvin Vargas MD   fluticasone-vilanterol 1 puff Inhalation Daily Melvin Vargas MD   gabapentin 400 mg Oral HS Melvin Vargas MD   gabapentin 400 mg Oral BID Melvin Vargas MD   heparin (porcine) 5,000 Units Subcutaneous [facility] Melvin Vargas MD   hydrALAZINE 25 mg Oral Q8H PRN SONJA Reid   insulin lispro 1-5 Units Subcutaneous TID [facility] Melvin Vargas MD   insulin lispro 1-5 Units Subcutaneous HS Melvin Vargas MD   lidocaine  Topical Q4H PRN Melvin Vargas MD   losartan 100 mg Oral Daily Melvin Vargas MD   melatonin 3 mg Oral HS Mery Grewal PA-C   metFORMIN 500 mg Oral Daily With Breakfast SONJA Reid   nicotine 21 mg Transdermal Daily Mery Grewal PA-C   ondansetron 4 mg Intravenous Q6H PRN Melvin Vargas MD   oxybutynin 5 mg Oral Daily SONJA Lucas   oxyCODONE 10 mg Oral Q4H PRN Melvin Vargas MD   oxyCODONE 5 mg Oral Q4H PRN Melvin Vargas MD   pantoprazole 40 mg Oral Daily Before Breakfast Melvin Vargas MD   polyethylene glycol 17 g Oral Daily PRN Melvin Vargas MD   polyethylene glycol 17 g Oral Daily Melvin Vargas MD   polyvinyl alcohol 1 drop Both Eyes 4x Daily Melvin Vargas MD   polyvinyl alcohol 1 drop Both Eyes Q1H PRN Melvin Vargas MD   pravastatin 10 mg Oral Daily With Jeremiah West MD   senna 1 tablet Oral Daily Melvin Vargas MD   tamsulosin 0 4 mg Oral After Lawson Mobley MD   thiamine 50 mg Oral Daily Spencer Baer MD   tiotropium 18 mcg Inhalation Daily Spencer Baer MD       Labs:     Results from last 7 days   Lab Units 07/01/19  0948 06/27/19  0629   WBC Thousand/uL 4 40 4 01*   HEMOGLOBIN g/dL 14 1 13 1   HEMATOCRIT % 43 7 40 7   PLATELETS Thousands/uL 189 171     Results from last 7 days   Lab Units 07/01/19  0611 06/27/19  0629   SODIUM mmol/L 136 140   POTASSIUM mmol/L 4 3 4 3   CHLORIDE mmol/L 103 105   CO2 mmol/L 31 32   BUN mg/dL 19 20   CREATININE mg/dL 0 80 0 82   CALCIUM mg/dL 10 1 10 6*                  Results from last 7 days   Lab Units 07/02/19  1049 07/02/19  0627 07/01/19  2059   POC GLUCOSE mg/dl 96 111 110       Imaging:     No orders to display       *Labs reviewed  *Radiology studies reviewed  *Medications reviewed and reconciled as needed  *Please refer to order section for additional ordered labs studies  *Case discussed with primary attending during morning huddle case rounds    Physical Examination:  Vitals:   Vitals:    07/01/19 1332 07/01/19 2000 07/01/19 2310 07/02/19 0546   BP: 133/80 125/85  147/83   BP Location: Left arm Right arm  Left arm   Pulse: 88 85  74   Resp: 18 18  18   Temp: 97 7 °F (36 5 °C) 98 8 °F (37 1 °C)  97 9 °F (36 6 °C)   TempSrc: Oral Oral  Oral   SpO2: 100% 98% 97% 98%   Weight:       Height:           General Appearance: NAD, conversive  Eyes: No icterus; conjunctiva normal  HENT: oropharynx clear; mucous membranes moist  Neck: collar on  Lungs: CTA, normal respiratory effort, no retractions, expiratory effort normal  CV: regular rate, no rubs/murmurs/gallops  ABD: soft; ND/NT; +BS  EXT: no edema  Skin: normal turgor, normal texture, no rashes  Psych: affect normal, No anxiety/depression   Neuro: AAOx3;  UE 4/5 R>L, LEs 4+/5 R>L          Total time spent: At least 40 minutes, with more than 50% spent counseling/coordinating care   Counseling includes discussion with patient re: progress  and discussion with patient of his/her current medical state/information  Coordination of patient's care was performed in conjunction with primary service  Time invested included review of patient's labs, vitals, and management of their comorbidities with continued monitoring  In addition, this patient was discussed with medical team including physician and advanced extenders  The care of the patient was extensively discussed and appropriate treatment plan was formulated unique for this patient  ** Please Note: Dragon 360 Dictation voice to text software may have been used in the creation of this document   **

## 2019-07-02 NOTE — PROGRESS NOTES
Occupational Therapy Treatment Note:       07/02/19 1230   Pain Assessment   Pain Assessment No/denies pain   Pain Score No Pain   Restrictions/Precautions   Precautions Bed/chair alarms;Cognitive; Fall Risk;Spinal precautions;Supervision on toilet/commode   Weight Bearing Restrictions No   ROM Restrictions Yes  (Cervical spinal precautions  )   Braces or Orthoses C/S Collar   Grooming   Able To Initiate Tasks; Wash/Dry Hands   Limitation Noted In Coordination;Problem Solving; Safety;Strength   Findings Pt completed hand washing post toieting routine while in stance at sink with CGA; pt refusing to use soap despite discussing hygiene concern, pt stated, "I do not need to use soap, I never do "   QI: Sit to Stand   Assistance Needed Adaptive equipment;Set-up / clean-up;Supervision   Assistance Provided by [place] No physical assistance   Comment CS using SPC with VCs for technique in order to ensure good safety  Upon preparing for stand > sit pt attempting to complete dynamic reach below the waist while in stance to place cane on floor, with handle in a position that would warrant pt to step over to access chair; discussed safety concerns and proper positioning in order to reduce fall risk  Sit to Stand CARE Score 4   QI: Chair/Bed-to-Chair Transfer   Assistance Needed Adaptive equipment;Set-up / clean-up; Verbal cues; Incidental touching   Assistance Provided by [place] Less than 25%   Comment CGA/CS using SPC with VCs for technique in order to ensure good safety  Chair/Bed-to-Chair Transfer CARE Score 3   Transfer Bed/Chair/Wheelchair   Positioning Concerns Cognition   Limitations Noted In Balance; Coordination; Endurance;Problem Solving;LE Strength   Adaptive Equipment Cane   Stand Pivot Contact Guard;Supervision   Sit to Stand Supervision  (CS)   Stand to Sit Supervision  (CS)   Findings Pt completed functional mobility from pt room to OT gym using [facility] with CGA/CS and VCs for technique in order to ensure good safety  Bed, Chair, Wheelchair Transfer (FIM) 4 - Patient requires steadying assist or light touching   QI: [address] Needed Adaptive equipment;Set-up / [place]; Incidental touching   Assistance Provided by [place] Less than 25%   Comment Pt urinating while in stance; SPC and grab bar available for steadying assist as needed  Toileting Hygiene CARE Score 3   Toileting   Able to [address] down yes, up yes  Able to Manage Clothing Closures Other  (None needed; elastic waisted pants  )   Manage Hygiene Bladder   Limitations Noted In Balance; Coordination; Safety;LE Strength   Adaptive Equipment Grab Bar; Other  (SPC)   Toileting (FIM) 4 - Patient requires steadying assist or light touching   Coordination   Dexterity Pt requesting to continue upon R handwriting skills  Trialed both brown foam built up roll and pencil gripper on standard pencil as pt c/o decreased ability to maintain grasp, in which pt had most success with pencil gripper  Initially pt engaged in task of re-writing pre-written paragraph on seperate lined sheet of paper, however letters of decreased legibility and pt demonstrating difficulty following recommendations to avoid cursive and to use larger print  Therefore down graded activity to "alphabet learning workbook" in which pt was to trace uppercase/lowercase pair of letter 2x followed by practicing writing pair I'ly using large handwriting lines for reference; VCs warranted for technique in order to improve upon legibility; pt completed letters Aa through Ee; pt with noted improvement with progression of task  Cognition   Overall Cognitive Status Impaired  (Mild impairment questionable; decreased safety awareness  )   Arousal/Participation Alert; Cooperative   Attention Attends with cues to redirect   Orientation Level Oriented X4   Memory Decreased short term memory;Decreased recall of precautions   Following Commands Follows multistep commands with increased time or repetition   Activity Tolerance   Activity Tolerance Patient tolerated treatment well   Assessment   Treatment Assessment OT tx sessions focused on transfers, standing balance, functional mobility, toileting, and handwriting skills/R hand dexterity  Refer above for details on pt performance  Pt would benefit from continued skilled OT services in order to achieve highest functional abilities  Prognosis Good   Problem List Decreased strength;Decreased endurance; Impaired balance;Decreased mobility; Decreased coordination;Decreased cognition; Impaired judgement;Decreased safety awareness;Orthopedic restrictions   Barriers to Discharge Inaccessible home environment;Decreased caregiver support   Plan   Treatment/Interventions ADL retraining;Functional transfer training; Therapeutic exercise; Endurance training;Cognitive reorientation;Patient/family training;Equipment eval/education; Compensatory technique education;OT   Progress Progressing toward goals   Recommendation   OT Discharge Recommendation Short Term Rehab   OT Therapy Minutes   OT Time In 1230   OT Time Out 1400   OT Total Time (minutes) 90   OT Mode of treatment - Individual (minutes) 90   OT Mode of treatment - Concurrent (minutes) 0   OT Mode of treatment - Group (minutes) 0   OT Mode of treatment - Co-treat (minutes) 0   OT Mode of Teatment - Total time(minutes) 90 minutes   Therapy Time missed   Time missed?  No   Anastacia Pizarro, [address]

## 2019-07-02 NOTE — PROGRESS NOTES
07/02/19 0830   Pain Assessment   Pain Assessment No/denies pain   Pain Score No Pain   Restrictions/Precautions   Precautions Fall Risk;Spinal precautions; Bed/chair alarms;Supervision on toilet/commode   Weight Bearing Restrictions No   ROM Restrictions Yes   RUE ROM Restriction   (Cervical spinal precautions )   Braces or Orthoses C/S Collar   Cognition   Arousal/Participation Alert; Cooperative   Subjective   Subjective Pt with no c/o currently  Eager to participate in therapy session  QI: Lying to Sitting on Side of Bed   Assistance Needed Supervision   Assistance Provided by [place] No physical assistance   Lying to Sitting on Side of Bed CARE Score 4   QI: Sit to Stand   Assistance Needed Supervision; Incidental touching; Adaptive equipment   Assistance Provided by [place] Less than 25%   Comment CS with RW/Cane, CGA with no AD    Sit to Stand CARE Score 3   QI: Chair/Bed-to-Chair Transfer   Assistance Needed Supervision; Incidental touching; Adaptive equipment   Assistance Provided by [place] Less than 25%   Comment CS w RW/Cane, CGA no AD    Chair/Bed-to-Chair Transfer CARE Score 3   Transfer Bed/Chair/Wheelchair   Limitations Noted In Balance; Coordination   Adaptive Equipment Rollator;Cane;None   Stand Pivot Contact Guard;Supervision   Sit to Stand Contact Guard;Supervision   Stand to Sit Contact Guard;Supervision   Supine to Sit Supervision   Car Transfer Contact Guard  (no AD )   Findings CS with RW/Cane, CGA no device  Bed, Chair, Wheelchair Transfer (FIM) 4 - Patient requires steadying assist or light touching   QI: Car Transfer   Assistance Needed Incidental touching;Verbal cues   Assistance Provided by [place] Less than 25%   Comment verbal cues for hand placement    Car Transfer CARE Score 3   QI: Walk 10 Feet   Assistance Needed Incidental touching;Supervision; Adaptive equipment   Assistance Provided by [place] Less than 25%   Comment CS with RW/Cane   CGA no device    Walk 10 Feet CARE Score 3   QI: Walk 50 Feet with Two Turns   Assistance Needed Adaptive equipment; Incidental touching   Assistance Provided by [place] Less than 25%   Comment CS with RW/Cane  CGA no device    Walk 50 Feet with Two Turns CARE Score 3   QI: Walk 150 Feet   Assistance Needed Adaptive equipment;Supervision; Incidental touching   Assistance Provided by [place] Less than 25%   Comment CS with RW/Cane  CGA no device    Walk 150 Feet CARE Score 3   Ambulation   Does the patient walk? 2  Yes   Primary Discharge Mode of Locomotion Walk   Walk Assist Level Contact Guard;Close Supervision   Gait Pattern Inconsistant Analisa; Improper weight shift;Narrow LILLIE; Trendelenburg   Assist Device Roller Walker;Cane;Other  (no AD )   Distance Walked (feet) 700 ft  (x2 with cane, 700 RW, 350x3 no AD  150x2 RW )   Limitations Noted In Balance; Coordination; Endurance   Findings Pt ambulating CS level with RW or cane and CGA with no AD  No sig LOB noted this session during ambulation over bolsters, on foam, or without AD  Walking (FIM) 4 - Patient requires steadying assist or light touching AND distance 150 feet or more, no rest   Wheelchair mobility   QI: Does the patient use a wheelchair? 0  No   QI: 1 Step (Curb)   Assistance Needed Incidental touching   Assistance Provided by [place] Less than 25%   Comment 8" step in gym with no AD    1 Step (Curb) CARE Score 3   QI: 4 Steps   Assistance Needed Adaptive equipment; Incidental touching   Assistance Provided by [place] Less than 25%   Comment trialed with SPC and single HR and Single HR only  4 Steps CARE Score 3   QI: 12 Steps   Assistance Needed Incidental touching   Assistance Provided by [place] Less than 25%   Comment trialed with SPC with single HR and single HR only      12 Steps CARE Score 3   Stairs   Type Stairs;Ramp;Curb   # of Steps 24  (12 with SPC and single HR )   Weight Bearing Precautions Fall Risk   Assist Devices Single Rail;Cane   Findings Pt CGA with single HR on L descending with reciprocal pattern and R HR ascending with reciprocal patter with no LOB noted  Trialed SPC RUE and LHR descending with non-reciprocal pattern then RHR ascending with SPC LUE CGA levelw ith no LOB noted  Stairs (FIM) 4 - Patient requires steadying assist or light touching AND patient goes up and down full flight (12- 14 stairs)   QI: Toilet Transfer   Assistance Needed Incidental touching; Adaptive equipment   Assistance Provided by [place] Less than 25%   Comment SPC from gym to bathroom near PT gym  Urinated in stance with CS no steadying assist this session  Toilet Transfer CARE Score 3   Toilet Transfer   Surface Assessed Standard Toilet   Limitations Noted In Balance; Sequencing   Adaptive Equipment Grab Bar  [facility] )   Findings used [facility] for ambulation from PT gym to bathroom by gym  used grab bar for clothing management and able to urinate in stance with CS but no steadying assit  Toilet Transfer (FIM) 5 - Patient requires supervision/monitoring   Therapeutic Interventions   Strengthening Step ups BLE on 6"  steps fwd and lat 2x15  Balance Ambulation with SPC and No AD to work balance, obstacle course stepping over bolsters and onto green and red foam to mimic varying heights and variety of surfaces  Assessment   Treatment Assessment Pt participated in skilled PT session with focus on education about spinal precautions, inc balance, LE strength, functional mobility with cane and inc righting reactions  Pt ambulated with straight cane in RUE t/o session with good safety and use of device  Pt states he has cane at home which he used prior to surgery only in the community  Pt with no sig LOB noted  Pt cont with ambulation with no AD for balance training  Pt with unsteady gait with no AD but balance is improving  Pt son to come in today at 38 for family meeting to decide SNF placement  Pt cont to become unsteady with ambulation when distracted in hallway but able to recover   plan to have OT practice with cane this afternoon and assess which AD is best  Pt cont to beenfit from skilled PT to inc overall safety and maximize functional ind  Family/Caregiver Present no   Barriers to Discharge Decreased caregiver support; Inaccessible home environment   PT Barriers   Physical Impairment Decreased strength;Decreased endurance; Impaired balance;Decreased mobility;Orthopedic restrictions   Functional Limitation Stair negotiation;Standing;Transfers; Walking   Plan   Treatment/Interventions Functional transfer training;LE strengthening/ROM; Elevations; Therapeutic exercise; Endurance training;Patient/family training;Equipment eval/education; Bed mobility;Gait training   Progress Progressing toward goals   Recommendation   Recommendation Short-term skilled PT   Equipment Recommended Other (Comment)  (TBD pending progress  )   PT Therapy Minutes   PT Time In 0830   PT Time Out 1000   PT Total Time (minutes) 90   PT Mode of treatment - Individual (minutes) 90   PT Mode of treatment - Concurrent (minutes) 0   PT Mode of treatment - Group (minutes) 0   PT Mode of treatment - Co-treat (minutes) 0   PT Mode of Teatment - Total time(minutes) 90 minutes   Therapy Time missed   Time missed?  No

## 2019-07-03 LAB
GLUCOSE SERPL-MCNC: 100 MG/DL (ref 65–140)
GLUCOSE SERPL-MCNC: 105 MG/DL (ref 65–140)
GLUCOSE SERPL-MCNC: 116 MG/DL (ref 65–140)
GLUCOSE SERPL-MCNC: 127 MG/DL (ref 65–140)

## 2019-07-03 PROCEDURE — 97112 NEUROMUSCULAR REEDUCATION: CPT

## 2019-07-03 PROCEDURE — 97110 THERAPEUTIC EXERCISES: CPT

## 2019-07-03 PROCEDURE — 94760 N-INVAS EAR/PLS OXIMETRY 1: CPT

## 2019-07-03 PROCEDURE — 97535 SELF CARE MNGMENT TRAINING: CPT

## 2019-07-03 PROCEDURE — 97530 THERAPEUTIC ACTIVITIES: CPT

## 2019-07-03 PROCEDURE — 99233 SBSQ HOSP IP/OBS HIGH 50: CPT

## 2019-07-03 PROCEDURE — 82948 REAGENT STRIP/BLOOD GLUCOSE: CPT

## 2019-07-03 PROCEDURE — 94660 CPAP INITIATION&MGMT: CPT

## 2019-07-03 RX ADMIN — ACETAMINOPHEN 650 MG: 325 TABLET ORAL at 14:20

## 2019-07-03 RX ADMIN — TIOTROPIUM BROMIDE 18 MCG: 18 CAPSULE ORAL; RESPIRATORY (INHALATION) at 08:29

## 2019-07-03 RX ADMIN — HEPARIN SODIUM 5000 UNITS: 5000 INJECTION INTRAVENOUS; SUBCUTANEOUS at 06:10

## 2019-07-03 RX ADMIN — POLYETHYLENE GLYCOL 3350 17 G: 17 POWDER, FOR SOLUTION ORAL at 08:27

## 2019-07-03 RX ADMIN — HEPARIN SODIUM 5000 UNITS: 5000 INJECTION INTRAVENOUS; SUBCUTANEOUS at 14:20

## 2019-07-03 RX ADMIN — PRAVASTATIN SODIUM 10 MG: 10 TABLET ORAL at 17:43

## 2019-07-03 RX ADMIN — CLINDAMYCIN HYDROCHLORIDE 300 MG: 150 CAPSULE ORAL at 06:09

## 2019-07-03 RX ADMIN — POLYVINYL ALCOHOL 1 DROP: 14 SOLUTION/ DROPS OPHTHALMIC at 17:43

## 2019-07-03 RX ADMIN — DULOXETINE HYDROCHLORIDE 60 MG: 60 CAPSULE, DELAYED RELEASE ORAL at 08:24

## 2019-07-03 RX ADMIN — THIAMINE HCL TAB 100 MG 50 MG: 100 TAB at 08:28

## 2019-07-03 RX ADMIN — NICOTINE 21 MG: 21 PATCH, EXTENDED RELEASE TRANSDERMAL at 08:31

## 2019-07-03 RX ADMIN — ACETAMINOPHEN 650 MG: 325 TABLET ORAL at 06:09

## 2019-07-03 RX ADMIN — HEPARIN SODIUM 5000 UNITS: 5000 INJECTION INTRAVENOUS; SUBCUTANEOUS at 22:13

## 2019-07-03 RX ADMIN — TAMSULOSIN HYDROCHLORIDE 0.4 MG: 0.4 CAPSULE ORAL at 17:43

## 2019-07-03 RX ADMIN — DOCUSATE SODIUM 100 MG: 100 CAPSULE, LIQUID FILLED ORAL at 17:43

## 2019-07-03 RX ADMIN — LOSARTAN POTASSIUM 100 MG: 50 TABLET, FILM COATED ORAL at 08:24

## 2019-07-03 RX ADMIN — FLUTICASONE FUROATE AND VILANTEROL TRIFENATATE 1 PUFF: 100; 25 POWDER RESPIRATORY (INHALATION) at 08:37

## 2019-07-03 RX ADMIN — GABAPENTIN 400 MG: 400 CAPSULE ORAL at 22:08

## 2019-07-03 RX ADMIN — GABAPENTIN 400 MG: 400 CAPSULE ORAL at 14:20

## 2019-07-03 RX ADMIN — SENNOSIDES 8.6 MG: 8.6 TABLET, FILM COATED ORAL at 08:28

## 2019-07-03 RX ADMIN — DILTIAZEM HYDROCHLORIDE 180 MG: 180 CAPSULE, COATED, EXTENDED RELEASE ORAL at 08:24

## 2019-07-03 RX ADMIN — POLYVINYL ALCOHOL 1 DROP: 14 SOLUTION/ DROPS OPHTHALMIC at 22:10

## 2019-07-03 RX ADMIN — DOCUSATE SODIUM 100 MG: 100 CAPSULE, LIQUID FILLED ORAL at 08:24

## 2019-07-03 RX ADMIN — POLYVINYL ALCOHOL 1 DROP: 14 SOLUTION/ DROPS OPHTHALMIC at 08:27

## 2019-07-03 RX ADMIN — GABAPENTIN 400 MG: 400 CAPSULE ORAL at 06:09

## 2019-07-03 RX ADMIN — METFORMIN HYDROCHLORIDE 500 MG: 500 TABLET ORAL at 08:25

## 2019-07-03 RX ADMIN — POLYVINYL ALCOHOL 1 DROP: 14 SOLUTION/ DROPS OPHTHALMIC at 14:21

## 2019-07-03 RX ADMIN — MELATONIN 3 MG: at 22:08

## 2019-07-03 RX ADMIN — PANTOPRAZOLE SODIUM 40 MG: 40 TABLET, DELAYED RELEASE ORAL at 06:09

## 2019-07-03 RX ADMIN — ACETAMINOPHEN 650 MG: 325 TABLET ORAL at 22:08

## 2019-07-03 RX ADMIN — VITAMIN D, TAB 1000IU (100/BT) 2000 UNITS: 25 TAB at 08:23

## 2019-07-03 RX ADMIN — OXYBUTYNIN CHLORIDE 5 MG: 5 TABLET, EXTENDED RELEASE ORAL at 08:30

## 2019-07-03 NOTE — PROGRESS NOTES
07/03/19 1430   Pain Assessment   Pain Assessment No/denies pain   Pain Score No Pain   Restrictions/Precautions   Precautions Bed/chair alarms; Fall Risk;Impulsive;Supervision on toilet/commode   ROM Restrictions Yes  (cervical spinal precautions )   Braces or Orthoses C/S Collar   Cognition   Arousal/Participation Alert; Cooperative   QI: Sit to Stand   Assistance Needed Incidental touching   Assistance Provided by [place] Less than 25%   Comment no AD    Sit to Stand CARE Score 3   QI: Chair/Bed-to-Chair Transfer   Assistance Needed Incidental touching   Assistance Provided by [place] Less than 25%   Comment no AD    Chair/Bed-to-Chair Transfer CARE Score 3   Transfer Bed/Chair/Wheelchair   Limitations Noted In Balance; Coordination   Adaptive Equipment None   Stand Pivot Contact Guard   Sit to Stand Contact Guard   Stand to [employer] Guard   Findings CGA no AD for STS and SPT  Bed, Chair, Wheelchair Transfer (FIM) 4 - Patient requires steadying assist or light touching   QI: Walk 10 Feet   Assistance Needed Incidental touching   Assistance Provided by [place] Less than 25%   Comment no AD    Walk 10 Feet CARE Score 3   QI: Walk 50 Feet with Two Turns   Assistance Needed Incidental touching   Assistance Provided by [place] Less than 25%   Comment no AD    Walk 50 Feet with Two Turns CARE Score 3   QI: Walk 150 Feet   Assistance Needed Incidental touching   Assistance Provided by [place] Less than 25%   Comment No AD    Walk 150 Feet CARE Score 3   Ambulation   Does the patient walk? 2  Yes   Primary Discharge Mode of Locomotion Walk   Walk Assist Level Contact Guard   Gait Pattern Inconsistant Analisa;Decreased foot clearance;Narrow LILLIE;Step through; Improper weight shift   Assist Device Other  (occ Hands on Hips )   Distance Walked (feet) 700 ft  (x2, 150x2 )   Limitations Noted In Balance; Heel Strike   Walking (FIM) 4 - Patient requires steadying assist or light touching AND distance 150 feet or more, no rest Wheelchair mobility   QI: Does the patient use a wheelchair? 0  No   QI: 1 Step (Curb)   Assistance Needed Adaptive equipment; Incidental touching   Assistance Provided by [place] Less than 25%   Comment 8in curb step in gym w RW    1 Step (Curb) CARE Score 3   Stairs   Type Curb;Ramp   # of Steps 1  (x4 )   Weight Bearing Precautions Fall Risk   Assist Devices Roller Walker   Therapeutic Interventions   Balance Cone taps BLE 2x10, Cup toe taps with side stepping down line of cups, fwd/bwd ambulation, side stepping, marching, dynamic gait with ball toss fwd/bwd  Assessment   Treatment Assessment Pt participated in skilled PT session with focus on ambulation with no AD to inc balance, balance activites, use of RW in pts room to max safety and dec fall risk  Pt educated on proper use of RW in confined space of room with toileting transfer and washing hands in bathroom  Pt with good understanding to styay within walkerr and not abandon early or reach outside LILLIE  Pt with good carry over of device management, Pt ambulated without AD with inc balance and steady gait  Cont to be CGA assist for minor LOB  Righting reactions are improving  Pt will cont to benefit from skiled pt to inc overall safety and maximize functon  Family/Caregiver Present no   Barriers to Discharge Decreased caregiver support; Inaccessible home environment   PT Barriers   Physical Impairment Decreased strength;Decreased endurance; Impaired balance;Decreased mobility; Decreased safety awareness;Orthopedic restrictions   Functional Limitation Car transfers;Stair negotiation;Standing;Transfers; Walking   Plan   Treatment/Interventions Functional transfer training;LE strengthening/ROM; Elevations; Therapeutic exercise; Endurance training;Cognitive reorientation;Patient/family training;Equipment eval/education; Bed mobility;Gait training   Progress Progressing toward goals   Recommendation   Recommendation Short-term skilled PT   PT Therapy Minutes   PT Time In 1430   PT Time Out 1530   PT Total Time (minutes) 60   PT Mode of treatment - Individual (minutes) 60   PT Mode of treatment - Concurrent (minutes) 0   PT Mode of treatment - Group (minutes) 0   PT Mode of treatment - Co-treat (minutes) 0   PT Mode of Teatment - Total time(minutes) 60 minutes   Therapy Time missed   Time missed?  No

## 2019-07-03 NOTE — PROGRESS NOTES
Physical Medicine and Rehabilitation Progress Note  Ivelisse Hooper During 70 y o  male MRN: [id number]  Unit/Bed#: [facility] 970-01 Encounter: [id number]    Chief Complaints:  Impaired dexterity    Subjective/Interval Events:   Patient reports still having impaired dexterity in his hands and some impaired incoordination of his arms with some imbalance  Patient reports these are improving steadily  Patient reports pain overall adequately controlled he is sleeping well  He denies significant bladder problems, constipation, fever, chills, sweats, nausea, calf pain, or other new complaints  ROS: A 10-point ROS was performed  Negative except as listed above  Overall Assessment/relevant history:  68-year-old male with a past medical history of  diabetes mellitus 2, hypertension, hyperlipidemia,  active smoker,lung cancer status post left upper lobectomy,  pulmonary nodules, COPD, obstructive sleep apnea,  only grade 1 diastolic dysfunction left ventricle, BPH who developed progressive 4 limb weakness, sensory changes, and gait imbalance 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 who underwent C3-C4 anterior cervical decompression and fusion with  C4 hemicorpectomy by Dr Cortez Mohs on 6/18/19  Postoperative course notable for pain, hypertension, with significant decline ADLs and ambulation    Patient was evaluated by skilled therapies and is appropriate for admission to acute rehabilitation center      Functional status on admission to [facility]:  OT:  Bathing, upper body dressing, toileting, toilet transfers moderate assist, eating, grooming, lower body dressing, general transfers min assist  PT:  Ambulation min assist 100 feet but scored as total assist because of 2nd person chair follow    Functional status (recent):    Transfers and ambulation about Min assist; toileting supervision at times    Functional status goal:  Modified independent for ADLs and ambulation     * Cervical myelopathy (HCC)  Assessment & Plan  Overall improving  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 and fusion with C4 hemicorpectomy by Dr Marleen Garcia on 6/18/19  Clindamycin for infectious prophylaxis per Neurosurgery  Neurosurgery cleared patient to resume aspirin if he needs it  Patient states he was on aspirin chronically for prevention; patient denies history of heart attack, stroke, stenting; certainly follow up with his outpatient PCP after discharge > discussed with IM team who recommends following up with PCP prior to restarting particularly with newer recommendations to hold aspirin for prevention unless patient meets specific criteria that he may not   Postop day 14 Tuesday July 2nd > neurosurgery follow-up on that date with removal of staples  Cervical spine precautions, cervical collar on at all times  Monitor incision for infection or dehiscence  Recommend acute comprehensive interdisciplinary inpatient rehabilitation to include intensive skilled therapies (PT, OT) as outlined with oversight and management by rehabilitation physician as well as inpatient rehab level nursing, case management and weekly interdisciplinary team meetings  Follow-up with Neurosurgery if needed during course after discharge    Urinary incontinence  Assessment & Plan  Improved  Condom catheter at night p r n    Recent urinary frequency, incontinence > possible neurogenic overactive spastic bladder  > significantly impairing sleep, function, quality of life  > U/A 6/22 non-infectious  > urology c/s appreciated > oxybutynin recently started > monitor for changes  > toileting program  PVRs, bladder scans, measure urine outputs to help determine  Continue chronic flomax for now  Monitor for infection, incontinence, retention  Follow-up with Urology after discharge    Anxiety  Assessment & Plan  Improving   Supportive counseling, psychology consult during course  Gabapentin may be helpful for this as well as pain  Optimize sleep  Counseled on and continue to encourage deep breathing/relaxation/behavioral management techniques:     Deep breathin seconds in, 5 seconds out, 5 times per hour when awake and PRN when experiencing pain or anxiety  Avoid holding breath and tightening muscles and instead breathe slowly and deeply      Insomnia  Assessment & Plan  Improved  Optimal management of bladder dysfunction as outlined > monitor with adjustments in pain meds/urination meds   Melatonin 3 mg q h s  Optimize pain management as outlined  Recommend appropriate sleep hygiene: patient counselled on this:   Sleep only as much as necessary to feel rested and then get up or sit up in bed, maintain regular sleep schedule (same bedtime and wake time everyday), do not force sleep, avoid caffeinated beverages after lunch, avoid alcohol at home near bedtime, do not smoke particularly in evening, do not go to bed hungry, adjust bedroom environment so you are comfortable prior to settling down for sleep, deal with concerns or worries before bedtime  Make a list of things to work on for next day so anxiety is reduced at night, exercise regularly when cleared by physician      Pain  Assessment & Plan  Continue  Somatic postsurgical, arthritic, muscle spasms, neuropathic   Continue current management for now:  Scheduled acetaminophen monitor LFTs (wnl )   Continue gabapentin to 400 mg t i d  Continue Oxycodone 5-10 mg q 4 hours p r n  Cymbalta can help as well   Hold for oversedation, AMS, or RR<12    Patient apparently was on Cymbalta in the past follow-up       Constipation  Assessment & Plan  Improved  - Colace/Senna BID, miralax daily   - PRN bowel regimen (Miralax PRN/Dulcolax supp PRN)  - monitor for signs and symptoms of obstruction/ileus > not currently; hold stimulant lax if occurs  - Limiting constipating medications if possible  - Ensure adequate hydration        At risk for venous thromboembolism (VTE)  Assessment & Plan  Heparin and SCDs    BPH (benign prostatic hyperplasia)  Assessment & Plan  Monitor for retention, incontinence, infection  Continue Flomax    SHIVANI (obstructive sleep apnea)  Assessment & Plan  CPAP    COPD (chronic obstructive pulmonary disease) ([facility])  Assessment & Plan  Monitor oxygen with and without activity  Management overall per Medicine  Encourage deep breathing and incentive spirometry  Provide albuteral neb for now with increased congestion recently off chronic meds  Spiriva, as needed albuterol  Home regimen, Advair, Spiriva, Combivent PRN  Follow-up with pulmonology after discharge    Essential hypertension  Assessment & Plan  Overall management by Medicine  Losartan 100 mg daily  Patient also on diltiazem 180 mg now > adjustments for Medicine    Hyperlipidemia associated with type 2 diabetes mellitus ([facility] )  Assessment & Plan  Resume statin at discretion Medicine    Type 2 diabetes mellitus with diabetic neuropathy, without long-term current use of insulin ([facility] )  Assessment & Plan  Overall management but Medicine  Patient on combination metformin medication previously  Insulin lispro at their discretion, other insulin and orals at their discretion     Pipe smoker  Assessment & Plan  Patient started on nicotine patch per Internal Medicine    Vitamin D insufficiency  Assessment & Plan  2000 units D3 for now daily follow-up with PCP    # Skin:   - Nursing to turn patient Q2H if not adequately ambulatory, monitor for skin breakdown, rashes, and wounds if applicable     # Diet/Hydration:    Diabetic     Disposition:   Skilled nursing facility Friday     Follow-up:   PCP, PMR, NSx, Urology     CODE: Level 1: Full Code     Restrictions include: Fall precautions     ---------------------------------------------------------------------------------------------------------------------    Objective:     Allergies and Medications per EMR    Physical Exam:  Vitals:    07/03/19 1317   BP: 127/73   Pulse: 81   Resp: 18   Temp: 98 °F (36 7 °C) SpO2: 99%     General: Awake, alert in NAD  HENT:  MMM   Respiratory:  clear to auscultation without wheeze without significant rales or rhonchi  Cardiovascular: Regular rate and rhythm, no murmurs, rubs, or gallops  Gastrointestinal: Soft, non-tender, non-distended, normoactive bowel sounds  Genitourinary: No byrnes  SkiN/MSK/Extremities:  no calf edema, no calf tenderness to palpation  Neurologic/Psych:   MENTAL STATUS: awake, cognition stable  Affect:  Positive  Strength stable    Physical exam performed, documentation above reviewed and updated if appropriate on relevant date of encounter:   07/03/2019    Diagnostic Studies: reviewed, no new imaging  No results found      Laboratory:    Results from last 7 days   Lab Units 07/01/19  0948 06/27/19  0629   HEMOGLOBIN g/dL 14 1 13 1   HEMATOCRIT % 43 7 40 7   WBC Thousand/uL 4 40 4 01*     Results from last 7 days   Lab Units 07/01/19  0611 06/27/19  0629   BUN mg/dL 19 20   POTASSIUM mmol/L 4 3 4 3   CHLORIDE mmol/L 103 105   CREATININE mg/dL 0 80 0 82                Patient Active Problem List   Diagnosis    Type 2 diabetes mellitus with diabetic neuropathy, without long-term current use of insulin ([facility])    Hyperlipidemia associated with type 2 diabetes mellitus ([address] )    Essential hypertension    BPH (benign prostatic hyperplasia)    Degenerative cervical spinal stenosis    Cervical spondylosis    COPD (chronic obstructive pulmonary disease) ([facility])    Adjustment disorder with depressed mood    Progressive locomotor ataxia    Other spondylosis with myelopathy, cervical region    Quadriparesis ([address] )    Spinal cord compression ([facility] )    Prostate cancer ([facility] )    Overactive bladder    Lesion of native kidney    SHIVANI (obstructive sleep apnea)    Impaired mobility and ADLs    Cervical myelopathy ([facility])    At risk for venous thromboembolism (VTE)    Pain    Constipation    Urinary incontinence    Depression    Vitamin D insufficiency    Pipe smoker  Insomnia    Anxiety       ** Please Note: Fluency Direct voice to text software may have been used in the creation of this document  **    Total time spent: At least 35 minutes, with more than 50% spent counseling/coordinating care  Counseling includes discussion with patient re: progress in therapies, functional issues observed by therapy staff, and discussion with patient his/her current medical state/wellbeing  Coordination of patient's care was performed in conjunction with Internal Medicine service to monitor patient's labs, vitals, and management of their comorbidities  In addition, this patient was discussed by the interdisciplinary team in weekly case conference today  The care of the patient was extensively discussed with all care providers and an appropriate rehabilitation plan was formulated unique for this patient  Barriers were identified preventing progression of therapy and appropriate interventions were discussed with each discipline  Please see the team note for input from all disciplines regarding barriers, intervention, and discharge planning           Yoli Knott MD, [facility]  Physical Medicine and Rehabilitation  Brain Injury Medicine

## 2019-07-03 NOTE — SOCIAL WORK
CM sent the financial form to [facility], as that is Pts family's first choice  Pt was also accepted to [facility], and [facility]  [facility]  Met with Pt to review team meeting  Pt is in agreement with the plan for SNF, and is open to any of the options  PCF Adeel Andrea, to learn about Pts CPAP machine (rm air 21%, 4cpap for Pt comfort), and to obtain info re: Pts family  CM provided the number to reach Pts son

## 2019-07-03 NOTE — TEAM CONFERENCE
Acute RehabilitationTeam Conference Note  Date: 7/3/2019   Time: 10:07 AM       Patient Name:  Raissa Chandler       Medical Record Number: [id number]   YOB: 1940  Sex:  Male          Room/Bed:  [facility]0/[facility]0-01  Payor Info:  Payor: MEDICARE / Plan: MEDICARE A AND B / Product Type: Medicare A & B Fee for Service /      Admitting Diagnosis: Cervical spinal cord compression ([facility] ) [G95 20]   Admit Date/Time:  6/20/2019  3:30 PM  Admission Comments: No comment available     Primary Diagnosis:  Cervical myelopathy (HCC)  Principal Problem: Cervical myelopathy ([facility] [address] )    Patient Active Problem List    Diagnosis Date Noted    Pipe smoker 06/22/2019    Insomnia 06/22/2019    Anxiety 06/22/2019    Urinary incontinence 06/21/2019    Depression 06/21/2019    Vitamin D insufficiency 06/21/2019    Impaired mobility and ADLs 06/20/2019    Cervical myelopathy ([facility] ) 06/20/2019    At risk for venous thromboembolism (VTE) 06/20/2019    Pain 06/20/2019    Constipation 06/20/2019    SHIVANI (obstructive sleep apnea) 06/07/2019    Prostate cancer ([facility] ) 05/16/2019    Overactive bladder 05/16/2019    Other spondylosis with myelopathy, cervical region 05/08/2019    Quadriparesis ([facility] ) 05/08/2019    Spinal cord compression ([facility] ) 05/08/2019    Lesion of native kidney 04/17/2019    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    Type 2 diabetes mellitus with diabetic neuropathy, without long-term current use of insulin ([facility] ) 02/27/2019    Hyperlipidemia associated with type 2 diabetes mellitus ([facility] ) 02/27/2019    Essential hypertension 02/27/2019    BPH (benign prostatic hyperplasia) 02/27/2019    Cervical spondylosis 02/27/2019    COPD (chronic obstructive pulmonary disease) ([address] ) 02/27/2019    Adjustment disorder with depressed mood 02/27/2019    Degenerative cervical spinal stenosis 02/27/2017       Physical Therapy:    Weight Bearing Status: Full Weight Bearing  Transfers: Contact Guard, Supervision  Bed Mobility: Supervision  Amulation Distance (ft): 700 feet  Ambulation: Contact Guard, Supervision  Assistive Device for Ambulation: Roller Walker  Number of Stairs: 24  Assistive Device for Stairs: Lehft Hand Rail  Stair Assistance: Minimal Assistance  Ramp: Supervision  Assistive Device for Ramp: Roller Walker  Discharge Recommendations: [facility] with[de-identified] Family Support, Home Physical Therapy    7/2/2019: Jeanette Herrera cont to make progress in physical therapy  Balance and righting reactions are improving  Pt is ambulating  level with assistive device (RW and with SPC this morning)  Pt completing 24 stairs CGA level with LHR decending and RHR ascending with reciprocal pattern  Pt SPT and STS transfer at CGA/CS level with occ steadying assist  Pt limitations cont to be dec balance and righting reactions  Activity tolerance has increased  Pt barriers to home include: inaccessable home environment with 6STE single HR (BHR but unable to reach both at same time) and decreased caregiver support  Pt lives with wife but they have had "complications" so she is only home on weekends  She works and lives in [place] during the week  Pt enters home with living room, kitchen and dining room on first level  5 stairs up to bathroom and bedrooms  Spends most of his time on ground level and will have to negotiate 5 stairs up to bathroom, 9 stairs down to basement where laundry is  SHR in home LHR descending, RHR ascending  Pt son in today 7/2/19 for family meeting to discuss SNF placement  6/26/2019: Jeanette Herrera has made good progress since starting physical therapy  He is ambulating at [facility] level with RW and is CGA for SPT transfer with RW  Pt is eager to engage in therapy sessions and requests extra time if able  Pt limitations include dec balance and righting reactions, dec strength in LE, and dec activity tolerance  Cont to address limitations prior to d/c   Pt barriers to d/c include: dec caregiver support and inaccessible home environemt  Pt looking for HHA to come in 2 days a week for a few hours to help with [facility] chores and bathing  CM gave information to pt and informed that HHA will not be covered by insurance  Pt stated his son lives close but works in [place] [facility] [place] so he is not available at all times  Pt also spoke of daughter who works but occ when she gets a day off will come to pt's house and sometimes spend the night  Pt will be alone as wife works in [place] during the week  Pt is progressing and will cont to benefit from PT to address physical impairments  Occupational Therapy:  Eating: Modified Independent  Grooming: Contact Guard  Bathing: Contact Guard  Bathing: Contact Guard  Upper Body Dressing: Contact Guard  Lower Body Dressing: Contact Guard  Toileting: Contact Guard  Tub/Shower Transfer: Contact Guard  Toilet Transfer: Contact Guard  Cognition: Within Defined Limits  Cognition: Decreased Memory, Decreased Safety  Orientation: Person, Place, Time, Situation  Discharge Recommendations: Other(SNF rehab)       Pt is making fair progress with occupational therapy  Pt continues with poor BUE sensation, BUE ataxia, and impaired safety awareness and memory impacting ability to engage in ADL and IADL tasks  Pt continues to demonstrate need for assistance with c/s collar management  Pt unable to maintain cervical precautions while transferring into shower collar or transferring pads  Pt remains limited with functional transfers to [facility]  Pt must be mod I for d/c home as he has limited friend support and will be alone with occasional assistance for transport only  Pt is currently unable to d/c home safely  Pt would benefit from continued subacute rehab to address barriers and maximize independence  Speech Therapy:           No notes on file    Nursing Notes:  Appetite: Good  Diet Type: Diabetic                      Diet Patient/Family Education Complete: Yes    Type of Wound (LDA):  Wound(incision) Type of Wound Patient/Family Education: Yes  Bladder: 1 - Total Assistance(condom cath @ hs)     Bladder Patient/Family Education: Yes  Bowel: 6 - Modified [place]     Bowel Patient/Family Education: Yes  Pain Location: Neck  Pain Orientation: Left  Pain Score: 0                       Hospital Pain Intervention(s): Repositioned, Ambulation/increased activity  Pain Patient/Family Education: Yes  Medication Management/Safety  Injectable: Insulin  Safe Administration: Yes  Medication Patient/Family Education Complete: Yes    Patient with Hx: Cervical spinal stenosis/cord compression with progressive quadraparesis; s/p C3/4 ACDF with C4 hemicorpectomy, C3-4 anterior instrumentation/fusion 6/18/19 [facility])  Has [place] collar on at all times  Hx:  DM, on Metformin 500mg daily  Continue DM diet and Accuchecks/QID with SSI  Patient also with hx: COPD; hx lung cancer/VIVIANA lobectomy, pulmonary nodules, follows with pulm as OP and takes Advair 250-50 one puff BID/Spiriva one puff daily and prn Combivent  Hx: Urinary incontinence/BPH/urgency, taking Flomax  Pt prefers to use condom cath at night- as frequent voiding keeps him awake  This week we will continue to monitor labs and vitals signs  We will monitor incision for s/s of infection  We will continue pain management and safety precautions to keep patient free from falls         Case Management:     Discharge Planning  Goal Length of Stay: 7  Living Arrangements: Spouse/significant other, Children  Support Systems: Spouse/significant other, Children, Friends/neighbors, Family members  Assistance Needed: PT/OT/ST  Type of Current Residence: Private residence  [address]: No  Pt is participating well with therapy and continues to make gains  Pt and his family agree that a skilled nursing facility is necessary, and referrals have been made per their wishes  Following to assist with d/c planning needs        Is the patient actively participating in therapies? yes  List any modifications to the treatment plan:     Barriers Interventions   Balance/righting reactions Therapy exercises/redirection   Decreased caregiver support Family training/skilled nursing facility   Cervical precautions Collar management             Is the patient making expected progress toward goals? yes  List any update or changes to goals:     Medical Goals: Patient will be medically stable for discharge to [facility] [place] upon completion of rehab program and Patient will be able to manage medical conditions and comorbid conditions with medications and follow up upon completion of rehab program    Weekly Team Goals:   Rehab Team Goals  Bowel/Bladder Team Goal: Patient will be independent with bladder/bowel management with least restrictive device upon completion of rehab program  Transfer Team Goal: Patient will be independent with transfers with least restrictive device upon completion of rehab program  Locomotion Team Goal: Patient will be independent with locomotion with least restrictive device upon completion of rehab program    Discussion: Pt presents with the above barriers  Pt is functioning at cg for ADLs, and cg/supervision for transfers/bed mobility  Pt utilizes a RW for ambulation, at cg/supervision  Pt and his family have chosen SNFs, and referrals have been sent  Following to assist with d/c planning needs  Anticipated Discharge Date:  7 6 19  [facility] Team Members Present: The following team members are supervising care for this patient and were present during this Weekly Team Conference      Physician: Dr Wallace Duque MD  : VITALY Robins/ CRISTIANAW  Registered Nurse: Haseeb Hensley RN   Physical Therapist: ALLISON FoxT  Occupational Therapist: Bibiana Amador MS, OTR/L  Speech Therapist:   Other: Johanna Crum, RN, BSN  [address] [place] [address] and Hospice

## 2019-07-03 NOTE — PROGRESS NOTES
07/03/19 0830   Pain Assessment   Pain Assessment No/denies pain   Pain Score No Pain   Restrictions/Precautions   Precautions Bed/chair alarms; Fall Risk;Spinal precautions;Supervision on toilet/commode   Weight Bearing Restrictions No   ROM Restrictions Yes  (Cervical spine precautions )   Cognition   Arousal/Participation Alert; Cooperative   Subjective   Subjective Pt with no c/o  Agreeabel to session  QI: Sit to Stand   Assistance Needed Adaptive equipment;Supervision;Verbal cues   Assistance Provided by [place] No physical assistance   Comment Cues to reach for chair when sitting   Sit to Stand CARE Score 4   QI: Chair/Bed-to-Chair Transfer   Assistance Needed Adaptive equipment; Incidental touching   Assistance Provided by [place] Less than 25%   Chair/Bed-to-Chair Transfer CARE Score 3   Transfer Bed/Chair/Wheelchair   Limitations Noted In Balance; Coordination   Adaptive Equipment Roller Walker;None   Stand Pivot Contact Guard   Sit to Stand Supervision   Stand to W  R  Farhana, Chair, Wheelchair Transfer (FIM) 4 - Patient requires steadying assist or light touching   QI: Walk 10 Feet   Assistance Needed Adaptive equipment;Supervision   Assistance Provided by [place] Less than 25%   Comment RW    Walk 10 Feet CARE Score 3   QI: Walk 50 Feet with Two Turns   Assistance Needed Adaptive equipment;Supervision   Assistance Provided by [place] No physical assistance   Comment RW CS level    Walk 50 Feet with Two Turns CARE Score 4   QI: Walk 150 Feet   Assistance Needed Adaptive equipment;Supervision; Incidental touching   Assistance Provided by [place] Less than 25%   Comment RW with CS/Occ CGA longer distances on turns or with distractions in hallway  Walk 150 Feet CARE Score 3   Ambulation   Does the patient walk? 2   Yes   Primary Discharge Mode of Locomotion Walk   Walk Assist Level Contact Guard;Close Supervision   Gait Pattern Inconsistant Analisa;Decreased foot clearance;Narrow LILLIE;Step through; Improper weight shift   Assist Device Hamer Renee Kent Walked (feet) 700 ft  (x1 RW, 700 no device, 150x2 RW to and from room )   Limitations Noted In Balance; Coordination; Heel Strike   Findings Pt ambulating CS level with RW with occ CGA to steady during longer distances or when distracted in hallway  Walking (FIM) 4 - Patient requires steadying assist or light touching AND distance 150 feet or more, no rest   Wheelchair mobility   QI: Does the patient use a wheelchair? 0  No   QI: 4 Steps   Assistance Needed Adaptive equipment;Supervision   Assistance Provided by [place] No physical assistance   Comment Reciprocal LHR descending/RHR ascending  4 Steps CARE Score 4   QI: 12 Steps   Assistance Needed Adaptive equipment;Supervision   Assistance Provided by [place] No physical assistance   Comment Reciprocal LHR descending/RHR ascending  12 Steps CARE Score 4   Stairs   Type Stairs   # of Steps 24   Weight Bearing Precautions Fall Risk   Assist Devices Single Rail   Findings Pt negotiated 2FF stairs with no rest break in between and CS level with good safety and safe, controleld pacing  Stairs (FIM) 5 - Patient requires supervision/monitoring AND goes up and down full flight (12- 14 stairs)   QI: Toilet Transfer   Assistance Needed Adaptive equipment; Incidental touching   Assistance Provided by [place] Less than 25%   Comment occ steadying during clothing management duirng standing to urinate  Toilet Transfer CARE Score 3   Toilet Transfer   Surface Assessed Standard Toilet   Limitations Noted In [address]  (RW )   Findings Pt stood to urinate stating he likes to stand because he feels he can empty his bladder more  Occ steadying assist during clothing management and hand washing      Toilet Transfer (FIM) 4 - Patient requires steadying assist or light touching   Therapeutic Interventions   Balance Ambulation without  ft, ambulation weaving through cones in hallway no device  Inc dyncamic gait balnce this session  Assessment   Treatment Assessment Pt participated in skilled PT session with focus on functional mobility and inc balance  Pt with inc safety and steadiness ambulating with RW this session  Cues needed to reach for chair duing transfers with RW  Pt cont to Leave walker early at times in bathrrom while washing hands and toileting tasks with cues to redirect  Increased safety with stair negotiation perfroming CS level with no LOB noted  Pt is progressing and cont to beenfit from skilled PT to inc balance and righting reactions to dec fall risk  Family/Caregiver Present no   Barriers to Discharge Inaccessible home environment;Decreased caregiver support   PT Barriers   Physical Impairment Decreased strength;Decreased endurance; Impaired balance;Decreased mobility; Decreased coordination;Decreased safety awareness;Orthopedic restrictions   Functional Limitation Standing;Walking;Transfers;Stair negotiation   Plan   Treatment/Interventions Functional transfer training;LE strengthening/ROM; Elevations; Therapeutic exercise; Endurance training;Equipment eval/education;Patient/family training;Gait training   Progress Progressing toward goals   Recommendation   Recommendation Short-term skilled PT   Equipment Recommended Walker   PT Therapy Minutes   PT Time In 0830   PT Time Out 0900   PT Total Time (minutes) 30   PT Mode of treatment - Individual (minutes) 30   PT Mode of treatment - Concurrent (minutes) 0   PT Mode of treatment - Group (minutes) 0   PT Mode of treatment - Co-treat (minutes) 0   PT Mode of Teatment - Total time(minutes) 30 minutes   Therapy Time missed   Time missed?  No

## 2019-07-03 NOTE — PROGRESS NOTES
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      DM2: at home he takes Kombiglyze XR 5-1000mg qd and no insulin  Continue Metformin 500mg daily = has good control currently  Continue DM diet and Accuchecks/QID with SSI      HTN:  at home, takes Cardizem 180mg qd, Losartan 100mg qd (was not on Norvasc he says) = continue same here; stable     COPD; hx lung cancer/VIVIANA lobectomy, pulmonary nodules/cough: follows with pulm as OP and takes Advair 250-50 one puff BID/Spiriva one puff daily and prn Combivent  On Breo as substitute for Advair, continue Spiriva and use Albuterol inhaler prn  Has intermitt cough but not bothersome  Had CT chest in April for lung nodule surveillance = for repeat 1 yr  Will watch     SHIVANI: supposed to use CPAP but has been noncompliant     HLD: at home on Pravachol     Urinary incontinence/BPH/urgency: continue Flomax, Oxybutynin ER 5mg qd; Uro is following         Subjective/ HPI:  Patients overnight issues or events were reviewed with nursing or staff during rounds or morning huddle session  No new or overnight issues  Hypokalemia resolved  DM stable, HTN: stable on current tx  Patient denies any current complaints      ROS:     GI: denies abdominal pain, change bowel habits or reflux symptoms  Neuro: Denies any headache, new vision changes, new neuropathies,new weaknesses   Respiratory: No Cough, SOB, denies wheeze  Cardiovascular: No CP, palpitations , denies perception of rapid heartbeat  : denies any new urinary burning or frequency    Review of Scheduled Meds:    Current Facility-Administered Medications:  acetaminophen 650 mg Oral TID Mounika Juares MD   albuterol 2 puff Inhalation Q4H PRN Mounika Juares MD   bisacodyl 10 mg Rectal Daily PRN Mounika Juares MD   bisacodyl 10 mg Rectal Once Mounika Juares MD   cholecalciferol 2,000 Units Oral Daily Mounika Juares MD   diltiazem 180 mg Oral Daily Vianey Hernandez, SONJA   docusate sodium 100 mg Oral BID Mounika Juares MD   DULoxetine 60 mg Oral Daily Mounika Juares MD   fluticasone-vilanterol 1 puff Inhalation Daily Mounika Juares MD   gabapentin 400 mg Oral HS Mounika Juares MD   gabapentin 400 mg Oral BID Mounika Juares MD   heparin (porcine) 5,000 Units Subcutaneous [facility] Mounika Juares MD   hydrALAZINE 25 mg Oral Q8H PRN Vianey Hernandez, SONJA   insulin lispro 1-5 Units Subcutaneous TID [facility] Mounika Juares MD   insulin lispro 1-5 Units Subcutaneous HS Mounika Juares MD   lidocaine  Topical Q4H PRN Mounika Juares MD   losartan 100 mg Oral Daily Mounika Juares MD   melatonin 3 mg Oral HS Mery Grewal PA-C   metFORMIN 500 mg Oral Daily With Breakfast SONJA Bonilla   nicotine 21 mg Transdermal Daily Mery Grewal PA-C   ondansetron 4 mg Intravenous Q6H PRN Mounika Juares MD   oxybutynin 5 mg Oral Daily Loren Part, CRNP   oxyCODONE 10 mg Oral Q4H PRN Mounika Juares MD   oxyCODONE 5 mg Oral Q4H PRN Mounika Juares MD   pantoprazole 40 mg Oral Daily Before Breakfast Mounika Juares MD   polyethylene glycol 17 g Oral Daily PRN Mounika Juares MD   polyethylene glycol 17 g Oral Daily Mounika Juares MD   polyvinyl alcohol 1 drop Both Eyes 4x Daily Mounika Juares MD   polyvinyl alcohol 1 drop Both Eyes Q1H PRN Mounika Juares MD   pravastatin 10 mg Oral Daily With Monet Tao MD   senna 1 tablet Oral Daily Mounika Juares MD   tamsulosin 0 4 mg Oral After Flaco Lopez MD   thiamine 50 mg Oral Daily Mitchell Blanco MD   tiotropium 18 mcg Inhalation Daily Mitchell Blanco MD       Labs:     Results from last 7 days   Lab Units 07/01/19  0948 06/27/19  0629   WBC Thousand/uL 4 40 4 01*   HEMOGLOBIN g/dL 14 1 13 1   HEMATOCRIT % 43 7 40 7   PLATELETS Thousands/uL 189 171     Results from last 7 days   Lab Units 07/01/19  0611 06/27/19  0629   SODIUM mmol/L 136 140   POTASSIUM mmol/L 4 3 4 3   CHLORIDE mmol/L 103 105   CO2 mmol/L 31 32   BUN mg/dL 19 20   CREATININE mg/dL 0 80 0 82   CALCIUM mg/dL 10 1 10 6*                  Results from last 7 days   Lab Units 07/03/19  0637 07/02/19  2043 07/02/19  1616   POC GLUCOSE mg/dl 116 127 165*       Imaging:     No orders to display       *Labs reviewed  *Radiology studies reviewed  *Medications reviewed and reconciled as needed  *Please refer to order section for additional ordered labs studies  *Case discussed with primary attending during morning huddle case rounds    Physical Examination:  Vitals:   Vitals:    07/02/19 2022 07/02/19 2249 07/03/19 0558 07/03/19 0900   BP: 115/76  137/78 123/74   BP Location: Right arm  Left arm Right arm   Pulse: 79  78 78   Resp: 18  18 18   Temp: 98 2 °F (36 8 °C)  98 °F (36 7 °C) 98 5 °F (36 9 °C)   TempSrc: Oral  Oral Oral   SpO2: 99% 98% 100% 99%   Weight:   62 kg (136 lb 11 oz)    Height:           General Appearance: NAD, conversive  Eyes: No icterus; conjunctiva normal  HENT: oropharynx clear; mucous membranes moist  Neck: collar on  Lungs: CTA, normal respiratory effort, no retractions, expiratory effort normal  CV: regular rate, no rubs/murmurs/gallops  ABD: soft; ND/NT; +BS  EXT: no edema  Skin: normal turgor, normal texture, no rashes  Psych: affect normal, No anxiety/depression   Neuro: AAOx3;  UE 4/5 R>L, LEs 4+/5 R>L          Total time spent: At least 40 minutes, with more than 50% spent counseling/coordinating care   Counseling includes discussion with patient re: progress  and discussion with patient of his/her current medical state/information  Coordination of patient's care was performed in conjunction with primary service  Time invested included review of patient's labs, vitals, and management of their comorbidities with continued monitoring  In addition, this patient was discussed with medical team including physician and advanced extenders  The care of the patient was extensively discussed and appropriate treatment plan was formulated unique for this patient  ** Please Note: Dragon 360 Dictation voice to text software may have been used in the creation of this document   **

## 2019-07-03 NOTE — PROGRESS NOTES
07/03/19 1000   Pain Assessment   Pain Assessment No/denies pain   Pain Score No Pain   Restrictions/Precautions   Precautions Bed/chair alarms; Fall Risk;Impulsive;Supervision on toilet/commode;Spinal precautions   Weight Bearing Restrictions No   ROM Restrictions   (cervical spinal precautions)   Braces or Orthoses C/S Collar   QI: Eating   Assistance Needed Supervision   Assistance Provided by [place] No physical assistance   Comment ext time   Eating CARE Score 4   Eating Assessment   Findings With ext time, pt able to open all packagings  Eating (FIM) 5 - Patient requires supervision, cueing or coaxing   QI: Sit to Stand   Assistance Needed Adaptive equipment; Incidental touching   Assistance Provided by [place] No physical assistance   Comment RW   Sit to Stand CARE Score 4   QI: Chair/Bed-to-Chair Transfer   Assistance Needed Physical assistance   Assistance Provided by [place] Less than 25%   Comment steadying A during pivot to chair   Chair/Bed-to-Chair Transfer CARE Score 3   Transfer Bed/Chair/Wheelchair   Positioning Concerns Cognition   Limitations Noted In Endurance;Balance;Problem Solving   Adaptive Equipment Roller Walker   Findings Pt cont to req cuing for safe management of RW as pt attempts to lift around obstacles instead of stepping sideways around and pt attempts to sit without bringing RW with during turn to chair  Bed, Chair, Wheelchair Transfer (FIM) 4 - Patient requires steadying assist or light touching   QI: [address] Needed Adaptive equipment;Supervision   Assistance Provided by [place] No physical assistance   Comment urination in stance with no LOB   Toileting Hygiene CARE Score 4   Toileting   Able to [address] down yes, up no     Manage Hygiene Bladder   Limitations Noted In Safety   Adaptive Equipment Grab Bar   Toileting (FIM) 5 - Patient requires supervision/monitoring   Health Management   Health Management Level of Assistance Minimum assistance   Health Management COLLAR MANAGEMENT: Pt engaged in C/S collar management this session  Pt cont to req A to peter from [place]IST[place] collar to shower collar 2* dec FMC and UE ROM limiting ability to place back part of brace  Due to dec sensation, pt unaware when he has velcro strap in grasp attempting to pull on plastic collar or therapists fingers  Pt able to indep change pads on collar with no v/c necessary and ext time 2* FM deficits  Pt discussed how to wash pads but unable to complete due to time constraints  Exercise Tools   UE Ergometer Completed 5min prograde and 5min retrograde on armbike positioned on table top to promote UE endurance and strength  Pt reports no inc pain but fatigues req rest break about [facility] through each direction  Hand Gripper utilized red digiflex to promote grasp strength and finger isolation for inc indep with grooming and feeding tasks  Pt has difficulty isolating digits 4 and 5 but reports inc strength with digits 2 and 3 with repetition  No pain with task  Coordination   Fine Motor Pt completed repetitive pincer grasp practice while picking up small glass stones and placing in container  Pt able to maintain unlar curl with digits 3-5 during task to inc stability of pincer grasp  Pt with noted improvements in coordination of pincer grasp and had no droppage of items  Pt cont to report fatigue with repetition and req visualization of objects 2* dec sensation  In Hand Manipulation Completed pipe tree task with focus on manipulation of smaller items, grasp adaptation while manipulating varying sized/shaped objects, bimanual task coordination, hand sensation, and functional cognition  Pt able to copy Fig 5 with accurate pipe size and connector shape  Pt with minimal droppage during task  Cognition   Overall Cognitive Status Impaired   Arousal/Participation Alert; Cooperative   Attention Attends with cues to redirect   Orientation Level Oriented X4   Memory Decreased short term memory;Decreased recall of precautions   Following Commands Follows multistep commands with increased time or repetition   Comments cont to have difficulty maintaining spinal precautions during task completion as he attempts to laterally rotate and flex head  Activity Tolerance   Activity Tolerance Patient tolerated treatment well   Assessment   Treatment Assessment Pt participated in skilled OT session focusing on functional transfers, endurance work, bimanual coordination, [facility], collar management and functional cognition  Pt cont to be motivated to work on endurance and balance tasks and demo improvements  Pt indep cont to be limited by spinal precaution carryover, dec FMC, sensation deficits, divided attention  Pt will req A in therapy tomorrow to manage mortgage payment with online banking  Pt would benefit from cont therapy focusing on dynamic balance, FMC, sensation compensation, collar management, and carryover with precautions during functional task completion  Cont with POC  Prognosis Good   Problem List Decreased strength;Decreased endurance; Impaired balance;Decreased mobility; Decreased coordination;Decreased cognition; Impaired judgement;Decreased safety awareness;Orthopedic restrictions   Barriers to Discharge Inaccessible home environment;Decreased caregiver support   Plan   Treatment/Interventions ADL retraining;Functional transfer training; Therapeutic exercise; Endurance training;Patient/family training;Equipment eval/education; Bed mobility   Progress Progressing toward goals   Recommendation   OT Discharge Recommendation Short Term Rehab   OT Therapy Minutes   OT Time In 1000   OT Time Out 1140   OT Total Time (minutes) 100   OT Mode of treatment - Individual (minutes) 100   OT Mode of treatment - Concurrent (minutes) 0   OT Mode of treatment - Group (minutes) 0   OT Mode of treatment - Co-treat (minutes) 0   OT Mode of Teatment - Total time(minutes) 100 minutes   Therapy Time missed   Time missed? No

## 2019-07-04 LAB
ANION GAP SERPL CALCULATED.3IONS-SCNC: 3 MMOL/L (ref 4–13)
BASOPHILS # BLD AUTO: 0.02 THOUSANDS/ΜL (ref 0–0.1)
BASOPHILS NFR BLD AUTO: 1 % (ref 0–1)
BUN SERPL-MCNC: 19 MG/DL (ref 5–25)
CALCIUM SERPL-MCNC: 10.4 MG/DL (ref 8.3–10.1)
CHLORIDE SERPL-SCNC: 104 MMOL/L (ref 100–108)
CO2 SERPL-SCNC: 32 MMOL/L (ref 21–32)
CREAT SERPL-MCNC: 0.76 MG/DL (ref 0.6–1.3)
EOSINOPHIL # BLD AUTO: 0.13 THOUSAND/ΜL (ref 0–0.61)
EOSINOPHIL NFR BLD AUTO: 3 % (ref 0–6)
ERYTHROCYTE [DISTWIDTH] IN BLOOD BY AUTOMATED COUNT: 15.1 % (ref 11.6–15.1)
GFR SERPL CREATININE-BSD FRML MDRD: 102 ML/MIN/1.73SQ M
GLUCOSE P FAST SERPL-MCNC: 102 MG/DL (ref 65–99)
GLUCOSE SERPL-MCNC: 102 MG/DL (ref 65–140)
GLUCOSE SERPL-MCNC: 109 MG/DL (ref 65–140)
GLUCOSE SERPL-MCNC: 120 MG/DL (ref 65–140)
GLUCOSE SERPL-MCNC: 128 MG/DL (ref 65–140)
GLUCOSE SERPL-MCNC: 97 MG/DL (ref 65–140)
HCT VFR BLD AUTO: 41.9 % (ref 36.5–49.3)
HGB BLD-MCNC: 13.6 G/DL (ref 12–17)
IMM GRANULOCYTES # BLD AUTO: 0.01 THOUSAND/UL (ref 0–0.2)
IMM GRANULOCYTES NFR BLD AUTO: 0 % (ref 0–2)
LYMPHOCYTES # BLD AUTO: 1.63 THOUSANDS/ΜL (ref 0.6–4.47)
LYMPHOCYTES NFR BLD AUTO: 39 % (ref 14–44)
MCH RBC QN AUTO: 27.6 PG (ref 26.8–34.3)
MCHC RBC AUTO-ENTMCNC: 32.5 G/DL (ref 31.4–37.4)
MCV RBC AUTO: 85 FL (ref 82–98)
MONOCYTES # BLD AUTO: 0.37 THOUSAND/ΜL (ref 0.17–1.22)
MONOCYTES NFR BLD AUTO: 9 % (ref 4–12)
NEUTROPHILS # BLD AUTO: 2.03 THOUSANDS/ΜL (ref 1.85–7.62)
NEUTS SEG NFR BLD AUTO: 48 % (ref 43–75)
NRBC BLD AUTO-RTO: 0 /100 WBCS
PLATELET # BLD AUTO: 176 THOUSANDS/UL (ref 149–390)
PMV BLD AUTO: 10.5 FL (ref 8.9–12.7)
POTASSIUM SERPL-SCNC: 4.3 MMOL/L (ref 3.5–5.3)
RBC # BLD AUTO: 4.92 MILLION/UL (ref 3.88–5.62)
SODIUM SERPL-SCNC: 139 MMOL/L (ref 136–145)
WBC # BLD AUTO: 4.19 THOUSAND/UL (ref 4.31–10.16)

## 2019-07-04 PROCEDURE — 97535 SELF CARE MNGMENT TRAINING: CPT

## 2019-07-04 PROCEDURE — 82948 REAGENT STRIP/BLOOD GLUCOSE: CPT

## 2019-07-04 PROCEDURE — 97530 THERAPEUTIC ACTIVITIES: CPT

## 2019-07-04 PROCEDURE — 85025 COMPLETE CBC W/AUTO DIFF WBC: CPT | Performed by: INTERNAL MEDICINE

## 2019-07-04 PROCEDURE — 97110 THERAPEUTIC EXERCISES: CPT

## 2019-07-04 PROCEDURE — 94660 CPAP INITIATION&MGMT: CPT

## 2019-07-04 PROCEDURE — 97112 NEUROMUSCULAR REEDUCATION: CPT

## 2019-07-04 PROCEDURE — 97116 GAIT TRAINING THERAPY: CPT

## 2019-07-04 PROCEDURE — 80048 BASIC METABOLIC PNL TOTAL CA: CPT | Performed by: INTERNAL MEDICINE

## 2019-07-04 RX ADMIN — MELATONIN 3 MG: at 21:09

## 2019-07-04 RX ADMIN — FLUTICASONE FUROATE AND VILANTEROL TRIFENATATE 1 PUFF: 100; 25 POWDER RESPIRATORY (INHALATION) at 09:34

## 2019-07-04 RX ADMIN — HEPARIN SODIUM 5000 UNITS: 5000 INJECTION INTRAVENOUS; SUBCUTANEOUS at 21:08

## 2019-07-04 RX ADMIN — POLYVINYL ALCOHOL 1 DROP: 14 SOLUTION/ DROPS OPHTHALMIC at 21:09

## 2019-07-04 RX ADMIN — DILTIAZEM HYDROCHLORIDE 180 MG: 180 CAPSULE, COATED, EXTENDED RELEASE ORAL at 09:34

## 2019-07-04 RX ADMIN — POLYVINYL ALCOHOL 1 DROP: 14 SOLUTION/ DROPS OPHTHALMIC at 09:38

## 2019-07-04 RX ADMIN — HEPARIN SODIUM 5000 UNITS: 5000 INJECTION INTRAVENOUS; SUBCUTANEOUS at 06:00

## 2019-07-04 RX ADMIN — NICOTINE 21 MG: 21 PATCH, EXTENDED RELEASE TRANSDERMAL at 09:40

## 2019-07-04 RX ADMIN — GABAPENTIN 400 MG: 400 CAPSULE ORAL at 21:09

## 2019-07-04 RX ADMIN — TAMSULOSIN HYDROCHLORIDE 0.4 MG: 0.4 CAPSULE ORAL at 17:14

## 2019-07-04 RX ADMIN — OXYBUTYNIN CHLORIDE 5 MG: 5 TABLET, EXTENDED RELEASE ORAL at 09:32

## 2019-07-04 RX ADMIN — THIAMINE HCL TAB 100 MG 50 MG: 100 TAB at 09:32

## 2019-07-04 RX ADMIN — GABAPENTIN 400 MG: 400 CAPSULE ORAL at 06:00

## 2019-07-04 RX ADMIN — DOCUSATE SODIUM 100 MG: 100 CAPSULE, LIQUID FILLED ORAL at 09:33

## 2019-07-04 RX ADMIN — PRAVASTATIN SODIUM 10 MG: 10 TABLET ORAL at 17:14

## 2019-07-04 RX ADMIN — VITAMIN D, TAB 1000IU (100/BT) 2000 UNITS: 25 TAB at 09:32

## 2019-07-04 RX ADMIN — GABAPENTIN 400 MG: 400 CAPSULE ORAL at 14:43

## 2019-07-04 RX ADMIN — ACETAMINOPHEN 650 MG: 325 TABLET ORAL at 06:00

## 2019-07-04 RX ADMIN — ACETAMINOPHEN 650 MG: 325 TABLET ORAL at 14:43

## 2019-07-04 RX ADMIN — DULOXETINE HYDROCHLORIDE 60 MG: 60 CAPSULE, DELAYED RELEASE ORAL at 09:33

## 2019-07-04 RX ADMIN — PANTOPRAZOLE SODIUM 40 MG: 40 TABLET, DELAYED RELEASE ORAL at 06:00

## 2019-07-04 RX ADMIN — LOSARTAN POTASSIUM 100 MG: 50 TABLET, FILM COATED ORAL at 09:33

## 2019-07-04 RX ADMIN — SENNOSIDES 8.6 MG: 8.6 TABLET, FILM COATED ORAL at 09:33

## 2019-07-04 RX ADMIN — HEPARIN SODIUM 5000 UNITS: 5000 INJECTION INTRAVENOUS; SUBCUTANEOUS at 14:44

## 2019-07-04 RX ADMIN — TIOTROPIUM BROMIDE 18 MCG: 18 CAPSULE ORAL; RESPIRATORY (INHALATION) at 09:34

## 2019-07-04 RX ADMIN — METFORMIN HYDROCHLORIDE 500 MG: 500 TABLET ORAL at 09:33

## 2019-07-04 RX ADMIN — ACETAMINOPHEN 650 MG: 325 TABLET ORAL at 21:09

## 2019-07-04 RX ADMIN — POLYVINYL ALCOHOL 1 DROP: 14 SOLUTION/ DROPS OPHTHALMIC at 17:14

## 2019-07-04 RX ADMIN — POLYVINYL ALCOHOL 1 DROP: 14 SOLUTION/ DROPS OPHTHALMIC at 11:48

## 2019-07-04 NOTE — PROGRESS NOTES
07/04/19 1230   Pain Assessment   Pain Assessment No/denies pain   Restrictions/Precautions   Precautions Bed/chair alarms   Braces or Orthoses C/S Collar   Grooming   Able To Initiate Tasks;Brush/Clean Teeth;Wash/Dry Hands; Wash/Dry Face   Limitation Noted In Coordination;Problem Solving; Safety; Sequencing;Strength   Grooming (FIM) 5 - Patient requires supervision/monitoring   Bathing   Assessed Bath Style Shower   Able to Gather/Transport No   Able to [employer] Temperature No   Able to Wash/Rinse/Dry (body part) Left Arm;Right Arm;R Upper Leg;L Upper Leg;Chest;Abdomen;Perineal Area; Buttocks   Limitations Noted in Balance; Coordination; Endurance;Problem Solving;ROM;Safety; Sequencing;Strength   Positioning Seated   Adaptive Equipment Shower Seat;Shower Bars;Hand Held Shower   Bathing (FIM) 4 - Patient completes 8/10 or 9/10 parts   Tub/Shower Transfer   Limitations Noted In LE Strength;UE Strength; Sequencing; Safety;ROM;Problem Solving; Endurance; Coordination;Balance   Adaptive Equipment Seat with Back;Grab Bars   Assessed Shower   Shower Transfer (FIM) 4 - Patient completes 75% of all tasks   Dressing/Undressing Clothing   Remove UB Clothes Pullover Shirt   Remove LB Clothes Pants;Socks; [address] Clothes Pullover Shirt   Don LB Clothes Pants;Socks; Shoes   Limitations Noted In Balance; Coordination; Endurance;Problem Solving; Safety; Sequencing;Strength;ROM   Positioning Supported Sit   UB Dressing (FIM) 4 - Patient completes 75% of all tasks   LB Dressing (FIM) 4 - Patient completes 75% of all tasks   Transfer Bed/Chair/Wheelchair   Positioning Concerns Cognition   Limitations Noted In Balance; Coordination; Endurance;Problem Solving;Sensation; Sequencing;LE Strength;UE Strength   Adaptive Equipment Roller Walker   Stand Pivot Supervision   Sit to Stand Supervision   Stand to [employer] Functional mob in bathroom, bedroom and hallways    Bed, Chair, Wheelchair Transfer (FIM) 4 - Patient completes 75% of all tasks   Transfers   Sit to Stand 5  Supervision   Stand to Sit 5  Supervision   Other 4  Minimal assistance   Additional items Impulsive;Verbal cues   Additional Comments Functional mob with RW min A    Coordination   Fine Motor Completed zipper, button and lace managment boards; min A needed to complete buttons    Cognition   Overall Cognitive Status Impaired   Arousal/Participation Alert; Cooperative   Attention Attends with cues to redirect   Memory Decreased short term memory;Decreased recall of precautions   Following Commands Follows multistep commands with increased time or repetition   Activity Tolerance   Activity Tolerance Patient tolerated treatment well   Assessment   Treatment Assessment Pt presents without c/o pain and willing to complete PM ADL  He demonstrates decreased BUE prox to distal strength, coordination and decreased endurance  Mildly decreased act gallo noted, but recovers with rest breaks and completing at seated level  Decreased memory, attention and problem solving greatest barrier and limits safety with txfrs, func mob and ADL participation  Increased time needed to complete all tasks and ongoing cues for maintaining cervical and c-collar precautions  Moderate difficulty noted with snap buttoning, however unable to complete regular due to impaired sensation  Recommended pt cont with OT services to max safety as per POC  Prognosis Good   Problem List Decreased strength;Decreased endurance; Impaired balance;Decreased mobility; Decreased coordination;Decreased cognition; Impaired judgement;Decreased safety awareness;Orthopedic restrictions   Plan   Treatment/Interventions ADL retraining;Functional transfer training; Therapeutic exercise; Endurance training;Cognitive reorientation;Patient/family training;Bed mobility; Compensatory technique education   Progress Progressing toward goals   OT Therapy Minutes   OT Time In 1230   OT Time Out 1400   OT Total Time (minutes) 90   OT Mode of treatment - Individual (minutes) 90   OT Mode of treatment - Concurrent (minutes) 0   OT Mode of treatment - Group (minutes) 0   OT Mode of treatment - Co-treat (minutes) 0   OT Mode of Teatment - Total time(minutes) 90 minutes   Therapy Time missed   Time missed?  No

## 2019-07-04 NOTE — PROGRESS NOTES
07/04/19 0940   Pain Assessment   Pain Assessment No/denies pain   Restrictions/Precautions   Precautions Bed/chair alarms; Fall Risk;Supervision on toilet/commode   Braces or Orthoses C/S Collar   Subjective   Subjective pt rpeorted feeling well and happy with his progress but unhappy that he needs to leave the [facility]   QI: Roll Left and Right   Assistance Needed Supervision   Roll Left and Right CARE Score 4   QI: Sit to [address] to Lying CARE Score 4   QI: Lying to Sitting on Side of Bed   Assistance Needed Supervision   Lying to Sitting on Side of Bed CARE Score 4   QI: Sit to Stand   Assistance Needed Supervision   Sit to Stand CARE Score 4   QI: Chair/Bed-to-Chair Transfer   Assistance Needed Supervision   Chair/Bed-to-Chair Transfer CARE Score 4   Transfer Bed/Chair/Wheelchair   Limitations Noted In Balance;LE Strength;UE Strength   Adaptive Equipment None   Stand Pivot Supervision   Sit to Stand Supervision   Stand to Sit Supervision   Supine to Sit Supervision   Sit to Supine Supervision   Car Transfer Supervision   Findings CS   Bed, Chair, Wheelchair Transfer (FIM) 5 - Patient requires supervision/monitoring   QI: Car Transfer   Assistance Needed Supervision   Car Transfer CARE Score 4   QI: [address] 10 Feet CARE Score 4   QI: Walk 50 Feet with Two [place] 50 Feet with Two Turns CARE Score 4   QI: Walk 150 Feet   Assistance Needed Supervision   Walk 150 Feet CARE Score 4   QI: Walking 10 Feet on Uneven Surfaces   Assistance Needed Incidental touching   Walking 10 Feet on Uneven Surfaces CARE Score 4   Ambulation   Does the patient walk? 2  Yes   Primary Discharge Mode of Locomotion Walk   Walk Assist Level Close Supervision   Gait Pattern Inconsistant Analisa; Slow Analisa; Step through; Improper weight shift   Assist Device   (none, use of gait belt for safety)   Distance Walked (feet) 700 ft  (500)   Limitations Noted In Balance; Endurance; Heel Strike;Speed;Strength;Swing   Walking (FIM) 5 - Patient requires supervision/monitoring AND distance 150 feet or more, no rest   QI: 1 Step (Curb)   Assistance Needed Physical assistance   Assistance Provided by [place] Less than 25%   1 Step (Curb) CARE Score 3   Therapeutic Interventions   Balance FTEO, FTEC, modified tandem with EO, SLS bilat  pt needed CS-Mack for first two and Min-ModA to correct LOB with SLS   Equipment Use   NuStep 10 min level 4 with BUE    Other Comments   Comments walking forward over different sized half dowels, no LOB> walking 50' x5 at inc gait speed  walking backwards 50' x5  side stepping 50' once R and once L    Assessment   Treatment Assessment Pt cont to make progress towards LTGs and has made improvement in ambulatory balance, gait speed and static standing balance  Pt sitll remains a fall risk due to lack of cervical ROM with collar on and dec ability to use vision to help with balance  Pt will cont to benefit from skilled PT to progress towarwds (I) level and to dec fall risk  Barriers to Discharge Decreased caregiver support   PT Barriers   Physical Impairment Decreased strength;Decreased range of motion;Decreased endurance;Decreased mobility; Impaired balance; Impaired sensation   Functional Limitation Car transfers;Stair negotiation;Standing;Transfers; Walking   Plan   Treatment/Interventions Functional transfer training;LE strengthening/ROM; Elevations; Therapeutic exercise; Endurance training;Patient/family training;Equipment eval/education; Bed mobility;Gait training   Progress Progressing toward goals   Recommendation   Recommendation Short-term skilled PT   PT Therapy Minutes   PT Time In 0940   PT Time Out 2041   PT Total Time (minutes) 35   PT Mode of treatment - Individual (minutes) 20   PT Mode of treatment - Concurrent (minutes) 15   PT Mode of treatment - Group (minutes) 0   PT Mode of treatment - Co-treat (minutes) 0   PT Mode of Teatment - Total time(minutes) 35 minutes   Therapy Time missed   Time missed?  No

## 2019-07-04 NOTE — PROGRESS NOTES
07/04/19 0940   Pain Assessment   Pain Assessment No/denies pain   Restrictions/Precautions   Precautions Bed/chair alarms; Fall Risk;Supervision on toilet/commode   Braces or Orthoses C/S Collar   Subjective   Subjective pt rpeorted feeling well and happy with his progress but unhappy that he needs to leave the ARC   QI: Roll Left and Right   Assistance Needed Supervision   Roll Left and Right CARE Score 4   QI: Sit to [address] to Lying CARE Score 4   QI: Lying to Sitting on Side of Bed   Assistance Needed Supervision   Lying to Sitting on Side of Bed CARE Score 4   QI: Sit to Stand   Assistance Needed Supervision   Sit to Stand CARE Score 4   QI: Chair/Bed-to-Chair Transfer   Assistance Needed Supervision   Chair/Bed-to-Chair Transfer CARE Score 4   Transfer Bed/Chair/Wheelchair   Limitations Noted In Balance;LE Strength;UE Strength   Adaptive Equipment None   Stand Pivot Supervision   Sit to Stand Supervision   Stand to Sit Supervision   Supine to Sit Supervision   Sit to Supine Supervision   Car Transfer Supervision   Findings CS   Bed, Chair, Wheelchair Transfer (FIM) 5 - Patient requires supervision/monitoring   QI: Car Transfer   Assistance Needed Supervision   Car Transfer CARE Score 4   QI: [address] 10 Feet CARE Score 4   QI: Walk 50 Feet with Two [address] 50 Feet with Two Turns CARE Score 4   QI: Walk 150 Feet   Assistance Needed Supervision   Walk 150 Feet CARE Score 4   QI: Walking 10 Feet on Uneven Surfaces   Assistance Needed Incidental touching   Walking 10 Feet on Uneven Surfaces CARE Score 4   Ambulation   Does the patient walk? 2  Yes   Primary Discharge Mode of Locomotion Walk   Walk Assist Level Close Supervision   Gait Pattern Inconsistant Analisa; Slow Analisa; Step through; Improper weight shift   Assist Device   (none, use of gait belt for safety)   Distance Walked (feet) 700 ft  (500)   Limitations Noted In Balance; Endurance; Heel Strike;Speed;Strength;Swing   Walking (FIM) 5 - Patient requires supervision/monitoring AND distance 150 feet or more, no rest   QI: 1 Step (Curb)   Assistance Needed Physical assistance   Assistance Provided by [place] Less than 25%   1 Step (Curb) CARE Score 3   Therapeutic Interventions   Balance FTEO, FTEC, modified tandem with EO, SLS bilat  pt needed CS-Mack for first two and Min-ModA to correct LOB with SLS   Equipment Use   NuStep 10 min level 4 with BUE    Other Comments   Comments walking forward over different sized half dowels, no LOB> walking 50' x5 at inc gait speed  walking backwards 50' x5  side stepping 50' once R and once L    Assessment   Treatment Assessment Pt cont to make progress towards LTGs and has made improvement in ambulatory balance, gait speed and static standing balance  Pt sitll remains a fall risk due to lack of cervical ROM with collar on and dec ability to use vision to help with balance  Pt will cont to benefit from skilled PT to progress towarwds (I) level and to dec fall risk  Barriers to Discharge Decreased caregiver support   PT Barriers   Physical Impairment Decreased strength;Decreased range of motion;Decreased endurance;Decreased mobility; Impaired balance; Impaired sensation   Functional Limitation Car transfers;Stair negotiation;Standing;Transfers; Walking   Plan   Treatment/Interventions Functional transfer training;LE strengthening/ROM; Elevations; Therapeutic exercise; Endurance training;Patient/family training;Equipment eval/education; Bed mobility;Gait training   Progress Progressing toward goals   Recommendation   Recommendation Short-term skilled PT   PT Therapy Minutes   PT Time In 0940   PT Time Out 8442   PT Total Time (minutes) 35   PT Mode of treatment - Individual (minutes) 20   PT Mode of treatment - Concurrent (minutes) 15   PT Mode of treatment - Group (minutes) 0   PT Mode of treatment - Co-treat (minutes) 0   PT Mode of Teatment - Total time(minutes) 35 minutes   Therapy Time missed   Time missed?  No

## 2019-07-04 NOTE — PROGRESS NOTES
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; s/p C3/4 ACDF with C4 hemicorpectomy, C3-4 anterior instrumentation/fusion 6/18/19 Marlyn Figueroa):  continue cervical collar; s/p Clindamycin for infection prophylaxis  Pain control per primary service  Had previously been on Cymbalta for neuropathic sx  Patient reports discharge is planned for tomorrow      DM2: at home he takes Kombiglyze XR 5-1000mg qd and no insulin  Continue Metformin 500mg daily = has good control currently  Continue DM diet and Accuchecks/QID with SSI      HTN:  at home, takes Cardizem 180mg qd, Losartan 100mg qd (was not on Norvasc he says) = continue same here; stable     COPD; hx lung cancer/VIVIANA lobectomy, pulmonary nodules/cough: follows with pulm as OP and takes Advair 250-50 one puff BID/Spiriva one puff daily and prn Combivent  On Breo as substitute for Advair, continue Spiriva and use Albuterol inhaler prn  Has intermitt cough but not bothersome  Had CT chest in April for lung nodule surveillance = for repeat 1 yr  Will watch     SHIVANI: supposed to use CPAP but has been noncompliant     HLD: at home on Pravachol     Urinary incontinence/BPH/urgency: continue Flomax, Oxybutynin ER 5mg qd; Uro is following         Subjective/ HPI:  Patients overnight issues or events were reviewed with nursing or staff during rounds or morning huddle session  No new or overnight issues  Post-op pain:  Currently well controlled  DM stable, HTN: stable on current tx  Patient denies any current complaints      ROS:     GI: denies abdominal pain, change bowel habits or reflux symptoms  Neuro: Denies any headache, new vision changes, new neuropathies,new weaknesses   Respiratory: No Cough, SOB, denies wheeze  Cardiovascular: No CP, palpitations , denies perception of rapid heartbeat  : denies any new urinary burning or frequency    Review of Scheduled Meds:    Current Facility-Administered Medications:  acetaminophen 650 mg Oral TID Swapna Persaud MD   albuterol 2 puff Inhalation Q4H PRN Swapna Persaud MD   bisacodyl 10 mg Rectal Daily PRN Swapna Persaud MD   bisacodyl 10 mg Rectal Once Swapna Persaud MD   cholecalciferol 2,000 Units Oral Daily Swapna Persaud MD   diltiazem 180 mg Oral Daily SONJA Zavala   docusate sodium 100 mg Oral BID Swapna Persaud MD   DULoxetine 60 mg Oral Daily Swapna Persaud MD   fluticasone-vilanterol 1 puff Inhalation Daily Swapna Persaud MD   gabapentin 400 mg Oral HS Swapna Persaud MD   gabapentin 400 mg Oral BID Swapna Persaud MD   heparin (porcine) 5,000 Units Subcutaneous [facility] Swapna Persaud MD   hydrALAZINE 25 mg Oral Q8H PRN SONJA Zavala   insulin lispro 1-5 Units Subcutaneous TID [facility] Swapna Persaud MD   insulin lispro 1-5 Units Subcutaneous HS Swapna Persaud MD   lidocaine  Topical Q4H PRN Swapna Persaud MD   losartan 100 mg Oral Daily Swapna Persaud MD   melatonin 3 mg Oral HS Mery Grewal PA-C   metFORMIN 500 mg Oral Daily With Breakfast SONJA Zavala   nicotine 21 mg Transdermal Daily Mery Grewal PA-C   ondansetron 4 mg Intravenous Q6H PRN Swapna Persaud MD   oxybutynin 5 mg Oral Daily SONJA Gan   oxyCODONE 10 mg Oral Q4H PRN Swapna Persaud MD   oxyCODONE 5 mg Oral Q4H PRN Swapna Persaud MD   pantoprazole 40 mg Oral Daily Before Breakfast Swapna Persaud MD   polyethylene glycol 17 g Oral Daily PRN Swapna Persaud MD   polyethylene glycol 17 g Oral Daily Swapna Persaud MD   polyvinyl alcohol 1 drop Both Eyes 4x Daily Swapna Persaud MD   polyvinyl alcohol 1 drop Both Eyes Q1H PRN Swapna Persaud MD   pravastatin 10 mg Oral Daily With Shaye Richter MD   senna 1 tablet Oral Daily Swapna Persaud MD tamsulosin 0 4 mg Oral After Jessica Dee MD   thiamine 50 mg Oral Daily Katarina Faustin MD   tiotropium 18 mcg Inhalation Daily Katarina Faustin MD       Labs:     Results from last 7 days   Lab Units 07/04/19  0609 07/01/19  0948   WBC Thousand/uL 4 19* 4 40   HEMOGLOBIN g/dL 13 6 14 1   HEMATOCRIT % 41 9 43 7   PLATELETS Thousands/uL 176 189     Results from last 7 days   Lab Units 07/04/19  0609 07/01/19  0611   SODIUM mmol/L 139 136   POTASSIUM mmol/L 4 3 4 3   CHLORIDE mmol/L 104 103   CO2 mmol/L 32 31   BUN mg/dL 19 19   CREATININE mg/dL 0 76 0 80   CALCIUM mg/dL 10 4* 10 1                  Results from last 7 days   Lab Units 07/04/19  0645 07/03/19  2103 07/03/19  1602   POC GLUCOSE mg/dl 109 127 105       Imaging:     No orders to display       *Labs reviewed  *Radiology studies reviewed  *Medications reviewed and reconciled as needed  *Please refer to order section for additional ordered labs studies  *Case discussed with primary attending during morning huddle case rounds    Physical Examination:  Vitals:   Vitals:    07/03/19 1317 07/03/19 2045 07/03/19 2200 07/04/19 0700   BP: 127/73 137/89  138/79   BP Location: Right arm Right arm     Pulse: 81 86  84   Resp: 18 18  20   Temp: 98 °F (36 7 °C) 98 7 °F (37 1 °C)  98 1 °F (36 7 °C)   TempSrc: Oral Oral  Oral   SpO2: 99% 98% 96% 97%   Weight:       Height:           General Appearance: NAD, conversive  Eyes: No icterus; conjunctiva normal  HENT: oropharynx clear; mucous membranes moist  Neck: collar on  Lungs: CTA, normal respiratory effort, no retractions, expiratory effort normal  CV: regular rate, no rubs/murmurs/gallops  ABD: soft; ND/NT; +BS  EXT: no edema  Skin: normal turgor, normal texture, no rashes  Psych: affect normal, No anxiety/depression   Neuro: AAOx3;  UE 4/5 R>L, LEs 4+/5 R>L          Total time spent: At least 40 minutes, with more than 50% spent counseling/coordinating care   Counseling includes discussion with patient re: progress  and discussion with patient of his/her current medical state/information  Coordination of patient's care was performed in conjunction with primary service  Time invested included review of patient's labs, vitals, and management of their comorbidities with continued monitoring  In addition, this patient was discussed with medical team including physician and advanced extenders  The care of the patient was extensively discussed and appropriate treatment plan was formulated unique for this patient  ** Please Note: Dragon 360 Dictation voice to text software may have been used in the creation of this document   **

## 2019-07-04 NOTE — PROGRESS NOTES
07/04/19 0830   Pain Assessment   Pain Assessment 0-10   Pain Score No Pain   Restrictions/Precautions   Precautions Bed/chair alarms; Fall Risk;Impulsive;Supervision on toilet/commode   Weight Bearing Restrictions No   ROM Restrictions Yes  (cervical spinal precaution)   RUE ROM Restriction   (cervical spinal precaution)   Braces or Orthoses C/S Collar   Cognition   Overall Cognitive Status Impaired   Arousal/Participation Alert; Cooperative   Attention Attends with cues to redirect   Orientation Level Oriented X4   Memory Decreased short term memory;Decreased recall of precautions   Following Commands Follows multistep commands with increased time or repetition   Subjective   Subjective No c/o pain today  QI: Sit to Stand   Assistance Needed Incidental touching;Supervision  (CS/CG)   Assistance Provided by [place] Less than 25%   Comment CG/CS without AD   Sit to Stand CARE Score 3   QI: Chair/Bed-to-Chair Transfer   Assistance Needed Incidental touching;Supervision  (CG/CS)   Assistance Provided by [place] Less than 25%   Comment CG/CS without AD   Chair/Bed-to-Chair Transfer CARE Score 3   Transfer Bed/Chair/Wheelchair   Limitations Noted In Balance; Coordination   Adaptive Equipment None   Stand Pivot Contact Guard;Supervision  (CG/CS)   Sit to Stand Contact Guard;Supervision  (CG/CS)   Stand to [employer]  (CG/CS)   Bed, Chair, Wheelchair Transfer (FIM) 4 - Patient requires steadying assist or light touching   QI: Walk 10 Feet   Assistance Needed Incidental touching   Assistance Provided by [place] Less than 25%   Comment CG without AD   Walk 10 Feet CARE Score 3   QI: Walk 50 Feet with Two Turns   Assistance Needed Incidental touching   Assistance Provided by [place] Less than 25%   Comment CG without AD   Walk 50 Feet with Two Turns CARE Score 3   QI: Walk 150 Feet   Assistance Needed Incidental touching   Assistance Provided by [place] Less than 25%   Comment CG without AD   Walk 150 Feet CARE Score 3   Ambulation   Does the patient walk? 2  Yes   Primary Discharge Mode of Locomotion Walk   Walk Assist Level Contact Guard   Gait Pattern Inconsistant Analisa;Decreased foot clearance;[place]; Improper weight shift   Assist Device Other  (no AD)   Distance Walked (feet) 150 ft  (150 x 2)   Limitations Noted In Balance; Coordination; Heel Strike   Walking (FIM) 4 - Patient requires steadying assist or light touching AND distance 150 feet or more, no rest   Wheelchair mobility   QI: Does the patient use a wheelchair? 0  No   QI: 4 Steps   Assistance Needed Supervision  (CS)   Assistance Provided by [place] No physical assistance   Comment 1HR   4 Steps CARE Score 4   QI: 12 Steps   Assistance Needed Supervision  (CS)   Assistance Provided by [place] No physical assistance   12 Steps CARE Score 4   Stairs   Type Stairs   # of Steps 20   Weight Bearing Precautions Fall Risk   Assist Devices Single Rail   Stairs (FIM) 5 - Patient requires supervision/monitoring AND goes up and down full flight (12- 14 stairs)   Therapeutic Interventions   Strengthening Standing ther ex on BLE's with the support of RW for marching in place, partial squats, heel raises for 3 x 10 reps each  Seated hip adduction with ball squeezes x 30 reps  Equipment Use   NuStep Level 3 x 15 minutes using BUE and BLE  Assessment   Treatment Assessment Patient tolerated therapy very well with rest breaks in between  He was very pleasant and very cooperative the entire therapy session  He ambulated 150 feet x 2 without AD with CG in a circuit  CG/CS with sit to stand transfers and SPT  Ascended/descended 20 steps with 1HR with CS  No LOB noted  He needed occasional verbal cues with proper hand placements during transfers  He will benefit from continued skilled PT services to maximize level of function, to be able to return to his PLOF and assist with safe d/c planning     Family/Caregiver Present no   Problem List Decreased strength;Decreased endurance; Impaired balance;Decreased mobility; Decreased coordination;Decreased cognition; Impaired judgement;Decreased safety awareness;Orthopedic restrictions   Barriers to Discharge Inaccessible home environment;Decreased caregiver support   PT Barriers   Physical Impairment Decreased strength;Decreased endurance; Impaired balance;Decreased mobility; Decreased coordination;Decreased cognition; Impaired judgement;Decreased safety awareness;Orthopedic restrictions   Functional Limitation Car transfers;Stair negotiation;Standing;Transfers; Walking   Plan   Treatment/Interventions Functional transfer training;LE strengthening/ROM; Elevations; Therapeutic exercise; Endurance training;Bed mobility;Gait training   Progress Progressing toward goals   Recommendation   Recommendation Short-term skilled PT   Equipment Recommended Walker   PT Therapy Minutes   PT Time In 0830   PT Time Out 0930   PT Total Time (minutes) 60   PT Mode of treatment - Individual (minutes) 60   PT Mode of treatment - Concurrent (minutes) 0   PT Mode of treatment - Group (minutes) 0   PT Mode of treatment - Co-treat (minutes) 0   PT Mode of Teatment - Total time(minutes) 60 minutes   Therapy Time missed   Time missed?  No

## 2019-07-05 PROBLEM — Z87.898 HISTORY OF INSOMNIA: Status: ACTIVE | Noted: 2019-06-22

## 2019-07-05 PROBLEM — Z87.19 HISTORY OF CONSTIPATION: Status: ACTIVE | Noted: 2019-06-20

## 2019-07-05 LAB
GLUCOSE SERPL-MCNC: 104 MG/DL (ref 65–140)
GLUCOSE SERPL-MCNC: 115 MG/DL (ref 65–140)
GLUCOSE SERPL-MCNC: 128 MG/DL (ref 65–140)
GLUCOSE SERPL-MCNC: 131 MG/DL (ref 65–140)

## 2019-07-05 PROCEDURE — 94660 CPAP INITIATION&MGMT: CPT

## 2019-07-05 PROCEDURE — 97530 THERAPEUTIC ACTIVITIES: CPT

## 2019-07-05 PROCEDURE — 97116 GAIT TRAINING THERAPY: CPT

## 2019-07-05 PROCEDURE — 99232 SBSQ HOSP IP/OBS MODERATE 35: CPT

## 2019-07-05 PROCEDURE — 82948 REAGENT STRIP/BLOOD GLUCOSE: CPT

## 2019-07-05 PROCEDURE — 97110 THERAPEUTIC EXERCISES: CPT

## 2019-07-05 PROCEDURE — 97535 SELF CARE MNGMENT TRAINING: CPT

## 2019-07-05 RX ORDER — OXYBUTYNIN CHLORIDE 5 MG/1
5 TABLET, EXTENDED RELEASE ORAL DAILY
Qty: 30 TABLET | Refills: 0 | Status: SHIPPED | OUTPATIENT
Start: 2019-07-06 | End: 2019-09-03 | Stop reason: SDUPTHER

## 2019-07-05 RX ORDER — ALBUTEROL SULFATE 90 UG/1
2 AEROSOL, METERED RESPIRATORY (INHALATION) 4 TIMES DAILY PRN
Qty: 6.7 G | Refills: 0
Start: 2019-07-05 | End: 2019-07-05 | Stop reason: HOSPADM

## 2019-07-05 RX ORDER — DOCUSATE SODIUM 100 MG/1
100 CAPSULE, LIQUID FILLED ORAL 2 TIMES DAILY
Qty: 60 CAPSULE | Refills: 0
Start: 2019-07-05 | End: 2019-07-29

## 2019-07-05 RX ORDER — POLYVINYL ALCOHOL 14 MG/ML
1 SOLUTION/ DROPS OPHTHALMIC 4 TIMES DAILY
Qty: 15 ML | Refills: 0 | Status: SHIPPED | OUTPATIENT
Start: 2019-07-05

## 2019-07-05 RX ORDER — SENNOSIDES 8.6 MG
1 TABLET ORAL DAILY
Qty: 30 EACH | Refills: 0
Start: 2019-07-06 | End: 2019-07-29

## 2019-07-05 RX ORDER — POLYETHYLENE GLYCOL 3350 17 G/17G
17 POWDER, FOR SOLUTION ORAL DAILY PRN
Qty: 14 EACH | Refills: 0
Start: 2019-07-05 | End: 2019-08-19 | Stop reason: ALTCHOICE

## 2019-07-05 RX ORDER — ACETAMINOPHEN 325 MG/1
650 TABLET ORAL 3 TIMES DAILY PRN
Status: DISCONTINUED | OUTPATIENT
Start: 2019-07-05 | End: 2019-07-07 | Stop reason: HOSPADM

## 2019-07-05 RX ORDER — GABAPENTIN 400 MG/1
400 CAPSULE ORAL 3 TIMES DAILY
Qty: 90 CAPSULE | Refills: 1 | Status: SHIPPED | OUTPATIENT
Start: 2019-07-05 | End: 2019-08-19 | Stop reason: SDUPTHER

## 2019-07-05 RX ORDER — POLYVINYL ALCOHOL 14 MG/ML
1 SOLUTION/ DROPS OPHTHALMIC
Qty: 15 ML | Refills: 0 | Status: SHIPPED | OUTPATIENT
Start: 2019-07-05 | End: 2019-08-19 | Stop reason: SDUPTHER

## 2019-07-05 RX ORDER — NICOTINE 21 MG/24HR
1 PATCH, TRANSDERMAL 24 HOURS TRANSDERMAL DAILY
Qty: 28 PATCH | Refills: 0
Start: 2019-07-06 | End: 2019-08-19 | Stop reason: SDDI

## 2019-07-05 RX ORDER — ACETAMINOPHEN 325 MG/1
650 TABLET ORAL 3 TIMES DAILY PRN
Qty: 30 TABLET | Refills: 0 | Status: SHIPPED | OUTPATIENT
Start: 2019-07-05 | End: 2019-08-19 | Stop reason: SDDI

## 2019-07-05 RX ADMIN — OXYBUTYNIN CHLORIDE 5 MG: 5 TABLET, EXTENDED RELEASE ORAL at 09:09

## 2019-07-05 RX ADMIN — DILTIAZEM HYDROCHLORIDE 180 MG: 180 CAPSULE, COATED, EXTENDED RELEASE ORAL at 09:10

## 2019-07-05 RX ADMIN — POLYVINYL ALCOHOL 1 DROP: 14 SOLUTION/ DROPS OPHTHALMIC at 21:49

## 2019-07-05 RX ADMIN — HEPARIN SODIUM 5000 UNITS: 5000 INJECTION INTRAVENOUS; SUBCUTANEOUS at 06:00

## 2019-07-05 RX ADMIN — NICOTINE 21 MG: 21 PATCH, EXTENDED RELEASE TRANSDERMAL at 09:19

## 2019-07-05 RX ADMIN — GABAPENTIN 400 MG: 400 CAPSULE ORAL at 06:01

## 2019-07-05 RX ADMIN — VITAMIN D, TAB 1000IU (100/BT) 2000 UNITS: 25 TAB at 09:09

## 2019-07-05 RX ADMIN — PANTOPRAZOLE SODIUM 40 MG: 40 TABLET, DELAYED RELEASE ORAL at 06:02

## 2019-07-05 RX ADMIN — DULOXETINE HYDROCHLORIDE 60 MG: 60 CAPSULE, DELAYED RELEASE ORAL at 09:11

## 2019-07-05 RX ADMIN — TIOTROPIUM BROMIDE 18 MCG: 18 CAPSULE ORAL; RESPIRATORY (INHALATION) at 09:08

## 2019-07-05 RX ADMIN — DOCUSATE SODIUM 100 MG: 100 CAPSULE, LIQUID FILLED ORAL at 09:12

## 2019-07-05 RX ADMIN — FLUTICASONE FUROATE AND VILANTEROL TRIFENATATE 1 PUFF: 100; 25 POWDER RESPIRATORY (INHALATION) at 09:08

## 2019-07-05 RX ADMIN — MELATONIN 3 MG: at 21:48

## 2019-07-05 RX ADMIN — LOSARTAN POTASSIUM 100 MG: 50 TABLET, FILM COATED ORAL at 09:11

## 2019-07-05 RX ADMIN — THIAMINE HCL TAB 100 MG 50 MG: 100 TAB at 09:09

## 2019-07-05 RX ADMIN — ACETAMINOPHEN 650 MG: 325 TABLET ORAL at 06:00

## 2019-07-05 RX ADMIN — DOCUSATE SODIUM 100 MG: 100 CAPSULE, LIQUID FILLED ORAL at 17:19

## 2019-07-05 RX ADMIN — POLYVINYL ALCOHOL 1 DROP: 14 SOLUTION/ DROPS OPHTHALMIC at 11:57

## 2019-07-05 RX ADMIN — PRAVASTATIN SODIUM 10 MG: 10 TABLET ORAL at 17:19

## 2019-07-05 RX ADMIN — POLYVINYL ALCOHOL 1 DROP: 14 SOLUTION/ DROPS OPHTHALMIC at 17:18

## 2019-07-05 RX ADMIN — TAMSULOSIN HYDROCHLORIDE 0.4 MG: 0.4 CAPSULE ORAL at 17:19

## 2019-07-05 RX ADMIN — HEPARIN SODIUM 5000 UNITS: 5000 INJECTION INTRAVENOUS; SUBCUTANEOUS at 21:49

## 2019-07-05 RX ADMIN — SENNOSIDES 8.6 MG: 8.6 TABLET, FILM COATED ORAL at 09:12

## 2019-07-05 RX ADMIN — METFORMIN HYDROCHLORIDE 500 MG: 500 TABLET ORAL at 09:12

## 2019-07-05 RX ADMIN — HEPARIN SODIUM 5000 UNITS: 5000 INJECTION INTRAVENOUS; SUBCUTANEOUS at 13:38

## 2019-07-05 RX ADMIN — GABAPENTIN 400 MG: 400 CAPSULE ORAL at 13:37

## 2019-07-05 RX ADMIN — POLYVINYL ALCOHOL 1 DROP: 14 SOLUTION/ DROPS OPHTHALMIC at 09:09

## 2019-07-05 RX ADMIN — GABAPENTIN 400 MG: 400 CAPSULE ORAL at 21:48

## 2019-07-05 RX ADMIN — POLYETHYLENE GLYCOL 3350 17 G: 17 POWDER, FOR SOLUTION ORAL at 09:08

## 2019-07-05 NOTE — PROGRESS NOTES
07/05/19 1000   Pain Assessment   Pain Assessment No/denies pain   Pain Score No Pain   Restrictions/Precautions   Precautions Bed/chair alarms; Fall Risk;Supervision on toilet/commode   Braces or Orthoses C/S Collar   Cognition   Arousal/Participation Alert; Cooperative   Subjective   Subjective Pt eager and agreeable to session  QI: Sit to Stand   Assistance Needed Supervision   Assistance Provided by [place] No physical assistance   Sit to Stand CARE Score 4   QI: Chair/Bed-to-Chair Transfer   Assistance Needed Supervision   Assistance Provided by [place] No physical assistance   Chair/Bed-to-Chair Transfer CARE Score 4   Transfer Bed/Chair/Wheelchair   Limitations Noted In Balance; Coordination   Adaptive Equipment None   Stand Pivot Supervision   Sit to Stand Supervision   Stand to Sit Supervision   Car Transfer Supervision   Bed, Chair, Wheelchair Transfer (FIM) 4 - Patient requires steadying assist or light touching   QI: [address] Provided by [place] No physical assistance   Car Transfer CARE Score 4   QI: [address] Provided by [place] No physical assistance   Walk 10 Feet CARE Score 4   QI: Walk 50 Feet with Two [address] Provided by [place] No physical assistance   Comment CS without AD    Walk 50 Feet with Two Turns CARE Score 4   QI: Walk 150 [address] Provided by [place] No physical assistance   Comment CS without AD    Walk 150 Feet CARE Score 4   QI: Walking 10 Feet on Uneven Surfaces   Assistance Needed Incidental touching   Assistance Provided by [place] Less than 25%   Comment CGA outside B entrance on uneven surface  Walking 10 Feet on Uneven Surfaces CARE Score 3   Ambulation   Does the patient walk? 2   Yes   Primary Discharge Mode of Locomotion Walk   Walk Assist Level Close Supervision   Gait Pattern Inconsistant Analisa; Slow Analisa;Decreased foot clearance;Narrow LILLIE;Step through; Improper weight shift   Assist Device Other  (Gait belt for safety )   Distance Walked (feet) 700 ft  (x2, 150x2, 500 outside B entrance  )   Limitations Noted In Balance; Endurance; Heel Strike;Strength;Swing;Speed   Findings Pt ambulated ooutside B entrance up to [facility] and back to entrance then took rest break  Then ambulated ramp to the left of B entrance all the way down to crosswalk and back up to gym before taking rest  Gait belt for safety CGA    Walking (FIM) 5 - Patient requires supervision/monitoring AND distance 150 feet or more, no rest   Wheelchair mobility   QI: Does the patient use a wheelchair? 0  No   QI: 1 Step (Curb)   Assistance Needed Physical assistance   Assistance Provided by [place] Less than 25%   Comment No AD, curb outside B entrance x3    1 Step (Curb) CARE Score 3   QI: 4 Steps   Assistance Needed Incidental touching; Adaptive equipment   Assistance Provided by [place] Less than 25%   Comment CGA reciprocal pattern Single HR    4 Steps CARE Score 3   QI: 12 Steps   Assistance Needed Adaptive equipment; Incidental touching   Assistance Provided by [place] Less than 25%   Comment CGA reciprocal pattern Single HR    12 Steps CARE Score 3   Stairs   Type Stairs;Curb;Ramp   # of Steps 24   Weight Bearing Precautions Fall Risk   Assist Devices Single Rail   Findings CGA reciprocal pattern Single HR    Stairs (FIM) 4 - Patient requires steadying assist or light touching AND patient goes up and down full flight (12- 14 stairs)   Therapeutic Interventions   Balance Modified tandem stance 1minx3  SLS BLE [id number], MArching in hallway, 50ftx6, walking over varyying size of bolsters,    Assessment   Treatment Assessment Pt participated in skilled PT session with focus on inc balance, community ambulation, functional mobility  Performed therapy session without RW to work balance   Pt overall ambulated CGA level with gait belt for safety  Pt with inc activity tolerance able to ambulate long distances outside B entrance wiithout multiple rest breaks  Pt awaiting placement at SNF before able to d/c  Pt overall made good progress with therapy and will cont to benefit  from skilled PT to inc balacne and righting reactions in order to dec fall risk and maximize function  Family/Caregiver Present no   Barriers to Discharge Inaccessible home environment;Decreased caregiver support   PT Barriers   Physical Impairment Decreased strength; Impaired balance;Decreased endurance;Decreased mobility; Decreased coordination;Orthopedic restrictions;Decreased safety awareness   Functional Limitation Stair negotiation;Standing;Transfers; Walking   Plan   Treatment/Interventions Functional transfer training;LE strengthening/ROM; Elevations; Therapeutic exercise; Endurance training;Patient/family training;Equipment eval/education;Gait training   Progress Progressing toward goals   Recommendation   Recommendation Short-term skilled PT   PT Therapy Minutes   PT Time In 1000   PT Time Out 1130   PT Total Time (minutes) 90   PT Mode of treatment - Individual (minutes) 90   PT Mode of treatment - Concurrent (minutes) 0   PT Mode of treatment - Group (minutes) 0   PT Mode of treatment - Co-treat (minutes) 0   PT Mode of Teatment - Total time(minutes) 90 minutes   Therapy Time missed   Time missed?  No

## 2019-07-05 NOTE — PROGRESS NOTES
07/05/19 0700   Pain Assessment   Pain Assessment No/denies pain   Pain Score No Pain   Restrictions/Precautions   Precautions Bed/chair alarms; Fall Risk;Supervision on toilet/commode   Braces or Orthoses C/S Collar   QI: Eating   Assistance Needed Set-up / clean-up   Assistance Provided by [place] No physical assistance   Eating CARE Score 5   Eating Assessment   Food To Mouth Yes   Able To Cut Yes   QI: Swallowing/Nutritional Status Regular food   Eating (FIM) 5 - Patient requires supervision, cueing or coaxing   QI: Oral Hygiene   Assistance Needed Incidental touching   Assistance Provided by [place] No physical assistance   Comment CGA during standing at sink to complete grooming tasks  Oral Hygiene CARE Score 4   Grooming   Able To Wash/Dry Face;Brush/Clean Teeth;Wash/Dry Hands; Initiate Tasks;Comb/Brush Hair   Limitation Noted In Coordination; Sequencing   Grooming (FIM) 5 - Patient requires supervision/monitoring   QI: Shower/Bathe Self   Assistance Needed Incidental touching   Assistance Provided by [place] Less than 25%   Comment Pt is CGA for bathing with cueing to maintain  on grab bar while washing li and buttock  Pt able to cross leg over opposing knee to wash LE's  Shower/Bathe Self CARE Score 3   Bathing   Assessed Bath Style Shower   Anticipated D/C Bath Style Shower   Able to Slava Christofer No   Able to [employer] Temperature No   Able to Wash/Rinse/Dry (body part) Left Arm;Right Arm;L Upper Leg;R Upper Leg;L Lower Leg/Foot;R Lower Leg/Foot;Chest;Abdomen;Perineal Area; Buttocks   Limitations Noted in Balance; Coordination; Endurance; Safety;Strength   Positioning Standing;Seated   Adaptive Equipment Shower Seat;Hand Held Shower   Bathing (FIM) 4 - Patient requires steadying assist or light touching   Tub/Shower Transfer   Limitations Noted In Sequencing;LE Strength;UE Strength;Balance; Endurance;Problem Solving;Coordination   Adaptive Equipment Seat with Back;Transfer Bench   Assessed Shower Findings Pt completed CGA for standing pivot transfers    Shower Transfer (FIM) 4 - Patient requires steadying assist or light touching   QI: Upper Body Dressing   Assistance Needed Supervision   Assistance Provided by [place] No physical assistance   Comment Pt is able to don pull over shirt with increased time  Pt requires assist to manage collar and don/doff collar while maintaining precautions  Pt is able to change pads on c/s collar  Upper Body Dressing CARE Score 4   QI: Lower Body Dressing   Assistance Needed Incidental touching   Assistance Provided by [place] Less than 25%   Comment Pt able to don pants over b/l LE's pt required CGA while pulling pants up over hips  Lower Body Dressing CARE Score 3   QI: Putting On/Taking Off Footwear   Assistance Needed Supervision   Assistance Provided by [place] No physical assistance   Comment cross legged method, increased time to complete don/doff   Putting On/Taking Off Footwear CARE Score 4   Dressing/Undressing Clothing   Remove UB Clothes   (hospital gown)   Remove LB Clothes Socks   Don UB Clothes Pullover [address] LB Clothes Pants;Socks   Limitations Noted In Balance; Endurance;Problem Solving; Safety;Strength   UB Dressing (FIM) 5 - Patient requires supervision/monitoring   LB Dressing (FIM) 4 - Patient requires steadying assist or light touching   QI: Lying to Sitting on Side of Bed   Assistance Needed Supervision   Assistance Provided by [place] No physical assistance   Lying to Sitting on Side of Bed CARE Score 4   QI: Sit to [address] Provided by [place] No physical assistance   Sit to Stand CARE Score 4   QI: Chair/Bed-to-Chair Transfer   Assistance Needed Incidental touching   Assistance Provided by [place] No physical assistance   Chair/Bed-to-Chair Transfer CARE Score 4   Transfer Bed/Chair/Wheelchair   Stand Pivot Contact Guard   Sit to Stand Supervision   Stand to Sit Supervision   Bed, Chair, Wheelchair Transfer (FIM) 4 - Patient requires steadying assist or light touching   QI: [address] Needed Incidental touching   Assistance Provided by [place] No physical assistance   Toileting Hygiene CARE Score 4   Toileting   Able to [address] down yes, up yes  Able to Manage Clothing Closures Yes   Manage Hygiene Bladder   Limitations Noted In Safety   Adaptive Equipment Grab Bar   Toileting (FIM) 5 - Patient requires supervision/monitoring   QI: Toilet Transfer   Assistance Needed Incidental touching   Assistance Provided by [place] No physical assistance   Toilet Transfer CARE Score 4   Toilet Transfer   Surface Assessed Drop Arm Commode   Transfer Technique Standard   Limitations Noted In Balance;Problem Solving; Safety;LE Strength; Sequencing   Toilet Transfer (FIM) 4 - Patient requires steadying assist or light touching   Coordination   Fine Motor Pt completed graded clothespins task  Pt completed using three jaw dell grasp pattern  Cognition   Overall Cognitive Status Impaired   Arousal/Participation Alert; Cooperative   Attention Attends with cues to redirect   Orientation Level Oriented X4   Memory Decreased short term memory;Decreased long term memory   Following Commands Follows multistep commands with increased time or repetition   Activity Tolerance   Activity Tolerance Patient tolerated treatment well   Assessment   Treatment Assessment Pt engaged in OT treatment  session with focus on ADL routine  Pt overall at CGA level for all LB ADL tasks due to decreased balance, decreased coordination  Pt still demonstrates poor carryover with c/s collar and requires cueing and assistance to complete management of pads  Pt lives alone and has no family support thus will require continued inpatient rehab  Prognosis Good   Problem List Decreased strength;Decreased endurance; Impaired balance;Decreased coordination;Decreased mobility; Decreased safety awareness   Plan   Treatment/Interventions ADL retraining;Functional transfer training;LE strengthening/ROM; Therapeutic exercise; Endurance training;Cognitive reorientation;Equipment eval/education;Patient/family training; Compensatory technique education   Progress Progressing toward goals   Recommendation   OT Discharge Recommendation Short Term Rehab   OT Therapy Minutes   OT Time In 0700   OT Time Out 0830   OT Total Time (minutes) 90   OT Mode of treatment - Individual (minutes) 90   OT Mode of treatment - Concurrent (minutes) 0   OT Mode of treatment - Group (minutes) 0   OT Mode of treatment - Co-treat (minutes) 0   OT Mode of Teatment - Total time(minutes) 90 minutes   Therapy Time missed   Time missed?  No

## 2019-07-05 NOTE — PROGRESS NOTES
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; s/p C3/4 ACDF with C4 hemicorpectomy, C3-4 anterior instrumentation/fusion 6/18/19 [place]):  continue cervical collar; s/p Clindamycin for infection prophylaxis  Pain control per primary service  Had previously been on Cymbalta for neuropathic sx      DM2: at home he takes Kombiglyze XR 5-1000mg qd and no insulin  Continue Metformin 500mg daily = has good control currently  Continue DM diet and Accuchecks/QID with SSI      HTN:  at home, takes Cardizem 180mg qd, Losartan 100mg qd (was not on Norvasc he says) = continue same here; stable     COPD; hx lung cancer/VIVIANA lobectomy, pulmonary nodules/cough: follows with pulm as OP and takes Advair 250-50 one puff BID/Spiriva one puff daily and prn Combivent  On Breo here as substitute for Advair; continue Spiriva and use Albuterol inhaler prn  Has intermitt cough but not bothersome  Had CT chest in April for lung nodule surveillance = for repeat 1 yr  Will watch     SHIVANI: supposed to use CPAP but has been noncompliant     HLD: at home on Pravachol     Urinary incontinence/BPH/urgency: continue Flomax, Oxybutynin ER 5mg qd; Uro did see here         Subjective/ HPI:  Patients overnight issues or events were reviewed with nursing or staff during rounds or morning huddle session  No new or overnight issues  Post-op pain:  Currently well controlled  DM stable, HTN: stable on current tx  Patient denies any current complaints      ROS:     GI: denies abdominal pain, change bowel habits or reflux symptoms  Neuro: Denies any headache, new vision changes, new neuropathies,new weaknesses   Respiratory: No Cough, SOB, denies wheeze  Cardiovascular: No CP, palpitations , denies perception of rapid heartbeat  : denies any new urinary burning or frequency    Review of Scheduled Meds:    Current Facility-Administered Medications:  acetaminophen 650 mg Oral TID Lisa Shows, MD   albuterol 2 puff Inhalation Q4H PRN Lisa Shows, MD   bisacodyl 10 mg Rectal Daily PRN Lisa Shows, MD   bisacodyl 10 mg Rectal Once Lisa Shows, MD   cholecalciferol 2,000 Units Oral Daily Lisa Shows, MD   diltiazem 180 mg Oral Daily SONJA Keller   docusate sodium 100 mg Oral BID Lisa Shows, MD   DULoxetine 60 mg Oral Daily Lisa Shows, MD   fluticasone-vilanterol 1 puff Inhalation Daily Lisa Shows, MD   gabapentin 400 mg Oral HS Lisa Shows, MD   gabapentin 400 mg Oral BID Lisa Shows, MD   heparin (porcine) 5,000 Units Subcutaneous [facility] Lisa Shows, MD   hydrALAZINE 25 mg Oral Q8H PRN SONJA Keller   insulin lispro 1-5 Units Subcutaneous TID [facility] Lisa Shows, MD   insulin lispro 1-5 Units Subcutaneous HS Lisa Shows, MD   lidocaine  Topical Q4H PRN Lisa Shows, MD   losartan 100 mg Oral Daily Lisa Shows, MD   melatonin 3 mg Oral HS Mery Grewal PA-C   metFORMIN 500 mg Oral Daily With Breakfast SONJA Keller   nicotine 21 mg Transdermal Daily Mery Grewal PA-C   ondansetron 4 mg Intravenous Q6H PRN Lisa Shows, MD   oxybutynin 5 mg Oral Daily SONJA Harper   oxyCODONE 10 mg Oral Q4H PRN Lisa Shows, MD   oxyCODONE 5 mg Oral Q4H PRN Lisa Shows, MD   pantoprazole 40 mg Oral Daily Before Breakfast Lisa Shows, MD   polyethylene glycol 17 g Oral Daily PRN Lisa Shows, MD   polyethylene glycol 17 g Oral Daily Lisa Shows, MD   polyvinyl alcohol 1 drop Both Eyes 4x Daily Lisa Shows, MD   polyvinyl alcohol 1 drop Both Eyes Q1H PRN Lisa Shows, MD   pravastatin 10 mg Oral Daily With Rogelio Book, MD   senna 1 tablet Oral Daily Lisa Shows, MD   tamsulosin 0 4 mg Oral After [address], MD   thiamine 50 mg Oral Daily Joy Eldridge MD   tiotropium 18 mcg Inhalation Daily Joy Eldridge MD       Labs:     Results from last 7 days   Lab Units 07/04/19  0609 07/01/19  0948   WBC Thousand/uL 4 19* 4 40   HEMOGLOBIN g/dL 13 6 14 1   HEMATOCRIT % 41 9 43 7   PLATELETS Thousands/uL 176 189     Results from last 7 days   Lab Units 07/04/19  0609 07/01/19  0611   SODIUM mmol/L 139 136   POTASSIUM mmol/L 4 3 4 3   CHLORIDE mmol/L 104 103   CO2 mmol/L 32 31   BUN mg/dL 19 19   CREATININE mg/dL 0 76 0 80   CALCIUM mg/dL 10 4* 10 1                  Results from last 7 days   Lab Units 07/05/19  1104 07/05/19  0633 07/04/19  2119   POC GLUCOSE mg/dl 115 104 128       Imaging:     No orders to display       *Labs reviewed  *Radiology studies reviewed  *Medications reviewed and reconciled as needed  *Please refer to order section for additional ordered labs studies  *Case discussed with primary attending during morning huddle case rounds    Physical Examination:  Vitals:   Vitals:    07/04/19 0700 07/04/19 1338 07/05/19 0600 07/05/19 0908   BP: 138/79 120/77 142/86 134/82   BP Location:  Left arm     Pulse: 84 94 72    Resp: 20 19 20    Temp: 98 1 °F (36 7 °C) 98 6 °F (37 °C) 97 7 °F (36 5 °C)    TempSrc: Oral Oral Oral    SpO2: 97% 94% 100%    Weight:       Height:           General Appearance: NAD, conversive  Eyes: No icterus; conjunctiva normal  HENT: oropharynx clear; mucous membranes moist  Neck: collar on  Lungs: CTA, normal respiratory effort, no retractions, expiratory effort normal  CV: regular rate, no rubs/murmurs/gallops  ABD: soft; ND/NT; +BS  EXT: no edema  Skin: normal turgor, normal texture, no rashes  Psych: affect normal, No anxiety/depression   Neuro: AAOx3;  UE 4+/5 R>L, LEs 5/5 R>L          Total time spent: At least 40 minutes, with more than 50% spent counseling/coordinating care   Counseling includes discussion with patient re: progress  and discussion with patient of his/her current medical state/information  Coordination of patient's care was performed in conjunction with primary service  Time invested included review of patient's labs, vitals, and management of their comorbidities with continued monitoring  In addition, this patient was discussed with medical team including physician and advanced extenders  The care of the patient was extensively discussed and appropriate treatment plan was formulated unique for this patient  ** Please Note: Dragon 360 Dictation voice to text software may have been used in the creation of this document   **

## 2019-07-05 NOTE — SOCIAL WORK
VM from [facility], [address], stating that she has a bed for Pt today  Please call, [phone]  PCT [facility], [place], CM will speak with the family, and be in touch  CM spoke with Pt and called his son, Lissett, [phone], and they are both in agreement  PCT [place], [phone], to arrange w/c Viktordie Able for tomorrow morning, 10:45am       CM notified Pt, Pts son, and [facility] staff

## 2019-07-05 NOTE — DISCHARGE INSTRUCTIONS
Discharge instructions  Anterior cervical decompression and fixation/fusion      Surgical incisional care:   Keep incision clean and dry  Avoid applying creams, lotion or antiseptic to incision area   Check the wound daily  If the incision becomes red, swollen, tender, warm, or has increased drainage please notify physician immediately   May shower 3 days after surgery, but do not soak in a tub and no swimming  o Use mild antimicrobial soap and water with a clean washcloth  Pat incision dry after showering and a clean towel daily   Cervical VISTA collar to be worn at all times except for showering  Change from [place] (grey) collar to [place] collar prior to showering  Incision may be cleaned with water and a mild antimicrobial soap using a clean washcloth  Incision is to be gently patted dry with a clean towel  Once dry, collar should be changed back to a VISTA (grey) collar with clean pads in place   Wash collar pads with mild soap and water  They are to be laid flat to dry on a clean towel  Recommend changing every 1-2 days   Please refer to VISTA collar instructions for further details  Activity Restrictions:   No heavy lifting greater than 5 - 10lbs  No strenuous activities   May walk as tolerated  Encourage at least 4 short walks per day   No driving while requiring cervical collar, anticipated six weeks   No significant neck movement   Diet: consider soft minced food with gravy  Recommend small bites with sips of water between  Postoperative medication:   Take pain medications to relieve incision pain, and muscle relaxants to prevent spasms as directed  Please see after visit summary (AVS) for details   Take over the counter stool softeners such as colace or senna-s to avoid constipation while on narcotics  Intake water and fiber intake   Do not take ibuprofen, Naproxen/Aleve or any NSAID until cleared by surgeon  May take Tylenol instead     If taking Coumadin, Aspirin, or Plavix, you may resume these medications when cleared by Neurosurgery  Follow-up 6 weeks after surgery with a repeat cervical spine upright x-rays to be completed prior to visit  **Please notify MD immediately if you experience a fever of 101F, have increased neck or arm pain, new numbness and/or weakness in your arms/hands, difficulty swallowing or breathing especially while lying down, numbness or weakness in your legs  **    DISCHARGE INSTRUCTIONS: Miranda Pino 67 22    Bring these instructions with you to your outpatient physician appointments so they can order and follow-up any additional lab work or imaging recommended at time of discharge  If you have any questions or concerns regarding your acute rehabilitation stay including issues with medications, rehabilitation, and follow-up plan, please call:           [employer] [facility] at [phone] or [phone]  Should you develop fevers, chills, new weakness, changes in sensation, difficulty speaking, facial weakness, confusion, or other concerning symptoms please call 911 and/or obtain transportation to nearest ER immediately  Should you develop worsening pain, swelling, or drainage notify your surgeon right away or obtain transportation to nearest ER for evaluation  PHYSICIANS to see:  Please see your doctors listed in the follow up providers section of your discharge paperwork, and take the discharge paperwork with you to your appointments  LAB WORK to follow-up:  Follow-up lab work at discretion of your facility or outpatient physicians in the future  IMAGING to follow-up:  Follow-up imaging at discretion of your surgeon  WEIGHTBEARING/ACTIVITY PRECAUTIONS to follow:  Weightbearing as tolerated  Cervical Spinal precautions  Please wear your cervical collar at all times until cleared by spine surgery  Driving restrictions:   You are recommended against driving until cleared by an outpatient physician  Driving at current time can increase your risk of injury and can increase risk of injury of others  Work restrictions: You should not operate heavy machinery (if applicable) until cleared by an outpatient physician  Alcohol restrictions: You are recommended to not drink alcohol at this time unless cleared by an outpatient physician  Combining alcohol with your current medications can increase your risk of injury which could be severe  Drinking alcohol in your current functional condition can increase your risk of injury which could be severe  Drinking alcohol given your current health problems can lead to increased medical complications which could be severe  Smoking restrictions: You are recommended to no longer smoke nicotine  Smoking increases your risk of heart attack, stroke, emphysema/COPD, and lung cancer  MEDICATIONS:  Please see a full list of your medications outlined in the After Visit Summary that is attached to these Discharge Instructions  Please note changes may have been made to your medications please refer to your discharge paperwork for your current medications and take this list with you to all your doctors appointments for your doctors to review  Please do not resume a home medication unless the medication reconciliation sheet indicates to do so, please do not assume that a medication that you were given a prescription for is the same as a medication you have at home based on both medications having the same name as dosages and frequency may have changed  Unless specifically noted in your medication list provided to you in your discharge paper work do not resume prior vitamins, minerals, or supplements you may have been taking prior to your hospitalization unless instructed by an outpatient physician in the future  Blood thinners with increased risks of complications      Aspirin instructions  RESUME aspirin daily 81mg unless instructed otherwise by a doctor  This medications was started prior to your recent surgery as recommended by your prior physicians  This medication will need to be managed by your primary care physician after discharge  Follow-up with this provider as soon as possible to ensure appropriate use  This is a blood thinner  It is being recommended for use by you (or your family) to decrease the risk of clotting which can be severely disabling and even life-threatening  Even when provided as recommended it can cause severe disabling and even life-threatening bleeding  Too much or too little of this blood thinner further increases your risk of this medication causing serious and even life-threatening complications such as severe bleeding, clots, strokes, and death  With that said, at this time based on best available evidence and consensus agreement, your physicians recommend you take aspirin based on your overall risks and benefits in your specific medical situation  If you (or your family/caregiver) notice black stools, bloody stools, vomit blood, develop new weakness, slurred speech, confusion or have any other concerning symptoms call 911 or obtain transportation to nearest emergency room immediately  Sedating Medications with increased risk of complications:    Gabapentin has been used to help neuropathic and overall pain  You tolerated this medication adequately during your recent hospital stay  - Take 400 mg 3 times per day  - Do not stop this medication abruptly as this can cause seizures  - If stopping medication I would decrease by 400mg every 5-7 days  - Do not take with alcohol (or marijuana/cannabis) while on this medication as this can cause increased confusion, breathing problems, falls, and severe injury    - Follow-up with your primary care physician, internist, or rehab physician within 2 weeks as well for additional management of your medical conditions, potential refills, or adjustments of this medication  MEDICAL MANAGEMENT AT HOME specific to you:    Diabetes Management:  Please check your blood sugars 4 times daily before meals and at bedtime 2 times daily before breakfast and dinner and record them to provide to your doctors, contact your family doctor as soon as possible for blood sugars higher than 220 or lower than 100  Hypertension Management:  Please check your blood pressure prior to taking your blood pressure medications and keep a log that you will bring with you to your follow-up doctors' appointments  >Please contact your family doctor or cardiologist immediately for a blood pressure below 100/50 and do not take your blood pressure medications until speaking with them  >Please contact your family doctor or cardiologist as soon as possible for blood pressure greater than 160/100  NSAID Warning:  Please avoid NSAID (including but not limited to advil, aleve, motrin, naproxen, ibuprofen, mobic, meloxicam, diclofenac etc) medications as NSAID medications may increase your risk of bleeding (which can be life-threatening)and delay bone healing  Please note a summary of your hospital stay with relevant information for your doctors will try to be sent to them  Please confirm with your doctors at your follow up visits that they have received this summary and have them contact [address] [place] [address] if they have not received them along with any other medical records they may require  Minh Granado Phone Number:  [phone]          Anterior Cervical Discectomy   WHAT YOU NEED TO KNOW:   Anterior cervical discectomy is surgery to remove one or more cervical discs from your neck  A cervical disc is material that cushions and separates the vertebrae of your neck  The discs help your spine support your head and protect your spine from being damaged when you move    DISCHARGE INSTRUCTIONS:   Medicines: · Pain medicine: You may need medicine to take away or decrease pain  ¨ Learn how to take your medicine  Ask what medicine and how much you should take  Be sure you know how, when, and how often to take it  ¨ Do not wait until the pain is severe before you take your medicine  Tell caregivers if your pain does not decrease  ¨ Pain medicine can make you dizzy or sleepy  Prevent falls by calling someone when you get out of bed or if you need help  · Take your medicine as directed  Contact your healthcare provider if you think your medicine is not helping or if you have side effects  Tell him or her if you are allergic to any medicine  Keep a list of the medicines, vitamins, and herbs you take  Include the amounts, and when and why you take them  Bring the list or the pill bottles to follow-up visits  Carry your medicine list with you in case of an emergency  Follow up with your healthcare provider or orthopedic surgeon as directed:  Tell your healthcare provider or orthopedic surgeon if you are having any pain or other symptoms  He may do a physical exam and check your muscle strength and reflexes  You may need tests such as a cervical spine x-ray, CT scan, or MRI to help healthcare providers check the position of each vertebra  The tests will also show if your graft, plates, or screws have moved out of place  Ask how often you should clean your surgical wound and change your bandage  Write down your questions so you remember to ask them during your visits  Activity:  Your healthcare provider or orthopedic surgeon may tell you to take many short walks after your surgery  Walking helps blood move through your body and may help prevent blood clots from forming  If you feel weak or dizzy, sit or lie down right away  Neck brace: You may need to wear a neck brace for a few weeks after your surgery  The brace will support your neck and hold it in the right position while you are healing   Do not stop wearing your neck brace until your healthcare provider says it is okay  Physical therapy:  You may need physical therapy after your surgery  A physical therapist will help you with exercises to decrease pain and improve movement  Physical therapy can also help improve strength in the muscles that support your neck and decrease your risk for loss of function  Contact your healthcare provider or orthopedic surgeon if:   · You have a fever  · You have a cough that does not go away  · The skin around your surgical site is red, warm, or swollen  · You have yellow or bad-smelling fluid coming from your wound  · You have new or worsening trouble when you swallow  · You have new or worsening pain in your neck or arm  · You have worsening hoarseness, or you have trouble speaking  · You have questions or concerns about your condition or care  Seek care immediately or call 911 if:   · Your bandage begins to soak with blood  · Your surgical wound breaks open  · You have painful swelling in your neck and trouble swallowing  · You have new or worsening trouble moving your neck, arms, or legs  · You start leaking urine or bowel movement  · You suddenly feel lightheaded and have trouble breathing  · You have chest pain  You may have more pain when you take a deep breath or cough  You may cough up blood  · Your arm or leg feels warm, tender, and painful  It may look swollen and red  © 2017 [address] Information is for End User's use only and may not be sold, redistributed or otherwise used for commercial purposes  All illustrations and images included in CareNotes® are the copyrighted property of [employer] M [employer]  or Eloy Huggins  The above information is an  only  It is not intended as medical advice for individual conditions or treatments   Talk to your doctor, nurse or pharmacist before following any medical regimen to see if it is safe and effective for you

## 2019-07-05 NOTE — PLAN OF CARE
Problem: Potential for Falls  Goal: Patient will remain free of falls  Description  INTERVENTIONS:  - Assess patient frequently for physical needs  -  Identify cognitive and physical deficits and behaviors that affect risk of falls  -  [place] fall precautions as indicated by assessment   - Educate patient/family on patient safety including physical limitations  - Instruct patient to call for assistance with activity based on assessment  - Modify environment to reduce risk of injury  - Consider OT/PT consult to assist with strengthening/mobility  Outcome: Progressing     Problem: SAFETY ADULT  Goal: Patient will remain free of falls  Description  INTERVENTIONS:  - Assess patient frequently for physical needs  -  Identify cognitive and physical deficits and behaviors that affect risk of falls    -  [place] fall precautions as indicated by assessment   - Educate patient/family on patient safety including physical limitations  - Instruct patient to call for assistance with activity based on assessment  - Modify environment to reduce risk of injury  - Consider OT/PT consult to assist with strengthening/mobility  Outcome: Progressing     Problem: PAIN - ADULT  Goal: Verbalizes/displays adequate comfort level or baseline comfort level  Description  Interventions:  - Encourage patient to monitor pain and request assistance  - Assess pain using appropriate pain scale  - Administer analgesics based on type and severity of pain and evaluate response  - Implement non-pharmacological measures as appropriate and evaluate response  - Consider cultural and social influences on pain and pain management  - Notify physician/advanced practitioner if interventions unsuccessful or patient reports new pain  Outcome: Progressing

## 2019-07-06 LAB
GLUCOSE SERPL-MCNC: 101 MG/DL (ref 65–140)
GLUCOSE SERPL-MCNC: 108 MG/DL (ref 65–140)
GLUCOSE SERPL-MCNC: 129 MG/DL (ref 65–140)
GLUCOSE SERPL-MCNC: 153 MG/DL (ref 65–140)

## 2019-07-06 PROCEDURE — 82948 REAGENT STRIP/BLOOD GLUCOSE: CPT

## 2019-07-06 PROCEDURE — 97116 GAIT TRAINING THERAPY: CPT

## 2019-07-06 PROCEDURE — 97530 THERAPEUTIC ACTIVITIES: CPT

## 2019-07-06 PROCEDURE — 97110 THERAPEUTIC EXERCISES: CPT

## 2019-07-06 PROCEDURE — 94760 N-INVAS EAR/PLS OXIMETRY 1: CPT

## 2019-07-06 PROCEDURE — 97535 SELF CARE MNGMENT TRAINING: CPT

## 2019-07-06 RX ADMIN — POLYVINYL ALCOHOL 1 DROP: 14 SOLUTION/ DROPS OPHTHALMIC at 12:30

## 2019-07-06 RX ADMIN — DILTIAZEM HYDROCHLORIDE 180 MG: 180 CAPSULE, COATED, EXTENDED RELEASE ORAL at 08:33

## 2019-07-06 RX ADMIN — MELATONIN 3 MG: at 21:43

## 2019-07-06 RX ADMIN — GABAPENTIN 400 MG: 400 CAPSULE ORAL at 21:43

## 2019-07-06 RX ADMIN — HEPARIN SODIUM 5000 UNITS: 5000 INJECTION INTRAVENOUS; SUBCUTANEOUS at 06:05

## 2019-07-06 RX ADMIN — PRAVASTATIN SODIUM 10 MG: 10 TABLET ORAL at 17:46

## 2019-07-06 RX ADMIN — INSULIN LISPRO 1 UNITS: 100 INJECTION, SOLUTION INTRAVENOUS; SUBCUTANEOUS at 17:47

## 2019-07-06 RX ADMIN — POLYVINYL ALCOHOL 1 DROP: 14 SOLUTION/ DROPS OPHTHALMIC at 21:46

## 2019-07-06 RX ADMIN — HEPARIN SODIUM 5000 UNITS: 5000 INJECTION INTRAVENOUS; SUBCUTANEOUS at 21:44

## 2019-07-06 RX ADMIN — TAMSULOSIN HYDROCHLORIDE 0.4 MG: 0.4 CAPSULE ORAL at 17:46

## 2019-07-06 RX ADMIN — FLUTICASONE FUROATE AND VILANTEROL TRIFENATATE 1 PUFF: 100; 25 POWDER RESPIRATORY (INHALATION) at 08:33

## 2019-07-06 RX ADMIN — LOSARTAN POTASSIUM 100 MG: 50 TABLET, FILM COATED ORAL at 08:30

## 2019-07-06 RX ADMIN — DOCUSATE SODIUM 100 MG: 100 CAPSULE, LIQUID FILLED ORAL at 17:46

## 2019-07-06 RX ADMIN — THIAMINE HCL TAB 100 MG 50 MG: 100 TAB at 08:37

## 2019-07-06 RX ADMIN — OXYBUTYNIN CHLORIDE 5 MG: 5 TABLET, EXTENDED RELEASE ORAL at 08:29

## 2019-07-06 RX ADMIN — NICOTINE 21 MG: 21 PATCH, EXTENDED RELEASE TRANSDERMAL at 08:31

## 2019-07-06 RX ADMIN — PANTOPRAZOLE SODIUM 40 MG: 40 TABLET, DELAYED RELEASE ORAL at 06:05

## 2019-07-06 RX ADMIN — GABAPENTIN 400 MG: 400 CAPSULE ORAL at 15:22

## 2019-07-06 RX ADMIN — GABAPENTIN 400 MG: 400 CAPSULE ORAL at 06:05

## 2019-07-06 RX ADMIN — VITAMIN D, TAB 1000IU (100/BT) 2000 UNITS: 25 TAB at 08:29

## 2019-07-06 RX ADMIN — METFORMIN HYDROCHLORIDE 500 MG: 500 TABLET ORAL at 08:30

## 2019-07-06 RX ADMIN — DULOXETINE HYDROCHLORIDE 60 MG: 60 CAPSULE, DELAYED RELEASE ORAL at 08:30

## 2019-07-06 RX ADMIN — HEPARIN SODIUM 5000 UNITS: 5000 INJECTION INTRAVENOUS; SUBCUTANEOUS at 15:22

## 2019-07-06 RX ADMIN — POLYVINYL ALCOHOL 1 DROP: 14 SOLUTION/ DROPS OPHTHALMIC at 17:47

## 2019-07-06 RX ADMIN — POLYVINYL ALCOHOL 1 DROP: 14 SOLUTION/ DROPS OPHTHALMIC at 08:35

## 2019-07-06 RX ADMIN — DOCUSATE SODIUM 100 MG: 100 CAPSULE, LIQUID FILLED ORAL at 08:30

## 2019-07-06 NOTE — PROGRESS NOTES
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; s/p C3/4 ACDF with C4 hemicorpectomy, C3-4 anterior instrumentation/fusion 6/18/19 Ervin Alfred):  continue cervical collar; s/p Clindamycin for infection prophylaxis  Pain control per primary service  Had previously been on Cymbalta for neuropathic sx  Discharge changed until tomorrow due to transportation issues      DM2: at home he takes Kombiglyze XR 5-1000mg qd and no insulin  Continue Metformin 500mg daily = has good control currently  Continue DM diet and Accuchecks/QID with SSI      HTN:  at home, takes Cardizem 180mg qd, Losartan 100mg qd (was not on Norvasc he says) = continue same here; stable     COPD; hx lung cancer/VIVIANA lobectomy, pulmonary nodules/cough: follows with pulm as OP and takes Advair 250-50 one puff BID/Spiriva one puff daily and prn Combivent  On Breo here as substitute for Advair; continue Spiriva and use Albuterol inhaler prn  Has intermitt cough but not bothersome  Had CT chest in April for lung nodule surveillance = for repeat 1 yr  Will watch     SHIVANI: supposed to use CPAP but has been noncompliant     HLD: at home on Pravachol     Urinary incontinence/BPH/urgency: continue Flomax, Oxybutynin ER 5mg qd; Uro did see here         Subjective/ HPI:  Patients overnight issues or events were reviewed with nursing or staff during rounds or morning huddle session  No new or overnight issues  Post-op pain:  Currently well controlled  DM stable, HTN: stable on current tx  Patient denies any current complaints      ROS:     GI: denies abdominal pain, change bowel habits or reflux symptoms  Neuro: Denies any headache, new vision changes, new neuropathies,new weaknesses   Respiratory: No Cough, SOB, denies wheeze  Cardiovascular: No CP, palpitations , denies perception of rapid heartbeat  : denies any new urinary burning or frequency    Review of Scheduled Meds:    Current Facility-Administered Medications:  acetaminophen 650 mg Oral TID PRN Denisa Domínguez MD   albuterol 2 puff Inhalation Q4H PRN Denisa Domínguez MD   bisacodyl 10 mg Rectal Daily PRN Denisa Domínguez MD   cholecalciferol 2,000 Units Oral Daily Denisa Domínguez MD   diltiazem 180 mg Oral Daily Anice Patient, CRNP   docusate sodium 100 mg Oral BID Denisa Domínguez MD   DULoxetine 60 mg Oral Daily Denisa Domínguez MD   fluticasone-vilanterol 1 puff Inhalation Daily Denisa Domínguez MD   gabapentin 400 mg Oral HS Denisa Domínguez MD   gabapentin 400 mg Oral BID Denisa Domínguez MD   heparin (porcine) 5,000 Units Subcutaneous [facility] Denisa Domínguez MD   hydrALAZINE 25 mg Oral Q8H PRN Anice Patient, CRNP   insulin lispro 1-5 Units Subcutaneous TID [facility] Denisa Domínguez MD   insulin lispro 1-5 Units Subcutaneous HS Denisa Domínguez MD   lidocaine  Topical Q4H PRN Denisa Domínguez MD   losartan 100 mg Oral Daily Denisa Domínguez MD   melatonin 3 mg Oral HS Mery Grewal PA-C   metFORMIN 500 mg Oral Daily With Breakfast Anice Patient, CRNP   nicotine 21 mg Transdermal Daily Mery Grewal PA-C   ondansetron 4 mg Intravenous Q6H PRN Denisa Domínguez MD   oxybutynin 5 mg Oral Daily Diya Tyson, SONJA   pantoprazole 40 mg Oral Daily Before Breakfast Denisa Domínguez MD   polyethylene glycol 17 g Oral Daily PRN Denisa Domínguez MD   polyethylene glycol 17 g Oral Daily Denisa Domínguez MD   polyvinyl alcohol 1 drop Both Eyes 4x Daily Denisa Domínguez MD   polyvinyl alcohol 1 drop Both Eyes Q1H PRN Denisa Domínguez MD   pravastatin 10 mg Oral Daily With Mayra Plunkett MD   senna 1 tablet Oral Daily Denisa Domínguez MD   tamsulosin 0 4 mg Oral After Nicki Bah MD   thiamine 50 mg Oral Daily Denisa Domínguez MD   tiotropium 18 mcg Inhalation Daily Diana Vergara MD       Labs:     Results from last 7 days   Lab Units 07/04/19  0609 07/01/19  0948   WBC Thousand/uL 4 19* 4 40   HEMOGLOBIN g/dL 13 6 14 1   HEMATOCRIT % 41 9 43 7   PLATELETS Thousands/uL 176 189     Results from last 7 days   Lab Units 07/04/19  0609 07/01/19  0611   SODIUM mmol/L 139 136   POTASSIUM mmol/L 4 3 4 3   CHLORIDE mmol/L 104 103   CO2 mmol/L 32 31   BUN mg/dL 19 19   CREATININE mg/dL 0 76 0 80   CALCIUM mg/dL 10 4* 10 1                  Results from last 7 days   Lab Units 07/06/19  1049 07/06/19  0632 07/05/19  2118   POC GLUCOSE mg/dl 108 129 131       Imaging:     No orders to display       *Labs reviewed  *Radiology studies reviewed  *Medications reviewed and reconciled as needed  *Please refer to order section for additional ordered labs studies  *Case discussed with primary attending during morning huddle case rounds    Physical Examination:  Vitals:   Vitals:    07/05/19 1332 07/05/19 2047 07/05/19 2221 07/06/19 0500   BP: 130/84 136/83  159/88   BP Location: Left arm Right arm  Right arm   Pulse: 78 73  80   Resp: 18 20  18   Temp: 98 8 °F (37 1 °C) 98 2 °F (36 8 °C)  97 9 °F (36 6 °C)   TempSrc: Oral Oral  Oral   SpO2: 98% 99% 99% 100%   Weight:       Height:           General Appearance: NAD, conversive  Eyes: No icterus; conjunctiva normal  HENT: oropharynx clear; mucous membranes moist  Neck: collar on  Lungs: CTA, normal respiratory effort, no retractions, expiratory effort normal  CV: regular rate, no rubs/murmurs/gallops  ABD: soft; ND/NT; +BS  EXT: no edema  Skin: normal turgor, normal texture, no rashes  Psych: affect normal, No anxiety/depression   Neuro: AAOx3;  UE 4+/5 R>L, LEs 5/5 R>L          Total time spent: At least 40 minutes, with more than 50% spent counseling/coordinating care  Counseling includes discussion with patient re: progress  and discussion with patient of his/her current medical state/information   Coordination of patient's care was performed in conjunction with primary service  Time invested included review of patient's labs, vitals, and management of their comorbidities with continued monitoring  In addition, this patient was discussed with medical team including physician and advanced extenders  The care of the patient was extensively discussed and appropriate treatment plan was formulated unique for this patient  ** Please Note: Dragon 360 Dictation voice to text software may have been used in the creation of this document   **

## 2019-07-06 NOTE — PROGRESS NOTES
07/06/19 1250   Pain Assessment   Pain Assessment No/denies pain   Pain Score No Pain   Restrictions/Precautions   Precautions Bed/chair alarms; Fall Risk;Supervision on toilet/commode;Spinal precautions  (C/S collar)   Braces or Orthoses C/S Collar   QI: Upper Body Dressing   Assistance Needed Supervision   Assistance Provided by [place] No physical assistance   Comment Pt able to doff/don pullover shirt and manage around C/S collar adhereing to precautions 100% of the time at Sup level  Upper Body Dressing CARE Score 4   QI: Lower Body Dressing   Assistance Needed Incidental touching   Assistance Provided by [place] Less than 25%   Comment CGA for doffing/donning pants over hips due to balance  Lower Body Dressing CARE Score 3   QI: Putting On/Taking Off Footwear   Assistance Needed Supervision   Assistance Provided by [place] No physical assistance   Comment cross leg technique with increased timeliness to complete task   Putting On/Taking Off Footwear CARE Score 4   Dressing/Undressing Clothing   Remove UB Clothes Pullover Shirt   Remove LB Clothes Pants; Undergarment;Socks; Shoes   Don UB Clothes Pullover Shirt   Don LB Clothes Pants; Undergarment;Socks; Shoes   Limitations Noted In Endurance; Safety;Timeliness   UB Dressing (FIM) 5 - Patient requires supervision/monitoring   LB Dressing (FIM) 4 - Patient requires steadying assist or light touching   QI: Sit to Stand   Assistance Needed Incidental touching   Assistance Provided by [place] Less than 25%   Comment CGA with RW requiring verbal cues for RW safety   Sit to Stand CARE Score 3   QI: Chair/Bed-to-Chair Transfer   Assistance Needed Incidental touching   Assistance Provided by [place] Less than 25%   Comment CGA with RW requiring verbal cues for RW safety   Chair/Bed-to-Chair Transfer CARE Score 3   Transfer Bed/Chair/Wheelchair   Bed, Chair, Wheelchair Transfer (FIM) 4 - Patient requires steadying assist or light touching   QI: [address] Assistance Needed Incidental touching   Assistance Provided by [place] Less than 25%   Comment CGA for clothing management in stance   Prabhjot Candelario Vei [address] Score 3   Toileting   Able to [facility] Bladder   Toileting (FIM) 4 - Patient requires steadying assist or light touching   QI: Toilet Transfer   Assistance Needed Incidental touching   Assistance Provided by [place] Less than 25%   Toilet Transfer CARE Score 3   Toilet Transfer   Toilet Transfer (FIM) 4 - Patient requires steadying assist or light touching   Cognition   Orientation Level Oriented X4   Assessment   Treatment Assessment Pt participated in 30 min OT session with fair activity tolerance with focus on ADL training and therapeutic activities  Pt seated in room upon therapist entering room  Pt agreeable to participate in OT session  Pt stated he wanted to change into new outfit secondary to not being d/c today  Pt participated in UB dressing at Sup level, and LB dressing at [facility] 62 level for doffing/donning pants over hips secondary to decreased dynamic standing balance  Pt following activity pt participated in functional mobility with RW to therapy gym  Pt participated in [facility] activity (nuts and bolts) secondary to pt reporting decreased ability to move L hand at times with difficulties noted in buttoning  Pt able to complete [facility] task with mild difficulties with pt reporting improved ability to coordinate hand  Pt stated he needed to utilize bathroom prior to returning to room at this time  Pt participated in toileting routine, standing to urinate at [facility] 62 level with fair dynamic standing balance noted  Pt participated in washing hands following activity standing at sinkside at [facility] 62 level  Pt participated in functional mobility back to bedroom at [place] [id number] level with RW  Pt seated in bedside recliner chair with tray table, call bell, bed/chair alarm on, and phone within reach   Pt throughout session required CGA for standing balance secondary to decreased dynamic standing balance and min verbal cues for RW management/safety limiting pt's IND in ADL tasks  Pt would benefit from continued OT services to progress pt through [address] to meet established OT goals  Per RN pt expected d/c 07/07/2019 to skilled nursing facility for continued rehab at this time for safe d/c  Prognosis Fair   Problem List Decreased strength;Decreased endurance; Impaired balance;Decreased mobility; Decreased coordination;Decreased safety awareness   Barriers to Discharge Inaccessible home environment;Decreased caregiver support   Recommendation   OT Discharge Recommendation Short Term Rehab   OT Therapy Minutes   OT Time In 1250   OT Time Out 1320   OT Total Time (minutes) 30   OT Mode of treatment - Individual (minutes) 30   OT Mode of treatment - Concurrent (minutes) 0   OT Mode of treatment - Group (minutes) 0   OT Mode of treatment - Co-treat (minutes) 0   OT Mode of Teatment - Total time(minutes) 30 minutes   Therapy Time missed   Time missed?  No

## 2019-07-06 NOTE — PROGRESS NOTES
[facility] called me back & they have availability to  pt Sunday 7/7/19 at 0930  Pt notified family   Primary nurse Geof notified Lucian Almanza,  Dr Sushila Longoria , Dr Ling Avalos  notified

## 2019-07-06 NOTE — PROGRESS NOTES
07/06/19 1400   Pain Assessment   Pain Assessment 0-10   Pain Score No Pain   Restrictions/Precautions   Precautions Bed/chair alarms; Fall Risk;Supervision on toilet/commode   Braces or Orthoses C/S Collar   Subjective   Subjective pt agreeable to perform skilled PT    QI: Sit to Stand   Assistance Needed Supervision   Sit to Stand CARE Score 4   QI: Chair/Bed-to-Chair Transfer   Assistance Needed Supervision   Chair/Bed-to-Chair Transfer CARE Score 4   Transfer Bed/Chair/Wheelchair   Limitations Noted In Balance; Coordination; Endurance   Stand Pivot Supervision   Sit to Stand Supervision   Stand to W  ESTEFANIA [place], Chair, Wheelchair Transfer (FIM) 5 - Patient requires supervision/monitoring   QI: Walk 10 Feet   Assistance Needed Supervision   Walk 10 Feet CARE Score 4   QI: Walk 50 Feet with Two Turns   Assistance Needed Supervision   Walk 50 Feet with Two Turns CARE Score 4   QI: Walk 150 Feet   Assistance Needed Supervision   Walk 150 Feet CARE Score 4   Ambulation   Does the patient walk? 2  Yes   Primary Discharge Mode of Locomotion Walk   Walk Assist Level Close Supervision;Supervision   Gait Pattern Inconsistant Analisa; Slow Analisa;Decreased foot clearance; Improper weight shift;Narrow LILLIE   Assist Device Hamer Renee Kent Walked (feet) 300 ft   Limitations Noted In Device Management;Balance   Findings ambulating with RW    Walking (FIM) 5 - Patient requires supervision/monitoring AND distance 150 feet or more, no rest   Wheelchair mobility   QI: Does the patient use a wheelchair? 0  No   Therapeutic Interventions   Strengthening STS transfers recliner to RW    Balance static balance in standing    Equipment Use   NuStep 25 min level 3    Assessment   Treatment Assessment pt progressing with endurance on Nu Step bike as above , S level with ambulation and transfers ngoc SPT with RW , instructed with C - collar for safety and posture    pt will cont to benefit with skilled PT awaiting placement for SNF   Barriers to Discharge Inaccessible home environment;Decreased caregiver support   Plan   Treatment/Interventions Functional transfer training;LE strengthening/ROM; Therapeutic exercise; Endurance training;Gait training;Patient/family training   Progress Progressing toward goals   PT Therapy Minutes   PT Time In 1400   PT Time Out 1430   PT Total Time (minutes) 30   PT Mode of treatment - Individual (minutes) 30   PT Mode of treatment - Concurrent (minutes) 0   PT Mode of treatment - Group (minutes) 0   PT Mode of treatment - Co-treat (minutes) 0   PT Mode of Teatment - Total time(minutes) 30 minutes   Therapy Time missed   Time missed?  No

## 2019-07-06 NOTE — PROGRESS NOTES
Spoke to paco regarding the late p/u that was set for [phone] going w/c Geoffery [facility]  They do not have it scheduled & does not have any availability today  I reached out to  Case management [employer]

## 2019-07-07 ENCOUNTER — TELEPHONE (OUTPATIENT)
Dept: OTHER | Facility: OTHER | Age: 79
End: 2019-07-07

## 2019-07-07 VITALS
WEIGHT: 136.69 LBS | HEIGHT: 69 IN | TEMPERATURE: 97.9 F | SYSTOLIC BLOOD PRESSURE: 148 MMHG | RESPIRATION RATE: 18 BRPM | BODY MASS INDEX: 20.24 KG/M2 | OXYGEN SATURATION: 100 % | DIASTOLIC BLOOD PRESSURE: 71 MMHG | HEART RATE: 78 BPM

## 2019-07-07 LAB — GLUCOSE SERPL-MCNC: 112 MG/DL (ref 65–140)

## 2019-07-07 PROCEDURE — 82948 REAGENT STRIP/BLOOD GLUCOSE: CPT

## 2019-07-07 PROCEDURE — 99239 HOSP IP/OBS DSCHRG MGMT >30: CPT

## 2019-07-07 RX ADMIN — GABAPENTIN 400 MG: 400 CAPSULE ORAL at 06:02

## 2019-07-07 RX ADMIN — POLYVINYL ALCOHOL 1 DROP: 14 SOLUTION/ DROPS OPHTHALMIC at 08:16

## 2019-07-07 RX ADMIN — METFORMIN HYDROCHLORIDE 500 MG: 500 TABLET ORAL at 08:13

## 2019-07-07 RX ADMIN — OXYBUTYNIN CHLORIDE 5 MG: 5 TABLET, EXTENDED RELEASE ORAL at 08:13

## 2019-07-07 RX ADMIN — FLUTICASONE FUROATE AND VILANTEROL TRIFENATATE 1 PUFF: 100; 25 POWDER RESPIRATORY (INHALATION) at 08:16

## 2019-07-07 RX ADMIN — NICOTINE 21 MG: 21 PATCH, EXTENDED RELEASE TRANSDERMAL at 08:13

## 2019-07-07 RX ADMIN — DULOXETINE HYDROCHLORIDE 60 MG: 60 CAPSULE, DELAYED RELEASE ORAL at 08:13

## 2019-07-07 RX ADMIN — LOSARTAN POTASSIUM 100 MG: 50 TABLET, FILM COATED ORAL at 08:13

## 2019-07-07 RX ADMIN — VITAMIN D, TAB 1000IU (100/BT) 2000 UNITS: 25 TAB at 08:13

## 2019-07-07 RX ADMIN — PANTOPRAZOLE SODIUM 40 MG: 40 TABLET, DELAYED RELEASE ORAL at 06:02

## 2019-07-07 RX ADMIN — THIAMINE HCL TAB 100 MG 50 MG: 100 TAB at 08:14

## 2019-07-07 RX ADMIN — DILTIAZEM HYDROCHLORIDE 180 MG: 180 CAPSULE, COATED, EXTENDED RELEASE ORAL at 08:15

## 2019-07-07 RX ADMIN — HEPARIN SODIUM 5000 UNITS: 5000 INJECTION INTRAVENOUS; SUBCUTANEOUS at 06:01

## 2019-07-07 NOTE — NURSING NOTE
Pt d/'d to [facility], report called and instructions faxed  Pt verbalized no pain or discomfort at this time  Vista collar on and align

## 2019-07-07 NOTE — TELEPHONE ENCOUNTER
[address] [facility], [address] [place] [address]/Sondra Reyes/[phone]/Steven During/1940/Medicaiton Check

## 2019-07-08 ENCOUNTER — NURSING HOME VISIT (OUTPATIENT)
Dept: GERIATRICS | Facility: OTHER | Age: 79
End: 2019-07-08
Payer: MEDICARE

## 2019-07-08 ENCOUNTER — TELEPHONE (OUTPATIENT)
Dept: NEUROSURGERY | Facility: CLINIC | Age: 79
End: 2019-07-08

## 2019-07-08 ENCOUNTER — TRANSITIONAL CARE MANAGEMENT (OUTPATIENT)
Dept: INTERNAL MEDICINE CLINIC | Facility: CLINIC | Age: 79
End: 2019-07-08

## 2019-07-08 DIAGNOSIS — I10 ESSENTIAL HYPERTENSION: Chronic | ICD-10-CM

## 2019-07-08 DIAGNOSIS — J43.9 PULMONARY EMPHYSEMA, UNSPECIFIED EMPHYSEMA TYPE (HCC): Chronic | ICD-10-CM

## 2019-07-08 DIAGNOSIS — G95.9 CERVICAL MYELOPATHY (HCC): Primary | ICD-10-CM

## 2019-07-08 DIAGNOSIS — Z78.9 IMPAIRED MOBILITY AND ADLS: ICD-10-CM

## 2019-07-08 DIAGNOSIS — Z74.09 IMPAIRED MOBILITY AND ADLS: ICD-10-CM

## 2019-07-08 DIAGNOSIS — E11.69 HYPERLIPIDEMIA ASSOCIATED WITH TYPE 2 DIABETES MELLITUS (HCC): Chronic | ICD-10-CM

## 2019-07-08 DIAGNOSIS — M48.02 DEGENERATIVE CERVICAL SPINAL STENOSIS: Chronic | ICD-10-CM

## 2019-07-08 DIAGNOSIS — M47.22 OSTEOARTHRITIS OF SPINE WITH RADICULOPATHY, CERVICAL REGION: Chronic | ICD-10-CM

## 2019-07-08 DIAGNOSIS — E78.5 HYPERLIPIDEMIA ASSOCIATED WITH TYPE 2 DIABETES MELLITUS (HCC): Chronic | ICD-10-CM

## 2019-07-08 LAB — GLUCOSE SERPL-MCNC: 126 MG/DL (ref 65–140)

## 2019-07-08 PROCEDURE — 99306 1ST NF CARE HIGH MDM 50: CPT | Performed by: FAMILY MEDICINE

## 2019-07-08 NOTE — TELEPHONE ENCOUNTER
Called Felipe MARTINO During on primary contact number after surgery 6/18/2019 to check in on recovery and provide post surgical instructions  Line rang 10 times before disconnecting without the option to leave a voicemail  Will make another attempt to reach patient later today or tomorrow

## 2019-07-08 NOTE — CASE MANAGEMENT
Team Discharge Summary  Pt made fair progress during stay, but did not achieve mod I goals necessary to return home safely  Pt was transferred to SNF at [facility] for further therapy  Pts family participated in the plan, and are aware of Pt functional ability

## 2019-07-08 NOTE — CONSULTS
Consultation/Progress note - Ivonne Ramon During 70 y o  male MRN: [id number]    Unit/Bed#: -01 Encounter: [id number]      Assessment/Plan     Assessment:  CLINICAL PROGRESS NOTE    Date: 6/27/2019  Start Time: 10:30am  Stop Time: 11:30am    Time: 60 minutes  CPT Code: 25083    Appearance  __x__Neat ____Disheveled ____Overweight ____Underweight ____Frail    Speech  __x__Fluid, Articulate __x__Spontaneous ____Circumlocutions ____Word Finding difficulties  ____Dysarthria ____Circumstantial ____Paucity ____Perseverative  ____Pressured ____ Tangential     Thought Process/Content  __x__Coherent____Preoccupations____Ruminations____Obsessions____Hallucinations  ____Delusions ____Flight of Ideas ____Distortions ____Distracted ____Suicidal Ideation  ____Homicidal Ideation ____ Memory Issues ____ Perseveration ____ Tangential    Interaction  __x__Engaged__x__Cooperative____Superficial____Detached____Fearful____Guarded  ____Suspicious ____Poor Boundaries ____Aggressive    Affect  __x__Neutral____Positive__x__Anxiety____Worried____Irritable____Angry____Depressed/Dysphoric  __x__Euthymic ___x__ Tearful ____ Fatigued     Behavior  ___x_Spontaneous __x__Purposeful ____Slowed Initiation ____Apathetic ____Impulsive  ____Dyscontrolled ____Hypoactive ____Hyperactive ____Repetitive/Perseverative      BMI: 20 18 kg/m²    Functional Status, Objectives, Interventions, and Assessment:  Pt participated in psychology services to address coping skills, adjustment, and motivation in rehab  See goals and interventions addressed during session in treatment plan below  Discussed progress on rehab goals; explored pt reaction to stress and family issues; psychoeducation related to stress and illness provided  Reviewed cognitive-behavioral coping skills to help pt manage mood  Provided supportive counseling  Pt continues to address goals in rehab sessions and in therapy  Dx: F43 23 Adjustment disorder with depressed and anxious mood  Overall Progress:  ____Very Declined ____Declined __x__Stable ____Improved ____Very Improved    Motivation and Participation:  ____Very Poor ____Poor ____Fair ____Good __x__Very Good    Projected Number of Sessions to Attain Goals: 8    Date of Next Session (if confirmed):    Continued Treatment is medically necessary to:  __x__Maintain current progress  __x__Prevent regression  ____Prevent hospitalization  ____Prevent relapse    Medications:  __x__ Reviewed current prescribed medications and there are no changes in dosage, frequency, method of administration since last appointment  (see below)  __x__ Reviewed current OTC medications and there are no changes in dosage, frequency, method of administration since last appointment  (see below)  __x__ Reviewed current herbal supplements and vitamins and there are no changes in dosage, frequency, that of administration since last appointment  (see below)  Depression Screen: GDS= 0/15 (no depressed mood noted)  Unhealthy Alcohol Use Screen: CAGE= 0/4      Plan:  TREATMENT PLAN AND RECOMMENDATIONS    1-Treatment Modality:   __x__CBT ____Cognitive Rehabilitation __x__Integrative ____Psychodynamic ____Behavioral____ Existential ____Health and Behavior ____Biofeedback ____Addictions Treatment ____Marital and Family ____Other:    Frequency: Sessions weekly and PRN while pt is at [facility]  2-Treatment Plan and Goals:    Problem: Adjustment disorder with depressed and anxious mood related to cervical myelopathy/spinal surgery  Goal: Pt will address mood disturbance and improve mood as evidenced by pt participation in session, therapist observation, and pt report  Intervention: 1, 16, 19, 22, 42, 51     Target Date: 7/25/2019        Problem: Improve coping skills      Goal: Pt will identify three CBT coping skills each session  Intervention: 1, 7, 10, 14, 24, 25, 42, 43, 51     Target Date: 7/25/2019        Problem: Motivation in rehab sessions  Goal: Pt will participate in rehab sessions three hours per day, and address rehab goals as evidenced by pt and treatment team report  Intervention: 1, 20, 21, 31, 35, 38, 49, 50, 51     Target Date: 7/25/2019       3-Discharge criteria:  ____ Treatment Goals Reached __x__Maximum Benefit Achieved ____Health Condition Improved    4-Further Evaluation: N/A  ____Psychological ____Neuropsychological ____Educational ____Career ____Other:    5-Referrals:   ____Medical Specialist ____Psychiatry ____Support Group ____Self-Help ____Community Resources __x__Other:  pt treatment team consists of attending physiatrist, advanced practitioner, PT, OT, SLP, nursing, case management, and other specialists PRN  6-Treatment plan and recommendations discussed and patient understands and consents to treatment:  ___x_Yes ____No    7-I have communicated the results of this evaluation with the patient PCP or referring physician:  __x__Yes ____No (Explain why or why not)      Interventions Code Key*      1  Affect Identification & Expression 14  Coping Skills Training 27  Journal Assignments 40  Progressive Muscle Relaxation   2  Anger Management 15  Crisis Management 28  Journaling 41  Psychodrama   3  Assertiveness Training 16  Critical Incident Debriefing 29  Lifestyle management 42  Psychoeducation   4  Assigned Readings 17  Decision Option 30  Memory Enhancement Training 43  Refuting Irrational Thoughts   5  Assigned tasks 18  Engage Sig Others in Treatment 31  Motivational Interview 44  Reminiscence Techniques   6  Autogenic Training 19  Exploration 32  Motivational Interviewing 45  Role Play Techniques   7  Breathing Exercises 20  Facilitate decision making 33  Organizational Skills Training 46  Self-Hypnosis   8  CBT-I 21  Goal setting & time management 34  Other (specify) 47  Self-Other Boundaries Training   9  Cognitive Rehabilitation 22  Grief Work 35  Pain Management 48  Social Skills Training   10  Cognitive Restructuring 23  Guided Imagery 36  Parent Training 49  Solution Focused Techniques   11  Communication Training 24  Identify Automatic Thoughts 37  Pattern Identification & Interruption 50  Stress Management   12  Community support group 25  Identify Cognitive Distortions 38  Preventative strategies 51  Supportive Therapy   13  Conflict Resolution Skills 26  Job stress management 39  Problem Solving Skills Training 52  Thought stopping Techniques     Consults    Review of Systems    Historical Information   Past Medical History:   Diagnosis Date    BPH (benign prostatic hyperplasia)     Cancer ([address] ) 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    Chemical exposure     9/11/01- worked in [employer]   COPD [facility]chronic obstructive pulmonary disease) ([facility]     CPAP (continuous positive airway pressure) dependence     DDD (degenerative disc disease), cervical     Diabetes mellitus (HCC)     Foraminal stenosis of cervical region     Hyperlipidemia     Hypertension     SHIVANI (obstructive sleep apnea)     Overactive bladder      Past Surgical History:   Procedure Laterality Date    ARTHROSCOPY KNEE Bilateral     COLONOSCOPY      LUNG SURGERY Left     scraping of left    DC ARTHRODESIS ANT INTERBODY INC DISCECTOMY, CERVICAL BELOW C2 Bilateral 6/18/2019    Procedure: C3/4  ACDF WITH  C4 HEMICORPECTOMY, C3-4 ANTERIOR INSTRUMENTATION AND FUSION (NEUROMONITORING);   Surgeon: Jaja Marmolejo MD;  Location: [facility];  Service: Neurosurgery    ROTATOR CUFF REPAIR Bilateral      Social History   Social History     Substance and Sexual Activity   Alcohol Use Not Currently     Social History     Substance and Sexual Activity   Drug Use No     Social History     Tobacco Use   Smoking Status Current Every Day Smoker    Years: 61 00    Types: Pipe   Smokeless Tobacco Never Used   Tobacco Comment    very light smoker   Meds/Allergies No Known Allergies      acetaminophen (TYLENOL) tablet 650 mg  650 mg Oral TID    albuterol (PROVENTIL HFA,VENTOLIN HFA) inhaler 2 puff  2 puff Inhalation Q4H PRN    bisacodyl (DULCOLAX) rectal suppository 10 mg  10 mg Rectal Daily PRN    bisacodyl (DULCOLAX) rectal suppository 10 mg  10 mg Rectal Once    cholecalciferol (VITAMIN D3) tablet 2,000 Units  2,000 Units Oral Daily    clindamycin (CLEOCIN) capsule 300 mg  300 mg Oral Q8H [address]    diltiazem (CARDIZEM CD) 24 hr capsule 120 mg  120 mg Oral Daily    docusate sodium (COLACE) capsule 100 mg  100 mg Oral BID    DULoxetine (CYMBALTA) delayed release capsule 60 mg  60 mg Oral Daily    fluticasone-vilanterol (BREO ELLIPTA) 100-25 mcg/inh inhaler 1 puff  1 puff Inhalation Daily    gabapentin (NEURONTIN) capsule 300 mg  300 mg Oral BID    gabapentin (NEURONTIN) capsule 400 mg  400 mg Oral HS    heparin (porcine) subcutaneous injection 5,000 Units  5,000 Units Subcutaneous Q8H [address]    hydrALAZINE (APRESOLINE) tablet 25 mg  25 mg Oral Q8H PRN    insulin lispro (HumaLOG) 100 units/mL subcutaneous injection 1-5 Units  1-5 Units Subcutaneous TID AC    insulin lispro (HumaLOG) 100 units/mL subcutaneous injection 1-5 Units  1-5 Units Subcutaneous HS    lidocaine (URO-JET) 2 % urethral/mucosal gel   Topical Q4H PRN    losartan (COZAAR) tablet 100 mg  100 mg Oral Daily    melatonin tablet 3 mg  3 mg Oral HS    metFORMIN (GLUCOPHAGE) tablet 500 mg  500 mg Oral Daily With Breakfast    nicotine (NICODERM CQ) 21 mg/24 hr TD 24 hr patch 21 mg  21 mg Transdermal Daily    ondansetron (ZOFRAN) injection 4 mg  4 mg Intravenous Q6H PRN    oxybutynin (DITROPAN-XL) 24 hr tablet 5 mg  5 mg Oral Daily    oxyCODONE (ROXICODONE) immediate release tablet 10 mg  10 mg Oral Q4H PRN    oxyCODONE (ROXICODONE) IR tablet 5 mg  5 mg Oral Q4H PRN    pantoprazole (PROTONIX) EC tablet 40 mg  40 mg Oral Daily Before Breakfast    polyethylene glycol (MIRALAX) packet 17 g  17 g Oral Daily PRN    polyethylene glycol (MIRALAX) packet 17 g  17 g Oral Daily    polyvinyl alcohol (LIQUIFILM TEARS) 1 4 % ophthalmic solution 1 drop  1 drop Both Eyes 4x Daily    polyvinyl alcohol (LIQUIFILM TEARS) 1 4 % ophthalmic solution 1 drop  1 drop Both Eyes Q1H PRN    pravastatin (PRAVACHOL) tablet 10 mg  10 mg Oral Daily With Dinner    senna (SENOKOT) tablet 8 6 mg  1 tablet Oral Daily    tamsulosin (FLOMAX) capsule 0 4 mg  0 4 mg Oral After Dinner    thiamine (VITAMIN B1) tablet 50 mg  50 mg Oral Daily    tiotropium (SPIRIVA) capsule for inhaler 18 mcg  18 mcg Inhalation Daily       Objective   Vitals: Blood pressure 148/71, pulse 78, temperature 97 9 °F (36 6 °C), temperature source Oral, resp  rate 18, height 5' 9" (1 753 m), weight 62 kg (136 lb 11 oz), SpO2 100 %      Physical Exam

## 2019-07-08 NOTE — CONSULTS
Consultation/Progress note - Dajuan Zamarripa During 70 y o  male MRN: [id number]    Unit/Bed#: -01 Encounter: [id number]      Assessment/Plan     Assessment:  CLINICAL PROGRESS NOTE     Date: 7/1/2019  Start Time: 11:00am  Stop Time: 12:00pm     Time: 60 minutes       CPT Code: 86009     Appearance  __x__Neat ____Disheveled ____Overweight ____Underweight ____Frail     Speech  __x__Fluid, Articulate __x__Spontaneous ____Circumlocutions ____Word Finding difficulties  ____Dysarthria ____Circumstantial ____Paucity ____Perseverative  ____Pressured ____ Tangential      Thought Process/Content  __x__Coherent____Preoccupations____Ruminations____Obsessions____Hallucinations  ____Delusions ____Flight of Ideas ____Distortions ____Distracted ____Suicidal Ideation  ____Homicidal Ideation ____ Memory Issues ____ Perseveration ____ Tangential     Interaction  __x__Engaged__x__Cooperative____Superficial____Detached____Fearful____Guarded  ____Suspicious ____Poor Boundaries ____Aggressive     Affect  __x__Neutral____Positive__x__Anxiety____Worried____Irritable____Angry____Depressed/Dysphoric  __x__Euthymic _____ Tearful ____ Fatigued      Behavior  ___x_Spontaneous __x__Purposeful ____Slowed Initiation ____Apathetic ____Impulsive  ____Dyscontrolled ____Hypoactive ____Hyperactive ____Repetitive/Perseverative        BMI: 20 18 kg/m²     Functional Status, Objectives, Interventions, and Assessment:  Pt participated in psychology services to address coping skills, adjustment, and motivation in rehab   See goals and interventions addressed during session in treatment plan below  Discussed progress on rehab goals; explored family support and life changes related to surgery  Reviewed cognitive-behavioral coping skills   Provided supportive counseling  Pt continues to address goals in rehab sessions and in therapy    Dx: F43 23 Adjustment disorder with depressed and anxious mood          Overall Progress:  ____Very Declined ____Declined ____Stable __x__Improved ____Very Improved     Motivation and Participation:  ____Very Poor ____Poor ____Fair ____Good __x__Very Pierre Dance     Projected Number of Sessions to Attain Goals: 8     Date of Next Session (if confirmed):     Continued Treatment is medically necessary to:  __x__Maintain current progress  __x__Prevent regression  ____Prevent hospitalization  ____Prevent relapse     Medications:  __x__ Reviewed current prescribed medications and there are no changes in dosage, frequency, method of administration since last appointment  (see below)  __x__ Reviewed current OTC medications and there are no changes in dosage, frequency, method of administration since last appointment  (see below)  __x__ Reviewed current herbal supplements and vitamins and there are no changes in dosage, frequency, that of administration since last appointment  (see below)       Depression Screen: GDS= 0/15 (no depressed mood noted)        Unhealthy Alcohol Use Screen: CAGE= 0/4        Plan:  TREATMENT PLAN AND RECOMMENDATIONS     1-Treatment Modality:   __x__CBT ____Cognitive Rehabilitation __x__Integrative ____Psychodynamic ____Behavioral____ Existential ____Health and Behavior ____Biofeedback ____Addictions Treatment ____Marital and Family ____Other:     Frequency: Sessions weekly and PRN while pt is at [facility]       2-Treatment Plan and Goals:     Problem: Adjustment disorder with depressed and anxious mood related to cervical myelopathy/spinal surgery       Goal: Pt will address mood disturbance and improve mood as evidenced by pt participation in session, therapist observation, and pt report        Intervention: 1, 16, 19, 22, 42, 51     Target Date: 7/25/2019        Problem: Improve coping skills      Goal: Pt will identify three CBT coping skills each session       Intervention: 1, 7, 10, 14, 24, 25, 42, 43, 51     Target Date: 7/25/2019        Problem: Motivation in rehab sessions       Goal: Pt will participate in rehab sessions three hours per day, and address rehab goals as evidenced by pt and treatment team report       Intervention: 1, 20, 21, 31, 35, 38, 49, 50, 51     Target Date: 7/25/2019        3-Discharge criteria:  ____ Treatment Goals Reached __x__Maximum Benefit Achieved ____Health Condition Improved     4-Further Evaluation: N/A  ____Psychological ____Neuropsychological ____Educational ____Career ____Other:     5-Referrals:   ____Medical Specialist ____Psychiatry ____Support Group ____Self-Help ____Community Resources __x__Other:  pt treatment team consists of attending physiatrist, advanced practitioner, PT, OT, SLP, nursing, case management, and other specialists PRN     6-Treatment plan and recommendations discussed and patient understands and consents to treatment:  ___x_Yes ____No     7-I have communicated the results of this evaluation with the patient PCP or referring physician:  __x__Yes ____No (Explain why or why not)        Interventions Code Key*         1  Affect Identification & Expression 14  Coping Skills Training 27  Journal Assignments 40  Progressive Muscle Relaxation   2  Anger Management 15  Crisis Management 28  Journaling 41  Psychodrama   3  Assertiveness Training 16  Critical Incident Debriefing 29  Lifestyle management 42  Psychoeducation   4  Assigned Readings 17  Decision Option 30  Memory Enhancement Training 43  Refuting Irrational Thoughts   5  Assigned tasks 18  Engage Sig Others in Treatment 31  Motivational Interview 44  Reminiscence Techniques   6  Autogenic Training 19  Exploration 32  Motivational Interviewing 45  Role Play Techniques   7  Breathing Exercises 20  Facilitate decision making 33  Organizational Skills Training 46  Self-Hypnosis   8  CBT-I 21  Goal setting & time management 34  Other (specify) 47  Self-Other Boundaries Training   9  Cognitive Rehabilitation 22  Grief Work 35  Pain Management 48  Social Skills Training   10  Cognitive Restructuring 23   Guided Imagery 39  Parent Training 49  Solution Focused Techniques   11  Communication Training 24  Identify Automatic Thoughts 37  Pattern Identification & Interruption 50  Stress Management   12  Community support group 25  Identify Cognitive Distortions 38  Preventative strategies 51  Supportive Therapy   13  Conflict Resolution Skills 26  Job stress management 39  Problem Solving Skills Training 52  Thought stopping Techniques        Consults    Review of Systems    Historical Information   Past Medical History:   Diagnosis Date    BPH (benign prostatic hyperplasia)     Cancer ([facility] [address] ) 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    Chemical exposure     9/11/01- worked in [employer]   [facility]     CPAP (continuous positive airway pressure) dependence     DDD (degenerative disc disease), cervical     Diabetes mellitus (HCC)     Foraminal stenosis of cervical region     Hyperlipidemia     Hypertension     SHIVANI (obstructive sleep apnea)     Overactive bladder      Past Surgical History:   Procedure Laterality Date    ARTHROSCOPY KNEE Bilateral     COLONOSCOPY      LUNG SURGERY Left     scraping of left    [place] ARTHRODESIS ANT INTERBODY INC DISCECTOMY, CERVICAL BELOW C2 Bilateral 6/18/2019    Procedure: C3/4  ACDF WITH  C4 HEMICORPECTOMY, C3-4 ANTERIOR INSTRUMENTATION AND FUSION (NEUROMONITORING);   Surgeon: Deidre Negrete MD;  [facility];  Service: Neurosurgery    ROTATOR CUFF REPAIR Bilateral      Social History   Social History     Substance and Sexual Activity   Alcohol Use Not Currently     Social History     Substance and Sexual Activity   Drug Use No     Social History     Tobacco Use   Smoking Status Current Every Day Smoker    Years: 61 00    Types: Pipe   Smokeless Tobacco Never Used   Tobacco Comment    very light smoker   Meds/Allergies No Known Allergies   acetaminophen (TYLENOL) tablet 650 mg  650 mg Oral TID    albuterol (PROVENTIL HFA,VENTOLIN HFA) inhaler 2 puff  2 puff Inhalation Q4H PRN    bisacodyl (DULCOLAX) rectal suppository 10 mg  10 mg Rectal Daily PRN    bisacodyl (DULCOLAX) rectal suppository 10 mg  10 mg Rectal Once    cholecalciferol (VITAMIN D3) tablet 2,000 Units  2,000 Units Oral Daily    clindamycin (CLEOCIN) capsule 300 mg  300 mg Oral Q8H [address]    diltiazem (CARDIZEM CD) 24 hr capsule 120 mg  120 mg Oral Daily    docusate sodium (COLACE) capsule 100 mg  100 mg Oral BID    DULoxetine (CYMBALTA) delayed release capsule 60 mg  60 mg Oral Daily    fluticasone-vilanterol (BREO ELLIPTA) 100-25 mcg/inh inhaler 1 puff  1 puff Inhalation Daily    gabapentin (NEURONTIN) capsule 300 mg  300 mg Oral BID    gabapentin (NEURONTIN) capsule 400 mg  400 mg Oral HS    heparin (porcine) subcutaneous injection 5,000 Units  5,000 Units Subcutaneous Q8H [address]    hydrALAZINE (APRESOLINE) tablet 25 mg  25 mg Oral Q8H PRN    insulin lispro (HumaLOG) 100 units/mL subcutaneous injection 1-5 Units  1-5 Units Subcutaneous TID AC    insulin lispro (HumaLOG) 100 units/mL subcutaneous injection 1-5 Units  1-5 Units Subcutaneous HS    lidocaine (URO-JET) 2 % urethral/mucosal gel   Topical Q4H PRN    losartan (COZAAR) tablet 100 mg  100 mg Oral Daily    melatonin tablet 3 mg  3 mg Oral HS    metFORMIN (GLUCOPHAGE) tablet 500 mg  500 mg Oral Daily With Breakfast    nicotine (NICODERM CQ) 21 mg/24 hr TD 24 hr patch 21 mg  21 mg Transdermal Daily    ondansetron (ZOFRAN) injection 4 mg  4 mg Intravenous Q6H PRN    oxybutynin (DITROPAN-XL) 24 hr tablet 5 mg  5 mg Oral Daily    oxyCODONE (ROXICODONE) immediate release tablet 10 mg  10 mg Oral Q4H PRN    oxyCODONE (ROXICODONE) IR tablet 5 mg  5 mg Oral Q4H PRN    pantoprazole (PROTONIX) EC tablet 40 mg  40 mg Oral Daily Before Breakfast    polyethylene glycol (MIRALAX) packet 17 g  17 g Oral Daily PRN    polyethylene glycol (MIRALAX) packet 17 g  17 g Oral Daily    polyvinyl alcohol (LIQUIFILM TEARS) 1 4 % ophthalmic solution 1 drop  1 drop Both Eyes 4x Daily    polyvinyl alcohol (LIQUIFILM TEARS) 1 4 % ophthalmic solution 1 drop  1 drop Both Eyes Q1H PRN    pravastatin (PRAVACHOL) tablet 10 mg  10 mg Oral Daily With Dinner    senna (SENOKOT) tablet 8 6 mg  1 tablet Oral Daily    tamsulosin (FLOMAX) capsule 0 4 mg  0 4 mg Oral After Dinner    thiamine (VITAMIN B1) tablet 50 mg  50 mg Oral Daily    tiotropium (SPIRIVA) capsule for inhaler 18 mcg  18 mcg Inhalation Daily       Objective   Vitals: Blood pressure 148/71, pulse 78, temperature 97 9 °F (36 6 °C), temperature source Oral, resp  rate 18, height 5' 9" (1 753 m), weight 62 kg (136 lb 11 oz), SpO2 100 %        Physical Exam

## 2019-07-08 NOTE — CONSULTS
Consultation/Progress note - Som Pill During 70 y o  male MRN: [id number]    Unit/Bed#: -01 Encounter: [id number]      Assessment/Plan     Assessment:  CLINICAL PROGRESS NOTE     Date: 7/2/2019  Start Time: 10:00am  Stop Time: 10:45am     Time: 45 minutes       CPT Code: 45065     Appearance  __x__Neat ____Disheveled ____Overweight ____Underweight ____Frail     Speech  __x__Fluid, Articulate __x__Spontaneous ____Circumlocutions ____Word Finding difficulties  ____Dysarthria ____Circumstantial ____Paucity ____Perseverative  ____Pressured ____ Tangential      Thought Process/Content  __x__Coherent____Preoccupations____Ruminations____Obsessions____Hallucinations  ____Delusions ____Flight of Ideas ____Distortions ____Distracted ____Suicidal Ideation  ____Homicidal Ideation ____ Memory Issues ____ Perseveration ____ Tangential     Interaction  __x__Engaged__x__Cooperative____Superficial____Detached____Fearful____Guarded  ____Suspicious ____Poor Boundaries ____Aggressive     Affect  __x__Neutral____Positive____Anxiety____Worried____Irritable____Angry____Depressed/Dysphoric  __x__Euthymic _____ Tearful ____ Fatigued      Behavior  ___x_Spontaneous __x__Purposeful ____Slowed Initiation ____Apathetic ____Impulsive  ____Dyscontrolled ____Hypoactive ____Hyperactive ____Repetitive/Perseverative        BMI: 20 18 kg/m²     Functional Status, Objectives, Interventions, and Assessment:  Pt participated in psychology services to address coping skills, adjustment, and motivation in rehab   See goals and interventions addressed during session in treatment plan below  Discussed progress on rehab goals; discussed pt dc this week and continued goals   Reviewed cognitive-behavioral coping skills  Provided supportive counseling  Pt continues to address goals in rehab sessions and in therapy    Dx: F43 23 Adjustment disorder with depressed and anxious mood          Overall Progress:  ____Very Declined ____Declined ____Stable __x__Improved ____Very Improved     Motivation and Participation:  ____Very Poor ____Poor ____Fair ____Good __x__Very Annavalentin Uriarte     Projected Number of Sessions to Attain Goals: 8     Date of Next Session (if confirmed):     Continued Treatment is medically necessary to:  __x__Maintain current progress  __x__Prevent regression  ____Prevent hospitalization  ____Prevent relapse     Medications:  __x__ Reviewed current prescribed medications and there are no changes in dosage, frequency, method of administration since last appointment  (see below)  __x__ Reviewed current OTC medications and there are no changes in dosage, frequency, method of administration since last appointment  (see below)  __x__ Reviewed current herbal supplements and vitamins and there are no changes in dosage, frequency, that of administration since last appointment  (see below)       Depression Screen: GDS= 0/15 (no depressed mood noted)        Unhealthy Alcohol Use Screen: CAGE= 0/4        Plan:  TREATMENT PLAN AND RECOMMENDATIONS     1-Treatment Modality:   __x__CBT ____Cognitive Rehabilitation __x__Integrative ____Psychodynamic ____Behavioral____ Existential ____Health and Behavior ____Biofeedback ____Addictions Treatment ____Marital and Family ____Other:     Frequency: Sessions weekly and PRN while pt is at [facility]       2-Treatment Plan and Goals:     Problem: Adjustment disorder with depressed and anxious mood related to cervical myelopathy/spinal surgery       Goal: Pt will address mood disturbance and improve mood as evidenced by pt participation in session, therapist observation, and pt report        Intervention: 1, 16, 19, 22, 42, 51     Target Date: 7/25/2019        Problem: Improve coping skills      Goal: Pt will identify three CBT coping skills each session       Intervention: 1, 7, 10, 14, 24, 25, 42, 43, 51     Target Date: 7/25/2019        Problem: Motivation in rehab sessions       Goal: Pt will participate in rehab sessions three hours per day, and address rehab goals as evidenced by pt and treatment team report       Intervention: 1, 20, 21, 31, 35, 38, 49, 50, 51     Target Date: 7/25/2019        3-Discharge criteria:  ____ Treatment Goals Reached __x__Maximum Benefit Achieved ____Health Condition Improved     4-Further Evaluation: N/A  ____Psychological ____Neuropsychological ____Educational ____Career ____Other:     5-Referrals:   ____Medical Specialist ____Psychiatry ____Support Group ____Self-Help ____Community Resources __x__Other:  pt treatment team consists of attending physiatrist, advanced practitioner, PT, OT, SLP, nursing, case management, and other specialists PRN     6-Treatment plan and recommendations discussed and patient understands and consents to treatment:  ___x_Yes ____No     7-I have communicated the results of this evaluation with the patient PCP or referring physician:  __x__Yes ____No (Explain why or why not)        Interventions Code Key*         1  Affect Identification & Expression 14  Coping Skills Training 27  Journal Assignments 40  Progressive Muscle Relaxation   2  Anger Management 15  Crisis Management 28  Journaling 41  Psychodrama   3  Assertiveness Training 16  Critical Incident Debriefing 29  Lifestyle management 42  Psychoeducation   4  Assigned Readings 17  Decision Option 30  Memory Enhancement Training 43  Refuting Irrational Thoughts   5  Assigned tasks 18  Engage Sig Others in Treatment 31  Motivational Interview 44  Reminiscence Techniques   6  Autogenic Training 19  Exploration 32  Motivational Interviewing 45  Role Play Techniques   7  Breathing Exercises 20  Facilitate decision making 33  Organizational Skills Training 46  Self-Hypnosis   8  CBT-I 21  Goal setting & time management 34  Other (specify) 47  Self-Other Boundaries Training   9  Cognitive Rehabilitation 22  Grief Work 35  Pain Management 48  Social Skills Training   10  Cognitive Restructuring 23  Guided Imagery 36   Parent Training 49  Solution Focused Techniques   11  Communication Training 24  Identify Automatic Thoughts 37  Pattern Identification & Interruption 50  Stress Management   12  Community support group 25  Identify Cognitive Distortions 38  Preventative strategies 51  Supportive Therapy   13  Conflict Resolution Skills 26  Job stress management 39  Problem Solving Skills Training 52  Thought stopping Techniques        Consults    Review of Systems    Historical Information   Past Medical History:   Diagnosis Date    BPH (benign prostatic hyperplasia)     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     CPAP (continuous positive airway pressure) dependence     DDD (degenerative disc disease), cervical     Diabetes mellitus (HCC)     Foraminal stenosis of cervical region     Hyperlipidemia     Hypertension     SHIVANI (obstructive sleep apnea)     Overactive bladder      Past Surgical History:   Procedure Laterality Date    ARTHROSCOPY KNEE Bilateral     COLONOSCOPY      LUNG SURGERY Left     scraping of left    [place] ARTHRODESIS ANT INTERBODY INC DISCECTOMY, CERVICAL BELOW C2 Bilateral 6/18/2019    Procedure: C3/4  ACDF WITH  C4 HEMICORPECTOMY, C3-4 ANTERIOR INSTRUMENTATION AND FUSION (NEUROMONITORING);   Surgeon: Sukhwinder Sykes MD;  [facility];  Service: Neurosurgery    ROTATOR CUFF REPAIR Bilateral      Social History   Social History     Substance and Sexual Activity   Alcohol Use Not Currently     Social History     Substance and Sexual Activity   Drug Use No     Social History     Tobacco Use   Smoking Status Current Every Day Smoker    Years: 61 00    Types: Pipe   Smokeless Tobacco Never Used   Tobacco Comment    very light smoker   Meds/Allergies No Known Allergies      acetaminophen (TYLENOL) tablet 650 mg  650 mg Oral TID    albuterol (PROVENTIL HFA,VENTOLIN HFA) inhaler 2 puff  2 puff Inhalation Q4H PRN    bisacodyl (DULCOLAX) rectal suppository 10 mg  10 mg Rectal Daily PRN    bisacodyl (DULCOLAX) rectal suppository 10 mg  10 mg Rectal Once    cholecalciferol (VITAMIN D3) tablet 2,000 Units  2,000 Units Oral Daily    clindamycin (CLEOCIN) capsule 300 mg  300 mg Oral Q8H [address]    diltiazem (CARDIZEM CD) 24 hr capsule 120 mg  120 mg Oral Daily    docusate sodium (COLACE) capsule 100 mg  100 mg Oral BID    DULoxetine (CYMBALTA) delayed release capsule 60 mg  60 mg Oral Daily    fluticasone-vilanterol (BREO ELLIPTA) 100-25 mcg/inh inhaler 1 puff  1 puff Inhalation Daily    gabapentin (NEURONTIN) capsule 300 mg  300 mg Oral BID    gabapentin (NEURONTIN) capsule 400 mg  400 mg Oral HS    heparin (porcine) subcutaneous injection 5,000 Units  5,000 Units Subcutaneous Q8H [address]    hydrALAZINE (APRESOLINE) tablet 25 mg  25 mg Oral Q8H PRN    insulin lispro (HumaLOG) 100 units/mL subcutaneous injection 1-5 Units  1-5 Units Subcutaneous TID AC    insulin lispro (HumaLOG) 100 units/mL subcutaneous injection 1-5 Units  1-5 Units Subcutaneous HS    lidocaine (URO-JET) 2 % urethral/mucosal gel   Topical Q4H PRN    losartan (COZAAR) tablet 100 mg  100 mg Oral Daily    melatonin tablet 3 mg  3 mg Oral HS    metFORMIN (GLUCOPHAGE) tablet 500 mg  500 mg Oral Daily With Breakfast    nicotine (NICODERM CQ) 21 mg/24 hr TD 24 hr patch 21 mg  21 mg Transdermal Daily    ondansetron (ZOFRAN) injection 4 mg  4 mg Intravenous Q6H PRN    oxybutynin (DITROPAN-XL) 24 hr tablet 5 mg  5 mg Oral Daily    oxyCODONE (ROXICODONE) immediate release tablet 10 mg  10 mg Oral Q4H PRN    oxyCODONE (ROXICODONE) IR tablet 5 mg  5 mg Oral Q4H PRN    pantoprazole (PROTONIX) EC tablet 40 mg  40 mg Oral Daily Before Breakfast    polyethylene glycol (MIRALAX) packet 17 g  17 g Oral Daily PRN    polyethylene glycol (MIRALAX) packet 17 g  17 g Oral Daily    polyvinyl alcohol (LIQUIFILM TEARS) 1 4 % ophthalmic solution 1 drop  1 drop Both Eyes 4x Daily    polyvinyl alcohol (LIQUIFILM TEARS) 1 4 % ophthalmic solution 1 drop  1 drop Both Eyes Q1H PRN    pravastatin (PRAVACHOL) tablet 10 mg  10 mg Oral Daily With Dinner    senna (SENOKOT) tablet 8 6 mg  1 tablet Oral Daily    tamsulosin (FLOMAX) capsule 0 4 mg  0 4 mg Oral After Dinner    thiamine (VITAMIN B1) tablet 50 mg  50 mg Oral Daily    tiotropium (SPIRIVA) capsule for inhaler 18 mcg  18 mcg Inhalation Daily           Objective   Vitals: Blood pressure 148/71, pulse 78, temperature 97 9 °F (36 6 °C), temperature source Oral, resp  rate 18, height 5' 9" (1 753 m), weight 62 kg (136 lb 11 oz), SpO2 100 %        Physical Exam

## 2019-07-08 NOTE — CONSULTS
Consultation/Progress note - Lanette Balderas During 70 y o  male MRN: [id number]    Unit/Bed#: -01 Encounter: [id number]      Assessment/Plan     Assessment:  CLINICAL PROGRESS NOTE     Date: 7/4/2019  Start Time: 11:00am  Stop Time: 12:00pm     Time: 60 minutes       CPT Code: 59336     Appearance  __x__Neat ____Disheveled ____Overweight ____Underweight ____Frail     Speech  __x__Fluid, Articulate __x__Spontaneous ____Circumlocutions ____Word Finding difficulties  ____Dysarthria ____Circumstantial ____Paucity ____Perseverative  ____Pressured ____ Tangential      Thought Process/Content  __x__Coherent____Preoccupations____Ruminations____Obsessions____Hallucinations  ____Delusions ____Flight of Ideas ____Distortions ____Distracted ____Suicidal Ideation  ____Homicidal Ideation ____ Memory Issues ____ Perseveration ____ Tangential     Interaction  __x__Engaged__x__Cooperative____Superficial____Detached____Fearful____Guarded  ____Suspicious ____Poor Boundaries ____Aggressive     Affect  ____Neutral____Positive____Anxiety____Worried____Irritable____Angry____Depressed/Dysphoric  __x__Euthymic _____ Tearful ____ Fatigued      Behavior  ___x_Spontaneous __x__Purposeful ____Slowed Initiation ____Apathetic ____Impulsive  ____Dyscontrolled ____Hypoactive ____Hyperactive ____Repetitive/Perseverative        BMI: 20 18 kg/m²     Functional Status, Objectives, Interventions, and Assessment:  Pt participated in psychology services to address coping skills, adjustment, and motivation in rehab   See goals and interventions addressed during session in treatment plan below  Discussed progress on rehab goals; pt will be dc this weekend; discussed continued rehab and therapy goals; pt will contact this writer after dc to participate in outpatient psychotherapy   Reviewed cognitive-behavioral coping skills  Provided supportive counseling  Pt continues to address goals in rehab sessions and in therapy    Dx: F43 23 Adjustment disorder with depressed and anxious mood          Overall Progress:  ____Very Declined ____Declined ____Stable __x__Improved ____Very Improved     Motivation and Participation:  ____Very Poor ____Poor ____Fair ____Good __x__Very Justice Vignesh     Projected Number of Sessions to Attain Goals: 8     Date of Next Session (if confirmed):     Continued Treatment is medically necessary to:  __x__Maintain current progress  __x__Prevent regression  ____Prevent hospitalization  ____Prevent relapse     Medications:  __x__ Reviewed current prescribed medications and there are no changes in dosage, frequency, method of administration since last appointment  (see below)  __x__ Reviewed current OTC medications and there are no changes in dosage, frequency, method of administration since last appointment  (see below)  __x__ Reviewed current herbal supplements and vitamins and there are no changes in dosage, frequency, that of administration since last appointment  (see below)       Depression Screen: GDS= 0/15 (no depressed mood noted)        Unhealthy Alcohol Use Screen: CAGE= 0/4        Plan:  TREATMENT PLAN AND RECOMMENDATIONS     1-Treatment Modality:   __x__CBT ____Cognitive Rehabilitation __x__Integrative ____Psychodynamic ____Behavioral____ Existential ____Health and Behavior ____Biofeedback ____Addictions Treatment ____Marital and Family ____Other:     Frequency: Sessions weekly and PRN while pt is at [facility]       2-Treatment Plan and Goals:     Problem: Adjustment disorder with depressed and anxious mood related to cervical myelopathy/spinal surgery       Goal: Pt will address mood disturbance and improve mood as evidenced by pt participation in session, therapist observation, and pt report        Intervention: 1, 16, 19, 22, 42, 51     Target Date: 7/25/2019        Problem: Improve coping skills      Goal: Pt will identify three CBT coping skills each session       Intervention: 1, 7, 10, 14, 24, 25, 42, 43, 51     Target Date: 7/25/2019        Problem: Motivation in rehab sessions       Goal: Pt will participate in rehab sessions three hours per day, and address rehab goals as evidenced by pt and treatment team report       Intervention: 1, 20, 21, 31, 35, 38, 49, 50, 51     Target Date: 7/25/2019        3-Discharge criteria:  ____ Treatment Goals Reached __x__Maximum Benefit Achieved ____Health Condition Improved     4-Further Evaluation: N/A  ____Psychological ____Neuropsychological ____Educational ____Career ____Other:     5-Referrals:   ____Medical Specialist ____Psychiatry ____Support Group ____Self-Help ____Community Resources __x__Other:  pt treatment team consists of attending physiatrist, advanced practitioner, PT, OT, SLP, nursing, case management, and other specialists PRN     6-Treatment plan and recommendations discussed and patient understands and consents to treatment:  ___x_Yes ____No     7-I have communicated the results of this evaluation with the patient PCP or referring physician:  __x__Yes ____No (Explain why or why not)        Interventions Code Key*         1  Affect Identification & Expression 14  Coping Skills Training 27  Journal Assignments 40  Progressive Muscle Relaxation   2  Anger Management 15  Crisis Management 28  Journaling 41  Psychodrama   3  Assertiveness Training 16  Critical Incident Debriefing 29  Lifestyle management 42  Psychoeducation   4  Assigned Readings 17  Decision Option 30  Memory Enhancement Training 43  Refuting Irrational Thoughts   5  Assigned tasks 18  Engage Sig Others in Treatment 31  Motivational Interview 44  Reminiscence Techniques   6  Autogenic Training 19  Exploration 32  Motivational Interviewing 45  Role Play Techniques   7  Breathing Exercises 20  Facilitate decision making 33  Organizational Skills Training 46  Self-Hypnosis   8  CBT-I 21  Goal setting & time management 34  Other (specify) 47  Self-Other Boundaries Training   9  Cognitive Rehabilitation 22   Grief Work 28  Pain Management 48  Social Skills Training   10  Cognitive Restructuring 23  Guided Imagery 36  Parent Training 49  Solution Focused Techniques   11  Communication Training 24  Identify Automatic Thoughts 37  Pattern Identification & Interruption 50  Stress Management   12  Community support group 25  Identify Cognitive Distortions 38  Preventative strategies 51  Supportive Therapy   13  Conflict Resolution Skills 26  Job stress management 39  Problem Solving Skills Training 52  Thought stopping Techniques           Consults    Review of Systems    Historical Information   Past Medical History:   Diagnosis Date    BPH (benign prostatic hyperplasia)     Cancer ([facility] [address] [address] ) 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    Chemical exposure     9/11/01- worked in [employer]   [facility]ch[facility]) [facility]     CPAP (continuous positive airway pressure) dependence     DDD (degenerative disc disease), cervical     Diabetes mellitus (HCC)     Foraminal stenosis of cervical region     Hyperlipidemia     Hypertension     SHIVANI (obstructive sleep apnea)     Overactive bladder      Past Surgical History:   Procedure Laterality Date    ARTHROSCOPY KNEE Bilateral     COLONOSCOPY      LUNG SURGERY Left     scraping of left    [place] ARTHRODESIS ANT INTERBODY INC DISCECTOMY, CERVICAL BELOW C2 Bilateral 6/18/2019    Procedure: C3/4  ACDF WITH  C4 HEMICORPECTOMY, C3-4 ANTERIOR INSTRUMENTATION AND FUSION (NEUROMONITORING);   Surgeon: Silvano Umaña MD;  Location: [facility];  Service: Neurosurgery    ROTATOR CUFF REPAIR Bilateral      Social History   Social History     Substance and Sexual Activity   Alcohol Use Not Currently     Social History     Substance and Sexual Activity   Drug Use No     Social History     Tobacco Use   Smoking Status Current Every Day Smoker    Years: 61 00    Types: Pipe   Smokeless Tobacco Never Used   Tobacco Comment    very light smoker   Meds/Allergies No Known Allergies   acetaminophen (TYLENOL) tablet 650 mg  650 mg Oral TID    albuterol (PROVENTIL HFA,VENTOLIN HFA) inhaler 2 puff  2 puff Inhalation Q4H PRN    bisacodyl (DULCOLAX) rectal suppository 10 mg  10 mg Rectal Daily PRN    bisacodyl (DULCOLAX) rectal suppository 10 mg  10 mg Rectal Once    cholecalciferol (VITAMIN D3) tablet 2,000 Units  2,000 Units Oral Daily    clindamycin (CLEOCIN) capsule 300 mg  300 mg Oral Q8H [address]    diltiazem (CARDIZEM CD) 24 hr capsule 120 mg  120 mg Oral Daily    docusate sodium (COLACE) capsule 100 mg  100 mg Oral BID    DULoxetine (CYMBALTA) delayed release capsule 60 mg  60 mg Oral Daily    fluticasone-vilanterol (BREO ELLIPTA) 100-25 mcg/inh inhaler 1 puff  1 puff Inhalation Daily    gabapentin (NEURONTIN) capsule 300 mg  300 mg Oral BID    gabapentin (NEURONTIN) capsule 400 mg  400 mg Oral HS    heparin (porcine) subcutaneous injection 5,000 Units  5,000 Units Subcutaneous Q8H [address]    hydrALAZINE (APRESOLINE) tablet 25 mg  25 mg Oral Q8H PRN    insulin lispro (HumaLOG) 100 units/mL subcutaneous injection 1-5 Units  1-5 Units Subcutaneous TID AC    insulin lispro (HumaLOG) 100 units/mL subcutaneous injection 1-5 Units  1-5 Units Subcutaneous HS    lidocaine (URO-JET) 2 % urethral/mucosal gel   Topical Q4H PRN    losartan (COZAAR) tablet 100 mg  100 mg Oral Daily    melatonin tablet 3 mg  3 mg Oral HS    metFORMIN (GLUCOPHAGE) tablet 500 mg  500 mg Oral Daily With Breakfast    nicotine (NICODERM CQ) 21 mg/24 hr TD 24 hr patch 21 mg  21 mg Transdermal Daily    ondansetron (ZOFRAN) injection 4 mg  4 mg Intravenous Q6H PRN    oxybutynin (DITROPAN-XL) 24 hr tablet 5 mg  5 mg Oral Daily    oxyCODONE (ROXICODONE) immediate release tablet 10 mg  10 mg Oral Q4H PRN    oxyCODONE (ROXICODONE) IR tablet 5 mg  5 mg Oral Q4H PRN    pantoprazole (PROTONIX) EC tablet 40 mg  40 mg Oral Daily Before Breakfast    polyethylene glycol (MIRALAX) packet 17 g  17 g Oral Daily PRN    polyethylene glycol (MIRALAX) packet 17 g  17 g Oral Daily    polyvinyl alcohol (LIQUIFILM TEARS) 1 4 % ophthalmic solution 1 drop  1 drop Both Eyes 4x Daily    polyvinyl alcohol (LIQUIFILM TEARS) 1 4 % ophthalmic solution 1 drop  1 drop Both Eyes Q1H PRN    pravastatin (PRAVACHOL) tablet 10 mg  10 mg Oral Daily With Dinner    senna (SENOKOT) tablet 8 6 mg  1 tablet Oral Daily    tamsulosin (FLOMAX) capsule 0 4 mg  0 4 mg Oral After Dinner    thiamine (VITAMIN B1) tablet 50 mg  50 mg Oral Daily    tiotropium (SPIRIVA) capsule for inhaler 18 mcg  18 mcg Inhalation Daily           Objective   Vitals: Blood pressure 148/71, pulse 78, temperature 97 9 °F (36 6 °C), temperature source Oral, resp  rate 18, height 5' 9" (1 753 m), weight 62 kg (136 lb 11 oz), SpO2 100 %      Physical Exam

## 2019-07-08 NOTE — PROGRESS NOTES
[address]  [address]  History and Physical    NAME: Lela Jaime During  AGE: 70 y o  SEX: male [id number]    DATE OF ENCOUNTER: 7/8/2019    Code status:  CPR    Assessment and Plan     1  Impaired mobility and ADL - PT/OT Therapy  2  Status post cervical decompression and fusion C4 hemicorpectomy - F/U with Dr Oleksandr Maya   - continue to wear Vista collar, pain meds  3  Neuropathy - Cont Gabapentin 400mg TID, duloxetine  4  HTN- controlled, Cont Losartan 100mg, diltiazem  5  DM Type 2 - Cont Metformin 500mg       - accuchecks daily  6  Tobacco use - Cont Nicotine 21mg/24 hr patch  7  Lung Ca S/p Lt upper lobectomy       Cont- Spiriva 18mcg                  Advair 250-50mcg/dose 1 puff BID  8  BPH: cont Tamsulosin, Oxybutynin    All medications and routine orders were reviewed and updated as needed  Plan discussed with: Patient    Chief ComplaintPost surgical cervical decomp     Seen for admission at [address] [place] [address]    History of Present Illness     During Joey Siva a 70 y old monet patient with known h/o HTN, DM 2, hyperlipidemia, active smoker, Lung Ca status post Lt upper lobectomy, COPD,  Left ventricle dysfunction, BPH,  developed weakness both upper limbs got admitted to he [address] [place] [address] for rehab  He had progressive limb weakness, sensory changes, and gait imbalance due to cervical spondylitic myelopathy and underwent C3-C4 anterior cervical decompression and fusion with  C4 hemicorpectomy by Dr Oleksandr Maya on 6/18/19  Postoperative course notable for improved pain and hypertension, with significant improvement in ADLs and ambulation  Pt got admitted to [address] [place] [address] for subacute rehabilitation  Patient was examined at bedside, stable, and was able to give history  C/o numbness in left upper limb, difficult to grab things, denies any pain or tingling  He is wearng Vista collar      HISTORY:  Past Medical History:   Diagnosis Date    BPH (benign prostatic hyperplasia)     Cancer [facility]) 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    Chemical exposure     9/11/01- worked in [employer]   COPD [facility]) [facility]     CPAP (continuous positive airway pressure) dependence     DDD (degenerative disc disease), cervical     Diabetes mellitus ([facility] [address] )     Foraminal stenosis of cervical region     Hyperlipidemia     Hypertension     SHIVANI (obstructive sleep apnea)     Overactive bladder      Family History   Problem Relation Age of Onset    No Known Problems Mother     No Known Problems Father      Social History     Socioeconomic History    Marital status: /Civil Union     Spouse name: None    Number of children: 3    Years of education: None    Highest education level: None   Occupational History    Occupation: retired   Social Needs    Financial resource strain: None    Food insecurity:     Worry: None     Inability: None    Transportation needs:     Medical: None     Non-medical: None   Tobacco Use    Smoking status: Current Every Day Smoker     Years: 61 00     Types: Pipe    Smokeless tobacco: Never Used    Tobacco comment: very light smoker   Substance and Sexual Activity    Alcohol use: Not Currently    Drug use: No    Sexual activity: Not Currently   Lifestyle    Physical activity:     Days per week: 0 days     Minutes per session: 0 min    Stress:  To some extent   Relationships    Social connections:     Talks on phone: None     Gets together: None     Attends Orthodox service: None     Active member of club or organization: None     Attends meetings of clubs or organizations: None     Relationship status: None    Intimate partner violence:     Fear of current or ex partner: None     Emotionally abused: None     Physically abused: None     Forced sexual activity: None   Other Topics Concern    None   Social History Narrative    Pt was born in [facility]; moved to [facility] as an adult with his wife, and eventually moved to [place]  Allergies:  No Known Allergies    Review of Systems     Review of Systems   Constitutional: Negative  HENT: Negative  Eyes: Negative  Respiratory: Negative  Cardiovascular: Negative  Gastrointestinal: Negative  Endocrine: Negative  Musculoskeletal: Positive for neck pain  Skin: Negative  Neurological: Positive for weakness and numbness  Complains Numbness and weakness on the Lt upper limb   Hematological: Negative  Psychiatric/Behavioral: Negative  All other systems reviewed and are negative  Medications and orders     All medications reviewed and updated in Nursing Home EMR  Objective     Vitals: T: 96 3, P: 85, R: 18, BP: 129/85, 98% on RA, Wt: 123  Lbs  Physical Exam   Constitutional: He is oriented to person, place, and time  He appears well-developed and well-nourished  HENT:   Head: Normocephalic and atraumatic  Neck:   Pt has Neck collar post surgical /limited range of motion in the neck   Cardiovascular: Normal rate, regular rhythm and normal heart sounds  Pulmonary/Chest: Effort normal and breath sounds normal    Abdominal: Soft  Bowel sounds are normal    Musculoskeletal: Normal range of motion  Lt UL - 3/5 Rt UL 4/5   Neurological: He is alert and oriented to person, place, and time  Skin: Skin is warm and dry  Alicia intact with neck incision   Psychiatric: He has a normal mood and affect  His behavior is normal  Judgment and thought content normal    Nursing note and vitals reviewed  Pertinent Laboratory/Diagnostic Studies: The following labs/studies were reviewed please see chart or hospital paperwork for details    Ref Range & Units 7/4/19 0609    Sodium 136 - 145 mmol/L 139    Potassium 3 5 - 5 3 mmol/L 4 3    Chloride 100 - 108 mmol/L 104    CO2 21 - 32 mmol/L 32    ANION GAP 4 - 13 mmol/L 3Low     BUN 5 - 25 mg/dL 19    Creatinine 0 60 - 1 30 mg/dL 0 76    Comment: Standardized to IDMS reference method   Glucose 65 - 140 mg/dL 102      Ref Range & Units 7/4/19 0609    WBC 4 31 - 10 16 Thousand/uL 4 19Low     RBC 3 88 - 5 62 Million/uL 4 92    Hemoglobin 12 0 - 17 0 g/dL 13 6    Hematocrit 36 5 - 49 3 % 41 9    MCV 82 - 98 fL 85    MCH 26 8 - 34 3 pg 27 6    MCHC 31 4 - 37 4 g/dL 32 5    RDW 11 6 - 15 1 % 15 1    MPV 8 9 - 12 7 fL 10 5    Platelets 409 - 431 Thousands/uL 176    nRBC /100 WBCs 0    Neutrophils Relative 43 - 75 % 48    Immat GRANS % 0 - 2 % 0    Lymphocytes Relative 14 - 44 % 39    Monocytes Relative 4 - 12 % 9    Eosinophils Relative 0 - 6 % 3    Basophils Relative 0 - 1 % 1    Neutrophils Absolute 1 85 - 7 62 Thousands/µL 2 03    Immature Grans Absolute 0 00 - 0 20 Thousand/uL 0 01    Lymphocytes Absolute 0 60 - 4 47 Thousands/µL 1 63    Monocytes Absolute 0 17 - 1 22 Thousand/µL 0 37    Eosinophils Absolute 0 00 - 0 61 Thousand/µL 0 13    Basophils Absolute 0 00 - 0 10 Thousands/µL 0 02      - Medication Side Effects: Adverse side effects of medications were reviewed with the patient/guardian today  DOS: 7/8/2019  Facility: [facility] SNF List: [place]  BILLING CODE: [id number] [facility] [address] place of service: POS 31 Skilled Care-Part A Coverage  Diagnoses:     Diagnosis ICD-10-CM Associated Orders   1  Cervical myelopathy (HCC) G95 9    2  Osteoarthritis of spine with radiculopathy, cervical region M47 22    3  Pulmonary emphysema, unspecified emphysema type ([facility] [address] ) J43 9    4  Degenerative cervical spinal stenosis M48 02    5  Essential hypertension I10    6  Hyperlipidemia associated with type 2 diabetes mellitus (HCC) E11 69     E78 5    7   Impaired mobility and ADLs Z74 09

## 2019-07-08 NOTE — PROGRESS NOTES
OT DISCHARGE SUMMARY      Pt made fair progress during acute rehab stay but did not achieve goals necessary to d/c home  Pt had limited family supported and needed to be mod I to return home  At time of discharge pt was CGA for LB dressing, toileting, and all fxnl transfers  Pt required CGA for balance support during bathing  Pt overall was limited by decreased [facility], impaired sensation, decreased balance, decreased strength  Pt is limited by c/s precautions and inability to manage C/S collar while maintaining precautions  Pt requires cueing to maintain precautions as pt continues to attempt to turn his head  Due to pt with limited family support, pt must d/c to SNF for continued rehab       Sadia Bello, OT

## 2019-07-09 NOTE — TELEPHONE ENCOUNTER
[facility] and attempted to speak to there nurse of Michael Fernandes During to see how he is doing after surgery 6/18/2019 with hospital discharge date of 7/7/2019  She was not available to speak to but left a message with direct line to contact me back  Will make another attempt tomorrow if no callback is received

## 2019-07-09 NOTE — TELEPHONE ENCOUNTER
Received call back from [address] [facility] who reports he is doing well over all  Has been compliant with his cervical collar and is participating in therapy, eating well and is pleasant  Advised that he may shower normally, mild soap and water over incision  Avoid cream/oitnment/lotion to the site  Advise to change pads QOD and hand wash in between  Reminded of appt with Dr Aftab Lee 7/31/2019 [facility] and requires xray prior to offered to fax xray rx she declined  Was advise to contact the office with any questions or concerns

## 2019-07-10 NOTE — PHYSICAL THERAPY NOTE
PT DISCHARGE SUMMARY    PATIENT MADE FAIR PROGRESS DURING HIS STAY AT THE ACUTE REHAB CENTER HOWEVER WAS UNABLE TO ACHIEVE MOD(I) GOALS REQUIRED FOR DISCHARGE  PATIENT SUPERVISION FOR BED MOBILITY AND TRANSFERS BUT CONTINUED TO REQUIRE MIN/CGA FOR AMBULATION AND STAIRS  PATIENT REMAINED WITH DEFICITS IN DYNAMIC BALANCE LIMITING HIS SAFETY TO PERFORM ADLs AND (I)ALDS WITHOUT ASSISTANCE AT HOME  HE WAS D/C'D TO SNF WHERE HE WOULD CONTINUE TO BENEFIT FROM SKILLED PT INTERVENTION TO MAXIMIZE FUNCTION AND SAFETY        Luis Manuel Donald, PT

## 2019-07-15 NOTE — PROGRESS NOTES
Physical Medicine and Rehabilitation Progress Note  Emre Herman During 70 y o  male MRN: [id number]  Unit/Bed#: [facility] 970-01 Encounter: [id number]    Chief Complaints:  Decline in function    Subjective/Interval Events:   Patient reports overall feeling well and is appreciative of the care he has received in [facility]  He reports hand dexterity continues to improve along with overall strength and function  Patient denies bowel/bladder changes, uncontrolled pain, fever, chills, calf pain, SOB, increased cough, or other complaints  ROS: A 10-point ROS was performed  Negative except as listed above  Overall Assessment/relevant history:  24-year-old male with a past medical history of  diabetes mellitus 2, hypertension, hyperlipidemia,  active smoker,lung cancer status post left upper lobectomy,  pulmonary nodules, COPD, obstructive sleep apnea,  only grade 1 diastolic dysfunction left ventricle, BPH who developed progressive 4 limb weakness, sensory changes, and gait imbalance 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 who underwent C3-C4 anterior cervical decompression and fusion with  C4 hemicorpectomy by Dr Kody Silver on 6/18/19  Postoperative course notable for pain, hypertension, with significant decline ADLs and ambulation    Patient was evaluated by skilled therapies and is appropriate for admission to acute rehabilitation center      Functional status on admission to ARC:  OT:  Bathing, upper body dressing, toileting, toilet transfers moderate assist, eating, grooming, lower body dressing, general transfers min assist  PT:  Ambulation min assist 100 feet but scored as total assist because of 2nd person chair follow    Functional status (recent):    LB dressing and toileting min assist    Functional status goal:  Modified independent for ADLs and ambulation     * Cervical myelopathy (HCC)  Assessment & Plan  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 and fusion with C4 hemicorpectomy by Dr Junito Yung on 19  Clindamycin for infectious prophylaxis per Neurosurgery  Neurosurgery cleared patient to resume aspirin if he needs it  Patient states he was on aspirin chronically for prevention; patient denies history of heart attack, stroke, stenting; but further review of hx notes he has been on asp for considerable time and was seen recently by PCP > recommend resuming asp 81 mg qday and follow-up with PCP after SNF  Postop day 14  > neurosurgery follow-up on that date with removal of staples  Cervical spine precautions, cervical collar on at all times  Monitor incision for infection or dehiscence > healing appropriately   Completed acute comprehensive interdisciplinary inpatient rehabilitation to include intensive skilled therapies (PT, OT) as outlined with oversight and management by rehabilitation physician as well as inpatient rehab level nursing, case management and weekly interdisciplinary team meetings  Follow-up with Neurosurgery after discharge    Urinary incontinence  Assessment & Plan  Improved  Condom catheter at night p r n  Recent urinary frequency, incontinence > possible neurogenic overactive spastic bladder  > significantly impairing sleep, function, quality of life  > U/A  non-infectious  > urology c/s appreciated > oxybutynin recently started > monitor for changes  Continue chronic flomax for now  Continue ditropan XL for now   Follow-up with Urology after discharge    Anxiety  Assessment & Plan  Improving   Supportive counseling, psychology consult during course  Gabapentin may be helpful for this as well as pain  Optimize sleep  Counseled on and continue to encourage deep breathing/relaxation/behavioral management techniques:     Deep breathin seconds in, 5 seconds out, 5 times per hour when awake and PRN when experiencing pain or anxiety    Avoid holding breath and tightening muscles and instead breathe slowly and deeply      History of insomnia  Assessment & Plan  Improved  Optimal management of bladder dysfunction as outlined > monitor with adjustments in pain meds/urination meds   Melatonin 3 mg q h s  PRN   Optimize pain management as outlined  Recommend appropriate sleep hygiene: patient counselled on this:   Sleep only as much as necessary to feel rested and then get up or sit up in bed, maintain regular sleep schedule (same bedtime and wake time everyday), do not force sleep, avoid caffeinated beverages after lunch, avoid alcohol at home near bedtime, do not smoke particularly in evening, do not go to bed hungry, adjust bedroom environment so you are comfortable prior to settling down for sleep, deal with concerns or worries before bedtime   Make a list of things to work on for next day so anxiety is reduced at night, exercise regularly when cleared by physician      Pain  Assessment & Plan  Much improved control; weaned off opiates   Somatic postsurgical, arthritic, muscle spasms, neuropathic   Discharge on:  APAP PRN  Continue gabapentin to 400 mg t i d    Cymbalta can help as well        History of constipation  Assessment & Plan  Improved  - Colace/Senna BID, miralax daily   - PRN bowel regimen (Miralax PRN/Dulcolax supp PRN)  - monitor for signs and symptoms of obstruction/ileus > not currently; hold stimulant lax if occurs  - Limiting constipating medications if possible  - Ensure adequate hydration        At risk for venous thromboembolism (VTE)  Assessment & Plan  Heparin and SCDs during ARC course     BPH (benign prostatic hyperplasia)  Assessment & Plan  Monitor for retention, incontinence, infection  Continue Flomax    SHIVANI (obstructive sleep apnea)  Assessment & Plan  CPAP    COPD (chronic obstructive pulmonary disease) ([facility])  Assessment & Plan  Monitor oxygen with and without activity  Management overall per Medicine  Encourage deep breathing and incentive spirometry  Breo, Spiriva, as needed albuterol during ARC course   Home regimen, Advair, Spiriva, Combivent PRN  Follow-up with pulmonology after discharge    Essential hypertension  Assessment & Plan  Overall management by Medicine  Losartan 100 mg daily  Patient also on diltiazem 180 mg now > adjustments for Medicine    Hyperlipidemia associated with type 2 diabetes mellitus ([facility] )  Assessment & Plan  Resume statin at discretion Medicine    Type 2 diabetes mellitus with diabetic neuropathy, without long-term current use of insulin ([facility] )  Assessment & Plan  Overall management but Medicine  Patient on combination metformin medication previously  Insulin lispro at their discretion, other insulin and orals at their discretion   D/C only on Metformin 500mg qday and diabetic diet     Pipe smoker  Assessment & Plan  Patient started on nicotine patch per Internal Medicine    Vitamin D insufficiency  Assessment & Plan  2000 units D3 for now daily follow-up with PCP    # Skin:   - Nursing to turn patient Q2H if not adequately ambulatory, monitor for skin breakdown, rashes, and wounds if applicable     # Diet/Hydration:    Diabetic     Disposition:   Skilled nursing facility this weekend      Follow-up:   PCP, PMR, NSx, Urology     CODE: Level 1: Full Code     Restrictions include: Fall precautions     ---------------------------------------------------------------------------------------------------------------------    Objective:     Allergies and Medications per EMR    Physical Exam:  T 98 8 P 78 RR 18 /84  General: Awake, alert in NAD  HENT:  MMM, incision healing appropriately    Respiratory:  clear to auscultation without wheeze without significant rales or rhonchi  Cardiovascular: Regular rate and rhythm, no murmurs, rubs, or gallops  Gastrointestinal: Soft, non-tender, non-distended, normoactive bowel sounds  Genitourinary: No byrnes  SkiN/MSK/Extremities:  no calf edema, no calf tenderness to palpation  Neurologic/Psych:   MENTAL STATUS: awake, cognition grossly stable, appropriate wakefulness and social interaction  Affect:  Positive  Strength/MMT:  Right proximal upper extremity strength 5-out of 5, right distal upper extremity strength 4+/5, left proximal upper extremity strength 5/5, left distal upper extremity strength 4+ over 5, right lower extremity 5out of 5, left lower extremity 5/5  Improving hand dexterity     Physical exam performed, documentation above reviewed and updated if appropriate on relevant date of encounter:   7/5/2019    Diagnostic Studies: reviewed, no new imaging  No results found  Laboratory:    Results for Martha NEELY (MRN [id number]) as of 7/14/2019 20:46   Ref  Range 7/5/2019 11:04   POC Glucose Latest Ref Range: 65 - 140 mg/dl 115       Patient Active Problem List   Diagnosis    Type 2 diabetes mellitus with diabetic neuropathy, without long-term current use of insulin ([facility])    Hyperlipidemia associated with type 2 diabetes mellitus ([address] )    Essential hypertension    BPH (benign prostatic hyperplasia)    Degenerative cervical spinal stenosis    Cervical spondylosis    COPD (chronic obstructive pulmonary disease) ([facility])    Adjustment disorder with depressed mood    Progressive locomotor ataxia    Other spondylosis with myelopathy, cervical region    Quadriparesis ([address] )    Spinal cord compression ([address] )    Prostate cancer ([address] )    Overactive bladder    Lesion of native kidney    SHIVANI (obstructive sleep apnea)    Impaired mobility and ADLs    Cervical myelopathy (HCC)    At risk for venous thromboembolism (VTE)    Pain    History of constipation    Urinary incontinence    Depression    Vitamin D insufficiency    Pipe smoker    History of insomnia    Anxiety       ** Please Note: Fluency Direct voice to text software may have been used in the creation of this document  **    Total visit time: At least 25 minutes, with more than 50% spent counseling/coordinating care   Counseling includes discussion with patient re: progress in therapies, functional issues observed by therapy staff, and discussion with patient regarding their current medical state and wellbeing  Coordination of patient's care was performed in conjunction with Internal Medicine service to monitor patient's labs, vitals, and management of their comorbidities           Angelica Moreland MD, [facility]  Physical Medicine and Rehabilitation  Brain Injury Medicine

## 2019-07-15 NOTE — DISCHARGE SUMMARY
This is a non-billable encounter    Discharge Summary - Agueda Hinds During 70 y o  male MRN: [id number]  Unit/Bed#: -01 Encounter: [id number]    Admission Date: 6/20/2019     Discharge Date: 7/7/2019    Rehabilitation/Etiologic Diagnosis:   Spinal Cord Dysfunction:  Non-Traumatic:  04 130 Other Non-Traumatic Cervical myelopathy    Discharge Diagnoses:       Type 2 diabetes mellitus with diabetic neuropathy, without long-term current use of insulin ([facility] )    Hyperlipidemia associated with type 2 diabetes mellitus ([facility] )    Essential hypertension    BPH (benign prostatic hyperplasia)    Degenerative cervical spinal stenosis    Cervical spondylosis    COPD (chronic obstructive pulmonary disease) (HCC)    Adjustment disorder with depressed mood    Spinal cord compression (HCC)    Overactive bladder    SHIVANI (obstructive sleep apnea)    Impaired mobility and ADLs    Cervical myelopathy ([facility])    Pain    History of constipation    Urinary incontinence    Depression    Vitamin D insufficiency    Pipe smoker    History of insomnia    Anxiety       Acute Rehabilitation Center Course:   (with significant findings, care, complications, treatment, and services provided):      53-year-old male with a past medical history of  diabetes mellitus 2, hypertension, hyperlipidemia,  active smoker,lung cancer status post left upper lobectomy,  pulmonary nodules, COPD, obstructive sleep apnea,  only grade 1 diastolic dysfunction left ventricle, BPH who developed progressive 4 limb weakness, sensory changes, and gait imbalance 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 who underwent C3-C4 anterior cervical decompression and fusion with  C4 hemicorpectomy by Dr Shelia French on 6/18/19  Postoperative course notable for pain, hypertension, with significant decline ADLs and ambulation      Patient was evaluated by skilled therapies and was appropriate for admission to acute rehabilitation center      Patient participated in a comprehensive interdisciplinary inpatient rehabilitation program which included involvment of MD, therapies (PT, OT, and SLP), RN, CM/SW, and psychology services with adequate functional gains in ADLs and mobility as outlined below  He will however continue to require further skilled rehabilitation and recovery time before patient can safely discharge back to community  Patient is considered adequately safe and appropriate for discharged to SNF to continue recovery process  Please see below for patient's hospital course and day to day management of medical needs in problem list format      Functional status on admission to ARC:  OT:  Bathing, upper body dressing, toileting, toilet transfers moderate assist, eating, grooming, lower body dressing, general transfers min assist  PT:  Ambulation min assist 100 feet but scored as total assist because of 2nd person chair follow    Functional status on discharge from ARC:  OT: UB dressing supervision, grooming supervision; LB dressing toileting, transfers min assist  PT: transfers and ambulation supervision      * Cervical myelopathy [facility])  Assessment & Plan  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 and fusion with C4 hemicorpectomy by Dr Tessa Arenas on 6/18/19  Clindamycin for infectious prophylaxis per Neurosurgery  Neurosurgery cleared patient to resume aspirin if he needs it  Patient states he was on aspirin chronically for prevention; patient denies history of heart attack, stroke, stenting; but further review of hx notes he has been on asp for considerable time and was seen recently by PCP > recommend resuming asp 81 mg qday and follow-up with PCP after SNF  Postop day 14 Tuesday July 2nd > neurosurgery follow-up on that date with removal of staples  Cervical spine precautions, cervical collar on at all times  Monitor incision for infection or dehiscence > healing appropriately   Completed acute comprehensive interdisciplinary inpatient rehabilitation to include intensive skilled therapies (PT, OT) as outlined with oversight and management by rehabilitation physician as well as inpatient rehab level nursing, case management and weekly interdisciplinary team meetings  Follow-up with Neurosurgery after discharge    Urinary incontinence  Assessment & Plan  Improved  Condom catheter at night p r n  Recent urinary frequency, incontinence > possible neurogenic overactive spastic bladder  > significantly impairing sleep, function, quality of life  > U/A  non-infectious  > urology c/s appreciated > oxybutynin recently started > monitor for changes  Continue chronic flomax for now  Continue ditropan XL for now   Follow-up with Urology after discharge    Anxiety  Assessment & Plan  Improving   Supportive counseling, psychology consult during course  Gabapentin may be helpful for this as well as pain  Optimize sleep  Counseled on and continue to encourage deep breathing/relaxation/behavioral management techniques:     Deep breathin seconds in, 5 seconds out, 5 times per hour when awake and PRN when experiencing pain or anxiety  Avoid holding breath and tightening muscles and instead breathe slowly and deeply      History of insomnia  Assessment & Plan  Improved  Optimal management of bladder dysfunction as outlined > monitor with adjustments in pain meds/urination meds   Melatonin 3 mg q h s   PRN   Optimize pain management as outlined  Recommend appropriate sleep hygiene: patient counselled on this:   Sleep only as much as necessary to feel rested and then get up or sit up in bed, maintain regular sleep schedule (same bedtime and wake time everyday), do not force sleep, avoid caffeinated beverages after lunch, avoid alcohol at home near bedtime, do not smoke particularly in evening, do not go to bed hungry, adjust bedroom environment so you are comfortable prior to settling down for sleep, deal with concerns or worries before bedtime   Make a list of things to work on for next day so anxiety is reduced at night, exercise regularly when cleared by physician      Pain  Assessment & Plan  Much improved control; weaned off opiates   Somatic postsurgical, arthritic, muscle spasms, neuropathic   Discharge on:  APAP PRN  Continue gabapentin to 400 mg t i d    Cymbalta can help as well        History of constipation  Assessment & Plan  Improved  - Colace/Senna BID, miralax daily   - PRN bowel regimen (Miralax PRN/Dulcolax supp PRN)  - monitor for signs and symptoms of obstruction/ileus > not currently; hold stimulant lax if occurs  - Limiting constipating medications if possible  - Ensure adequate hydration        At risk for venous thromboembolism (VTE)  Assessment & Plan  Heparin and SCDs during ARC course     BPH (benign prostatic hyperplasia)  Assessment & Plan  Monitor for retention, incontinence, infection  Continue Flomax    SHIVANI (obstructive sleep apnea)  Assessment & Plan  CPAP    COPD (chronic obstructive pulmonary disease) ([facility])  Assessment & Plan  Monitor oxygen with and without activity  Management overall per Medicine  Encourage deep breathing and incentive spirometry  Breo, Spiriva, as needed albuterol during ARC course   Home regimen, Advair, Spiriva, Combivent PRN  Follow-up with pulmonology after discharge    Essential hypertension  Assessment & Plan  Overall management by Medicine  Losartan 100 mg daily  Patient also on diltiazem 180 mg now > adjustments for Medicine    Hyperlipidemia associated with type 2 diabetes mellitus ([facility] )  Assessment & Plan  Resume statin at discretion Medicine    Type 2 diabetes mellitus with diabetic neuropathy, without long-term current use of insulin ([facility])  Assessment & Plan  Overall management but Medicine  Patient on combination metformin medication previously  Insulin lispro at their discretion, other insulin and orals at their discretion   D/C only on Metformin 500mg qday and diabetic diet     Pipe smoker  Assessment & Plan  Patient started on nicotine patch per Internal Medicine    Vitamin D insufficiency  Assessment & Plan  2000 units D3 for now daily follow-up with PCP      # Diet/Hydration:    Diabetic     Disposition:   Skilled nursing facility - [facility]     Follow-up:   PCP, PMR, NSx, Urology, Pulm, psychology      CODE: Level 1: Full Code     Restrictions include:  Fall precautions, cervical spinal precautions    Imaging:  No results found  Pertinent Recent Labs (See Course above, EPIC EMR, and if needed request additional records):  Results for MARION NEELY (MRN [id number]) as of 7/14/2019 20:46   Ref  Range 7/4/2019 06:09   Sodium Latest Ref Range: 136 - 145 mmol/L 139   Potassium Latest Ref Range: 3 5 - 5 3 mmol/L 4 3   Chloride Latest Ref Range: 100 - 108 mmol/L 104   CO2 Latest Ref Range: 21 - 32 mmol/L 32   Anion Gap Latest Ref Range: 4 - 13 mmol/L 3 (L)   BUN Latest Ref Range: 5 - 25 mg/dL 19   Creatinine Latest Ref Range: 0 60 - 1 30 mg/dL 0 76   Glucose, Random Latest Ref Range: 65 - 140 mg/dL 102   GLUCOSE FASTING Latest Ref Range: 65 - 99 mg/dL 102 (H)   Calcium Latest Ref Range: 8 3 - 10 1 mg/dL 10 4 (H)   eGFR Latest Units: ml/min/1 73sq m 102   WBC Latest Ref Range: 4 31 - 10 16 Thousand/uL 4 19 (L)   Red Blood Cell Count Latest Ref Range: 3 88 - 5 62 Million/uL 4 92   Hemoglobin Latest Ref Range: 12 0 - 17 0 g/dL 13 6   HCT Latest Ref Range: 36 5 - 49 3 % 41 9   MCV Latest Ref Range: 82 - 98 fL 85   MCH Latest Ref Range: 26 8 - 34 3 pg 27 6   MCHC Latest Ref Range: 31 4 - 37 4 g/dL 32 5   RDW Latest Ref Range: 11 6 - 15 1 % 15 1   Platelet Count Latest Ref Range: 149 - 390 Thousands/uL 176   Results for MARION NEELY (MRN [id number]) as of 7/14/2019 20:46   Ref   Range 6/21/2019 05:42   Vit D, 25-Hydroxy Latest Ref Range: 30 0 - 100 0 ng/mL 25 8 (L)       Procedures Performed During ARC Admission: None    HPI:   43-year-old male with a past medical history of diabetes mellitus 2, hypertension, hyperlipidemia,  active smoker,lung cancer status post left upper lobectomy,  pulmonary nodules, COPD, obstructive sleep apnea,  only grade 1 diastolic dysfunction left ventricle, BPH who developed progressive 4 limb weakness, sensory changes, and gait imbalance 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 who underwent C3-C4 anterior cervical decompression and fusion with  C4 hemicorpectomy by Dr Giovanni Leigh on 6/18/19  Postoperative course notable for pain, hypertension, with significant decline ADLs and ambulation  Patient was evaluated by skilled therapies and is appropriate for admission to acute rehabilitation center      On evaluation, patient reports some improving bilateral arm strength and dexterity  Patient notes some tingling burning pain in both arms at times but not to bed  Patient notes mild neck pain worse with movements  He reports pain medications adequately helpful  Patient reports having a firm stool earlier today  Patient reports chronic urinary incontinence mixed with some continence daily over last several months which is stable since surgery  Patient denies dysuria but states that his urologist treat him for a urine infection not so distant past   He reports progressive gait imbalance over last half year  Patient states he feels his balance already may be slightly better  Patient notes some increased cough and production of sputum over the last day  He denies increased shortness of breath  Patient denies fever, chills, sweats, nausea, chest pain, calf pain, or other new complaints      Review of Systems:     Complete review of systems obtained  Please see HPI for details with other significant symptoms or history listed here: Otherwise, 14 point review of systems completed and was otherwise unremarkable      Condition at Discharge: good     Discharge instructions/Information to patient and family:   See after visit summary for information provided to patient and family  Provisions for Follow-Up Care:  See after visit summary for information related to follow-up care and any pertinent home health orders  Disposition: SNF - [place]     Planned Readmission:  No    Discharge Statement   Total time spent examining patient, counseling patient (or patient/family) on condition, medication, rehabilitation/medical plan, and coordinating care on day of discharge: at least 45 minutes  Greater than 50% of the total time was spent examining patient, answering all questions, directly discussing plan of care and post-discharge instructions with patient (or patient and family)  Additional time spent on coordinating care and other discharge activities  Discharge Medications:  See after visit summary for reconciled discharge medications provided to patient and family

## 2019-07-17 ENCOUNTER — TELEPHONE (OUTPATIENT)
Dept: INTERNAL MEDICINE CLINIC | Facility: CLINIC | Age: 79
End: 2019-07-17

## 2019-07-17 ENCOUNTER — TRANSITIONAL CARE MANAGEMENT (OUTPATIENT)
Dept: INTERNAL MEDICINE CLINIC | Facility: CLINIC | Age: 79
End: 2019-07-17

## 2019-07-17 NOTE — TELEPHONE ENCOUNTER
Bonifacio Lindsay from [facility] called requesting at [facility] appt for Marti Loose During who is being discharged on 7/18  Please advise who we can schedule with

## 2019-07-19 ENCOUNTER — TELEPHONE (OUTPATIENT)
Dept: NEUROSURGERY | Facility: CLINIC | Age: 79
End: 2019-07-19

## 2019-07-19 ENCOUNTER — TELEPHONE (OUTPATIENT)
Dept: INTERNAL MEDICINE CLINIC | Facility: CLINIC | Age: 79
End: 2019-07-19

## 2019-07-20 ENCOUNTER — TELEPHONE (OUTPATIENT)
Dept: INTERNAL MEDICINE CLINIC | Facility: CLINIC | Age: 79
End: 2019-07-20

## 2019-07-20 NOTE — TELEPHONE ENCOUNTER
Message for Dr Maxwell Mayen:    Caty Chamberlain called as a residential nurse  Will Dr Maxwell Mayen sign h/care orders    Surgery on: 6/18, cervical decompression and infusion  Admitted for Home care, PT, OT with home health aid      Please call if Dr Maxwell Mayen will sign the home care orders    P#: [phone]

## 2019-07-22 ENCOUNTER — TELEPHONE (OUTPATIENT)
Dept: INTERNAL MEDICINE CLINIC | Facility: CLINIC | Age: 79
End: 2019-07-22

## 2019-07-24 ENCOUNTER — TELEPHONE (OUTPATIENT)
Dept: INTERNAL MEDICINE CLINIC | Facility: CLINIC | Age: 79
End: 2019-07-24

## 2019-07-24 NOTE — TELEPHONE ENCOUNTER
Guicho Cortez from home health care [phone]    FYI:   After his evaluation patient is a good candidate for home health care    For  2 time a week for 4 weeks

## 2019-07-25 ENCOUNTER — TELEPHONE (OUTPATIENT)
Dept: NEUROSURGERY | Facility: CLINIC | Age: 79
End: 2019-07-25

## 2019-07-25 NOTE — TELEPHONE ENCOUNTER
Pt reports a piece of equipment was delivered to him and explained  He just wants verificastion that this was ordered  It is a bone stim and informed him he should use as instructed  He stated an understanding

## 2019-07-29 ENCOUNTER — OFFICE VISIT (OUTPATIENT)
Dept: INTERNAL MEDICINE CLINIC | Facility: CLINIC | Age: 79
End: 2019-07-29
Payer: MEDICARE

## 2019-07-29 VITALS
BODY MASS INDEX: 19.2 KG/M2 | OXYGEN SATURATION: 97 % | DIASTOLIC BLOOD PRESSURE: 84 MMHG | SYSTOLIC BLOOD PRESSURE: 142 MMHG | WEIGHT: 130 LBS | HEART RATE: 90 BPM

## 2019-07-29 DIAGNOSIS — E11.40 TYPE 2 DIABETES MELLITUS WITH DIABETIC NEUROPATHY, WITHOUT LONG-TERM CURRENT USE OF INSULIN (HCC): Chronic | ICD-10-CM

## 2019-07-29 DIAGNOSIS — G95.20 SPINAL CORD COMPRESSION (HCC): ICD-10-CM

## 2019-07-29 DIAGNOSIS — G82.50 QUADRIPARESIS (HCC): ICD-10-CM

## 2019-07-29 DIAGNOSIS — I10 ESSENTIAL HYPERTENSION: Chronic | ICD-10-CM

## 2019-07-29 DIAGNOSIS — A52.11 PROGRESSIVE LOCOMOTOR ATAXIA: ICD-10-CM

## 2019-07-29 DIAGNOSIS — G95.9 CERVICAL MYELOPATHY (HCC): Primary | ICD-10-CM

## 2019-07-29 PROBLEM — R32 URINARY INCONTINENCE: Status: RESOLVED | Noted: 2019-06-21 | Resolved: 2019-07-29

## 2019-07-29 PROBLEM — N40.0 BPH (BENIGN PROSTATIC HYPERPLASIA): Chronic | Status: ACTIVE | Noted: 2019-02-27

## 2019-07-29 PROBLEM — F43.21 ADJUSTMENT DISORDER WITH DEPRESSED MOOD: Chronic | Status: ACTIVE | Noted: 2019-02-27

## 2019-07-29 PROBLEM — Z87.19 HISTORY OF CONSTIPATION: Status: RESOLVED | Noted: 2019-06-20 | Resolved: 2019-07-29

## 2019-07-29 PROBLEM — F32.A DEPRESSION: Status: RESOLVED | Noted: 2019-06-21 | Resolved: 2019-07-29

## 2019-07-29 PROBLEM — R52 PAIN: Status: RESOLVED | Noted: 2019-06-20 | Resolved: 2019-07-29

## 2019-07-29 PROBLEM — Z87.898 HISTORY OF INSOMNIA: Status: RESOLVED | Noted: 2019-06-22 | Resolved: 2019-07-29

## 2019-07-29 PROBLEM — M47.12 OTHER SPONDYLOSIS WITH MYELOPATHY, CERVICAL REGION: Status: RESOLVED | Noted: 2019-05-08 | Resolved: 2019-07-29

## 2019-07-29 PROBLEM — F43.23 ADJUSTMENT DISORDER WITH MIXED ANXIETY AND DEPRESSED MOOD: Chronic | Status: ACTIVE | Noted: 2019-02-27

## 2019-07-29 PROBLEM — M48.02 DEGENERATIVE CERVICAL SPINAL STENOSIS: Status: ACTIVE | Noted: 2017-02-27

## 2019-07-29 PROBLEM — N32.81 OVERACTIVE BLADDER: Chronic | Status: ACTIVE | Noted: 2019-05-16

## 2019-07-29 PROCEDURE — 99495 TRANSJ CARE MGMT MOD F2F 14D: CPT | Performed by: INTERNAL MEDICINE

## 2019-07-29 NOTE — PROGRESS NOTES
INTERNAL MEDICINE TRANSITION OF CARE OFFICE VISIT  [facility] - [facility]    NAME: Matilda Ventura During  AGE: 70 y o  SEX: male  : 1940     DATE: 2019     Assessment and Plan:     Assessment  1  Cervical myelopathy (HCC)  2  Quadriparesis ([address] )  3  Spinal cord compression ([address] )  4  Progressive locomotor ataxia  5  Type 2 diabetes mellitus with diabetic neuropathy, without long-term current use of insulin ([address] )  6  Essential hypertension    Plan    Patient is doing well after surgery and recovering as expected  Continue therapy/home health  Has follow-up with neurosurgery and urology  Still having some adjustment issues with depression/anxiety that steam from family/marital problems  He is going to be working with psychologist  Will monitor BP and have him follow-up in 1 month  Transitional Care Management Review:     Matilda Chandler is a 70 y o  male here for TCM follow-up    During the TCM phone call patient stated:    TCM Call (since 2019)     Date and time call was made  2019 10:51 AM    Hospital care reviewed  Records reviewed    Patient was hospitialized at  Other (comment); [place] [phone]  Transferred to [address] [place] [address], discharge 19    Date of Admission  19    Date of discharge  19    Diagnosis  cervical stenosis, surgery  INSTRUMENTATION AND FUSION     Disposition  Home    Were the patients medications reviewed and updated  Yes <img src='C:FILES (X86)    Current Symptoms  None      TCM Call (since 2019)     Post hospital issues  None    Should patient be enrolled in anticoag monitoring? Yes    Scheduled for follow up?   Yes <img src='C:FILES (X86)    Did you obtain your prescribed medications  Yes    Do you need help managing your prescriptions or medications  No    Is transportation to your appointment needed  Yes    Specify why  needs transportation    I have advised the patient to call PCP with any new or worsening symptoms  [address] [place] [address]  None    Are you recieving any outpatient services  No    Are you recieving home care services  No    Are you using any community resources  No    Current waiver services  No    Have you fallen in the last 12 months  No    Interperter language line needed  No    Counseling  Patient    Counseling topics  Importance of RX compliance    Comments  pt will bring [employer] discharge information  HPI:     70year old male underwent C3-C4 anterior cervical decompression and fusion with C4 hemicorpectomy by Dr Derrick Hilton on 6/18/19  Patient was evaluated due to some complications after surgery and decision was made that patient would benefit from a course of rehab  He did well in rehab and met his goals and was ultimately discharged  His blood sugars have been well controlled  He has been working on exercises at home  He continues to wear hard cervical collar and has OP follow-up with neurosurgery coming up  He was dealing with some overactive bladder/BPH  He was started on oxybutynin which is helping him  No falls at home  Continues to have home health come to his house to help him out with some of his ADLs  The following portions of the patient's history were reviewed and updated as appropriate: allergies, current medications, past family history, past medical history, past social history, past surgical history and problem list      Review of Systems:     Review of Systems   Constitutional: Negative for activity change, appetite change and fatigue  Respiratory: Negative for apnea, cough, chest tightness, shortness of breath and wheezing  Cardiovascular: Negative for chest pain, palpitations and leg swelling  Gastrointestinal: Negative for abdominal distention, abdominal pain, blood in stool, constipation, diarrhea, nausea and vomiting  Musculoskeletal: Positive for gait problem   Negative for arthralgias, back pain, joint swelling and myalgias  Skin: Negative for rash and wound  Neurological: Positive for weakness and numbness  Negative for dizziness, light-headedness and headaches  Psychiatric/Behavioral: Negative for behavioral problems, confusion, hallucinations, sleep disturbance and suicidal ideas  The patient is not nervous/anxious  Problem List:     Patient Active Problem List   Diagnosis    Type 2 diabetes mellitus with diabetic neuropathy, without long-term current use of insulin ([facility])    Hyperlipidemia associated with type 2 diabetes mellitus ([facility] )    Essential hypertension    BPH (benign prostatic hyperplasia)    Degenerative cervical spinal stenosis    Cervical spondylosis    COPD (chronic obstructive pulmonary disease) ([facility])    Adjustment disorder with depressed mood    Progressive locomotor ataxia    Other spondylosis with myelopathy, cervical region    [facility]rip[facility]is [facility]    Spinal cord compression ([facility] )    Prostate cancer ([facility] )    Overactive bladder    Lesion of native kidney    SHIVANI (obstructive sleep apnea)    Impaired mobility and ADLs    Cervical myelopathy ([facility])    At risk for venous thromboembolism (VTE)    History of constipation    Urinary incontinence    Depression    Vitamin D insufficiency    Pipe smoker    History of insomnia    Anxiety      Objective:     /84 (BP Location: Left arm, Patient Position: Sitting, Cuff Size: Standard)   Pulse 90   Wt 59 kg (130 lb) Comment: w/ shoes denied off  SpO2 97%   BMI 19 20 kg/m²     Physical Exam   Constitutional: He is oriented to person, place, and time  He appears well-developed and well-nourished  No distress  Eyes: Conjunctivae are normal  Right eye exhibits no discharge  Left eye exhibits no discharge  No scleral icterus  Neck: Neck supple  No JVD present  Hard cervical collar   Cardiovascular: Normal rate, regular rhythm, normal heart sounds and intact distal pulses   Exam reveals no gallop and no friction rub  No murmur heard  Pulmonary/Chest: Effort normal and breath sounds normal  No respiratory distress  He has no wheezes  He has no rales  He exhibits no tenderness  Abdominal: Soft  Bowel sounds are normal  He exhibits no distension  There is no tenderness  There is no guarding  Musculoskeletal: He exhibits no edema  Lymphadenopathy:     He has no cervical adenopathy  Neurological: He is alert and oriented to person, place, and time  LLE strength: +3/5  RLE strength: +4/5  RUE/LUE strength: +5/5   Skin: Skin is warm and dry  He is not diaphoretic  Psychiatric: He has a normal mood and affect  His behavior is normal    Vitals reviewed  Laboratory Results: I have personally reviewed the pertinent laboratory results/reports     Radiology/Other Diagnostic Testing Results: I have personally reviewed pertinent reports         Current Medications:     Outpatient Medications Prior to Visit   Medication Sig Dispense Refill    acetaminophen (TYLENOL) 325 mg tablet Take 2 tablets (650 mg total) by mouth 3 (three) times a day as needed for mild pain, moderate pain, headaches or fever 30 tablet 0    aspirin (ECOTRIN LOW STRENGTH) 81 mg EC tablet Take 1 tablet by mouth daily 30 tablet 0    cholecalciferol 2000 units TABS Take 1 tablet (2,000 Units total) by mouth daily 60 tablet 0    diltiazem (TIAZAC) 180 MG 24 hr capsule Take 180 mg by mouth daily      DULoxetine (CYMBALTA) 60 mg delayed release capsule Take 1 capsule (60 mg total) by mouth daily 90 capsule 3    fluticasone-salmeterol (ADVAIR) 250-50 mcg/dose inhaler Inhale 1 puff every 12 (twelve) hours      gabapentin (NEURONTIN) 400 mg capsule Take 1 capsule (400 mg total) by mouth 3 (three) times a day 90 capsule 1    ipratropium-albuterol (COMBIVENT RESPIMAT) inhaler Inhale 1 puff as needed      losartan (COZAAR) 100 MG tablet Take 1 tablet (100 mg total) by mouth daily 90 tablet 3    metFORMIN (GLUCOPHAGE) 500 mg tablet Take 1 tablet (500 mg total) by mouth daily with breakfast 30 tablet 1    Multiple Vitamin (MULTI-VITAMINS PO) Take by mouth daily      nicotine (NICODERM CQ) 21 mg/24 hr TD 24 hr patch Place 1 patch on the skin daily 28 patch 0    ONETOUCH DELICA LANCETS 24B MISC TEST 1 TO 2 TIMES DAILY 100 each 3    oxybutynin (DITROPAN-XL) 5 mg 24 hr tablet Take 1 tablet (5 mg total) by mouth daily 30 tablet 0    polyethylene glycol (MIRALAX) 17 g packet Take 17 g by mouth daily as needed (Per bowel protocol 1st line treatment) 14 each 0    polyvinyl alcohol (LIQUIFILM TEARS) 1 4 % ophthalmic solution Administer 1 drop to both eyes 4 (four) times a day 15 mL 0    polyvinyl alcohol (LIQUIFILM TEARS) 1 4 % ophthalmic solution Administer 1 drop to both eyes every hour as needed for dry eyes 15 mL 0    pravastatin (PRAVACHOL) 40 mg tablet take 1 tablet by mouth once daily as directed 90 tablet 1    tamsulosin (FLOMAX) 0 4 mg Take 1 capsule (0 4 mg total) by mouth daily at bedtime 90 capsule 3    thiamine 50 MG tablet Take 1 tablet (50 mg total) by mouth daily 30 tablet 3    tiotropium (SPIRIVA) 18 mcg inhalation capsule Place 18 mcg into inhaler and inhale daily      docusate sodium (COLACE) 100 mg capsule Take 1 capsule (100 mg total) by mouth 2 (two) times a day (Patient not taking: Reported on 7/29/2019) 60 capsule 0    senna (SENOKOT) 8 6 mg Take 1 tablet (8 6 mg total) by mouth daily (Patient not taking: Reported on 7/29/2019) 30 each 0     No facility-administered medications prior to visit          Enrique Tellez DO  [facility]

## 2019-07-29 NOTE — PATIENT INSTRUCTIONS
DASH Eating Plan   AMBULATORY CARE:   The DASH (Dietary Approaches to Stop Hypertension) Eating Plan  is designed to help prevent or lower high blood pressure  It can also help to lower LDL (bad) cholesterol and decrease your risk for heart disease  The plan is low in sodium, sugar, unhealthy fats, and total fat  It is high in potassium, calcium, magnesium, and fiber  These nutrients are added when you eat more fruits, vegetables, and whole grains  Your sodium limit each day: Your dietitian will tell you how much sodium is safe for you to have each day  People with high blood pressure should have no more than 1,500 to 2,300 mg of sodium in a day  A teaspoon (tsp) of salt has 2,300 mg of sodium  This may seem like a difficult goal, but small changes to the foods you eat can make a big difference  Your healthcare provider or dietitian can help you create a meal plan that follows your sodium limit  How to limit sodium:   · Read food labels  Food labels can help you choose foods that are low in sodium  The amount of sodium is listed in milligrams (mg)  The % Daily Value (DV) column tells you how much of your daily needs are met by 1 serving of the food for each nutrient listed  Choose foods that have less than 5% of the DV of sodium  These foods are considered low in sodium  Foods that have 20% or more of the DV of sodium are considered high in sodium  Avoid foods that have more than 300 mg of sodium in each serving  Choose foods that say low-sodium, reduced-sodium, or no salt added on the food label  · Avoid salt  Do not salt food at the table, and add very little salt to foods during cooking  Use herbs and spices, such as onions, garlic, and salt-free seasonings to add flavor to foods  Try lemon or lime juice or vinegar to give foods a tart flavor  Use hot peppers or a small amount of hot pepper sauce to add a spicy flavor to foods  · Ask about salt substitutes    Ask your healthcare provider if you may use salt substitutes  Some salt substitutes have ingredients that can be harmful if you have certain health conditions  · Choose foods carefully at restaurants  Meals from restaurants, especially fast food restaurants, are often high in sodium  Some restaurants have nutrition information that tells you the amount of sodium in their foods  Ask to have your food prepared with less, or no salt  What you need to know about fats:   · Include healthy fats  Examples are unsaturated fats and omega-3 fatty acids  Unsaturated fats are found in soybean, canola, olive, or sunflower oil, and liquid and soft tub margarines  Omega-3 fatty acids are found in fatty fish, such as salmon, tuna, mackerel, and sardines  It is also found in flaxseed oil and ground flaxseed  · Avoid unhealthy fats  Do not eat unhealthy fats, such as saturated fats and trans fats  Saturated fats are found in foods that contain fat from animals  Examples are fatty meats, whole milk, butter, cream, and other dairy foods  It is also found in shortening, stick margarine, palm oil, and coconut oil  Trans fats are found in fried foods, crackers, chips, and baked goods made with margarine or shortening  Foods to include: With the DASH eating plan, you need to eat a certain number of servings from each food group  This will help you get enough of certain nutrients and limit others  The amount of servings you should eat depends on how many calories you need  Your dietitian can tell you how many calories you need  The number of servings listed next to the food groups below are for people who need about 2,000 calories each day    · Grains:  6 to 8 servings (3 of these servings should be whole-grain foods)    ¨ 1 slice of whole-grain bread     ¨ 1 ounce of dry cereal    ¨ ½ cup of cooked cereal, pasta, or brown rice    · Vegetables and fruits:  4 to 5 servings of fruits and 4 to 5 servings of vegetables    ¨ 1 medium fruit    ¨ ½ cup of frozen, canned (no added salt), or chopped fresh vegetables     ¨ ½ cup of fresh, frozen, dried, or canned fruit (canned in light syrup or fruit juice)    ¨ ½ cup of vegetable or fruit juice    · Dairy:  2 to 3 servings    ¨ 1 cup of nonfat (skim) or 1% milk    ¨ 1½ ounces of fat-free or low-fat cheese    ¨ 6 ounces of nonfat or low-fat yogurt    · Lean meat, poultry, and fish:  6 ounces or less    [employer] (chicken, turkey) with no skin    ¨ Fish (especially fatty fish, such as salmon, fresh tuna, or mackerel)    ¨ Lean beef and pork (loin, round, extra lean hamburger)    ¨ Egg whites and egg substitutes    · Nuts, seeds, and legumes:  4 to 5 servings each week    ¨ ½ cup of cooked beans and peas    ¨ 1½ ounces of unsalted nuts    ¨ 2 tablespoons of peanut butter or seeds    · Sweets and added sugars:  5 or less each week    ¨ 1 tablespoon of sugar, jelly, or jam    ¨ ½ cup of sorbet or gelatin    ¨ 1 cup of lemonade    · Fats:  2 to 3 servings each week    ¨ 1 teaspoon of soft margarine or vegetable oil    ¨ 1 tablespoon of mayonnaise    ¨ 2 tablespoons of salad dressing  Foods to avoid:   · Grains:      [employer], such as doughnuts, pastries, cookies, and biscuits (high in fat and sugar)    ¨ Mixes for cornbread and biscuits, packaged foods, such as bread stuffing, rice and pasta mixes, macaroni and cheese, and instant cereals (high in sodium)    · Fruits and vegetables:      ¨ Regular, canned vegetables (high in sodium)    ¨ Sauerkraut, pickled vegetables, and other foods prepared in brine (high in sodium)    ¨ Fried vegetables or vegetables in butter or high-fat sauces    ¨ Fruit in cream or butter sauce (high in fat)    · Dairy:      ¨ Whole milk, 2% milk, and cream (high in fat)    ¨ Regular cheese and processed cheese (high in fat and sodium)    · Meats and protein foods:      ¨ Smoked or cured meat, such as corned beef, gooden, ham, hot dogs, and sausage (high in fat and sodium)    ¨ Canned beans and canned meats or spreads, such as potted meats, sardines, anchovies, and imitation seafood (high in sodium)    ¨ Deli or lunch meats, such as bologna, ham, turkey, and roast beef (high in sodium)    ¨ High-fat meat (T-bone steak, regular hamburger, and ribs)    ¨ Whole eggs and egg yolks (high in fat)    · Other:      ¨ Seasonings made with salt, such as garlic salt, celery salt, onion salt, seasoned salt, meat tenderizers, and monosodium glutamate (MSG)    ¨ Miso soup and canned or dried soup mixes (high in sodium)    ¨ Regular soy sauce, barbecue sauce, teriyaki sauce, steak sauce, Worcestershire sauce, and most flavored vinegars (high in sodium)    ¨ Regular condiments, such as mustard, ketchup, and salad dressings (high in sodium)    ¨ Gravy and sauces, such as Kevin or cheese sauces (high in sodium and fat)    ¨ Drinks high in sugar, such as soda or fruit drinks    [employer] foods, such as salted chips, popcorn, pretzels, pork rinds, salted crackers, and salted nuts    ¨ Frozen foods, such as dinners, entrees, vegetables with sauces, and breaded meats (high in sodium)  Other guidelines to follow:   · Maintain a healthy weight  Your risk for heart disease is higher if you are overweight  Your healthcare provider may suggest that you lose weight if you are overweight  You can lose weight by eating fewer calories and foods that have added sugars and fat  The DASH meal plan can help you do this  Decrease calories by eating smaller portions at each meal and fewer snacks  Ask your healthcare provider for more information about how to lose weight  · Exercise regularly  Regular exercise can help you reach or maintain a healthy weight  Regular exercise can also help decrease your blood pressure and improve your cholesterol levels  Get 30 minutes or more of moderate exercise each day of the week  To lose weight, get at least 60 minutes of exercise  Talk to your healthcare provider about the best exercise program for you      · Limit alcohol  Women should limit alcohol to 1 drink a day  Men should limit alcohol to 2 drinks a day  A drink of alcohol is 12 ounces of beer, 5 ounces of wine, or 1½ ounces of liquor  © 2017 [address] Information is for End User's use only and may not be sold, redistributed or otherwise used for commercial purposes  All illustrations and images included in CareNotes® are the copyrighted property of [employer] , [employer]  or Eloy Huggins  The above information is an  only  It is not intended as medical advice for individual conditions or treatments  Talk to your doctor, nurse or pharmacist before following any medical regimen to see if it is safe and effective for you

## 2019-07-31 ENCOUNTER — OFFICE VISIT (OUTPATIENT)
Dept: NEUROSURGERY | Facility: CLINIC | Age: 79
End: 2019-07-31

## 2019-07-31 ENCOUNTER — HOSPITAL ENCOUNTER (OUTPATIENT)
Dept: RADIOLOGY | Facility: HOSPITAL | Age: 79
Discharge: HOME | End: 2019-07-31
Payer: MEDICARE

## 2019-07-31 VITALS
WEIGHT: 130 LBS | DIASTOLIC BLOOD PRESSURE: 86 MMHG | HEART RATE: 88 BPM | RESPIRATION RATE: 16 BRPM | BODY MASS INDEX: 19.26 KG/M2 | TEMPERATURE: 96.5 F | HEIGHT: 69 IN | SYSTOLIC BLOOD PRESSURE: 136 MMHG

## 2019-07-31 DIAGNOSIS — Z48.89 AFTERCARE FOLLOWING SURGERY FOR INJURY AND TRAUMA: Primary | ICD-10-CM

## 2019-07-31 DIAGNOSIS — G95.9 CERVICAL MYELOPATHY (HCC): ICD-10-CM

## 2019-07-31 PROCEDURE — 72040 X-RAY EXAM NECK SPINE 2-3 VW: CPT

## 2019-07-31 PROCEDURE — 99024 POSTOP FOLLOW-UP VISIT: CPT | Performed by: NEUROLOGICAL SURGERY

## 2019-07-31 NOTE — PROGRESS NOTES
Assessment/Plan:    No problem-specific Assessment & Plan notes found for this encounter  Problem List Items Addressed This Visit     None      Visit Diagnoses     Aftercare following surgery for injury and trauma    -  Primary    Relevant Orders    XR spine cervical 2 or 3 vw injury            Subjective:      Patient ID: Anju Chandler is a 70 y o  male  HPI    The following portions of the patient's history were reviewed and updated as appropriate:   He  has a past medical history of BPH (benign prostatic hyperplasia), Cancer ([address] ) (2013), Chemical exposure, COPD (chronic obstructive pulmonary disease) ([address] ), CPAP (continuous positive airway pressure) dependence, DDD (degenerative disc disease), cervical, Diabetes mellitus ([address] ), Foraminal stenosis of cervical region, Hyperlipidemia, Hypertension, SHIVANI (obstructive sleep apnea), and Overactive bladder    He   Patient Active Problem List    Diagnosis Date Noted    Pipe smoker 06/22/2019    Anxiety 06/22/2019    Vitamin D insufficiency 06/21/2019    Impaired mobility and ADLs 06/20/2019    Cervical myelopathy ([address] ) 06/20/2019    At risk for venous thromboembolism (VTE) 06/20/2019    SHIVANI (obstructive sleep apnea) 06/07/2019    Prostate cancer ([address] ) 05/16/2019    Overactive bladder 05/16/2019    Quadriparesis ([address] ) 05/08/2019    Spinal cord compression ([address] ) 05/08/2019    Lesion of native kidney 04/17/2019    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    Type 2 diabetes mellitus with diabetic neuropathy, without long-term current use of insulin ([address] ) 02/27/2019    Hyperlipidemia associated with type 2 diabetes mellitus ([address] ) 02/27/2019    Essential hypertension 02/27/2019    BPH (benign prostatic hyperplasia) 02/27/2019    Cervical spondylosis 02/27/2019    COPD (chronic obstructive pulmonary disease) ([address] ) 02/27/2019    Adjustment disorder with mixed anxiety and depressed mood 02/27/2019    Degenerative cervical spinal stenosis 02/27/2017 He  has a past surgical history that includes Rotator cuff repair (Bilateral); ARTHROSCOPY KNEE (Bilateral); Lung surgery (Left); Colonoscopy; and pr arthrodesis ant interbody inc discectomy, cervical below c2 (Bilateral, 6/18/2019)  His family history includes No Known Problems in his father and mother  He  reports that he has been smoking pipe  He has smoked for the past 61 00 years  He uses smokeless tobacco  He reports that he drank alcohol  He reports that he does not use drugs    Current Outpatient Medications   Medication Sig Dispense Refill    aspirin (ECOTRIN LOW STRENGTH) 81 mg EC tablet Take 1 tablet by mouth daily 30 tablet 0    cholecalciferol 2000 units TABS Take 1 tablet (2,000 Units total) by mouth daily 60 tablet 0    diltiazem (TIAZAC) 180 MG 24 hr capsule Take 180 mg by mouth daily      DULoxetine (CYMBALTA) 60 mg delayed release capsule Take 1 capsule (60 mg total) by mouth daily 90 capsule 3    fluticasone-salmeterol (ADVAIR) 250-50 mcg/dose inhaler Inhale 1 puff every 12 (twelve) hours      gabapentin (NEURONTIN) 400 mg capsule Take 1 capsule (400 mg total) by mouth 3 (three) times a day 90 capsule 1    ipratropium-albuterol (COMBIVENT RESPIMAT) inhaler Inhale 1 puff as needed      losartan (COZAAR) 100 MG tablet Take 1 tablet (100 mg total) by mouth daily 90 tablet 3    metFORMIN (GLUCOPHAGE) 500 mg tablet Take 1 tablet (500 mg total) by mouth daily with breakfast 30 tablet 1    Multiple Vitamin (MULTI-VITAMINS PO) Take by mouth daily      nicotine (NICODERM CQ) 21 mg/24 hr TD 24 hr patch Place 1 patch on the skin daily 28 patch 0    ONETOUCH DELICA LANCETS 28P MISC TEST 1 TO 2 TIMES DAILY 100 each 3    oxybutynin (DITROPAN-XL) 5 mg 24 hr tablet Take 1 tablet (5 mg total) by mouth daily 30 tablet 0    polyethylene glycol (MIRALAX) 17 g packet Take 17 g by mouth daily as needed (Per bowel protocol 1st line treatment) 14 each 0    polyvinyl alcohol (LIQUIFILM TEARS) 1 4 % ophthalmic solution Administer 1 drop to both eyes 4 (four) times a day 15 mL 0    polyvinyl alcohol (LIQUIFILM TEARS) 1 4 % ophthalmic solution Administer 1 drop to both eyes every hour as needed for dry eyes 15 mL 0    pravastatin (PRAVACHOL) 40 mg tablet take 1 tablet by mouth once daily as directed 90 tablet 1    tamsulosin (FLOMAX) 0 4 mg Take 1 capsule (0 4 mg total) by mouth daily at bedtime 90 capsule 3    thiamine 50 MG tablet Take 1 tablet (50 mg total) by mouth daily 30 tablet 3    tiotropium (SPIRIVA) 18 mcg inhalation capsule Place 18 mcg into inhaler and inhale daily      acetaminophen (TYLENOL) 325 mg tablet Take 2 tablets (650 mg total) by mouth 3 (three) times a day as needed for mild pain, moderate pain, headaches or fever (Patient not taking: Reported on 7/31/2019) 30 tablet 0     No current facility-administered medications for this visit        Current Outpatient Medications on File Prior to Visit   Medication Sig    aspirin (ECOTRIN LOW STRENGTH) 81 mg EC tablet Take 1 tablet by mouth daily    cholecalciferol 2000 units TABS Take 1 tablet (2,000 Units total) by mouth daily    diltiazem (TIAZAC) 180 MG 24 hr capsule Take 180 mg by mouth daily    DULoxetine (CYMBALTA) 60 mg delayed release capsule Take 1 capsule (60 mg total) by mouth daily    fluticasone-salmeterol (ADVAIR) 250-50 mcg/dose inhaler Inhale 1 puff every 12 (twelve) hours    gabapentin (NEURONTIN) 400 mg capsule Take 1 capsule (400 mg total) by mouth 3 (three) times a day    ipratropium-albuterol (COMBIVENT RESPIMAT) inhaler Inhale 1 puff as needed    losartan (COZAAR) 100 MG tablet Take 1 tablet (100 mg total) by mouth daily    metFORMIN (GLUCOPHAGE) 500 mg tablet Take 1 tablet (500 mg total) by mouth daily with breakfast    Multiple Vitamin (MULTI-VITAMINS PO) Take by mouth daily    nicotine (NICODERM CQ) 21 mg/24 hr TD 24 hr patch Place 1 patch on the skin daily    ONETOUCH DELICA LANCETS 15I MISC TEST 1 TO 2 TIMES DAILY    oxybutynin (DITROPAN-XL) 5 mg 24 hr tablet Take 1 tablet (5 mg total) by mouth daily    polyethylene glycol (MIRALAX) 17 g packet Take 17 g by mouth daily as needed (Per bowel protocol 1st line treatment)    polyvinyl alcohol (LIQUIFILM TEARS) 1 4 % ophthalmic solution Administer 1 drop to both eyes 4 (four) times a day    polyvinyl alcohol (LIQUIFILM TEARS) 1 4 % ophthalmic solution Administer 1 drop to both eyes every hour as needed for dry eyes    pravastatin (PRAVACHOL) 40 mg tablet take 1 tablet by mouth once daily as directed    tamsulosin (FLOMAX) 0 4 mg Take 1 capsule (0 4 mg total) by mouth daily at bedtime    thiamine 50 MG tablet Take 1 tablet (50 mg total) by mouth daily    tiotropium (SPIRIVA) 18 mcg inhalation capsule Place 18 mcg into inhaler and inhale daily    acetaminophen (TYLENOL) 325 mg tablet Take 2 tablets (650 mg total) by mouth 3 (three) times a day as needed for mild pain, moderate pain, headaches or fever (Patient not taking: Reported on 7/31/2019)     No current facility-administered medications on file prior to visit  He has No Known Allergies       Review of Systems   Constitutional: Negative  HENT: Negative  Eyes:        Cataracts   Respiratory: Negative for shortness of breath and wheezing  Cardiovascular: Negative for chest pain and palpitations  Gastrointestinal: Negative  Genitourinary: Negative for frequency and urgency  Musculoskeletal: Negative for gait problem (ambulates with cane  But per pt  gait has improved significantly ) and neck stiffness  Skin: Negative  Allergic/Immunologic: Negative  Neurological: Positive for weakness (bilat  UE and LE) and numbness (bilat  UE and LE)  Negative for dizziness and headaches  Hematological:        Aspirin   Psychiatric/Behavioral: Negative for sleep disturbance           Objective:      /86 (BP Location: Left arm, Patient Position: Sitting, Cuff Size: Standard)   Pulse 88 Temp (!) 96 5 °F (35 8 °C) (Tympanic)   Resp 16   Ht 5' 9" (1 753 m)   Wt 59 kg (130 lb)   BMI 19 20 kg/m²           I have personally obtain history and examined patient  I have personally reviewed case including all pertinent investigations/studies       Time spent 15 minutes  More than 50% of total time spent on counseling and coordination of care as described above including patient education    HPI    6 weeks post op anterior cervical decompression and fusion for cord compression/progressive quadraparesis  Doing well with improved strength, sensation and gait  Denies neck pain  Compliant with collar and rehab  Exam    Well healed incision  Mild restriction in cervical ROM  Grade 4+/5 bilateral  strength  Improved sensation  Improved fine motor  Grossly steady gait without cane  Deficit with tandem                    Neck:   Supple, symmetrical, trachea midline, no adenopathy;        thyroid:  No enlargement/tenderness/nodules; no carotid    bruit or JVD                               Extremities:   Extremities normal, atraumatic, no cyanosis or edema   Pulses:   2+ and symmetric all extremities   Skin:   Skin color, texture, turgor normal, no rashes or lesions     Radiology    Xray    Anatomic neck alignment with all hardware and screws in expected position  No cage settling    Summary and plan    Mr  During is doing well 6 weeks post op  I see no further need for the collar  We reviewed activity restrictions as well as the need for ongoing spinal cord injury rehab/PT  I will see him in fu in 6 weeks with repeat xray

## 2019-08-05 ENCOUNTER — TELEPHONE (OUTPATIENT)
Dept: UROLOGY | Facility: MEDICAL CENTER | Age: 79
End: 2019-08-05

## 2019-08-05 NOTE — TELEPHONE ENCOUNTER
Patient of Dr Magda Maya seen at [facility]  Patient called to reschedule the cysto he was scheduled for on 07/26/19  I am not able to find an opening for him in [facility] until 10/11/19  Patient wants to be seen before then  He can be reached at [phone]

## 2019-08-05 NOTE — TELEPHONE ENCOUNTER
Called and spoke to patient  Patient was scheduled for next available cysto on 10/11/19 at 1:00 at the [facility] office with Dr Meehan  Patient verbalized understanding and denies any other questions or concerns

## 2019-08-19 ENCOUNTER — OFFICE VISIT (OUTPATIENT)
Dept: NEUROLOGY | Facility: CLINIC | Age: 79
End: 2019-08-19
Payer: MEDICARE

## 2019-08-19 VITALS
HEART RATE: 88 BPM | HEIGHT: 69 IN | BODY MASS INDEX: 19.11 KG/M2 | WEIGHT: 129 LBS | SYSTOLIC BLOOD PRESSURE: 118 MMHG | DIASTOLIC BLOOD PRESSURE: 80 MMHG

## 2019-08-19 DIAGNOSIS — E11.40 TYPE 2 DIABETES MELLITUS WITH DIABETIC NEUROPATHY, WITHOUT LONG-TERM CURRENT USE OF INSULIN (HCC): Primary | Chronic | ICD-10-CM

## 2019-08-19 DIAGNOSIS — G95.9 CERVICAL MYELOPATHY (HCC): ICD-10-CM

## 2019-08-19 PROCEDURE — 99214 OFFICE O/P EST MOD 30 MIN: CPT | Performed by: PSYCHIATRY & NEUROLOGY

## 2019-08-19 RX ORDER — GABAPENTIN 300 MG/1
300 CAPSULE ORAL 3 TIMES DAILY
Qty: 90 CAPSULE | Refills: 3 | Status: SHIPPED | OUTPATIENT
Start: 2019-08-19 | End: 2019-12-18 | Stop reason: SDUPTHER

## 2019-08-19 NOTE — PROGRESS NOTES
Progress Note - Neurology   Ivelisse Hooper During 70 y o  male MRN: [id number]  Unit/Bed#:  Encounter: [id number]      Subjective:   Patient is here for a follow-up visit with a history of cervical myelopathy, diabetic polyneuropathy, and since his last visit was evaluated by Neurosurgery, underwent cervical decompression, and is postop 6 weeks, and has seen improvement with his strength as well as his gait  Patient's blood sugars also under decent control, and has been receiving home services with occupational therapy and physical therapy  Patient uses cervical soft collar while driving, was experiencing cramps until recently since he was also smoking which he has given up at this time  He denies any bladder bowel symptoms  Patient has been on gabapentin 400 mg 3 times a day and Cymbalta 60 mg daily for bilateral pain which has resolved at this time  ROS:   Review of Systems   Constitutional: Positive for unexpected weight change  Negative for appetite change and fever  HENT: Negative  Negative for hearing loss, tinnitus, trouble swallowing and voice change  Eyes: Negative  Negative for photophobia and pain  Respiratory: Negative  Negative for shortness of breath  Cardiovascular: Negative  Negative for palpitations  Gastrointestinal: Negative  Negative for nausea and vomiting  Endocrine: Negative  Negative for cold intolerance and heat intolerance  Genitourinary: Negative  Negative for dysuria, frequency and urgency  Musculoskeletal: Positive for arthralgias and gait problem  Negative for back pain, myalgias and neck pain  Skin: Negative  Negative for rash  Neurological: Positive for weakness, numbness (with tingling in hands and feet) and headaches  Negative for dizziness, tremors, seizures, syncope, facial asymmetry, speech difficulty and light-headedness  Hematological: Negative  Does not bruise/bleed easily  Psychiatric/Behavioral: Negative    Negative for confusion, hallucinations and sleep disturbance  Vitals:   Vitals:    08/19/19 1057   BP: 118/80   BP Location: Left arm   Patient Position: Sitting   Cuff Size: Standard   Pulse: 88   Weight: 58 5 kg (129 lb)   Height: 5' 9" (1 753 m)   ,Body mass index is 19 05 kg/m²      MEDS:      Current Outpatient Medications:     aspirin (ECOTRIN LOW STRENGTH) 81 mg EC tablet, Take 1 tablet by mouth daily, Disp: 30 tablet, Rfl: 0    diltiazem (TIAZAC) 180 MG 24 hr capsule, Take 180 mg by mouth daily, Disp: , Rfl:     DULoxetine (CYMBALTA) 60 mg delayed release capsule, Take 1 capsule (60 mg total) by mouth daily, Disp: 90 capsule, Rfl: 3    fluticasone-salmeterol (ADVAIR) 250-50 mcg/dose inhaler, Inhale 1 puff every 12 (twelve) hours, Disp: , Rfl:     gabapentin (NEURONTIN) 400 mg capsule, Take 1 capsule (400 mg total) by mouth 3 (three) times a day, Disp: 90 capsule, Rfl: 1    ipratropium-albuterol (COMBIVENT RESPIMAT) inhaler, Inhale 1 puff as needed, Disp: , Rfl:     losartan (COZAAR) 100 MG tablet, Take 1 tablet (100 mg total) by mouth daily, Disp: 90 tablet, Rfl: 3    metFORMIN (GLUCOPHAGE) 500 mg tablet, Take 1 tablet (500 mg total) by mouth daily with breakfast, Disp: 30 tablet, Rfl: 1    Multiple Vitamin (MULTI-VITAMINS PO), Take by mouth daily, Disp: , Rfl:     ONETOUCH DELICA LANCETS B MISC, TEST 1 TO 2 TIMES DAILY, Disp: 100 each, Rfl: 3    oxybutynin (DITROPAN-XL) 5 mg 24 hr tablet, Take 1 tablet (5 mg total) by mouth daily, Disp: 30 tablet, Rfl: 0    polyvinyl alcohol (LIQUIFILM TEARS) 1 4 % ophthalmic solution, Administer 1 drop to both eyes 4 (four) times a day, Disp: 15 mL, Rfl: 0    pravastatin (PRAVACHOL) 40 mg tablet, take 1 tablet by mouth once daily as directed, Disp: 90 tablet, Rfl: 1    tamsulosin (FLOMAX) 0 4 mg, Take 1 capsule (0 4 mg total) by mouth daily at bedtime, Disp: 90 capsule, Rfl: 3    thiamine 50 MG tablet, Take 1 tablet (50 mg total) by mouth daily, Disp: 30 tablet, Rfl: 3   tiotropium (SPIRIVA) 18 mcg inhalation capsule, Place 18 mcg into inhaler and inhale daily, Disp: , Rfl:   :    Physical Exam:  General appearance: alert, appears stated age and cooperative  Head: Normocephalic, without obvious abnormality, atraumatic    On examination there is no evidence of any cranial nerve deficit, he has a soft cervical collar in place, no cervical tenderness was noted, and on motor and sensory examination mild weakness is noted in the small muscles of the right hand mainly in the lumbricals at the interossei with adequate  bilaterally and there is no evidence of any proximal upper extremity or lower extremity weakness  His deep tendon reflexes are brisk in the lower extremities, patient ambulates with the help of a cane with minimal imbalance and a sway on Romberg testing  No bruits were appreciable in the neck  Lab Results: I have personally reviewed pertinent reports  Imaging Studies: I have personally reviewed pertinent reports  Assessment:  1  Cervical myelopathy, status post cervical cord decompression  2  Diabetic polyneuropathy  Plan:  Patient is undergoing home rehab at this time, with gradual improvement, has quit smoking, and is advised to lower the dose of gabapentin to 300 mg 3 times a day  He will call us if he notices any worsening symptoms, and will return back to see me in 3 months  8/19/2019,11:02 AM    Dictation voice to text software has been used in the creation of this document  Please consider this in light of any contextual or grammatical errors

## 2019-08-20 ENCOUNTER — TELEPHONE (OUTPATIENT)
Dept: NEUROSURGERY | Facility: CLINIC | Age: 79
End: 2019-08-20

## 2019-08-20 NOTE — TELEPHONE ENCOUNTER
Spoke with Pt Son Lissett to reschedule father's duarte  Lissett will call me back to reschedule appt

## 2019-08-23 ENCOUNTER — TELEPHONE (OUTPATIENT)
Dept: INTERNAL MEDICINE CLINIC | Facility: CLINIC | Age: 79
End: 2019-08-23

## 2019-09-03 DIAGNOSIS — F32.A DEPRESSION: ICD-10-CM

## 2019-09-03 DIAGNOSIS — E11.40 TYPE 2 DIABETES MELLITUS WITH DIABETIC NEUROPATHY, WITHOUT LONG-TERM CURRENT USE OF INSULIN (HCC): Chronic | ICD-10-CM

## 2019-09-03 RX ORDER — OXYBUTYNIN CHLORIDE 5 MG/1
5 TABLET, EXTENDED RELEASE ORAL DAILY
Qty: 90 TABLET | Refills: 1 | Status: SHIPPED | OUTPATIENT
Start: 2019-09-03 | End: 2020-01-16 | Stop reason: SDUPTHER

## 2019-09-03 NOTE — TELEPHONE ENCOUNTER
PT  NOTVILMA   RX REFILL    METFORMIN 500 MG  AND  OXYBUTYNIN 5 MG  [facility]  [place]  ANY QUESTIONS  CALL PT  [phone]

## 2019-09-05 ENCOUNTER — TELEPHONE (OUTPATIENT)
Dept: INTERNAL MEDICINE CLINIC | Facility: CLINIC | Age: 79
End: 2019-09-05

## 2019-09-05 DIAGNOSIS — G82.50 QUADRIPARESIS (HCC): ICD-10-CM

## 2019-09-05 DIAGNOSIS — G95.9 CERVICAL MYELOPATHY (HCC): Primary | ICD-10-CM

## 2019-09-05 DIAGNOSIS — A52.11 PROGRESSIVE LOCOMOTOR ATAXIA: ICD-10-CM

## 2019-09-05 NOTE — TELEPHONE ENCOUNTER
[facility] NEEDS A SCRIPT FAXED OVER FOR OT AND PT THERAPY  BEING RELEASED FROM [facility]  FAX # [phone]  Juanita Palma [place]  #[phone]

## 2019-09-06 ENCOUNTER — TELEPHONE (OUTPATIENT)
Dept: INTERNAL MEDICINE CLINIC | Facility: CLINIC | Age: 79
End: 2019-09-06

## 2019-09-06 NOTE — TELEPHONE ENCOUNTER
He has a referral for occupational & physical therapy but there is no DX on it    pls add the DX and re fax    Fax [phone]

## 2019-09-06 NOTE — TELEPHONE ENCOUNTER
Diagnoses are on there   So not sure how we faxed it the first time, but if you can't figure how to print out with diagnoses let me know

## 2019-09-13 ENCOUNTER — OFFICE VISIT (OUTPATIENT)
Dept: INTERNAL MEDICINE CLINIC | Facility: CLINIC | Age: 79
End: 2019-09-13
Payer: MEDICARE

## 2019-09-13 VITALS
HEART RATE: 79 BPM | DIASTOLIC BLOOD PRESSURE: 88 MMHG | SYSTOLIC BLOOD PRESSURE: 144 MMHG | OXYGEN SATURATION: 98 % | WEIGHT: 135.6 LBS | BODY MASS INDEX: 20.02 KG/M2

## 2019-09-13 DIAGNOSIS — E11.69 HYPERLIPIDEMIA ASSOCIATED WITH TYPE 2 DIABETES MELLITUS (HCC): Chronic | ICD-10-CM

## 2019-09-13 DIAGNOSIS — Z00.00 MEDICARE ANNUAL WELLNESS VISIT, INITIAL: Primary | ICD-10-CM

## 2019-09-13 DIAGNOSIS — Z23 NEED FOR INFLUENZA VACCINATION: ICD-10-CM

## 2019-09-13 DIAGNOSIS — F43.23 ADJUSTMENT DISORDER WITH MIXED ANXIETY AND DEPRESSED MOOD: Chronic | ICD-10-CM

## 2019-09-13 DIAGNOSIS — E78.5 HYPERLIPIDEMIA ASSOCIATED WITH TYPE 2 DIABETES MELLITUS (HCC): Chronic | ICD-10-CM

## 2019-09-13 DIAGNOSIS — E11.40 TYPE 2 DIABETES MELLITUS WITH DIABETIC NEUROPATHY, WITHOUT LONG-TERM CURRENT USE OF INSULIN (HCC): Chronic | ICD-10-CM

## 2019-09-13 DIAGNOSIS — I10 ESSENTIAL HYPERTENSION: Chronic | ICD-10-CM

## 2019-09-13 PROCEDURE — 1124F ACP DISCUSS-NO DSCNMKR DOCD: CPT | Performed by: INTERNAL MEDICINE

## 2019-09-13 PROCEDURE — 99214 OFFICE O/P EST MOD 30 MIN: CPT | Performed by: INTERNAL MEDICINE

## 2019-09-13 PROCEDURE — G0438 PPPS, INITIAL VISIT: HCPCS | Performed by: INTERNAL MEDICINE

## 2019-09-13 PROCEDURE — 90662 IIV NO PRSV INCREASED AG IM: CPT | Performed by: INTERNAL MEDICINE

## 2019-09-13 PROCEDURE — G0008 ADMIN INFLUENZA VIRUS VAC: HCPCS | Performed by: INTERNAL MEDICINE

## 2019-09-13 RX ORDER — DILTIAZEM HYDROCHLORIDE 240 MG/1
240 CAPSULE, EXTENDED RELEASE ORAL DAILY
Qty: 90 CAPSULE | Refills: 3 | Status: SHIPPED | OUTPATIENT
Start: 2019-09-13 | End: 2020-09-01

## 2019-09-13 NOTE — PATIENT INSTRUCTIONS
Medicare Preventive Visit Patient Instructions  Thank you for completing your Welcome to Medicare Visit or Medicare Annual Wellness Visit today  Your next wellness visit will be due in one year (9/13/2020)  The screening/preventive services that you may require over the next 5-10 years are detailed below  Some tests may not apply to you based off risk factors and/or age  Screening tests ordered at today's visit but not completed yet may show as past due  Also, please note that scanned in results may not display below  Preventive Screenings:  Service Recommendations Previous Testing/Comments   Colorectal Cancer Screening  · Colonoscopy    · Fecal Occult Blood Test (FOBT)/Fecal Immunochemical Test (FIT)  · Fecal DNA/Cologuard Test  · Flexible Sigmoidoscopy Age: 54-65 years old   Colonoscopy: every 10 years (May be performed more frequently if at higher risk)  OR  FOBT/FIT: every 1 year  OR  Cologuard: every 3 years  OR  Sigmoidoscopy: every 5 years  Screening may be recommended earlier than age 48 if at higher risk for colorectal cancer  Also, an individualized decision between you and your healthcare provider will decide whether screening between the ages of 74-80 would be appropriate   Colonoscopy: Not on file  FOBT/FIT: Not on file  Cologuard: Not on file  Sigmoidoscopy: Not on file    Screening Not Indicated     Prostate Cancer Screening Individualized decision between patient and health care provider in men between ages of 53-78   Medicare will cover every 12 months beginning on the day after your 50th birthday PSA: No results in last 5 years     History Prostate Cancer  Screening Not Indicated     Hepatitis C Screening Once for adults born between [birth year] and 1965  More frequently in patients at high risk for Hepatitis C Hep C Antibody: Not on file    Screening Not Indicated   Diabetes Screening 1-2 times per year if you're at risk for diabetes or have pre-diabetes Fasting glucose: 102 mg/dL   A1C: 6 0 %    Screening Not Indicated  History Diabetes   Cholesterol Screening Once every 5 years if you don't have a lipid disorder  May order more often based on risk factors  Lipid panel: 02/27/2019    Screening Not Indicated  History Lipid Disorder      Other Preventive Screenings Covered by Medicare:  1  Abdominal Aortic Aneurysm (AAA) Screening: covered once if your at risk  You're considered to be at risk if you have a family history of AAA or a male between the age of 73-68 who smoking at least 100 cigarettes in your lifetime  2  Lung Cancer Screening: covers low dose CT scan once per year if you meet all of the following conditions: (1) Age 50-69; (2) No signs or symptoms of lung cancer; (3) Current smoker or have quit smoking within the last 15 years; (4) You have a tobacco smoking history of at least 30 pack years (packs per day x number of years you smoked); (5) You get a written order from a healthcare provider  3  Glaucoma Screening: covered annually if you're considered high risk: (1) You have diabetes OR (2) Family history of glaucoma OR (3)  aged 48 and older OR (3)  American aged 72 and older  3  Osteoporosis Screening: covered every 2 years if you meet one of the following conditions: (1) Have a vertebral abnormality; (2) On glucocorticoid therapy for more than 3 months; (3) Have primary hyperparathyroidism; (4) On osteoporosis medications and need to assess response to drug therapy  5  HIV Screening: covered annually if you're between the age of 12-76  Also covered annually if you are younger than 13 and older than 72 with risk factors for HIV infection  For pregnant patients, it is covered up to 3 times per pregnancy      Immunizations:  Immunization Recommendations   Influenza Vaccine Annual influenza vaccination during flu season is recommended for all persons aged >= 6 months who do not have contraindications   Pneumococcal Vaccine (Prevnar and Pneumovax)  * Prevnar = PCV13  * Pneumovax = PPSV23 Adults 25-60 years old: 1-3 doses may be recommended based on certain risk factors  Adults 72 years old: Prevnar (PCV13) vaccine recommended followed by Pneumovax (PPSV23) vaccine  If already received PPSV23 since turning 65, then PCV13 recommended at least one year after PPSV23 dose  Hepatitis B Vaccine 3 dose series if at intermediate or high risk (ex: diabetes, end stage renal disease, liver disease)   Tetanus (Td) Vaccine - COST NOT COVERED BY MEDICARE PART B Following completion of primary series, a booster dose should be given every 10 years to maintain immunity against tetanus  Td may also be given as tetanus wound prophylaxis  Tdap Vaccine - COST NOT COVERED BY MEDICARE PART B Recommended at least once for all adults  For pregnant patients, recommended with each pregnancy  Shingles Vaccine (Shingrix) - COST NOT COVERED BY MEDICARE PART B  2 shot series recommended in those aged 48 and above     Health Maintenance Due:  There are no preventive care reminders to display for this patient  Immunizations Due:      Topic Date Due    HEPATITIS B VACCINES (1 of 3 - Risk 3-dose series) 06/30/1961    DTaP,Tdap,and Td Vaccines (1 - Tdap) 06/30/1963    INFLUENZA VACCINE  07/01/2019     Advance Directives   What are advance directives? Advance directives are legal documents that state your wishes and plans for medical care  These plans are made ahead of time in case you lose your ability to make decisions for yourself  Advance directives can apply to any medical decision, such as the treatments you want, and if you want to donate organs  What are the types of advance directives? There are many types of advance directives, and each state has rules about how to use them  You may choose a combination of any of the following:  · Living will: This is a written record of the treatment you want   You can also choose which treatments you do not want, which to limit, and which to stop at a certain time  This includes surgery, medicine, IV fluid, and tube feedings  · Durable power of  for healthcare [facility]): This is a written record that states who you want to make healthcare choices for you when you are unable to make them for yourself  This person, called a proxy, is usually a family member or a friend  You may choose more than 1 proxy  · Do not resuscitate (DNR) order:  A DNR order is used in case your heart stops beating or you stop breathing  It is a request not to have certain forms of treatment, such as CPR  A DNR order may be included in other types of advance directives  · Medical directive: This covers the care that you want if you are in a coma, near death, or unable to make decisions for yourself  You can list the treatments you want for each condition  Treatment may include pain medicine, surgery, blood transfusions, dialysis, IV or tube feedings, and a ventilator (breathing machine)  · Values history: This document has questions about your views, beliefs, and how you feel and think about life  This information can help others choose the care that you would choose  Why are advance directives important? An advance directive helps you control your care  Although spoken wishes may be used, it is better to have your wishes written down  Spoken wishes can be misunderstood, or not followed  Treatments may be given even if you do not want them  An advance directive may make it easier for your family to make difficult choices about your care  How to Quit Using Smokeless Tobacco   Why it is important to stop using smokeless tobacco:  Smokeless tobacco comes in many forms  Examples include chew, snuff, dip, dissolvable tobacco, and snus  All smokeless tobacco products contain nicotine and may contain as much nicotine as 3 cigarettes  You may be physically dependent on nicotine  You may also be emotionally addicted to it   The cravings can be strong, but it is important to quit using smokeless tobacco  You will improve your health and decrease your cancer, stroke, and heart attack risk  Mouth sores and tooth problems will also improve when you quit  You can benefit from quitting no matter how long you have used smokeless tobacco    Prepare to stop using smokeless tobacco:  Nicotine is a highly addictive drug  Withdrawal symptoms can happen when you stop and make it hard to quit  The following can help keep you on track:  · Set a quit date  · Tell friends, family, and coworkers that you plan to quit  · Remove all smokeless tobacco products from your home, car, and workplace  Manage weight gain after you quit:  Nicotine can affect your metabolism  You may gain a few pounds after you quit  The following can help you control your weight:  · Eat healthy foods  · Drink water before, during, and between meals  · Exercise as directed  © Copyright [employer] 2018 Information is for End User's use only and may not be sold, redistributed or otherwise used for commercial purposes   All illustrations and images included in CareNotes® are the copyrighted property of A D A M , [employer]  or [address] [place] [address]

## 2019-09-13 NOTE — PROGRESS NOTES
Assessment and Plan:     1  Medicare annual wellness visit, initial    2  Need for influenza vaccination  - influenza vaccine, [id number], high-dose, PF 0 5 mL (FLUZONE HIGH-DOSE)    Preventive health issues were discussed with patient, and age appropriate screening tests were ordered as noted in patient's After Visit Summary  Personalized health advice and appropriate referrals for health education or preventive services given if needed, as noted in patient's After Visit Summary  History of Present Illness:     Patient presents for Medicare Annual Wellness visit    Patient Care Team:  Francisco Gonzales DO as PCP - General (Internal Medicine)  Marne Dance, MD as Consulting Physician (Neurology)     Problem List:     Patient Active Problem List   Diagnosis    Type 2 diabetes mellitus with diabetic neuropathy, without long-term current use of insulin ([facility] [address] )    Hyperlipidemia associated with type 2 diabetes mellitus ([address] )    Essential hypertension    BPH (benign prostatic hyperplasia)    Degenerative cervical spinal stenosis    Cervical spondylosis    COPD (chronic obstructive pulmonary disease) ([address] )    Adjustment disorder with mixed anxiety and depressed mood    Progressive locomotor ataxia    Quadriparesis ([address] )    Spinal cord compression ([address] )    Prostate cancer ([address] )    Overactive bladder    Lesion of native kidney    SHIVANI (obstructive sleep apnea)    Impaired mobility and ADLs    Cervical myelopathy ([address] )    At risk for venous thromboembolism (VTE)    Vitamin D insufficiency    Pipe smoker    Anxiety      Past Medical and Surgical History:     Past Medical History:   Diagnosis Date    BPH (benign prostatic hyperplasia)     Cancer ([address] ) 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    Chemical exposure     9/11/01- worked in [place]       COPD (chronic obstructive pulmonary disease) (HCC)     CPAP (continuous positive airway pressure) dependence     DDD (degenerative disc disease), cervical     Diabetes mellitus ([address] )     Foraminal stenosis of cervical region     Hyperlipidemia     Hypertension     SHIVANI (obstructive sleep apnea)     Overactive bladder      Past Surgical History:   Procedure Laterality Date    ARTHROSCOPY KNEE Bilateral     COLONOSCOPY      LUNG SURGERY Left     scraping of left    FL ARTHRODESIS ANT INTERBODY INC DISCECTOMY, CERVICAL BELOW C2 Bilateral 2019    Procedure: C3/4  ACDF WITH  C4 HEMICORPECTOMY, C3-4 ANTERIOR INSTRUMENTATION AND FUSION (NEUROMONITORING);   Surgeon: Anselmo Medina MD;  Location[facility];  Service: Neurosurgery    ROTATOR CUFF REPAIR Bilateral       Family History:     Family History   Problem Relation Age of Onset    No Known Problems Mother     No Known Problems Father       Social History:     Social History     Socioeconomic History    Marital status: /Civil Union     Spouse name: None    Number of children: 3    Years of education: None    Highest education level: None   Occupational History    Occupation: retired   Social Needs    Financial resource strain: None    Food insecurity:     Worry: None     Inability: None    Transportation needs:     Medical: None     Non-medical: None   Tobacco Use    Smoking status: Former Smoker     Years: 61 00     Types: Pipe     Last attempt to quit: 2019     Years since quittin 0    Smokeless tobacco: Current User    Tobacco comment: very light smoker   Substance and Sexual Activity    Alcohol use: Not Currently    Drug use: No    Sexual activity: Not Currently   Lifestyle    Physical activity:     Days per week: 2 days     Minutes per session: 40 min    Stress: Rather much   Relationships    Social connections:     Talks on phone: None     Gets together: None     Attends Restoration service: None     Active member of club or organization: None     Attends meetings of clubs or organizations: None     Relationship status: None    Intimate partner violence:     Fear of current or ex partner: None     Emotionally abused: None     Physically abused: None     Forced sexual activity: None   Other Topics Concern    None   Social History Narrative    Pt was born in [facility]; moved to [place] as an adult with his wife, and eventually moved to [place], [place]          Medications and Allergies:     Current Outpatient Medications   Medication Sig Dispense Refill    aspirin (ECOTRIN LOW STRENGTH) 81 mg EC tablet Take 1 tablet by mouth daily 30 tablet 0    diltiazem (TIAZAC) 240 MG 24 hr capsule Take 1 capsule (240 mg total) by mouth daily 90 capsule 3    DULoxetine (CYMBALTA) 60 mg delayed release capsule Take 1 capsule (60 mg total) by mouth daily 90 capsule 3    fluticasone-salmeterol (ADVAIR) 250-50 mcg/dose inhaler Inhale 1 puff every 12 (twelve) hours      gabapentin (NEURONTIN) 300 mg capsule Take 1 capsule (300 mg total) by mouth 3 (three) times a day 90 capsule 3    ipratropium-albuterol (COMBIVENT RESPIMAT) inhaler Inhale 1 puff as needed      losartan (COZAAR) 100 MG tablet Take 1 tablet (100 mg total) by mouth daily 90 tablet 3    metFORMIN (GLUCOPHAGE) 500 mg tablet Take 1 tablet (500 mg total) by mouth daily with breakfast 90 tablet 1    Multiple Vitamin (MULTI-VITAMINS PO) Take by mouth daily      ONETOUCH DELICA LANCETS 10G MISC TEST 1 TO 2 TIMES DAILY 100 each 3    oxybutynin (DITROPAN-XL) 5 mg 24 hr tablet Take 1 tablet (5 mg total) by mouth daily 90 tablet 1    polyvinyl alcohol (LIQUIFILM TEARS) 1 4 % ophthalmic solution Administer 1 drop to both eyes 4 (four) times a day 15 mL 0    pravastatin (PRAVACHOL) 40 mg tablet take 1 tablet by mouth once daily as directed 90 tablet 1    tamsulosin (FLOMAX) 0 4 mg Take 1 capsule (0 4 mg total) by mouth daily at bedtime 90 capsule 3    thiamine 50 MG tablet Take 1 tablet (50 mg total) by mouth daily 30 tablet 3    tiotropium (SPIRIVA) 18 mcg inhalation capsule Place 18 mcg into inhaler and inhale daily       No current facility-administered medications for this visit  No Known Allergies   Immunizations:     Immunization History   Administered Date(s) Administered    INFLUENZA 10/01/2018    Pneumococcal Conjugate 13-Valent 08/18/2016    Pneumococcal Polysaccharide PPV23 03/16/2018    Zoster 06/23/2014    Zoster Vaccine Recombinant 11/06/2018, 02/01/2019      Health Maintenance: There are no preventive care reminders to display for this patient  Topic Date Due    HEPATITIS B VACCINES (1 of 3 - Risk 3-dose series) 06/30/1961    DTaP,Tdap,and Td Vaccines (1 - Tdap) 06/30/1963    INFLUENZA VACCINE  07/01/2019      Medicare Health Risk Assessment:     /88 (BP Location: Left arm, Patient Position: Sitting, Cuff Size: Standard)   Pulse 79   Wt 61 5 kg (135 lb 9 6 oz) Comment: w/ shoes denied off  SpO2 98%   BMI 20 02 kg/m²      Nicole Gillespie is here for his Initial Wellness visit  Health Risk Assessment:   Patient rates overall health as fair  Patient feels that their physical health rating is slightly better  Eyesight was rated as same  Hearing was rated as same  Patient feels that their emotional and mental health rating is same  Pain experienced in the last 7 days has been none  Patient states that he has experienced no weight loss or gain in last 6 months  Depression Screening:   PHQ-2 Score: 4  PHQ-9 Score: 9      Fall Risk Screening: In the past year, patient has experienced: no history of falling in past year      Home Safety:  Patient does not have trouble with stairs inside or outside of their home  Patient has working smoke alarms and has no working carbon monoxide detector  Home safety hazards include: none  Nutrition:   Current diet is Regular  Medications:   Patient is currently taking over-the-counter supplements  OTC medications include: see medication list  Patient is able to manage medications       Activities of Daily Living (ADLs)/Instrumental Activities of Daily Living (IADLs): Walk and transfer into and out of bed and chair?: Yes  Dress and groom yourself?: Yes    Bathe or shower yourself?: Yes    Feed yourself? Yes  Do your laundry/housekeeping?: No  Manage your money, pay your bills and track your expenses?: No  Make your own meals?: Yes    Do your own shopping?: Yes    Durable Medical Equipment Suppliers  none    Previous Hospitalizations:   Any hospitalizations or ED visits within the last 12 months?: Yes    How many hospitalizations have you had in the last year?: 1-2    Advance Care Planning:   Living will: No    Durable POA for healthcare: Yes    Advanced directive: No    Five wishes given: No      PREVENTIVE SCREENINGS      Cardiovascular Screening:    General: Screening Not Indicated and History Lipid Disorder      Diabetes Screening:     General: Screening Not Indicated and History Diabetes      Colorectal Cancer Screening:     General: Screening Not Indicated      Prostate Cancer Screening:    General: History Prostate Cancer and Screening Not Indicated      Osteoporosis Screening:    General: Screening Not Indicated      Abdominal Aortic Aneurysm (AAA) Screening:    Risk factors include: tobacco use        General: Screening Not Indicated      Lung Cancer Screening:     General: Screening Not Indicated      Hepatitis C Screening:    General: Screening Not Indicated    Other Counseling Topics:   Skin self-exam, sunscreen and calcium and vitamin D intake and regular weightbearing exercise         Ranjit Trejo,

## 2019-09-13 NOTE — PROGRESS NOTES
INTERNAL MEDICINE FOLLOW-UP OFFICE VISIT  [facility] - [facility]    NAME: Rasheed Martin During  AGE: 70 y o  SEX: male  : 1940     DATE: 2019     Assessment and Plan:     1  Type 2 diabetes mellitus with diabetic neuropathy, without long-term current use of insulin ([facility] [address] )    Diabetes has been well controlled  Will check up-to-date lab work  Last A1c was well controlled at 6 0%  Continue current medications as prescribed  Continue neuropathic agents as prescribed  He recently saw Neurology  Continue outpatient physical therapy     - HEMOGLOBIN A1C W/ EAG ESTIMATION; Future  - Basic metabolic panel; Future  - TSH, 3rd generation with Free T4 reflex; Future    2  Hyperlipidemia associated with type 2 diabetes mellitus ([facility] [address] )    Patient's cholesterol is well controlled  Continue statin as prescribed  - Lipid panel; Future    3  Essential hypertension    Patient's blood pressure has been uncontrolled  Will increase diltiazem to 240 mg daily  He will continue to monitor his blood pressures at home  He has been under increased amounts of stress  - diltiazem (TIAZAC) 240 MG 24 hr capsule; Take 1 capsule (240 mg total) by mouth daily  Dispense: 90 capsule; Refill: 3    4  Adjustment disorder with mixed anxiety and depressed mood    Has been under increased amounts of stress and difficulty with his thought process wrapping his mind around some personal family issues  Think he would benefit from talking to a psychologist or therapist   He would benefit from psychotherapy  He was given a couple list of names to call to see if he can set up an appointment  Return in about 4 months (around 2020) for Follow-up  Chief Complaint:     Chief Complaint   Patient presents with    Medicare Wellness Visit    Follow-up     6 week        History of Present Illness:     Patient presents for follow-up    He has been doing well since cervical decompression for cervical myelopathy  He he has felt much stronger in his lower extremities  He is no longer wearing cervical collar  He denies any recent falls  He has had some difficulties with his thought process due to some personal family issues  He is interested in speaking to a psychologist or therapist   His blood sugars have been well controlled  He brings in blood pressure log which shows that since he was last seen his blood pressures have been rather uncontrolled  He has had multiple values with systolics in the [address] to [address]  He denies any significant amount of pain  He denies any hypoglycemic episodes  Review of Systems:     Review of Systems   Constitutional: Negative for activity change, appetite change and fatigue  Respiratory: Negative for apnea, cough, chest tightness, shortness of breath and wheezing  Cardiovascular: Negative for chest pain, palpitations and leg swelling  Gastrointestinal: Negative for abdominal distention, abdominal pain, blood in stool, constipation, diarrhea, nausea and vomiting  Musculoskeletal: Positive for gait problem  Negative for arthralgias, back pain, joint swelling and myalgias  Skin: Negative for rash and wound  Neurological: Negative for dizziness, weakness, light-headedness, numbness and headaches  Psychiatric/Behavioral: Positive for behavioral problems  Negative for confusion, hallucinations, sleep disturbance and suicidal ideas  The patient is not nervous/anxious         Problem List:     Patient Active Problem List   Diagnosis    Type 2 diabetes mellitus with diabetic neuropathy, without long-term current use of insulin ([address] )    Hyperlipidemia associated with type 2 diabetes mellitus ([address] )    Essential hypertension    BPH (benign prostatic hyperplasia)    Degenerative cervical spinal stenosis    Cervical spondylosis    COPD (chronic obstructive pulmonary disease) (HCC)    Adjustment disorder with mixed anxiety and depressed mood    Progressive locomotor ataxia    Quadriparesis ([facility] [address] )    Spinal cord compression (HCC)    Prostate cancer ([facility] [address] )    Overactive bladder    Lesion of native kidney    SHIVANI (obstructive sleep apnea)    Impaired mobility and ADLs    Cervical myelopathy (HCC)    At risk for venous thromboembolism (VTE)    Vitamin D insufficiency    Pipe smoker    Anxiety        Objective:     /88 (BP Location: Left arm, Patient Position: Sitting, Cuff Size: Standard)   Pulse 79   Wt 61 5 kg (135 lb 9 6 oz) Comment: w/ shoes denied off  SpO2 98%   BMI 20 02 kg/m²     Physical Exam   Constitutional: He is oriented to person, place, and time  He appears well-developed and well-nourished  No distress  Eyes: Conjunctivae are normal  Right eye exhibits no discharge  Left eye exhibits no discharge  No scleral icterus  Neck: Neck supple  No JVD present  No thyromegaly present  Cardiovascular: Normal rate, regular rhythm and normal heart sounds  No murmur heard  Pulmonary/Chest: Effort normal and breath sounds normal  No respiratory distress  He has no wheezes  He has no rales  He exhibits no tenderness  Abdominal: Soft  Bowel sounds are normal  He exhibits no distension and no mass  There is no tenderness  There is no rebound and no guarding  No hernia  Musculoskeletal: He exhibits no edema  Lymphadenopathy:     He has no cervical adenopathy  Neurological: He is alert and oriented to person, place, and time  Skin: Skin is warm and dry  He is not diaphoretic  Psychiatric: He has a normal mood and affect  His behavior is normal    Vitals reviewed      Won Mirza DO  [facility] [address] [place] [address]

## 2019-09-16 ENCOUNTER — APPOINTMENT (OUTPATIENT)
Dept: LAB | Facility: CLINIC | Age: 79
End: 2019-09-16
Payer: MEDICARE

## 2019-09-16 ENCOUNTER — TELEPHONE (OUTPATIENT)
Dept: NEUROSURGERY | Facility: CLINIC | Age: 79
End: 2019-09-16

## 2019-09-16 ENCOUNTER — TELEPHONE (OUTPATIENT)
Dept: INTERNAL MEDICINE CLINIC | Facility: CLINIC | Age: 79
End: 2019-09-16

## 2019-09-16 DIAGNOSIS — E11.69 HYPERLIPIDEMIA ASSOCIATED WITH TYPE 2 DIABETES MELLITUS (HCC): Chronic | ICD-10-CM

## 2019-09-16 DIAGNOSIS — E11.40 TYPE 2 DIABETES MELLITUS WITH DIABETIC NEUROPATHY, WITHOUT LONG-TERM CURRENT USE OF INSULIN (HCC): Chronic | ICD-10-CM

## 2019-09-16 DIAGNOSIS — E78.5 HYPERLIPIDEMIA ASSOCIATED WITH TYPE 2 DIABETES MELLITUS (HCC): Chronic | ICD-10-CM

## 2019-09-16 LAB
ANION GAP SERPL CALCULATED.3IONS-SCNC: 2 MMOL/L (ref 4–13)
BUN SERPL-MCNC: 20 MG/DL (ref 5–25)
CALCIUM SERPL-MCNC: 10.1 MG/DL (ref 8.3–10.1)
CHLORIDE SERPL-SCNC: 107 MMOL/L (ref 100–108)
CHOLEST SERPL-MCNC: 108 MG/DL (ref 50–200)
CO2 SERPL-SCNC: 31 MMOL/L (ref 21–32)
CREAT SERPL-MCNC: 0.9 MG/DL (ref 0.6–1.3)
EST. AVERAGE GLUCOSE BLD GHB EST-MCNC: 117 MG/DL
GFR SERPL CREATININE-BSD FRML MDRD: 95 ML/MIN/1.73SQ M
GLUCOSE SERPL-MCNC: 98 MG/DL (ref 65–140)
HBA1C MFR BLD: 5.7 % (ref 4.2–6.3)
HDLC SERPL-MCNC: 55 MG/DL (ref 40–60)
LDLC SERPL CALC-MCNC: 44 MG/DL (ref 0–100)
NONHDLC SERPL-MCNC: 53 MG/DL
POTASSIUM SERPL-SCNC: 4.4 MMOL/L (ref 3.5–5.3)
PSA SERPL-MCNC: <0.1 NG/ML (ref 0–4)
SODIUM SERPL-SCNC: 140 MMOL/L (ref 136–145)
TRIGL SERPL-MCNC: 44 MG/DL
TSH SERPL DL<=0.05 MIU/L-ACNC: 1.19 UIU/ML (ref 0.36–3.74)

## 2019-09-16 PROCEDURE — 84153 ASSAY OF PSA TOTAL: CPT

## 2019-09-16 PROCEDURE — 80048 BASIC METABOLIC PNL TOTAL CA: CPT

## 2019-09-16 PROCEDURE — 84443 ASSAY THYROID STIM HORMONE: CPT

## 2019-09-16 PROCEDURE — 83036 HEMOGLOBIN GLYCOSYLATED A1C: CPT

## 2019-09-16 PROCEDURE — 36415 COLL VENOUS BLD VENIPUNCTURE: CPT

## 2019-09-16 PROCEDURE — 80061 LIPID PANEL: CPT

## 2019-09-16 NOTE — TELEPHONE ENCOUNTER
----- Message from Owen Craig DO sent at 9/16/2019  7:05 PM EDT -----  Call patient and let him know that all his labs are excellent  His diabetes is very well controlled

## 2019-09-18 ENCOUNTER — HOSPITAL ENCOUNTER (OUTPATIENT)
Dept: RADIOLOGY | Facility: HOSPITAL | Age: 79
Discharge: HOME | End: 2019-09-18
Attending: NEUROLOGICAL SURGERY
Payer: MEDICARE

## 2019-09-18 ENCOUNTER — OFFICE VISIT (OUTPATIENT)
Dept: NEUROSURGERY | Facility: CLINIC | Age: 79
End: 2019-09-18

## 2019-09-18 VITALS
TEMPERATURE: 97 F | HEIGHT: 69 IN | SYSTOLIC BLOOD PRESSURE: 132 MMHG | BODY MASS INDEX: 19.99 KG/M2 | DIASTOLIC BLOOD PRESSURE: 94 MMHG | HEART RATE: 68 BPM | RESPIRATION RATE: 16 BRPM | WEIGHT: 135 LBS

## 2019-09-18 DIAGNOSIS — Z48.89 AFTERCARE FOLLOWING SURGERY FOR INJURY AND TRAUMA: ICD-10-CM

## 2019-09-18 DIAGNOSIS — Z48.89 AFTERCARE FOLLOWING SURGERY FOR INJURY AND TRAUMA: Primary | ICD-10-CM

## 2019-09-18 PROCEDURE — 72040 X-RAY EXAM NECK SPINE 2-3 VW: CPT

## 2019-09-18 PROCEDURE — 99024 POSTOP FOLLOW-UP VISIT: CPT | Performed by: NEUROLOGICAL SURGERY

## 2019-09-18 NOTE — PROGRESS NOTES
Assessment/Plan:    No problem-specific Assessment & Plan notes found for this encounter  Problem List Items Addressed This Visit     None      Visit Diagnoses     Aftercare following surgery for injury and trauma    -  Primary    Relevant Orders    XR spine cervical 2 or 3 vw injury            Subjective:      Patient ID: Linda Chandler is a 70 y o  male  HPI    The following portions of the patient's history were reviewed and updated as appropriate:   He  has a past medical history of BPH (benign prostatic hyperplasia), Cancer ([address] ) (2013), Chemical exposure, COPD (chronic obstructive pulmonary disease) ([address] ), CPAP (continuous positive airway pressure) dependence, DDD (degenerative disc disease), cervical, Diabetes mellitus ([address] ), Foraminal stenosis of cervical region, Hyperlipidemia, Hypertension, SHIVANI (obstructive sleep apnea), and Overactive bladder    He   Patient Active Problem List    Diagnosis Date Noted    Pipe smoker 06/22/2019    Anxiety 06/22/2019    Vitamin D insufficiency 06/21/2019    Impaired mobility and ADLs 06/20/2019    Cervical myelopathy ([address] ) 06/20/2019    At risk for venous thromboembolism (VTE) 06/20/2019    SHIVANI (obstructive sleep apnea) 06/07/2019    Prostate cancer ([address] ) 05/16/2019    Overactive bladder 05/16/2019    Quadriparesis ([address] ) 05/08/2019    Spinal cord compression ([address] ) 05/08/2019    Lesion of native kidney 04/17/2019    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    Type 2 diabetes mellitus with diabetic neuropathy, without long-term current use of insulin ([address] ) 02/27/2019    Hyperlipidemia associated with type 2 diabetes mellitus ([address] ) 02/27/2019    Essential hypertension 02/27/2019    BPH (benign prostatic hyperplasia) 02/27/2019    Cervical spondylosis 02/27/2019    COPD (chronic obstructive pulmonary disease) ([address] ) 02/27/2019    Adjustment disorder with mixed anxiety and depressed mood 02/27/2019    Degenerative cervical spinal stenosis 02/27/2017 He  has a past surgical history that includes Rotator cuff repair (Bilateral); ARTHROSCOPY KNEE (Bilateral); Lung surgery (Left); Colonoscopy; and pr arthrodesis ant interbody inc discectomy, cervical below c2 (Bilateral, 6/18/2019)  His family history includes No Known Problems in his father and mother  He  reports that he quit smoking about 4 weeks ago  His smoking use included pipe  He quit after 61 00 years of use  He uses smokeless tobacco  He reports that he drank alcohol  He reports that he does not use drugs    Current Outpatient Medications   Medication Sig Dispense Refill    aspirin (ECOTRIN LOW STRENGTH) 81 mg EC tablet Take 1 tablet by mouth daily 30 tablet 0    diltiazem (TIAZAC) 240 MG 24 hr capsule Take 1 capsule (240 mg total) by mouth daily 90 capsule 3    DULoxetine (CYMBALTA) 60 mg delayed release capsule Take 1 capsule (60 mg total) by mouth daily 90 capsule 3    fluticasone-salmeterol (ADVAIR) 250-50 mcg/dose inhaler Inhale 1 puff every 12 (twelve) hours      gabapentin (NEURONTIN) 300 mg capsule Take 1 capsule (300 mg total) by mouth 3 (three) times a day 90 capsule 3    ipratropium-albuterol (COMBIVENT RESPIMAT) inhaler Inhale 1 puff as needed      losartan (COZAAR) 100 MG tablet Take 1 tablet (100 mg total) by mouth daily 90 tablet 3    metFORMIN (GLUCOPHAGE) 500 mg tablet Take 1 tablet (500 mg total) by mouth daily with breakfast 90 tablet 1    Multiple Vitamin (MULTI-VITAMINS PO) Take by mouth daily      ONETOUCH DELICA LANCETS 01B MISC TEST 1 TO 2 TIMES DAILY 100 each 3    oxybutynin (DITROPAN-XL) 5 mg 24 hr tablet Take 1 tablet (5 mg total) by mouth daily 90 tablet 1    polyvinyl alcohol (LIQUIFILM TEARS) 1 4 % ophthalmic solution Administer 1 drop to both eyes 4 (four) times a day 15 mL 0    pravastatin (PRAVACHOL) 40 mg tablet take 1 tablet by mouth once daily as directed 90 tablet 1    tamsulosin (FLOMAX) 0 4 mg Take 1 capsule (0 4 mg total) by mouth daily at bedtime 90 capsule 3    thiamine 50 MG tablet Take 1 tablet (50 mg total) by mouth daily 30 tablet 3    tiotropium (SPIRIVA) 18 mcg inhalation capsule Place 18 mcg into inhaler and inhale daily       No current facility-administered medications for this visit  Current Outpatient Medications on File Prior to Visit   Medication Sig    aspirin (ECOTRIN LOW STRENGTH) 81 mg EC tablet Take 1 tablet by mouth daily    diltiazem (TIAZAC) 240 MG 24 hr capsule Take 1 capsule (240 mg total) by mouth daily    DULoxetine (CYMBALTA) 60 mg delayed release capsule Take 1 capsule (60 mg total) by mouth daily    fluticasone-salmeterol (ADVAIR) 250-50 mcg/dose inhaler Inhale 1 puff every 12 (twelve) hours    gabapentin (NEURONTIN) 300 mg capsule Take 1 capsule (300 mg total) by mouth 3 (three) times a day    ipratropium-albuterol (COMBIVENT RESPIMAT) inhaler Inhale 1 puff as needed    losartan (COZAAR) 100 MG tablet Take 1 tablet (100 mg total) by mouth daily    metFORMIN (GLUCOPHAGE) 500 mg tablet Take 1 tablet (500 mg total) by mouth daily with breakfast    Multiple Vitamin (MULTI-VITAMINS PO) Take by mouth daily    ONETOUCH DELICA LANCETS 17Z MISC TEST 1 TO 2 TIMES DAILY    oxybutynin (DITROPAN-XL) 5 mg 24 hr tablet Take 1 tablet (5 mg total) by mouth daily    polyvinyl alcohol (LIQUIFILM TEARS) 1 4 % ophthalmic solution Administer 1 drop to both eyes 4 (four) times a day    pravastatin (PRAVACHOL) 40 mg tablet take 1 tablet by mouth once daily as directed    tamsulosin (FLOMAX) 0 4 mg Take 1 capsule (0 4 mg total) by mouth daily at bedtime    thiamine 50 MG tablet Take 1 tablet (50 mg total) by mouth daily    tiotropium (SPIRIVA) 18 mcg inhalation capsule Place 18 mcg into inhaler and inhale daily     No current facility-administered medications on file prior to visit  He has No Known Allergies       Review of Systems   Constitutional: Negative  HENT: Negative      Eyes:        Cataracts   Respiratory: Negative for shortness of breath (occasionally with increase activity)  Cardiovascular: Negative for chest pain  Gastrointestinal: Negative  Endocrine: Negative  Genitourinary: Negative for frequency and urgency  Musculoskeletal: Negative for neck stiffness  Skin: Negative  Allergic/Immunologic: Negative  Neurological: Positive for weakness (bilat  UE and LE) and numbness (bilat  UE and LE)  Negative for dizziness and headaches  Hematological:        Aspirin   Psychiatric/Behavioral: Negative for sleep disturbance  Objective:      /94 (BP Location: Left arm, Patient Position: Sitting, Cuff Size: Standard)   Pulse 68   Temp (!) 97 °F (36 1 °C) (Tympanic)   Resp 16   Ht 5' 9" (1 753 m)   Wt 61 2 kg (135 lb)   BMI 19 94 kg/m²           I have personally obtain history and examined patient  I have personally reviewed case including all pertinent investigations/studies        Time spent 15 minutes  More than 50% of total time spent on counseling and coordination of care as described above including patient education     HPI     12 weeks post op anterior cervical decompression and fusion for cord compression/progressive quadraparesis  Doing well with improved strength, sensation and gait  walks with cane  Denies neck pain   Compliant with collar and rehab      Exam     Well healed incision  Mild restriction in cervical ROM  Grade 4+/5 bilateral  strength  Improved sensation  Improved fine motor  Grossly steady gait without cane  Deficit with tandem                             Neck:   Supple, symmetrical, trachea midline, no adenopathy;        thyroid:  No enlargement/tenderness/nodules; no carotid    bruit or JVD                                             Extremities:   Extremities normal, atraumatic, no cyanosis or edema   Pulses:   2+ and symmetric all extremities   Skin:   Skin color, texture, turgor normal, no rashes or lesions      Radiology     Xray     Anatomic neck alignment with all hardware and screws in expected position  No cage settling     Summary and plan     Mr  During is doing well 12 weeks post op  I see no further need for the collar  We reviewed activity restrictions as well as the need for ongoing spinal cord injury rehab/PT  I will see him in fu in 3 months with repeat xray

## 2019-09-30 ENCOUNTER — TELEPHONE (OUTPATIENT)
Dept: INTERNAL MEDICINE CLINIC | Facility: CLINIC | Age: 79
End: 2019-09-30

## 2019-09-30 DIAGNOSIS — E11.40 TYPE 2 DIABETES MELLITUS WITH DIABETIC NEUROPATHY, WITHOUT LONG-TERM CURRENT USE OF INSULIN (HCC): Primary | ICD-10-CM

## 2019-09-30 NOTE — TELEPHONE ENCOUNTER
I'll update the script to say up to twice a day  That does not mean he always need to check it twice a day from my standpoint

## 2019-09-30 NOTE — TELEPHONE ENCOUNTER
Pharmacy called stating that patient is out of test strips  Order was sent over on 6/11 for One Touch Verio Test Strips  Patient states he was never told to test only 1 time per day and he tests 2 times per day and has now run out of strips  [facility] will need a new script with the correct number of times for testing

## 2019-10-09 NOTE — PATIENT INSTRUCTIONS
OnabotulinumtoxinA (By injection)   OnabotulinumtoxinA (jj-v-dhc-hl-OMD-ngh-tox-in-ay)  Treats muscle stiffness, muscle spasms, excessive sweating, overactive bladder, or loss of bladder control  Prevents chronic migraine headaches  Improves the appearance of wrinkles on the face  Brand Name(s): Botox, Botox Cosmetic   There may be other brand names for this medicine  When This Medicine Should Not Be Used: This medicine is not right for everyone  You should not receive this medicine if you had an allergic reaction to onabotulinumtoxinA or any other botulinum toxin product  How to Use This Medicine:   Injectable  · Your doctor will prescribe your exact dose and tell you how often it should be given  This medicine is given by a healthcare provider as a shot under your skin or into a muscle  · You may be given medicine to numb the area where the shot will be injected  If you receive the medicine around your eyes, you may be given eye drops or ointment to numb the area  After your injection, you may need to wear a protective contact lens or eye patch  · If you are being treated for excessive sweating, shave your underarms but do not use deodorant for 24 hours before your injection  Avoid exercise, hot foods or liquids, or anything else that could make you sweat for 30 minutes before your injection  · The recommended treatment schedule for chronic migraine is every 12 weeks  · This medicine works slowly  Once your condition has improved, the medicine will last about 3 months, then the effects will slowly go away  You might need more injections to treat your condition  ¨ Muscle spasms in the eyelids should improve within 3 to 10 days  ¨ Eye muscle problems should improve 1 or 2 days after the injection, and the improvement should last for 2 to 6 weeks  ¨ Neck pain should improve within 2 to 6 weeks  ¨ Arm stiffness should improve within 4 to 6 weeks    ¨ Facial lines or wrinkles should improve 1 or 2 days   · This medicine should come with a Medication Guide  Ask your pharmacist for a copy if you do not have one  · Missed dose:Call your doctor or pharmacist for instructions  Drugs and Foods to Avoid:   Ask your doctor or pharmacist before using any other medicine, including over-the-counter medicines, vitamins, and herbal products  · Some foods and medicine can affect how onabotulinumtoxinA works  Tell your doctor if you are using any of the following:  ¨ Aspirin or a blood thinner (such as ticlopidine, warfarin)  ¨ Muscle relaxer  ¨ Medicine for an infection (such as amikacin, gentamicin, streptomycin, tobramycin)  · Tell your doctor if you have received an injection of any botulinum toxin product within the past 4 months  Warnings While Using This Medicine:   · Tell your doctor if you are pregnant or breastfeeding, or if you have breathing or lung problems, bleeding problems, heart or blood vessel disease, or nerve or muscle problems (such as myasthenia gravis)  Tell your doctor if you have ever had face surgery or if you have a urinary tract infection or trouble urinating, diabetes, or multiple sclerosis  · This medicine may cause the following problems:  ¨ Muscle weakness, loss of bladder control, trouble swallowing, speaking, or breathing (caused by the toxin spreading to other parts of your body)  · This medicine may make your muscles weak or cause vision problems  Do not drive or do anything else that could be dangerous until you know how this medicine affects you  · There are some warnings that only apply if you are receiving this medicine to treat the following:   ¨ Injections near the eye: This medicine may reduce blinking, which can raise the risk of eye problems such as corneal exposure and ulcers  Tell your doctor right away if you notice that you are blinking less than usual or your eyes feel dry  ¨ Urinary incontinence:  This medicine may cause autonomic dysreflexia, which can be a life-threatening condition  ¨ Overactive bladder: Check with your doctor right away if you have trouble urinating or a burning sensation while urinating  · This medicine contains products from donated human blood, so it may contain viruses, although the risk is low  Human donors and blood are always tested for viruses to keep the risk low  Talk with your doctor about this risk if you are concerned  · Your doctor will check your progress and the effects of this medicine at regular visits  Keep all appointments  Possible Side Effects While Using This Medicine:   Call your doctor right away if you notice any of these side effects:  · Allergic reaction: Itching or hives, swelling in your face or hands, swelling or tingling in your mouth or throat, chest tightness, trouble breathing  · Blurred or double vision, droopy eyelids  · Change in how much or how often you urinate, trouble urinating, or painful urination  · Chest pain, slow or uneven heartbeat  · Headache, increased sweating, warmth or redness in your face, neck, or arm  · Muscle weakness  · Trouble swallowing, talking, or breathing  If you notice these less serious side effects, talk with your doctor:   · Fever, chills, cough, stuffy or runny nose, sore throat, and body aches  · Pain in your neck, back, arms, or legs  · Redness, pain, tenderness, bruising, swelling, or weakness where the shot was given  If you notice other side effects that you think are caused by this medicine, tell your doctor  Call your doctor for medical advice about side effects  You may report side effects to FDA at 2-374-FDA-2177  © 2017 [address] [employer] Information is for End User's use only and may not be sold, redistributed or otherwise used for commercial purposes  The above information is an  only  It is not intended as medical advice for individual conditions or treatments   Talk to your doctor, nurse or pharmacist before following any medical regimen to see if it is safe and effective for you

## 2019-10-09 NOTE — PROGRESS NOTES
Problem List Items Addressed This Visit        Genitourinary    BPH (benign prostatic hyperplasia) - Primary (Chronic)     Patient is taking tamsulosin as well as oxybutynin, long-acting, he is doing quite well, his urgency and frequency have completely resolved, he is no longer leaking  He is quite happy  Plan:  Continue current medication regimen         Relevant Orders    POCT urine dip (Completed)    Prostate cancer [facility])     Patient with a history of prostate cancer status post brachytherapy in 2013, PSA is undetectable, patient has no evidence of disease  He will see us back in 6 months, if doing well at that time he can follow on a yearly basis with a PSA prior            Other    Erectile dysfunction due to diseases classified elsewhere     Patient with complaints of erectile dysfunction, likely a combination of his age of 70years old, history of diabetes, and history of prostate cancer  He takes no nitrate medications, he is interested in trying Viagra  Spoke with him about the mechanism of action of this medication, as well as potential side effects, I verified that he takes no nitrate medications at this time  Plan:  Trial of generic sildenafil, if this is not successful the next step would be to try Tri Mix         Relevant Medications    sildenafil (REVATIO) 20 mg tablet            Discussion:  Cystoscopy today is negative for structural lesions, it does show moderate obstruction of the prostatic urethra with some intravesical protrusion of the prostate as well, there are no bladder lesions noted  The patient's lower urinary tract symptoms have improved greatly since being on tamsulosin as well as oxybutynin, he is quite happy with this at this time      Main complaint today is erectile dysfunction, he wishes to try phosphodiesterase 5 inhibition, I have given the local compound pharmacy a prescription for this medication      Assessment and plan:     Please see problem oriented charting for the assessment plan of today's urological complaints    Noah Jimenez MD      Chief Complaint     Chief Complaint   Patient presents with    Cystoscopy    Benign Prostatic Hypertrophy   urgency and frequency of urination      History of Present Illness     Micky Crum During is a 70 y o  s/p brachytherapy for prostate cancer in 2013  He has been treated previously with anticholinergic medications and with myrbetriq and these did not work  Complains of swollen legs and nocturia additionally, these have improved greatly since being on medical therapies for his LUTS  Complains of erectile dysfunction, no associated symptoms, no aggravating or alleviating factors, no current sexual partner, but does wish to get back out there    Gave him a prescription for generic Viagra, he will try this medication going forward  No other complaints today        The following portions of the patient's history were reviewed and updated as appropriate: allergies, current medications, past family history, past medical history, past social history, past surgical history and problem list     Detailed Urologic History     - please refer to HPI    Review of Systems     Review of Systems   Constitutional: Negative  HENT: Negative  Eyes: Negative  Respiratory: Negative  Cardiovascular: Negative  Gastrointestinal: Negative  Endocrine: Negative  Genitourinary: Positive for frequency and urgency  Musculoskeletal: Negative  Skin: Negative  Allergic/Immunologic: Negative  Neurological: Negative  Hematological: Negative  Psychiatric/Behavioral: Negative  Allergies     No Known Allergies    Physical Exam     Physical Exam   Constitutional: He is oriented to person, place, and time  He appears well-developed and well-nourished  No distress  HENT:   Head: Normocephalic and atraumatic  Eyes: Pupils are equal, round, and reactive to light   EOM are normal  Right eye exhibits no discharge  Left eye exhibits no discharge  Neck: No tracheal deviation present  Cardiovascular: Intact distal pulses  Pulmonary/Chest: Effort normal  No stridor  No respiratory distress  Abdominal: Soft  He exhibits no distension and no mass  There is no tenderness  There is no rebound and no guarding  No hernia  Genitourinary: Penis normal    Musculoskeletal: He exhibits no edema, tenderness or deformity  Neurological: He is alert and oriented to person, place, and time  No cranial nerve deficit  Coordination normal    Skin: Skin is warm and dry  No rash noted  He is not diaphoretic  No erythema  No pallor  Psychiatric: He has a normal mood and affect  His behavior is normal  Judgment and thought content normal    Nursing note and vitals reviewed            Vital Signs  Vitals:    10/11/19 1242   BP: 126/76   Pulse: 99   Weight: 65 8 kg (145 lb)   Height: 5' 9" (1 753 m)         Current Medications       Current Outpatient Medications:     aspirin (ECOTRIN LOW STRENGTH) 81 mg EC tablet, Take 1 tablet by mouth daily, Disp: 30 tablet, Rfl: 0    diltiazem (TIAZAC) 240 MG 24 hr capsule, Take 1 capsule (240 mg total) by mouth daily, Disp: 90 capsule, Rfl: 3    DULoxetine (CYMBALTA) 60 mg delayed release capsule, Take 1 capsule (60 mg total) by mouth daily, Disp: 90 capsule, Rfl: 3    fluticasone-salmeterol (ADVAIR) 250-50 mcg/dose inhaler, Inhale 1 puff every 12 (twelve) hours, Disp: , Rfl:     gabapentin (NEURONTIN) 300 mg capsule, Take 1 capsule (300 mg total) by mouth 3 (three) times a day, Disp: 90 capsule, Rfl: 3    glucose blood (ONETOUCH VERIO) test strip, Patient to test twice daily (Dx: E11 9), Disp: 100 each, Rfl: 3    ipratropium-albuterol (COMBIVENT RESPIMAT) inhaler, Inhale 1 puff as needed, Disp: , Rfl:     losartan (COZAAR) 100 MG tablet, Take 1 tablet (100 mg total) by mouth daily, Disp: 90 tablet, Rfl: 3    metFORMIN (GLUCOPHAGE) 500 mg tablet, Take 1 tablet (500 mg total) by mouth daily with breakfast, Disp: 90 tablet, Rfl: 1    Multiple Vitamin (MULTI-VITAMINS PO), Take by mouth daily, Disp: , Rfl:     ONETOUCH DELICA LANCETS 27E MISC, TEST 1 TO 2 TIMES DAILY, Disp: 100 each, Rfl: 3    oxybutynin (DITROPAN-XL) 5 mg 24 hr tablet, Take 1 tablet (5 mg total) by mouth daily, Disp: 90 tablet, Rfl: 1    polyvinyl alcohol (LIQUIFILM TEARS) 1 4 % ophthalmic solution, Administer 1 drop to both eyes 4 (four) times a day, Disp: 15 mL, Rfl: 0    pravastatin (PRAVACHOL) 40 mg tablet, take 1 tablet by mouth once daily as directed, Disp: 90 tablet, Rfl: 1    tamsulosin (FLOMAX) 0 4 mg, Take 1 capsule (0 4 mg total) by mouth daily at bedtime, Disp: 90 capsule, Rfl: 3    thiamine 50 MG tablet, Take 1 tablet (50 mg total) by mouth daily, Disp: 30 tablet, Rfl: 3    tiotropium (SPIRIVA) 18 mcg inhalation capsule, Place 18 mcg into inhaler and inhale daily, Disp: , Rfl:     sildenafil (REVATIO) 20 mg tablet, Take 2-5 tablets as needed daily for intercourse, Disp: 60 tablet, Rfl: 6      Active Problems     Patient Active Problem List   Diagnosis    Type 2 diabetes mellitus with diabetic neuropathy, without long-term current use of insulin ([facility])    Hyperlipidemia associated with type 2 diabetes mellitus ([facility])    Essential hypertension    BPH (benign prostatic hyperplasia)    Degenerative cervical spinal stenosis    Cervical spondylosis    COPD (chronic obstructive pulmonary disease) ([facility])    Adjustment disorder with mixed anxiety and depressed mood    Progressive locomotor ataxia    Quadriparesis ([address] )    Spinal cord compression ([facility])    Pro[facility] cancer [facility] [address] [facility]    Overactive bladder    Lesion of native kidney    SHIVANI (obstructive sleep apnea)    Impaired mobility and ADLs    Cervical myelopathy ([facility])    At risk for venous thromboembolism (VTE)    Vitamin D insufficiency    Pipe smoker    Anxiety    Erectile dysfunction due to diseases classified elsewhere         Past Medical History     Past Medical History:   Diagnosis Date    BPH (benign prostatic hyperplasia)     Cancer ([facility] [address] )     lung- prostate    Chemical exposure     01- worked in [address] [place] [address]   COPD (chronic obstructive pulmonary disease) (HCC)     CPAP (continuous positive airway pressure) dependence     DDD (degenerative disc disease), cervical     Diabetes mellitus (HCC)     Foraminal stenosis of cervical region     Hyperlipidemia     Hypertension     SHIVANI (obstructive sleep apnea)     Overactive bladder          Surgical History     Past Surgical History:   Procedure Laterality Date    ARTHROSCOPY KNEE Bilateral     COLONOSCOPY      LUNG SURGERY Left     scraping of left    CO ARTHRODESIS ANT INTERBODY INC DISCECTOMY, CERVICAL BELOW C2 Bilateral 2019    Procedure: C3/4  ACDF WITH  C4 HEMICORPECTOMY, C3-4 ANTERIOR INSTRUMENTATION AND FUSION (NEUROMONITORING);   Surgeon: Fern Herrera MD;  [facility];  Service: Neurosurgery    ROTATOR CUFF REPAIR Bilateral          Family History     Family History   Problem Relation Age of Onset    No Known Problems Mother     No Known Problems Father          Social History     Social History     Social History     Tobacco Use   Smoking Status Former Smoker    Years: 61 00    Types: Pipe    Last attempt to quit: 2019    Years since quittin 1   Smokeless Tobacco Current User   Tobacco Comment    very light smoker         Pertinent Lab Values     Lab Results   Component Value Date    CREATININE 0 90 2019       Lab Results   Component Value Date    PSA <0 1 2019             Pertinent Imaging      no imaging for my review

## 2019-10-11 ENCOUNTER — PROCEDURE VISIT (OUTPATIENT)
Dept: UROLOGY | Facility: CLINIC | Age: 79
End: 2019-10-11
Payer: MEDICARE

## 2019-10-11 VITALS
WEIGHT: 145 LBS | BODY MASS INDEX: 21.48 KG/M2 | SYSTOLIC BLOOD PRESSURE: 126 MMHG | DIASTOLIC BLOOD PRESSURE: 76 MMHG | HEART RATE: 99 BPM | HEIGHT: 69 IN

## 2019-10-11 DIAGNOSIS — N52.1 ERECTILE DYSFUNCTION DUE TO DISEASES CLASSIFIED ELSEWHERE: ICD-10-CM

## 2019-10-11 DIAGNOSIS — Z71.2 PERSON CONSULTING FOR EXPLANATION OF EXAMINATION OR TEST FINDING: ICD-10-CM

## 2019-10-11 DIAGNOSIS — C61 PROSTATE CANCER (HCC): ICD-10-CM

## 2019-10-11 DIAGNOSIS — N40.1 BENIGN PROSTATIC HYPERPLASIA WITH LOWER URINARY TRACT SYMPTOMS, SYMPTOM DETAILS UNSPECIFIED: Primary | ICD-10-CM

## 2019-10-11 LAB
SL AMB  POCT GLUCOSE, UA: NORMAL
SL AMB LEUKOCYTE ESTERASE,UA: NORMAL
SL AMB POCT BLOOD,UA: NORMAL
SL AMB POCT CLARITY,UA: CLEAR
SL AMB POCT COLOR,UA: YELLOW
SL AMB POCT KETONES,UA: NORMAL
SL AMB POCT NITRITE,UA: NORMAL
SL AMB POCT PH,UA: 7
SL AMB POCT SPECIFIC GRAVITY,UA: 1.01
SL AMB POCT URINE PROTEIN: NORMAL

## 2019-10-11 PROCEDURE — 99214 OFFICE O/P EST MOD 30 MIN: CPT | Performed by: UROLOGY

## 2019-10-11 PROCEDURE — 52000 CYSTOURETHROSCOPY: CPT | Performed by: UROLOGY

## 2019-10-11 PROCEDURE — 81002 URINALYSIS NONAUTO W/O SCOPE: CPT | Performed by: UROLOGY

## 2019-10-11 RX ORDER — SILDENAFIL CITRATE 20 MG/1
TABLET ORAL
Qty: 60 TABLET | Refills: 6 | Status: SHIPPED | OUTPATIENT
Start: 2019-10-11 | End: 2021-11-11 | Stop reason: CLARIF

## 2019-10-11 NOTE — ASSESSMENT & PLAN NOTE
Patient with complaints of erectile dysfunction, likely a combination of his age of 70years old, history of diabetes, and history of prostate cancer  He takes no nitrate medications, he is interested in trying Viagra  Spoke with him about the mechanism of action of this medication, as well as potential side effects, I verified that he takes no nitrate medications at this time      Plan:  Trial of generic sildenafil, if this is not successful the next step would be to try Tri Mix

## 2019-10-11 NOTE — ASSESSMENT & PLAN NOTE
Patient with a history of prostate cancer status post brachytherapy in 2013, PSA is undetectable, patient has no evidence of disease      He will see us back in 6 months, if doing well at that time he can follow on a yearly basis with a PSA prior

## 2019-10-11 NOTE — ASSESSMENT & PLAN NOTE
Patient is taking tamsulosin as well as oxybutynin, long-acting, he is doing quite well, his urgency and frequency have completely resolved, he is no longer leaking  He is quite happy      Plan:  Continue current medication regimen

## 2019-10-11 NOTE — LETTER
October 11, 2019     Lb Sender,   [address] [address]    Patient: Micky Crum During   YOB: 1940   Date of Visit: 10/11/2019       Dear Dr Candi Loyd:    Thank you for referring Samina Chandler to me for evaluation  Below are my notes for this consultation  If you have questions, please do not hesitate to call me  I look forward to following your patient along with you  Sincerely,        Noah Jimenez MD        CC: No Recipients  Noah Jimenez MD  10/11/2019  1:03 PM  Sign at close encounter  Office Cystoscopy Procedure Note    Indication:     medically refractory lower urinary tract symptoms     Informed consent   The risks, benefits, complications, treatment options, and expected outcomes were discussed with the patient  The patient concurred with the proposed plan and provided informed consent  Anesthesia  Lidocaine jelly 2%    Antibiotic prophylaxis   None    Procedure  The patient was placed in the supineposition, was prepped and draped in the usual manner using sterile technique, and 2% lidocaine jelly instilled into the urethra  A 17 F flexible cystoscope was then inserted into the urethra and the urethra and bladder carefully examined    The following findings were noted:    Findings:  Urethra:  Normal, no stricture, intact sphincter magnet  Prostate:  Lateral lobe hypertrophy, small median bar, there is approximately 1 centimeter of prostate protruding into the lumen of the bladder  Bladder:  No lesions, tumors, defects, or stones  Ureteral orifices:  Orthotopic, clear urine exiting  Other findings:  None    Specimens: None                 Complications:    None; patient tolerated the procedure well           Disposition: To home            Condition: Stable    Plan: Negative cystoscopic evaluation for stricture, does show obstruction of the prostatic urethra with some intravesical protrusion of the prostate, no malignant findings    Cystoscopy  Date/Time: 10/11/2019 1:03 PM  Performed by: Tahir Browning MD  Authorized by: Tahir Browning MD     Procedure details: cystoscopy    Patient tolerance: Patient tolerated the procedure well with no immediate complications    Additional Procedure Details: Procedure  The patient was placed in the supineposition, was prepped and draped in the usual manner using sterile technique, and 2% lidocaine jelly instilled into the urethra  A 17 F flexible cystoscope was then inserted into the urethra and the urethra and bladder carefully examined  The following findings were noted:    Findings:  Urethra:  Normal, no stricture, intact sphincter magnet  Prostate:  Lateral lobe hypertrophy, small median bar, there is approximately 1 centimeter of prostate protruding into the lumen of the bladder  Bladder:  No lesions, tumors, defects, or stones  Ureteral orifices:  Orthotopic, clear urine exiting  Other findings:  None    Specimens: None                 Complications:    None; patient tolerated the procedure well           Disposition: To home            Condition: Stable    Plan: Negative cystoscopic evaluation for stricture, does show obstruction of the prostatic urethra with some intravesical protrusion of the prostate, no malignant findings                    Tahir Browning MD  10/11/2019  1:02 PM  Sign at close encounter       Problem List Items Addressed This Visit        Genitourinary    BPH (benign prostatic hyperplasia) - Primary (Chronic)     Patient is taking tamsulosin as well as oxybutynin, long-acting, he is doing quite well, his urgency and frequency have completely resolved, he is no longer leaking  He is quite happy  Plan:  Continue current medication regimen         Relevant Orders    POCT urine dip (Completed)    Prostate cancer [facility])     Patient with a history of prostate cancer status post brachytherapy in 2013, PSA is undetectable, patient has no evidence of disease      He will see us back in 6 months, if doing well at that time he can follow on a yearly basis with a PSA prior            Other    Erectile dysfunction due to diseases classified elsewhere     Patient with complaints of erectile dysfunction, likely a combination of his age of 70years old, history of diabetes, and history of prostate cancer  He takes no nitrate medications, he is interested in trying Viagra  Spoke with him about the mechanism of action of this medication, as well as potential side effects, I verified that he takes no nitrate medications at this time  Plan:  Trial of generic sildenafil, if this is not successful the next step would be to try Tri Mix         Relevant Medications    sildenafil (REVATIO) 20 mg tablet            Discussion:  Cystoscopy today is negative for structural lesions, it does show moderate obstruction of the prostatic urethra with some intravesical protrusion of the prostate as well, there are no bladder lesions noted  The patient's lower urinary tract symptoms have improved greatly since being on tamsulosin as well as oxybutynin, he is quite happy with this at this time  Main complaint today is erectile dysfunction, he wishes to try phosphodiesterase 5 inhibition, I have given the local compound pharmacy a prescription for this medication      Assessment and plan:     Please see problem oriented charting for the assessment plan of today's urological complaints    Rosa Maria Blas MD      Chief Complaint     Chief Complaint   Patient presents with    Cystoscopy    Benign Prostatic Hypertrophy   urgency and frequency of urination      History of Present Illness     Onur Thurston During is a 70 y o  s/p brachytherapy for prostate cancer in 2013  He has been treated previously with anticholinergic medications and with myrbetriq and these did not work      Complains of swollen legs and nocturia additionally, these have improved greatly since being on medical therapies for his LUTS     Complains of erectile dysfunction, no associated symptoms, no aggravating or alleviating factors, no current sexual partner, but does wish to get back out there    Gave him a prescription for generic Viagra, he will try this medication going forward  No other complaints today        The following portions of the patient's history were reviewed and updated as appropriate: allergies, current medications, past family history, past medical history, past social history, past surgical history and problem list     Detailed Urologic History     - please refer to HPI    Review of Systems     Review of Systems   Constitutional: Negative  HENT: Negative  Eyes: Negative  Respiratory: Negative  Cardiovascular: Negative  Gastrointestinal: Negative  Endocrine: Negative  Genitourinary: Positive for frequency and urgency  Musculoskeletal: Negative  Skin: Negative  Allergic/Immunologic: Negative  Neurological: Negative  Hematological: Negative  Psychiatric/Behavioral: Negative  Allergies     No Known Allergies    Physical Exam     Physical Exam   Constitutional: He is oriented to person, place, and time  He appears well-developed and well-nourished  No distress  HENT:   Head: Normocephalic and atraumatic  Eyes: Pupils are equal, round, and reactive to light  EOM are normal  Right eye exhibits no discharge  Left eye exhibits no discharge  Neck: No tracheal deviation present  Cardiovascular: Intact distal pulses  Pulmonary/Chest: Effort normal  No stridor  No respiratory distress  Abdominal: Soft  He exhibits no distension and no mass  There is no tenderness  There is no rebound and no guarding  No hernia  Genitourinary: Penis normal    Musculoskeletal: He exhibits no edema, tenderness or deformity  Neurological: He is alert and oriented to person, place, and time  No cranial nerve deficit  Coordination normal    Skin: Skin is warm and dry  No rash noted  He is not diaphoretic  No erythema  No pallor  Psychiatric: He has a normal mood and affect  His behavior is normal  Judgment and thought content normal    Nursing note and vitals reviewed            Vital Signs  Vitals:    10/11/19 1242   BP: 126/76   Pulse: 99   Weight: 65 8 kg (145 lb)   Height: 5' 9" (1 753 m)         Current Medications       Current Outpatient Medications:     aspirin (ECOTRIN LOW STRENGTH) 81 mg EC tablet, Take 1 tablet by mouth daily, Disp: 30 tablet, Rfl: 0    diltiazem (TIAZAC) 240 MG 24 hr capsule, Take 1 capsule (240 mg total) by mouth daily, Disp: 90 capsule, Rfl: 3    DULoxetine (CYMBALTA) 60 mg delayed release capsule, Take 1 capsule (60 mg total) by mouth daily, Disp: 90 capsule, Rfl: 3    fluticasone-salmeterol (ADVAIR) 250-50 mcg/dose inhaler, Inhale 1 puff every 12 (twelve) hours, Disp: , Rfl:     gabapentin (NEURONTIN) 300 mg capsule, Take 1 capsule (300 mg total) by mouth 3 (three) times a day, Disp: 90 capsule, Rfl: 3    glucose blood (ONETOUCH VERIO) test strip, Patient to test twice daily (Dx: E11 9), Disp: 100 each, Rfl: 3    ipratropium-albuterol (COMBIVENT RESPIMAT) inhaler, Inhale 1 puff as needed, Disp: , Rfl:     losartan (COZAAR) 100 MG tablet, Take 1 tablet (100 mg total) by mouth daily, Disp: 90 tablet, Rfl: 3    metFORMIN (GLUCOPHAGE) 500 mg tablet, Take 1 tablet (500 mg total) by mouth daily with breakfast, Disp: 90 tablet, Rfl: 1    Multiple Vitamin (MULTI-VITAMINS PO), Take by mouth daily, Disp: , Rfl:     ONETOUCH DELICA LANCETS 36C MISC, TEST 1 TO 2 TIMES DAILY, Disp: 100 each, Rfl: 3    oxybutynin (DITROPAN-XL) 5 mg 24 hr tablet, Take 1 tablet (5 mg total) by mouth daily, Disp: 90 tablet, Rfl: 1    polyvinyl alcohol (LIQUIFILM TEARS) 1 4 % ophthalmic solution, Administer 1 drop to both eyes 4 (four) times a day, Disp: 15 mL, Rfl: 0    pravastatin (PRAVACHOL) 40 mg tablet, take 1 tablet by mouth once daily as directed, Disp: 90 tablet, Rfl: 1   tamsulosin (FLOMAX) 0 4 mg, Take 1 capsule (0 4 mg total) by mouth daily at bedtime, Disp: 90 capsule, Rfl: 3    thiamine 50 MG tablet, Take 1 tablet (50 mg total) by mouth daily, Disp: 30 tablet, Rfl: 3    tiotropium (SPIRIVA) 18 mcg inhalation capsule, Place 18 mcg into inhaler and inhale daily, Disp: , Rfl:     sildenafil (REVATIO) 20 mg tablet, Take 2-5 tablets as needed daily for intercourse, Disp: 60 tablet, Rfl: 6      Active Problems     Patient Active Problem List   Diagnosis    Type 2 diabetes mellitus with diabetic neuropathy, without long-term current use of insulin ([facility])    Hyperlipidemia associated with type 2 diabetes mellitus ([facility] [facility]    Essential hypertension    BPH (benign prostatic hyperplasia)    Degenerative cervical spinal stenosis    Cervical spondylosis    COPD (chronic obstructive pulmonary disease) ([facility]    Adjustment disorder with mixed anxiety and depressed mood    Progressive locomotor ataxia    Quadriparesis ([facility] [address] )    Spinal cord compression ([facility] [address] [facility]    Prostate cancer ([facility] [address] )    Overactive bladder    Lesion of native kidney    SHIVANI (obstructive sleep apnea)    Impaired mobility and ADLs    Cervical myelopathy ([facility]    At risk for venous thromboembolism (VTE)    Vitamin D insufficiency    Pipe smoker    Anxiety    Erectile dysfunction due to diseases classified elsewhere         Past Medical History     Past Medical History:   Diagnosis Date    BPH (benign prostatic hyperplasia)     Cancer ([facility] [address] ) 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    Chemical exposure     9/11/01- worked in [employer]       COPD (chronic obstructive pulmonary disease) ([facility])     CPAP (continuous positive airway pressure) dependence     DDD (degenerative disc disease), cervical     Diabetes mellitus ([facility] [address] )     Foraminal stenosis of cervical region     Hyperlipidemia     Hypertension     SHIVANI (obstructive sleep apnea)     Overactive bladder          Surgical History     Past Surgical History:   Procedure Laterality Date    ARTHROSCOPY KNEE Bilateral     COLONOSCOPY      LUNG SURGERY Left     scraping of left    VT ARTHRODESIS ANT INTERBODY INC DISCECTOMY, CERVICAL BELOW C2 Bilateral 2019    Procedure: C3/4  ACDF WITH  C4 HEMICORPECTOMY, C3-4 ANTERIOR INSTRUMENTATION AND FUSION (NEUROMONITORING);   Surgeon: Sylvester Sahu MD;  [facility];  Service: Neurosurgery    ROTATOR CUFF REPAIR Bilateral          Family History     Family History   Problem Relation Age of Onset    No Known Problems Mother     No Known Problems Father          Social History     Social History     Social History     Tobacco Use   Smoking Status Former Smoker    Years: 61 00    Types: Pipe    Last attempt to quit: 2019    Years since quittin 1   Smokeless Tobacco Current User   Tobacco Comment    very light smoker         Pertinent Lab Values     Lab Results   Component Value Date    CREATININE 0 90 2019       Lab Results   Component Value Date    PSA <0 1 2019             Pertinent Imaging      no imaging for my review

## 2019-10-11 NOTE — PROGRESS NOTES
Office Cystoscopy Procedure Note    Indication:     medically refractory lower urinary tract symptoms     Informed consent   The risks, benefits, complications, treatment options, and expected outcomes were discussed with the patient  The patient concurred with the proposed plan and provided informed consent  Anesthesia  Lidocaine jelly 2%    Antibiotic prophylaxis   None    Procedure  The patient was placed in the supineposition, was prepped and draped in the usual manner using sterile technique, and 2% lidocaine jelly instilled into the urethra  A 17 F flexible cystoscope was then inserted into the urethra and the urethra and bladder carefully examined  The following findings were noted:    Findings:  Urethra:  Normal, no stricture, intact sphincter magnet  Prostate:  Lateral lobe hypertrophy, small median bar, there is approximately 1 centimeter of prostate protruding into the lumen of the bladder  Bladder:  No lesions, tumors, defects, or stones  Ureteral orifices:  Orthotopic, clear urine exiting  Other findings:  None    Specimens: None                 Complications:    None; patient tolerated the procedure well           Disposition: To home            Condition: Stable    Plan: Negative cystoscopic evaluation for stricture, does show obstruction of the prostatic urethra with some intravesical protrusion of the prostate, no malignant findings    Cystoscopy  Date/Time: 10/11/2019 1:03 PM  Performed by: Kelly Wallace MD  Authorized by: Kelly Wallace MD     Procedure details: cystoscopy    Patient tolerance: Patient tolerated the procedure well with no immediate complications    Additional Procedure Details: Procedure  The patient was placed in the supineposition, was prepped and draped in the usual manner using sterile technique, and 2% lidocaine jelly instilled into the urethra  A 17 F flexible cystoscope was then inserted into the urethra and the urethra and bladder carefully examined    The following findings were noted:    Findings:  Urethra:  Normal, no stricture, intact sphincter magnet  Prostate:  Lateral lobe hypertrophy, small median bar, there is approximately 1 centimeter of prostate protruding into the lumen of the bladder  Bladder:  No lesions, tumors, defects, or stones  Ureteral orifices:  Orthotopic, clear urine exiting  Other findings:  None    Specimens: None                 Complications:    None; patient tolerated the procedure well           Disposition: To home            Condition: Stable    Plan: Negative cystoscopic evaluation for stricture, does show obstruction of the prostatic urethra with some intravesical protrusion of the prostate, no malignant findings

## 2019-10-14 LAB
LEFT EYE DIABETIC RETINOPATHY: NORMAL
RIGHT EYE DIABETIC RETINOPATHY: NORMAL

## 2019-11-20 ENCOUNTER — TELEPHONE (OUTPATIENT)
Dept: NEUROLOGY | Facility: CLINIC | Age: 79
End: 2019-11-20

## 2019-11-20 NOTE — TELEPHONE ENCOUNTER
Called patient to reschedule the appointment with Dr Sanford Malik on 11/25/19  Patient was not too happy to reschedule appt  Patient would like an appointment to see Dr Sanford Malik before the end of December  Spoke to patient letting him know that I was going to forward this information to Dr Sanford Malik  So we can bring him sooner

## 2019-11-29 NOTE — TELEPHONE ENCOUNTER
Patient called asking for update   Told him someone will get back to him since there is nothing open till 2/24

## 2019-12-16 ENCOUNTER — HOSPITAL ENCOUNTER (OUTPATIENT)
Dept: RADIOLOGY | Facility: HOSPITAL | Age: 79
Discharge: HOME | End: 2019-12-16
Attending: NEUROLOGICAL SURGERY
Payer: MEDICARE

## 2019-12-16 DIAGNOSIS — Z48.89 AFTERCARE FOLLOWING SURGERY FOR INJURY AND TRAUMA: ICD-10-CM

## 2019-12-16 PROCEDURE — 72040 X-RAY EXAM NECK SPINE 2-3 VW: CPT

## 2019-12-16 NOTE — TELEPHONE ENCOUNTER
Patient called to let us know he will be seeing neurosurgery this week  He has no follow ups schedules with Dr Gilliland  Next available appt 2/27/20  Patient did not want to book until Terrence Rousseau was made aware  Gilmer Herrera - Did you speak to Terrence Rousseau regarding appt  ?

## 2019-12-18 DIAGNOSIS — E11.40 TYPE 2 DIABETES MELLITUS WITH DIABETIC NEUROPATHY, WITHOUT LONG-TERM CURRENT USE OF INSULIN (HCC): Chronic | ICD-10-CM

## 2019-12-18 RX ORDER — GABAPENTIN 300 MG/1
CAPSULE ORAL
Qty: 90 CAPSULE | Refills: 3 | Status: SHIPPED | OUTPATIENT
Start: 2019-12-18 | End: 2020-04-16

## 2019-12-19 ENCOUNTER — OFFICE VISIT (OUTPATIENT)
Dept: NEUROSURGERY | Facility: CLINIC | Age: 79
End: 2019-12-19
Payer: MEDICARE

## 2019-12-19 VITALS
HEART RATE: 80 BPM | HEIGHT: 69 IN | WEIGHT: 145 LBS | DIASTOLIC BLOOD PRESSURE: 100 MMHG | SYSTOLIC BLOOD PRESSURE: 160 MMHG | BODY MASS INDEX: 21.48 KG/M2 | TEMPERATURE: 96.7 F | RESPIRATION RATE: 16 BRPM

## 2019-12-19 DIAGNOSIS — M47.12 OTHER SPONDYLOSIS WITH MYELOPATHY, CERVICAL REGION: ICD-10-CM

## 2019-12-19 DIAGNOSIS — M48.02 DEGENERATIVE CERVICAL SPINAL STENOSIS: ICD-10-CM

## 2019-12-19 DIAGNOSIS — G95.20 SPINAL CORD COMPRESSION (HCC): Primary | ICD-10-CM

## 2019-12-19 PROCEDURE — 99213 OFFICE O/P EST LOW 20 MIN: CPT | Performed by: NEUROLOGICAL SURGERY

## 2019-12-19 NOTE — PROGRESS NOTES
Assessment/Plan:    No problem-specific Assessment & Plan notes found for this encounter  Problem List Items Addressed This Visit        Nervous and Auditory    Spinal cord compression ([address] ) - Primary       Other    Degenerative cervical spinal stenosis      Other Visit Diagnoses     Other spondylosis with myelopathy, cervical region                Subjective:      Patient ID: Dina Chandler is a 70 y o  male  HPI    The following portions of the patient's history were reviewed and updated as appropriate:   He  has a past medical history of BPH (benign prostatic hyperplasia), Cancer ([address] ) (2013), Chemical exposure, COPD (chronic obstructive pulmonary disease) ([address] ), CPAP (continuous positive airway pressure) dependence, DDD (degenerative disc disease), cervical, Diabetes mellitus ([address] ), Foraminal stenosis of cervical region, Hyperlipidemia, Hypertension, SHIVANI (obstructive sleep apnea), and Overactive bladder    He   Patient Active Problem List    Diagnosis Date Noted    Erectile dysfunction due to diseases classified elsewhere 10/11/2019    Pipe smoker 06/22/2019    Anxiety 06/22/2019    Vitamin D insufficiency 06/21/2019    Impaired mobility and ADLs 06/20/2019    Cervical myelopathy ([address] ) 06/20/2019    At risk for venous thromboembolism (VTE) 06/20/2019    SHIVANI (obstructive sleep apnea) 06/07/2019    Prostate cancer ([address] ) 05/16/2019    Overactive bladder 05/16/2019    Quadriparesis ([address] ) 05/08/2019    Spinal cord compression ([address] ) 05/08/2019    Lesion of native kidney 04/17/2019    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    Type 2 diabetes mellitus with diabetic neuropathy, without long-term current use of insulin ([address] ) 02/27/2019    Hyperlipidemia associated with type 2 diabetes mellitus ([address] ) 02/27/2019    Essential hypertension 02/27/2019    BPH (benign prostatic hyperplasia) 02/27/2019    Cervical spondylosis 02/27/2019    COPD (chronic obstructive pulmonary disease) ([address] ) 02/27/2019    Adjustment disorder with mixed anxiety and depressed mood 02/27/2019    Degenerative cervical spinal stenosis 02/27/2017     He  has a past surgical history that includes Rotator cuff repair (Bilateral); ARTHROSCOPY KNEE (Bilateral); Lung surgery (Left); Colonoscopy; and pr arthrodesis ant interbody inc discectomy, cervical below c2 (Bilateral, 6/18/2019)  His family history includes No Known Problems in his father and mother  He  reports that he quit smoking about 4 months ago  His smoking use included pipe  He quit after 61 00 years of use  He uses smokeless tobacco  He reports that he drank alcohol  He reports that he does not use drugs    Current Outpatient Medications   Medication Sig Dispense Refill    aspirin (ECOTRIN LOW STRENGTH) 81 mg EC tablet Take 1 tablet by mouth daily 30 tablet 0    diltiazem (TIAZAC) 240 MG 24 hr capsule Take 1 capsule (240 mg total) by mouth daily 90 capsule 3    DULoxetine (CYMBALTA) 60 mg delayed release capsule Take 1 capsule (60 mg total) by mouth daily 90 capsule 3    fluticasone-salmeterol (ADVAIR) 250-50 mcg/dose inhaler Inhale 1 puff every 12 (twelve) hours      gabapentin (NEURONTIN) 300 mg capsule take 1 capsule by mouth three times a day 90 capsule 3    glucose blood (ONETOUCH VERIO) test strip Patient to test twice daily (Dx: E11 9) 100 each 3    ipratropium-albuterol (COMBIVENT RESPIMAT) inhaler Inhale 1 puff as needed      losartan (COZAAR) 100 MG tablet Take 1 tablet (100 mg total) by mouth daily 90 tablet 3    metFORMIN (GLUCOPHAGE) 500 mg tablet Take 1 tablet (500 mg total) by mouth daily with breakfast 90 tablet 1    Multiple Vitamin (MULTI-VITAMINS PO) Take by mouth daily      ONETOUCH DELICA LANCETS 05B MISC TEST 1 TO 2 TIMES DAILY 100 each 3    oxybutynin (DITROPAN-XL) 5 mg 24 hr tablet Take 1 tablet (5 mg total) by mouth daily 90 tablet 1    polyvinyl alcohol (LIQUIFILM TEARS) 1 4 % ophthalmic solution Administer 1 drop to both eyes 4 (four) times a day 15 mL 0    pravastatin (PRAVACHOL) 40 mg tablet take 1 tablet by mouth once daily as directed 90 tablet 1    sildenafil (REVATIO) 20 mg tablet Take 2-5 tablets as needed daily for intercourse 60 tablet 6    tamsulosin (FLOMAX) 0 4 mg Take 1 capsule (0 4 mg total) by mouth daily at bedtime 90 capsule 3    thiamine 50 MG tablet Take 1 tablet (50 mg total) by mouth daily 30 tablet 3    tiotropium (SPIRIVA) 18 mcg inhalation capsule Place 18 mcg into inhaler and inhale daily       No current facility-administered medications for this visit        Current Outpatient Medications on File Prior to Visit   Medication Sig    aspirin (ECOTRIN LOW STRENGTH) 81 mg EC tablet Take 1 tablet by mouth daily    diltiazem (TIAZAC) 240 MG 24 hr capsule Take 1 capsule (240 mg total) by mouth daily    DULoxetine (CYMBALTA) 60 mg delayed release capsule Take 1 capsule (60 mg total) by mouth daily    fluticasone-salmeterol (ADVAIR) 250-50 mcg/dose inhaler Inhale 1 puff every 12 (twelve) hours    gabapentin (NEURONTIN) 300 mg capsule take 1 capsule by mouth three times a day    glucose blood (ONETOUCH VERIO) test strip Patient to test twice daily (Dx: E11 9)    ipratropium-albuterol (COMBIVENT RESPIMAT) inhaler Inhale 1 puff as needed    losartan (COZAAR) 100 MG tablet Take 1 tablet (100 mg total) by mouth daily    metFORMIN (GLUCOPHAGE) 500 mg tablet Take 1 tablet (500 mg total) by mouth daily with breakfast    Multiple Vitamin (MULTI-VITAMINS PO) Take by mouth daily    ONETOUCH DELICA LANCETS 42B MISC TEST 1 TO 2 TIMES DAILY    oxybutynin (DITROPAN-XL) 5 mg 24 hr tablet Take 1 tablet (5 mg total) by mouth daily    polyvinyl alcohol (LIQUIFILM TEARS) 1 4 % ophthalmic solution Administer 1 drop to both eyes 4 (four) times a day    pravastatin (PRAVACHOL) 40 mg tablet take 1 tablet by mouth once daily as directed    sildenafil (REVATIO) 20 mg tablet Take 2-5 tablets as needed daily for intercourse  tamsulosin (FLOMAX) 0 4 mg Take 1 capsule (0 4 mg total) by mouth daily at bedtime    thiamine 50 MG tablet Take 1 tablet (50 mg total) by mouth daily    tiotropium (SPIRIVA) 18 mcg inhalation capsule Place 18 mcg into inhaler and inhale daily     No current facility-administered medications on file prior to visit  He has No Known Allergies       Review of Systems   Constitutional: Negative  HENT: Negative  Eyes:        Cataracts   Respiratory: Negative for shortness of breath (occasionally with increase activity)  Cardiovascular: Negative for chest pain  Gastrointestinal: Negative  Endocrine: Negative  Genitourinary: Negative for frequency and urgency  Musculoskeletal: Negative for neck stiffness  Skin: Negative  Allergic/Immunologic: Negative  Neurological: Positive for weakness (UE and LE) and numbness (bilat  UE and LE)  Negative for dizziness and headaches  Hematological:        Aspirin   Psychiatric/Behavioral: Negative for sleep disturbance  Objective:      /86 (BP Location: Left arm, Patient Position: Sitting, Cuff Size: Standard)   Pulse 80   Temp (!) 96 7 °F (35 9 °C) (Tympanic)   Resp 16   Ht 5' 9" (1 753 m)   Wt 65 8 kg (145 lb)   BMI 21 41 kg/m²          Physical Exam       I have personally obtain history and examined patient  I have personally reviewed case including all pertinent investigations/studies        Time spent 15 minutes  More than 50% of total time spent on counseling and coordination of care as described above including patient education     HPI     3 months post op anterior cervical decompression and fusion for cord compression/progressive quadraparesis  Doing well with ongoing improved strength, sensation and gait  walks independently without assistance   Denies neck pain       Exam     Well healed incision  Mild restriction in cervical ROM  Grade 5-/5 bilateral  strength  Improved sensation  Improved fine motor  Grossly steady gait without cane  Mildly unsteady tandem                             Neck:   Supple, symmetrical, trachea midline, no adenopathy;        thyroid:  No enlargement/tenderness/nodules; no carotid    bruit or JVD                                             Extremities:   Extremities normal, atraumatic, no cyanosis or edema       Skin:   Skin color, texture, turgor normal, no rashes or lesions      Radiology     Xray     Anatomic neck alignment with all hardware and screws in expected position  No cage settling  Evidence of bridging interbody fusion mass     Summary and plan     Mr  During is doing well 6 months post op  We reviewed activity restrictions as well as the need for ongoing spinal cord injury rehab/PT  I will see him on a PRN basis

## 2020-01-16 ENCOUNTER — OFFICE VISIT (OUTPATIENT)
Dept: INTERNAL MEDICINE CLINIC | Facility: CLINIC | Age: 80
End: 2020-01-16
Payer: MEDICARE

## 2020-01-16 ENCOUNTER — APPOINTMENT (OUTPATIENT)
Dept: LAB | Facility: CLINIC | Age: 80
End: 2020-01-16
Payer: MEDICARE

## 2020-01-16 ENCOUNTER — TELEPHONE (OUTPATIENT)
Dept: INTERNAL MEDICINE CLINIC | Facility: CLINIC | Age: 80
End: 2020-01-16

## 2020-01-16 VITALS
BODY MASS INDEX: 22.45 KG/M2 | DIASTOLIC BLOOD PRESSURE: 82 MMHG | WEIGHT: 152 LBS | HEART RATE: 86 BPM | SYSTOLIC BLOOD PRESSURE: 130 MMHG | OXYGEN SATURATION: 97 %

## 2020-01-16 DIAGNOSIS — J43.9 PULMONARY EMPHYSEMA, UNSPECIFIED EMPHYSEMA TYPE (HCC): Chronic | ICD-10-CM

## 2020-01-16 DIAGNOSIS — E11.40 TYPE 2 DIABETES MELLITUS WITH DIABETIC NEUROPATHY, WITHOUT LONG-TERM CURRENT USE OF INSULIN (HCC): Primary | Chronic | ICD-10-CM

## 2020-01-16 DIAGNOSIS — E78.5 HYPERLIPIDEMIA ASSOCIATED WITH TYPE 2 DIABETES MELLITUS (HCC): ICD-10-CM

## 2020-01-16 DIAGNOSIS — E11.69 HYPERLIPIDEMIA ASSOCIATED WITH TYPE 2 DIABETES MELLITUS (HCC): ICD-10-CM

## 2020-01-16 DIAGNOSIS — N32.81 OVERACTIVE BLADDER: Chronic | ICD-10-CM

## 2020-01-16 DIAGNOSIS — E11.40 TYPE 2 DIABETES MELLITUS WITH DIABETIC NEUROPATHY, WITHOUT LONG-TERM CURRENT USE OF INSULIN (HCC): ICD-10-CM

## 2020-01-16 PROBLEM — Z85.46 HISTORY OF PROSTATE CANCER: Chronic | Status: ACTIVE | Noted: 2019-05-16

## 2020-01-16 PROBLEM — Z78.9 IMPAIRED MOBILITY AND ADLS: Status: RESOLVED | Noted: 2019-06-20 | Resolved: 2020-01-16

## 2020-01-16 PROBLEM — M47.812 CERVICAL SPONDYLOSIS: Status: ACTIVE | Noted: 2019-02-27

## 2020-01-16 PROBLEM — Z91.89 AT RISK FOR VENOUS THROMBOEMBOLISM (VTE): Status: RESOLVED | Noted: 2019-06-20 | Resolved: 2020-01-16

## 2020-01-16 PROBLEM — Z74.09 IMPAIRED MOBILITY AND ADLS: Status: RESOLVED | Noted: 2019-06-20 | Resolved: 2020-01-16

## 2020-01-16 PROBLEM — G95.20 SPINAL CORD COMPRESSION (HCC): Status: RESOLVED | Noted: 2019-05-08 | Resolved: 2020-01-16

## 2020-01-16 PROBLEM — G82.50 QUADRIPARESIS (HCC): Status: RESOLVED | Noted: 2019-05-08 | Resolved: 2020-01-16

## 2020-01-16 PROBLEM — A52.11: Status: RESOLVED | Noted: 2019-03-13 | Resolved: 2020-01-16

## 2020-01-16 PROBLEM — F43.23 ADJUSTMENT DISORDER WITH MIXED ANXIETY AND DEPRESSED MOOD: Status: ACTIVE | Noted: 2019-02-27

## 2020-01-16 PROBLEM — G95.9 CERVICAL MYELOPATHY (HCC): Status: RESOLVED | Noted: 2019-06-20 | Resolved: 2020-01-16

## 2020-01-16 LAB
ALBUMIN SERPL BCP-MCNC: 3.7 G/DL (ref 3.5–5)
ANION GAP SERPL CALCULATED.3IONS-SCNC: 3 MMOL/L (ref 4–13)
BUN SERPL-MCNC: 12 MG/DL (ref 5–25)
CALCIUM ALBUM COR SERPL-MCNC: 10.5 MG/DL (ref 8.3–10.1)
CALCIUM SERPL-MCNC: 10.3 MG/DL (ref 8.3–10.1)
CALCIUM SERPL-MCNC: 10.3 MG/DL (ref 8.3–10.1)
CHLORIDE SERPL-SCNC: 111 MMOL/L (ref 100–108)
CO2 SERPL-SCNC: 30 MMOL/L (ref 21–32)
CREAT SERPL-MCNC: 0.89 MG/DL (ref 0.6–1.3)
CREAT UR-MCNC: 175 MG/DL
EST. AVERAGE GLUCOSE BLD GHB EST-MCNC: 151 MG/DL
GFR SERPL CREATININE-BSD FRML MDRD: 95 ML/MIN/1.73SQ M
GLUCOSE P FAST SERPL-MCNC: 76 MG/DL (ref 65–99)
HBA1C MFR BLD: 6.9 % (ref 4.2–6.3)
LEFT EYE DIABETIC RETINOPATHY: NORMAL
LEFT EYE IMAGE QUALITY: NORMAL
LEFT EYE MACULAR EDEMA: NORMAL
LEFT EYE OTHER RETINOPATHY: NORMAL
MICROALBUMIN UR-MCNC: 29.6 MG/L (ref 0–20)
MICROALBUMIN/CREAT 24H UR: 17 MG/G CREATININE (ref 0–30)
POTASSIUM SERPL-SCNC: 4 MMOL/L (ref 3.5–5.3)
RIGHT EYE DIABETIC RETINOPATHY: NORMAL
RIGHT EYE IMAGE QUALITY: NORMAL
RIGHT EYE MACULAR EDEMA: NORMAL
RIGHT EYE OTHER RETINOPATHY: NORMAL
SEVERITY (EYE EXAM): NORMAL
SODIUM SERPL-SCNC: 144 MMOL/L (ref 136–145)

## 2020-01-16 PROCEDURE — 82043 UR ALBUMIN QUANTITATIVE: CPT | Performed by: INTERNAL MEDICINE

## 2020-01-16 PROCEDURE — 82570 ASSAY OF URINE CREATININE: CPT | Performed by: INTERNAL MEDICINE

## 2020-01-16 PROCEDURE — 82040 ASSAY OF SERUM ALBUMIN: CPT

## 2020-01-16 PROCEDURE — 92250 FUNDUS PHOTOGRAPHY W/I&R: CPT | Performed by: INTERNAL MEDICINE

## 2020-01-16 PROCEDURE — 36415 COLL VENOUS BLD VENIPUNCTURE: CPT

## 2020-01-16 PROCEDURE — 99214 OFFICE O/P EST MOD 30 MIN: CPT | Performed by: INTERNAL MEDICINE

## 2020-01-16 PROCEDURE — 80048 BASIC METABOLIC PNL TOTAL CA: CPT

## 2020-01-16 PROCEDURE — 83036 HEMOGLOBIN GLYCOSYLATED A1C: CPT

## 2020-01-16 RX ORDER — OXYBUTYNIN CHLORIDE 5 MG/1
5 TABLET, EXTENDED RELEASE ORAL DAILY
Qty: 90 TABLET | Refills: 3 | Status: SHIPPED | OUTPATIENT
Start: 2020-01-16 | End: 2021-03-19

## 2020-01-16 RX ORDER — PRAVASTATIN SODIUM 40 MG
40 TABLET ORAL DAILY
Qty: 90 TABLET | Refills: 3 | Status: SHIPPED | OUTPATIENT
Start: 2020-01-16 | End: 2021-01-18

## 2020-01-16 NOTE — PATIENT INSTRUCTIONS
Type 2 Diabetes in Adults   AMBULATORY CARE:   Type 2 diabetes  is a disease that affects how your body uses glucose (sugar)  Normally, when the blood sugar level increases, the pancreas makes more insulin  Insulin helps move sugar out of the blood so it can be used for energy  Type 2 diabetes develops because either the body cannot make enough insulin, or it cannot use the insulin correctly  After many years, your pancreas may stop making insulin  Common symptoms include the following:   · More hunger or thirst than usual     · Frequent urination     · Weight loss without trying     · Blurred vision  Call 911 if you have any of the following:   · You have any of the following signs of a stroke:      ¨ Numbness or drooping on one side of your face     ¨ Weakness in an arm or leg    ¨ Confusion or difficulty speaking    ¨ Dizziness, a severe headache, or vision loss    · You have any of the following signs of a heart attack:      ¨ Squeezing, pressure, or pain in your chest that lasts longer than 5 minutes or returns    ¨ Discomfort or pain in your back, neck, jaw, stomach, or arm     ¨ Trouble breathing    ¨ Nausea or vomiting    ¨ Lightheadedness or a sudden cold sweat, especially with chest pain or trouble breathing  Seek care immediately if:   · You have severe abdominal pain, or the pain spreads to your back  You may also be vomiting  · You have trouble staying awake or focusing  · You are shaking or sweating  · You have blurred or double vision  · Your breath has a fruity, sweet smell  · Your breathing is deep and labored, or rapid and shallow  · Your heartbeat is fast and weak  Contact your healthcare provider if:   · You are vomiting or have diarrhea  · You have an upset stomach and cannot eat the foods on your meal plan  · You feel weak or more tired than usual      · You feel dizzy, have headaches, or are easily irritated  · Your skin is red, warm, dry, or swollen  · You have a wound that does not heal      · You have numbness in your arms or legs  · You have trouble coping with your illness, or you feel anxious or depressed  · You have questions or concerns about your condition or care  Treatment for type 2 diabetes  includes keeping your blood sugar at a normal level  You must eat the right foods, and exercise regularly  You may need medicine if you cannot control your blood sugar level with nutrition and exercise  You may also need medicine to prevent heart disease, a complication of type 2 diabetes  You may  need any of the following:  · Hypoglycemic medicines or insulin  may be given to decrease the amount of sugar in your blood  · Blood pressure medicine  may be given to lower your blood pressure  Your blood pressure should be less than 140/90  · Cholesterol lowering medicine  may be given to prevent heart disease  · Antiplatelets , such as aspirin, help prevent blood clots  Take your antiplatelet medicine exactly as directed  These medicines make it more likely for you to bleed or bruise  If you are told to take aspirin, do not take acetaminophen or ibuprofen instead  · Take your medicine as directed  Contact your healthcare provider if you think your medicine is not helping or if you have side effects  Tell him or her if you are allergic to any medicine  Keep a list of the medicines, vitamins, and herbs you take  Include the amounts, and when and why you take them  Bring the list or the pill bottles to follow-up visits  Carry your medicine list with you in case of an emergency  Check your blood sugar level: You will be taught how to check a small drop of blood in a glucose monitor  You will need to check your blood sugar level at least 3 times each day if you are on insulin  Ask your healthcare provider when and how often to check during the day  If you check your blood sugar level before a meal , it should be between 80 and 130 mg/dL   If you check your blood sugar level 1 to 2 hours after a meal , it should be less than 180 mg/dL  Ask your healthcare provider if these are good goals for you  Write down your results, and show them to your healthcare provider  He may use the results to make changes to your medicine, food, and exercise schedules  If your blood sugar level is too low: Your blood sugar level is too low if it goes below 70 mg/dL  If the level is too low, eat or drink 15 grams of fast-acting carbohydrate  These are found naturally in fruits  Fast-acting carbohydrates will raise your blood sugar level quickly  Examples of 15 grams of fast-acting carbohydrate are 4 ounces (½ cup) of fruit juice or 4 ounces of regular soda  Other examples are 2 tablespoons of raisins or 3 to 4 glucose tablets  Check your blood sugar level 15 minutes later  If the level is still low (less than 100 mg/dL), eat another 15 grams of carbohydrate  When the level returns to 100 mg/dL, eat a snack or meal that contains carbohydrates  This will help prevent another drop in blood sugar  Always carefully follow your healthcare provider's instructions on how to treat low blood sugar levels  Check your feet each day for sores:  Wear shoes and socks that fit correctly  Do not trim your toenails  Ask your healthcare provider for more information about foot care  Follow your meal plan:  A dietitian will help you make a meal plan to keep your blood sugar level steady  Do not skip meals  Your blood sugar level may drop too low if you have taken diabetes medicine and do not eat  · Keep track of carbohydrates (sugar and starchy foods)  Your blood sugar level can get too high if you eat too many carbohydrates  Your dietitian will help you plan meals and snacks that have the right amount of carbohydrates  · Eat low-fat foods , such as skinless chicken and low-fat milk  · Eat less sodium (salt)    Limit high-sodium foods, such as soy sauce, potato chips, and soup  Do not add salt to food you cook  Limit your use of table salt  You should have less than 2,300 mg of sodium per day  · Eat high-fiber foods , such as vegetables, whole grain breads, and beans  · Limit alcohol  Alcohol affects your blood sugar level and can make it harder to manage your diabetes  Limit alcohol to 1 drink a day if you are a woman  Limit alcohol to 2 drinks a day if you are a man  A drink of alcohol is 12 ounces of beer, 5 ounces of wine, or 1½ ounces of liquor  Maintain a healthy weight:  Ask your healthcare provider how much you should weigh  A healthy weight can help you control your diabetes  Ask your provider to help you create a weight loss plan if you are overweight  Together you can set manageable weight loss goals  Exercise as directed:  Exercise can help keep your blood sugar level steady, decrease your risk of heart disease, and help you lose weight  Stretch before and after you exercise  Exercise for at least 150 minutes every week  Spread this amount of exercise over at least 3 days a week  Do not skip exercise more than 2 days in a row  Include muscle strengthening activities 2 to 3 days each week  Older adults should include balance training 2 to 3 times each week  Activities that help increase balance include yoga and keyonna chi  Work with your healthcare provider to create an exercise plan  · Check your blood sugar level before and after exercise  Healthcare providers may tell you to change the amount of insulin you take or food you eat  If your blood sugar level is high, check your blood or urine for ketones before you exercise  Do not exercise if your blood sugar level is high and you have ketones  · If your blood sugar level is less than 100 mg/dL, have a carbohydrate snack before you exercise  Examples are 4 to 6 crackers, ½ banana, 8 ounces (1 cup) of milk, or 4 ounces (½ cup) of juice   Drink water or liquids that do not contain sugar before, during, and after exercise  Ask your dietitian or healthcare provider which liquids you should drink when you exercise  · Do not sit for longer than 30 minutes  If you cannot walk around, at least stand up  This will help you stay active and keep your blood circulating  Do not smoke:  Nicotine and other chemicals in cigarettes and cigars can cause lung damage and make it more difficult to manage your diabetes  Ask your healthcare provider for information if you currently smoke and need help to quit  Do not use e-cigarettes or smokeless tobacco in place of cigarettes or to help you quit  They still contain nicotine  Check your blood pressure as directed:  Ask your healthcare provider what your blood pressure should be  Most adults with diabetes and high blood pressure should have a systolic blood pressure (first number) less than 140  Your diastolic blood pressure (second number) should be less than 90  Wear medical alert identification:  Wear medical alert jewelry or carry a card that says you have diabetes  Ask your healthcare provider where to get these items  Ask about vaccines: You have a higher risk for serious illness if you get the flu, pneumonia, or hepatitis  Ask your healthcare provider if you should get a flu, pneumonia, or hepatitis B vaccine, and when to get the vaccine  Follow up with your healthcare provider as directed: You may need to return to have your A1c checked every 3 months  You will need to return at least once each year to have your feet checked  You will need an eye exam once a year to check for retinopathy  You will also need urine tests every year to check for kidney problems  You may need tests to monitor for heart disease such as an EKG, stress test, blood pressure monitoring, and blood tests  Write down your questions so you remember to ask them during your visits     © 2017 [employer] Information is for End User's use only and may not be sold, redistributed or otherwise used for commercial purposes  All illustrations and images included in CareNotes® are the copyrighted property of [employer]  or [employer]  The above information is an  only  It is not intended as medical advice for individual conditions or treatments  Talk to your doctor, nurse or pharmacist before following any medical regimen to see if it is safe and effective for you

## 2020-01-16 NOTE — PROGRESS NOTES
INTERNAL MEDICINE FOLLOW-UP OFFICE VISIT  [facility] - [facility] [address] [place] [address]    NAME: Madonna Siemens During  AGE: 70 y o  SEX: male  : 1940     DATE: 2020     Assessment and Plan:     1  Type 2 diabetes mellitus with diabetic neuropathy, without long-term current use of insulin ([facility] )    Most recent A1c was 5 7 % on 2019  Diabetes has been well controlled  Check UTD labs and will check eye exam in office     - Microalbumin / creatinine urine ratio  - HEMOGLOBIN A1C W/ EAG ESTIMATION; Future  - metFORMIN (GLUCOPHAGE) 500 mg tablet; Take 1 tablet (500 mg total) by mouth daily with breakfast  Dispense: 90 tablet; Refill: 3  - Basic metabolic panel; Future  - IRIS Diabetic eye exam    2  Hyperlipidemia associated with type 2 diabetes mellitus [facility])    Lab Results   Component Value Date    LDLCALC 44 2019      Continue statin as prescribed  LDL is excellent  - pravastatin (PRAVACHOL) 40 mg tablet; Take 1 tablet (40 mg total) by mouth daily  Dispense: 90 tablet; Refill: 3    3  Pulmonary emphysema, unspecified emphysema type ([facility] )    Stable on medications without evidence of exacerbation  Continues to lightly smoke tobacco through pipe  Cessation was recommended  - fluticasone-salmeterol (ADVAIR, WIXELA) 250-50 mcg/dose inhaler; Inhale 1 puff every 12 (twelve) hours  Dispense: 3 Inhaler; Refill: 3  - tiotropium (SPIRIVA) 18 mcg inhalation capsule; Place 1 capsule (18 mcg total) into inhaler and inhale daily  Dispense: 90 each; Refill: 3    4  Overactive bladder  - oxybutynin (DITROPAN-XL) 5 mg 24 hr tablet; Take 1 tablet (5 mg total) by mouth daily  Dispense: 90 tablet; Refill: 3    Return in about 4 months (around 2020) for Follow-up  Chief Complaint:     Chief Complaint   Patient presents with    Follow-up     4 month and labs- 2019        History of Present Illness:     Patient presents for routine follow-up      Much improved after anterior cervical decompression/fusion after he was experiencing worsening gait, quadriparesis, and spinal cord compression  Neurosurgery released him to be seen as needed  Blood sugars and blood pressure are well controlled at home  Mood has been good  No worsening anxiety/depression    Overactive bladder symptoms controlled on medications  Follows with urology  Review of Systems:     Review of Systems   Constitutional: Negative for activity change, appetite change and fatigue  Respiratory: Negative for apnea, cough, chest tightness, shortness of breath and wheezing  Cardiovascular: Negative for chest pain, palpitations and leg swelling  Gastrointestinal: Negative for abdominal distention, abdominal pain, blood in stool, constipation, diarrhea, nausea and vomiting  Musculoskeletal: Negative for arthralgias, back pain, gait problem, joint swelling and myalgias  Skin: Negative for rash and wound  Neurological: Negative for dizziness, weakness, light-headedness, numbness and headaches  Psychiatric/Behavioral: Negative for behavioral problems, confusion, hallucinations, sleep disturbance and suicidal ideas  The patient is not nervous/anxious         Problem List:     Patient Active Problem List   Diagnosis    Type 2 diabetes mellitus with diabetic neuropathy, without long-term current use of insulin ([facility] )    Hyperlipidemia associated with type 2 diabetes mellitus ([facility] )    Essential hypertension    BPH (benign prostatic hyperplasia)    Degenerative cervical spinal stenosis    Cervical spondylosis    COPD (chronic obstructive pulmonary disease) (HCC)    Adjustment disorder with mixed anxiety and depressed mood    History of prostate cancer    Overactive bladder    Lesion of native kidney    SHIVANI (obstructive sleep apnea)    Vitamin D insufficiency    Pipe smoker    Anxiety    Erectile dysfunction due to diseases classified elsewhere      Objective:     /82 (BP Location: Left arm, Patient Position: Sitting, Cuff Size: Standard)   Pulse 86   Wt 68 9 kg (152 lb) Comment: w/ shoes denied off  SpO2 97%   BMI 22 45 kg/m²     Physical Exam   Constitutional: He is oriented to person, place, and time  He appears well-developed and well-nourished  No distress  Eyes: Conjunctivae are normal  Right eye exhibits no discharge  Left eye exhibits no discharge  No scleral icterus  Neck: Neck supple  No JVD present  No thyromegaly present  Cardiovascular: Normal rate, regular rhythm and normal heart sounds  Pulses are weak pulses  No murmur heard  Pulses:       Dorsalis pedis pulses are 1+ on the right side, and 1+ on the left side  Posterior tibial pulses are 1+ on the right side, and 1+ on the left side  Pulmonary/Chest: Effort normal and breath sounds normal  No respiratory distress  He has no wheezes  He has no rales  He exhibits no tenderness  Abdominal: Soft  Bowel sounds are normal  He exhibits no distension  There is no tenderness  Musculoskeletal: He exhibits no edema  Feet:   Right Foot:   Skin Integrity: Negative for ulcer, skin breakdown, erythema, warmth, callus or dry skin  Left Foot:   Skin Integrity: Negative for ulcer, skin breakdown, erythema, warmth, callus or dry skin  Lymphadenopathy:     He has no cervical adenopathy  Neurological: He is alert and oriented to person, place, and time  Skin: Skin is warm and dry  He is not diaphoretic  Psychiatric: He has a normal mood and affect  His behavior is normal    Vitals reviewed  Diabetic Foot Exam  Patient's shoes and socks removed  Right Foot/Ankle   Right Foot Inspection  Skin Exam: skin normal and skin intact no dry skin, no warmth, no callus, no erythema, no maceration, no abnormal color, no pre-ulcer, no ulcer and no callus                          Toe Exam: ROM and strength within normal limits  Sensory     Proprioception: intact   Monofilament testing: intact  Vascular  Capillary refills: < 3 seconds  The right DP pulse is 1+  The right PT pulse is 1+  Left Foot/Ankle  Left Foot Inspection  Skin Exam: skin normal and skin intactno dry skin, no warmth, no erythema, no maceration, normal color, no pre-ulcer, no ulcer and no callus                         Toe Exam: ROM and strength within normal limits                   Sensory     Proprioception: intact  Monofilament: intact  Vascular  Capillary refills: < 3 seconds  The left DP pulse is 1+  The left PT pulse is 1+  Assign Risk Category:  No deformity present;  No loss of protective sensation; Weak pulses       Risk: 0    Dax Marie, DO  MEDICAL [id number]

## 2020-01-16 NOTE — TELEPHONE ENCOUNTER
----- Message from Kristyn Henderson DO sent at 1/16/2020  4:22 PM EST -----  Labs are stable  Diabetes remains controlled

## 2020-01-17 ENCOUNTER — TELEPHONE (OUTPATIENT)
Dept: INTERNAL MEDICINE CLINIC | Facility: CLINIC | Age: 80
End: 2020-01-17

## 2020-01-17 NOTE — TELEPHONE ENCOUNTER
----- Message from Gavin El DO sent at 1/17/2020  7:12 AM EST -----  Please let patient know there was no evidence of diabetic retinopathy on his eye exam from our office

## 2020-02-05 NOTE — TELEPHONE ENCOUNTER
PT  JOSH   CX  HIS  TCM   PT  SAID  NO  RIDE  WILL  CALL  BACK  WANTS  HE  GETS  SETTLE  IN Stable on current regimen

## 2020-02-12 NOTE — PROGRESS NOTES
Patient ID: Elizabeth Pires During is a 70 y o  male  Assessment/Plan:    Diabetic polyneuropathy   patient clinically stable  -- last hemoglobin A1c well controlled at  6 9  -- continues on gabapentin 300 mg 3 times a day  -- continues on Cymbalta 60 mg daily  -- encouraged good foot hygiene, following with podiatry  --discussed with patient, and encouraged smoking cessation     cervical myelopathy  - patient is status post cervical decompression in June, he continues to do quite well, improvement in his strength as well as his gait  -- patient seen in follow-up by Neurosurgery, this point, will follow-up as needed    No problem-specific Assessment & Plan notes found for this encounter  Diagnoses and all orders for this visit:    Type 2 diabetes mellitus with diabetic neuropathy, without long-term current use of insulin (HCC)    Cervical spondylosis           Subjective:    SHREYA    Love Pascual is a 22-year-old gentleman with history of SHIVANI, HTN, BPH, cervical compression status post decompression surgery, diabetes  Patient presents today for follow-up for neuropathy as well as cervical myelopathy  He was last seen in August      At his last office visit, patient was doing quite well, he was status post decompression surgery from June  He completed home services with OT and PT  He was seen in follow-up by neurosurgery,no further follow up needed  Today, he notes improved strength, sensation, as well as improvement in his gait  He is ambulating independently  He does note some imbalance, no reported interim falls  Patient does note some mild increased cervical discomfort which is primarily at nighttime, seems to be more related to his positioning,  No radicular complaints  No issues with bowel and bladder  He does relate having some increased paresthesias which according to him seems to be related to his smoking  He admits to being under increased stress, hence he has been smoking more    When he does so, his symptoms seem to be more prominent  If he refrains from smoking, his symptoms dissipate  He continues on gabapentin 300 mg 3 times a day as well as Cymbalta  At this time, he does not feel he needs any adjustments in his medication  In the interim, patient seen by his PCP, most recent A1c noted at  6 9  Patient's blood sugar running around   110-140  routine eye exam without evidence of diabetic retinopathy  Continues on statin therapy, LDL is good         labs 01/16/2020; glucose = 76, calcium = 10 5 corrected,  Hemoglobin A1c = 6 9    The following portions of the patient's history were reviewed and updated as appropriate: allergies, current medications, past family history, past medical history, past social history, past surgical history and problem list          Objective:  Current Outpatient Medications   Medication Sig Dispense Refill    aspirin (ECOTRIN LOW STRENGTH) 81 mg EC tablet Take 1 tablet by mouth daily 30 tablet 0    diltiazem (TIAZAC) 240 MG 24 hr capsule Take 1 capsule (240 mg total) by mouth daily 90 capsule 3    DULoxetine (CYMBALTA) 60 mg delayed release capsule Take 1 capsule (60 mg total) by mouth daily 90 capsule 3    fluticasone-salmeterol (ADVAIR, WIXELA) 250-50 mcg/dose inhaler Inhale 1 puff every 12 (twelve) hours 3 Inhaler 3    gabapentin (NEURONTIN) 300 mg capsule take 1 capsule by mouth three times a day 90 capsule 3    glucose blood (ONETOUCH VERIO) test strip Patient to test twice daily (Dx: E11 9) 100 each 3    ipratropium-albuterol (COMBIVENT RESPIMAT) inhaler Inhale 1 puff as needed      losartan (COZAAR) 100 MG tablet Take 1 tablet (100 mg total) by mouth daily 90 tablet 3    metFORMIN (GLUCOPHAGE) 500 mg tablet Take 1 tablet (500 mg total) by mouth daily with breakfast 90 tablet 3    Multiple Vitamin (MULTI-VITAMINS PO) Take by mouth daily      ONETOUCH DELICA LANCETS 21Z MISC TEST 1 TO 2 TIMES DAILY 100 each 3    oxybutynin (DITROPAN-XL) 5 mg 24 hr tablet Take 1 tablet (5 mg total) by mouth daily 90 tablet 3    polyvinyl alcohol (LIQUIFILM TEARS) 1 4 % ophthalmic solution Administer 1 drop to both eyes 4 (four) times a day 15 mL 0    pravastatin (PRAVACHOL) 40 mg tablet Take 1 tablet (40 mg total) by mouth daily 90 tablet 3    tamsulosin (FLOMAX) 0 4 mg Take 1 capsule (0 4 mg total) by mouth daily at bedtime 90 capsule 3    thiamine 50 MG tablet Take 1 tablet (50 mg total) by mouth daily 30 tablet 3    tiotropium (SPIRIVA) 18 mcg inhalation capsule Place 1 capsule (18 mcg total) into inhaler and inhale daily 90 each 3    sildenafil (REVATIO) 20 mg tablet Take 2-5 tablets as needed daily for intercourse (Patient not taking: Reported on 2/13/2020) 60 tablet 6     No current facility-administered medications for this visit  Blood pressure 142/84, pulse 84, height 5' 9" (1 753 m), weight 64 kg (141 lb)  Physical Exam   Constitutional: He appears well-developed  HENT:   Head: Normocephalic  Eyes: Pupils are equal, round, and reactive to light  Cardiovascular: Normal rate and regular rhythm  Pulmonary/Chest: Effort normal    Musculoskeletal: Normal range of motion  He exhibits edema  Mild edema left lower extremity   Neurological: He has normal strength  Coordination normal    Reflex Scores:       Tricep reflexes are 2+ on the right side and 2+ on the left side  Brachioradialis reflexes are 3+ on the right side and 2+ on the left side  Patellar reflexes are 1+ on the right side and 1+ on the left side  Achilles reflexes are 0 on the right side and 0 on the left side  Skin: Skin is warm  Psychiatric: He has a normal mood and affect  His speech is normal and behavior is normal  Judgment and thought content normal        Neurological Exam  Mental Status  Awake, alert and oriented to person, place and time  Speech is normal  Language is fluent with no aphasia      Cranial Nerves  CN III, IV, VI: Extraocular movements intact bilaterally  Pupils equal round and reactive to light bilaterally  CN V: Facial sensation is normal   CN VII: Full and symmetric facial movement  CN XI: Shoulder shrug strength is normal     Motor  Decreased muscle bulk throughout  Mild  Atrophy noted intrinsics of his hand, more prominent on the right  Normal muscle tone  Strength is 5/5 throughout all four extremities  Sensory  Temperature abnormality:  Distal temperature loss bilateral lower extremities to lower calf  Vibration abnormality:  Absent vibratory perception great toes bilaterally, 10 seconds maximal strike left malleolus, 8 seconds right malleolus,  Intact upper extremities  Reflexes                                           Right                      Left  Brachioradialis                    3+                         2+  Triceps                                2+                         2+  Patellar                                1+                         1+  Achilles                                0                         0    Coordination  Finger-to-nose, rapid alternating movements and heel-to-shin normal bilaterally without dysmetria  Gait  Casual gait: Wide stance  Normal stride length  Romberg is present  Unable to rise from chair without using arms  Patient ambulated independently,  Mild imbalance noted  ROS: ROS reviewed personally updated with patient today's visit    Review of Systems  Constitutional: Negative  Negative for appetite change and fever  HENT: Negative  Negative for hearing loss, tinnitus, trouble swallowing and voice change  Eyes: Negative  Negative for photophobia and pain  Respiratory: Negative  Negative for shortness of breath  Cardiovascular: Negative  Negative for palpitations  Gastrointestinal: Negative  Negative for constipation, diarrhea, nausea and vomiting  Endocrine: Negative  Negative for cold intolerance and heat intolerance  Genitourinary: Negative   Negative for dysuria, frequency and urgency  Musculoskeletal: Positive for gait problem and neck pain  Negative for back pain and myalgias  Skin: Negative  Negative for rash  Neurological: Positive for numbness and headaches (less severe)  Negative for dizziness, tremors, seizures, syncope, facial asymmetry, speech difficulty, weakness and light-headedness  Hematological: Negative  Does not bruise/bleed easily  Psychiatric/Behavioral: Negative  Negative for confusion, hallucinations and sleep disturbance

## 2020-02-13 ENCOUNTER — OFFICE VISIT (OUTPATIENT)
Dept: NEUROLOGY | Facility: CLINIC | Age: 80
End: 2020-02-13
Payer: MEDICARE

## 2020-02-13 VITALS
BODY MASS INDEX: 20.88 KG/M2 | HEIGHT: 69 IN | DIASTOLIC BLOOD PRESSURE: 84 MMHG | SYSTOLIC BLOOD PRESSURE: 142 MMHG | HEART RATE: 84 BPM | WEIGHT: 141 LBS

## 2020-02-13 DIAGNOSIS — M47.812 CERVICAL SPONDYLOSIS: ICD-10-CM

## 2020-02-13 DIAGNOSIS — E11.40 TYPE 2 DIABETES MELLITUS WITH DIABETIC NEUROPATHY, WITHOUT LONG-TERM CURRENT USE OF INSULIN (HCC): Primary | Chronic | ICD-10-CM

## 2020-02-13 PROCEDURE — 2022F DILAT RTA XM EVC RTNOPTHY: CPT | Performed by: NURSE PRACTITIONER

## 2020-02-13 PROCEDURE — 3079F DIAST BP 80-89 MM HG: CPT | Performed by: NURSE PRACTITIONER

## 2020-02-13 PROCEDURE — 1160F RVW MEDS BY RX/DR IN RCRD: CPT | Performed by: NURSE PRACTITIONER

## 2020-02-13 PROCEDURE — 3077F SYST BP >= 140 MM HG: CPT | Performed by: NURSE PRACTITIONER

## 2020-02-13 PROCEDURE — 4040F PNEUMOC VAC/ADMIN/RCVD: CPT | Performed by: NURSE PRACTITIONER

## 2020-02-13 PROCEDURE — 4004F PT TOBACCO SCREEN RCVD TLK: CPT | Performed by: NURSE PRACTITIONER

## 2020-02-13 PROCEDURE — 99214 OFFICE O/P EST MOD 30 MIN: CPT | Performed by: NURSE PRACTITIONER

## 2020-02-13 PROCEDURE — 3008F BODY MASS INDEX DOCD: CPT | Performed by: NURSE PRACTITIONER

## 2020-02-13 PROCEDURE — 3044F HG A1C LEVEL LT 7.0%: CPT | Performed by: NURSE PRACTITIONER

## 2020-02-13 PROCEDURE — 3060F POS MICROALBUMINURIA REV: CPT | Performed by: NURSE PRACTITIONER

## 2020-02-13 NOTE — PATIENT INSTRUCTIONS
Patient clinically stable  Continue on gabapentin 300 mg 1 tablet 3 times a day  Continue on Cymbalta 60 mg a day  Following with podiatrist and Ophthalmology  Patient encouraged to stop smoking  Encouraged to have good foot hygiene  Patient to call with any increased discomfort

## 2020-02-13 NOTE — PROGRESS NOTES
Patient ID: Kenyetta Green During is a 70 y o  male  Assessment/Plan:    No problem-specific Assessment & Plan notes found for this encounter  {Assess/PlanSmartLinks:28210}       Subjective:    HPI    {[facility] HPI texts:98545}    {Common ambulatory SmartLinks:14162}         Objective:    Blood pressure 142/84, pulse 84, height 5' 9" (1 753 m), weight 64 kg (141 lb)  Physical Exam    Neurological Exam      ROS:    Review of Systems   Constitutional: Negative  Negative for appetite change and fever  HENT: Negative  Negative for hearing loss, tinnitus, trouble swallowing and voice change  Eyes: Negative  Negative for photophobia and pain  Respiratory: Negative  Negative for shortness of breath  Cardiovascular: Negative  Negative for palpitations  Gastrointestinal: Negative  Negative for constipation, diarrhea, nausea and vomiting  Endocrine: Negative  Negative for cold intolerance and heat intolerance  Genitourinary: Negative  Negative for dysuria, frequency and urgency  Musculoskeletal: Positive for gait problem and neck pain  Negative for back pain and myalgias  Skin: Negative  Negative for rash  Neurological: Positive for numbness and headaches (less severe)  Negative for dizziness, tremors, seizures, syncope, facial asymmetry, speech difficulty, weakness and light-headedness  Hematological: Negative  Does not bruise/bleed easily  Psychiatric/Behavioral: Negative  Negative for confusion, hallucinations and sleep disturbance

## 2020-03-17 ENCOUNTER — TELEPHONE (OUTPATIENT)
Dept: INTERNAL MEDICINE CLINIC | Facility: CLINIC | Age: 80
End: 2020-03-17

## 2020-04-10 ENCOUNTER — TELEPHONE (OUTPATIENT)
Dept: UROLOGY | Facility: CLINIC | Age: 80
End: 2020-04-10

## 2020-04-16 DIAGNOSIS — E11.40 TYPE 2 DIABETES MELLITUS WITH DIABETIC NEUROPATHY, WITHOUT LONG-TERM CURRENT USE OF INSULIN (HCC): Chronic | ICD-10-CM

## 2020-04-16 RX ORDER — GABAPENTIN 300 MG/1
CAPSULE ORAL
Qty: 90 CAPSULE | Refills: 3 | Status: SHIPPED | OUTPATIENT
Start: 2020-04-16 | End: 2020-04-20 | Stop reason: SDUPTHER

## 2020-04-20 ENCOUNTER — OFFICE VISIT (OUTPATIENT)
Dept: NEUROLOGY | Facility: CLINIC | Age: 80
End: 2020-04-20
Payer: MEDICARE

## 2020-04-20 VITALS
DIASTOLIC BLOOD PRESSURE: 92 MMHG | HEIGHT: 69 IN | BODY MASS INDEX: 22.66 KG/M2 | SYSTOLIC BLOOD PRESSURE: 164 MMHG | HEART RATE: 74 BPM | WEIGHT: 153 LBS

## 2020-04-20 DIAGNOSIS — M48.02 DEGENERATIVE CERVICAL SPINAL STENOSIS: Primary | ICD-10-CM

## 2020-04-20 DIAGNOSIS — E11.40 TYPE 2 DIABETES MELLITUS WITH DIABETIC NEUROPATHY, WITHOUT LONG-TERM CURRENT USE OF INSULIN (HCC): Chronic | ICD-10-CM

## 2020-04-20 PROCEDURE — 4040F PNEUMOC VAC/ADMIN/RCVD: CPT | Performed by: PSYCHIATRY & NEUROLOGY

## 2020-04-20 PROCEDURE — 3060F POS MICROALBUMINURIA REV: CPT | Performed by: PSYCHIATRY & NEUROLOGY

## 2020-04-20 PROCEDURE — 2022F DILAT RTA XM EVC RTNOPTHY: CPT | Performed by: PSYCHIATRY & NEUROLOGY

## 2020-04-20 PROCEDURE — 3080F DIAST BP >= 90 MM HG: CPT | Performed by: PSYCHIATRY & NEUROLOGY

## 2020-04-20 PROCEDURE — 99214 OFFICE O/P EST MOD 30 MIN: CPT | Performed by: PSYCHIATRY & NEUROLOGY

## 2020-04-20 PROCEDURE — 1160F RVW MEDS BY RX/DR IN RCRD: CPT | Performed by: PSYCHIATRY & NEUROLOGY

## 2020-04-20 PROCEDURE — 3077F SYST BP >= 140 MM HG: CPT | Performed by: PSYCHIATRY & NEUROLOGY

## 2020-04-20 PROCEDURE — 3044F HG A1C LEVEL LT 7.0%: CPT | Performed by: PSYCHIATRY & NEUROLOGY

## 2020-04-20 PROCEDURE — 4004F PT TOBACCO SCREEN RCVD TLK: CPT | Performed by: PSYCHIATRY & NEUROLOGY

## 2020-04-20 PROCEDURE — 3008F BODY MASS INDEX DOCD: CPT | Performed by: PSYCHIATRY & NEUROLOGY

## 2020-04-20 RX ORDER — DULOXETIN HYDROCHLORIDE 60 MG/1
60 CAPSULE, DELAYED RELEASE ORAL DAILY
Qty: 90 CAPSULE | Refills: 3 | Status: SHIPPED | OUTPATIENT
Start: 2020-04-20 | End: 2020-05-21 | Stop reason: SDUPTHER

## 2020-04-20 RX ORDER — GABAPENTIN 300 MG/1
300 CAPSULE ORAL 3 TIMES DAILY
Qty: 90 CAPSULE | Refills: 3 | Status: SHIPPED | OUTPATIENT
Start: 2020-04-20 | End: 2020-10-09 | Stop reason: SDUPTHER

## 2020-05-20 ENCOUNTER — TELEPHONE (OUTPATIENT)
Dept: INTERNAL MEDICINE CLINIC | Facility: CLINIC | Age: 80
End: 2020-05-20

## 2020-05-20 ENCOUNTER — TELEPHONE (OUTPATIENT)
Dept: PULMONOLOGY | Facility: CLINIC | Age: 80
End: 2020-05-20

## 2020-05-21 ENCOUNTER — TELEPHONE (OUTPATIENT)
Dept: INTERNAL MEDICINE CLINIC | Facility: CLINIC | Age: 80
End: 2020-05-21

## 2020-05-21 ENCOUNTER — APPOINTMENT (OUTPATIENT)
Dept: LAB | Facility: CLINIC | Age: 80
End: 2020-05-21
Payer: MEDICARE

## 2020-05-21 ENCOUNTER — OFFICE VISIT (OUTPATIENT)
Dept: INTERNAL MEDICINE CLINIC | Facility: CLINIC | Age: 80
End: 2020-05-21
Payer: MEDICARE

## 2020-05-21 VITALS
SYSTOLIC BLOOD PRESSURE: 112 MMHG | OXYGEN SATURATION: 95 % | BODY MASS INDEX: 22.62 KG/M2 | WEIGHT: 153.2 LBS | TEMPERATURE: 98.3 F | DIASTOLIC BLOOD PRESSURE: 62 MMHG | HEART RATE: 84 BPM

## 2020-05-21 DIAGNOSIS — E11.40 TYPE 2 DIABETES MELLITUS WITH DIABETIC NEUROPATHY, WITHOUT LONG-TERM CURRENT USE OF INSULIN (HCC): Chronic | ICD-10-CM

## 2020-05-21 DIAGNOSIS — I10 ESSENTIAL HYPERTENSION: Chronic | ICD-10-CM

## 2020-05-21 DIAGNOSIS — E11.40 TYPE 2 DIABETES MELLITUS WITH DIABETIC NEUROPATHY, WITHOUT LONG-TERM CURRENT USE OF INSULIN (HCC): Primary | Chronic | ICD-10-CM

## 2020-05-21 DIAGNOSIS — E11.69 HYPERLIPIDEMIA ASSOCIATED WITH TYPE 2 DIABETES MELLITUS (HCC): Chronic | ICD-10-CM

## 2020-05-21 DIAGNOSIS — E78.5 HYPERLIPIDEMIA ASSOCIATED WITH TYPE 2 DIABETES MELLITUS (HCC): Chronic | ICD-10-CM

## 2020-05-21 LAB
ALBUMIN SERPL BCP-MCNC: 3.8 G/DL (ref 3.5–5)
ALP SERPL-CCNC: 84 U/L (ref 46–116)
ALT SERPL W P-5'-P-CCNC: 28 U/L (ref 12–78)
ANION GAP SERPL CALCULATED.3IONS-SCNC: 3 MMOL/L (ref 4–13)
AST SERPL W P-5'-P-CCNC: 10 U/L (ref 5–45)
BILIRUB SERPL-MCNC: 0.49 MG/DL (ref 0.2–1)
BUN SERPL-MCNC: 16 MG/DL (ref 5–25)
CALCIUM ALBUM COR SERPL-MCNC: 10.4 MG/DL (ref 8.3–10.1)
CALCIUM SERPL-MCNC: 10.2 MG/DL (ref 8.3–10.1)
CHLORIDE SERPL-SCNC: 111 MMOL/L (ref 100–108)
CO2 SERPL-SCNC: 29 MMOL/L (ref 21–32)
CREAT SERPL-MCNC: 0.95 MG/DL (ref 0.6–1.3)
EST. AVERAGE GLUCOSE BLD GHB EST-MCNC: 143 MG/DL
GFR SERPL CREATININE-BSD FRML MDRD: 89 ML/MIN/1.73SQ M
GLUCOSE P FAST SERPL-MCNC: 146 MG/DL (ref 65–99)
HBA1C MFR BLD: 6.6 %
POTASSIUM SERPL-SCNC: 4 MMOL/L (ref 3.5–5.3)
PROT SERPL-MCNC: 6.4 G/DL (ref 6.4–8.2)
SODIUM SERPL-SCNC: 143 MMOL/L (ref 136–145)

## 2020-05-21 PROCEDURE — 3074F SYST BP LT 130 MM HG: CPT | Performed by: INTERNAL MEDICINE

## 2020-05-21 PROCEDURE — 99214 OFFICE O/P EST MOD 30 MIN: CPT | Performed by: INTERNAL MEDICINE

## 2020-05-21 PROCEDURE — 4040F PNEUMOC VAC/ADMIN/RCVD: CPT | Performed by: INTERNAL MEDICINE

## 2020-05-21 PROCEDURE — 2022F DILAT RTA XM EVC RTNOPTHY: CPT | Performed by: INTERNAL MEDICINE

## 2020-05-21 PROCEDURE — 4004F PT TOBACCO SCREEN RCVD TLK: CPT | Performed by: INTERNAL MEDICINE

## 2020-05-21 PROCEDURE — 1160F RVW MEDS BY RX/DR IN RCRD: CPT | Performed by: INTERNAL MEDICINE

## 2020-05-21 PROCEDURE — 3078F DIAST BP <80 MM HG: CPT | Performed by: INTERNAL MEDICINE

## 2020-05-21 PROCEDURE — 3060F POS MICROALBUMINURIA REV: CPT | Performed by: INTERNAL MEDICINE

## 2020-05-21 PROCEDURE — 80053 COMPREHEN METABOLIC PANEL: CPT

## 2020-05-21 PROCEDURE — 3044F HG A1C LEVEL LT 7.0%: CPT | Performed by: INTERNAL MEDICINE

## 2020-05-21 PROCEDURE — 36415 COLL VENOUS BLD VENIPUNCTURE: CPT

## 2020-05-21 PROCEDURE — 83036 HEMOGLOBIN GLYCOSYLATED A1C: CPT

## 2020-05-21 RX ORDER — DULOXETIN HYDROCHLORIDE 60 MG/1
60 CAPSULE, DELAYED RELEASE ORAL DAILY
Qty: 90 CAPSULE | Refills: 3 | Status: SHIPPED | OUTPATIENT
Start: 2020-05-21 | End: 2021-06-10

## 2020-05-28 ENCOUNTER — TELEPHONE (OUTPATIENT)
Dept: PULMONOLOGY | Facility: CLINIC | Age: 80
End: 2020-05-28

## 2020-05-29 ENCOUNTER — OFFICE VISIT (OUTPATIENT)
Dept: PULMONOLOGY | Facility: CLINIC | Age: 80
End: 2020-05-29
Payer: MEDICARE

## 2020-05-29 ENCOUNTER — TELEPHONE (OUTPATIENT)
Dept: INTERNAL MEDICINE CLINIC | Facility: CLINIC | Age: 80
End: 2020-05-29

## 2020-05-29 VITALS
HEIGHT: 69 IN | OXYGEN SATURATION: 94 % | WEIGHT: 152 LBS | HEART RATE: 95 BPM | SYSTOLIC BLOOD PRESSURE: 144 MMHG | DIASTOLIC BLOOD PRESSURE: 94 MMHG | BODY MASS INDEX: 22.51 KG/M2 | TEMPERATURE: 97.8 F

## 2020-05-29 DIAGNOSIS — Z85.118 HISTORY OF LUNG CANCER: ICD-10-CM

## 2020-05-29 DIAGNOSIS — N28.9 LESION OF RIGHT NATIVE KIDNEY: Primary | ICD-10-CM

## 2020-05-29 DIAGNOSIS — R91.8 PULMONARY NODULES: ICD-10-CM

## 2020-05-29 DIAGNOSIS — J43.2 CENTRILOBULAR EMPHYSEMA (HCC): Primary | ICD-10-CM

## 2020-05-29 DIAGNOSIS — G47.33 OSA (OBSTRUCTIVE SLEEP APNEA): ICD-10-CM

## 2020-05-29 DIAGNOSIS — F17.290 OTHER TOBACCO PRODUCT NICOTINE DEPENDENCE, UNCOMPLICATED: ICD-10-CM

## 2020-05-29 PROCEDURE — 99214 OFFICE O/P EST MOD 30 MIN: CPT | Performed by: PHYSICIAN ASSISTANT

## 2020-05-29 PROCEDURE — 4040F PNEUMOC VAC/ADMIN/RCVD: CPT | Performed by: PHYSICIAN ASSISTANT

## 2020-05-29 PROCEDURE — 2022F DILAT RTA XM EVC RTNOPTHY: CPT | Performed by: PHYSICIAN ASSISTANT

## 2020-05-29 PROCEDURE — 4004F PT TOBACCO SCREEN RCVD TLK: CPT | Performed by: PHYSICIAN ASSISTANT

## 2020-05-29 PROCEDURE — 3077F SYST BP >= 140 MM HG: CPT | Performed by: PHYSICIAN ASSISTANT

## 2020-05-29 PROCEDURE — 3060F POS MICROALBUMINURIA REV: CPT | Performed by: PHYSICIAN ASSISTANT

## 2020-05-29 PROCEDURE — 1160F RVW MEDS BY RX/DR IN RCRD: CPT | Performed by: PHYSICIAN ASSISTANT

## 2020-05-29 PROCEDURE — 3008F BODY MASS INDEX DOCD: CPT | Performed by: PHYSICIAN ASSISTANT

## 2020-05-29 PROCEDURE — 3080F DIAST BP >= 90 MM HG: CPT | Performed by: PHYSICIAN ASSISTANT

## 2020-05-29 PROCEDURE — 3044F HG A1C LEVEL LT 7.0%: CPT | Performed by: PHYSICIAN ASSISTANT

## 2020-05-29 RX ORDER — NICOTINE 21 MG/24HR
1 PATCH, TRANSDERMAL 24 HOURS TRANSDERMAL EVERY 24 HOURS
Qty: 28 PATCH | Refills: 1 | Status: SHIPPED | OUTPATIENT
Start: 2020-05-29 | End: 2020-10-09 | Stop reason: SDDI

## 2020-06-10 ENCOUNTER — HOSPITAL ENCOUNTER (OUTPATIENT)
Dept: SLEEP CENTER | Facility: CLINIC | Age: 80
Discharge: HOME | End: 2020-06-10
Payer: MEDICARE

## 2020-06-10 DIAGNOSIS — G47.33 OSA (OBSTRUCTIVE SLEEP APNEA): ICD-10-CM

## 2020-06-10 PROCEDURE — 95810 POLYSOM 6/> YRS 4/> PARAM: CPT | Performed by: INTERNAL MEDICINE

## 2020-06-10 PROCEDURE — 95810 POLYSOM 6/> YRS 4/> PARAM: CPT

## 2020-06-11 DIAGNOSIS — G47.33 OSA (OBSTRUCTIVE SLEEP APNEA): Primary | ICD-10-CM

## 2020-06-12 ENCOUNTER — TELEPHONE (OUTPATIENT)
Dept: PULMONOLOGY | Facility: CLINIC | Age: 80
End: 2020-06-12

## 2020-06-18 ENCOUNTER — HOSPITAL ENCOUNTER (OUTPATIENT)
Dept: CT IMAGING | Facility: HOSPITAL | Age: 80
Discharge: HOME | End: 2020-06-18
Payer: MEDICARE

## 2020-06-18 ENCOUNTER — HOSPITAL ENCOUNTER (OUTPATIENT)
Dept: MRI IMAGING | Facility: HOSPITAL | Age: 80
Discharge: HOME | End: 2020-06-18
Attending: INTERNAL MEDICINE
Payer: MEDICARE

## 2020-06-18 DIAGNOSIS — N28.9 LESION OF RIGHT NATIVE KIDNEY: ICD-10-CM

## 2020-06-18 DIAGNOSIS — R91.8 PULMONARY NODULES: ICD-10-CM

## 2020-06-18 PROCEDURE — A9585 GADOBUTROL INJECTION: HCPCS | Performed by: INTERNAL MEDICINE

## 2020-06-18 PROCEDURE — 71250 CT THORAX DX C-: CPT

## 2020-06-18 PROCEDURE — 74183 MRI ABD W/O CNTR FLWD CNTR: CPT

## 2020-06-18 RX ADMIN — GADOBUTROL 6 ML: 604.72 INJECTION INTRAVENOUS at 16:43

## 2020-07-13 DIAGNOSIS — I10 ESSENTIAL HYPERTENSION: ICD-10-CM

## 2020-07-13 RX ORDER — LOSARTAN POTASSIUM 100 MG/1
TABLET ORAL
Qty: 90 TABLET | Refills: 3 | Status: SHIPPED | OUTPATIENT
Start: 2020-07-13 | End: 2021-08-21

## 2020-08-13 ENCOUNTER — OFFICE VISIT (OUTPATIENT)
Dept: UROLOGY | Facility: CLINIC | Age: 80
End: 2020-08-13
Payer: MEDICARE

## 2020-08-13 VITALS
BODY MASS INDEX: 22.36 KG/M2 | HEIGHT: 69 IN | WEIGHT: 151 LBS | SYSTOLIC BLOOD PRESSURE: 130 MMHG | HEART RATE: 82 BPM | DIASTOLIC BLOOD PRESSURE: 86 MMHG | TEMPERATURE: 98.1 F

## 2020-08-13 DIAGNOSIS — C61 PROSTATE CANCER (HCC): Primary | ICD-10-CM

## 2020-08-13 LAB
POST-VOID RESIDUAL VOLUME, ML POC: 72 ML
SL AMB  POCT GLUCOSE, UA: NORMAL
SL AMB LEUKOCYTE ESTERASE,UA: NORMAL
SL AMB POCT BILIRUBIN,UA: NORMAL
SL AMB POCT BLOOD,UA: NORMAL
SL AMB POCT CLARITY,UA: CLEAR
SL AMB POCT COLOR,UA: YELLOW
SL AMB POCT KETONES,UA: NORMAL
SL AMB POCT NITRITE,UA: POSITIVE
SL AMB POCT PH,UA: 8.5
SL AMB POCT SPECIFIC GRAVITY,UA: 1
SL AMB POCT URINE PROTEIN: NORMAL
SL AMB POCT UROBILINOGEN: 0.2

## 2020-08-13 PROCEDURE — 2022F DILAT RTA XM EVC RTNOPTHY: CPT | Performed by: PHYSICIAN ASSISTANT

## 2020-08-13 PROCEDURE — 3044F HG A1C LEVEL LT 7.0%: CPT | Performed by: PHYSICIAN ASSISTANT

## 2020-08-13 PROCEDURE — 87086 URINE CULTURE/COLONY COUNT: CPT | Performed by: PHYSICIAN ASSISTANT

## 2020-08-13 PROCEDURE — 4040F PNEUMOC VAC/ADMIN/RCVD: CPT | Performed by: PHYSICIAN ASSISTANT

## 2020-08-13 PROCEDURE — 99213 OFFICE O/P EST LOW 20 MIN: CPT | Performed by: PHYSICIAN ASSISTANT

## 2020-08-13 PROCEDURE — 3008F BODY MASS INDEX DOCD: CPT | Performed by: PHYSICIAN ASSISTANT

## 2020-08-13 PROCEDURE — 87186 SC STD MICRODIL/AGAR DIL: CPT | Performed by: PHYSICIAN ASSISTANT

## 2020-08-13 PROCEDURE — 1160F RVW MEDS BY RX/DR IN RCRD: CPT | Performed by: PHYSICIAN ASSISTANT

## 2020-08-13 PROCEDURE — 4004F PT TOBACCO SCREEN RCVD TLK: CPT | Performed by: PHYSICIAN ASSISTANT

## 2020-08-13 PROCEDURE — 81002 URINALYSIS NONAUTO W/O SCOPE: CPT | Performed by: PHYSICIAN ASSISTANT

## 2020-08-13 PROCEDURE — 51798 US URINE CAPACITY MEASURE: CPT | Performed by: PHYSICIAN ASSISTANT

## 2020-08-13 PROCEDURE — 3075F SYST BP GE 130 - 139MM HG: CPT | Performed by: PHYSICIAN ASSISTANT

## 2020-08-13 PROCEDURE — 3060F POS MICROALBUMINURIA REV: CPT | Performed by: PHYSICIAN ASSISTANT

## 2020-08-13 PROCEDURE — 87147 CULTURE TYPE IMMUNOLOGIC: CPT | Performed by: PHYSICIAN ASSISTANT

## 2020-08-13 PROCEDURE — 3079F DIAST BP 80-89 MM HG: CPT | Performed by: PHYSICIAN ASSISTANT

## 2020-08-13 NOTE — PROGRESS NOTES
1  Prostate cancer ([facility] [address] )  POCT urine dip    POCT Measure PVR    PSA Total, Diagnostic    Urine culture       Assessment and plan:     1  Prostate cancer status post brachytherapy (2013) - managed by Dr Brandyn Sandoval  - PSA is undetectable  - follow-up 1 year PSA prior to visit for continued surveillance    2  Lower urinary tract symptoms  - status post normal cystoscopy 2019  - continue tamsulosin and oxybutynin  - demonstrating adequate bladder emptying in the office today  - urine concerning for evidence of infection  Will be submitted for culture  He will be contacted if antibiotics are indicated  3  Erectile dysfunction  - continue sildenafil        Ivy Manuel PA-C      Chief Complaint     Prostate cancer, lower urinary tract symptoms, erectile dysfunction    History of Present Illness     Zuly Chandler is a 68 y o  male patient of Dr Brandyn Sandoval s/p brachytherapy for prostate cancer in 2013  Patient's last PSA was <0 1 (9/16/2020)  He has been treated previously with anticholinergic medications and with myrbetriq and these did not work  Patient underwent cystoscopy in October 2019 which was negative for any structural lesion, however did show some 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 No   Incomplete emptying? No   Urinary frequency? Yes   Urinary urgency? No   Urinary hesitancy? Yes [sometimes]   Dysuria (painful difficult urination)?   No Nocturia (waking up to use the bathroom)? No   Straining (having to push to go)? Yes [sometimes]   Weak stream?  Yes   Intermittent stream?  Yes [sometimes]   Post void dribbling? Yes [sometimes]            Review of Systems     Review of Systems   Constitutional: Negative  HENT: Negative  Eyes: Negative  Respiratory: Negative  Cardiovascular: Negative  Gastrointestinal: Negative  Endocrine: Negative  Genitourinary: Positive for frequency and urgency  Musculoskeletal: Negative  Skin: Negative  Allergic/Immunologic: Negative  Neurological: Negative  Hematological: Negative  Psychiatric/Behavioral: Negative  Allergies     No Known Allergies    Physical Exam     Physical Exam  Vitals signs and nursing note reviewed  Constitutional:       General: He is not in acute distress  Appearance: He is well-developed  He is not diaphoretic  HENT:      Head: Normocephalic and atraumatic  Eyes:      General:         Right eye: No discharge  Left eye: No discharge  Pupils: Pupils are equal, round, and reactive to light  Neck:      Trachea: No tracheal deviation  Pulmonary:      Effort: Pulmonary effort is normal  No respiratory distress  Breath sounds: No stridor  Abdominal:      General: There is no distension  Palpations: Abdomen is soft  There is no mass  Tenderness: There is no abdominal tenderness  There is no guarding or rebound  Hernia: No hernia is present  Genitourinary:     Penis: Normal     Musculoskeletal:         General: No tenderness or deformity  Skin:     General: Skin is warm and dry  Coloration: Skin is not pale  Findings: No erythema or rash  Neurological:      Mental Status: He is alert and oriented to person, place, and time  Cranial Nerves: No cranial nerve deficit        Coordination: Coordination normal    Psychiatric:         Behavior: Behavior normal          Thought Content: Thought content normal          Judgment: Judgment normal              Vital Signs  Vitals:    08/13/20 1338   BP: 130/86   BP Location: Left arm   Patient Position: Sitting   Cuff Size: Standard   Pulse: 82   Temp: 98 1 °F (36 7 °C)   Weight: 68 5 kg (151 lb)   Height: 5' 9" (1 753 m)         Current Medications       Current Outpatient Medications:     aspirin (ECOTRIN LOW STRENGTH) 81 mg EC tablet, Take 1 tablet by mouth daily, Disp: 30 tablet, Rfl: 0    diltiazem (TIAZAC) 240 MG 24 hr capsule, Take 1 capsule (240 mg total) by mouth daily, Disp: 90 capsule, Rfl: 3    DULoxetine (CYMBALTA) 60 mg delayed release capsule, Take 1 capsule (60 mg total) by mouth daily, Disp: 90 capsule, Rfl: 3    fluticasone-umeclidinium-vilanterol (TRELEGY) 100-62 5-25 MCG/INH inhaler, Inhale 1 puff daily Rinse mouth after use , Disp: 3 Inhaler, Rfl: 3    gabapentin (NEURONTIN) 300 mg capsule, Take 1 capsule (300 mg total) by mouth 3 (three) times a day, Disp: 90 capsule, Rfl: 3    glucose blood (ONETOUCH VERIO) test strip, Patient to test twice daily (Dx: E11 9), Disp: 100 each, Rfl: 3    losartan (COZAAR) 100 MG tablet, TAKE 1 TABLET DAILY, Disp: 90 tablet, Rfl: 3    metFORMIN (GLUCOPHAGE) 500 mg tablet, Take 1 tablet (500 mg total) by mouth daily with breakfast, Disp: 90 tablet, Rfl: 3    Multiple Vitamin (MULTI-VITAMINS PO), Take by mouth daily, Disp: , Rfl:     nicotine (NICODERM CQ) 21 mg/24 hr TD 24 hr patch, Place 1 patch on the skin every 24 hours, Disp: 28 patch, Rfl: 1    ONETOUCH DELICA LANCETS 38W MISC, TEST 1 TO 2 TIMES DAILY, Disp: 100 each, Rfl: 3    oxybutynin (DITROPAN-XL) 5 mg 24 hr tablet, Take 1 tablet (5 mg total) by mouth daily, Disp: 90 tablet, Rfl: 3    polyvinyl alcohol (LIQUIFILM TEARS) 1 4 % ophthalmic solution, Administer 1 drop to both eyes 4 (four) times a day, Disp: 15 mL, Rfl: 0    pravastatin (PRAVACHOL) 40 mg tablet, Take 1 tablet (40 mg total) by mouth daily, Disp: 90 tablet, Rfl: 3   sildenafil (REVATIO) 20 mg tablet, Take 2-5 tablets as needed daily for intercourse, Disp: 60 tablet, Rfl: 6    tamsulosin (FLOMAX) 0 4 mg, Take 1 capsule (0 4 mg total) by mouth daily at bedtime, Disp: 90 capsule, Rfl: 3    thiamine 50 MG tablet, Take 1 tablet (50 mg total) by mouth daily, Disp: 30 tablet, Rfl: 3      Active Problems     Patient Active Problem List   Diagnosis    Type 2 diabetes mellitus with diabetic neuropathy, without long-term current use of insulin ([facility])    Hyperlipidemia associated with type 2 diabetes mellitus [facility]    Essential hypertension    BPH (benign prostatic hyperplasia)    Degenerative cervical spinal stenosis    Cervical spondylosis    COPD (chronic obstructive pulmonary disease) [facility]    Adjustment disorder with mixed anxiety and depressed mood    History of prostate cancer    Overactive bladder    Lesion of native kidney    SHIVANI (obstructive sleep apnea)    Vitamin D insufficiency    Pipe smoker    Anxiety    Erectile dysfunction due to diseases classified elsewhere         Past Medical History     Past Medical History:   Diagnosis Date    BPH (benign prostatic hyperplasia)     [facility]  ) 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    Cervical myelopathy ([place] [address] ) 6/20/2019    Chemical exposure     9/11/01- worked in [employer]       COPD (chronic obstructive pulmonary disease) ([facility])     CPAP (continuous positive airway pressure) dependence     DDD (degenerative disc disease), cervical     Diabetes mellitus [facility]     Foraminal stenosis of cervical region     Hyperlipidemia     Hypertension     SHIVANI (obstructive sleep apnea)     Overactive bladder     Spinal cord compression ([facility] ) 5/8/2019    Added automatically from request for surgery [id number]         Surgical History     Past Surgical History:   Procedure Laterality Date    ARTHROSCOPY KNEE Bilateral     COLONOSCOPY      LUNG SURGERY Left     scraping of left    NM ARTHRODESIS ANT INTERBODY INC DISCECTOMY, CERVICAL BELOW C2 Bilateral 2019    Procedure: C3/4  ACDF WITH  C4 HEMICORPECTOMY, C3-4 ANTERIOR INSTRUMENTATION AND FUSION (NEUROMONITORING);   Surgeon: Denzel Rob MD;  [facility];  Service: Neurosurgery    ROTATOR CUFF REPAIR Bilateral          Family History     Family History   Problem Relation Age of Onset    No Known Problems Mother     No Known Problems Father          Social History     Social History     Social History     Tobacco Use   Smoking Status Current Every Day Smoker    Packs/day: 0 01    Years: 61 00    Pack years: 0 61    Types: Pipe    Last attempt to quit: 2020    Years since quittin 3   Smokeless Tobacco Current User   Tobacco Comment    5-6 pipes a day         Pertinent Lab Values     Lab Results   Component Value Date    CREATININE 0 95 2020       Lab Results   Component Value Date    PSA <0 1 2019             Pertinent Imaging      no imaging for my review

## 2020-08-17 ENCOUNTER — TELEPHONE (OUTPATIENT)
Dept: UROLOGY | Facility: CLINIC | Age: 80
End: 2020-08-17

## 2020-08-17 DIAGNOSIS — N30.00 ACUTE CYSTITIS WITHOUT HEMATURIA: Primary | ICD-10-CM

## 2020-08-17 LAB — BACTERIA UR CULT: ABNORMAL

## 2020-08-17 RX ORDER — AMOXICILLIN AND CLAVULANATE POTASSIUM 500; 125 MG/1; MG/1
1 TABLET, FILM COATED ORAL EVERY 12 HOURS SCHEDULED
Qty: 10 TABLET | Refills: 0 | Status: SHIPPED | OUTPATIENT
Start: 2020-08-17 | End: 2020-08-22

## 2020-08-17 NOTE — TELEPHONE ENCOUNTER
----- Message from Ronny Granda PA-C sent at 8/17/2020 10:36 AM EDT -----  Culture with growth - UA leukocyte and nrite positive  Will send augmentin x5days to pharmacy  Please make the patient aware  Thank you

## 2020-08-17 NOTE — TELEPHONE ENCOUNTER
Called and spoke to patient  Made him aware that his urine culture was positive  Made him aware that  augmentin x5days was sent into his pharmacy  Patient verbalized understanding and denies any other questions or concerns

## 2020-08-20 ENCOUNTER — TELEPHONE (OUTPATIENT)
Dept: PULMONOLOGY | Facility: CLINIC | Age: 80
End: 2020-08-20

## 2020-08-20 NOTE — TELEPHONE ENCOUNTER
Looking for sleep study results  Can you please call him with results when you get a chance? Thank you  He will not be home until 4:30  He does not have a cell phone  He also wants a order for renewal for a new cpap

## 2020-08-20 NOTE — TELEPHONE ENCOUNTER
Please let patient know sleep study shows severe sleep apnea  CPAP was ordered in June  Do you need me to order it again?

## 2020-08-26 ENCOUNTER — TELEPHONE (OUTPATIENT)
Dept: PULMONOLOGY | Facility: CLINIC | Age: 80
End: 2020-08-26

## 2020-09-01 DIAGNOSIS — E11.40 TYPE 2 DIABETES MELLITUS WITH DIABETIC NEUROPATHY, WITHOUT LONG-TERM CURRENT USE OF INSULIN (HCC): ICD-10-CM

## 2020-09-01 DIAGNOSIS — I10 ESSENTIAL HYPERTENSION: Chronic | ICD-10-CM

## 2020-09-01 RX ORDER — BLOOD SUGAR DIAGNOSTIC
STRIP MISCELLANEOUS
Qty: 200 EACH | Refills: 3 | Status: SHIPPED | OUTPATIENT
Start: 2020-09-01 | End: 2021-09-16

## 2020-09-01 RX ORDER — LANCETS 33 GAUGE
EACH MISCELLANEOUS
Qty: 200 EACH | Refills: 3 | Status: SHIPPED | OUTPATIENT
Start: 2020-09-01 | End: 2021-09-16

## 2020-09-01 RX ORDER — DILTIAZEM HYDROCHLORIDE 240 MG/1
CAPSULE, EXTENDED RELEASE ORAL
Qty: 90 CAPSULE | Refills: 3 | Status: SHIPPED | OUTPATIENT
Start: 2020-09-01 | End: 2021-08-31

## 2020-09-02 DIAGNOSIS — N40.1 BENIGN PROSTATIC HYPERPLASIA WITH LOWER URINARY TRACT SYMPTOMS, SYMPTOM DETAILS UNSPECIFIED: ICD-10-CM

## 2020-09-02 RX ORDER — TAMSULOSIN HYDROCHLORIDE 0.4 MG/1
CAPSULE ORAL
Qty: 90 CAPSULE | Refills: 3 | Status: SHIPPED | OUTPATIENT
Start: 2020-09-02 | End: 2021-08-21

## 2020-09-24 ENCOUNTER — APPOINTMENT (OUTPATIENT)
Dept: LAB | Facility: CLINIC | Age: 80
End: 2020-09-24
Payer: MEDICARE

## 2020-09-24 ENCOUNTER — OFFICE VISIT (OUTPATIENT)
Dept: INTERNAL MEDICINE CLINIC | Facility: CLINIC | Age: 80
End: 2020-09-24
Payer: MEDICARE

## 2020-09-24 VITALS
HEART RATE: 84 BPM | OXYGEN SATURATION: 96 % | BODY MASS INDEX: 22.68 KG/M2 | DIASTOLIC BLOOD PRESSURE: 82 MMHG | SYSTOLIC BLOOD PRESSURE: 132 MMHG | TEMPERATURE: 97.7 F | WEIGHT: 153.6 LBS

## 2020-09-24 DIAGNOSIS — N28.9 LESION OF RIGHT NATIVE KIDNEY: ICD-10-CM

## 2020-09-24 DIAGNOSIS — E11.40 TYPE 2 DIABETES MELLITUS WITH DIABETIC NEUROPATHY, WITHOUT LONG-TERM CURRENT USE OF INSULIN (HCC): Chronic | ICD-10-CM

## 2020-09-24 DIAGNOSIS — Z23 ENCOUNTER FOR IMMUNIZATION: ICD-10-CM

## 2020-09-24 DIAGNOSIS — E11.40 TYPE 2 DIABETES MELLITUS WITH DIABETIC NEUROPATHY, WITHOUT LONG-TERM CURRENT USE OF INSULIN (HCC): Primary | Chronic | ICD-10-CM

## 2020-09-24 DIAGNOSIS — E11.69 HYPERLIPIDEMIA ASSOCIATED WITH TYPE 2 DIABETES MELLITUS (HCC): Chronic | ICD-10-CM

## 2020-09-24 DIAGNOSIS — E78.5 HYPERLIPIDEMIA ASSOCIATED WITH TYPE 2 DIABETES MELLITUS (HCC): Chronic | ICD-10-CM

## 2020-09-24 DIAGNOSIS — I10 ESSENTIAL HYPERTENSION: Chronic | ICD-10-CM

## 2020-09-24 DIAGNOSIS — Z00.00 MEDICARE ANNUAL WELLNESS VISIT, SUBSEQUENT: ICD-10-CM

## 2020-09-24 LAB
ANION GAP SERPL CALCULATED.3IONS-SCNC: 4 MMOL/L (ref 4–13)
BUN SERPL-MCNC: 12 MG/DL (ref 5–25)
CALCIUM SERPL-MCNC: 10.2 MG/DL (ref 8.3–10.1)
CHLORIDE SERPL-SCNC: 110 MMOL/L (ref 100–108)
CHOLEST SERPL-MCNC: 99 MG/DL (ref 50–200)
CO2 SERPL-SCNC: 31 MMOL/L (ref 21–32)
CREAT SERPL-MCNC: 0.84 MG/DL (ref 0.6–1.3)
EST. AVERAGE GLUCOSE BLD GHB EST-MCNC: 157 MG/DL
GFR SERPL CREATININE-BSD FRML MDRD: 97 ML/MIN/1.73SQ M
GLUCOSE P FAST SERPL-MCNC: 105 MG/DL (ref 65–99)
HBA1C MFR BLD: 7.1 %
HDLC SERPL-MCNC: 47 MG/DL
LDLC SERPL CALC-MCNC: 44 MG/DL (ref 0–100)
NONHDLC SERPL-MCNC: 52 MG/DL
POTASSIUM SERPL-SCNC: 4.3 MMOL/L (ref 3.5–5.3)
SODIUM SERPL-SCNC: 145 MMOL/L (ref 136–145)
TRIGL SERPL-MCNC: 42 MG/DL

## 2020-09-24 PROCEDURE — G0439 PPPS, SUBSEQ VISIT: HCPCS | Performed by: INTERNAL MEDICINE

## 2020-09-24 PROCEDURE — 83036 HEMOGLOBIN GLYCOSYLATED A1C: CPT

## 2020-09-24 PROCEDURE — 36415 COLL VENOUS BLD VENIPUNCTURE: CPT

## 2020-09-24 PROCEDURE — 80048 BASIC METABOLIC PNL TOTAL CA: CPT

## 2020-09-24 PROCEDURE — 99214 OFFICE O/P EST MOD 30 MIN: CPT | Performed by: INTERNAL MEDICINE

## 2020-09-24 PROCEDURE — 80061 LIPID PANEL: CPT

## 2020-09-24 PROCEDURE — G0008 ADMIN INFLUENZA VIRUS VAC: HCPCS | Performed by: INTERNAL MEDICINE

## 2020-09-24 PROCEDURE — 90662 IIV NO PRSV INCREASED AG IM: CPT | Performed by: INTERNAL MEDICINE

## 2020-09-24 RX ORDER — POLYETHYLENE GLYCOL 3350 17 G
4 POWDER IN PACKET (EA) ORAL AS NEEDED
COMMUNITY
End: 2021-04-19 | Stop reason: ALTCHOICE

## 2020-09-24 NOTE — PROGRESS NOTES
Assessment and Plan:     1  Medicare annual wellness visit, subsequent    2  Encounter for immunization  - influenza vaccine, high-dose, PF 0 7 mL (FLUZONE HIGH-DOSE)        Preventive health issues were discussed with patient, and age appropriate screening tests were ordered as noted in patient's After Visit Summary  Personalized health advice and appropriate referrals for health education or preventive services given if needed, as noted in patient's After Visit Summary  History of Present Illness:     Patient presents for Medicare Annual Wellness visit    Patient Care Team:  Daisy Juárez DO as PCP - General (Internal Medicine)  Harpal Sanabria MD as Consulting Physician (Neurology)  Teodoro Rhodes MD (Urology)     Problem List:     Patient Active Problem List   Diagnosis    Type 2 diabetes mellitus with diabetic neuropathy, without long-term current use of insulin ([facility] [address] )    Hyperlipidemia associated with type 2 diabetes mellitus ([address] )    Essential hypertension    BPH (benign prostatic hyperplasia)    Degenerative cervical spinal stenosis    Cervical spondylosis    COPD (chronic obstructive pulmonary disease) ([address] )    Adjustment disorder with mixed anxiety and depressed mood    History of prostate cancer    Overactive bladder    Lesion of native kidney    SHIVANI (obstructive sleep apnea)    Vitamin D insufficiency    Pipe smoker    Anxiety    Erectile dysfunction due to diseases classified elsewhere      Past Medical and Surgical History:     Past Medical History:   Diagnosis Date    BPH (benign prostatic hyperplasia)     Cancer ([address] ) 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    Cervical myelopathy ([facility] [address] ) 6/20/2019    Chemical exposure     9/11/01- worked in [employer]       COPD (chronic obstructive pulmonary disease) (HCC)     CPAP (continuous positive airway pressure) dependence     DDD (degenerative disc disease), cervical     Diabetes mellitus ([address] )     Foraminal stenosis of cervical region     Hyperlipidemia     Hypertension     SHIVANI (obstructive sleep apnea)     Overactive bladder     Spinal cord compression ([address] ) 2019    Added automatically from request for surgery [id number]     Past Surgical History:   Procedure Laterality Date    ARTHROSCOPY KNEE Bilateral     COLONOSCOPY      LUNG SURGERY Left     scraping of left    ND ARTHRODESIS ANT INTERBODY INC DISCECTOMY, CERVICAL BELOW C2 Bilateral 2019    Procedure: C3/4  ACDF WITH  C4 HEMICORPECTOMY, C3-4 ANTERIOR INSTRUMENTATION AND FUSION (NEUROMONITORING);   Surgeon: Corina Soto MD;  [facility];  Service: Neurosurgery    ROTATOR CUFF REPAIR Bilateral       Family History:     Family History   Problem Relation Age of Onset    No Known Problems Mother     No Known Problems Father       Social History:     E-Cigarette/Vaping    E-Cigarette Use Never User      E-Cigarette/Vaping Substances    Nicotine No     THC No     CBD No     Flavoring No     Other No     Unknown No      Social History     Socioeconomic History    Marital status: Legally      Spouse name: None    Number of children: 3    Years of education: None    Highest education level: None   Occupational History    Occupation: retired   Social Needs    Financial resource strain: None    Food insecurity     Worry: None     Inability: None    Transportation needs     Medical: None     Non-medical: None   Tobacco Use    Smoking status: Current Every Day Smoker     Packs/day: 0 01     Years: 61 00     Pack years: 0 61     Types: Pipe     Last attempt to quit: 2020     Years since quittin 4    Smokeless tobacco: Current User    Tobacco comment: 5-6 pipes a day   Substance and Sexual Activity    Alcohol use: Not Currently    Drug use: No    Sexual activity: Not Currently   Lifestyle    Physical activity     Days per week: 0 days     Minutes per session: 0 min    Stress: Rather much   Relationships    Social connections     Talks on phone: None     Gets together: None     Attends [employer] service: None     Active member of club or organization: None     Attends meetings of clubs or organizations: None     Relationship status: None    Intimate partner violence     Fear of current or ex partner: None     Emotionally abused: None     Physically abused: None     Forced sexual activity: None   Other Topics Concern    None   Social History Narrative    Pt was born in [facility]; moved to [place] [facility] as an adult with his wife, and eventually moved to [place], [place]  Medications and Allergies:     Current Outpatient Medications   Medication Sig Dispense Refill    aspirin (ECOTRIN LOW STRENGTH) 81 mg EC tablet Take 1 tablet by mouth daily 30 tablet 0    diltiazem (TIAZAC) 240 MG 24 hr capsule take 1 capsule by mouth once daily 90 capsule 3    DULoxetine (CYMBALTA) 60 mg delayed release capsule Take 1 capsule (60 mg total) by mouth daily 90 capsule 3    fluticasone-umeclidinium-vilanterol (TRELEGY) 100-62 5-25 MCG/INH inhaler Inhale 1 puff daily Rinse mouth after use   3 Inhaler 3    gabapentin (NEURONTIN) 300 mg capsule Take 1 capsule (300 mg total) by mouth 3 (three) times a day 90 capsule 3    glucose blood (OneTouch Verio) test strip use 1 TEST STRIP to TEST BLOOD SUGAR twice a day 200 each 3    Lancets (OneTouch Delica Plus [id number]) MISC use 1 LANCET to TEST BLOOD SUGAR one to two times a day 200 each 3    losartan (COZAAR) 100 MG tablet TAKE 1 TABLET DAILY 90 tablet 3    metFORMIN (GLUCOPHAGE) 500 mg tablet Take 1 tablet (500 mg total) by mouth daily with breakfast 90 tablet 3    Multiple Vitamin (MULTI-VITAMINS PO) Take by mouth daily      nicotine polacrilex (COMMIT) 4 MG lozenge Apply 4 mg to the mouth or throat as needed for smoking cessation      oxybutynin (DITROPAN-XL) 5 mg 24 hr tablet Take 1 tablet (5 mg total) by mouth daily 90 tablet 3    polyvinyl alcohol (LIQUIFILM TEARS) 1 4 % ophthalmic solution Administer 1 drop to both eyes 4 (four) times a day 15 mL 0    pravastatin (PRAVACHOL) 40 mg tablet Take 1 tablet (40 mg total) by mouth daily 90 tablet 3    sildenafil (REVATIO) 20 mg tablet Take 2-5 tablets as needed daily for intercourse 60 tablet 6    tamsulosin (FLOMAX) 0 4 mg TAKE 1 CAPSULE DAILY AT BEDTIME 90 capsule 3    thiamine 50 MG tablet Take 1 tablet (50 mg total) by mouth daily 30 tablet 3    nicotine (NICODERM CQ) 21 mg/24 hr TD 24 hr patch Place 1 patch on the skin every 24 hours (Patient not taking: Reported on 9/24/2020) 28 patch 1     No current facility-administered medications for this visit  No Known Allergies   Immunizations:     Immunization History   Administered Date(s) Administered    INFLUENZA 10/01/2018    Influenza, high dose seasonal 0 7 mL 09/13/2019    Pneumococcal Conjugate 13-Valent 08/18/2016    Pneumococcal Polysaccharide PPV23 03/16/2018    Zoster 06/23/2014    Zoster Vaccine Recombinant 11/06/2018, 02/01/2019      Health Maintenance: There are no preventive care reminders to display for this patient  Topic Date Due    DTaP,Tdap,and Td Vaccines (1 - Tdap) 06/30/1961    Influenza Vaccine  07/01/2020      Medicare Health Risk Assessment:     BP (!) 154/102 (BP Location: Left arm, Patient Position: Sitting, Cuff Size: Standard)   Pulse 84   Temp 97 7 °F (36 5 °C) (Temporal) Comment: w/ aspirin  Wt 69 7 kg (153 lb 9 6 oz) Comment: w/ shoes denied off  SpO2 96%   BMI 22 68 kg/m²      Geeta Veloz is here for his Subsequent Wellness visit  Last Medicare Wellness visit information reviewed, patient interviewed and updates made to the record today  Health Risk Assessment:   Patient rates overall health as good  Patient feels that their physical health rating is much better  Eyesight was rated as same  Hearing was rated as same  Patient feels that their emotional and mental health rating is same  Pain experienced in the last 7 days has been none   Patient states that he has experienced no weight loss or gain in last 6 months  Depression Screening:   PHQ-2 Score: 0      Fall Risk Screening: In the past year, patient has experienced: no history of falling in past year      Home Safety:  Patient does not have trouble with stairs inside or outside of their home  Patient has working smoke alarms and has no working carbon monoxide detector  Home safety hazards include: none  Nutrition:   Current diet is Regular  Medications:   Patient is currently taking over-the-counter supplements  OTC medications include: see medication list  Patient is able to manage medications  Activities of Daily Living (ADLs)/Instrumental Activities of Daily Living (IADLs):   Walk and transfer into and out of bed and chair?: Yes  Dress and groom yourself?: Yes    Bathe or shower yourself?: Yes    Feed yourself?  Yes  Do your laundry/housekeeping?: Yes  Manage your money, pay your bills and track your expenses?: Yes  Make your own meals?: Yes    Do your own shopping?: Yes    Durable Medical Equipment Suppliers  none    Previous Hospitalizations:   Any hospitalizations or ED visits within the last 12 months?: No      Advance Care Planning:   Living will: No    Durable POA for healthcare: No    Advanced directive: No    Five wishes given: Yes      Cognitive Screening:   Provider or family/friend/caregiver concerned regarding cognition?: No    PREVENTIVE SCREENINGS      Cardiovascular Screening:    General: Screening Current      Diabetes Screening:     General: Screening Not Indicated and History Diabetes      Colorectal Cancer Screening:     General: Screening Not Indicated      Prostate Cancer Screening:    General: History Prostate Cancer and Screening Not Indicated      Osteoporosis Screening:    General: Screening Not Indicated      Abdominal Aortic Aneurysm (AAA) Screening:    Risk factors include: tobacco use        General: Screening Not Indicated      Lung Cancer Screening:     General: Screening Not Indicated and History Lung Cancer      Hepatitis C Screening:    General: Screening Not Indicated    Other Counseling Topics:   Car/seat belt/driving safety, skin self-exam, sunscreen and regular weightbearing exercise         Lizy Dyer DO

## 2020-09-24 NOTE — PATIENT INSTRUCTIONS
Medicare Preventive Visit Patient Instructions  Thank you for completing your Welcome to Medicare Visit or Medicare Annual Wellness Visit today  Your next wellness visit will be due in one year (9/24/2021)  The screening/preventive services that you may require over the next 5-10 years are detailed below  Some tests may not apply to you based off risk factors and/or age  Screening tests ordered at today's visit but not completed yet may show as past due  Also, please note that scanned in results may not display below  Preventive Screenings:  Service Recommendations Previous Testing/Comments   Colorectal Cancer Screening  · Colonoscopy    · Fecal Occult Blood Test (FOBT)/Fecal Immunochemical Test (FIT)  · Fecal DNA/Cologuard Test  · Flexible Sigmoidoscopy Age: 54-65 years old   Colonoscopy: every 10 years (May be performed more frequently if at higher risk)  OR  FOBT/FIT: every 1 year  OR  Cologuard: every 3 years  OR  Sigmoidoscopy: every 5 years  Screening may be recommended earlier than age 48 if at higher risk for colorectal cancer  Also, an individualized decision between you and your healthcare provider will decide whether screening between the ages of 74-80 would be appropriate   Colonoscopy: Not on file  FOBT/FIT: Not on file  Cologuard: Not on file  Sigmoidoscopy: Not on file    Screening Not Indicated     Prostate Cancer Screening Individualized decision between patient and health care provider in men between ages of 53-78   Medicare will cover every 12 months beginning on the day after your 50th birthday PSA: <0 1 ng/mL     History Prostate Cancer  Screening Not Indicated     Hepatitis C Screening Once for adults born between 1945 and 1965  More frequently in patients at high risk for Hepatitis C Hep C Antibody: Not on file    Screening Not Indicated   Diabetes Screening 1-2 times per year if you're at risk for diabetes or have pre-diabetes Fasting glucose: 146 mg/dL   A1C: 6 6 %    Screening Not Indicated  History Diabetes   Cholesterol Screening Once every 5 years if you don't have a lipid disorder  May order more often based on risk factors  Lipid panel: 09/16/2019    Screening Current      Other Preventive Screenings Covered by Medicare:  1  Abdominal Aortic Aneurysm (AAA) Screening: covered once if your at risk  You're considered to be at risk if you have a family history of AAA or a male between the age of 73-68 who smoking at least 100 cigarettes in your lifetime  2  Lung Cancer Screening: covers low dose CT scan once per year if you meet all of the following conditions: (1) Age 50-69; (2) No signs or symptoms of lung cancer; (3) Current smoker or have quit smoking within the last 15 years; (4) You have a tobacco smoking history of at least 30 pack years (packs per day x number of years you smoked); (5) You get a written order from a healthcare provider  3  Glaucoma Screening: covered annually if you're considered high risk: (1) You have diabetes OR (2) Family history of glaucoma OR (3)  aged 48 and older OR (3)  American aged 72 and older  3  Osteoporosis Screening: covered every 2 years if you meet one of the following conditions: (1) Have a vertebral abnormality; (2) On glucocorticoid therapy for more than 3 months; (3) Have primary hyperparathyroidism; (4) On osteoporosis medications and need to assess response to drug therapy  5  HIV Screening: covered annually if you're between the age of 12-76  Also covered annually if you are younger than 13 and older than 72 with risk factors for HIV infection  For pregnant patients, it is covered up to 3 times per pregnancy      Immunizations:  Immunization Recommendations   Influenza Vaccine Annual influenza vaccination during flu season is recommended for all persons aged >= 6 months who do not have contraindications   Pneumococcal Vaccine (Prevnar and Pneumovax)  * Prevnar = PCV13  * Pneumovax = PPSV23 Adults 25-60 years old: 1-3 doses may be recommended based on certain risk factors  Adults 72 years old: Prevnar (PCV13) vaccine recommended followed by Pneumovax (PPSV23) vaccine  If already received PPSV23 since turning 65, then PCV13 recommended at least one year after PPSV23 dose  Hepatitis B Vaccine 3 dose series if at intermediate or high risk (ex: diabetes, end stage renal disease, liver disease)   Tetanus (Td) Vaccine - COST NOT COVERED BY MEDICARE PART B Following completion of primary series, a booster dose should be given every 10 years to maintain immunity against tetanus  Td may also be given as tetanus wound prophylaxis  Tdap Vaccine - COST NOT COVERED BY MEDICARE PART B Recommended at least once for all adults  For pregnant patients, recommended with each pregnancy  Shingles Vaccine (Shingrix) - COST NOT COVERED BY MEDICARE PART B  2 shot series recommended in those aged 48 and above     Health Maintenance Due:  There are no preventive care reminders to display for this patient  Immunizations Due:      Topic Date Due    DTaP,Tdap,and Td Vaccines (1 - Tdap) 06/30/1961    Influenza Vaccine  07/01/2020     Advance Directives   What are advance directives? Advance directives are legal documents that state your wishes and plans for medical care  These plans are made ahead of time in case you lose your ability to make decisions for yourself  Advance directives can apply to any medical decision, such as the treatments you want, and if you want to donate organs  What are the types of advance directives? There are many types of advance directives, and each state has rules about how to use them  You may choose a combination of any of the following:  · Living will: This is a written record of the treatment you want  You can also choose which treatments you do not want, which to limit, and which to stop at a certain time  This includes surgery, medicine, IV fluid, and tube feedings     · Durable power of  for [facility]): This is a written record that states who you want to make healthcare choices for you when you are unable to make them for yourself  This person, called a proxy, is usually a family member or a friend  You may choose more than 1 proxy  · Do not resuscitate (DNR) order:  A DNR order is used in case your heart stops beating or you stop breathing  It is a request not to have certain forms of treatment, such as CPR  A DNR order may be included in other types of advance directives  · Medical directive: This covers the care that you want if you are in a coma, near death, or unable to make decisions for yourself  You can list the treatments you want for each condition  Treatment may include pain medicine, surgery, blood transfusions, dialysis, IV or tube feedings, and a ventilator (breathing machine)  · Values history: This document has questions about your views, beliefs, and how you feel and think about life  This information can help others choose the care that you would choose  Why are advance directives important? An advance directive helps you control your care  Although spoken wishes may be used, it is better to have your wishes written down  Spoken wishes can be misunderstood, or not followed  Treatments may be given even if you do not want them  An advance directive may make it easier for your family to make difficult choices about your care  Cigarette Smoking and Your Health   Risks to your health if you smoke:  Nicotine and other chemicals found in tobacco damage every cell in your body  Even if you are a light smoker, you have an increased risk for cancer, heart disease, and lung disease  If you are pregnant or have diabetes, smoking increases your risk for complications  Benefits to your health if you stop smoking:   · You decrease respiratory symptoms such as coughing, wheezing, and shortness of breath     · You reduce your risk for cancers of the lung, mouth, throat, kidney, bladder, pancreas, stomach, and cervix  If you already have cancer, you increase the benefits of chemotherapy  You also reduce your risk for cancer returning or a second cancer from developing  · You reduce your risk for heart disease, blood clots, heart attack, and stroke  · You reduce your risk for lung infections, and diseases such as pneumonia, asthma, chronic bronchitis, and emphysema  · Your circulation improves  More oxygen can be delivered to your body  If you have diabetes, you lower your risk for complications, such as kidney, artery, and eye diseases  You also lower your risk for nerve damage  Nerve damage can lead to amputations, poor vision, and blindness  · You improve your body's ability to heal and to fight infections  For more information and support to stop smoking:   · [employer]  Phone: 9- 062 - [phone]  Web Address: [place]  How to Quit Using Smokeless Tobacco   Why it is important to stop using smokeless tobacco:  Smokeless tobacco comes in many forms  Examples include chew, snuff, dip, dissolvable tobacco, and snus  All smokeless tobacco products contain nicotine and may contain as much nicotine as 3 cigarettes  You may be physically dependent on nicotine  You may also be emotionally addicted to it  The cravings can be strong, but it is important to quit using smokeless tobacco  You will improve your health and decrease your cancer, stroke, and heart attack risk  Mouth sores and tooth problems will also improve when you quit  You can benefit from quitting no matter how long you have used smokeless tobacco    Prepare to stop using smokeless tobacco:  Nicotine is a highly addictive drug  Withdrawal symptoms can happen when you stop and make it hard to quit  The following can help keep you on track:  · Set a quit date  · Tell friends, family, and coworkers that you plan to quit  · Remove all smokeless tobacco products from your home, car, and workplace      Manage weight gain after you quit:  Nicotine can affect your metabolism  You may gain a few pounds after you quit  The following can help you control your weight:  · Eat healthy foods  · Drink water before, during, and between meals  · Exercise as directed  © Copyright [employer] 2018 Information is for End User's use only and may not be sold, redistributed or otherwise used for commercial purposes   All illustrations and images included in CareNotes® are the copyrighted property of A D A M , Inc  or [address] [place] [address]

## 2020-09-24 NOTE — PROGRESS NOTES
[facility] - [facility]    NAME: Rosalia Hutchinson During  AGE: 68 y o  SEX: male  : 1940     DATE: 2020     Assessment and Plan:     1  Type 2 diabetes mellitus with diabetic neuropathy, without long-term current use of insulin ([facility] [address] )    Most recent A1c was 6 6 % on 2020  Continue metformin as prescribed  Diabetes is well controlled for his age  - Hemoglobin A1C; Future  - Basic metabolic panel; Future  - Lipid panel; Future    2  Lesion of right native kidney    MRI was reviewed with patient  Recommend surveillance MRI in 2020 which will be the 6 month danya  - MRI abdomen w wo contrast; Future    3  Hyperlipidemia associated with type 2 diabetes mellitus ([facility] [address] )    Continue statin as prescribed  Cholesterol is been controlled  4  Essential hypertension    Blood pressure readings were reviewed  Continue current antihypertensives  Return in about 4 months (around 2021) for Follow-up  Chief Complaint:     Chief Complaint   Patient presents with    Medicare Wellness Visit    Follow-up     4 month and labs- 2020      History of Present Illness:     Patient presents for routine follow-up  No changes to his health since I last saw him  Type 2 diabetes - continues to monitor his sugars on a regular basis and they have been well controlled  He is taking metformin as prescribed  He denies any hypoglycemic episodes  Most recent A1c was 6 6 % on 2020  No significant renal dysfunction  No evidence of nephropathy, retinopathy  Lesion of right kidney - noted previously on CT scan  MRI was then recommended which was done in 2020  Has a history of prostate cancer was treated  MRI showed stability for 1 year  Continued surveillance with follow-up in 6 months was recommended  He denies any recent falls  Monitors his BP at home and it is generally well controlled       Review of Systems:     Review of Systems Constitutional: Positive for fatigue  Negative for activity change and appetite change  Respiratory: Negative for apnea, cough, chest tightness, shortness of breath and wheezing  Cardiovascular: Negative for chest pain, palpitations and leg swelling  Gastrointestinal: Negative for abdominal distention, abdominal pain, blood in stool, constipation, diarrhea, nausea and vomiting  Neurological: Negative for dizziness, weakness, light-headedness, numbness and headaches  Psychiatric/Behavioral: Positive for sleep disturbance  Negative for behavioral problems, confusion, hallucinations and suicidal ideas  The patient is nervous/anxious  Objective:     /82 (BP Location: Left arm, Patient Position: Sitting, Cuff Size: Standard)   Pulse 84   Temp 97 7 °F (36 5 °C) (Temporal) Comment: w/ aspirin  Wt 69 7 kg (153 lb 9 6 oz) Comment: w/ shoes denied off  SpO2 96%   BMI 22 68 kg/m²     Physical Exam  Vitals signs reviewed  Constitutional:       General: He is not in acute distress  Appearance: He is well-developed  He is not diaphoretic  Eyes:      General: No scleral icterus  Right eye: No discharge  Left eye: No discharge  Conjunctiva/sclera: Conjunctivae normal    Neck:      Musculoskeletal: Neck supple  Thyroid: No thyromegaly  Vascular: No JVD  Cardiovascular:      Rate and Rhythm: Normal rate and regular rhythm  Heart sounds: Normal heart sounds  No murmur  Pulmonary:      Effort: Pulmonary effort is normal  No respiratory distress  Breath sounds: Normal breath sounds  No wheezing or rales  Abdominal:      General: Bowel sounds are normal  There is no distension  Palpations: Abdomen is soft  Tenderness: There is no abdominal tenderness  Musculoskeletal:      Right lower leg: No edema  Left lower leg: No edema  Lymphadenopathy:      Cervical: No cervical adenopathy  Skin:     General: Skin is warm and dry  Neurological:      Mental Status: He is alert     Psychiatric:         Mood and Affect: Mood normal          Behavior: Behavior normal        Andre Heart, [facility] [address]

## 2020-09-25 ENCOUNTER — TELEPHONE (OUTPATIENT)
Dept: INTERNAL MEDICINE CLINIC | Facility: CLINIC | Age: 80
End: 2020-09-25

## 2020-09-25 NOTE — TELEPHONE ENCOUNTER
----- Message from Louise Vargas DO sent at 9/25/2020  6:27 AM EDT -----  Call patient and let him know labs are stable and diabetes and cholesterol is well controlled

## 2020-09-28 ENCOUNTER — TELEPHONE (OUTPATIENT)
Dept: PULMONOLOGY | Facility: CLINIC | Age: 80
End: 2020-09-28

## 2020-09-28 NOTE — TELEPHONE ENCOUNTER
COVID Pre-Visit Screening     1  Is this a family member screening? no  2  Have you traveled outside of your state in the past 2 weeks? 3  Do you presently have a fever or flu-like symptoms? no  4  Do you have symptoms of an upper respiratory infection like runny nose, sore throat, or cough? no  5  Are you suffering from new headache that you have not had in the past?  no  6  Do you have/have you experienced any new shortness of breath recently? no  7  Do you have any new diarrhea, nausea or vomiting? no  8  Have you been in contact with anyone who has been sick or diagnosed with COVID-19? no  9  Do you have any new loss of taste or smell? no  10  Are you able to wear a mask without a valve for the entire visit?  yes

## 2020-09-29 ENCOUNTER — OFFICE VISIT (OUTPATIENT)
Dept: PULMONOLOGY | Facility: CLINIC | Age: 80
End: 2020-09-29
Payer: MEDICARE

## 2020-09-29 VITALS
TEMPERATURE: 97.7 F | BODY MASS INDEX: 22.78 KG/M2 | HEIGHT: 69 IN | WEIGHT: 153.8 LBS | OXYGEN SATURATION: 98 % | DIASTOLIC BLOOD PRESSURE: 84 MMHG | SYSTOLIC BLOOD PRESSURE: 124 MMHG | HEART RATE: 91 BPM

## 2020-09-29 DIAGNOSIS — I10 ESSENTIAL HYPERTENSION: Chronic | ICD-10-CM

## 2020-09-29 DIAGNOSIS — F17.290 PIPE SMOKER: ICD-10-CM

## 2020-09-29 DIAGNOSIS — Z85.118 HISTORY OF LUNG CANCER: ICD-10-CM

## 2020-09-29 DIAGNOSIS — E11.40 TYPE 2 DIABETES MELLITUS WITH DIABETIC NEUROPATHY, WITHOUT LONG-TERM CURRENT USE OF INSULIN (HCC): Chronic | ICD-10-CM

## 2020-09-29 DIAGNOSIS — J43.2 CENTRILOBULAR EMPHYSEMA (HCC): Primary | Chronic | ICD-10-CM

## 2020-09-29 DIAGNOSIS — G47.33 OSA (OBSTRUCTIVE SLEEP APNEA): ICD-10-CM

## 2020-09-29 DIAGNOSIS — R91.8 PULMONARY NODULES: ICD-10-CM

## 2020-09-29 PROCEDURE — 99214 OFFICE O/P EST MOD 30 MIN: CPT | Performed by: PHYSICIAN ASSISTANT

## 2020-09-29 NOTE — PROGRESS NOTES
Assessment:    1  Centrilobular emphysema ([address] )     2  SHIVANI (obstructive sleep apnea)     3  Essential hypertension     4  Type 2 diabetes mellitus with diabetic neuropathy, without long-term current use of insulin ([address] )     5  Pipe smoker     6  Pulmonary nodules     7  History of lung cancer           Plan:   Patient presenting for follow-up, reports his breathing has improved since switching to Trelegy Ellipta  Continue short-acting bronchodilators as needed  He is using the CPAP benefitting from it  Patient states that he is compliant with use  He cannot sleep without it  With the CPAP, he sleeps well and feels he has more energy during the day  Regular cleaning and changing of the supplies discussed  He is aware of consequences of untreated sleep apnea, especially in light of his underlying medical conditions  Last CT chest done June 2020 with stable pulmonary nodules  He is trying to quit smoking the pipe with the help with nicotine patches    All of the patient's questions were answered to their satisfaction and understanding, which was verbalized  Patient was advised to call the office prior to next appointment should they have any questions or concerns prior  Patient made aware of worrisome symptoms that should prompt a visit to the ER or call to 911 such as chest pain, severe shortness of breath, cyanosis, throat closing, syncope, etc     Subjective:     Patient ID: Simone Chandler is a 68 y o  male  Chief Complaint:  Donna Beasley is a pleasant 68-year-old male current previous smoker with past medical history including emphysema, pulmonary nodules, lung cancer status post left upper lobectomy, SHIVANI, hypertension, diabetes mellitus presenting for follow-up  Patient states overall he is doing well  Feels his breathing has improved since switching inhalers from Advair and Spiriva to Trelegy Ellipta  He also is sleeping much better since using the CPAP and has much more energy during the day    He continues to smoke about 2 pipes per day and is trying to quit with nicotine patches  He has a significant smoking history of greater than 30 pack years and was [place] during 9[place]11  The following portions of the patient's history were reviewed in this encounter and updated as appropriate: Past medical, social, surgical, family, allergies    Review of Systems   Constitutional: Negative for chills and fever  Respiratory: Positive for cough  Negative for shortness of breath  Psychiatric/Behavioral: Positive for sleep disturbance  All other systems reviewed and are negative  Objective:  Vitals:    09/29/20 1312   BP: 124/84   Pulse: 91   Temp: 97 7 °F (36 5 °C)   SpO2: 98%   Weight: 69 8 kg (153 lb 12 8 oz)   Height: 5' 9" (1 753 m)       Physical Exam  Vitals signs and nursing note reviewed  Constitutional:       General: He is not in acute distress  Appearance: He is well-developed  He is not diaphoretic  HENT:      Head: Normocephalic and atraumatic  Right Ear: External ear normal       Left Ear: External ear normal    Eyes:      General: No scleral icterus  Right eye: No discharge  Left eye: No discharge  Conjunctiva/sclera: Conjunctivae normal    Neck:      Musculoskeletal: Normal range of motion and neck supple  Trachea: No tracheal deviation  Cardiovascular:      Rate and Rhythm: Normal rate and regular rhythm  Heart sounds: Normal heart sounds  No murmur  No friction rub  No gallop  Pulmonary:      Effort: Pulmonary effort is normal  No respiratory distress  Breath sounds: Normal breath sounds  No stridor  No wheezing  Abdominal:      General: There is no distension  Tenderness: There is no guarding  Musculoskeletal: Normal range of motion  General: No tenderness or deformity  Skin:     General: Skin is warm and dry  Coloration: Skin is not pale  Findings: No erythema or rash     Neurological:      Mental Status: He is alert and oriented to person, place, and time  Cranial Nerves: No cranial nerve deficit  Motor: No abnormal muscle tone  Psychiatric:         Behavior: Behavior normal          Thought Content:  Thought content normal          Judgment: Judgment normal          Lab Review:   Appointment on 09/24/2020   Component Date Value    Hemoglobin A1C 09/24/2020 7 1*    EAG 09/24/2020 157     Sodium 09/24/2020 145     Potassium 09/24/2020 4 3     Chloride 09/24/2020 110*    CO2 09/24/2020 31     ANION GAP 09/24/2020 4     BUN 09/24/2020 12     Creatinine 09/24/2020 0 84     Glucose, Fasting 09/24/2020 105*    Calcium 09/24/2020 10 2*    eGFR 09/24/2020 97     Cholesterol 09/24/2020 99     Triglycerides 09/24/2020 42     HDL, Direct 09/24/2020 47     LDL Calculated 09/24/2020 44     Non-HDL-Chol (CHOL-HDL) 09/24/2020 52    Office Visit on 08/13/2020   Component Date Value    LEUKOCYTE ESTERASE,UA 08/13/2020 large     NITRITE,UA 08/13/2020 positive     SL AMB POCT UROBILINOGEN 08/13/2020 0 2     POCT URINE PROTEIN 08/13/2020 -      PH,UA 08/13/2020 8 5     BLOOD,UA 08/13/2020 -     SPECIFIC GRAVITY,UA 08/13/2020 1 005     KETONES,UA 08/13/2020 -     BILIRUBIN,UA 08/13/2020 -     GLUCOSE, UA 08/13/2020 -      COLOR,UA 08/13/2020 yellow     CLARITY,UA 08/13/2020 clear     POST-VOID RESIDUAL VOLUM* 08/13/2020 72     Urine Culture 08/13/2020 70,000-79,000 cfu/ml Staphylococcus coagulase negative*

## 2020-10-06 ENCOUNTER — TELEPHONE (OUTPATIENT)
Dept: NEUROLOGY | Facility: CLINIC | Age: 80
End: 2020-10-06

## 2020-10-09 ENCOUNTER — OFFICE VISIT (OUTPATIENT)
Dept: NEUROLOGY | Facility: CLINIC | Age: 80
End: 2020-10-09
Payer: MEDICARE

## 2020-10-09 VITALS
HEIGHT: 69 IN | DIASTOLIC BLOOD PRESSURE: 90 MMHG | SYSTOLIC BLOOD PRESSURE: 160 MMHG | WEIGHT: 150 LBS | BODY MASS INDEX: 22.22 KG/M2 | HEART RATE: 90 BPM

## 2020-10-09 DIAGNOSIS — M47.812 CERVICAL SPONDYLOSIS: Primary | ICD-10-CM

## 2020-10-09 DIAGNOSIS — E11.40 TYPE 2 DIABETES MELLITUS WITH DIABETIC NEUROPATHY, WITHOUT LONG-TERM CURRENT USE OF INSULIN (HCC): Chronic | ICD-10-CM

## 2020-10-09 PROCEDURE — 99214 OFFICE O/P EST MOD 30 MIN: CPT | Performed by: PSYCHIATRY & NEUROLOGY

## 2020-10-09 RX ORDER — GABAPENTIN 300 MG/1
300 CAPSULE ORAL 2 TIMES DAILY
Qty: 90 CAPSULE | Refills: 3 | Status: SHIPPED | OUTPATIENT
Start: 2020-10-09 | End: 2021-04-19 | Stop reason: SDUPTHER

## 2020-11-13 ENCOUNTER — OFFICE VISIT (OUTPATIENT)
Dept: PULMONOLOGY | Facility: CLINIC | Age: 80
End: 2020-11-13
Payer: MEDICARE

## 2020-11-13 VITALS
DIASTOLIC BLOOD PRESSURE: 84 MMHG | TEMPERATURE: 97.7 F | WEIGHT: 154 LBS | HEART RATE: 68 BPM | BODY MASS INDEX: 22.81 KG/M2 | HEIGHT: 69 IN | SYSTOLIC BLOOD PRESSURE: 124 MMHG | OXYGEN SATURATION: 96 %

## 2020-11-13 DIAGNOSIS — J43.2 CENTRILOBULAR EMPHYSEMA (HCC): ICD-10-CM

## 2020-11-13 DIAGNOSIS — G47.33 OSA (OBSTRUCTIVE SLEEP APNEA): Primary | ICD-10-CM

## 2020-11-13 DIAGNOSIS — R91.8 PULMONARY NODULES: ICD-10-CM

## 2020-11-13 DIAGNOSIS — F17.290 PIPE SMOKER: ICD-10-CM

## 2020-11-13 DIAGNOSIS — Z85.118 HISTORY OF LUNG CANCER: ICD-10-CM

## 2020-11-13 PROCEDURE — 99214 OFFICE O/P EST MOD 30 MIN: CPT | Performed by: PHYSICIAN ASSISTANT

## 2020-11-30 ENCOUNTER — TELEPHONE (OUTPATIENT)
Dept: PULMONOLOGY | Facility: CLINIC | Age: 80
End: 2020-11-30

## 2021-01-05 ENCOUNTER — HOSPITAL ENCOUNTER (OUTPATIENT)
Dept: MRI IMAGING | Facility: HOSPITAL | Age: 81
Discharge: HOME | End: 2021-01-05
Attending: INTERNAL MEDICINE
Payer: MEDICARE

## 2021-01-05 DIAGNOSIS — N28.9 LESION OF RIGHT NATIVE KIDNEY: ICD-10-CM

## 2021-01-05 PROCEDURE — A9585 GADOBUTROL INJECTION: HCPCS | Performed by: INTERNAL MEDICINE

## 2021-01-05 PROCEDURE — 74183 MRI ABD W/O CNTR FLWD CNTR: CPT

## 2021-01-05 PROCEDURE — G1004 CDSM NDSC: HCPCS

## 2021-01-05 RX ADMIN — GADOBUTROL 6 ML: 604.72 INJECTION INTRAVENOUS at 14:07

## 2021-01-07 ENCOUNTER — TELEPHONE (OUTPATIENT)
Dept: INTERNAL MEDICINE CLINIC | Facility: CLINIC | Age: 81
End: 2021-01-07

## 2021-01-07 NOTE — TELEPHONE ENCOUNTER
----- Message from Leonel Virgen DO sent at 1/7/2021  5:04 PM EST -----  Call patient and let him know kidney cyst is stable and will follow-up with ultrasound in 1 year

## 2021-01-18 DIAGNOSIS — E11.69 HYPERLIPIDEMIA ASSOCIATED WITH TYPE 2 DIABETES MELLITUS (HCC): ICD-10-CM

## 2021-01-18 DIAGNOSIS — E78.5 HYPERLIPIDEMIA ASSOCIATED WITH TYPE 2 DIABETES MELLITUS (HCC): ICD-10-CM

## 2021-01-18 RX ORDER — PRAVASTATIN SODIUM 40 MG
TABLET ORAL
Qty: 90 TABLET | Refills: 1 | Status: SHIPPED | OUTPATIENT
Start: 2021-01-18 | End: 2021-07-20

## 2021-02-11 DIAGNOSIS — E11.40 TYPE 2 DIABETES MELLITUS WITH DIABETIC NEUROPATHY, WITHOUT LONG-TERM CURRENT USE OF INSULIN (HCC): Chronic | ICD-10-CM

## 2021-02-21 ENCOUNTER — HOSPITAL ENCOUNTER (OUTPATIENT)
Dept: CT IMAGING | Facility: HOSPITAL | Age: 81
Discharge: HOME | End: 2021-02-21
Payer: MEDICARE

## 2021-02-21 DIAGNOSIS — Z85.118 HISTORY OF LUNG CANCER: ICD-10-CM

## 2021-02-21 DIAGNOSIS — R91.8 PULMONARY NODULES: ICD-10-CM

## 2021-02-21 PROCEDURE — G1004 CDSM NDSC: HCPCS

## 2021-02-21 PROCEDURE — 71250 CT THORAX DX C-: CPT

## 2021-02-25 ENCOUNTER — APPOINTMENT (OUTPATIENT)
Dept: LAB | Facility: CLINIC | Age: 81
End: 2021-02-25
Payer: MEDICARE

## 2021-02-25 ENCOUNTER — OFFICE VISIT (OUTPATIENT)
Dept: PULMONOLOGY | Facility: CLINIC | Age: 81
End: 2021-02-25
Payer: MEDICARE

## 2021-02-25 ENCOUNTER — OFFICE VISIT (OUTPATIENT)
Dept: INTERNAL MEDICINE CLINIC | Facility: CLINIC | Age: 81
End: 2021-02-25
Payer: MEDICARE

## 2021-02-25 VITALS
BODY MASS INDEX: 23.05 KG/M2 | DIASTOLIC BLOOD PRESSURE: 80 MMHG | HEART RATE: 80 BPM | RESPIRATION RATE: 12 BRPM | WEIGHT: 155.6 LBS | SYSTOLIC BLOOD PRESSURE: 132 MMHG | TEMPERATURE: 98.2 F | HEIGHT: 69 IN

## 2021-02-25 VITALS
HEART RATE: 77 BPM | SYSTOLIC BLOOD PRESSURE: 118 MMHG | OXYGEN SATURATION: 99 % | HEIGHT: 69 IN | TEMPERATURE: 97.6 F | DIASTOLIC BLOOD PRESSURE: 82 MMHG | BODY MASS INDEX: 22.45 KG/M2 | WEIGHT: 151.6 LBS

## 2021-02-25 DIAGNOSIS — E83.52 HYPERCALCEMIA: ICD-10-CM

## 2021-02-25 DIAGNOSIS — Z85.118 HISTORY OF LUNG CANCER: ICD-10-CM

## 2021-02-25 DIAGNOSIS — G47.33 OSA (OBSTRUCTIVE SLEEP APNEA): Primary | ICD-10-CM

## 2021-02-25 DIAGNOSIS — E78.5 HYPERLIPIDEMIA ASSOCIATED WITH TYPE 2 DIABETES MELLITUS (HCC): Chronic | ICD-10-CM

## 2021-02-25 DIAGNOSIS — I10 ESSENTIAL HYPERTENSION: Chronic | ICD-10-CM

## 2021-02-25 DIAGNOSIS — F17.290 PIPE SMOKER: ICD-10-CM

## 2021-02-25 DIAGNOSIS — E11.69 HYPERLIPIDEMIA ASSOCIATED WITH TYPE 2 DIABETES MELLITUS (HCC): Chronic | ICD-10-CM

## 2021-02-25 DIAGNOSIS — E11.40 TYPE 2 DIABETES MELLITUS WITH DIABETIC NEUROPATHY, WITHOUT LONG-TERM CURRENT USE OF INSULIN (HCC): Chronic | ICD-10-CM

## 2021-02-25 DIAGNOSIS — E11.40 TYPE 2 DIABETES MELLITUS WITH DIABETIC NEUROPATHY, WITHOUT LONG-TERM CURRENT USE OF INSULIN (HCC): Primary | Chronic | ICD-10-CM

## 2021-02-25 DIAGNOSIS — J43.2 CENTRILOBULAR EMPHYSEMA (HCC): Chronic | ICD-10-CM

## 2021-02-25 DIAGNOSIS — J43.2 CENTRILOBULAR EMPHYSEMA (HCC): ICD-10-CM

## 2021-02-25 DIAGNOSIS — R91.8 PULMONARY NODULES: ICD-10-CM

## 2021-02-25 PROBLEM — F43.23 ADJUSTMENT DISORDER WITH MIXED ANXIETY AND DEPRESSED MOOD: Status: RESOLVED | Noted: 2019-02-27 | Resolved: 2021-02-25

## 2021-02-25 LAB
25(OH)D3 SERPL-MCNC: 23.3 NG/ML (ref 30–100)
ALBUMIN SERPL BCP-MCNC: 3.8 G/DL (ref 3.5–5)
ALP SERPL-CCNC: 84 U/L (ref 46–116)
ALT SERPL W P-5'-P-CCNC: 30 U/L (ref 12–78)
ANION GAP SERPL CALCULATED.3IONS-SCNC: 5 MMOL/L (ref 4–13)
AST SERPL W P-5'-P-CCNC: 13 U/L (ref 5–45)
BILIRUB SERPL-MCNC: 0.4 MG/DL (ref 0.2–1)
BUN SERPL-MCNC: 17 MG/DL (ref 5–25)
CALCIUM SERPL-MCNC: 10.4 MG/DL (ref 8.3–10.1)
CHLORIDE SERPL-SCNC: 111 MMOL/L (ref 100–108)
CHOLEST SERPL-MCNC: 96 MG/DL (ref 50–200)
CO2 SERPL-SCNC: 28 MMOL/L (ref 21–32)
CREAT SERPL-MCNC: 0.84 MG/DL (ref 0.6–1.3)
CREAT UR-MCNC: 148 MG/DL
ERYTHROCYTE [DISTWIDTH] IN BLOOD BY AUTOMATED COUNT: 15.4 % (ref 11.6–15.1)
EST. AVERAGE GLUCOSE BLD GHB EST-MCNC: 143 MG/DL
GFR SERPL CREATININE-BSD FRML MDRD: 97 ML/MIN/1.73SQ M
GLUCOSE SERPL-MCNC: 108 MG/DL (ref 65–140)
HBA1C MFR BLD: 6.6 %
HCT VFR BLD AUTO: 44.3 % (ref 36.5–49.3)
HDLC SERPL-MCNC: 40 MG/DL
HGB BLD-MCNC: 13.9 G/DL (ref 12–17)
LDLC SERPL CALC-MCNC: 50 MG/DL (ref 0–100)
MCH RBC QN AUTO: 27.6 PG (ref 26.8–34.3)
MCHC RBC AUTO-ENTMCNC: 31.4 G/DL (ref 31.4–37.4)
MCV RBC AUTO: 88 FL (ref 82–98)
MICROALBUMIN UR-MCNC: 26.2 MG/L (ref 0–20)
MICROALBUMIN/CREAT 24H UR: 18 MG/G CREATININE (ref 0–30)
PLATELET # BLD AUTO: 175 THOUSANDS/UL (ref 149–390)
PMV BLD AUTO: 11 FL (ref 8.9–12.7)
POTASSIUM SERPL-SCNC: 4.1 MMOL/L (ref 3.5–5.3)
PROT SERPL-MCNC: 6.5 G/DL (ref 6.4–8.2)
PTH-INTACT SERPL-MCNC: 81 PG/ML (ref 18.4–80.1)
RBC # BLD AUTO: 5.04 MILLION/UL (ref 3.88–5.62)
SODIUM SERPL-SCNC: 144 MMOL/L (ref 136–145)
TRIGL SERPL-MCNC: 31 MG/DL
WBC # BLD AUTO: 5.34 THOUSAND/UL (ref 4.31–10.16)

## 2021-02-25 PROCEDURE — 83036 HEMOGLOBIN GLYCOSYLATED A1C: CPT

## 2021-02-25 PROCEDURE — 82570 ASSAY OF URINE CREATININE: CPT

## 2021-02-25 PROCEDURE — 36415 COLL VENOUS BLD VENIPUNCTURE: CPT

## 2021-02-25 PROCEDURE — 80053 COMPREHEN METABOLIC PANEL: CPT

## 2021-02-25 PROCEDURE — 99214 OFFICE O/P EST MOD 30 MIN: CPT | Performed by: PHYSICIAN ASSISTANT

## 2021-02-25 PROCEDURE — 99214 OFFICE O/P EST MOD 30 MIN: CPT | Performed by: INTERNAL MEDICINE

## 2021-02-25 PROCEDURE — 80061 LIPID PANEL: CPT

## 2021-02-25 PROCEDURE — 82306 VITAMIN D 25 HYDROXY: CPT

## 2021-02-25 PROCEDURE — 82043 UR ALBUMIN QUANTITATIVE: CPT

## 2021-02-25 PROCEDURE — 85027 COMPLETE CBC AUTOMATED: CPT

## 2021-02-25 PROCEDURE — 83970 ASSAY OF PARATHORMONE: CPT

## 2021-02-25 NOTE — PROGRESS NOTES
Assessment:    1  SHIVANI (obstructive sleep apnea)     2  Centrilobular emphysema (HCC)     3  Pulmonary nodules     4  History of lung cancer     5  Pipe smoker           Plan:   Patient presenting for follow-up  They are using the CPAP and benefitting from it  Better quality of sleep at night and more energy throughout the day  Reviewed compliance report with the patient demonstrating residual AHI is acceptable at 4 2 and they are compliant with use  Will continue CPAP at current pressure settings  Regular cleaning and changing of the supplies  Patient is aware of consequences of untreated sleep apnea including increased risk for cardiac disease and stroke and therefore the need for compliance  Breathing remains stable on Trelegy Ellipta 1 puff daily, short-acting bronchodilators as needed  Patient continues to smoke pipe, quit smoking cigarettes years ago  He will not quit smoking pipe, finds it to be too enjoyable   Last CT chest done June 2020 with stable nodules  Follow-up CT done earlier this week, report pending  Will call patient with results  In the process of obtaining patient's medical records from [facility]    Subjective:     Patient ID: Yvette Mcneal During is a 68 y o  male  Chief Complaint:  Celio Garcia is a very pleasant 60-year-old male current pipe smoker with past medical history including but not limited to chronic bronchitis and emphysema, pulmonary nodules, lung cancer status post left upper lobectomy, SHIVANI, hypertension, diabetes myelitis, prostate cancer status post radiation therapy presenting for follow-up  Patient reports his symptoms have been stable  He has chronic dyspnea on exertion and daily cough productive for white sputum  He is sleeping very well with his new CPAP machine like that quite a bit  He does still require a nap during the day, advised him to use the CPAP while taking a nap  He no longer smokes cigarettes, does smoke pipe twice a day and does not want to quit that    He has at least a 30 pack year smoking history and also was present at [facility] during 9/11  The following portions of the patient's history were reviewed in this encounter and updated as appropriate: Past medical, social, surgical, family, allergies    Review of Systems   Constitutional: Positive for fatigue  Negative for chills and fever  HENT: Positive for congestion  Respiratory: Positive for cough and shortness of breath  All other systems reviewed and are negative  Objective:  Vitals:    02/25/21 0920   BP: 118/82   Pulse: 77   Temp: 97 6 °F (36 4 °C)   SpO2: 99%   Weight: 68 8 kg (151 lb 9 6 oz)   Height: 5' 9" (1 753 m)       Physical Exam  Vitals signs and nursing note reviewed  Constitutional:       General: He is not in acute distress  Appearance: He is well-developed  He is not diaphoretic  HENT:      Head: Normocephalic and atraumatic  Right Ear: External ear normal       Left Ear: External ear normal       Nose: Nose normal    Eyes:      General: No scleral icterus  Right eye: No discharge  Left eye: No discharge  Conjunctiva/sclera: Conjunctivae normal    Neck:      Musculoskeletal: Normal range of motion and neck supple  Trachea: No tracheal deviation  Cardiovascular:      Rate and Rhythm: Normal rate and regular rhythm  Heart sounds: Normal heart sounds  No murmur  No friction rub  No gallop  Pulmonary:      Effort: Pulmonary effort is normal  No respiratory distress  Breath sounds: Normal breath sounds  No stridor  No wheezing  Abdominal:      General: There is no distension  Tenderness: There is no guarding  Musculoskeletal: Normal range of motion  General: No tenderness or deformity  Skin:     General: Skin is warm and dry  Coloration: Skin is not pale  Findings: No erythema or rash  Neurological:      Mental Status: He is alert and oriented to person, place, and time  Cranial Nerves:  No cranial nerve deficit  Motor: No abnormal muscle tone  Psychiatric:         Behavior: Behavior normal          Thought Content: Thought content normal          Judgment: Judgment normal          Lab Review:   Appointment on 02/25/2021   Component Date Value    Hemoglobin A1C 02/25/2021 6 6*    EAG 02/25/2021 143     Creatinine, Ur 02/25/2021 148 0     Microalbum  ,U,Random 02/25/2021 26 2*    Microalb Creat Ratio 02/25/2021 18     WBC 02/25/2021 5 34     RBC 02/25/2021 5 04     Hemoglobin 02/25/2021 13 9     Hematocrit 02/25/2021 44 3     MCV 02/25/2021 88     MCH 02/25/2021 27 6     MCHC 02/25/2021 31 4     RDW 02/25/2021 15 4*    Platelets 72/64/9321 175     MPV 02/25/2021 11 0

## 2021-02-25 NOTE — PROGRESS NOTES
[facility] - [facility] ALISA MARTINO    NAME: Colleen Sanon During  AGE: 68 y o  SEX: male  : 1940     DATE: 2021     Assessment and Plan:     1  Type 2 diabetes mellitus with diabetic neuropathy, without long-term current use of insulin ([facility] )    Most recent A1c was 7 1 % on 2020  Check UTD labs  Blood sugars at home are controlled  Continue gabapentin for neuropathy     - Hemoglobin A1C; Future  - Microalbumin / creatinine urine ratio; Future  - CBC; Future  - Comprehensive metabolic panel; Future    2  Hyperlipidemia associated with type 2 diabetes mellitus ([facility] )    Continue pravastatin as prescribed  Check lipid panel     - Lipid Panel with Direct LDL reflex; Future    3  Centrilobular emphysema ([facility] )    Follows with pulmonary  Stable without exacerbation  Recommend total tobacco cessation  Continue inhalers  Has a recent CT chest done that is pending read by radiology  4  Essential hypertension    Continue to monitor BP  Follow heart healthy diet  Continue anti-hypertensives  5  Hypercalcemia  - PTH, intact; Future  - Vitamin D 25 hydroxy; Future          Return in about 4 months (around 2021) for Follow-up  Chief Complaint:     Chief Complaint   Patient presents with    Questions about health      History of Present Illness:     Patient presents for routine follow-up  Health has been stable recently  Has underlying COPD  Gets intermittent cough and SOB  SOB more so when he does too much  CT chest recently done for f/u of lung nodules  Has follow-up with pulmonary today  He eats well and blood sugars have been controlled  Most recent A1c was 7 1 % on 2020  Review of Systems:     Review of Systems   Constitutional: Negative for activity change, appetite change and fatigue  Respiratory: Negative for apnea, cough, chest tightness, shortness of breath and wheezing  Cardiovascular: Negative for chest pain, palpitations and leg swelling  Gastrointestinal: Negative for abdominal distention, abdominal pain, blood in stool, constipation, diarrhea, nausea and vomiting  Neurological: Positive for numbness  Negative for dizziness, weakness, light-headedness and headaches  Psychiatric/Behavioral: Negative for behavioral problems, confusion, hallucinations, sleep disturbance and suicidal ideas  The patient is not nervous/anxious  Objective:     /80 (BP Location: Left arm, Patient Position: Sitting)   Pulse 80   Temp 98 2 °F (36 8 °C) (Tympanic)   Resp 12   Ht 5' 9" (1 753 m)   Wt 70 6 kg (155 lb 9 6 oz)   BMI 22 98 kg/m²     Physical Exam  Vitals signs reviewed  Constitutional:       General: He is not in acute distress  Appearance: He is well-developed  He is not diaphoretic  Eyes:      General: No scleral icterus  Right eye: No discharge  Left eye: No discharge  Conjunctiva/sclera: Conjunctivae normal    Neck:      Musculoskeletal: Neck supple  Thyroid: No thyromegaly  Vascular: No JVD  Cardiovascular:      Rate and Rhythm: Normal rate and regular rhythm  Pulses: Pulses are weak  Dorsalis pedis pulses are 1+ on the right side and 1+ on the left side  Posterior tibial pulses are 1+ on the right side and 1+ on the left side  Heart sounds: Normal heart sounds  No murmur  Pulmonary:      Effort: Pulmonary effort is normal  No respiratory distress  Breath sounds: Normal breath sounds  No wheezing or rales  Abdominal:      General: Bowel sounds are normal  There is no distension  Palpations: Abdomen is soft  Tenderness: There is no abdominal tenderness  Musculoskeletal:      Right lower leg: No edema  Left lower leg: No edema  Feet:      Right foot:      Skin integrity: Dry skin present  No ulcer, skin breakdown, erythema, warmth or callus  Left foot:      Skin integrity: Dry skin present   No ulcer, skin breakdown, erythema, warmth or callus  Lymphadenopathy:      Cervical: No cervical adenopathy  Skin:     General: Skin is warm and dry  Neurological:      Mental Status: He is alert  Psychiatric:         Mood and Affect: Mood normal          Behavior: Behavior normal      Diabetic Foot Exam  Patient's shoes and socks removed  Right Foot/Ankle   Right Foot Inspection  Skin Exam: skin normal, skin intact and dry skin no warmth, no callus, no erythema, no maceration, no abnormal color, no pre-ulcer, no ulcer and no callus                          Toe Exam: ROM and strength within normal limits  Sensory     Proprioception: diminished   Monofilament testing: diminished  Vascular  Capillary refills: < 3 seconds  The right DP pulse is 1+  The right PT pulse is 1+  Left Foot/Ankle  Left Foot Inspection  Skin Exam: skin normal, skin intact and dry skinno warmth, no erythema, no maceration, normal color, no pre-ulcer, no ulcer and no callus                         Toe Exam: ROM and strength within normal limits                   Sensory     Proprioception: diminished  Monofilament: diminished  Vascular  Capillary refills: < 3 seconds  The left DP pulse is 1+  The left PT pulse is 1+  Assign Risk Category:  No deformity present;  Loss of protective sensation; Weak pulses       Risk: 2    Cheo Aguilera DO  MEDICAL [address]

## 2021-02-25 NOTE — PATIENT INSTRUCTIONS
Type 2 Diabetes Management for Adults   WHAT YOU NEED TO KNOW:   What is type 2 diabetes? Type 2 diabetes is a disease that affects how your body uses glucose (sugar)  Normally, when the blood sugar level increases, the pancreas makes more insulin  Insulin helps move sugar out of the blood so it can be used for energy  Type 2 diabetes develops because either the body cannot make enough insulin, or it cannot use the insulin correctly  Type 2 diabetes can be controlled to prevent damage to your heart, blood vessels, and other organs  What can I do to manage my blood sugar levels? · Make healthy food choices  Healthy foods can give you energy to learn and be active  Healthy foods can also help you keep your blood sugar in balance, and manage or lose weight safely  Work with a dietitian to develop a meal plan that works for you and your schedule  A dietitian can help you learn how to eat the right amount of carbohydrates during your meals and snacks  Carbohydrates can raise your blood sugar if you eat too many at one time  Some foods that contain carbohydrates include breads, cereals, rice, pasta, and sweets  · Make healthy beverage choices  Drink water  Decrease the amount of drinks with sugar substitutes you have, such as diet sodas  Avoid sugar-sweetened drinks, such as regular sodas and fruit juice  · Get regular physical activity  Physical activity helps to lower your blood sugar levels  It can also help you manage your weight  Get at least 150 minutes of moderate to vigorous aerobic physical activity each week  Do not miss more than 2 days in a row  Do not sit longer than 30 minutes at a time  Your healthcare provider can help you create an activity plan  The plan can include the best activities for you and can help you build your strength and endurance  · Maintain a healthy weight  Ask your healthcare provider how much you should weigh   Ask him or her to help you create a safe weight loss plan if you are overweight  Weight loss can improve your blood sugar levels  · Check your blood sugar level as directed and as needed  Ask your healthcare provider what your blood sugar levels should be  ? Look at your schedule and make a plan for when you will check your blood sugar levels throughout the day  ? Check more often if you think your blood sugar is too high or too low  This will allow you to take care of any low or high blood sugar levels so they do not interfere with your activities  ? Rotate the sites where you do fingersticks  This will help make the checks less painful, and make fingerstick sites less noticeable  ? Write down your blood sugar levels so you can show them to your healthcare provider during your visits  Talk to your healthcare provider if you are having trouble keeping your blood sugar at the recommended levels  · Take your diabetes medicine or insulin as directed  You may need diabetes medicine, insulin, or both to help control your blood sugar levels  Your healthcare provider will teach you how and when to take your diabetes medicine or insulin  · Go to all follow-up appointments  Your healthcare provider may need to check your A1c every 3 months  An A1c test shows the average amount of sugar in your blood over the past 2 to 3 months  Your healthcare provider will tell you what your A1c level should be  What do I need to know about high blood sugar? High blood sugar may not cause any symptoms  It may cause you to feel more thirsty than usual or urinate more often than usual  Over time, high blood sugar levels can damage your nerves, blood vessels, tissues, and organs  · Large meals or large amounts of carbohydrates at one time can raise your blood sugar  · Decreased physical activity can raise your blood sugar  For example, your blood sugar can increase if you stop playing a sport or getting regular physical activity   Do not sit for longer than 90 minutes at a time  · Stress can raise your blood sugar  Ask your healthcare provider for help if you are having trouble managing stress  · Illness can raise your blood sugar  This can happen even if you eat less than usual while you are sick  Work with your healthcare provider to develop a sick day plan  This is a plan that helps you manage your blood sugar levels while you are sick  · A lower dose of medicine or insulin, or a late dose, can raise your blood sugar  There is not enough time for your medicine or insulin to work as it should if you take it late  When you take a lower dose, there is not enough medicine or insulin needed to lower your blood sugar  What do I need to know about low blood sugar? You can prevent symptoms such as shakiness, dizziness, irritability, or confusion by preventing your blood sugar from going too low  · Treat low blood sugar right away  Eat 15 grams of carbohydrate, such as 4 ounces of juice or 1 tube of glucose gel  Check your blood sugar again 10 to 15 minutes later  When your blood sugar goes back to normal, eat a meal or snack to prevent another decrease in blood sugar  ·          · Your blood sugar can get too low if you take diabetes medicine or insulin and do not eat enough food  It can also happen if you skip a meal or snack  · Increased physical activity can cause low blood sugar  If you use insulin, check your blood sugar before you exercise  If your blood sugar is below 100 mg/dL, eat 15 grams of carbohydrate  If you will exercise for more than 1 hour, check your blood sugar every 30 minutes  You may need to adjust your insulin before exercise  You may need a carbohydrate snack during or after exercise  What else can I do to manage my diabetes? · Wear medical alert jewelry or carry a card that says you have diabetes  Ask where to get these items  · Be safe when you drive    Check your blood sugar before you drive if you use insulin, and you think your blood sugar is low  If your blood sugar is low, eat 15 grams of carbohydrate and wait for your blood sugar to go back to normal  Keep snacks that contain carbohydrate in the car  If you feel like your blood sugar is low while you are driving, pull over and check your blood sugar level  Treat low blood sugar before you start driving again, if needed  · Know the risks if you choose to drink alcohol  Alcohol can cause your blood sugar levels to be low if you use insulin  Alcohol can cause high blood sugar levels and weight gain if you drink too much  Women should limit alcohol to 1 drink a day  Men should limit alcohol to 2 drinks a day  A drink of alcohol is 12 ounces of beer, 5 ounces of wine, or 1½ ounces of liquor  · Do not smoke  Nicotine can damage blood vessels and make it more difficult to manage your diabetes  Do not use e-cigarettes or smokeless tobacco in place of cigarettes or to help you quit  They still contain nicotine  Ask your healthcare provider for information if you currently smoke and need help quitting  · Have screenings for complications of diabetes and other conditions that happen with diabetes  You will need to be screened for kidney problems, high cholesterol, high blood pressure, blood vessel problems, eye problems, and eating disorders  Some screenings may begin right away and some may happen within the first 5 years of diagnosis  You will need to continue screenings through your lifetime  Keep your follow-up appointments with all providers  · Ask about vaccines  You have a higher risk for serious illness if you get the flu, pneumonia, or hepatitis  Ask your healthcare provider if you should get a flu, pneumonia, or hepatitis B vaccine, and when to get the vaccine  CARE AGREEMENT:   You have the right to help plan your care  Learn about your health condition and how it may be treated   Discuss treatment options with your healthcare providers to decide what care you want to receive  You always have the right to refuse treatment  The above information is an  only  It is not intended as medical advice for individual conditions or treatments  Talk to your doctor, nurse or pharmacist before following any medical regimen to see if it is safe and effective for you  © Copyright [address] Information is for End User's use only and may not be sold, redistributed or otherwise used for commercial purposes   All illustrations and images included in CareNotes® are the copyrighted property of A D A M [employer]  or [address] [place] [address]

## 2021-02-26 ENCOUNTER — TELEPHONE (OUTPATIENT)
Dept: INTERNAL MEDICINE CLINIC | Facility: CLINIC | Age: 81
End: 2021-02-26

## 2021-02-26 ENCOUNTER — TELEPHONE (OUTPATIENT)
Dept: PULMONOLOGY | Facility: CLINIC | Age: 81
End: 2021-02-26

## 2021-02-26 DIAGNOSIS — Z85.118 HISTORY OF LUNG CANCER: Primary | ICD-10-CM

## 2021-02-26 DIAGNOSIS — Z72.0 TOBACCO ABUSE: ICD-10-CM

## 2021-02-26 DIAGNOSIS — R91.8 PULMONARY NODULES: ICD-10-CM

## 2021-02-26 NOTE — TELEPHONE ENCOUNTER
----- Message from Sukhjinder Evans DO sent at 2/26/2021  7:38 AM EST -----  Call patient and let him know labs are stable  Diabetes very well controlled  Vitamin D just a little low  Would recommend 4000 units vitamin D3 daily

## 2021-02-26 NOTE — TELEPHONE ENCOUNTER
----- Message from Sheyla Morrissey PA-C sent at 2/26/2021 12:09 PM EST -----  Please let the patient know CT chest findings are stable compared to last year's exam  We will continue with annual monitoring       Thank you

## 2021-03-15 DIAGNOSIS — J43.2 CENTRILOBULAR EMPHYSEMA (HCC): ICD-10-CM

## 2021-03-15 RX ORDER — FLUTICASONE FUROATE, UMECLIDINIUM BROMIDE AND VILANTEROL TRIFENATATE 100; 62.5; 25 UG/1; UG/1; UG/1
POWDER RESPIRATORY (INHALATION)
Qty: 180 EACH | Refills: 3 | Status: SHIPPED | OUTPATIENT
Start: 2021-03-15 | End: 2022-04-20

## 2021-03-16 ENCOUNTER — IMMUNIZATIONS (OUTPATIENT)
Dept: FAMILY MEDICINE CLINIC | Facility: HOSPITAL | Age: 81
End: 2021-03-16

## 2021-03-16 DIAGNOSIS — Z23 ENCOUNTER FOR IMMUNIZATION: Primary | ICD-10-CM

## 2021-03-16 PROCEDURE — 91300 SARS-COV-2 / COVID-19 MRNA VACCINE (PFIZER-BIONTECH) 30 MCG: CPT

## 2021-03-16 PROCEDURE — 0001A SARS-COV-2 / COVID-19 MRNA VACCINE (PFIZER-BIONTECH) 30 MCG: CPT

## 2021-03-17 ENCOUNTER — TELEPHONE (OUTPATIENT)
Dept: PULMONOLOGY | Facility: CLINIC | Age: 81
End: 2021-03-17

## 2021-03-17 NOTE — TELEPHONE ENCOUNTER
FYI-    Patient just wanted to let you know he received the first dose of pfizer covid vaccine yesterday  Thank you

## 2021-03-19 DIAGNOSIS — N32.81 OVERACTIVE BLADDER: Chronic | ICD-10-CM

## 2021-03-19 RX ORDER — OXYBUTYNIN CHLORIDE 5 MG/1
TABLET, EXTENDED RELEASE ORAL
Qty: 90 TABLET | Refills: 3 | Status: SHIPPED | OUTPATIENT
Start: 2021-03-19 | End: 2022-03-16

## 2021-04-07 ENCOUNTER — IMMUNIZATIONS (OUTPATIENT)
Dept: FAMILY MEDICINE CLINIC | Facility: HOSPITAL | Age: 81
End: 2021-04-07

## 2021-04-07 DIAGNOSIS — Z23 ENCOUNTER FOR IMMUNIZATION: Primary | ICD-10-CM

## 2021-04-07 PROCEDURE — 0002A SARS-COV-2 / COVID-19 MRNA VACCINE (PFIZER-BIONTECH) 30 MCG: CPT

## 2021-04-07 PROCEDURE — 91300 SARS-COV-2 / COVID-19 MRNA VACCINE (PFIZER-BIONTECH) 30 MCG: CPT

## 2021-04-19 ENCOUNTER — OFFICE VISIT (OUTPATIENT)
Dept: NEUROLOGY | Facility: CLINIC | Age: 81
End: 2021-04-19
Payer: MEDICARE

## 2021-04-19 ENCOUNTER — TELEPHONE (OUTPATIENT)
Dept: NEUROLOGY | Facility: CLINIC | Age: 81
End: 2021-04-19

## 2021-04-19 VITALS
HEART RATE: 82 BPM | DIASTOLIC BLOOD PRESSURE: 70 MMHG | WEIGHT: 152 LBS | BODY MASS INDEX: 22.51 KG/M2 | HEIGHT: 69 IN | RESPIRATION RATE: 16 BRPM | SYSTOLIC BLOOD PRESSURE: 118 MMHG

## 2021-04-19 DIAGNOSIS — M47.812 CERVICAL SPONDYLOSIS: Primary | ICD-10-CM

## 2021-04-19 DIAGNOSIS — E11.40 TYPE 2 DIABETES MELLITUS WITH DIABETIC NEUROPATHY, WITHOUT LONG-TERM CURRENT USE OF INSULIN (HCC): Chronic | ICD-10-CM

## 2021-04-19 PROCEDURE — 99214 OFFICE O/P EST MOD 30 MIN: CPT | Performed by: PSYCHIATRY & NEUROLOGY

## 2021-04-19 RX ORDER — GABAPENTIN 100 MG/1
200 CAPSULE ORAL 2 TIMES DAILY
Qty: 120 CAPSULE | Refills: 6 | Status: SHIPPED | OUTPATIENT
Start: 2021-04-19 | End: 2022-04-08 | Stop reason: SDUPTHER

## 2021-04-19 NOTE — PROGRESS NOTES
Progress Note - Neurology   Nikky Gomez During 68 y o  male MRN: [id number]  Unit/Bed#:  Encounter: [id number]      Subjective:    patient is here for a follow-up visit for cervical spondylosis, status post cervical decompression, left shoulder discomfort, diabetic neuropathy, and overall has been maintaining himself except occasionally he feels he is a little off balance and has persistent numbness in his left hand  His blood sugars under good control at this time, and patient remains on gabapentin 300 mg twice a day and Cymbalta 60 mg at bedtime  He denies any other neurological symptoms  ROS:   Review of Systems   Constitutional: Negative  Negative for appetite change and fever  HENT: Negative  Negative for hearing loss, tinnitus, trouble swallowing and voice change  Eyes: Negative  Negative for photophobia and pain  Respiratory: Positive for shortness of breath  Cardiovascular: Negative  Negative for palpitations  Gastrointestinal: Negative  Negative for nausea and vomiting  Endocrine: Negative  Negative for cold intolerance  Genitourinary: Negative  Negative for dysuria, frequency and urgency  Musculoskeletal: Positive for arthralgias, gait problem and joint swelling  Negative for myalgias and neck pain  Skin: Negative  Negative for rash  Neurological: Positive for headaches  Negative for dizziness, tremors, seizures, syncope, facial asymmetry, speech difficulty, weakness, light-headedness and numbness  Hematological: Negative  Does not bruise/bleed easily  Psychiatric/Behavioral: Positive for sleep disturbance  Negative for confusion and hallucinations  MA review of systems was reviewed by myself  Vitals:   Vitals:    04/19/21 1410   BP: 118/70   BP Location: Left arm   Patient Position: Sitting   Cuff Size: Standard   Pulse: 82   Resp: 16   Weight: 68 9 kg (152 lb)   Height: 5' 9" (1 753 m)   ,Body mass index is 22 45 kg/m²      MEDS:      Current Outpatient Medications:     aspirin (ECOTRIN LOW STRENGTH) 81 mg EC tablet, Take 1 tablet by mouth daily, Disp: 30 tablet, Rfl: 0    diltiazem (TIAZAC) 240 MG 24 hr capsule, take 1 capsule by mouth once daily, Disp: 90 capsule, Rfl: 3    DULoxetine (CYMBALTA) 60 mg delayed release capsule, Take 1 capsule (60 mg total) by mouth daily, Disp: 90 capsule, Rfl: 3    gabapentin (NEURONTIN) 300 mg capsule, Take 1 capsule (300 mg total) by mouth 2 (two) times a day, Disp: 90 capsule, Rfl: 3    glucose blood (OneTouch Verio) test strip, use 1 TEST STRIP to TEST BLOOD SUGAR twice a day, Disp: 200 each, Rfl: 3    Lancets (OneTouch Delica Plus [id number]) MISC, use 1 LANCET to TEST BLOOD SUGAR one to two times a day, Disp: 200 each, Rfl: 3    losartan (COZAAR) 100 MG tablet, TAKE 1 TABLET DAILY, Disp: 90 tablet, Rfl: 3    metFORMIN (GLUCOPHAGE) 500 mg tablet, take 1 tablet by mouth daily WITH BREAKFAST, Disp: 90 tablet, Rfl: 3    Multiple Vitamin (MULTI-VITAMINS PO), Take by mouth daily, Disp: , Rfl:     oxybutynin (DITROPAN-XL) 5 mg 24 hr tablet, take 1 tablet by mouth once daily, Disp: 90 tablet, Rfl: 3    polyvinyl alcohol (LIQUIFILM TEARS) 1 4 % ophthalmic solution, Administer 1 drop to both eyes 4 (four) times a day, Disp: 15 mL, Rfl: 0    pravastatin (PRAVACHOL) 40 mg tablet, take 1 tablet by mouth once daily, Disp: 90 tablet, Rfl: 1    sildenafil (REVATIO) 20 mg tablet, Take 2-5 tablets as needed daily for intercourse, Disp: 60 tablet, Rfl: 6    tamsulosin (FLOMAX) 0 4 mg, TAKE 1 CAPSULE DAILY AT BEDTIME, Disp: 90 capsule, Rfl: 3    thiamine 50 MG tablet, Take 1 tablet (50 mg total) by mouth daily, Disp: 30 tablet, Rfl: 3    Trelegy Ellipta 100-62 5-25 MCG/INH inhaler, USE 1 INHALATION DAILY (RINSE MOUTH AFTER USE), Disp: 180 each, Rfl: 3    nicotine polacrilex (COMMIT) 4 MG lozenge, Apply 4 mg to the mouth or throat as needed for smoking cessation, Disp: , Rfl:   :    Physical Exam:  General appearance: alert, appears stated age and cooperative  Head: Normocephalic, without obvious abnormality, atraumatic      On neurological examination patient is alert awake oriented, speech is fluent, cranial nerves 2-12 intact, and on motor and sensory exam there is no evidence of any focal weakness in the upper or lower extremities  Deep tendon reflexes are brisk in both lower extremities, patient has diminished proprioception and vibration in both feet, with a mild sway on Romberg testing and his gait is normal based with no evidence of any dysmetria noted on finger-to-nose exam   No bruits were appreciable in the neck  No cervical paraspinal tenderness was noted  Lab Results: I have personally reviewed pertinent reports  Imaging Studies: I have personally reviewed pertinent reports  Assessment:  1  Cervical spondylosis, status post cervical decompression  2  Diabetic polyneuropathy with mild gait imbalance  Plan:    Patient is advised a regular home exercise program, he also notices swelling in his feet intermittently is advised to lower the dose of gabapentin to 200 mg twice a day  If he sees no worsening after 1 month he is advised to further reduce it to 100 mg twice a day  Will continue with Cymbalta at this time, tight blood sugar control and blood pressure control is recommended, he also remains on multivitamins on a regular basis and smoking cessation was discussed again  Patient will return back to see me in 6 months  4/19/2021,2:22 PM    Dictation voice to text software has been used in the creation of this document  Please consider this in light of any contextual or grammatical errors

## 2021-04-19 NOTE — TELEPHONE ENCOUNTER
Pt had an office visit with Dr Grant Teresa 4/19/2021  Pt left office without scheduling a follow up appt    Called and left message for pt to schedule 6 month follow up with Dr Grant Teresa

## 2021-06-10 DIAGNOSIS — E11.40 TYPE 2 DIABETES MELLITUS WITH DIABETIC NEUROPATHY, WITHOUT LONG-TERM CURRENT USE OF INSULIN (HCC): Chronic | ICD-10-CM

## 2021-06-10 RX ORDER — DULOXETIN HYDROCHLORIDE 60 MG/1
CAPSULE, DELAYED RELEASE ORAL
Qty: 90 CAPSULE | Refills: 3 | Status: SHIPPED | OUTPATIENT
Start: 2021-06-10 | End: 2022-05-07

## 2021-06-24 ENCOUNTER — TELEPHONE (OUTPATIENT)
Dept: INTERNAL MEDICINE CLINIC | Facility: CLINIC | Age: 81
End: 2021-06-24

## 2021-06-24 ENCOUNTER — APPOINTMENT (OUTPATIENT)
Dept: LAB | Facility: CLINIC | Age: 81
End: 2021-06-24
Payer: MEDICARE

## 2021-06-24 ENCOUNTER — OFFICE VISIT (OUTPATIENT)
Dept: INTERNAL MEDICINE CLINIC | Facility: CLINIC | Age: 81
End: 2021-06-24
Payer: MEDICARE

## 2021-06-24 VITALS
BODY MASS INDEX: 22.36 KG/M2 | OXYGEN SATURATION: 99 % | TEMPERATURE: 97.9 F | HEIGHT: 69 IN | HEART RATE: 63 BPM | DIASTOLIC BLOOD PRESSURE: 74 MMHG | WEIGHT: 151 LBS | SYSTOLIC BLOOD PRESSURE: 130 MMHG

## 2021-06-24 DIAGNOSIS — Z11.59 NEED FOR HEPATITIS C SCREENING TEST: ICD-10-CM

## 2021-06-24 DIAGNOSIS — E11.40 TYPE 2 DIABETES MELLITUS WITH DIABETIC NEUROPATHY, WITHOUT LONG-TERM CURRENT USE OF INSULIN (HCC): Primary | Chronic | ICD-10-CM

## 2021-06-24 DIAGNOSIS — E11.69 HYPERLIPIDEMIA ASSOCIATED WITH TYPE 2 DIABETES MELLITUS (HCC): Chronic | ICD-10-CM

## 2021-06-24 DIAGNOSIS — E78.5 HYPERLIPIDEMIA ASSOCIATED WITH TYPE 2 DIABETES MELLITUS (HCC): Chronic | ICD-10-CM

## 2021-06-24 DIAGNOSIS — E11.40 TYPE 2 DIABETES MELLITUS WITH DIABETIC NEUROPATHY, WITHOUT LONG-TERM CURRENT USE OF INSULIN (HCC): Chronic | ICD-10-CM

## 2021-06-24 DIAGNOSIS — I10 ESSENTIAL HYPERTENSION: Chronic | ICD-10-CM

## 2021-06-24 LAB
ALBUMIN SERPL BCP-MCNC: 3.8 G/DL (ref 3.5–5)
ANION GAP SERPL CALCULATED.3IONS-SCNC: 5 MMOL/L (ref 4–13)
BUN SERPL-MCNC: 9 MG/DL (ref 5–25)
CALCIUM SERPL-MCNC: 10 MG/DL (ref 8.3–10.1)
CHLORIDE SERPL-SCNC: 110 MMOL/L (ref 100–108)
CO2 SERPL-SCNC: 28 MMOL/L (ref 21–32)
CREAT SERPL-MCNC: 0.78 MG/DL (ref 0.6–1.3)
EST. AVERAGE GLUCOSE BLD GHB EST-MCNC: 148 MG/DL
GFR SERPL CREATININE-BSD FRML MDRD: 100 ML/MIN/1.73SQ M
GLUCOSE P FAST SERPL-MCNC: 133 MG/DL (ref 65–99)
HBA1C MFR BLD: 6.8 %
HCV AB SER QL: NORMAL
POTASSIUM SERPL-SCNC: 4.7 MMOL/L (ref 3.5–5.3)
SODIUM SERPL-SCNC: 143 MMOL/L (ref 136–145)

## 2021-06-24 PROCEDURE — 83036 HEMOGLOBIN GLYCOSYLATED A1C: CPT

## 2021-06-24 PROCEDURE — 86803 HEPATITIS C AB TEST: CPT

## 2021-06-24 PROCEDURE — 36415 COLL VENOUS BLD VENIPUNCTURE: CPT

## 2021-06-24 PROCEDURE — 80048 BASIC METABOLIC PNL TOTAL CA: CPT

## 2021-06-24 PROCEDURE — 82040 ASSAY OF SERUM ALBUMIN: CPT

## 2021-06-24 PROCEDURE — 99214 OFFICE O/P EST MOD 30 MIN: CPT | Performed by: INTERNAL MEDICINE

## 2021-06-24 RX ORDER — LANOLIN ALCOHOL/MO/W.PET/CERES
50 CREAM (GRAM) TOPICAL DAILY
COMMUNITY

## 2021-06-24 RX ORDER — ACETAMINOPHEN 500 MG
500 TABLET ORAL EVERY 6 HOURS PRN
COMMUNITY

## 2021-06-24 NOTE — PROGRESS NOTES
[facility] - [facility]    NAME: Asher Claros During  AGE: 68 y o  SEX: male  : 1940     DATE: 2021     Assessment and Plan:     1  Type 2 diabetes mellitus with diabetic neuropathy, without long-term current use of insulin ([facility] [address] )    Most recent A1c was 6 6 % on 2021  Diabetes has been well controlled for years  Follow-up with foot and eye doctor  Check UTD labs  - Hemoglobin A1C; Future  - Basic metabolic panel; Future  - Albumin; Future    2  Hyperlipidemia associated with type 2 diabetes mellitus ([facility] [address] )    Continue pravastatin as prescribed  Will check UTD lipids  3  Essential hypertension    BP controlled in the office  Continue anti-hypertensives  4  Need for hepatitis C screening test  - Hepatitis C antibody; Future        Return in about 4 months (around 2021) for Subsequent AWV  Chief Complaint:     Chief Complaint   Patient presents with    fu      History of Present Illness:     Kelly Irby presents for follow-up  Denies any changes with his health  Blood sugars remain well controlled  Hasn't been too active lately  He struggles with back pain  Review of Systems:     Review of Systems   Constitutional: Positive for fatigue  Negative for activity change and appetite change  Respiratory: Negative for apnea, cough, chest tightness, shortness of breath and wheezing  Cardiovascular: Positive for leg swelling  Negative for chest pain and palpitations  Gastrointestinal: Negative for abdominal distention, abdominal pain, blood in stool, constipation, diarrhea, nausea and vomiting  Musculoskeletal: Positive for back pain  Neurological: Negative for dizziness, weakness, light-headedness, numbness and headaches  Psychiatric/Behavioral: Negative for behavioral problems, confusion, hallucinations, sleep disturbance and suicidal ideas  The patient is not nervous/anxious         Objective:     /74 (BP Location: Left arm, Patient Position: Sitting) Comment: fs  Pulse 63   Temp 97 9 °F (36 6 °C) (Tympanic) Comment: gdf  Ht 5' 9" (1 753 m)   Wt 68 5 kg (151 lb)   SpO2 99%   BMI 22 30 kg/m²     Physical Exam  Vitals reviewed  Constitutional:       General: He is not in acute distress  Appearance: He is well-developed  He is not diaphoretic  Neck:      Thyroid: No thyromegaly  Vascular: No JVD  Cardiovascular:      Rate and Rhythm: Normal rate and regular rhythm  Heart sounds: Normal heart sounds  No murmur heard  Pulmonary:      Effort: Pulmonary effort is normal  No respiratory distress  Breath sounds: Normal breath sounds  No wheezing or rales  Abdominal:      General: Bowel sounds are normal  There is no distension  Palpations: Abdomen is soft  Tenderness: There is no abdominal tenderness  Musculoskeletal:      Cervical back: Neck supple  Right lower leg: Edema (mild) present  Left lower leg: Edema (mild) present  Lymphadenopathy:      Cervical: No cervical adenopathy  Skin:     General: Skin is warm and dry  Neurological:      Mental Status: He is alert     Psychiatric:         Mood and Affect: Mood normal          Behavior: Behavior normal        Scott Cheung DO  MEDICAL [id number]

## 2021-06-24 NOTE — TELEPHONE ENCOUNTER
----- Message from Lizy Dyer DO sent at 6/24/2021  2:39 PM EDT -----  Labs stable   Diabetes is controlled

## 2021-06-24 NOTE — PATIENT INSTRUCTIONS
Type 2 Diabetes Management for Adults   WHAT YOU NEED TO KNOW:   What is type 2 diabetes? Type 2 diabetes is a disease that affects how your body uses glucose (sugar)  Normally, when the blood sugar level increases, the pancreas makes more insulin  Insulin helps move sugar out of the blood so it can be used for energy  Type 2 diabetes develops because either the body cannot make enough insulin, or it cannot use the insulin correctly  Type 2 diabetes can be controlled to prevent damage to your heart, blood vessels, and other organs  What can I do to manage my blood sugar levels? · Make healthy food choices  Healthy foods can give you energy to learn and be active  Healthy foods can also help you keep your blood sugar in balance, and manage or lose weight safely  Work with a dietitian to develop a meal plan that works for you and your schedule  A dietitian can help you learn how to eat the right amount of carbohydrates during your meals and snacks  Carbohydrates can raise your blood sugar if you eat too many at one time  Some foods that contain carbohydrates include breads, cereals, rice, pasta, and sweets  · Make healthy beverage choices  Drink water  Decrease the amount of drinks with sugar substitutes you have, such as diet sodas  Avoid sugar-sweetened drinks, such as regular sodas and fruit juice  · Get regular physical activity  Physical activity helps to lower your blood sugar levels  It can also help you manage your weight  Get at least 150 minutes of moderate to vigorous aerobic physical activity each week  Do not miss more than 2 days in a row  Do not sit longer than 30 minutes at a time  Your healthcare provider can help you create an activity plan  The plan can include the best activities for you and can help you build your strength and endurance  · Maintain a healthy weight  Ask your healthcare provider how much you should weigh   Ask him or her to help you create a safe weight loss plan if you are overweight  Weight loss can improve your blood sugar levels  · Check your blood sugar level as directed and as needed  Ask your healthcare provider what your blood sugar levels should be  ? Look at your schedule and make a plan for when you will check your blood sugar levels throughout the day  ? Check more often if you think your blood sugar is too high or too low  This will allow you to take care of any low or high blood sugar levels so they do not interfere with your activities  ? Rotate the sites where you do fingersticks  This will help make the checks less painful, and make fingerstick sites less noticeable  ? Write down your blood sugar levels so you can show them to your healthcare provider during your visits  Talk to your healthcare provider if you are having trouble keeping your blood sugar at the recommended levels  · Take your diabetes medicine or insulin as directed  You may need diabetes medicine, insulin, or both to help control your blood sugar levels  Your healthcare provider will teach you how and when to take your diabetes medicine or insulin  · Go to all follow-up appointments  Your healthcare provider may need to check your A1c every 3 months  An A1c test shows the average amount of sugar in your blood over the past 2 to 3 months  Your healthcare provider will tell you what your A1c level should be  What do I need to know about high blood sugar? High blood sugar may not cause any symptoms  It may cause you to feel more thirsty than usual or urinate more often than usual  Over time, high blood sugar levels can damage your nerves, blood vessels, tissues, and organs  · Large meals or large amounts of carbohydrates at one time can raise your blood sugar  · Decreased physical activity can raise your blood sugar  For example, your blood sugar can increase if you stop playing a sport or getting regular physical activity   Do not sit for longer than 90 minutes at a time  · Stress can raise your blood sugar  Ask your healthcare provider for help if you are having trouble managing stress  · Illness can raise your blood sugar  This can happen even if you eat less than usual while you are sick  Work with your healthcare provider to develop a sick day plan  This is a plan that helps you manage your blood sugar levels while you are sick  · A lower dose of medicine or insulin, or a late dose, can raise your blood sugar  There is not enough time for your medicine or insulin to work as it should if you take it late  When you take a lower dose, there is not enough medicine or insulin needed to lower your blood sugar  What do I need to know about low blood sugar? You can prevent symptoms such as shakiness, dizziness, irritability, or confusion by preventing your blood sugar from going too low  · Treat low blood sugar right away  Eat 15 grams of carbohydrate, such as 4 ounces of juice or 1 tube of glucose gel  Check your blood sugar again 10 to 15 minutes later  When your blood sugar goes back to normal, eat a meal or snack to prevent another decrease in blood sugar  ·          · Your blood sugar can get too low if you take diabetes medicine or insulin and do not eat enough food  It can also happen if you skip a meal or snack  · Increased physical activity can cause low blood sugar  If you use insulin, check your blood sugar before you exercise  If your blood sugar is below 100 mg/dL, eat 15 grams of carbohydrate  If you will exercise for more than 1 hour, check your blood sugar every 30 minutes  You may need to adjust your insulin before exercise  You may need a carbohydrate snack during or after exercise  What else can I do to manage my diabetes? · Wear medical alert jewelry or carry a card that says you have diabetes  Ask where to get these items  · Be safe when you drive    Check your blood sugar before you drive if you use insulin, and you think your blood sugar is low  If your blood sugar is low, eat 15 grams of carbohydrate and wait for your blood sugar to go back to normal  Keep snacks that contain carbohydrate in the car  If you feel like your blood sugar is low while you are driving, pull over and check your blood sugar level  Treat low blood sugar before you start driving again, if needed  · Know the risks if you choose to drink alcohol  Alcohol can cause your blood sugar levels to be low if you use insulin  Alcohol can cause high blood sugar levels and weight gain if you drink too much  Women should limit alcohol to 1 drink a day  Men should limit alcohol to 2 drinks a day  A drink of alcohol is 12 ounces of beer, 5 ounces of wine, or 1½ ounces of liquor  · Do not smoke  Nicotine can damage blood vessels and make it more difficult to manage your diabetes  Do not use e-cigarettes or smokeless tobacco in place of cigarettes or to help you quit  They still contain nicotine  Ask your healthcare provider for information if you currently smoke and need help quitting  · Have screenings for complications of diabetes and other conditions that happen with diabetes  You will need to be screened for kidney problems, high cholesterol, high blood pressure, blood vessel problems, eye problems, and eating disorders  Some screenings may begin right away and some may happen within the first 5 years of diagnosis  You will need to continue screenings through your lifetime  Keep your follow-up appointments with all providers  · Ask about vaccines  You have a higher risk for serious illness if you get the flu, pneumonia, or hepatitis  Ask your healthcare provider if you should get a flu, pneumonia, or hepatitis B vaccine, and when to get the vaccine  CARE AGREEMENT:   You have the right to help plan your care  Learn about your health condition and how it may be treated   Discuss treatment options with your healthcare providers to decide what care you want to receive  You always have the right to refuse treatment  The above information is an  only  It is not intended as medical advice for individual conditions or treatments  Talk to your doctor, nurse or pharmacist before following any medical regimen to see if it is safe and effective for you  © Copyright [address] Information is for End User's use only and may not be sold, redistributed or otherwise used for commercial purposes   All illustrations and images included in CareNotes® are the copyrighted property of [employer] D [employer] M [employer]  or [address] [place] [address]

## 2021-07-20 DIAGNOSIS — E78.5 HYPERLIPIDEMIA ASSOCIATED WITH TYPE 2 DIABETES MELLITUS (HCC): ICD-10-CM

## 2021-07-20 DIAGNOSIS — E11.69 HYPERLIPIDEMIA ASSOCIATED WITH TYPE 2 DIABETES MELLITUS (HCC): ICD-10-CM

## 2021-07-20 RX ORDER — PRAVASTATIN SODIUM 40 MG
TABLET ORAL
Qty: 90 TABLET | Refills: 1 | Status: SHIPPED | OUTPATIENT
Start: 2021-07-20 | End: 2022-01-23

## 2021-08-17 ENCOUNTER — TELEPHONE (OUTPATIENT)
Dept: PULMONOLOGY | Facility: CLINIC | Age: 81
End: 2021-08-17

## 2021-08-18 ENCOUNTER — OFFICE VISIT (OUTPATIENT)
Dept: UROLOGY | Facility: CLINIC | Age: 81
End: 2021-08-18
Payer: MEDICARE

## 2021-08-18 VITALS
SYSTOLIC BLOOD PRESSURE: 130 MMHG | WEIGHT: 156 LBS | HEIGHT: 69 IN | HEART RATE: 88 BPM | DIASTOLIC BLOOD PRESSURE: 82 MMHG | BODY MASS INDEX: 23.11 KG/M2

## 2021-08-18 DIAGNOSIS — N28.1 RENAL CYST: ICD-10-CM

## 2021-08-18 DIAGNOSIS — C61 PROSTATE CANCER (HCC): Primary | ICD-10-CM

## 2021-08-18 PROCEDURE — 99213 OFFICE O/P EST LOW 20 MIN: CPT | Performed by: PHYSICIAN ASSISTANT

## 2021-08-18 NOTE — PROGRESS NOTES
1  Prostate cancer ([facility] [address] )  PSA Total, Diagnostic    PSA Total, Diagnostic   2  Renal cyst  US kidney and bladder       Assessment and plan:     1  Prostate cancer status post brachytherapy (2013) - managed by Dr Tran Villegas  - attila for an updated PSA in the near future    2  Lower urinary tract symptoms  - status post normal cystoscopy 2019  - continue tamsulosin and oxybutynin    3  Erectile dysfunction  - continue sildenafil  - discussed [facility] - patient will do further research and contact us should he be interested    4  Bosniak 2F right upper pole renal cyst  - f/u 6 months US prior to visit for continued surveillance      Bambi Flannery PA-C      Chief Complaint     Prostate cancer, lower urinary tract symptoms, erectile dysfunction    History of Present Illness     Chloe Corbin During is a 80 y o  male patient of Dr Tran Villegas s/p brachytherapy for prostate cancer in 2013  Patient's last PSA was <0 1 (9/16/2020)  He has been treated previously with anticholinergic medications and with myrbetriq and these did not work  Patient underwent cystoscopy in October 2019 which was negative for any structural lesion, however did show some moderate obstruction at the prostatic urethra with some intravesical protrusion  Patient is currently managed on tamsulosin and oxybutynin  Complains of swollen legs and nocturia additionally, these have improved greatly since being on medical therapies for his LUTS  Complains of erectile dysfunction in which he has previously been prescribed sildenafil for management    MRI abdomen 1/5/21 showing 1 2cm hemorrhagic/proteinaceous cyst in the right upper pole with thin enhancing septations, classified as Bosniak 2F    Review of Systems     Review of Systems   Constitutional: Negative  HENT: Negative  Eyes: Negative  Respiratory: Negative  Cardiovascular: Negative  Gastrointestinal: Negative  Endocrine: Negative      Genitourinary: Positive for frequency and urgency  Musculoskeletal: Negative  Skin: Negative  Allergic/Immunologic: Negative  Neurological: Negative  Hematological: Negative  Psychiatric/Behavioral: Negative  Allergies     No Known Allergies    Physical Exam     Physical Exam  Vitals and nursing note reviewed  Constitutional:       General: He is not in acute distress  Appearance: He is well-developed  He is not diaphoretic  HENT:      Head: Normocephalic and atraumatic  Eyes:      General:         Right eye: No discharge  Left eye: No discharge  Pupils: Pupils are equal, round, and reactive to light  Neck:      Trachea: No tracheal deviation  Pulmonary:      Effort: Pulmonary effort is normal  No respiratory distress  Breath sounds: No stridor  Abdominal:      General: There is no distension  Palpations: Abdomen is soft  There is no mass  Tenderness: There is no abdominal tenderness  There is no guarding or rebound  Hernia: No hernia is present  Genitourinary:     Penis: Normal     Musculoskeletal:         General: No tenderness or deformity  Skin:     General: Skin is warm and dry  Coloration: Skin is not pale  Findings: No erythema or rash  Neurological:      Mental Status: He is alert and oriented to person, place, and time  Cranial Nerves: No cranial nerve deficit  Coordination: Coordination normal    Psychiatric:         Behavior: Behavior normal          Thought Content:  Thought content normal          Judgment: Judgment normal              Vital Signs  Vitals:    08/18/21 1310   BP: 130/82   Pulse: 88   Weight: 70 8 kg (156 lb)   Height: 5' 9" (1 753 m)         Current Medications       Current Outpatient Medications:     acetaminophen (TYLENOL) 500 mg tablet, Take 500 mg by mouth every 6 (six) hours as needed for mild pain, Disp: , Rfl:     aspirin (ECOTRIN LOW STRENGTH) 81 mg EC tablet, Take 1 tablet by mouth daily, Disp: 30 tablet, Rfl: 0   diltiazem (TIAZAC) 240 MG 24 hr capsule, take 1 capsule by mouth once daily, Disp: 90 capsule, Rfl: 3    DULoxetine (CYMBALTA) 60 mg delayed release capsule, TAKE 1 CAPSULE DAILY, Disp: 90 capsule, Rfl: 3    gabapentin (NEURONTIN) 100 mg capsule, Take 2 capsules (200 mg total) by mouth 2 (two) times a day, Disp: 120 capsule, Rfl: 6    glucose blood (OneTouch Verio) test strip, use 1 TEST STRIP to TEST BLOOD SUGAR twice a day, Disp: 200 each, Rfl: 3    Lancets (OneTouch Delica Plus [id number]) MISC, use 1 LANCET to TEST BLOOD SUGAR one to two times a day, Disp: 200 each, Rfl: 3    losartan (COZAAR) 100 MG tablet, TAKE 1 TABLET DAILY, Disp: 90 tablet, Rfl: 3    metFORMIN (GLUCOPHAGE) 500 mg tablet, take 1 tablet by mouth daily WITH BREAKFAST, Disp: 90 tablet, Rfl: 3    Multiple Vitamin (MULTI-VITAMINS PO), Take by mouth daily, Disp: , Rfl:     oxybutynin (DITROPAN-XL) 5 mg 24 hr tablet, take 1 tablet by mouth once daily, Disp: 90 tablet, Rfl: 3    polyvinyl alcohol (LIQUIFILM TEARS) 1 4 % ophthalmic solution, Administer 1 drop to both eyes 4 (four) times a day, Disp: 15 mL, Rfl: 0    pravastatin (PRAVACHOL) 40 mg tablet, take 1 tablet by mouth once daily, Disp: 90 tablet, Rfl: 1    pyridoxine (VITAMIN B6) 50 mg tablet, Take 50 mg by mouth daily, Disp: , Rfl:     sildenafil (REVATIO) 20 mg tablet, Take 2-5 tablets as needed daily for intercourse, Disp: 60 tablet, Rfl: 6    tamsulosin (FLOMAX) 0 4 mg, TAKE 1 CAPSULE DAILY AT BEDTIME, Disp: 90 capsule, Rfl: 3    thiamine 50 MG tablet, Take 1 tablet (50 mg total) by mouth daily, Disp: 30 tablet, Rfl: 3    Trelegy Ellipta 100-62 5-25 MCG/INH inhaler, USE 1 INHALATION DAILY (RINSE MOUTH AFTER USE), Disp: 180 each, Rfl: 3    VITAMIN E PO, Take by mouth 268 mg 400 iu, Disp: , Rfl:       Active Problems     Patient Active Problem List   Diagnosis    Type 2 diabetes mellitus with diabetic neuropathy, without long-term current use of insulin (HCC)    Hyperlipidemia associated with type 2 diabetes mellitus ([facility] [address] )    Essential hypertension    BPH (benign prostatic hyperplasia)    Degenerative cervical spinal stenosis    Cervical spondylosis    COPD (chronic obstructive pulmonary disease) (HCC)    History of prostate cancer    Overactive bladder    Lesion of native kidney    SHIVANI (obstructive sleep apnea)    Vitamin D insufficiency    Pipe smoker    Anxiety    Erectile dysfunction due to diseases classified elsewhere         Past Medical History     Past Medical History:   Diagnosis Date    BPH (benign prostatic hyperplasia)     Cancer ([facility] [address] ) 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    Cervical myelopathy ([facility] [address] ) 6/20/2019    Chemical exposure     9/11/01- worked in [employer]   [facility]) [facility]     CPAP (continuous positive airway pressure) dependence     DDD (degenerative disc disease), cervical     Diabetes mellitus ([facility] [address] )     Foraminal stenosis of cervical region     Hyperlipidemia     Hypertension     SHIVANI (obstructive sleep apnea)     Overactive bladder     Spinal cord compression ([facility] [address] ) 5/8/2019    Added automatically from request for surgery [id number]         Surgical History     Past Surgical History:   Procedure Laterality Date    ARTHROSCOPY KNEE Bilateral     COLONOSCOPY      LUNG SURGERY Left     scraping of left    NE ARTHRODESIS ANT INTERBODY INC DISCECTOMY, CERVICAL BELOW C2 Bilateral 6/18/2019    Procedure: C3/4  ACDF WITH  C4 HEMICORPECTOMY, C3-4 ANTERIOR INSTRUMENTATION AND FUSION (NEUROMONITORING);   Surgeon: Weston Ramos MD;  Location: [facility];  Service: Neurosurgery    ROTATOR CUFF REPAIR Bilateral          Family History     Family History   Problem Relation Age of Onset    No Known Problems Mother     No Known Problems Father          Social History     Social History     Social History     Tobacco Use   Smoking Status Current Every Day Smoker    Packs/day: 0 01    Years: 61 00    Pack years: 0 61    Types: Pipe    Last attempt to quit: 2020    Years since quittin 3   Smokeless Tobacco Current User   Tobacco Comment    2 PIPES PER DAY          Pertinent Lab Values     Lab Results   Component Value Date    CREATININE 0 78 2021       Lab Results   Component Value Date    PSA <0 1 2019             Pertinent Imaging      no imaging for my review

## 2021-08-20 DIAGNOSIS — N40.1 BENIGN PROSTATIC HYPERPLASIA WITH LOWER URINARY TRACT SYMPTOMS, SYMPTOM DETAILS UNSPECIFIED: ICD-10-CM

## 2021-08-20 DIAGNOSIS — I10 ESSENTIAL HYPERTENSION: ICD-10-CM

## 2021-08-21 RX ORDER — TAMSULOSIN HYDROCHLORIDE 0.4 MG/1
CAPSULE ORAL
Qty: 90 CAPSULE | Refills: 3 | Status: SHIPPED | OUTPATIENT
Start: 2021-08-21 | End: 2022-07-29 | Stop reason: CLARIF

## 2021-08-21 RX ORDER — LOSARTAN POTASSIUM 100 MG/1
TABLET ORAL
Qty: 90 TABLET | Refills: 3 | Status: SHIPPED | OUTPATIENT
Start: 2021-08-21 | End: 2022-07-21

## 2021-08-31 DIAGNOSIS — I10 ESSENTIAL HYPERTENSION: Chronic | ICD-10-CM

## 2021-08-31 RX ORDER — DILTIAZEM HYDROCHLORIDE 240 MG/1
CAPSULE, EXTENDED RELEASE ORAL
Qty: 90 CAPSULE | Refills: 3 | Status: SHIPPED | OUTPATIENT
Start: 2021-08-31 | End: 2022-07-29 | Stop reason: SDUPTHER

## 2021-09-16 DIAGNOSIS — E11.40 TYPE 2 DIABETES MELLITUS WITH DIABETIC NEUROPATHY, WITHOUT LONG-TERM CURRENT USE OF INSULIN (HCC): ICD-10-CM

## 2021-09-16 RX ORDER — LANCETS 33 GAUGE
EACH MISCELLANEOUS
Qty: 100 EACH | Refills: 0 | Status: SHIPPED | OUTPATIENT
Start: 2021-09-16 | End: 2021-12-15

## 2021-09-16 RX ORDER — BLOOD SUGAR DIAGNOSTIC
STRIP MISCELLANEOUS
Qty: 200 STRIP | Refills: 0 | Status: SHIPPED | OUTPATIENT
Start: 2021-09-16 | End: 2021-12-15

## 2021-11-03 ENCOUNTER — TELEPHONE (OUTPATIENT)
Dept: INTERNAL MEDICINE CLINIC | Facility: CLINIC | Age: 81
End: 2021-11-03

## 2021-11-05 ENCOUNTER — TELEPHONE (OUTPATIENT)
Dept: PULMONOLOGY | Facility: CLINIC | Age: 81
End: 2021-11-05

## 2021-11-10 RX ORDER — CLINDAMYCIN HYDROCHLORIDE 300 MG/1
300 CAPSULE ORAL EVERY 8 HOURS
COMMUNITY
Start: 2021-09-16

## 2021-11-10 RX ORDER — IBUPROFEN 600 MG/1
600 TABLET ORAL EVERY 6 HOURS PRN
COMMUNITY
Start: 2021-09-16

## 2021-11-11 ENCOUNTER — APPOINTMENT (OUTPATIENT)
Dept: LAB | Facility: CLINIC | Age: 81
End: 2021-11-11
Payer: MEDICARE

## 2021-11-11 ENCOUNTER — OFFICE VISIT (OUTPATIENT)
Dept: INTERNAL MEDICINE CLINIC | Facility: CLINIC | Age: 81
End: 2021-11-11
Payer: MEDICARE

## 2021-11-11 VITALS
DIASTOLIC BLOOD PRESSURE: 84 MMHG | HEART RATE: 82 BPM | TEMPERATURE: 97.8 F | BODY MASS INDEX: 22.31 KG/M2 | SYSTOLIC BLOOD PRESSURE: 158 MMHG | HEIGHT: 69 IN | OXYGEN SATURATION: 95 % | RESPIRATION RATE: 18 BRPM | WEIGHT: 150.6 LBS

## 2021-11-11 DIAGNOSIS — E11.40 TYPE 2 DIABETES MELLITUS WITH DIABETIC NEUROPATHY, WITHOUT LONG-TERM CURRENT USE OF INSULIN (HCC): Chronic | ICD-10-CM

## 2021-11-11 DIAGNOSIS — I10 ESSENTIAL HYPERTENSION: Chronic | ICD-10-CM

## 2021-11-11 DIAGNOSIS — E11.69 HYPERLIPIDEMIA ASSOCIATED WITH TYPE 2 DIABETES MELLITUS (HCC): Chronic | ICD-10-CM

## 2021-11-11 DIAGNOSIS — Z00.00 MEDICARE ANNUAL WELLNESS VISIT, SUBSEQUENT: ICD-10-CM

## 2021-11-11 DIAGNOSIS — E78.5 HYPERLIPIDEMIA ASSOCIATED WITH TYPE 2 DIABETES MELLITUS (HCC): Chronic | ICD-10-CM

## 2021-11-11 DIAGNOSIS — E11.40 TYPE 2 DIABETES MELLITUS WITH DIABETIC NEUROPATHY, WITHOUT LONG-TERM CURRENT USE OF INSULIN (HCC): Primary | Chronic | ICD-10-CM

## 2021-11-11 DIAGNOSIS — Z23 ENCOUNTER FOR IMMUNIZATION: ICD-10-CM

## 2021-11-11 DIAGNOSIS — C61 PROSTATE CANCER (HCC): ICD-10-CM

## 2021-11-11 LAB
ANION GAP SERPL CALCULATED.3IONS-SCNC: 4 MMOL/L (ref 4–13)
BUN SERPL-MCNC: 9 MG/DL (ref 5–25)
CALCIUM SERPL-MCNC: 10 MG/DL (ref 8.3–10.1)
CHLORIDE SERPL-SCNC: 110 MMOL/L (ref 100–108)
CHOLEST SERPL-MCNC: 89 MG/DL (ref 50–200)
CO2 SERPL-SCNC: 30 MMOL/L (ref 21–32)
CREAT SERPL-MCNC: 0.88 MG/DL (ref 0.6–1.3)
GFR SERPL CREATININE-BSD FRML MDRD: 94 ML/MIN/1.73SQ M
GLUCOSE SERPL-MCNC: 128 MG/DL (ref 65–140)
HDLC SERPL-MCNC: 46 MG/DL
LDLC SERPL CALC-MCNC: 34 MG/DL (ref 0–100)
NONHDLC SERPL-MCNC: 43 MG/DL
POTASSIUM SERPL-SCNC: 4 MMOL/L (ref 3.5–5.3)
PSA SERPL-MCNC: <0.1 NG/ML (ref 0–4)
SODIUM SERPL-SCNC: 144 MMOL/L (ref 136–145)
TRIGL SERPL-MCNC: 47 MG/DL

## 2021-11-11 PROCEDURE — 80048 BASIC METABOLIC PNL TOTAL CA: CPT

## 2021-11-11 PROCEDURE — 84153 ASSAY OF PSA TOTAL: CPT

## 2021-11-11 PROCEDURE — G0444 DEPRESSION SCREEN ANNUAL: HCPCS | Performed by: INTERNAL MEDICINE

## 2021-11-11 PROCEDURE — 1123F ACP DISCUSS/DSCN MKR DOCD: CPT | Performed by: INTERNAL MEDICINE

## 2021-11-11 PROCEDURE — 99214 OFFICE O/P EST MOD 30 MIN: CPT | Performed by: INTERNAL MEDICINE

## 2021-11-11 PROCEDURE — G0008 ADMIN INFLUENZA VIRUS VAC: HCPCS | Performed by: INTERNAL MEDICINE

## 2021-11-11 PROCEDURE — 90662 IIV NO PRSV INCREASED AG IM: CPT | Performed by: INTERNAL MEDICINE

## 2021-11-11 PROCEDURE — 36415 COLL VENOUS BLD VENIPUNCTURE: CPT

## 2021-11-11 PROCEDURE — G0439 PPPS, SUBSEQ VISIT: HCPCS | Performed by: INTERNAL MEDICINE

## 2021-11-11 PROCEDURE — 83036 HEMOGLOBIN GLYCOSYLATED A1C: CPT

## 2021-11-11 PROCEDURE — 80061 LIPID PANEL: CPT

## 2021-11-11 RX ORDER — HYDROCHLOROTHIAZIDE 12.5 MG/1
12.5 TABLET ORAL DAILY
Qty: 90 TABLET | Refills: 1 | Status: SHIPPED | OUTPATIENT
Start: 2021-11-11

## 2021-11-12 ENCOUNTER — TELEPHONE (OUTPATIENT)
Dept: INTERNAL MEDICINE CLINIC | Facility: CLINIC | Age: 81
End: 2021-11-12

## 2021-11-12 LAB
EST. AVERAGE GLUCOSE BLD GHB EST-MCNC: 140 MG/DL
HBA1C MFR BLD: 6.5 %

## 2021-12-03 ENCOUNTER — IMMUNIZATIONS (OUTPATIENT)
Dept: FAMILY MEDICINE CLINIC | Facility: HOSPITAL | Age: 81
End: 2021-12-03

## 2021-12-03 DIAGNOSIS — Z23 ENCOUNTER FOR IMMUNIZATION: Primary | ICD-10-CM

## 2021-12-03 PROCEDURE — 91300 COVID-19 PFIZER VACC 0.3 ML: CPT

## 2021-12-03 PROCEDURE — 0001A COVID-19 PFIZER VACC 0.3 ML: CPT

## 2021-12-06 ENCOUNTER — OFFICE VISIT (OUTPATIENT)
Dept: PULMONOLOGY | Facility: CLINIC | Age: 81
End: 2021-12-06
Payer: MEDICARE

## 2021-12-06 VITALS
HEART RATE: 93 BPM | BODY MASS INDEX: 22.36 KG/M2 | HEIGHT: 69 IN | SYSTOLIC BLOOD PRESSURE: 128 MMHG | DIASTOLIC BLOOD PRESSURE: 68 MMHG | WEIGHT: 151 LBS | TEMPERATURE: 98.2 F | OXYGEN SATURATION: 98 %

## 2021-12-06 DIAGNOSIS — Z85.118 HISTORY OF LUNG CANCER: ICD-10-CM

## 2021-12-06 DIAGNOSIS — J43.2 CENTRILOBULAR EMPHYSEMA (HCC): ICD-10-CM

## 2021-12-06 DIAGNOSIS — G47.33 OSA (OBSTRUCTIVE SLEEP APNEA): Primary | ICD-10-CM

## 2021-12-06 DIAGNOSIS — R91.8 PULMONARY NODULES: ICD-10-CM

## 2021-12-06 DIAGNOSIS — F17.290 PIPE SMOKER: ICD-10-CM

## 2021-12-06 PROCEDURE — 99214 OFFICE O/P EST MOD 30 MIN: CPT | Performed by: PHYSICIAN ASSISTANT

## 2021-12-15 DIAGNOSIS — E11.40 TYPE 2 DIABETES MELLITUS WITH DIABETIC NEUROPATHY, WITHOUT LONG-TERM CURRENT USE OF INSULIN (HCC): ICD-10-CM

## 2021-12-15 RX ORDER — BLOOD SUGAR DIAGNOSTIC
STRIP MISCELLANEOUS
Qty: 200 STRIP | Refills: 0 | Status: SHIPPED | OUTPATIENT
Start: 2021-12-15 | End: 2022-01-25

## 2021-12-15 RX ORDER — LANCETS 33 GAUGE
EACH MISCELLANEOUS
Qty: 100 EACH | Refills: 0 | Status: SHIPPED | OUTPATIENT
Start: 2021-12-15

## 2022-01-23 DIAGNOSIS — E78.5 HYPERLIPIDEMIA ASSOCIATED WITH TYPE 2 DIABETES MELLITUS (HCC): ICD-10-CM

## 2022-01-23 DIAGNOSIS — E11.69 HYPERLIPIDEMIA ASSOCIATED WITH TYPE 2 DIABETES MELLITUS (HCC): ICD-10-CM

## 2022-01-23 RX ORDER — PRAVASTATIN SODIUM 40 MG
TABLET ORAL
Qty: 90 TABLET | Refills: 1 | Status: SHIPPED | OUTPATIENT
Start: 2022-01-23 | End: 2022-07-20

## 2022-01-25 DIAGNOSIS — E11.40 TYPE 2 DIABETES MELLITUS WITH DIABETIC NEUROPATHY, WITHOUT LONG-TERM CURRENT USE OF INSULIN (HCC): ICD-10-CM

## 2022-01-25 RX ORDER — BLOOD SUGAR DIAGNOSTIC
STRIP MISCELLANEOUS
Qty: 200 STRIP | Refills: 0 | Status: SHIPPED | OUTPATIENT
Start: 2022-01-25 | End: 2022-02-08

## 2022-01-31 ENCOUNTER — HOSPITAL ENCOUNTER (OUTPATIENT)
Dept: CT IMAGING | Facility: HOSPITAL | Age: 82
Discharge: HOME | End: 2022-01-31
Payer: MEDICARE

## 2022-01-31 DIAGNOSIS — Z72.0 TOBACCO ABUSE: ICD-10-CM

## 2022-01-31 DIAGNOSIS — Z85.118 HISTORY OF LUNG CANCER: ICD-10-CM

## 2022-01-31 DIAGNOSIS — R91.8 PULMONARY NODULES: ICD-10-CM

## 2022-01-31 PROCEDURE — 71250 CT THORAX DX C-: CPT

## 2022-01-31 PROCEDURE — G1004 CDSM NDSC: HCPCS

## 2022-02-07 DIAGNOSIS — I10 ESSENTIAL HYPERTENSION: Primary | ICD-10-CM

## 2022-02-07 RX ORDER — BLOOD-GLUCOSE METER
EACH MISCELLANEOUS
Qty: 1 KIT | Refills: 0 | Status: SHIPPED | OUTPATIENT
Start: 2022-02-07

## 2022-02-07 NOTE — TELEPHONE ENCOUNTER
PT  CALLED   BACK    ASKING  FOR  NEW  BLOOD  SUGAR  MONITORING  MACHINE      FREE  STYLE   LIGHT  MACHINE  PT  ALSO  NEEDS   STRIPS FOR  THE  MACHINE    NEEDS   SENT  TO  [facility]  [address] [place] [address]   ANY  QUESTIONS   CALL  PT  [phone]

## 2022-02-08 DIAGNOSIS — E11.40 TYPE 2 DIABETES MELLITUS WITH DIABETIC NEUROPATHY, WITHOUT LONG-TERM CURRENT USE OF INSULIN (HCC): ICD-10-CM

## 2022-02-08 RX ORDER — BLOOD-GLUCOSE METER
KIT MISCELLANEOUS
Qty: 100 STRIP | Refills: 0 | Status: SHIPPED | OUTPATIENT
Start: 2022-02-08

## 2022-02-11 ENCOUNTER — HOSPITAL ENCOUNTER (OUTPATIENT)
Dept: ULTRASOUND IMAGING | Facility: HOSPITAL | Age: 82
Discharge: HOME | End: 2022-02-11
Payer: MEDICARE

## 2022-02-11 DIAGNOSIS — N28.1 RENAL CYST: ICD-10-CM

## 2022-02-11 PROCEDURE — 76770 US EXAM ABDO BACK WALL COMP: CPT

## 2022-02-15 DIAGNOSIS — E11.40 TYPE 2 DIABETES MELLITUS WITH DIABETIC NEUROPATHY, WITHOUT LONG-TERM CURRENT USE OF INSULIN (HCC): Chronic | ICD-10-CM

## 2022-03-04 ENCOUNTER — OFFICE VISIT (OUTPATIENT)
Dept: NEUROLOGY | Facility: CLINIC | Age: 82
End: 2022-03-04
Payer: MEDICARE

## 2022-03-04 ENCOUNTER — APPOINTMENT (OUTPATIENT)
Dept: LAB | Facility: CLINIC | Age: 82
End: 2022-03-04
Payer: MEDICARE

## 2022-03-04 ENCOUNTER — TELEPHONE (OUTPATIENT)
Dept: UROLOGY | Facility: CLINIC | Age: 82
End: 2022-03-04

## 2022-03-04 VITALS
HEART RATE: 86 BPM | WEIGHT: 158.8 LBS | HEIGHT: 69 IN | SYSTOLIC BLOOD PRESSURE: 132 MMHG | DIASTOLIC BLOOD PRESSURE: 78 MMHG | OXYGEN SATURATION: 98 % | TEMPERATURE: 98.1 F | BODY MASS INDEX: 23.52 KG/M2

## 2022-03-04 DIAGNOSIS — M48.062 NEUROGENIC CLAUDICATION DUE TO LUMBAR SPINAL STENOSIS: ICD-10-CM

## 2022-03-04 DIAGNOSIS — C61 PROSTATE CANCER (HCC): ICD-10-CM

## 2022-03-04 DIAGNOSIS — M47.812 CERVICAL SPONDYLOSIS: Primary | ICD-10-CM

## 2022-03-04 LAB — PSA SERPL-MCNC: <0.1 NG/ML (ref 0–4)

## 2022-03-04 PROCEDURE — 99214 OFFICE O/P EST MOD 30 MIN: CPT | Performed by: PSYCHIATRY & NEUROLOGY

## 2022-03-04 PROCEDURE — 84153 ASSAY OF PSA TOTAL: CPT

## 2022-03-04 RX ORDER — CHLORHEXIDINE GLUCONATE 0.12 MG/ML
RINSE ORAL
COMMUNITY
Start: 2022-01-17

## 2022-03-04 NOTE — PROGRESS NOTES
Progress Note - Neurology   Zuly Pascual During 80 y o  male MRN: [id number]  Unit/Bed#:  Encounter: [id number]      Subjective:   Patient is here for a follow-up visit and was last seen by me about a year ago, has had cervical decompression surgery, with significant relief of symptoms and improvement, 3 years ago  Patient also suffers from diabetic polyneuropathy and since his gabapentin has been lowered to 100 mg twice a day his swelling in the legs has resolved and has not noticed any worsening sensory symptoms, patient also remains on Cymbalta  He however does experience worsening low back pain in the recent past, mostly when he 1st wakes up, while climbing steps, and also notices it when he ambulates for any period of time as an achy sensation in both lower extremities  Denies any worsening bladder dysfunction although history of prostate cancer has cause bladder dysfunction in the past and patient remains on oxybutynin  He also denies any falls  His last hemoglobin A1c was 6 5  Patient also brought his old MRI results from testing spine which showed evidence of multilevel facet disease, degenerative spondylosis, disc disease at L5-S1 along with degenerative changes causing severe spinal stenosis at L5-S1     ROS:   Review of Systems   Constitutional: Negative  Negative for appetite change and fever  HENT: Negative  Negative for hearing loss, tinnitus, trouble swallowing and voice change  Eyes: Negative  Negative for photophobia and pain  Respiratory: Positive for shortness of breath  Cardiovascular: Negative  Negative for palpitations  Gastrointestinal: Negative  Negative for nausea and vomiting  Endocrine: Negative  Negative for cold intolerance  Genitourinary: Positive for frequency and urgency  Negative for dysuria  Musculoskeletal: Positive for back pain, gait problem and neck pain  Negative for myalgias  Skin: Negative  Negative for rash  Neurological: Positive for weakness  Negative for dizziness, tremors, seizures, syncope, facial asymmetry, speech difficulty, light-headedness, numbness and headaches  Patient stated that he has weakness in both hands  Hematological: Negative  Does not bruise/bleed easily  Psychiatric/Behavioral: Positive for sleep disturbance  Negative for confusion and hallucinations  MA review of systems was reviewed by myself  Vitals:   Vitals:    03/04/22 0826   BP: 132/78   BP Location: Left arm   Patient Position: Sitting   Cuff Size: Standard   Pulse: 86   Temp: 98 1 °F (36 7 °C)   TempSrc: Temporal   SpO2: 98%   Weight: 72 kg (158 lb 12 8 oz)   Height: 5' 9" (1 753 m)   ,Body mass index is 23 45 kg/m²  MEDS:      Current Outpatient Medications:     aspirin (ECOTRIN LOW STRENGTH) 81 mg EC tablet, Take 1 tablet by mouth daily, Disp: 30 tablet, Rfl: 0    chlorhexidine (PERIDEX) 0 12 % solution, RINSE MOUTH WITH 15 ML (1 CAPFUL) FOR 30 SECONDS IN THE MORNING A   (REFER TO PRESCRIPTION NOTES)  , Disp: , Rfl:     clindamycin (CLEOCIN) 300 MG capsule, Take 300 mg by mouth every 8 (eight) hours, Disp: , Rfl:     diltiazem (TIAZAC) 240 MG 24 hr capsule, take 1 capsule by mouth once daily, Disp: 90 capsule, Rfl: 3    DULoxetine (CYMBALTA) 60 mg delayed release capsule, TAKE 1 CAPSULE DAILY, Disp: 90 capsule, Rfl: 3    gabapentin (NEURONTIN) 100 mg capsule, Take 2 capsules (200 mg total) by mouth 2 (two) times a day, Disp: 120 capsule, Rfl: 6    hydrochlorothiazide (HYDRODIURIL) 12 5 mg tablet, Take 1 tablet (12 5 mg total) by mouth daily, Disp: 90 tablet, Rfl: 1    losartan (COZAAR) 100 MG tablet, TAKE 1 TABLET DAILY, Disp: 90 tablet, Rfl: 3    metFORMIN (GLUCOPHAGE) 500 mg tablet, take 1 tablet by mouth daily WITH BREAKFAST, Disp: 90 tablet, Rfl: 3    Multiple Vitamin (MULTI-VITAMINS PO), Take by mouth daily, Disp: , Rfl:     oxybutynin (DITROPAN-XL) 5 mg 24 hr tablet, take 1 tablet by mouth once daily, Disp: 90 tablet, Rfl: 3   polyvinyl alcohol (LIQUIFILM TEARS) 1 4 % ophthalmic solution, Administer 1 drop to both eyes 4 (four) times a day, Disp: 15 mL, Rfl: 0    pravastatin (PRAVACHOL) 40 mg tablet, take 1 tablet by mouth once daily, Disp: 90 tablet, Rfl: 1    pyridoxine (VITAMIN B6) 50 mg tablet, Take 50 mg by mouth daily, Disp: , Rfl:     tamsulosin (FLOMAX) 0 4 mg, TAKE 1 CAPSULE DAILY AT BEDTIME, Disp: 90 capsule, Rfl: 3    thiamine 50 MG tablet, Take 1 tablet (50 mg total) by mouth daily, Disp: 30 tablet, Rfl: 3    Trelegy Ellipta 100-62 5-25 MCG/INH inhaler, USE 1 INHALATION DAILY (RINSE MOUTH AFTER USE), Disp: 180 each, Rfl: 3    VITAMIN E PO, Take by mouth 268 mg 400 iu, Disp: , Rfl:     acetaminophen (TYLENOL) 500 mg tablet, Take 500 mg by mouth every 6 (six) hours as needed for mild pain (Patient not taking: Reported on 3/4/2022 ), Disp: , Rfl:     Blood Glucose Monitoring Suppl (OneTouch Verio) w/Device KIT, use as directed (Patient not taking: Reported on 3/4/2022), Disp: 1 kit, Rfl: 0    glucose blood (FREESTYLE LITE) test strip, use 1 TEST STRIP to TEST BLOOD SUGAR twice a day (Patient not taking: Reported on 3/4/2022 ), Disp: 100 strip, Rfl: 0    ibuprofen (MOTRIN) 600 mg tablet, Take 600 mg by mouth every 6 (six) hours as needed (Patient not taking: Reported on 3/4/2022 ), Disp: , Rfl:     Lancets (OneTouch Delica Plus [id number]) MISC, use 1 LANCET to TEST BLOOD SUGAR one to two times a day (Patient not taking: Reported on 3/4/2022), Disp: 100 each, Rfl: 0  :    Physical Exam:  General appearance: alert, appears stated age and cooperative  Head: Normocephalic, without obvious abnormality, atraumatic    On neurological examination patient is alert awake oriented, speech is fluent, cranial nerves 2-12 intact, and on motor and sensory exam there is no evidence of any focal drift in the upper extremities or weakness in the upper extremities distally and proximally as well as lower extremities    Deep tendon reflexes are brisk bilaterally in the upper and lower extremities, patient has diminished proprioception and vibration in both lower extremities, no evidence of any dysmetria was noted, and has a positive Romberg sign with a normal based gait  There is no evidence of any cervical or lumbosacral tenderness, no bruits were appreciated in the neck  Lab Results: I have personally reviewed pertinent reports  Imaging Studies: I have personally reviewed pertinent reports  Assessment:  1  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  2  Status post cervical decompression  3  Diabetic polyneuropathy  Plan: At this time after lengthy discussion with the patient patient is reassured to continue gabapentin and Cymbalta with adequate blood sugar control and home exercise program   He was explained his options as far as his low back is concerned, patient wishes to take the path of pain management and trying lumbar epidural steroid injections since in the past he did see relief for a prolonged period of time as far as his low back pain is concerned  Home exercise program is encouraged and he will return back to see us in 3-4 months  3/4/2022,8:32 AM    Dictation voice to text software has been used in the creation of this document  Please consider this in light of any contextual or grammatical errors

## 2022-03-04 NOTE — TELEPHONE ENCOUNTER
[facility] asking for CB to RS appointment  Office number provided

## 2022-03-07 NOTE — PROGRESS NOTES
3/8/2022      Chief Complaint   Patient presents with    Prostate Cancer    LUTS    Erectile Dysfunction         Assessment and Plan    80 y o  male -- Dr Shruthi Ca    1  Prostate cancer s/p brachytherapy (2013)  - Undetectable PSA  - Follow up in 1 year with PSA prior    2  LUTS  - S/p normal cystoscopy 2019  - Continue Flomax and oxybutynin, denies side effects    3  Erectile Dysfunction  - Continue sildenafil    4  Bosniak 2F right upper pole renal cyst  - Most recent US stable  - Will obtain updated US in 1 year for continued monitoring        History of Present Illness  Filomena Abarca During is a 80 y o  male patient of Dr Shruthi Ca with history of prostate cancer s/p brachytherapy (2013), LUTS, ED, and Bosniak 2F right upper pole cyst here for follow up  PSA remains undetectable at this time  Doing well without new complaints  Patient underwent negative cystoscopy in 2019  Continues to use flomax and oxybutynin therapy with benefit  Denies side effects from medications  US showing stable Bosniak 2F right upper pole renal cysts  Review of Systems   Constitutional: Negative for activity change, appetite change, chills and fever  HENT: Negative for congestion and trouble swallowing  Respiratory: Negative for cough and shortness of breath  Cardiovascular: Negative for chest pain, palpitations and leg swelling  Gastrointestinal: Negative for abdominal pain, constipation, diarrhea, nausea and vomiting  Genitourinary: Negative for difficulty urinating, dysuria, flank pain, frequency, hematuria and urgency  Musculoskeletal: Negative for back pain and gait problem  Skin: Negative for wound  Allergic/Immunologic: Negative for immunocompromised state  Neurological: Negative for dizziness and syncope  Hematological: Does not bruise/bleed easily  Psychiatric/Behavioral: Negative for confusion  All other systems reviewed and are negative        Vitals  Vitals:    03/08/22 1424   BP: 142/78 Pulse: 88   SpO2: 99%   Weight: 70 9 kg (156 lb 3 2 oz)   Height: 5' 9" (1 753 m)       Physical Exam  Constitutional:       General: He is not in acute distress  Appearance: Normal appearance  He is not ill-appearing, toxic-appearing or diaphoretic  HENT:      Head: Normocephalic  Nose: No congestion  Eyes:      General: No scleral icterus  Right eye: No discharge  Left eye: No discharge  Conjunctiva/sclera: Conjunctivae normal       Pupils: Pupils are equal, round, and reactive to light  Pulmonary:      Effort: Pulmonary effort is normal    Musculoskeletal:      Cervical back: Normal range of motion  Skin:     General: Skin is warm and dry  Coloration: Skin is not jaundiced or pale  Findings: No bruising, erythema, lesion or rash  Neurological:      General: No focal deficit present  Mental Status: He is alert and oriented to person, place, and time  Mental status is at baseline  Gait: Gait normal    Psychiatric:         Mood and Affect: Mood normal          Behavior: Behavior normal          Thought Content: Thought content normal          Judgment: Judgment normal            Past History  Past Medical History:   Diagnosis Date    BPH (benign prostatic hyperplasia)     Cancer ([place] [address] ) 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    Cervical myelopathy ([facility] ) 6/20/2019    Chemical exposure     9/11/01- worked in [employer] [facility]       COPD (ch[facility] obstructive pulmonary disease) ([facility])     CPAP (continuous positive airway pressure) dependence     DDD (degenerative disc disease), cervical     Diabetes mellitus ([facility] )     Foraminal stenosis of cervical region     Hyperlipidemia     Hypertension     SHIVANI (obstructive sleep apnea)     Overactive bladder     Spinal cord compression ([facility] ) 5/8/2019    Added automatically from request for surgery [id number]     Social History     Socioeconomic History    Marital status: Legally      Spouse name: None    Number of children: 3    Years of education: None    Highest education level: None   Occupational History    Occupation: retired   Tobacco Use    Smoking status: Current Every Day Smoker     Packs/day: 0 01     Years: 61 00     Pack years: 0 61     Types: Pipe     Last attempt to quit: 2020     Years since quittin 9    Smokeless tobacco: Never Used    Tobacco comment: 2-3  PIPES PER DAY    Vaping Use    Vaping Use: Never used   Substance and Sexual Activity    Alcohol use: Never    Drug use: Never    Sexual activity: Not Currently   Other Topics Concern    None   Social History Narrative    Pt was born in [facility]; moved to [facility] as an adult with his wife, and eventually moved to [place], [place]        Social Determinants of Health     Financial Resource Strain: Not on file   Food Insecurity: Not on file   Transportation Needs: Not on file   Physical Activity: Not on file   Stress: Not on file   Social Connections: Not on file   Intimate Partner Violence: Not on file   Housing Stability: Not on file     Social History     Tobacco Use   Smoking Status Current Every Day Smoker    Packs/day: 0 01    Years: 61 00    Pack years: 0 61    Types: Pipe    Last attempt to quit: 2020    Years since quittin 9   Smokeless Tobacco Never Used   Tobacco Comment    2-3  PIPES PER DAY      Family History   Problem Relation Age of Onset    No Known Problems Mother     No Known Problems Father        The following portions of the patient's history were reviewed and updated as appropriate: allergies, current medications, past medical history, past social history, past surgical history and problem list     Results  Recent Results (from the past 1 hour(s))   POCT Measure PVR    Collection Time: 22  2:40 PM   Result Value Ref Range    POST-VOID RESIDUAL VOLUME, ML  mL   ]  Lab Results   Component Value Date    PSA <0 1 2022    PSA <0 1 2021    PSA <0 1 2019     Lab Results   Component Value Date    GLUCOSE 141 (H) 11/02/2017    CALCIUM 10 0 11/11/2021    K 4 0 11/11/2021    CO2 30 11/11/2021     (H) 11/11/2021    BUN 9 11/11/2021    CREATININE 0 88 11/11/2021     Lab Results   Component Value Date    WBC 5 34 02/25/2021    HGB 13 9 02/25/2021    HCT 44 3 02/25/2021    MCV 88 02/25/2021     02/25/2021       Skippy Clamp, LISA

## 2022-03-08 ENCOUNTER — OFFICE VISIT (OUTPATIENT)
Dept: UROLOGY | Facility: CLINIC | Age: 82
End: 2022-03-08
Payer: MEDICARE

## 2022-03-08 VITALS
BODY MASS INDEX: 23.13 KG/M2 | DIASTOLIC BLOOD PRESSURE: 78 MMHG | WEIGHT: 156.2 LBS | OXYGEN SATURATION: 99 % | SYSTOLIC BLOOD PRESSURE: 142 MMHG | HEIGHT: 69 IN | HEART RATE: 88 BPM

## 2022-03-08 DIAGNOSIS — E78.5 HYPERLIPIDEMIA ASSOCIATED WITH TYPE 2 DIABETES MELLITUS (HCC): ICD-10-CM

## 2022-03-08 DIAGNOSIS — R39.9 LOWER URINARY TRACT SYMPTOMS (LUTS): ICD-10-CM

## 2022-03-08 DIAGNOSIS — C61 PROSTATE CANCER (HCC): ICD-10-CM

## 2022-03-08 DIAGNOSIS — E11.69 HYPERLIPIDEMIA ASSOCIATED WITH TYPE 2 DIABETES MELLITUS (HCC): ICD-10-CM

## 2022-03-08 DIAGNOSIS — N28.1 RENAL CYST: Primary | ICD-10-CM

## 2022-03-08 DIAGNOSIS — N52.9 ERECTILE DYSFUNCTION, UNSPECIFIED ERECTILE DYSFUNCTION TYPE: ICD-10-CM

## 2022-03-08 LAB — POST-VOID RESIDUAL VOLUME, ML POC: 108 ML

## 2022-03-08 PROCEDURE — 99213 OFFICE O/P EST LOW 20 MIN: CPT | Performed by: PHYSICIAN ASSISTANT

## 2022-03-08 PROCEDURE — 51798 US URINE CAPACITY MEASURE: CPT | Performed by: PHYSICIAN ASSISTANT

## 2022-03-08 RX ORDER — SILDENAFIL CITRATE 20 MG/1
20 TABLET ORAL AS NEEDED
Qty: 30 TABLET | Refills: 1 | Status: SHIPPED | OUTPATIENT
Start: 2022-03-08

## 2022-03-16 DIAGNOSIS — N32.81 OVERACTIVE BLADDER: Chronic | ICD-10-CM

## 2022-03-16 RX ORDER — OXYBUTYNIN CHLORIDE 5 MG/1
TABLET, EXTENDED RELEASE ORAL
Qty: 90 TABLET | Refills: 3 | Status: SHIPPED | OUTPATIENT
Start: 2022-03-16

## 2022-03-23 ENCOUNTER — RA CDI HCC (OUTPATIENT)
Dept: OTHER | Facility: HOSPITAL | Age: 82
End: 2022-03-23

## 2022-03-23 NOTE — PROGRESS NOTES
[facility]  coding opportunities        E11 40 and J44 9    Chart Reviewed number of suggestions sent to Provider: 2     Patients Insurance     Medicare Insurance: Medicare

## 2022-03-24 NOTE — PROGRESS NOTES
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    2  Trigger point        Plan    This this is a 74-year-old male who presents to our office with right buttock pain and signs and symptoms of neurogenic claudication  This is in the setting of severe L4-5 central spinal stenosis based on my review of MRI from 2018  Radiology report not available  Cause for stenosis is multifactorial and appears to be related to disc bulge, ligamentum flavum hypertrophy and facet arthropathy  He also has notable gluteal trigger point in the right gluteal musculature  We discussed epidural steroid injection at the L3-4 level, above the area of severe stenosis, as well as right-sided gluteal trigger point injection  In the future if he has no significant improvement with L3-4 injection, would be candidate for bilateral L4 transforaminal epidural steroid injection  Aqua therapy was also offered to the patient which was declined today  Given the patient's symptoms, examination findings and imaging results noted above, I discussed the utility of proceeding with a L3-4 interlaminar epidural steroid injection under fluoroscopic guidance and trigger point injection  Using an anatomical model, the procedure, as well as its potential risks, benefits, and reasonable alternatives were discussed in detail  Discussed risks of the procedure included but are not limited to bleeding, infection, allergic reaction, nerve damage, hematoma fomation, abscess formation, failure of the pain to improve and potential worsening of the pain  Since Mr  During has failed at least 6 weeks of conservative measures including over-the-counter pain medications and prescription medications it is reasonable to proceed with the above injection  The patient verbalized understanding to the potential risks, benefits, and reasonable alternatives to the above injection and wishes to proceed    His response will help to further determine a treatment plan  [place] Prescription Drug Monitoring Program report was reviewed and was appropriate     My impressions and treatment recommendations were discussed in detail with the patient who verbalized understanding and had no further questions  Discharge instructions were provided  I personally saw and examined the patient and I agree with the above discussed plan of care  Orders Placed This Encounter   Procedures    FL spine and pain procedure     Standing Status:   Future     Standing Expiration Date:   3/25/2026     Order Specific Question:   Reason for Exam:     Answer:   L3-4 LESI and TPI     Order Specific Question:   Anticoagulant hold needed? Answer:   No     No orders of the defined types were placed in this encounter  History of Present Illness    Referring Provider: Vipul Prather MD    Violet Chandler is a 80 y o  male who presents with a chief complaint of lumbar spinal stenosis ankle  This is a chronic issue for last 15 years  Began of an undetermined cause  It is severe  10/10, constant  Worse in morning evening  Described as sharp, pressure-like  Reports weakness in the bilateral lower extremities  Uses a cane to ambulate  Pain is increased with lying down, standing, bending, sitting, walking, exercising, relaxation  He is complaining of significant pain in the right buttock  Takes him all morning for him to be able to straighten his back  Walks in a flexed posture  Reports when he walks he has cramping int he buttocks/hips and tiredeness of the legs  Denies bowel bladder incontinence or saddle anesthesia  Had MRI of the lumbar spine from 2018  No report available  Per my interpretation shows facet disease in lower lumbar spine  Also has notable spinal stenosis at the L4-5 level that appears to be severe  Has bilateral foraminal narrowing at the L4-5 and L5-S1 levels as well      Past medical history includes diabetes, hypertension, arthritis of the hand the knees, prostate and lung cancer  No relief with PT, exercise  Has had nerve injections in the past       He smokes tobacco, 3-4 hives does not smoke marijuana  Does not drink alcohol  In the past has used hydrocodone with relief  Currently using aspirin, Pamelor, gabapentin, Cymbalta  I have personally reviewed and/or updated the patient's past medical history, past surgical history, family history, social history, current medications, allergies, and vital signs today  Review of Systems   Constitutional: Negative for fever and unexpected weight change  HENT: Negative for trouble swallowing  Eyes: Positive for visual disturbance  Respiratory: Positive for shortness of breath  Negative for wheezing  Cardiovascular: Positive for leg swelling  Negative for chest pain and palpitations  Gastrointestinal: Negative for constipation, diarrhea, nausea and vomiting  Endocrine: Positive for polyuria  Negative for cold intolerance, heat intolerance and polydipsia  Genitourinary: Negative for difficulty urinating and frequency  Musculoskeletal: Positive for arthralgias and myalgias  Negative for gait problem and joint swelling  Skin: Negative for rash  Neurological: Positive for numbness  Negative for dizziness, seizures, syncope, weakness and headaches  Hematological: Does not bruise/bleed easily  Psychiatric/Behavioral: Positive for dysphoric mood  All other systems reviewed and are negative        Patient Active Problem List   Diagnosis    Type 2 diabetes mellitus with diabetic neuropathy, without long-term current use of insulin ([address] )    Hyperlipidemia associated with type 2 diabetes mellitus ([address] )    Essential hypertension    BPH (benign prostatic hyperplasia)    Degenerative cervical spinal stenosis    Cervical spondylosis    COPD (chronic obstructive pulmonary disease) (HCC)    History of prostate cancer    Overactive bladder    Lesion of native kidney    SHIVANI (obstructive sleep apnea)    Vitamin D insufficiency    Pipe smoker    Anxiety    Erectile dysfunction due to diseases classified elsewhere       Past Medical History:   Diagnosis Date    BPH (benign prostatic hyperplasia)     Cancer ([facility] )     lung- prostate    Cervical myelopathy ([facility] ) 2019    Chemical exposure     01- worked in [place]   COPD [facility]ch[facility] obstructive pulmonary disease) [facility]     CPAP (continuous positive airway pressure) dependence     DDD (degenerative disc disease), cervical     Diabetes mellitus ([facility] )     Foraminal stenosis of cervical region     Hyperlipidemia     Hypertension     SHIVANI (obstructive sleep apnea)     Overactive bladder     Spinal cord compression ([facility] ) 2019    Added automatically from request for surgery [id number]       Past Surgical History:   Procedure Laterality Date    ARTHROSCOPY KNEE Bilateral     COLONOSCOPY      LUNG SURGERY Left     scraping of left    NV ARTHRODESIS ANT INTERBODY INC DISCECTOMY, CERVICAL BELOW C2 Bilateral 2019    Procedure: C3/4  ACDF WITH  C4 HEMICORPECTOMY, C3-4 ANTERIOR INSTRUMENTATION AND FUSION (NEUROMONITORING);   Surgeon: Juany Fitch MD;  [facility];  Service: Neurosurgery    ROTATOR CUFF REPAIR Bilateral        Family History   Problem Relation Age of Onset    No Known Problems Mother     No Known Problems Father        Social History     Occupational History    Occupation: retired   Tobacco Use    Smoking status: Current Every Day Smoker     Packs/day: 0 01     Years: 61 00     Pack years: 0 61     Types: Pipe     Last attempt to quit: 2020     Years since quittin 9    Smokeless tobacco: Never Used    Tobacco comment: 2-3  PIPES PER DAY    Vaping Use    Vaping Use: Never used   Substance and Sexual Activity    Alcohol use: Never    Drug use: Never    Sexual activity: Not Currently       Current Outpatient Medications on File Prior to Visit   Medication Sig    acetaminophen (TYLENOL) 500 mg tablet Take 500 mg by mouth every 6 (six) hours as needed for mild pain      aspirin (ECOTRIN LOW STRENGTH) 81 mg EC tablet Take 1 tablet by mouth daily    Blood Glucose Monitoring Suppl (OneTouch Verio) w/Device KIT use as directed    chlorhexidine (PERIDEX) 0 12 % solution RINSE MOUTH WITH 15 ML (1 CAPFUL) FOR 30 SECONDS IN THE MORNING A   (REFER TO PRESCRIPTION NOTES)      diltiazem (TIAZAC) 240 MG 24 hr capsule take 1 capsule by mouth once daily    DULoxetine (CYMBALTA) 60 mg delayed release capsule TAKE 1 CAPSULE DAILY    gabapentin (NEURONTIN) 100 mg capsule Take 2 capsules (200 mg total) by mouth 2 (two) times a day    hydrochlorothiazide (HYDRODIURIL) 12 5 mg tablet Take 1 tablet (12 5 mg total) by mouth daily    losartan (COZAAR) 100 MG tablet TAKE 1 TABLET DAILY    metFORMIN (GLUCOPHAGE) 500 mg tablet take 1 tablet by mouth daily WITH BREAKFAST    Multiple Vitamin (MULTI-VITAMINS PO) Take by mouth daily    oxybutynin (DITROPAN-XL) 5 mg 24 hr tablet take 1 tablet by mouth once daily    polyvinyl alcohol (LIQUIFILM TEARS) 1 4 % ophthalmic solution Administer 1 drop to both eyes 4 (four) times a day    pravastatin (PRAVACHOL) 40 mg tablet take 1 tablet by mouth once daily    pyridoxine (VITAMIN B6) 50 mg tablet Take 50 mg by mouth daily    sildenafil (REVATIO) 20 mg tablet Take 1 tablet (20 mg total) by mouth if needed (Erectile dysfunction) Take 1-5 tablets as needed for erection on an empty stomach    tamsulosin (FLOMAX) 0 4 mg TAKE 1 CAPSULE DAILY AT BEDTIME    thiamine 50 MG tablet Take 1 tablet (50 mg total) by mouth daily    Trelegy Ellipta 100-62 5-25 MCG/INH inhaler USE 1 INHALATION DAILY (RINSE MOUTH AFTER USE)    VITAMIN E PO Take by mouth 268 mg 400 iu    clindamycin (CLEOCIN) 300 MG capsule Take 300 mg by mouth every 8 (eight) hours (Patient not taking: Reported on 3/25/2022 )    glucose blood (FREESTYLE LITE) test strip use 1 TEST STRIP to TEST BLOOD SUGAR twice a day (Patient not taking: Reported on 3/4/2022 )    ibuprofen (MOTRIN) 600 mg tablet Take 600 mg by mouth every 6 (six) hours as needed (Patient not taking: Reported on 3/8/2022 )    Lancets (OneTouch Delica Plus GUTMTC60I) MISC use 1 LANCET to TEST BLOOD SUGAR one to two times a day (Patient not taking: Reported on 3/4/2022)     No current facility-administered medications on file prior to visit  Allergies   Allergen Reactions    Chloroquine Itching    Qualaquin [Quinine] Itching       Physical Exam    /78   Pulse 90   Resp 18   Ht 5' 9" (1 753 m)   Wt 69 2 kg (152 lb 9 6 oz)   BMI 22 54 kg/m²     Constitutional: normal, well developed, well nourished, alert, in no distress and non-toxic and no overt pain behavior    Eyes: anicteric  HEENT: grossly intact  Neck: supple, symmetric, trachea midline and no masses   Pulmonary:even and unlabored  Cardiovascular:No edema or pitting edema present  Skin:Normal without rashes or lesions and well hydrated  Psychiatric:Mood and affect appropriate  Neurologic:Cranial Nerves II-XII grossly intact  Musculoskeletal:normal     Lumbar Spine Exam    Appearance:  Normal lordosis  Palpation/Tenderness:  notable tenderness to palpation in right gluteal musculature with trigger point noted  Sensory:  no sensory deficits noted  Motor Strength:  Left hip flexion:  5/5  Left hip extension:  5/5  Right hip flexion:  4/5  Right hip extension:  5/5  Left knee flexion:  5/5  Left knee extension:  5/5  Right knee flexion:  5/5  Right knee extension:  5/5  Left foot dorsiflexion:  4/5  Left foot plantar flexion:  5/5  Right foot dorsiflexion:  5/5  Right foot plantar flexion:  5/5  Reflexes:  Left Patellar:  3+   Right Patellar:  3+   Left Achilles:  2+   Right Achilles:  2+   Special Tests:  Left Straight Leg Test:  negative  Right Straight Leg Test:  positive      DIAGNOSTIC IMAGING AND TEST RESULTS:  Reviewed in epic interpretation above

## 2022-03-25 ENCOUNTER — CONSULT (OUTPATIENT)
Dept: PAIN MEDICINE | Facility: CLINIC | Age: 82
End: 2022-03-25
Payer: MEDICARE

## 2022-03-25 VITALS
RESPIRATION RATE: 18 BRPM | BODY MASS INDEX: 22.6 KG/M2 | HEIGHT: 69 IN | HEART RATE: 90 BPM | DIASTOLIC BLOOD PRESSURE: 78 MMHG | WEIGHT: 152.6 LBS | SYSTOLIC BLOOD PRESSURE: 125 MMHG

## 2022-03-25 DIAGNOSIS — M48.062 NEUROGENIC CLAUDICATION DUE TO LUMBAR SPINAL STENOSIS: Primary | ICD-10-CM

## 2022-03-25 DIAGNOSIS — M79.10 TRIGGER POINT: ICD-10-CM

## 2022-03-25 PROCEDURE — 99204 OFFICE O/P NEW MOD 45 MIN: CPT | Performed by: STUDENT IN AN ORGANIZED HEALTH CARE EDUCATION/TRAINING PROGRAM

## 2022-03-25 NOTE — PATIENT INSTRUCTIONS
Epidural Steroid Injection   AMBULATORY CARE:   What you need to know about an epidural steroid injection (PAULA):  An PAULA is a procedure to inject steroid medicine into the epidural space  The epidural space is between your spinal cord and vertebrae  Steroids reduce inflammation and fluid buildup in your spine that may be causing pain  You may be given pain medicine along with the steroids  How to prepare for an PAULA:  Your healthcare provider will talk to you about how to prepare for your procedure  He or she will tell you what medicines to take or not take on the day of your procedure  You may need to stop taking blood thinners or other medicines several days before your procedure  You may need to adjust any diabetes medicine you take on the day of your procedure  Steroid medicine can increase your blood sugar level  Arrange for someone to drive you home when you are discharged  What will happen during an PAULA:   · You will be given medicine to numb the procedure area  You will be awake for the procedure, but you will not feel pain  You may also be given medicine to help you relax  Contrast liquid will be used to help your healthcare provider see the area better  Tell the healthcare provider if you have ever had an allergic reaction to contrast liquid  · Your healthcare provider may place the needle into your neck area, middle of your back, or tailbone area  He may inject the medicine next to the nerves that are causing your pain  He may instead inject the medicine into a larger area of the epidural space  This helps the medicine spread to more nerves  Your healthcare provider will use a fluoroscope to help guide the needle to the right place  A fluoroscope is a type of x-ray  After the procedure, a bandage will be placed over the injection site to prevent infection  What will happen after an PAULA:  You will have a bandage over the injection site to prevent infection   Your healthcare provider will tell you when you can bathe and any activity guidelines  You will be able to go home  Risks of an PAULA:  You may have temporary or permanent nerve damage or paralysis  You may have bleeding or develop a serious infection, such as meningitis (swelling of the brain coverings)  An abscess may also develop  An abscess is a pus-filled area under the skin  You may need surgery to fix the abscess  You may have a seizure, anxiety, or trouble sleeping  If you are a man, you may have temporary erectile dysfunction (not able to have an erection)  Call your local emergency number (911 in the [address] [place] [address],[address]) if:   · You have a seizure  · You have trouble moving your legs  Seek care immediately if:   · Blood soaks through your bandage  · You have a fever or chills, severe back pain, and the procedure area is sensitive to the touch  · You cannot control when you urinate or have a bowel movement  Call your doctor if:   · You have weakness or numbness in your legs  · Your wound is red, swollen, or draining pus  · You have nausea or are vomiting  · Your face or neck is red and you feel warm  · You have more pain than you had before the procedure  · You have swelling in your hands or feet  · You have questions or concerns about your condition or care  Care for your wound as directed: You may remove the bandage before you go to bed the day of your procedure  You may take a shower, but do not take a bath for at least 24 hours  Self-care:   · Do not drive,  use machines, or do strenuous activity for 24 hours after your procedure or as directed  · Continue other treatments  as directed  Steroid injections alone will not control your pain  The injections are meant to be used with other treatments, such as physical therapy  Follow up with your doctor as directed:  Write down your questions so you remember to ask them during your visits     © Copyright [employer] 2022 Information is for End User's use only and may not be sold, redistributed or otherwise used for commercial purposes  All illustrations and images included in CareNotes® are the copyrighted property of A [employer] A M [employer]  or [employer]  The above information is an  only  It is not intended as medical advice for individual conditions or treatments  Talk to your doctor, nurse or pharmacist before following any medical regimen to see if it is safe and effective for you

## 2022-03-29 ENCOUNTER — APPOINTMENT (OUTPATIENT)
Dept: LAB | Facility: CLINIC | Age: 82
End: 2022-03-29
Payer: MEDICARE

## 2022-03-29 ENCOUNTER — OFFICE VISIT (OUTPATIENT)
Dept: INTERNAL MEDICINE CLINIC | Facility: CLINIC | Age: 82
End: 2022-03-29
Payer: MEDICARE

## 2022-03-29 VITALS
HEART RATE: 90 BPM | SYSTOLIC BLOOD PRESSURE: 132 MMHG | BODY MASS INDEX: 22.81 KG/M2 | OXYGEN SATURATION: 98 % | TEMPERATURE: 96.6 F | HEIGHT: 69 IN | WEIGHT: 154 LBS | RESPIRATION RATE: 20 BRPM | DIASTOLIC BLOOD PRESSURE: 80 MMHG

## 2022-03-29 DIAGNOSIS — E78.5 HYPERLIPIDEMIA ASSOCIATED WITH TYPE 2 DIABETES MELLITUS (HCC): Chronic | ICD-10-CM

## 2022-03-29 DIAGNOSIS — J43.2 CENTRILOBULAR EMPHYSEMA (HCC): ICD-10-CM

## 2022-03-29 DIAGNOSIS — I10 ESSENTIAL HYPERTENSION: Chronic | ICD-10-CM

## 2022-03-29 DIAGNOSIS — E11.69 HYPERLIPIDEMIA ASSOCIATED WITH TYPE 2 DIABETES MELLITUS (HCC): Chronic | ICD-10-CM

## 2022-03-29 DIAGNOSIS — E11.40 TYPE 2 DIABETES MELLITUS WITH DIABETIC NEUROPATHY, WITHOUT LONG-TERM CURRENT USE OF INSULIN (HCC): Primary | Chronic | ICD-10-CM

## 2022-03-29 DIAGNOSIS — E11.40 TYPE 2 DIABETES MELLITUS WITH DIABETIC NEUROPATHY, WITHOUT LONG-TERM CURRENT USE OF INSULIN (HCC): Chronic | ICD-10-CM

## 2022-03-29 LAB
ANION GAP SERPL CALCULATED.3IONS-SCNC: 0 MMOL/L (ref 4–13)
BUN SERPL-MCNC: 15 MG/DL (ref 5–25)
CALCIUM SERPL-MCNC: 10.5 MG/DL (ref 8.3–10.1)
CHLORIDE SERPL-SCNC: 110 MMOL/L (ref 100–108)
CHOLEST SERPL-MCNC: 92 MG/DL
CO2 SERPL-SCNC: 33 MMOL/L (ref 21–32)
CREAT SERPL-MCNC: 0.89 MG/DL (ref 0.6–1.3)
CREAT UR-MCNC: 155 MG/DL
EST. AVERAGE GLUCOSE BLD GHB EST-MCNC: 154 MG/DL
GFR SERPL CREATININE-BSD FRML MDRD: 81 ML/MIN/1.73SQ M
GLUCOSE P FAST SERPL-MCNC: 111 MG/DL (ref 65–99)
HBA1C MFR BLD: 7 %
HDLC SERPL-MCNC: 44 MG/DL
LDLC SERPL CALC-MCNC: 39 MG/DL (ref 0–100)
MICROALBUMIN UR-MCNC: 42.1 MG/L (ref 0–20)
MICROALBUMIN/CREAT 24H UR: 27 MG/G CREATININE (ref 0–30)
POTASSIUM SERPL-SCNC: 4.6 MMOL/L (ref 3.5–5.3)
SODIUM SERPL-SCNC: 143 MMOL/L (ref 136–145)
TRIGL SERPL-MCNC: 44 MG/DL

## 2022-03-29 PROCEDURE — 99214 OFFICE O/P EST MOD 30 MIN: CPT | Performed by: INTERNAL MEDICINE

## 2022-03-29 PROCEDURE — 36415 COLL VENOUS BLD VENIPUNCTURE: CPT

## 2022-03-29 PROCEDURE — 82570 ASSAY OF URINE CREATININE: CPT

## 2022-03-29 PROCEDURE — 83036 HEMOGLOBIN GLYCOSYLATED A1C: CPT

## 2022-03-29 PROCEDURE — 80048 BASIC METABOLIC PNL TOTAL CA: CPT

## 2022-03-29 PROCEDURE — 92250 FUNDUS PHOTOGRAPHY W/I&R: CPT | Performed by: INTERNAL MEDICINE

## 2022-03-29 PROCEDURE — 80061 LIPID PANEL: CPT

## 2022-03-29 PROCEDURE — 82043 UR ALBUMIN QUANTITATIVE: CPT

## 2022-03-29 RX ORDER — FLASH GLUCOSE SENSOR
KIT MISCELLANEOUS
Qty: 6 EACH | Refills: 3 | Status: SHIPPED | OUTPATIENT
Start: 2022-03-29

## 2022-03-29 RX ORDER — FLASH GLUCOSE SENSOR
1 KIT MISCELLANEOUS ONCE
Qty: 1 EACH | Refills: 0 | Status: SHIPPED | OUTPATIENT
Start: 2022-03-29 | End: 2022-03-29

## 2022-03-29 NOTE — PATIENT INSTRUCTIONS
10% - bad control"> 10% - bad control,Hemoglobin A1c (HbA1c) greater than 10% indicating poor diabetic control,Haemoglobin A1c greater than 10% indicating poor diabetic control">   Diabetes Mellitus Type 2 in Adults, Ambulatory Care   GENERAL INFORMATION:   Diabetes mellitus type 2  is a disease that affects how your body uses glucose (sugar)  Insulin helps move sugar out of the blood so it can be used for energy  Normally, when the blood sugar level increases, the pancreas makes more insulin  Type 2 diabetes develops because either the body cannot make enough insulin, or it cannot use the insulin correctly  After many years, your pancreas may stop making insulin  Common symptoms include the following:   · More hunger or thirst than usual     · Frequent urination     · Weight loss without trying     · Blurred vision  Seek immediate care for the following symptoms:   · Severe abdominal pain, or pain that spreads to your back  You may also be vomiting  · Trouble staying awake or focusing    · Shaking or sweating    · Blurred or double vision    · Breath has a fruity, sweet smell    · Breathing is deep and labored, or rapid and shallow    · Heartbeat is fast and weak  Treatment for diabetes mellitus type 2  includes keeping your blood sugar at a normal level  You must eat the right foods, and exercise regularly  You may also need medicine if you cannot control your blood sugar level with nutrition and exercise  Manage diabetes mellitus type 2:   · Check your blood sugar level  You will be taught how to check a small drop of blood in a glucose monitor  Ask your healthcare provider when and how often to check during the day  Ask your healthcare provider what your blood sugar levels should be when you check them  · Keep track of carbohydrates (sugar and starchy foods)  Your blood sugar level can get too high if you eat too many carbohydrates   Your dietitian will help you plan meals and snacks that have the right amount of carbohydrates  · Eat low-fat foods  Some examples are skinless chicken and low-fat milk  · Eat less sodium (salt)  Some examples of high-sodium foods to limit are soy sauce, potato chips, and soup  Do not add salt to food you cook  Limit your use of table salt  · Eat high-fiber foods  Foods that are a good source of fiber include vegetables, whole grain bread, and beans  · Limit alcohol  Alcohol affects your blood sugar level and can make it harder to manage your diabetes  Women should limit alcohol to 1 drink a day  Men should limit alcohol to 2 drinks a day  A drink of alcohol is 12 ounces of beer, 5 ounces of wine, or 1½ ounces of liquor  · Get regular exercise  Exercise can help keep your blood sugar level steady, decrease your risk of heart disease, and help you lose weight  Exercise for at least 30 minutes, 5 days a week  Include muscle strengthening activities 2 days each week  Work with your healthcare provider to create an exercise plan  · Check your feet each day  for injuries or open sores  Ask your healthcare provider for activities you can do if you have an open sore  · Quit smoking  If you smoke, it is never too late to quit  Smoking can worsen the problems that may occur with diabetes  Ask your healthcare provider for information about how to stop smoking if you are having trouble quitting  · Ask about your weight:  Ask healthcare providers if you need to lose weight, and how much to lose  Ask them to help you with a weight loss program  Even a 10 to 15 pound weight loss can help you manage your blood sugar level  · Carry medical alert identification  Wear medical alert jewelry or carry a card that says you have diabetes  Ask your healthcare provider where to get these items  · Ask about vaccines  Diabetes puts you at risk of serious illness if you get the flu, pneumonia, or hepatitis   Ask your healthcare provider if you should get a flu, pneumonia, or hepatitis B vaccine, and when to get the vaccine  Follow up with your healthcare provider as directed:  Write down your questions so you remember to ask them during your visits  CARE AGREEMENT:   You have the right to help plan your care  Learn about your health condition and how it may be treated  Discuss treatment options with your caregivers to decide what care you want to receive  You always have the right to refuse treatment  The above information is an  only  It is not intended as medical advice for individual conditions or treatments  Talk to your doctor, nurse or pharmacist before following any medical regimen to see if it is safe and effective for you  © 2014 [address] is for End User's use only and may not be sold, redistributed or otherwise used for commercial purposes  All illustrations and images included in CareNotes® are the copyrighted property of A [employer] M [employer]  or El[employer] Hug[employer]

## 2022-03-29 NOTE — PROGRESS NOTES
[facility] - [facility] [address] [place] [address]    NAME: Angie Ramsey During  AGE: 80 y o  SEX: male  : 1940     DATE: 3/29/2022     Assessment and Plan:     1  Type 2 diabetes mellitus with diabetic neuropathy, without long-term current use of insulin ([facility] )    Checkup today lab work  Diabetes is very well controlled  Neuropathy is stable  Keep up the good work  Most recent A1c was 6 5 % on 2021      - Continuous Blood Gluc  (FreeStyle Eben Panola) TIA; Use 1 Device once for 1 dose (14 DAY READER)  Dispense: 1 each; Refill: 0  - Continuous Blood Gluc Sensor (FreeStyle Eben 14 Day Sensor) MISC; Check blood sugars continuously  Switch every 14 days  Dispense: 6 each; Refill: 3  - Hemoglobin A1C; Future  - Basic metabolic panel; Future  - Microalbumin / creatinine urine ratio; Future    2  Centrilobular emphysema ([facility] )    He has no thoughts of quitting smoking  No significant pulmonary symptoms at this time  Monitor  3  Essential hypertension    Pressure very well controlled  Continue current antihypertensives  4  Hyperlipidemia associated with type 2 diabetes mellitus ([facility] )    Check lipid panel  Continue statin  - Lipid Panel with Direct LDL reflex; Future  - IRIS Diabetic eye exam      Depression Screening and Follow-up Plan: Patient was screened for depression during today's encounter  They screened negative with a PHQ-2 score of 0  Tobacco Cessation Counseling: Tobacco cessation counseling was provided  The patient is sincerely urged to quit consumption of tobacco  He is not ready to quit tobacco       Return in about 4 months (around 2022) for Follow-up  Chief Complaint:     Chief Complaint   Patient presents with    Follow-up     4 months / medication issues with insurance/ monitor bood sugar machine gómez hasnt been monitoring       History of Present Illness:       Patient presents for follow-up  He has been doing well lately    Saw back specialist and feels better after seeing him  Diabetes tends to be very well controlled  His pharmacist reported call his insurance and he is already approved for freestyle aida 2 even though he is not on insulin  Review of Systems:     Review of Systems   Constitutional: Negative for activity change, appetite change and fatigue  Respiratory: Negative for apnea, cough, chest tightness, shortness of breath and wheezing  Cardiovascular: Negative for chest pain, palpitations and leg swelling  Gastrointestinal: Negative for abdominal distention, abdominal pain, blood in stool, constipation, diarrhea, nausea and vomiting  Musculoskeletal: Positive for back pain  Neurological: Negative for dizziness, weakness, light-headedness, numbness and headaches  Psychiatric/Behavioral: Negative for behavioral problems, confusion, hallucinations, sleep disturbance and suicidal ideas  The patient is not nervous/anxious  Objective:     /80 (BP Location: Left arm, Patient Position: Sitting, Cuff Size: Standard)   Pulse 90   Temp (!) 96 6 °F (35 9 °C) (Tympanic)   Resp 20   Ht 5' 9" (1 753 m)   Wt 69 9 kg (154 lb)   SpO2 98%   BMI 22 74 kg/m²     Physical Exam  Constitutional:       General: He is not in acute distress  Appearance: He is not ill-appearing  Cardiovascular:      Rate and Rhythm: Normal rate and regular rhythm  Pulses: Pulses are weak  Dorsalis pedis pulses are 1+ on the right side and 1+ on the left side  Posterior tibial pulses are 1+ on the right side and 1+ on the left side  Heart sounds: No murmur heard  Pulmonary:      Effort: Pulmonary effort is normal  No respiratory distress  Breath sounds: No wheezing  Abdominal:      General: Bowel sounds are normal  There is no distension  Tenderness: There is no abdominal tenderness  Musculoskeletal:      Right lower leg: No edema  Left lower leg: No edema     Feet:      Right foot:      Skin integrity: Dry skin present  No ulcer, skin breakdown, erythema, warmth or callus  Left foot:      Skin integrity: Dry skin present  No ulcer, skin breakdown, erythema, warmth or callus  Neurological:      Mental Status: He is alert  Diabetic Foot Exam  Patient's shoes and socks removed  Right Foot/Ankle   Right Foot Inspection  Skin Exam: skin normal, skin intact and dry skin  No warmth, no callus, no erythema, no maceration, no abnormal color, no pre-ulcer, no ulcer and no callus  Toe Exam: ROM and strength within normal limits  Sensory   Proprioception: diminished  Monofilament testing: diminished    Vascular  Capillary refills: < 3 seconds  The right DP pulse is 1+  The right PT pulse is 1+  Left Foot/Ankle  Left Foot Inspection  Skin Exam: skin normal, skin intact and dry skin  No warmth, no erythema, no maceration, normal color, no pre-ulcer, no ulcer and no callus  Toe Exam: ROM and strength within normal limits  Sensory   Proprioception: diminished  Monofilament testing: diminished    Vascular  Capillary refills: < 3 seconds  The left DP pulse is 1+  The left PT pulse is 1+       Assign Risk Category  No deformity present  Loss of protective sensation  Weak pulses  Risk: 2          Deann Trujillo   MEDICAL [id number]

## 2022-03-30 ENCOUNTER — TELEPHONE (OUTPATIENT)
Dept: INTERNAL MEDICINE CLINIC | Facility: CLINIC | Age: 82
End: 2022-03-30

## 2022-03-30 LAB
LEFT EYE DIABETIC RETINOPATHY: NORMAL
LEFT EYE IMAGE QUALITY: NORMAL
LEFT EYE MACULAR EDEMA: NORMAL
LEFT EYE OTHER RETINOPATHY: NORMAL
RIGHT EYE DIABETIC RETINOPATHY: NORMAL
RIGHT EYE IMAGE QUALITY: NORMAL
RIGHT EYE MACULAR EDEMA: NORMAL
RIGHT EYE OTHER RETINOPATHY: NORMAL
SEVERITY (EYE EXAM): NORMAL

## 2022-03-30 NOTE — TELEPHONE ENCOUNTER
----- Message from Daisy Juárez DO sent at 3/30/2022  6:08 AM EDT -----  Diabetes remains well controlled   No other significant abnormalities

## 2022-03-30 NOTE — TELEPHONE ENCOUNTER
----- Message from Halinajayme Romero DO sent at 3/30/2022  8:55 AM EDT -----  Please let patient know there was no evidence of diabetic retinopathy on his eye exam from our office

## 2022-04-08 ENCOUNTER — TELEPHONE (OUTPATIENT)
Dept: INTERNAL MEDICINE CLINIC | Facility: CLINIC | Age: 82
End: 2022-04-08

## 2022-04-08 DIAGNOSIS — E11.40 TYPE 2 DIABETES MELLITUS WITH DIABETIC NEUROPATHY, WITHOUT LONG-TERM CURRENT USE OF INSULIN (HCC): Chronic | ICD-10-CM

## 2022-04-08 RX ORDER — GABAPENTIN 100 MG/1
200 CAPSULE ORAL 2 TIMES DAILY
Qty: 360 CAPSULE | Refills: 3 | Status: SHIPPED | OUTPATIENT
Start: 2022-04-08

## 2022-04-08 NOTE — TELEPHONE ENCOUNTER
PT NEEDS RX SENT TO PHARM -  GABAPENTIN 100 MG - 90 DAY W/REFILLS    LAST TIME WAS ONLY SENT FOR 60 DAY    ANY PROBS - CALL PT

## 2022-04-18 ENCOUNTER — TELEPHONE (OUTPATIENT)
Dept: OTHER | Facility: OTHER | Age: 82
End: 2022-04-18

## 2022-04-26 ENCOUNTER — HOSPITAL ENCOUNTER (OUTPATIENT)
Dept: RADIOLOGY | Facility: CLINIC | Age: 82
Discharge: HOME | End: 2022-04-26
Attending: STUDENT IN AN ORGANIZED HEALTH CARE EDUCATION/TRAINING PROGRAM | Admitting: STUDENT IN AN ORGANIZED HEALTH CARE EDUCATION/TRAINING PROGRAM
Payer: MEDICARE

## 2022-04-26 VITALS
SYSTOLIC BLOOD PRESSURE: 134 MMHG | OXYGEN SATURATION: 96 % | RESPIRATION RATE: 20 BRPM | DIASTOLIC BLOOD PRESSURE: 85 MMHG | HEART RATE: 87 BPM | TEMPERATURE: 97.7 F

## 2022-04-26 DIAGNOSIS — M48.062 NEUROGENIC CLAUDICATION DUE TO LUMBAR SPINAL STENOSIS: ICD-10-CM

## 2022-04-26 DIAGNOSIS — M79.10 TRIGGER POINT: ICD-10-CM

## 2022-04-26 PROCEDURE — 62323 NJX INTERLAMINAR LMBR/SAC: CPT | Performed by: STUDENT IN AN ORGANIZED HEALTH CARE EDUCATION/TRAINING PROGRAM

## 2022-04-26 PROCEDURE — 20552 NJX 1/MLT TRIGGER POINT 1/2: CPT | Performed by: STUDENT IN AN ORGANIZED HEALTH CARE EDUCATION/TRAINING PROGRAM

## 2022-04-26 RX ORDER — METHYLPREDNISOLONE ACETATE 80 MG/ML
80 INJECTION, SUSPENSION INTRA-ARTICULAR; INTRALESIONAL; INTRAMUSCULAR; PARENTERAL; SOFT TISSUE ONCE
Status: COMPLETED | OUTPATIENT
Start: 2022-04-26 | End: 2022-04-26

## 2022-04-26 RX ADMIN — METHYLPREDNISOLONE ACETATE 80 MG: 80 INJECTION, SUSPENSION INTRA-ARTICULAR; INTRALESIONAL; INTRAMUSCULAR; PARENTERAL; SOFT TISSUE at 15:06

## 2022-04-26 RX ADMIN — IOHEXOL 1 ML: 300 INJECTION, SOLUTION INTRAVENOUS at 15:04

## 2022-04-26 NOTE — H&P
History of Present Illness: The patient is a 80 y o  male who presents with complaints of back and extremity pain    Patient Active Problem List   Diagnosis    Type 2 diabetes mellitus with diabetic neuropathy, without long-term current use of insulin ([address] )    Hyperlipidemia associated with type 2 diabetes mellitus ([address] )    Essential hypertension    BPH (benign prostatic hyperplasia)    Degenerative cervical spinal stenosis    Cervical spondylosis    COPD (chronic obstructive pulmonary disease) (HCC)    History of prostate cancer    Overactive bladder    Lesion of native kidney    SHIVANI (obstructive sleep apnea)    Vitamin D insufficiency    Pipe smoker    Anxiety    Erectile dysfunction due to diseases classified elsewhere       Past Medical History:   Diagnosis Date    BPH (benign prostatic hyperplasia)     Cancer ([address] ) 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    [facility] myelopathy [facility] ) 6/20/2019    Chemical exposure     9/11/01- worked in [place]   COPD (chronic obstructive pulmonary disease) ([facility]     CPAP (continuous positive airway pressure) dependence     DDD (degenerative disc disease), cervical     Diabetes mellitus ([address] )     Foraminal stenosis of cervical region     Hyperlipidemia     Hypertension     SHIVANI (obstructive sleep apnea)     Overactive bladder     Spinal cord compression ([address] ) 5/8/2019    Added automatically from request for surgery [id number]       Past Surgical History:   Procedure Laterality Date    ARTHROSCOPY KNEE Bilateral     COLONOSCOPY      LUNG SURGERY Left     scraping of left    NH ARTHRODESIS ANT INTERBODY INC DISCECTOMY, CERVICAL BELOW C2 Bilateral 6/18/2019    Procedure: C3/4  ACDF WITH  C4 HEMICORPECTOMY, C3-4 ANTERIOR INSTRUMENTATION AND FUSION (NEUROMONITORING);   Surgeon: Marty Calderon MD;  Location: [facility];  Service: Neurosurgery    ROTATOR CUFF REPAIR Bilateral          Current Outpatient Medications:     acetaminophen (TYLENOL) 500 mg tablet, Take 500 mg by mouth every 6 (six) hours as needed for mild pain  , Disp: , Rfl:     aspirin (ECOTRIN LOW STRENGTH) 81 mg EC tablet, Take 1 tablet by mouth daily, Disp: 30 tablet, Rfl: 0    Blood Glucose Monitoring Suppl (OneTouch Verio) w/Device KIT, use as directed, Disp: 1 kit, Rfl: 0    chlorhexidine (PERIDEX) 0 12 % solution, RINSE MOUTH WITH 15 ML (1 CAPFUL) FOR 30 SECONDS IN THE MORNING A   (REFER TO PRESCRIPTION NOTES)  , Disp: , Rfl:     clindamycin (CLEOCIN) 300 MG capsule, Take 300 mg by mouth every 8 (eight) hours  , Disp: , Rfl:     Continuous Blood Gluc Sensor (FreeStyle Eben 14 Day Sensor) MISC, Check blood sugars continuously  Switch every 14 days  , Disp: 6 each, Rfl: 3    diltiazem (TIAZAC) 240 MG 24 hr capsule, take 1 capsule by mouth once daily, Disp: 90 capsule, Rfl: 3    DULoxetine (CYMBALTA) 60 mg delayed release capsule, TAKE 1 CAPSULE DAILY, Disp: 90 capsule, Rfl: 3    gabapentin (NEURONTIN) 100 mg capsule, Take 2 capsules (200 mg total) by mouth 2 (two) times a day, Disp: 360 capsule, Rfl: 3    glucose blood (FREESTYLE LITE) test strip, use 1 TEST STRIP to TEST BLOOD SUGAR twice a day, Disp: 100 strip, Rfl: 0    hydrochlorothiazide (HYDRODIURIL) 12 5 mg tablet, Take 1 tablet (12 5 mg total) by mouth daily, Disp: 90 tablet, Rfl: 1    ibuprofen (MOTRIN) 600 mg tablet, Take 600 mg by mouth every 6 (six) hours as needed  , Disp: , Rfl:     Lancets (OneTouch Delica Plus GNCGRI42T) MISC, use 1 LANCET to TEST BLOOD SUGAR one to two times a day, Disp: 100 each, Rfl: 0    losartan (COZAAR) 100 MG tablet, TAKE 1 TABLET DAILY, Disp: 90 tablet, Rfl: 3    metFORMIN (GLUCOPHAGE) 500 mg tablet, take 1 tablet by mouth daily WITH BREAKFAST, Disp: 90 tablet, Rfl: 3    Multiple Vitamin (MULTI-VITAMINS PO), Take by mouth daily, Disp: , Rfl:     oxybutynin (DITROPAN-XL) 5 mg 24 hr tablet, take 1 tablet by mouth once daily, Disp: 90 tablet, Rfl: 3    polyvinyl alcohol (LIQUIFILM TEARS) 1 4 % ophthalmic solution, Administer 1 drop to both eyes 4 (four) times a day, Disp: 15 mL, Rfl: 0    pravastatin (PRAVACHOL) 40 mg tablet, take 1 tablet by mouth once daily, Disp: 90 tablet, Rfl: 1    pyridoxine (VITAMIN B6) 50 mg tablet, Take 50 mg by mouth daily, Disp: , Rfl:     sildenafil (REVATIO) 20 mg tablet, Take 1 tablet (20 mg total) by mouth if needed (Erectile dysfunction) Take 1-5 tablets as needed for erection on an empty stomach, Disp: 30 tablet, Rfl: 1    tamsulosin (FLOMAX) 0 4 mg, TAKE 1 CAPSULE DAILY AT BEDTIME, Disp: 90 capsule, Rfl: 3    thiamine 50 MG tablet, Take 1 tablet (50 mg total) by mouth daily, Disp: 30 tablet, Rfl: 3    Trelegy Ellipta 100-62 5-25 MCG/INH inhaler, USE 1 INHALATION DAILY (RINSE MOUTH AFTER USE), Disp: 360 blister, Rfl: 3    VITAMIN E PO, Take by mouth 268 mg 400 iu, Disp: , Rfl:     Allergies   Allergen Reactions    Chloroquine Itching    Qualaquin [Quinine] Itching       Physical Exam:   Vitals:    04/26/22 1354   BP: 137/86   Pulse: 81   Resp: 20   Temp: 97 7 °F (36 5 °C)   SpO2: 100%     General: Awake, Alert, Oriented x 3, Mood and affect appropriate  Respiratory: Respirations even and unlabored  Cardiovascular: Peripheral pulses intact; no edema  Musculoskeletal Exam: low back apin    ASA Score: 3    Patient/Chart Verification  Patient ID Verified: Verbal  Consents Confirmed: Procedural,To be obtained in the Pre-Procedure area  H&P( within 30 days) Verified: Yes  Interval H&P(within 24 hr) Complete (required for Outpatients and Surgery Admit only): Yes  Allergies Reviewed: Yes  Anticoag/NSAID held?: No  Currently on antibiotics?: No    Assessment:   1  Neurogenic claudication due to lumbar spinal stenosis    2   Trigger point        Plan: L3-4 LESI and TPI

## 2022-04-28 ENCOUNTER — TELEPHONE (OUTPATIENT)
Dept: NEUROLOGY | Facility: CLINIC | Age: 82
End: 2022-04-28

## 2022-04-28 ENCOUNTER — TELEPHONE (OUTPATIENT)
Dept: UROLOGY | Facility: AMBULATORY SURGERY CENTER | Age: 82
End: 2022-04-28

## 2022-04-28 NOTE — TELEPHONE ENCOUNTER
Called and spoke to patient  Patient stated he will only speak to Surreal InkÂº about his "urolgoic issue"  Informed patient I am a nurse and I am able to assist patient and if I am unable to I will route it directly to [facility]  Patient denied giving me anymore information and only wants to be called from [facility]  Attempted multiple times to try to help, patient denied  Informed patient I would route to [facility]

## 2022-04-28 NOTE — TELEPHONE ENCOUNTER
PT under care of: Ashly Mcginnis     Pt last seen:  03/08/22     PT calling today because/symptoms are: pt has a question for Laura Hogan but he didn't say what is it   He was about his last appointment and he stated it was personal      PT can be reached at: [phone]

## 2022-04-28 NOTE — TELEPHONE ENCOUNTER
pt called asking to speak to david, he states that david was the nurse in the office  i made him aware that i do not know of a david and that i am one of the nurses and can take a message for the dr   he states that he has some questions and would not give me any more info    can you please call him at [phone]

## 2022-05-03 ENCOUNTER — TELEPHONE (OUTPATIENT)
Dept: PAIN MEDICINE | Facility: CLINIC | Age: 82
End: 2022-05-03

## 2022-05-04 ENCOUNTER — OFFICE VISIT (OUTPATIENT)
Dept: UROLOGY | Facility: CLINIC | Age: 82
End: 2022-05-04
Payer: MEDICARE

## 2022-05-04 VITALS
WEIGHT: 153 LBS | DIASTOLIC BLOOD PRESSURE: 86 MMHG | OXYGEN SATURATION: 97 % | HEART RATE: 91 BPM | BODY MASS INDEX: 22.66 KG/M2 | SYSTOLIC BLOOD PRESSURE: 132 MMHG | HEIGHT: 69 IN

## 2022-05-04 DIAGNOSIS — N53.19 OTHER EJACULATORY DYSFUNCTION: Primary | ICD-10-CM

## 2022-05-04 PROCEDURE — 99213 OFFICE O/P EST LOW 20 MIN: CPT | Performed by: PHYSICIAN ASSISTANT

## 2022-05-04 NOTE — PROGRESS NOTES
5/4/2022      No chief complaint on file  Assessment and Plan    80 y o  male    1  Retrograde Ejaculation  - Discussed this in depth with patient today  - Discussed possibility of Flomax causing this as this is a common side effect    - Patient will trial off of Flomax and reassess symptoms and side effects  - Follow up for yearly appointment as scheduled in March 2023  - Call with any questions or concerns  - All questions answered; patient understands and agrees with plan        History of Present Illness  Teresa Ibarra During is a 80 y o  male patient with history of prostate cancer s/p brachytherapy, LUTS, ED, and 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 here for discussion of retrograde ejaculation  Patient states he is bothered by retrograde ejaculation and would like to trial off of Flomax  Denies any new or worsening symptoms  Denies frequency, urgency, dysuria, gross hematuria, flank pain, suprapubic pain, fevers, chills  Review of Systems   Constitutional: Negative for activity change, appetite change, chills and fever  HENT: Negative for congestion and trouble swallowing  Respiratory: Negative for cough and shortness of breath  Cardiovascular: Negative for chest pain, palpitations and leg swelling  Gastrointestinal: Negative for abdominal pain, constipation, diarrhea, nausea and vomiting  Genitourinary: Negative for difficulty urinating, dysuria, flank pain, frequency, hematuria and urgency  Musculoskeletal: Negative for back pain and gait problem  Skin: Negative for wound  Allergic/Immunologic: Negative for immunocompromised state  Neurological: Negative for dizziness and syncope  Hematological: Does not bruise/bleed easily  Psychiatric/Behavioral: Negative for confusion  All other systems reviewed and are negative        Vitals  Vitals:    05/04/22 1000   BP: 132/86   Pulse: 91   SpO2: 97%   Weight: 69 4 kg (153 lb)   Height: 5' 9" (1 753 m)       Physical Exam  Constitutional:       General: He is not in acute distress  Appearance: Normal appearance  He is not ill-appearing, toxic-appearing or diaphoretic  HENT:      Head: Normocephalic  Nose: No congestion  Eyes:      General: No scleral icterus  Right eye: No discharge  Left eye: No discharge  Conjunctiva/sclera: Conjunctivae normal       Pupils: Pupils are equal, round, and reactive to light  Pulmonary:      Effort: Pulmonary effort is normal    Musculoskeletal:      Cervical back: Normal range of motion  Skin:     General: Skin is warm and dry  Coloration: Skin is not jaundiced or pale  Findings: No bruising, erythema, lesion or rash  Neurological:      General: No focal deficit present  Mental Status: He is alert and oriented to person, place, and time  Mental status is at baseline  Gait: Gait normal    Psychiatric:         Mood and Affect: Mood normal          Behavior: Behavior normal          Thought Content: Thought content normal          Judgment: Judgment normal            Past History  Past Medical History:   Diagnosis Date    BPH (benign prostatic hyperplasia)     Cancer ([facility] ) 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    Cervical myelopathy ([place] [address] ) 6/20/2019    Chemical exposure     9/11/01- worked in [employer]       COPD (chronic obstructive pulmonary disease) (HCC)     CPAP (continuous positive airway pressure) dependence     DDD (degenerative disc disease), cervical     Diabetes mellitus ([facility] )     Foraminal stenosis of cervical region     Hyperlipidemia     Hypertension     SHIVANI (obstructive sleep apnea)     Overactive bladder     Spinal cord compression ([facility] ) 5/8/2019    Added automatically from request for surgery [id number]     Social History     Socioeconomic History    Marital status: Legally      Spouse name: None    Number of children: 3    Years of education: None    Highest education level: None   Occupational History    Occupation: retired   Tobacco Use    Smoking status: Current Every Day Smoker     Packs/day: 0 01     Years: 61 00     Pack years: 0 61     Types: Pipe     Last attempt to quit: 2020     Years since quittin 0    Smokeless tobacco: Never Used    Tobacco comment: 2-3  PIPES PER DAY    Vaping Use    Vaping Use: Never used   Substance and Sexual Activity    Alcohol use: Never    Drug use: Never    Sexual activity: Not Currently   Other Topics Concern    None   Social History Narrative    Pt was born in [facility]; moved to [facility] [facility] as an adult with his wife, and eventually moved to [place], [place]        Social Determinants of Health     Financial Resource Strain: Not on file   Food Insecurity: Not on file   Transportation Needs: Not on file   Physical Activity: Not on file   Stress: Not on file   Social Connections: Not on file   Intimate Partner Violence: Not on file   Housing Stability: Not on file     Social History     Tobacco Use   Smoking Status Current Every Day Smoker    Packs/day: 0 01    Years: 61 00    Pack years: 0 61    Types: Pipe    Last attempt to quit: 2020    Years since quittin 0   Smokeless Tobacco Never Used   Tobacco Comment    2-3  PIPES PER DAY      Family History   Problem Relation Age of Onset    No Known Problems Mother     No Known Problems Father        The following portions of the patient's history were reviewed and updated as appropriate: allergies, current medications, past medical history, past social history, past surgical history and problem list     Results  No results found for this or any previous visit (from the past 1 hour(s)) ]  Lab Results   Component Value Date    PSA <0 1 2022    PSA <0 1 2021    PSA <0 1 2019     Lab Results   Component Value Date    GLUCOSE 141 (H) 2017    CALCIUM 10 5 (H) 2022    K 4 6 2022    CO2 33 (H) 2022     (H) 2022    BUN 15 2022    CREATININE 0 89 2022     Lab Results   Component Value Date    WBC 5 34 02/25/2021    HGB 13 9 02/25/2021    HCT 44 3 02/25/2021    MCV 88 02/25/2021     02/25/2021       Elizabeth Lopez PA-C

## 2022-05-06 DIAGNOSIS — E11.40 TYPE 2 DIABETES MELLITUS WITH DIABETIC NEUROPATHY, WITHOUT LONG-TERM CURRENT USE OF INSULIN (HCC): Chronic | ICD-10-CM

## 2022-05-07 RX ORDER — DULOXETIN HYDROCHLORIDE 60 MG/1
CAPSULE, DELAYED RELEASE ORAL
Qty: 90 CAPSULE | Refills: 3 | Status: SHIPPED | OUTPATIENT
Start: 2022-05-07

## 2022-06-06 ENCOUNTER — OFFICE VISIT (OUTPATIENT)
Dept: NEUROLOGY | Facility: CLINIC | Age: 82
End: 2022-06-06
Payer: MEDICARE

## 2022-06-06 VITALS
DIASTOLIC BLOOD PRESSURE: 80 MMHG | OXYGEN SATURATION: 97 % | TEMPERATURE: 97.2 F | WEIGHT: 153 LBS | BODY MASS INDEX: 22.66 KG/M2 | HEART RATE: 82 BPM | SYSTOLIC BLOOD PRESSURE: 136 MMHG | HEIGHT: 69 IN

## 2022-06-06 DIAGNOSIS — R93.89 ABNORMAL COMPUTED TOMOGRAPHY ANGIOGRAPHY (CTA): ICD-10-CM

## 2022-06-06 DIAGNOSIS — F17.290 PIPE SMOKER: ICD-10-CM

## 2022-06-06 DIAGNOSIS — E78.5 HYPERLIPIDEMIA ASSOCIATED WITH TYPE 2 DIABETES MELLITUS (HCC): ICD-10-CM

## 2022-06-06 DIAGNOSIS — I10 ESSENTIAL HYPERTENSION: ICD-10-CM

## 2022-06-06 DIAGNOSIS — I73.9 PERIPHERAL ARTERY DISEASE (HCC): Primary | ICD-10-CM

## 2022-06-06 DIAGNOSIS — R93.0 ABNORMAL FINDINGS ON DIAGNOSTIC IMAGING OF SKULL AND HEAD, NOT ELSEWHERE CLASSIFIED: ICD-10-CM

## 2022-06-06 DIAGNOSIS — E11.69 HYPERLIPIDEMIA ASSOCIATED WITH TYPE 2 DIABETES MELLITUS (HCC): ICD-10-CM

## 2022-06-06 DIAGNOSIS — E11.40 TYPE 2 DIABETES MELLITUS WITH DIABETIC NEUROPATHY, WITHOUT LONG-TERM CURRENT USE OF INSULIN (HCC): Chronic | ICD-10-CM

## 2022-06-06 PROCEDURE — 99214 OFFICE O/P EST MOD 30 MIN: CPT

## 2022-06-06 NOTE — PROGRESS NOTES
Patient ID: Colleen Chandler is a 80 y o  male  Assessment/Plan:    Peripheral artery disease (HCC)  Colleen Chandler is a 80year old male known to the practice for cervical radiculopathy s/p cervical decompression, diabetic peripheral neuropathy maintained on gabapentin 100 mg twice a day and Cymbalta 60 mg daily prescribed by his primary care provider, and neurogenic claudication of bilateral lower extremities due to severe lumbar stenosis  He denies any new or worsening symptoms, however does continue to have bilateral lower extremity pain and swelling especially with exercise  He recently had lower extremity arterial testing ordered by his podiatrist which is consistent with PAD, for which he has not yet been referred to vascular for  In reviewing his prior testing it does appear he had a CTA completed in 2017 which showed a very small infundibulum  I do not see that this was ever followed up on  As such we did discuss completing an MRA of the brain without contrast to further evaluate this, in the setting of his known hypertension, PAD, hyperlipidemia, diabetes, and current everyday tobacco use        Plan discussed with patient today includes:  - Continue with Gabapentin 100 mg twice daily and Cymbalta 60 mg daily (prescribed by your primary care provider)  - Referral to vascular surgery for PAD  - Complete MRA head w/o contrast to evaluate previously seen infundibulum on CTA  - Call pain management to schedule a follow up regarding your back pain  - Continue with good blood pressure control; I would recommend monitoring at home at least 3 times per week; Goal of <130/80  - Continue with good cholesterol control; Goal LDL <70  - Continue with good blood sugar control; Goal HgbA1c <7 0  - Continue to be compliant with your CPAP; following up with pulmonary  - Will defer monitoring of cholesterol and blood sugar and management of hypertensive medications to the primary care provider  - Stay well hydrated by drinking enough water   - Consider smoking cessation  - Eat a healthy diet, high in lean meats fish, turkey, chicken  Low in fats, cholesterol, sugars and sodium  Avoid canned foods,  get lets of fresh/frozen vegetables/fruits  - Get routine exercise/physical activity as much as able to tolerate  - Keep follow ups with your other health care providers  - Fall precautions    I will plan for him to return to the office in 6 months time but would be happy to see him sooner if the need should arise  If he has any symptoms concerning for TIA or stroke including sudden painless loss of vision or double vision, difficulty speaking or swallowing, vertigo/room spinning that does not quickly resolve, or weakness/numbness affecting 1 side of the face or body he should proceed by ambulance to the nearest emergency room immediately  What you can do to improve your sleep:   · Create a sleep schedule  Try to go to sleep and wake up at the same times every day  Keep a record of your sleep patterns, and any sleeping problems you have  Bring the record to follow-up visits with healthcare providers  · Do not take naps  Naps could make it hard for you to fall asleep at bedtime  · Keep your bedroom cool, quiet, and dark  Turn on white noise, such as a fan, to help you relax  Do not use your bed for any activity that will keep you awake  Do not read, exercise, eat, or watch TV in your bedroom  · Get up if you do not fall asleep within 20 minutes  Move to another room and do something relaxing until you become sleepy  · Limit caffeine, alcohol, and food to earlier in the day  Only drink caffeine in the morning  Do not drink alcohol within 6 hours of bedtime  Do not eat a heavy meal right before you go to bed  · Exercise regularly  Daily exercise may help you sleep better  Do not exercise within 4 hours of bedtime      Type 2 diabetes mellitus with diabetic neuropathy, without long-term current use of insulin ([facility] )    Lab Results   Component Value Date    HGBA1C 7 0 (H) 03/29/2022     Jose Chandler is a 80year old male known to the practice for cervical radiculopathy s/p cervical decompression, diabetic polyneuropathy for which he is maintained on gabapentin 100 mg twice a day and Cymbalta 60 mg daily prescribed by his primary care provider  In the past he was on higher dosages of gabapentin which caused significant lower extremity swelling  He is also noted to have neurogenic claudication of bilateral lower extremities due to severe lumbar stenosis  He has recently seen pain management and received epidural steroid injections as well as trigger point injections, with 65% improvement in his symptoms for only a short period of time  We discussed tight blood sugar control as being essential for his diabetic peripheral neuropathy  He will also continue with Gabapentin 100 mg twice daily (he cannot tolerate higher dosages) and Cymbalta 60 mg daily  His lower extremity discomfort is likely multifactorial including diabetic neuropathy, lumbar spinal stenosis, and peripheral artery disease  Diagnoses and all orders for this visit:    Peripheral artery disease ([facility] )  -     Ambulatory Referral to Vascular Surgery;  Future  -     MRI angiogram head without contrast; Future    Abnormal computed tomography angiography (CTA)  -     MRI angiogram head without contrast; Future    Essential hypertension  -     MRI angiogram head without contrast; Future    Hyperlipidemia associated with type 2 diabetes mellitus ([facility] )  -     MRI angiogram head without contrast; Future    Pipe smoker  -     MRI angiogram head without contrast; Future    Abnormal findings on diagnostic imaging of skull and head, not elsewhere classified   -     MRI angiogram head without contrast; Future    Type 2 diabetes mellitus with diabetic neuropathy, without long-term current use of insulin (HCC)           Subjective:    HPI Jose Chandler is a 80year old male known to the practice for cervical radiculopathy s/p cervical decompression with improvement of pain and relief of symptoms approximately 3 years ago  He also has diabetic polyneuropathy for which he is maintained on gabapentin 100 mg twice a day and Cymbalta 60 mg daily prescribed by his primary care provider  In the past he was on higher dosages of gabapentin which caused significant lower extremity swelling  He is also noted to have neurogenic claudication of bilateral lower extremities due to severe lumbar stenosis  He was last seen 03/04/2022 by Dr Dl Gil  At that time he was referred to pain management and has since received epidural steroid injections as well as trigger point injections, with 65% improvement in his symptoms  He returns the office today and states the injections helped him for a few weeks but then the pain returned  He does not have a scheduled follow up at this time  He reports swelling in bilateral lower extremities and coolness of his extremities  He tells me his podiatrist ordered "vascular testing" but has not yet had the results reviewed with him  Review of his chart under media does show   he recently had  lower extremity arterial testing ordered by his podiatrist consistent with PAD, he does not currently follow with a vascular physician  He reports he has a lot of difficulty with climbing or going down stairs, with a lot of cramping of his legs  He lives alone and is independent of all ADLs  He does drive without difficulty, and states he has no difficulty feeling the pedals under his feet  He denies any confusion or memory difficulty  He does endorse a lot of anxiety, thinking about his family (he is  from his wife and sounds to be estranged from his children)  He does have a friend who he visits with frequently  He denies any new numbness, tingling or weakness   No change in bowel or bladder; he does follow with a urologist for retrograde ejaculation/sexual dysfunction  He denies any difficulty with speaking, swallowing, dizziness, headaches, or vision change  He is maintained on ASA 81 mg daily and Pravastatin 40 mg daily  He is still smoking; 3 pipes per day and is not ready to quit  He sleeps with CPAP for SHIVANI, reports 100% compliance  Although he reports only sleeping 3-4 hours a night  He goes to bed at 3 am, wakes at 7-8 am; he reports napping a lot during the day and then being unable to fall asleep at night  He follows with pulmonology in this regard; next appt 8/15/22  Review of his most recent labs:   Cholesterol 3/29/22 at goal  HgbA1c 3/29/22 was 7 0, up from his prior 6 5  eGFR 81    Lab Results   Component Value Date/Time    CHOLESTEROL 92 03/29/2022 02:26 PM     Lab Results   Component Value Date/Time    TRIG 44 03/29/2022 02:26 PM     Lab Results   Component Value Date/Time    HDL 44 03/29/2022 02:26 PM     Lab Results   Component Value Date/Time    LDLCALC 39 03/29/2022 02:26 PM       Lab Results   Component Value Date/Time    HGBA1C 7 0 (H) 03/29/2022 02:26 PM     Lab Results   Component Value Date/Time     03/29/2022 02:26 PM       MRI Brain 11/2/17  1  No MR evidence of acute ischemia  2  Diffuse periventricular, subcortical and deep white matter T2/FLAIR signal abnormality which could relate to microangiopathy although other white matter etiologies are not excluded  3  Mild diffuse brain volume loss/atrophy  CTA head 11/2/17   No evidence of significant carotid stenosis by Nascet criteria  No large vessel occlusion seen  No large aneurysm seen  Small left 1 to 2 mm infundibulum at the origin of the left posterior communicating artery  There is no long segment occlusion, narrowing or pseudoaneurysm or dissection flap    The following portions of the patient's history were reviewed and updated as appropriate: allergies, current medications, past family history, past medical history, past social history and past surgical history  Objective:    Blood pressure 136/80, pulse 82, temperature (!) 97 2 °F (36 2 °C), temperature source Temporal, height 5' 9" (1 753 m), weight 69 4 kg (153 lb), SpO2 97 %  Neurological Exam    On neurological examination patient is alert, awake, oriented and in no distress  Speech is fluent without dysarthria or aphasia  Cranial nerves 2-12 were symmetrically intact bilaterally  No carotid bruits were appreciated  No evidence of any focal weakness or sensory loss in the upper or lower extremities  Motor testing reveals 5/5 strength of the bilateral upper and lower extremities  There was no pronator drift  No fasciculations present  No abnormal involuntary movements  Finger- to-nose reveals no tremor or ataxia and intact proprioceptive function, no dysmetria was noted  Sensation was intact to vibration, light touch, pin prick and temperature in bilateral upper extremities and sensation was diminished to vibration, light touch, pinprick, and temperature in the right lower extremity from the ankle to the mid shin midline, however sensation was intact laterally in this area  Sensation was intact in the left lower extremity to vibration, light touch, pinprick, and temperature both distally and proximally with no change  However he does note the plantar aspect of his feet bilaterally at the ball is more sensitive to touch than the heels of his feet bilaterally, and the dorsal aspect of his feet feel "wet"  The dorsal aspect of the foot is colder to temperature compared to the rest of his lower extremity, more left vs right  Deep tendon reflexes were brisk and symmetric in the bilateral upper and lower extremities  He is able to rise easily without assistance from a seated position  Casual gait is normal including stance, stride, and arm swing  There is no tenderness on palpation of the cervical, thoracic or lumbar spinous process, with no radicular pain noted    Negative straight leg test bilaterally  ROS:    Review of Systems   Constitutional: Negative  Negative for appetite change and fever  HENT: Negative  Negative for hearing loss, tinnitus, trouble swallowing and voice change  Eyes: Negative  Negative for photophobia and pain  Respiratory: Negative  Negative for shortness of breath  Cardiovascular: Negative  Negative for palpitations  Gastrointestinal: Negative  Negative for nausea and vomiting  Endocrine: Negative  Negative for cold intolerance  Genitourinary: Negative  Negative for dysuria, frequency and urgency  Musculoskeletal: Positive for back pain and gait problem  Negative for myalgias and neck pain  Skin: Negative  Negative for rash  Neurological: Positive for tremors  Negative for dizziness, seizures, syncope, facial asymmetry, speech difficulty, weakness, light-headedness, numbness and headaches  Patient stated that he has tremors in the right hand  Hematological: Negative  Does not bruise/bleed easily  Psychiatric/Behavioral: Negative  Negative for confusion, hallucinations and sleep disturbance  Reviewed ROS as entered by medical assistant

## 2022-06-06 NOTE — ASSESSMENT & PLAN NOTE
Onur Thurston During is a 80year old male known to the practice for cervical radiculopathy s/p cervical decompression, diabetic peripheral neuropathy maintained on gabapentin 100 mg twice a day and Cymbalta 60 mg daily prescribed by his primary care provider, and neurogenic claudication of bilateral lower extremities due to severe lumbar stenosis  He denies any new or worsening symptoms, however does continue to have bilateral lower extremity pain and swelling especially with exercise  He recently had lower extremity arterial testing ordered by his podiatrist which is consistent with PAD, for which he has not yet been referred to vascular for  In reviewing his prior testing it does appear he had a CTA completed in 2017 which showed a very small infundibulum  I do not see that this was ever followed up on  As such we did discuss completing an MRA of the brain without contrast to further evaluate this, in the setting of his known hypertension, PAD, hyperlipidemia, diabetes, and current everyday tobacco use        Plan discussed with patient today includes:  - Continue with Gabapentin 100 mg twice daily and Cymbalta 60 mg daily (prescribed by your primary care provider)  - Referral to vascular surgery for PAD  - Complete MRA head w/o contrast to evaluate previously seen infundibulum on CTA  - Call pain management to schedule a follow up regarding your back pain  - Continue with good blood pressure control; I would recommend monitoring at home at least 3 times per week; Goal of <130/80  - Continue with good cholesterol control; Goal LDL <70  - Continue with good blood sugar control; Goal HgbA1c <7 0  - Continue to be compliant with your CPAP; following up with pulmonary  - Will defer monitoring of cholesterol and blood sugar and management of hypertensive medications to the primary care provider  - Stay well hydrated by drinking enough water   - Consider smoking cessation  - Eat a healthy diet, high in lean meats fish, turkey, chicken  Low in fats, cholesterol, sugars and sodium  Avoid canned foods,  get lets of fresh/frozen vegetables/fruits  - Get routine exercise/physical activity as much as able to tolerate  - Keep follow ups with your other health care providers  - Fall precautions    I will plan for him to return to the office in 6 months time but would be happy to see him sooner if the need should arise  If he has any symptoms concerning for TIA or stroke including sudden painless loss of vision or double vision, difficulty speaking or swallowing, vertigo/room spinning that does not quickly resolve, or weakness/numbness affecting 1 side of the face or body he should proceed by ambulance to the nearest emergency room immediately  What you can do to improve your sleep:   · Create a sleep schedule  Try to go to sleep and wake up at the same times every day  Keep a record of your sleep patterns, and any sleeping problems you have  Bring the record to follow-up visits with healthcare providers  · Do not take naps  Naps could make it hard for you to fall asleep at bedtime  · Keep your bedroom cool, quiet, and dark  Turn on white noise, such as a fan, to help you relax  Do not use your bed for any activity that will keep you awake  Do not read, exercise, eat, or watch TV in your bedroom  · Get up if you do not fall asleep within 20 minutes  Move to another room and do something relaxing until you become sleepy  · Limit caffeine, alcohol, and food to earlier in the day  Only drink caffeine in the morning  Do not drink alcohol within 6 hours of bedtime  Do not eat a heavy meal right before you go to bed  · Exercise regularly  Daily exercise may help you sleep better  Do not exercise within 4 hours of bedtime

## 2022-06-06 NOTE — PATIENT INSTRUCTIONS
- Continue with Gabapentin 100 mg twice daily and Cymbalta 60 mg daily (prescribed by your primary care provider)  - Referral to vascular surgery for PAD  - Complete MRA head w/o contrast to evaluate previously seen infundibulum   - Call pain management to schedule a follow up regarding your back pain  - Continue with good blood pressure control; I would recommend monitoring at home at least 3 times per week; Goal of <130/80  - Continue with good cholesterol control; Goal LDL <70  - Continue with good blood sugar control; Goal HgbA1c <7 0  - Continue to be compliant with your CPAP; following up with pulmonary  - Will defer monitoring of cholesterol and blood sugar and management of hypertensive medications to the primary care provider  - Stay well hydrated by drinking enough water   - Consider smoking cessation  - Eat a healthy diet, high in lean meats fish, turkey, chicken  Low in fats, cholesterol, sugars and sodium  Avoid canned foods,  get lets of fresh/frozen vegetables/fruits  - Get routine exercise/physical activity as much as able to tolerate  - Keep follow ups with your other health care providers  - Fall precautions    I will plan for him to return to the office in 6 months time but would be happy to see him sooner if the need should arise  If he has any symptoms concerning for TIA or stroke including sudden painless loss of vision or double vision, difficulty speaking or swallowing, vertigo/room spinning that does not quickly resolve, or weakness/numbness affecting 1 side of the face or body he should proceed by ambulance to the nearest emergency room immediately  What you can do to improve your sleep:   Create a sleep schedule  Try to go to sleep and wake up at the same times every day  Keep a record of your sleep patterns, and any sleeping problems you have  Bring the record to follow-up visits with healthcare providers  Do not take naps    Naps could make it hard for you to fall asleep at bedtime  Keep your bedroom cool, quiet, and dark  Turn on white noise, such as a fan, to help you relax  Do not use your bed for any activity that will keep you awake  Do not read, exercise, eat, or watch TV in your bedroom  Get up if you do not fall asleep within 20 minutes  Move to another room and do something relaxing until you become sleepy  Limit caffeine, alcohol, and food to earlier in the day  Only drink caffeine in the morning  Do not drink alcohol within 6 hours of bedtime  Do not eat a heavy meal right before you go to bed  Exercise regularly  Daily exercise may help you sleep better  Do not exercise within 4 hours of bedtime

## 2022-06-06 NOTE — ASSESSMENT & PLAN NOTE
Lab Results   Component Value Date    HGBA1C 7 0 (H) 03/29/2022     Jaron Chandler is a 80year old male known to the practice for cervical radiculopathy s/p cervical decompression, diabetic polyneuropathy for which he is maintained on gabapentin 100 mg twice a day and Cymbalta 60 mg daily prescribed by his primary care provider  In the past he was on higher dosages of gabapentin which caused significant lower extremity swelling  He is also noted to have neurogenic claudication of bilateral lower extremities due to severe lumbar stenosis  He has recently seen pain management and received epidural steroid injections as well as trigger point injections, with 65% improvement in his symptoms for only a short period of time  We discussed tight blood sugar control as being essential for his diabetic peripheral neuropathy  He will also continue with Gabapentin 100 mg twice daily (he cannot tolerate higher dosages) and Cymbalta 60 mg daily  His lower extremity discomfort is likely multifactorial including diabetic neuropathy, lumbar spinal stenosis, and peripheral artery disease

## 2022-06-09 ENCOUNTER — TELEPHONE (OUTPATIENT)
Dept: PAIN MEDICINE | Facility: CLINIC | Age: 82
End: 2022-06-09

## 2022-06-09 NOTE — TELEPHONE ENCOUNTER
S/W pt who had LESI/TPI on 4/26  States he got 2 weeks of relief from injection  States his pain returned just as it was prior to injections    States he is agreeable to repeating    Please review Consult note and advise

## 2022-06-09 NOTE — TELEPHONE ENCOUNTER
Patient   [phone]  Dr Pramod Blas     Patient is stating that he is still having pain  His pain level is a 9/10, in the morning he can hardly walk   Follow up with pt thank  you

## 2022-06-10 NOTE — TELEPHONE ENCOUNTER
S/w pt and scheduled LESI and TPI for 6/23/22 215 pm arrival  Gave pre procedure instructions and /vaccine policy

## 2022-06-23 ENCOUNTER — HOSPITAL ENCOUNTER (OUTPATIENT)
Dept: RADIOLOGY | Facility: CLINIC | Age: 82
Discharge: HOME | End: 2022-06-23
Attending: STUDENT IN AN ORGANIZED HEALTH CARE EDUCATION/TRAINING PROGRAM
Payer: MEDICARE

## 2022-06-23 VITALS
OXYGEN SATURATION: 98 % | SYSTOLIC BLOOD PRESSURE: 153 MMHG | DIASTOLIC BLOOD PRESSURE: 89 MMHG | HEART RATE: 85 BPM | TEMPERATURE: 96 F | RESPIRATION RATE: 20 BRPM

## 2022-06-23 DIAGNOSIS — M79.10 TRIGGER POINT: ICD-10-CM

## 2022-06-23 DIAGNOSIS — M48.062 SPINAL STENOSIS OF LUMBAR REGION WITH NEUROGENIC CLAUDICATION: ICD-10-CM

## 2022-06-23 PROCEDURE — 62323 NJX INTERLAMINAR LMBR/SAC: CPT | Performed by: STUDENT IN AN ORGANIZED HEALTH CARE EDUCATION/TRAINING PROGRAM

## 2022-06-23 RX ORDER — METHYLPREDNISOLONE ACETATE 80 MG/ML
80 INJECTION, SUSPENSION INTRA-ARTICULAR; INTRALESIONAL; INTRAMUSCULAR; PARENTERAL; SOFT TISSUE ONCE
Status: COMPLETED | OUTPATIENT
Start: 2022-06-23 | End: 2022-06-23

## 2022-06-23 RX ADMIN — METHYLPREDNISOLONE ACETATE 80 MG: 80 INJECTION, SUSPENSION INTRA-ARTICULAR; INTRALESIONAL; INTRAMUSCULAR; PARENTERAL; SOFT TISSUE at 14:21

## 2022-06-23 NOTE — H&P
History of Present Illness: The patient is a 80 y o  male who presents with complaints of back and buttock pain    Patient Active Problem List   Diagnosis    Type 2 diabetes mellitus with diabetic neuropathy, without long-term current use of insulin ([facility] )    Hyperlipidemia associated with type 2 diabetes mellitus ([address] )    Essential hypertension    BPH (benign prostatic hyperplasia)    Degenerative cervical spinal stenosis    Cervical spondylosis    COPD (chronic obstructive pulmonary disease) (HCC)    History of prostate cancer    Overactive bladder    Lesion of native kidney    SHIVANI (obstructive sleep apnea)    Vitamin D insufficiency    Pipe smoker    Anxiety    Erectile dysfunction due to diseases classified elsewhere    Peripheral artery disease ([address] )       Past Medical History:   Diagnosis Date    BPH (benign prostatic hyperplasia)     Cancer ([address] ) 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    Cervical myelopathy ([facility] [address] ) 6/20/2019    Chemical exposure     9/11/01- worked in [place]   COPD (chronic obstructive pulmonary disease) ([facility])     CPAP (continuous positive airway pressure) dependence     DDD (degenerative disc disease), cervical     Diabetes mellitus ([address] )     Foraminal stenosis of cervical region     Hyperlipidemia     Hypertension     SHIVANI (obstructive sleep apnea)     Overactive bladder     Spinal cord compression ([facility] [address] ) 5/8/2019    Added automatically from request for surgery [id number]       Past Surgical History:   Procedure Laterality Date    ARTHROSCOPY KNEE Bilateral     COLONOSCOPY      LUNG SURGERY Left     scraping of left    AK ARTHRODESIS ANT INTERBODY INC DISCECTOMY, CERVICAL BELOW C2 Bilateral 6/18/2019    Procedure: C3/4  ACDF WITH  C4 HEMICORPECTOMY, C3-4 ANTERIOR INSTRUMENTATION AND FUSION (NEUROMONITORING);   Surgeon: Marty Calderon MD;  [facility];  Service: Neurosurgery    ROTATOR CUFF REPAIR Bilateral          Current Outpatient Medications:     acetaminophen (TYLENOL) 500 mg tablet, Take 500 mg by mouth every 6 (six) hours as needed for mild pain  , Disp: , Rfl:     aspirin (ECOTRIN LOW STRENGTH) 81 mg EC tablet, Take 1 tablet by mouth daily, Disp: 30 tablet, Rfl: 0    Blood Glucose Monitoring Suppl (OneTouch Verio) w/Device KIT, use as directed, Disp: 1 kit, Rfl: 0    chlorhexidine (PERIDEX) 0 12 % solution, RINSE MOUTH WITH 15 ML (1 CAPFUL) FOR 30 SECONDS IN THE MORNING A   (REFER TO PRESCRIPTION NOTES)  , Disp: , Rfl:     clindamycin (CLEOCIN) 300 MG capsule, Take 300 mg by mouth every 8 (eight) hours  , Disp: , Rfl:     Continuous Blood Gluc Sensor (FreeStyle Eben 14 Day Sensor) MISC, Check blood sugars continuously  Switch every 14 days  , Disp: 6 each, Rfl: 3    diltiazem (TIAZAC) 240 MG 24 hr capsule, take 1 capsule by mouth once daily, Disp: 90 capsule, Rfl: 3    DULoxetine (CYMBALTA) 60 mg delayed release capsule, TAKE 1 CAPSULE DAILY, Disp: 90 capsule, Rfl: 3    gabapentin (NEURONTIN) 100 mg capsule, Take 2 capsules (200 mg total) by mouth 2 (two) times a day, Disp: 360 capsule, Rfl: 3    glucose blood (FREESTYLE LITE) test strip, use 1 TEST STRIP to TEST BLOOD SUGAR twice a day, Disp: 100 strip, Rfl: 0    hydrochlorothiazide (HYDRODIURIL) 12 5 mg tablet, Take 1 tablet (12 5 mg total) by mouth daily, Disp: 90 tablet, Rfl: 1    ibuprofen (MOTRIN) 600 mg tablet, Take 600 mg by mouth every 6 (six) hours as needed  , Disp: , Rfl:     Lancets (OneTouch Delica Plus [id number]) MISC, use 1 LANCET to TEST BLOOD SUGAR one to two times a day, Disp: 100 each, Rfl: 0    losartan (COZAAR) 100 MG tablet, TAKE 1 TABLET DAILY, Disp: 90 tablet, Rfl: 3    metFORMIN (GLUCOPHAGE) 500 mg tablet, take 1 tablet by mouth daily WITH BREAKFAST, Disp: 90 tablet, Rfl: 3    Multiple Vitamin (MULTI-VITAMINS PO), Take by mouth daily, Disp: , Rfl:     oxybutynin (DITROPAN-XL) 5 mg 24 hr tablet, take 1 tablet by mouth once daily, Disp: 90 tablet, Rfl: 3    polyvinyl alcohol (LIQUIFILM TEARS) 1 4 % ophthalmic solution, Administer 1 drop to both eyes 4 (four) times a day, Disp: 15 mL, Rfl: 0    pravastatin (PRAVACHOL) 40 mg tablet, take 1 tablet by mouth once daily, Disp: 90 tablet, Rfl: 1    pyridoxine (VITAMIN B6) 50 mg tablet, Take 50 mg by mouth daily, Disp: , Rfl:     sildenafil (REVATIO) 20 mg tablet, Take 1 tablet (20 mg total) by mouth if needed (Erectile dysfunction) Take 1-5 tablets as needed for erection on an empty stomach, Disp: 30 tablet, Rfl: 1    tamsulosin (FLOMAX) 0 4 mg, TAKE 1 CAPSULE DAILY AT BEDTIME (Patient not taking: Reported on 6/6/2022), Disp: 90 capsule, Rfl: 3    thiamine 50 MG tablet, Take 1 tablet (50 mg total) by mouth daily, Disp: 30 tablet, Rfl: 3    Trelegy Ellipta 100-62 5-25 MCG/INH inhaler, USE 1 INHALATION DAILY (RINSE MOUTH AFTER USE), Disp: 360 blister, Rfl: 3    VITAMIN E PO, Take by mouth 268 mg 400 iu, Disp: , Rfl:     Allergies   Allergen Reactions    Chloroquine Itching    Qualaquin [Quinine] Itching       Physical Exam:   Vitals:    06/23/22 1350   Pulse: 77   Resp: 20   Temp: (!) 96 °F (35 6 °C)   SpO2: 97%     General: Awake, Alert, Oriented x 3, Mood and affect appropriate  Respiratory: Respirations even and unlabored  Cardiovascular: Peripheral pulses intact; no edema  Musculoskeletal Exam: back and buttock pain    ASA Score: 3    Patient/Chart Verification  Patient ID Verified: Verbal  ID Band Applied: No  Consents Confirmed: To be obtained in the Pre-Procedure area  H&P( within 30 days) Verified: To be obtained in the Pre-Procedure area  Interval H&P(within 24 hr) Complete (required for Outpatients and Surgery Admit only): To be obtained in the Pre-Procedure area  Allergies Reviewed: Yes  Anticoag/NSAID held?: NA  Currently on antibiotics?: No  Pregnancy denied?: NA    Assessment:   1  Spinal stenosis of lumbar region with neurogenic claudication    2   Trigger point        Plan: L3-4 LESI and TPI

## 2022-06-23 NOTE — DISCHARGE INSTR - LAB
Epidural Steroid Injection   WHAT YOU NEED TO KNOW:   An epidural steroid injection (PAULA) is a procedure to inject steroid medicine into the epidural space  The epidural space is between your spinal cord and vertebrae  Steroids reduce inflammation and fluid buildup in your spine that may be causing pain  You may be given pain medicine along with the steroids  ACTIVITY  Do not drive or operate machinery today  No strenuous activity today - bending, lifting, etc   You may resume normal activites starting tomorrow - start slowly and as tolerated  You may shower today, but no tub baths or hot tubs  You may have numbness for several hours from the local anesthetic  Please use caution and common sense, especially with weight-bearing activities  CARE OF THE INJECTION SITE  If you have soreness or pain, apply ice to the area today (20 minutes on/20 minutes off)  Starting tomorrow, you may use warm, moist heat or ice if needed  You may have an increase or change in your discomfort for 36-48 hours after your treatment  Apply ice and continue with any pain medication you have been prescribed  Notify the [facility] if you have any of the following: redness, drainage, swelling, headache, stiff neck or fever above 100°F     SPECIAL INSTRUCTIONS  Our office will contact you in approximately 7 days for a progress report  MEDICATIONS  Continue to take all routine medications  Our office may have instructed you to hold some medications  As no general anesthesia was used in today's procedure, you should not experience any side effects related to anesthesia  If you have a problem specifically related to your procedure, please call our office at [phone]  Problems not related to your procedure should be directed to your primary care physician

## 2022-06-27 ENCOUNTER — TELEPHONE (OUTPATIENT)
Dept: PAIN MEDICINE | Facility: CLINIC | Age: 82
End: 2022-06-27

## 2022-06-27 ENCOUNTER — TELEPHONE (OUTPATIENT)
Dept: VASCULAR SURGERY | Facility: CLINIC | Age: 82
End: 2022-06-27

## 2022-06-27 NOTE — TELEPHONE ENCOUNTER
Patient called stating that he was scheduled for surgery tomorrow I told patient that he only has an appointment with one of our PA's and she will be checking his legs  Patient  Was okay then   LLF

## 2022-06-28 ENCOUNTER — CONSULT (OUTPATIENT)
Dept: VASCULAR SURGERY | Facility: CLINIC | Age: 82
End: 2022-06-28
Payer: MEDICARE

## 2022-06-28 VITALS
BODY MASS INDEX: 22.36 KG/M2 | DIASTOLIC BLOOD PRESSURE: 82 MMHG | HEART RATE: 62 BPM | HEIGHT: 69 IN | WEIGHT: 151 LBS | SYSTOLIC BLOOD PRESSURE: 136 MMHG

## 2022-06-28 DIAGNOSIS — F17.290 PIPE SMOKER: ICD-10-CM

## 2022-06-28 DIAGNOSIS — E78.5 HYPERLIPIDEMIA ASSOCIATED WITH TYPE 2 DIABETES MELLITUS (HCC): Primary | Chronic | ICD-10-CM

## 2022-06-28 DIAGNOSIS — E11.69 HYPERLIPIDEMIA ASSOCIATED WITH TYPE 2 DIABETES MELLITUS (HCC): Primary | Chronic | ICD-10-CM

## 2022-06-28 DIAGNOSIS — I73.9 PERIPHERAL ARTERY DISEASE (HCC): ICD-10-CM

## 2022-06-28 DIAGNOSIS — E11.40 TYPE 2 DIABETES MELLITUS WITH DIABETIC NEUROPATHY, WITHOUT LONG-TERM CURRENT USE OF INSULIN (HCC): Chronic | ICD-10-CM

## 2022-06-28 PROCEDURE — 99204 OFFICE O/P NEW MOD 45 MIN: CPT | Performed by: PHYSICIAN ASSISTANT

## 2022-06-28 NOTE — PATIENT INSTRUCTIONS
Peripheral artery disease ([facility] [address] )  -     Ambulatory Referral to Vascular Surgery  -     VAS lower limb arterial duplex, complete bilateral; Future      -maintain good blood pressure, cholesterol and glucose control  -we discussed the importance of smoking cessation to decrease cardiovascular disease  -check feet daily for any changes  -recommend good skin care, moisturizer such as Cetaphil  -continue with aspirin 81 and pravastatin 40 (consider intensified statin therapy)     Leg edema, left  -mild skin changes and abnormal sensations  -recommend compression, periodic elevation, low-sodium diet and regular exercise  -after TONYA we will decide upon ideal compression stocking

## 2022-06-28 NOTE — PROGRESS NOTES
Assessment/Plan:    Peripheral artery disease ([facility] [address] )  -     Ambulatory Referral to Vascular Surgery  -     VAS lower limb arterial duplex, complete bilateral; Future    -L foot abnormal sensations and edema (abn sensations due to DM)  -General leg pain and difficulty walking in am (?MSK, back)  -No ischemic rest pain or wounds  -Gives Hx of prior endovascular procedures    Plan:  High risk patient with diabetes mellitus and stated history of peripheral arterial disease and prior interventions, his current symptoms do not seem to be of arterial etiology  However, we will check lower extremity arterial duplex study to evaluate vascular disease  If stable, we could consider medical compression for edema  Suspect leg weakness due to back or musculoskeletal etiology  Foot pain due to neuropathy     -maintain good blood pressure, cholesterol and glucose control  -we discussed the importance of smoking cessation to decrease cardiovascular disease  -check feet daily for any changes  -recommend good skin care, moisturizer such as Cetaphil  -continue with aspirin 81 and pravastatin 40 (consider intensified statin therapy)     Leg edema, left  -mild skin changes and abnormal sensations  -recommend compression, periodic elevation, low-sodium diet and regular exercise  -after TONYA we will decide upon ideal compression stocking      Additional dx:  Pipe smoker    Type 2 diabetes mellitus with diabetic neuropathy, without long-term current use of insulin (HCC)      Hyperlipidemia associated with type 2 diabetes mellitus (HCC)      Subjective:      Patient ID: Elizabeth Pires During is a 80 y o  male  Pt is new and here for b/l leg tenderness & swelling Lt>Rt  Pt states this has been going on for 3 years  Pt has no testing or wounds/ ulcers  Pt has been using compression  Pt is taking asa 81mg and pravastatin      HPI   Elizabeth Pires During is a 80 y o  male new patient hypertension, hyperlipidemia, longstanding diabetes mellitus with neuropathy, PAD who presents for evaluation of bilateral leg pain and left lower extremity edema  Patient has had lower extremity edema (mostly on the L) for at least 3 years and is concerned about discoloration at the toes  He wears compression stockings but they are uncomfortable  He also experiences bilateral leg weakness when he gets up and out of bed 1st thing in the morning which takes a couple hours to work out and he can walk better  He also complains bitterly of abnormal sensations to the bottom of the feet which is likely due to diabetic neuropathy  He has no ischemic rest pain or wounds  He does describe prior arterial procedures performed to the legs about 7 years ago but has no details  He is chronically on aspirin and pravastatin  The following portions of the patient's history were reviewed and updated as appropriate: allergies, current medications, past family history, past medical history, past social history, past surgical history and problem list     Review of Systems   Constitutional: Negative  HENT: Negative  Eyes: Negative  Respiratory: Negative  Cardiovascular: Positive for leg swelling  Gastrointestinal: Negative  Endocrine: Negative  Genitourinary: Negative  Musculoskeletal: Negative  Skin: Negative  Allergic/Immunologic: Negative  Neurological: Negative  Hematological: Negative  Psychiatric/Behavioral: Negative  Objective:      /82 (BP Location: Right arm, Patient Position: Sitting, Cuff Size: Standard)   Pulse 62   Ht 5' 9" (1 753 m)   Wt 68 5 kg (151 lb)   BMI 22 30 kg/m²             L ankle and pedal edema and mild chronic statis changes  Feet warm and well-pefused  R 2+ DP, L decreased DP pulses     Physical Exam  Vitals and nursing note reviewed  Constitutional:       Appearance: He is well-developed  HENT:      Head: Normocephalic and atraumatic  Eyes:      Pupils: Pupils are equal, round, and reactive to light  Neck:      Thyroid: No thyromegaly  Vascular: No JVD  Trachea: Trachea normal    Cardiovascular:      Rate and Rhythm: Normal rate and regular rhythm  Pulses:           Carotid pulses are 2+ on the right side and 2+ on the left side  Radial pulses are 2+ on the right side and 2+ on the left side  Femoral pulses are 2+ on the right side and 2+ on the left side  Dorsalis pedis pulses are 2+ on the right side and detected w/ Doppler on the left side  Posterior tibial pulses are detected w/ Doppler on the right side and detected w/ Doppler on the left side  Heart sounds: Normal heart sounds, S1 normal and S2 normal  No murmur heard  No friction rub  No gallop  Comments:   R 2+ DP and biphasic signal, L monophasic PT    L Monophasic DP/PT signals    Mild chronic stasis changes in the L foot    + pedal and ankle edema    Pulmonary:      Effort: Pulmonary effort is normal  No accessory muscle usage or respiratory distress  Breath sounds: Normal breath sounds  No wheezing or rales  Abdominal:      General: Bowel sounds are normal  There is no distension  Palpations: Abdomen is soft  Tenderness: There is no abdominal tenderness  Musculoskeletal:         General: No deformity  Normal range of motion  Cervical back: Neck supple  Left lower leg: Pitting Edema (1+ pedal) present  Skin:     General: Skin is warm and dry  Findings: No lesion or rash  Nails: There is no clubbing  Neurological:      Mental Status: He is alert and oriented to person, place, and time  Comments: Grossly normal    Psychiatric:         Behavior: Behavior is cooperative  I have reviewed and made appropriate changes to the review of systems input by the medical assistant      Vitals:    06/28/22 1334   BP: 136/82   BP Location: Right arm   Patient Position: Sitting   Cuff Size: Standard   Pulse: 62   Weight: 68 5 kg (151 lb)   Height: 5' 9" (1 753 m)       Patient Active Problem List   Diagnosis    Type 2 diabetes mellitus with diabetic neuropathy, without long-term current use of insulin ([facility] [address] )    Hyperlipidemia associated with type 2 diabetes mellitus ([facility] [address] )    Essential hypertension    BPH (benign prostatic hyperplasia)    Degenerative cervical spinal stenosis    Cervical spondylosis    COPD (chronic obstructive pulmonary disease) (HCC)    History of prostate cancer    Overactive bladder    Lesion of native kidney    SHIVANI (obstructive sleep apnea)    Vitamin D insufficiency    Pipe smoker    Anxiety    Erectile dysfunction due to diseases classified elsewhere    Peripheral artery disease (HCC)       Past Surgical History:   Procedure Laterality Date    ARTHROSCOPY KNEE Bilateral     COLONOSCOPY      LUNG SURGERY Left     scraping of left    [place] ARTHRODESIS ANT INTERBODY INC DISCECTOMY, CERVICAL BELOW C2 Bilateral 2019    Procedure: C3/4  ACDF WITH  C4 HEMICORPECTOMY, C3-4 ANTERIOR INSTRUMENTATION AND FUSION (NEUROMONITORING);   Surgeon: Lb Herbert MD;  [facility];  Service: Neurosurgery    ROTATOR CUFF REPAIR Bilateral        Family History   Problem Relation Age of Onset    No Known Problems Mother     No Known Problems Father        Social History     Socioeconomic History    Marital status: Legally      Spouse name: Not on file    Number of children: 3    Years of education: Not on file    Highest education level: Not on file   Occupational History    Occupation: retired   Tobacco Use    Smoking status: Current Every Day Smoker     Packs/day: 0 01     Years: 61 00     Pack years: 0 61     Types: Pipe     Last attempt to quit: 2020     Years since quittin 2    Smokeless tobacco: Never Used    Tobacco comment: 2-3  PIPES PER DAY    Vaping Use    Vaping Use: Never used   Substance and Sexual Activity    Alcohol use: Never    Drug use: Never    Sexual activity: Not Currently   Other Topics Concern    Not on file   Social History Narrative    Pt was born in [facility]; moved [place] [facility] [place] an adult with his wife, and eventually moved to [address] [place]  Social Determinants of Health     Financial Resource Strain: Not on file   Food Insecurity: Not on file   Transportation Needs: Not on file   Physical Activity: Not on file   Stress: Not on file   Social Connections: Not on file   Intimate Partner Violence: Not on file   Housing Stability: Not on file       Allergies   Allergen Reactions    Chloroquine Itching    Qualaquin [Quinine] Itching         Current Outpatient Medications:     acetaminophen (TYLENOL) 500 mg tablet, Take 500 mg by mouth every 6 (six) hours as needed for mild pain  , Disp: , Rfl:     aspirin (ECOTRIN LOW STRENGTH) 81 mg EC tablet, Take 1 tablet by mouth daily, Disp: 30 tablet, Rfl: 0    Blood Glucose Monitoring Suppl (OneTouch Verio) w/Device KIT, use as directed, Disp: 1 kit, Rfl: 0    chlorhexidine (PERIDEX) 0 12 % solution, RINSE MOUTH WITH 15 ML (1 CAPFUL) FOR 30 SECONDS IN THE MORNING A   (REFER TO PRESCRIPTION NOTES)  , Disp: , Rfl:     clindamycin (CLEOCIN) 300 MG capsule, Take 300 mg by mouth every 8 (eight) hours  , Disp: , Rfl:     Continuous Blood Gluc Sensor (FreeStyle Eben 14 Day Sensor) MISC, Check blood sugars continuously  Switch every 14 days  , Disp: 6 each, Rfl: 3    diltiazem (TIAZAC) 240 MG 24 hr capsule, take 1 capsule by mouth once daily, Disp: 90 capsule, Rfl: 3    DULoxetine (CYMBALTA) 60 mg delayed release capsule, TAKE 1 CAPSULE DAILY, Disp: 90 capsule, Rfl: 3    gabapentin (NEURONTIN) 100 mg capsule, Take 2 capsules (200 mg total) by mouth 2 (two) times a day, Disp: 360 capsule, Rfl: 3    glucose blood (FREESTYLE LITE) test strip, use 1 TEST STRIP to TEST BLOOD SUGAR twice a day, Disp: 100 strip, Rfl: 0    hydrochlorothiazide (HYDRODIURIL) 12 5 mg tablet, Take 1 tablet (12 5 mg total) by mouth daily, Disp: 90 tablet, Rfl: 1    ibuprofen (MOTRIN) 600 mg tablet, Take 600 mg by mouth every 6 (six) hours as needed  , Disp: , Rfl:     Lancets (OneTouch Delica Plus [id number]) MISC, use 1 LANCET to TEST BLOOD SUGAR one to two times a day, Disp: 100 each, Rfl: 0    losartan (COZAAR) 100 MG tablet, TAKE 1 TABLET DAILY, Disp: 90 tablet, Rfl: 3    metFORMIN (GLUCOPHAGE) 500 mg tablet, take 1 tablet by mouth daily WITH BREAKFAST, Disp: 90 tablet, Rfl: 3    Multiple Vitamin (MULTI-VITAMINS PO), Take by mouth daily, Disp: , Rfl:     oxybutynin (DITROPAN-XL) 5 mg 24 hr tablet, take 1 tablet by mouth once daily, Disp: 90 tablet, Rfl: 3    polyvinyl alcohol (LIQUIFILM TEARS) 1 4 % ophthalmic solution, Administer 1 drop to both eyes 4 (four) times a day, Disp: 15 mL, Rfl: 0    pravastatin (PRAVACHOL) 40 mg tablet, take 1 tablet by mouth once daily, Disp: 90 tablet, Rfl: 1    pyridoxine (VITAMIN B6) 50 mg tablet, Take 50 mg by mouth daily, Disp: , Rfl:     sildenafil (REVATIO) 20 mg tablet, Take 1 tablet (20 mg total) by mouth if needed (Erectile dysfunction) Take 1-5 tablets as needed for erection on an empty stomach, Disp: 30 tablet, Rfl: 1    thiamine 50 MG tablet, Take 1 tablet (50 mg total) by mouth daily, Disp: 30 tablet, Rfl: 3    Trelegy Ellipta 100-62 5-25 MCG/INH inhaler, USE 1 INHALATION DAILY (RINSE MOUTH AFTER USE), Disp: 360 blister, Rfl: 3    VITAMIN E PO, Take by mouth 268 mg 400 iu, Disp: , Rfl:     tamsulosin (FLOMAX) 0 4 mg, TAKE 1 CAPSULE DAILY AT BEDTIME (Patient not taking: Reported on 6/28/2022), Disp: 90 capsule, Rfl: 3

## 2022-06-30 ENCOUNTER — TELEPHONE (OUTPATIENT)
Dept: PAIN MEDICINE | Facility: CLINIC | Age: 82
End: 2022-06-30

## 2022-07-05 NOTE — TELEPHONE ENCOUNTER
Patient states that he received about 60% relief. Patient states that he will reach out to the office if the pain gets worse.

## 2022-07-08 ENCOUNTER — HOSPITAL ENCOUNTER (OUTPATIENT)
Dept: MRI IMAGING | Facility: HOSPITAL | Age: 82
Discharge: HOME | End: 2022-07-08
Payer: MEDICARE

## 2022-07-08 DIAGNOSIS — E11.69 HYPERLIPIDEMIA ASSOCIATED WITH TYPE 2 DIABETES MELLITUS (HCC): ICD-10-CM

## 2022-07-08 DIAGNOSIS — I10 ESSENTIAL HYPERTENSION: ICD-10-CM

## 2022-07-08 DIAGNOSIS — F17.290 PIPE SMOKER: ICD-10-CM

## 2022-07-08 DIAGNOSIS — E78.5 HYPERLIPIDEMIA ASSOCIATED WITH TYPE 2 DIABETES MELLITUS (HCC): ICD-10-CM

## 2022-07-08 DIAGNOSIS — I73.9 PERIPHERAL ARTERY DISEASE (HCC): ICD-10-CM

## 2022-07-08 DIAGNOSIS — R93.89 ABNORMAL COMPUTED TOMOGRAPHY ANGIOGRAPHY (CTA): ICD-10-CM

## 2022-07-08 DIAGNOSIS — R93.0 ABNORMAL FINDINGS ON DIAGNOSTIC IMAGING OF SKULL AND HEAD, NOT ELSEWHERE CLASSIFIED: ICD-10-CM

## 2022-07-08 PROCEDURE — G1004 CDSM NDSC: HCPCS

## 2022-07-08 PROCEDURE — 70544 MR ANGIOGRAPHY HEAD W/O DYE: CPT

## 2022-07-18 ENCOUNTER — TELEPHONE (OUTPATIENT)
Dept: OTHER | Facility: OTHER | Age: 82
End: 2022-07-18

## 2022-07-20 DIAGNOSIS — E78.5 HYPERLIPIDEMIA ASSOCIATED WITH TYPE 2 DIABETES MELLITUS (HCC): ICD-10-CM

## 2022-07-20 DIAGNOSIS — E11.69 HYPERLIPIDEMIA ASSOCIATED WITH TYPE 2 DIABETES MELLITUS (HCC): ICD-10-CM

## 2022-07-20 RX ORDER — PRAVASTATIN SODIUM 40 MG
TABLET ORAL
Qty: 90 TABLET | Refills: 1 | Status: SHIPPED | OUTPATIENT
Start: 2022-07-20

## 2022-07-21 DIAGNOSIS — I10 ESSENTIAL HYPERTENSION: ICD-10-CM

## 2022-07-21 RX ORDER — LOSARTAN POTASSIUM 100 MG/1
TABLET ORAL
Qty: 90 TABLET | Refills: 3 | Status: SHIPPED | OUTPATIENT
Start: 2022-07-21

## 2022-07-25 ENCOUNTER — RA CDI HCC (OUTPATIENT)
Dept: OTHER | Facility: HOSPITAL | Age: 82
End: 2022-07-25

## 2022-07-25 NOTE — PROGRESS NOTES
[facility]  coding opportunities     [id number]     Chart Reviewed number of suggestions sent to Provider: 1     Patients Insurance     Medicare Insurance: [employer]

## 2022-07-29 ENCOUNTER — OFFICE VISIT (OUTPATIENT)
Dept: INTERNAL MEDICINE CLINIC | Facility: CLINIC | Age: 82
End: 2022-07-29
Payer: MEDICARE

## 2022-07-29 ENCOUNTER — APPOINTMENT (OUTPATIENT)
Dept: LAB | Facility: CLINIC | Age: 82
End: 2022-07-29
Payer: MEDICARE

## 2022-07-29 VITALS
WEIGHT: 148 LBS | RESPIRATION RATE: 20 BRPM | HEIGHT: 69 IN | TEMPERATURE: 97.2 F | HEART RATE: 70 BPM | SYSTOLIC BLOOD PRESSURE: 140 MMHG | OXYGEN SATURATION: 98 % | BODY MASS INDEX: 21.92 KG/M2 | DIASTOLIC BLOOD PRESSURE: 80 MMHG

## 2022-07-29 DIAGNOSIS — E11.69 HYPERLIPIDEMIA ASSOCIATED WITH TYPE 2 DIABETES MELLITUS (HCC): Chronic | ICD-10-CM

## 2022-07-29 DIAGNOSIS — E11.40 TYPE 2 DIABETES MELLITUS WITH DIABETIC NEUROPATHY, WITHOUT LONG-TERM CURRENT USE OF INSULIN (HCC): Chronic | ICD-10-CM

## 2022-07-29 DIAGNOSIS — E11.40 TYPE 2 DIABETES MELLITUS WITH DIABETIC NEUROPATHY, WITHOUT LONG-TERM CURRENT USE OF INSULIN (HCC): Primary | Chronic | ICD-10-CM

## 2022-07-29 DIAGNOSIS — E78.5 HYPERLIPIDEMIA ASSOCIATED WITH TYPE 2 DIABETES MELLITUS (HCC): Chronic | ICD-10-CM

## 2022-07-29 DIAGNOSIS — R39.9 LOWER URINARY TRACT SYMPTOMS (LUTS): ICD-10-CM

## 2022-07-29 DIAGNOSIS — I10 ESSENTIAL HYPERTENSION: Chronic | ICD-10-CM

## 2022-07-29 DIAGNOSIS — E83.52 HYPERCALCEMIA: ICD-10-CM

## 2022-07-29 DIAGNOSIS — C61 PROSTATE CANCER (HCC): ICD-10-CM

## 2022-07-29 LAB
ALBUMIN SERPL BCP-MCNC: 3.5 G/DL (ref 3.5–5)
ALP SERPL-CCNC: 81 U/L (ref 46–116)
ALT SERPL W P-5'-P-CCNC: 40 U/L (ref 12–78)
ANION GAP SERPL CALCULATED.3IONS-SCNC: 2 MMOL/L (ref 4–13)
AST SERPL W P-5'-P-CCNC: 16 U/L (ref 5–45)
BILIRUB SERPL-MCNC: 0.51 MG/DL (ref 0.2–1)
BUN SERPL-MCNC: 14 MG/DL (ref 5–25)
CALCIUM SERPL-MCNC: 10.5 MG/DL (ref 8.3–10.1)
CHLORIDE SERPL-SCNC: 112 MMOL/L (ref 96–108)
CHOLEST SERPL-MCNC: 76 MG/DL
CO2 SERPL-SCNC: 30 MMOL/L (ref 21–32)
CREAT SERPL-MCNC: 0.9 MG/DL (ref 0.6–1.3)
EST. AVERAGE GLUCOSE BLD GHB EST-MCNC: 192 MG/DL
GFR SERPL CREATININE-BSD FRML MDRD: 80 ML/MIN/1.73SQ M
GLUCOSE SERPL-MCNC: 231 MG/DL (ref 65–140)
HBA1C MFR BLD: 8.3 %
HDLC SERPL-MCNC: 39 MG/DL
LDLC SERPL CALC-MCNC: 29 MG/DL (ref 0–100)
POTASSIUM SERPL-SCNC: 4.5 MMOL/L (ref 3.5–5.3)
PROT SERPL-MCNC: 6.3 G/DL (ref 6.4–8.4)
PSA SERPL-MCNC: <0.1 NG/ML (ref 0–4)
PTH-INTACT SERPL-MCNC: 89.2 PG/ML (ref 18.4–80.1)
SODIUM SERPL-SCNC: 144 MMOL/L (ref 135–147)
TRIGL SERPL-MCNC: 38 MG/DL

## 2022-07-29 PROCEDURE — 83970 ASSAY OF PARATHORMONE: CPT

## 2022-07-29 PROCEDURE — 84153 ASSAY OF PSA TOTAL: CPT

## 2022-07-29 PROCEDURE — 36415 COLL VENOUS BLD VENIPUNCTURE: CPT

## 2022-07-29 PROCEDURE — 80053 COMPREHEN METABOLIC PANEL: CPT

## 2022-07-29 PROCEDURE — 83036 HEMOGLOBIN GLYCOSYLATED A1C: CPT

## 2022-07-29 PROCEDURE — 80061 LIPID PANEL: CPT

## 2022-07-29 PROCEDURE — 99214 OFFICE O/P EST MOD 30 MIN: CPT | Performed by: INTERNAL MEDICINE

## 2022-07-29 RX ORDER — DILTIAZEM HYDROCHLORIDE 360 MG/1
360 CAPSULE, EXTENDED RELEASE ORAL DAILY
Qty: 90 CAPSULE | Refills: 1 | Status: SHIPPED | OUTPATIENT
Start: 2022-07-29

## 2022-07-29 NOTE — PATIENT INSTRUCTIONS
Type 2 Diabetes Management for Adults   WHAT YOU NEED TO KNOW:   What is type 2 diabetes? Type 2 diabetes is a disease that affects how your body uses glucose (sugar)  Either your body cannot make enough insulin, or it cannot use the insulin correctly  It is important to keep diabetes controlled to prevent damage to your heart, blood vessels, and other organs  What do I need to know about high blood sugar levels? High blood sugar levels may not cause any symptoms  You may feel more thirsty or urinate more often than usual  Over time, high blood sugar levels can damage your nerves, blood vessels, tissues, and organs  The following can increase your blood sugar levels:  Large meals or large amounts of carbohydrates at one time    Less physical activity    Stress    Illness    A lower dose of medicine or insulin, or a late dose    What do I need to know about low blood sugar levels? You can prevent symptoms such as shakiness, dizziness, irritability, or confusion by preventing your blood sugar levels from going too low  Treat a low blood sugar level right away  Drink 4 ounces of juice or have 1 tube of glucose gel  Check your blood sugar level again 10 to 15 minutes later  When the level goes back to normal, eat a meal or snack to prevent another decrease  Keep glucose gel, raisins, or hard candy with you at all times to treat a low blood sugar level  Your blood sugar level can get too low if you take diabetes medicine or insulin and do not eat enough food  If you use insulin, check your blood sugar level before you exercise  If your blood sugar level is below 100 mg/dL, eat 4 crackers or 2 ounces of raisins, or drink 4 ounces of juice  Check your level every 30 minutes if you exercise more than 1 hour  You may need a snack during or after exercise  What can I do to manage my blood sugar levels? Check your blood sugar levels as directed and as needed    Several items are available to use to check your levels  You may need to check by testing a drop of blood in a glucose monitor  You may instead be given a continuous glucose monitoring (CGM) device  The device is worn at all times  The CGM checks your blood sugar level every 5 minutes  It sends results to an electronic device such as a smart phone  A CGM can be used with or without an insulin pump  Talk with your provider to find out which method is best for you  The goal for blood sugar levels before meals  is between 80 and 130 mg/dL and 2 hours after eating  is lower than 180 mg/dL  Make healthy food choices  Work with a dietitian to develop a meal plan that works for you and your schedule  A dietitian can help you learn how to eat the right amount of carbohydrates during your meals and snacks  Carbohydrates can raise your blood sugar level if you eat too many at one time  Examples of foods that contain carbohydrates are breads, cereals, rice, pasta, and sweets  Get regular physical activity  Physical activity can help you get to your target blood sugar level goal and manage your weight  Get at least 150 minutes of moderate to vigorous aerobic physical activity each week  Do not miss more than 2 days in a row  Do not sit longer than 30 minutes at a time  Your healthcare provider can help you create an activity plan  The plan can include the best activities for you and can help you build your strength and endurance  Maintain a healthy weight  Ask your healthcare provider what a healthy weight is for you  Ask him or her to help you create a safe weight loss plan if you are overweight  Take your diabetes medicine or insulin as directed  You may need diabetes medicine, insulin, or both to help control your blood sugar levels  Your healthcare provider will teach you how and when to take your diabetes medicine or insulin   You will also be taught about side effects oral diabetes medicine can cause  Insulin may be injected, or given through a pump or pen  You and your care team will discuss which method is best for you  An insulin pump  is an implanted device that gives your insulin 24 hours a day  An insulin pump prevents the need for multiple insulin injections in a day  An insulin pen  is a device prefilled with the right amount of insulin  You and your family members will be taught how to draw up and give insulin  if this is the best method for you  Your education team will also teach you how to dispose of needles and syringes  You will learn how much insulin you need  and when to give it  You will be taught when not to give insulin  You will also be taught what to do if your blood sugar level drops too low  This may happen if you take insulin and do not eat the right amount of carbohydrates  What else can I do to manage type 2 diabetes? Wear medical alert identification  Wear medical alert jewelry or carry a card that says you have diabetes  Ask your provider where to get these items  Do not smoke  Nicotine and other chemicals in cigarettes and cigars can cause lung and blood vessel damage  It also makes it more difficult to manage your diabetes  Ask your provider for information if you currently smoke and need help to quit  Do not use e-cigarettes or smokeless tobacco in place of cigarettes or to help you quit  They still contain nicotine  Check your feet each day for cuts, scratches, calluses, or other wounds  Look for redness and swelling, and feel for warmth  Wear shoes that fit well  Check your shoes for rocks or other objects that can hurt your feet  Do not walk barefoot or wear shoes without socks  Wear cotton socks to help keep your feet dry  Ask about vaccines you may need  You have a higher risk for serious illness if you get the flu, pneumonia, COVID-19, or hepatitis   Ask your provider if you should get vaccines to prevent these or other diseases, and when to get the vaccines  Talk to your care team if you become stressed about diabetes care  Sometimes being able to fit diabetes care into your life can cause increased stress  The stress can cause you not to take care of yourself properly  Your care team can help by offering tips about self-care  Your care team may suggest you talk to a mental health provider  The provider can listen and offer help with self-care issues  Have someone call your local emergency number (911 in the [address] [facility]) if:   You cannot be woken  You have signs of diabetic ketoacidosis:     confusion, fatigue    vomiting    rapid heartbeat    fruity smelling breath    extreme thirst    dry mouth and skin    You have any of the following signs of a heart attack:      Squeezing, pressure, or pain in your chest    You may  also have any of the following:     Discomfort or pain in your back, neck, jaw, stomach, or arm    Shortness of breath    Nausea or vomiting    Lightheadedness or a sudden cold sweat    You have any of the following signs of a stroke:      Numbness or drooping on one side of your face     Weakness in an arm or leg    Confusion or difficulty speaking    Dizziness, a severe headache, or vision loss    When should I call my doctor or diabetes care team?   You have a sore or wound that will not heal     You have a change in the amount you urinate  Your blood sugar levels are higher than your target goals  You often have lower blood sugar levels than your target goals  Your skin is red, dry, warm, or swollen  You have trouble coping with diabetes, or you feel anxious or depressed  You have questions or concerns about your condition or care  CARE AGREEMENT:   You have the right to help plan your care  Learn about your health condition and how it may be treated  Discuss treatment options with your healthcare providers to decide what care you want to receive   You always have the right to refuse treatment  The above information is an  only  It is not intended as medical advice for individual conditions or treatments  Talk to your doctor, nurse or pharmacist before following any medical regimen to see if it is safe and effective for you  © Copyright [employer] 2022 Information is for End User's use only and may not be sold, redistributed or otherwise used for commercial purposes   All illustrations and images included in CareNotes® are the copyrighted property of A D A M , Inc  or [address] [place] [address]

## 2022-07-29 NOTE — PROGRESS NOTES
[facility] - [facility] ALISA MARTINO    NAME: Pheobe Apgar During  AGE: [de-identified] y o  SEX: male  : 1940     DATE: 2022     Assessment and Plan:     1  Type 2 diabetes mellitus with diabetic neuropathy, without long-term current use of insulin ([address] )    Most recent A1c was 7 0 % on 3/29/2022  Continue medications as prescribed  Work on increasing physical activity  Will repeat labs  - Hemoglobin A1C; Future    2  Hyperlipidemia associated with type 2 diabetes mellitus (HCC)    Continue statin  Repeat lipids  - Lipid Panel with Direct LDL reflex; Future  - Comprehensive metabolic panel; Future    3  Essential hypertension    BP not at goal  Will increase diltiazem to 360mg      - diltiazem (TIAZAC) 360 MG 24 hr capsule; Take 1 capsule (360 mg total) by mouth daily  Dispense: 90 capsule; Refill: 1    4  Hypercalcemia    Last calcium was mildly elevated  Will repeat calcium and check PTH     - PTH, intact; Future        Return in about 4 months (around 2022) for Subsequent AWV  History of Present Illness:     Kris Hong presents for follow-up  Overall he has been doing well  Not doing a lot of activity he admits  His blood pressure has been running a little high  Review of Systems:     Review of Systems   Constitutional: Negative  Respiratory: Negative  Cardiovascular: Negative  Gastrointestinal: Negative  Musculoskeletal: Negative  Objective:     /80 (BP Location: Left arm, Patient Position: Sitting, Cuff Size: Standard)   Pulse 70   Temp (!) 97 2 °F (36 2 °C) (Tympanic)   Resp 20   Ht 5' 9" (1 753 m)   Wt 67 1 kg (148 lb)   SpO2 98%   BMI 21 86 kg/m²     Physical Exam  Constitutional:       General: He is not in acute distress  Appearance: He is not ill-appearing  Cardiovascular:      Rate and Rhythm: Normal rate and regular rhythm  Heart sounds: No murmur heard    Pulmonary:      Effort: Pulmonary effort is normal  No respiratory distress  Breath sounds: No wheezing  Abdominal:      General: Bowel sounds are normal  There is no distension  Tenderness: There is no abdominal tenderness  Musculoskeletal:      Right lower leg: No edema  Left lower leg: No edema  Neurological:      Mental Status: He is alert         Carmita Mason DO  [facility] [address] [place] [address]

## 2022-08-01 ENCOUNTER — TELEPHONE (OUTPATIENT)
Dept: INTERNAL MEDICINE CLINIC | Facility: CLINIC | Age: 82
End: 2022-08-01

## 2022-08-01 NOTE — TELEPHONE ENCOUNTER
Results for orders placed or performed in visit on 07/29/22   Hemoglobin A1C   Result Value Ref Range    Hemoglobin A1C 8 3 (H) Normal 3 8-5 6%; PreDiabetic 5 7-6 4%; Diabetic >=6 5%; Glycemic control for adults with diabetes <7 0% %     mg/dl   PTH, intact   Result Value Ref Range    PTH 89 2 (H) 18 4 - 80 1 pg/mL   Lipid Panel with Direct LDL reflex   Result Value Ref Range    Cholesterol 76 See Comment mg/dL    Triglycerides 38 See Comment mg/dL    HDL, Direct 39 (L) >=40 mg/dL    LDL Calculated 29 0 - 100 mg/dL   Comprehensive metabolic panel   Result Value Ref Range    Sodium 144 135 - 147 mmol/L    Potassium 4 5 3 5 - 5 3 mmol/L    Chloride 112 (H) 96 - 108 mmol/L    CO2 30 21 - 32 mmol/L    ANION GAP 2 (L) 4 - 13 mmol/L    BUN 14 5 - 25 mg/dL    Creatinine 0 90 0 60 - 1 30 mg/dL    Glucose 231 (H) 65 - 140 mg/dL    Calcium 10 5 (H) 8 3 - 10 1 mg/dL    AST 16 5 - 45 U/L    ALT 40 12 - 78 U/L    Alkaline Phosphatase 81 46 - 116 U/L    Total Protein 6 3 (L) 6 4 - 8 4 g/dL    Albumin 3 5 3 5 - 5 0 g/dL    Total Bilirubin 0 51 0 20 - 1 00 mg/dL    eGFR 80 ml/min/1 73sq m   PSA Total, Diagnostic   Result Value Ref Range    PSA, Diagnostic <0 1 0 0 - 4 0 ng/mL     Spoke to patient about labs  Admits to not eating right and not being active  Wants time to get A1c back down  Does not want additional medication at this time  Calcium levels also remain high with high PTH levels  Concern for primary hyperPTH  Calcium levels are mild and he is asymptomatic so will monitor  Would be good to check bone density testing at his next appointment

## 2022-08-02 ENCOUNTER — HOSPITAL ENCOUNTER (OUTPATIENT)
Dept: NON INVASIVE DIAGNOSTICS | Facility: CLINIC | Age: 82
Discharge: HOME | End: 2022-08-02
Payer: MEDICARE

## 2022-08-02 DIAGNOSIS — I73.9 PERIPHERAL ARTERY DISEASE (HCC): ICD-10-CM

## 2022-08-02 PROCEDURE — 93925 LOWER EXTREMITY STUDY: CPT

## 2022-08-02 PROCEDURE — 93922 UPR/L XTREMITY ART 2 LEVELS: CPT | Performed by: SURGERY

## 2022-08-02 PROCEDURE — 93925 LOWER EXTREMITY STUDY: CPT | Performed by: SURGERY

## 2022-08-02 PROCEDURE — 93923 UPR/LXTR ART STDY 3+ LVLS: CPT

## 2022-08-15 ENCOUNTER — OFFICE VISIT (OUTPATIENT)
Dept: PULMONOLOGY | Facility: CLINIC | Age: 82
End: 2022-08-15
Payer: MEDICARE

## 2022-08-15 VITALS
HEART RATE: 86 BPM | RESPIRATION RATE: 18 BRPM | TEMPERATURE: 97.6 F | BODY MASS INDEX: 22.88 KG/M2 | HEIGHT: 68 IN | OXYGEN SATURATION: 98 % | SYSTOLIC BLOOD PRESSURE: 132 MMHG | WEIGHT: 151 LBS | DIASTOLIC BLOOD PRESSURE: 82 MMHG

## 2022-08-15 DIAGNOSIS — F17.290 OTHER TOBACCO PRODUCT NICOTINE DEPENDENCE, UNCOMPLICATED: ICD-10-CM

## 2022-08-15 DIAGNOSIS — Z85.118 HISTORY OF LUNG CANCER: ICD-10-CM

## 2022-08-15 DIAGNOSIS — R91.1 LUNG NODULE: ICD-10-CM

## 2022-08-15 DIAGNOSIS — J41.0 SIMPLE CHRONIC BRONCHITIS (HCC): Primary | ICD-10-CM

## 2022-08-15 PROCEDURE — 99213 OFFICE O/P EST LOW 20 MIN: CPT | Performed by: PHYSICIAN ASSISTANT

## 2022-08-17 NOTE — PROGRESS NOTES
Assessment/Plan:   Diagnoses and all orders for this visit:    Simple chronic bronchitis ([address] )  -     Nebulizer    Other tobacco product nicotine dependence, uncomplicated  -     CT chest wo contrast; Future    Lung nodule  -     CT chest wo contrast; Future    History of lung cancer     Patient is here today for follow-up  He is overall doing well with his breathing, continues on the Trelegy daily, albuterol as needed he does note a cough that is worse in the mornings and clears as the morning goes on  Will give him albuterol to use in the nebulizer, can uses each morning to help with his cough and mucus clearance  He will continue with the Trelegy daily  He continues to smoke a pipe, quit smoking cigarettes some time ago  He continues with his CPAP at night and generally sleeps well, feels well rested during the day  Will continue with annual CT scans which is due January 2023 to follow lung nodules as well as given his history of lung cancer  He will follow-up with us in 6 months or sooner if necessary  Return in about 6 months (around 2/15/2023)  All questions are answered to the patient's satisfaction and understanding  He verbalizes understanding  He is encouraged to call with any further questions or concerns  Portions of the record may have been created with voice recognition software  Occasional wrong word or "sound a like" substitutions may have occurred due to the inherent limitations of voice recognition software  Read the chart carefully and recognize, using context, where substitutions have occurred      Electronically Signed by Melly Wheeler PA-C    ______________________________________________________________________    Chief Complaint:   Chief Complaint   Patient presents with    Follow-up       Patient ID: Naz Edmondson is a [de-identified] y o  y o  male has a past medical history of BPH (benign prostatic hyperplasia), Cancer ([address] ) (2013), Cervical myelopathy ([address] ) (6/20/2019), Chemical exposure, COPD (chronic obstructive pulmonary disease) ([facility]), CPAP (continuous positive airway pressure) dependence, DDD (degenerative disc disease), cervical, Diabetes mellitus ([facility] ), Foraminal stenosis of cervical region, Hyperlipidemia, Hypertension, SHIVANI (obstructive sleep apnea), Overactive bladder, and Spinal cord compression ([facility] ) (5/8/2019)  8/15/2022  Patient presents today for follow-up visit  Patient is a an 61-year-old male with past medical history of chronic bronchitis/emphysema, lung cancer status post left upper lobectomy, lung nodules, prostate cancer status post radiation, SHIVANI on CPAP, hypertension, diabetes  He is here today for follow-up  He is overall doing well with his breathing does find that in the mornings he has a lot of coughing, will bring up some mucus and generally feels well afterwards  He is using his Trelegy daily, rescue inhaler as needed  He does not have a nebulizer  He continues to smoke pipes, quit smoking cigarettes some time ago  He continues with his CPAP on a nightly basis and is sleeping well  Review of Systems   Constitutional: Negative  HENT: Negative  Respiratory: Positive for cough  Cardiovascular: Negative  Gastrointestinal: Negative  Genitourinary: Negative  Musculoskeletal: Negative  Skin: Negative  Allergic/Immunologic: Negative  Neurological: Negative  Psychiatric/Behavioral: Negative  Smoking history: He reports that he has been smoking pipe  He started smoking about 41 years ago  He has a 0 61 pack-year smoking history   He has never used smokeless tobacco     The following portions of the patient's history were reviewed and updated as appropriate: allergies, current medications, past family history, past medical history, past social history, past surgical history and problem list     Immunization History   Administered Date(s) Administered    COVID-19 PFIZER VACCINE 0 3 ML IM 03/16/2021, 04/07/2021, 12/03/2021    INFLUENZA 10/01/2018    Influenza, high dose seasonal 0 7 mL 09/13/2019, 09/24/2020, 11/11/2021    Pneumococcal Conjugate 13-Valent 08/18/2016    Pneumococcal Polysaccharide PPV23 03/16/2018    Zoster 06/23/2014    Zoster Vaccine Recombinant 11/06/2018, 02/01/2019     Current Outpatient Medications   Medication Sig Dispense Refill    acetaminophen (TYLENOL) 500 mg tablet Take 500 mg by mouth every 6 (six) hours as needed for mild pain        aspirin (ECOTRIN LOW STRENGTH) 81 mg EC tablet Take 1 tablet by mouth daily 30 tablet 0    Blood Glucose Monitoring Suppl (OneTouch Verio) w/Device KIT use as directed 1 kit 0    chlorhexidine (PERIDEX) 0 12 % solution RINSE MOUTH WITH 15 ML (1 CAPFUL) FOR 30 SECONDS IN THE MORNING A   (REFER TO PRESCRIPTION NOTES)   clindamycin (CLEOCIN) 300 MG capsule Take 300 mg by mouth every 8 (eight) hours        Continuous Blood Gluc Sensor (FreeStyle Eben 14 Day Sensor) MISC Check blood sugars continuously  Switch every 14 days   6 each 3    diltiazem (TIAZAC) 360 MG 24 hr capsule Take 1 capsule (360 mg total) by mouth daily 90 capsule 1    DULoxetine (CYMBALTA) 60 mg delayed release capsule TAKE 1 CAPSULE DAILY 90 capsule 3    gabapentin (NEURONTIN) 100 mg capsule Take 2 capsules (200 mg total) by mouth 2 (two) times a day 360 capsule 3    glucose blood (FREESTYLE LITE) test strip use 1 TEST STRIP to TEST BLOOD SUGAR twice a day 100 strip 0    hydrochlorothiazide (HYDRODIURIL) 12 5 mg tablet Take 1 tablet (12 5 mg total) by mouth daily 90 tablet 1    ibuprofen (MOTRIN) 600 mg tablet Take 600 mg by mouth every 6 (six) hours as needed        Lancets (OneTouch Delica Plus [id number]) MISC use 1 LANCET to TEST BLOOD SUGAR one to two times a day 100 each 0    losartan (COZAAR) 100 MG tablet TAKE 1 TABLET DAILY 90 tablet 3    metFORMIN (GLUCOPHAGE) 500 mg tablet take 1 tablet by mouth daily WITH BREAKFAST 90 tablet 3    Multiple Vitamin (MULTI-VITAMINS PO) Take by mouth daily      oxybutynin (DITROPAN-XL) 5 mg 24 hr tablet take 1 tablet by mouth once daily 90 tablet 3    polyvinyl alcohol (LIQUIFILM TEARS) 1 4 % ophthalmic solution Administer 1 drop to both eyes 4 (four) times a day 15 mL 0    pravastatin (PRAVACHOL) 40 mg tablet take 1 tablet by mouth once daily 90 tablet 1    pyridoxine (VITAMIN B6) 50 mg tablet Take 50 mg by mouth daily      sildenafil (REVATIO) 20 mg tablet Take 1 tablet (20 mg total) by mouth if needed (Erectile dysfunction) Take 1-5 tablets as needed for erection on an empty stomach 30 tablet 1    thiamine 50 MG tablet Take 1 tablet (50 mg total) by mouth daily 30 tablet 3    Trelegy Ellipta 100-62 5-25 MCG/INH inhaler USE 1 INHALATION DAILY (RINSE MOUTH AFTER USE) 360 blister 3    VITAMIN E PO Take by mouth 268 mg 400 iu       No current facility-administered medications for this visit  Allergies: Chloroquine and Qualaquin [quinine]    Objective:  Vitals:    08/15/22 1507 08/15/22 1509   BP: 132/82    BP Location: Right arm    Patient Position: Sitting    Cuff Size: Large    Pulse: 86    Resp: 18    Temp: 97 6 °F (36 4 °C)    SpO2: 98% 98%   Weight: 68 5 kg (151 lb)    Height: 5' 8" (1 727 m)    Oxygen Therapy  SpO2: 98 %  Oxygen Therapy: None (Room air)    Wt Readings from Last 3 Encounters:   08/15/22 68 5 kg (151 lb)   07/29/22 67 1 kg (148 lb)   06/28/22 68 5 kg (151 lb)     Body mass index is 22 96 kg/m²  Physical Exam  Vitals reviewed  Constitutional:       General: He is not in acute distress  Appearance: Normal appearance  He is not ill-appearing  HENT:      Head: Normocephalic and atraumatic  Mouth/Throat:      Pharynx: Oropharynx is clear  Eyes:      Conjunctiva/sclera: Conjunctivae normal    Cardiovascular:      Rate and Rhythm: Normal rate and regular rhythm  Pulmonary:      Effort: Pulmonary effort is normal  No respiratory distress  Breath sounds: Normal breath sounds   No decreased breath sounds, wheezing, rhonchi or rales  Abdominal:      General: Abdomen is flat  There is no distension  Musculoskeletal:         General: Normal range of motion  Cervical back: Normal range of motion  Right lower leg: No edema  Left lower leg: No edema  Skin:     General: Skin is warm and dry  Neurological:      Mental Status: He is alert and oriented to person, place, and time  Psychiatric:         Mood and Affect: Mood normal          Behavior: Behavior normal          Lab Review:   Lab Results   Component Value Date    K 4 5 07/29/2022     (H) 07/29/2022    CO2 30 07/29/2022    CO2 27 11/02/2017    BUN 14 07/29/2022    CREATININE 0 90 07/29/2022    GLUCOSE 141 (H) 11/02/2017    CALCIUM 10 5 (H) 07/29/2022     Lab Results   Component Value Date    WBC 5 34 02/25/2021    HGB 13 9 02/25/2021    HCT 44 3 02/25/2021    MCV 88 02/25/2021     02/25/2021       Diagnostics:  I have personally reviewed pertinent reports     and I have personally reviewed pertinent films in PACS  Reviewed prior CT scan  Office Spirometry Results:     ESS:

## 2022-09-12 ENCOUNTER — TELEPHONE (OUTPATIENT)
Dept: PULMONOLOGY | Facility: CLINIC | Age: 82
End: 2022-09-12

## 2022-09-12 NOTE — TELEPHONE ENCOUNTER
Pt LVM: He never received the medication he said he was supposed to be prescribed at his last visit on 8/16  The only thing I see mentioned is albuterol for his nebulizer and continue Trelegy  There is no script for Albuterol nebulizer solution in his chart and it is not noted he got a refill on his Trelegy   Please advise

## 2022-09-13 DIAGNOSIS — J41.0 SIMPLE CHRONIC BRONCHITIS (HCC): Primary | Chronic | ICD-10-CM

## 2022-09-13 RX ORDER — ALBUTEROL SULFATE 2.5 MG/3ML
2.5 SOLUTION RESPIRATORY (INHALATION) EVERY 6 HOURS PRN
Qty: 120 ML | Refills: 1 | Status: SHIPPED | OUTPATIENT
Start: 2022-09-13

## 2022-09-23 ENCOUNTER — IMMUNIZATIONS (OUTPATIENT)
Dept: FAMILY MEDICINE CLINIC | Facility: CLINIC | Age: 82
End: 2022-09-23

## 2022-09-23 DIAGNOSIS — Z23 NEED FOR COVID-19 VACCINE: Primary | ICD-10-CM

## 2022-09-23 PROCEDURE — 91312 SARSCOV2 VAC BVL 30MCG/0.3ML: CPT

## 2022-12-06 ENCOUNTER — TELEPHONE (OUTPATIENT)
Dept: NEUROLOGY | Facility: CLINIC | Age: 82
End: 2022-12-06

## 2022-12-06 NOTE — TELEPHONE ENCOUNTER
Pt has an appt with Oliver 12/8 in [place] but was not notified about the location change  Pt can not travel to [place]  Moved appt to 12/7/22 @ 12:30 with Dr Fortunato Bedoya in [place]

## 2022-12-09 ENCOUNTER — OFFICE VISIT (OUTPATIENT)
Dept: INTERNAL MEDICINE CLINIC | Facility: CLINIC | Age: 82
End: 2022-12-09

## 2022-12-09 ENCOUNTER — APPOINTMENT (OUTPATIENT)
Dept: LAB | Facility: CLINIC | Age: 82
End: 2022-12-09

## 2022-12-09 VITALS
HEART RATE: 76 BPM | DIASTOLIC BLOOD PRESSURE: 80 MMHG | SYSTOLIC BLOOD PRESSURE: 128 MMHG | HEIGHT: 68 IN | BODY MASS INDEX: 23.04 KG/M2 | TEMPERATURE: 97 F | OXYGEN SATURATION: 97 % | WEIGHT: 152 LBS | RESPIRATION RATE: 20 BRPM

## 2022-12-09 DIAGNOSIS — E11.69 HYPERLIPIDEMIA ASSOCIATED WITH TYPE 2 DIABETES MELLITUS (HCC): Chronic | ICD-10-CM

## 2022-12-09 DIAGNOSIS — Z79.4 TYPE 2 DIABETES MELLITUS WITH HYPERGLYCEMIA, WITH LONG-TERM CURRENT USE OF INSULIN (HCC): ICD-10-CM

## 2022-12-09 DIAGNOSIS — I10 ESSENTIAL HYPERTENSION: Chronic | ICD-10-CM

## 2022-12-09 DIAGNOSIS — Z23 ENCOUNTER FOR IMMUNIZATION: ICD-10-CM

## 2022-12-09 DIAGNOSIS — E11.65 TYPE 2 DIABETES MELLITUS WITH HYPERGLYCEMIA, WITH LONG-TERM CURRENT USE OF INSULIN (HCC): ICD-10-CM

## 2022-12-09 DIAGNOSIS — E78.5 HYPERLIPIDEMIA ASSOCIATED WITH TYPE 2 DIABETES MELLITUS (HCC): Chronic | ICD-10-CM

## 2022-12-09 DIAGNOSIS — E21.3 HYPERPARATHYROIDISM (HCC): ICD-10-CM

## 2022-12-09 DIAGNOSIS — E11.65 TYPE 2 DIABETES MELLITUS WITH HYPERGLYCEMIA, WITH LONG-TERM CURRENT USE OF INSULIN (HCC): Primary | ICD-10-CM

## 2022-12-09 DIAGNOSIS — Z00.00 MEDICARE ANNUAL WELLNESS VISIT, SUBSEQUENT: ICD-10-CM

## 2022-12-09 DIAGNOSIS — Z79.4 TYPE 2 DIABETES MELLITUS WITH HYPERGLYCEMIA, WITH LONG-TERM CURRENT USE OF INSULIN (HCC): Primary | ICD-10-CM

## 2022-12-09 LAB
ANION GAP SERPL CALCULATED.3IONS-SCNC: 4 MMOL/L (ref 4–13)
BUN SERPL-MCNC: 14 MG/DL (ref 5–25)
CALCIUM SERPL-MCNC: 10.4 MG/DL (ref 8.3–10.1)
CHLORIDE SERPL-SCNC: 108 MMOL/L (ref 96–108)
CO2 SERPL-SCNC: 28 MMOL/L (ref 21–32)
CREAT SERPL-MCNC: 0.84 MG/DL (ref 0.6–1.3)
ERYTHROCYTE [DISTWIDTH] IN BLOOD BY AUTOMATED COUNT: 14.7 % (ref 11.6–15.1)
GFR SERPL CREATININE-BSD FRML MDRD: 82 ML/MIN/1.73SQ M
GLUCOSE P FAST SERPL-MCNC: 191 MG/DL (ref 65–99)
HCT VFR BLD AUTO: 45 % (ref 36.5–49.3)
HGB BLD-MCNC: 14.4 G/DL (ref 12–17)
MCH RBC QN AUTO: 27.5 PG (ref 26.8–34.3)
MCHC RBC AUTO-ENTMCNC: 32 G/DL (ref 31.4–37.4)
MCV RBC AUTO: 86 FL (ref 82–98)
PLATELET # BLD AUTO: 206 THOUSANDS/UL (ref 149–390)
PMV BLD AUTO: 10.7 FL (ref 8.9–12.7)
POTASSIUM SERPL-SCNC: 4 MMOL/L (ref 3.5–5.3)
PTH-INTACT SERPL-MCNC: 68.6 PG/ML (ref 18.4–80.1)
RBC # BLD AUTO: 5.23 MILLION/UL (ref 3.88–5.62)
SODIUM SERPL-SCNC: 140 MMOL/L (ref 135–147)
WBC # BLD AUTO: 6.26 THOUSAND/UL (ref 4.31–10.16)

## 2022-12-09 NOTE — PROGRESS NOTES
Assessment and Plan:     1  Type 2 diabetes mellitus with hyperglycemia, with long-term current use of insulin ([facility])    Check updated labs  Will call with results  Home blood sugar readings are well controlled so hopefully should see A1c be down this time  - Hemoglobin A1C; Future  - Basic metabolic panel; Future    2  Hyperlipidemia associated with type 2 diabetes mellitus ([facility] )    Well controlled  Continue statin  - CBC; Future    3  Hyperparathyroidism ([facility] )    Calcium levels and PTH mildly elevated at this time  Will monitor for now  Avoid any calcium supplements     - PTH, intact; Future    4  Essential hypertension    BP stable in the office  Continue anti-HTN therapy  5  Medicare annual wellness visit, subsequent    6  Encounter for immunization  - influenza vaccine, high-dose, PF 0 7 mL (FLUZONE HIGH-DOSE)       Depression Screening and Follow-up Plan: Patient was screened for depression during today's encounter  They screened negative with a PHQ-2 score of 0  Preventive health issues were discussed with patient, and age appropriate screening tests were ordered as noted in patient's After Visit Summary  Personalized health advice and appropriate referrals for health education or preventive services given if needed, as noted in patient's After Visit Summary  History of Present Illness:     Patient presents for a Medicare Wellness Visit    Daphne Villatoro states he has felt good lately  No problems with his health  Stable on medications  Blood sugar and blood pressure levels have been controlled at home  Denies cardiac symptoms  Denies recent falls  Continues to partake in pipe smoking  Patient Care Team:  Jenny Wallace DO as PCP - General (Internal Medicine)  Charley Banerjee MD as Consulting Physician (Neurology)  Lachelle Esquivel MD (Urology)     Review of Systems:     Review of Systems   Constitutional: Negative for activity change, appetite change and fatigue     Respiratory: Negative for apnea, cough, chest tightness, shortness of breath and wheezing  Cardiovascular: Negative for chest pain, palpitations and leg swelling  Gastrointestinal: Negative for abdominal distention, abdominal pain, blood in stool, constipation, diarrhea, nausea and vomiting  Neurological: Positive for numbness  Negative for dizziness, weakness, light-headedness and headaches  Psychiatric/Behavioral: Negative for behavioral problems, confusion, hallucinations, sleep disturbance and suicidal ideas  The patient is not nervous/anxious  Problem List:     Patient Active Problem List   Diagnosis   • Type 2 diabetes mellitus with diabetic neuropathy, without long-term current use of insulin ([facility] )   • Hyperlipidemia associated with type 2 diabetes mellitus ([facility] )   • Essential hypertension   • BPH (benign prostatic hyperplasia)   • Degenerative cervical spinal stenosis   • Cervical spondylosis   • COPD (chronic obstructive pulmonary disease) ([place] [address] )   • History of prostate cancer   • Overactive bladder   • Lesion of native kidney   • SHIVANI (obstructive sleep apnea)   • Vitamin D insufficiency   • Pipe smoker   • Anxiety   • Erectile dysfunction due to diseases classified elsewhere   • Peripheral artery disease [facility])      Past Medical and Surgical History:     Past Medical History:   Diagnosis Date   • BPH (benign prostatic hyperplasia)    • Cancer ([facility] ) 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   • Cervical myelopathy ([place] [address] ) 6/20/2019   • Chemical exposure     9/11/01- worked in [employer]      • COPD (chronic obstructive pulmonary disease) ([facility])    • CPAP (continuous positive airway pressure) dependence    • DDD (degenerative disc disease), cervical    • Diabetes mellitus ([facility] )    • Foraminal stenosis of cervical region    • Hyperlipidemia    • Hypertension    • SHIVANI (obstructive sleep apnea)    • Overactive bladder    • Spinal cord compression ([place] [address] ) 5/8/2019    Added automatically from request for surgery [id number]     Past Surgical History: Procedure Laterality Date   • ARTHROSCOPY KNEE Bilateral    • COLONOSCOPY     • LUNG SURGERY Left     scraping of left   • [place] ARTHRODESIS ANT INTERBODY INC DISCECTOMY, CERVICAL BELOW C2 Bilateral 2019    Procedure: C3/4  ACDF WITH  C4 HEMICORPECTOMY, C3-4 ANTERIOR INSTRUMENTATION AND FUSION (NEUROMONITORING); Surgeon: Kristy Collet, MD;  Location: [facility];  Service: Neurosurgery   • ROTATOR CUFF REPAIR Bilateral       Family History:     Family History   Problem Relation Age of Onset   • No Known Problems Mother    • No Known Problems Father       Social History:     Social History     Socioeconomic History   • Marital status: Legally      Spouse name: None   • Number of children: 3   • Years of education: None   • Highest education level: None   Occupational History   • Occupation: retired   Tobacco Use   • Smoking status: Every Day     Packs/day: 0 01     Years: 61 00     Pack years: 0 61     Types: Pipe, Cigarettes     Start date:      Last attempt to quit: 2020     Years since quittin 6   • Smokeless tobacco: Never   • Tobacco comments:     2-3  PIPES PER DAY    Vaping Use   • Vaping Use: Never used   Substance and Sexual Activity   • Alcohol use: Never   • Drug use: Never   • Sexual activity: Not Currently   Other Topics Concern   • None   Social History Narrative    Pt was born in [facility]; moved to [place] as an adult with his wife, and eventually moved to [place], [place]  Social Determinants of Health     Financial Resource Strain: Low Risk    • Difficulty of Paying Living Expenses: Not hard at all   Food Insecurity: Not on file   Transportation Needs: No Transportation Needs   • Lack of Transportation (Medical): No   • Lack of Transportation (Non-Medical):  No   Physical Activity: Not on file   Stress: Not on file   Social Connections: Not on file   Intimate Partner Violence: Not on file   Housing Stability: Not on file      Medications and Allergies:     Current Outpatient Medications   Medication Sig Dispense Refill   • acetaminophen (TYLENOL) 500 mg tablet Take 500 mg by mouth every 6 (six) hours as needed for mild pain       • albuterol (2 5 mg/3 mL) 0 083 % nebulizer solution Take 3 mL (2 5 mg total) by nebulization every 6 (six) hours as needed for wheezing or shortness of breath 120 mL 1   • aspirin (ECOTRIN LOW STRENGTH) 81 mg EC tablet Take 1 tablet by mouth daily 30 tablet 0   • Blood Glucose Monitoring Suppl (OneTouch Verio) w/Device KIT use as directed 1 kit 0   • chlorhexidine (PERIDEX) 0 12 % solution RINSE MOUTH WITH 15 ML (1 CAPFUL) FOR 30 SECONDS IN THE MORNING A   (REFER TO PRESCRIPTION NOTES)  • clindamycin (CLEOCIN) 300 MG capsule Take 300 mg by mouth every 8 (eight) hours       • Continuous Blood Gluc Sensor (FreeStyle Eben 14 Day Sensor) MISC Check blood sugars continuously  Switch every 14 days   6 each 3   • diltiazem (TIAZAC) 360 MG 24 hr capsule Take 1 capsule (360 mg total) by mouth daily 90 capsule 1   • DULoxetine (CYMBALTA) 60 mg delayed release capsule TAKE 1 CAPSULE DAILY 90 capsule 3   • gabapentin (NEURONTIN) 100 mg capsule Take 2 capsules (200 mg total) by mouth 2 (two) times a day 360 capsule 3   • glucose blood (FREESTYLE LITE) test strip use 1 TEST STRIP to TEST BLOOD SUGAR twice a day 100 strip 0   • hydrochlorothiazide (HYDRODIURIL) 12 5 mg tablet Take 1 tablet (12 5 mg total) by mouth daily 90 tablet 1   • ibuprofen (MOTRIN) 600 mg tablet Take 600 mg by mouth every 6 (six) hours as needed       • Lancets (OneTouch Delica Plus [id number]) MISC use 1 LANCET to TEST BLOOD SUGAR one to two times a day 100 each 0   • losartan (COZAAR) 100 MG tablet TAKE 1 TABLET DAILY 90 tablet 3   • metFORMIN (GLUCOPHAGE) 500 mg tablet take 1 tablet by mouth daily WITH BREAKFAST 90 tablet 3   • Multiple Vitamin (MULTI-VITAMINS PO) Take by mouth daily     • oxybutynin (DITROPAN-XL) 5 mg 24 hr tablet take 1 tablet by mouth once daily 90 tablet 3   • polyvinyl alcohol (LIQUIFILM TEARS) 1 4 % ophthalmic solution Administer 1 drop to both eyes 4 (four) times a day 15 mL 0   • pravastatin (PRAVACHOL) 40 mg tablet take 1 tablet by mouth once daily 90 tablet 1   • pyridoxine (VITAMIN B6) 50 mg tablet Take 50 mg by mouth daily     • sildenafil (REVATIO) 20 mg tablet Take 1 tablet (20 mg total) by mouth if needed (Erectile dysfunction) Take 1-5 tablets as needed for erection on an empty stomach 30 tablet 1   • thiamine 50 MG tablet Take 1 tablet (50 mg total) by mouth daily 30 tablet 3   • Trelegy Ellipta 100-62 5-25 MCG/INH inhaler USE 1 INHALATION DAILY (RINSE MOUTH AFTER USE) 360 blister 3   • VITAMIN E PO Take by mouth 268 mg 400 iu       No current facility-administered medications for this visit  Allergies   Allergen Reactions   • Chloroquine Itching   • Qualaquin [Quinine] Itching      Immunizations:     Immunization History   Administered Date(s) Administered   • COVID-19 PFIZER VACCINE 0 3 ML IM 03/16/2021, 04/07/2021, 12/03/2021   • COVID-19 Pfizer Vac BIVALENT Bi-sucrose 12 Yr+ IM (BOOSTER ONLY) 09/23/2022   • INFLUENZA 10/01/2018   • Influenza, high dose seasonal 0 7 mL 09/13/2019, 09/24/2020, 11/11/2021, 12/09/2022   • Pneumococcal Conjugate 13-Valent 08/18/2016   • Pneumococcal Polysaccharide PPV23 03/16/2018   • Zoster 06/23/2014   • Zoster Vaccine Recombinant 11/06/2018, 02/01/2019      Health Maintenance:         Topic Date Due   • Hepatitis C Screening  Completed         Topic Date Due   • Hepatitis A Vaccine (1 of 2 - Risk 2-dose series) Never done   • Hepatitis B Vaccine (1 of 3 - Risk 3-dose series) Never done   • COVID-19 Vaccine (4 - Booster for Pfizer series) 04/03/2022      Medicare Screening Tests and Risk Assessments:     Zackary Contreras is here for his Subsequent Wellness visit  Last Medicare Wellness visit information reviewed, patient interviewed and updates made to the record today        Health Risk Assessment:   Patient rates overall health as fair  Patient feels that their physical health rating is same  Patient is satisfied with their life  Eyesight was rated as same  Hearing was rated as same  Patient feels that their emotional and mental health rating is same  Patients states they are sometimes angry  Patient states they are always unusually tired/fatigued  Pain experienced in the last 7 days has been none  Patient states that he has experienced no weight loss or gain in last 6 months  Depression Screening:   PHQ-2 Score: 0      Fall Risk Screening: In the past year, patient has experienced: no history of falling in past year      Home Safety:  Patient has trouble with stairs inside or outside of their home  Patient has working smoke alarms and has working carbon monoxide detector  Home safety hazards include: none  Nutrition:   Current diet is Regular  Medications:   Patient is not currently taking any over-the-counter supplements  Patient is able to manage medications  Activities of Daily Living (ADLs)/Instrumental Activities of Daily Living (IADLs):   Walk and transfer into and out of bed and chair?: Yes  Bathe or shower yourself?: Yes    Feed yourself?  Yes  Do your laundry/housekeeping?: Yes  Manage your money, pay your bills and track your expenses?: Yes  Do your own shopping?: Yes    Durable Medical Equipment Suppliers  none    Previous Hospitalizations:   Any hospitalizations or ED visits within the last 12 months?: No      Advance Care Planning:   Living will: No    Durable POA for healthcare: No    Advanced directive: No    Five wishes given: No      Cognitive Screening:   Provider or family/friend/caregiver concerned regarding cognition?: No    PREVENTIVE SCREENINGS      Cardiovascular Screening:    General: Screening Not Indicated and History Lipid Disorder      Diabetes Screening:     General: Screening Not Indicated and History Diabetes      Colorectal Cancer Screening:     General: Screening Not Indicated Prostate Cancer Screening:    General: History Prostate Cancer and Screening Not Indicated      Osteoporosis Screening:    General: Screening Not Indicated      Abdominal Aortic Aneurysm (AAA) Screening:    Risk factors include: tobacco use        General: Screening Not Indicated      Lung Cancer Screening:     General: Screening Not Indicated and History Lung Cancer      Hepatitis C Screening:    General: Screening Current    Screening, Brief Intervention, and Referral to Treatment (SBIRT)    Screening  Typical number of drinks in a day: 0  Typical number of drinks in a week: 0  Interpretation: Low risk drinking behavior  AUDIT-C Screenin) How often did you have a drink containing alcohol in the past year? never  2) How many drinks did you have on a typical day when you were drinking in the past year? 0  3) How often did you have 6 or more drinks on one occasion in the past year? never    AUDIT-C Score: 0  Interpretation: Score 0-3 (male): Negative screen for alcohol misuse    Single Item Drug Screening:  How often have you used an illegal drug (including marijuana) or a prescription medication for non-medical reasons in the past year? never    Single Item Drug Screen Score: 0  Interpretation: Negative screen for possible drug use disorder    Brief Intervention  Alcohol & drug use screenings were reviewed  No concerns regarding substance use disorder identified  Other Counseling Topics:   Car/seat belt/driving safety, skin self-exam, sunscreen and regular weightbearing exercise  Physical Exam:     /80 (BP Location: Left arm, Patient Position: Sitting, Cuff Size: Standard)   Pulse 76   Temp (!) 97 °F (36 1 °C)   Resp 20   Ht 5' 8" (1 727 m)   Wt 68 9 kg (152 lb)   SpO2 97%   BMI 23 11 kg/m²     Physical Exam  Constitutional:       General: He is not in acute distress  Appearance: He is not ill-appearing  Cardiovascular:      Rate and Rhythm: Normal rate and regular rhythm  Heart sounds: No murmur heard  Pulmonary:      Effort: Pulmonary effort is normal  No respiratory distress  Breath sounds: No wheezing  Abdominal:      General: Bowel sounds are normal  There is no distension  Tenderness: There is no abdominal tenderness  Musculoskeletal:      Right lower leg: No edema  Left lower leg: No edema  Neurological:      Mental Status: He is alert            Nani Hoffmann DO

## 2022-12-09 NOTE — PATIENT INSTRUCTIONS
Medicare Preventive Visit Patient Instructions  Thank you for completing your Welcome to Medicare Visit or Medicare Annual Wellness Visit today  Your next wellness visit will be due in one year (12/10/2023)  The screening/preventive services that you may require over the next 5-10 years are detailed below  Some tests may not apply to you based off risk factors and/or age  Screening tests ordered at today's visit but not completed yet may show as past due  Also, please note that scanned in results may not display below  Preventive Screenings:  Service Recommendations Previous Testing/Comments   Colorectal Cancer Screening  Colonoscopy    Fecal Occult Blood Test (FOBT)/Fecal Immunochemical Test (FIT)  Fecal DNA/Cologuard Test  Flexible Sigmoidoscopy Age: 39-70 years old   Colonoscopy: every 10 years (May be performed more frequently if at higher risk)  OR  FOBT/FIT: every 1 year  OR  Cologuard: every 3 years  OR  Sigmoidoscopy: every 5 years  Screening may be recommended earlier than age 39 if at higher risk for colorectal cancer  Also, an individualized decision between you and your healthcare provider will decide whether screening between the ages of 74-80 would be appropriate   Colonoscopy: Not on file  FOBT/FIT: Not on file  Cologuard: Not on file  Sigmoidoscopy: Not on file    Screening Not Indicated     Prostate Cancer Screening Individualized decision between patient and health care provider in men between ages of 53-78   Medicare will cover every 12 months beginning on the day after your 50th birthday PSA: <0 1 ng/mL     History Prostate Cancer  Screening Not Indicated     Hepatitis C Screening Once for adults born between 1945 and 1965  More frequently in patients at high risk for Hepatitis C Hep C Antibody: 06/24/2021    Screening Current   Diabetes Screening 1-2 times per year if you're at risk for diabetes or have pre-diabetes Fasting glucose: 111 mg/dL (3/29/2022)  A1C: 8 3 % (7/29/2022)  Screening Not Indicated  History Diabetes   Cholesterol Screening Once every 5 years if you don't have a lipid disorder  May order more often based on risk factors  Lipid panel: 07/29/2022  Screening Not Indicated  History Lipid Disorder      Other Preventive Screenings Covered by Medicare:  Abdominal Aortic Aneurysm (AAA) Screening: covered once if your at risk  You're considered to be at risk if you have a family history of AAA or a male between the age of 73-68 who smoking at least 100 cigarettes in your lifetime  Lung Cancer Screening: covers low dose CT scan once per year if you meet all of the following conditions: (1) Age 50-69; (2) No signs or symptoms of lung cancer; (3) Current smoker or have quit smoking within the last 15 years; (4) You have a tobacco smoking history of at least 20 pack years (packs per day x number of years you smoked); (5) You get a written order from a healthcare provider  Glaucoma Screening: covered annually if you're considered high risk: (1) You have diabetes OR (2) Family history of glaucoma OR (3)  aged 48 and older OR (3)  American aged 72 and older  Osteoporosis Screening: covered every 2 years if you meet one of the following conditions: (1) Have a vertebral abnormality; (2) On glucocorticoid therapy for more than 3 months; (3) Have primary hyperparathyroidism; (4) On osteoporosis medications and need to assess response to drug therapy  HIV Screening: covered annually if you're between the age of 12-76  Also covered annually if you are younger than 13 and older than 72 with risk factors for HIV infection  For pregnant patients, it is covered up to 3 times per pregnancy      Immunizations:  Immunization Recommendations   Influenza Vaccine Annual influenza vaccination during flu season is recommended for all persons aged >= 6 months who do not have contraindications   Pneumococcal Vaccine   * Pneumococcal conjugate vaccine = PCV13 (Prevnar 13), PCV15 (Vaxneuvance), PCV20 (Prevnar 20)  * Pneumococcal polysaccharide vaccine = PPSV23 (Pneumovax) Adults 2364 years old: 1-3 doses may be recommended based on certain risk factors  Adults 72 years old: 1-2 doses may be recommended based off what pneumonia vaccine you previously received   Hepatitis B Vaccine 3 dose series if at intermediate or high risk (ex: diabetes, end stage renal disease, liver disease)   Tetanus (Td) Vaccine - COST NOT COVERED BY MEDICARE PART B Following completion of primary series, a booster dose should be given every 10 years to maintain immunity against tetanus  Td may also be given as tetanus wound prophylaxis  Tdap Vaccine - COST NOT COVERED BY MEDICARE PART B Recommended at least once for all adults  For pregnant patients, recommended with each pregnancy  Shingles Vaccine (Shingrix) - COST NOT COVERED BY MEDICARE PART B  2 shot series recommended in those aged 48 and above     Health Maintenance Due:      Topic Date Due    Hepatitis C Screening  Completed     Immunizations Due:      Topic Date Due    Hepatitis A Vaccine (1 of 2 - Risk 2-dose series) Never done    Hepatitis B Vaccine (1 of 3 - Risk 3-dose series) Never done    COVID-19 Vaccine (4 - Booster for Pfizer series) 04/03/2022     Advance Directives   What are advance directives? Advance directives are legal documents that state your wishes and plans for medical care  These plans are made ahead of time in case you lose your ability to make decisions for yourself  Advance directives can apply to any medical decision, such as the treatments you want, and if you want to donate organs  What are the types of advance directives? There are many types of advance directives, and each state has rules about how to use them  You may choose a combination of any of the following:  Living will: This is a written record of the treatment you want  You can also choose which treatments you do not want, which to limit, and which to stop at a certain time  This includes surgery, medicine, IV fluid, and tube feedings  Durable power of  for healthcare [facility]): This is a written record that states who you want to make healthcare choices for you when you are unable to make them for yourself  This person, called a proxy, is usually a family member or a friend  You may choose more than 1 proxy  Do not resuscitate (DNR) order:  A DNR order is used in case your heart stops beating or you stop breathing  It is a request not to have certain forms of treatment, such as CPR  A DNR order may be included in other types of advance directives  Medical directive: This covers the care that you want if you are in a coma, near death, or unable to make decisions for yourself  You can list the treatments you want for each condition  Treatment may include pain medicine, surgery, blood transfusions, dialysis, IV or tube feedings, and a ventilator (breathing machine)  Values history: This document has questions about your views, beliefs, and how you feel and think about life  This information can help others choose the care that you would choose  Why are advance directives important? An advance directive helps you control your care  Although spoken wishes may be used, it is better to have your wishes written down  Spoken wishes can be misunderstood, or not followed  Treatments may be given even if you do not want them  An advance directive may make it easier for your family to make difficult choices about your care  Cigarette Smoking and Your Health   Risks to your health if you smoke:  Nicotine and other chemicals found in tobacco damage every cell in your body  Even if you are a light smoker, you have an increased risk for cancer, heart disease, and lung disease  If you are pregnant or have diabetes, smoking increases your risk for complications  Benefits to your health if you stop smoking:    You decrease respiratory symptoms such as coughing, wheezing, and shortness of breath  You reduce your risk for cancers of the lung, mouth, throat, kidney, bladder, pancreas, stomach, and cervix  If you already have cancer, you increase the benefits of chemotherapy  You also reduce your risk for cancer returning or a second cancer from developing  You reduce your risk for heart disease, blood clots, heart attack, and stroke  You reduce your risk for lung infections, and diseases such as pneumonia, asthma, chronic bronchitis, and emphysema  Your circulation improves  More oxygen can be delivered to your body  If you have diabetes, you lower your risk for complications, such as kidney, artery, and eye diseases  You also lower your risk for nerve damage  Nerve damage can lead to amputations, poor vision, and blindness  You improve your body's ability to heal and to fight infections  For more information and support to stop smoking:   [facility]  Phone: 6- 234 - 177-4192  Web Address: www [employer]  [facility] Information is for End User's use only and may not be sold, redistributed or otherwise used for commercial purposes   All illustrations and images included in CareNotes® are the copyrighted property of A D A M , Inc  or [address] [place] [address]

## 2022-12-10 LAB
EST. AVERAGE GLUCOSE BLD GHB EST-MCNC: 171 MG/DL
HBA1C MFR BLD: 7.6 %

## 2022-12-12 ENCOUNTER — TELEPHONE (OUTPATIENT)
Dept: INTERNAL MEDICINE CLINIC | Facility: CLINIC | Age: 82
End: 2022-12-12

## 2022-12-12 NOTE — TELEPHONE ENCOUNTER
----- Message from Andre Heart DO sent at 12/10/2022  7:10 AM EST -----  Labs look good   A1c came down to 7 6%

## 2023-01-10 ENCOUNTER — OFFICE VISIT (OUTPATIENT)
Dept: VASCULAR SURGERY | Facility: CLINIC | Age: 83
End: 2023-01-10

## 2023-01-10 VITALS
WEIGHT: 155 LBS | DIASTOLIC BLOOD PRESSURE: 72 MMHG | SYSTOLIC BLOOD PRESSURE: 148 MMHG | HEART RATE: 82 BPM | BODY MASS INDEX: 23.49 KG/M2 | HEIGHT: 68 IN

## 2023-01-10 DIAGNOSIS — E78.5 HYPERLIPIDEMIA ASSOCIATED WITH TYPE 2 DIABETES MELLITUS (HCC): Chronic | ICD-10-CM

## 2023-01-10 DIAGNOSIS — M79.89 LEG SWELLING: ICD-10-CM

## 2023-01-10 DIAGNOSIS — F17.290 PIPE SMOKER: ICD-10-CM

## 2023-01-10 DIAGNOSIS — L72.3 SEBACEOUS CYST: ICD-10-CM

## 2023-01-10 DIAGNOSIS — E11.69 HYPERLIPIDEMIA ASSOCIATED WITH TYPE 2 DIABETES MELLITUS (HCC): Chronic | ICD-10-CM

## 2023-01-10 DIAGNOSIS — I10 ESSENTIAL HYPERTENSION: Chronic | ICD-10-CM

## 2023-01-10 DIAGNOSIS — I73.9 PERIPHERAL ARTERY DISEASE (HCC): ICD-10-CM

## 2023-01-10 DIAGNOSIS — E11.40 TYPE 2 DIABETES MELLITUS WITH DIABETIC NEUROPATHY, WITHOUT LONG-TERM CURRENT USE OF INSULIN (HCC): Primary | Chronic | ICD-10-CM

## 2023-01-10 NOTE — PATIENT INSTRUCTIONS
-recommend regular walking 30 minutes at least 4 days weekly to help with circulation  -maintain good blood pressure, cholesterol and glucose control  -continue with aspirin 81 and pravastatin 40  -recommend quitting smoking  -follow up TONYA 1 year (Aug '23) with office visit

## 2023-01-10 NOTE — PROGRESS NOTES
Assessment/Plan:    Peripheral artery disease (HCC)  -     VAS lower limb arterial duplex, complete bilateral; Future  -Functionally limited; no typical claudication; no ischemic rest pain or tissue loss  -Reportedly prior interventions at other facility    -TONYA 8/2/22    R 1 08/121/112; 50-75% distal SFA    L 0 94/113/73; > 75% distal SFA    Patient is diabetic and functionally limited as legs get tired and heavy with shortness of breath  He does have arterial disease but leg pain is unlikely related to PAD  Lower extremity arterial duplex study shows overall preserved ABIs with normal metatarsal and great toe pressures  There is bilateral SFA disease but he does not have any calf claudication  Skin is healthy and intact with normal color and temperature; capillary refill is less than 2 seconds  We discussed the pathophysiology and treatment of atherosclerosis in lower extremities  Recommend a regular walking program to help with circulation  Although he is not motivated to walk, he has a friend who will walk with him and agrees to do so  We will continue with clinical monitoring and periodic office visit  -recommend regular walking 30 minutes at least 4 days weekly to help with circulation  -maintain good blood pressure, cholesterol and glucose control  -continue with aspirin 81 and pravastatin 40  -recommend quitting smoking   -follow up TONYA 1 year (Aug '23) with office visit    Neuropathy  -neuropathy is not due to arterial disease; ? diabetes;   -hands and feet; follows with neurology on gabapentin  -maintain good diabetes management   -daily foot care  -routine diabetic foot care     Leg swelling  -     Compression Stocking  -order for fresh compression 15-20 mmhg  -compression, periodic elevation, low sodium, skin care  -regular walking    Sebaceous cyst  -     Ambulatory Referral to General Surgery;  Future  -palpable "mass" with thick, drainage  -refer to gen surgery      Additional diagnoses:  Type 2 diabetes mellitus with diabetic neuropathy, without long-term current use of insulin ([facility] [address] )    Essential hypertension    Hyperlipidemia associated with type 2 diabetes mellitus ([facility] [address] )    Pipe smoker      Subjective:      Patient ID: Alexandria Rome During is a [de-identified] y o  male  Pt presents to [facility] TON[facility] 8/02 for PAD  Pt c/o b/l leg pain & swelling L>R  Pt c/o chronic lower back pain  HPI    Alexandria Rome During is a [de-identified] y o  male new patient hypertension, hyperlipidemia, longstanding diabetes mellitus with neuropathy, PAD who presents for evaluation of bilateral leg pain and left lower extremity edema  Patient has had lower extremity edema (mostly on the L) for at least 3 years and is concerned about discoloration at the toes  He wears compression stockings but they are uncomfortable  He also experiences bilateral leg weakness when he gets up and out of bed 1st thing in the morning which takes a couple hours to work out and he can walk better  He also complains bitterly of abnormal sensations to the bottom of the feet which is likely due to diabetic neuropathy  He has no ischemic rest pain or wounds  He does describe prior arterial procedures performed to the legs about 7 years ago but has no details  He is chronically on aspirin and pravastatin  1/10/23:   Mr Alexandria Rome During returns for annual vascular follow-up  Patient reports no medical changes since he was last seen  He continues to complains bitterly of neuropathy in the feet and the hands  He already sees neurology and is on gabapentin  He has "problems with the legs " He describes difficulty getting up on the couch and walking  He is able to walk about 50 to 100 yards and then the legs are tired from the waist down and he "cannot breathe "  He has to rest for about 3 minutes to recover and then can walk a little more  At times the back hurts with lumbar pain on both sides   he has no typical leg or calf claudication no ischemic rest pain or wounds  He reports his walking is essentially unchanged  He also complains of bilateral lower extremity edema  He tells me that we wears compression stockings and has 9 pairs at home  He didn't wear the stockings today since he was coming into the office  He has doughy 2+ ankle edema  He remains on medical therapy with aspirin 81 and pravastatin  We reviewed his recent labs and vascular testing  Echo 5/14/19: EF 60%  [facility]  Grade 1 DD  BUN/ creat 14/0 90  A1c 7 6  LDL 29      -Walking  yards; legs tired from waist down, can't breath; 3 minutes to recover  -Physical therapy 2-3 years ago didn't help  -Hx neck surgery        TONYA 8/2/22  Operative History:  SFA Atherectomy  Risk Factors: HTN, Diabetes (NIDDM (oral meds), Hyperlipidemia and PAD  Clinical  Right Pressure:  157/ mm Hg, Left Pressure:  152/ mm Hg  FINDINGS:     Segment                Right                   Left                                            Impression  PSV (cm/s)  Impression  PSV (cm/s)    Common Femoral Artery                     106                     118    Prox Profunda                             117                      49    Prox SFA                                  114                     114    Mid SFA                                    88                     106    Dist SFA               50-75%             283  >75%               437    Proximal Pop                               78                      48    Distal Pop                                 78                      62    Dist Post Tibial                           50                      43    Dist  Ant  Tibial                          46                      40             CONCLUSION:     Impression:  RIGHT LOWER LIMB:  There is a 50-75% stenosis of the distal superficial femoral artery  Ankle/Brachial index:  1 08  which is in the normal disease category  PVR/ PPG tracings are slightly dampened    Metatarsal pressure of 121mmHg  [address] [facility] [id number] toe pressure of 112mmHg, within the healing range  LEFT LOWER LIMB:  There is a >75% stenosis of the distal superficial femoral artery  Ankle/Brachial index:  0 94 which is in the normal disease category  PVR/ PPG tracings are slightly dampened  Metatarsal pressure of 113mmHg  Great toe pressure of 73mmHg, within the healing range  The following portions of the patient's history were reviewed and updated as appropriate: allergies, current medications, past family history, past medical history, past social history, past surgical history and problem list     Review of Systems   Constitutional: Negative  HENT: Negative  Eyes: Negative  Respiratory: Negative  Cardiovascular: Positive for leg swelling  BLE pain   Gastrointestinal: Negative  Endocrine: Negative  Genitourinary: Negative  Musculoskeletal: Positive for back pain  Skin: Negative  Allergic/Immunologic: Negative  Neurological: Negative  Hematological: Negative  Psychiatric/Behavioral: Negative  Objective:      /72 (BP Location: Right arm, Patient Position: Sitting, Cuff Size: Standard)   Pulse 82   Ht 5' 8" (1 727 m)   Wt 70 3 kg (155 lb)   BMI 23 57 kg/m²        Physical Exam  Vitals and nursing note reviewed  Constitutional:       Appearance: He is well-developed  HENT:      Head: Normocephalic and atraumatic  Eyes:      Pupils: Pupils are equal, round, and reactive to light  Neck:      Thyroid: No thyromegaly  Vascular: No JVD  Trachea: Trachea normal    Cardiovascular:      Rate and Rhythm: Normal rate and regular rhythm  Pulses: Decreased pulses  Dorsalis pedis pulses are 2+ on the right side  Heart sounds: Normal heart sounds, S1 normal and S2 normal  No murmur heard  No friction rub  No gallop        Comments:   Feet warm and well-perfused; < sec cap refill  Nail fungus  No chronic changes or wounds  2+ LE mostly ankle edema  Pulmonary: Effort: Pulmonary effort is normal  No accessory muscle usage or respiratory distress  Breath sounds: Normal breath sounds  No wheezing or rales  Abdominal:      General: Bowel sounds are normal  There is no distension  Palpations: Abdomen is soft  Tenderness: There is no abdominal tenderness  Musculoskeletal:         General: No deformity  Normal range of motion  Cervical back: Neck supple  Right lower le+ Pitting Edema present  Left lower le+ Pitting Edema present  Skin:     General: Skin is warm and dry  Findings: No lesion or rash  Nails: There is no clubbing  Neurological:      Mental Status: He is alert and oriented to person, place, and time  Comments: Grossly normal    Psychiatric:         Behavior: Behavior is cooperative  I have reviewed and made appropriate changes to the review of systems input by the medical assistant      Vitals:    01/10/23 0849   BP: 148/72   BP Location: Right arm   Patient Position: Sitting   Cuff Size: Standard   Pulse: 82   Weight: 70 3 kg (155 lb)   Height: 5' 8" (1 727 m)       Patient Active Problem List   Diagnosis   • Type 2 diabetes mellitus with diabetic neuropathy, without long-term current use of insulin ([facility])   • Hyperlipidemia associated with type 2 diabetes mellitus ([facility] [address] )   • Essential hypertension   • BPH (benign prostatic hyperplasia)   • Degenerative cervical spinal stenosis   • Cervical spondylosis   • COPD (chronic obstructive pulmonary disease) ([facility])   • History of prostate cancer   • Overactive bladder   • Lesion of native kidney   • SHIVANI (obstructive sleep apnea)   • Vitamin D insufficiency   • Pipe smoker   • Anxiety   • Erectile dysfunction due to diseases classified elsewhere   • Peripheral artery disease ([facility])   • Leg swelling       Past Surgical History:   Procedure Laterality Date   • ARTHROSCOPY KNEE Bilateral    • COLONOSCOPY     • LUNG SURGERY Left     scraping of left   • [place] ARTHRD ANT INTERBODY DECOMPRESS CERVICAL BELW C2 Bilateral 2019    Procedure: C3/4  ACDF WITH  C4 HEMICORPECTOMY, C3-4 ANTERIOR INSTRUMENTATION AND FUSION (NEUROMONITORING); Surgeon: Edilma Hagan MD;  Location: [facility];  Service: Neurosurgery   • ROTATOR CUFF REPAIR Bilateral        Family History   Problem Relation Age of Onset   • No Known Problems Mother    • No Known Problems Father        Social History     Socioeconomic History   • Marital status: Legally      Spouse name: Not on file   • Number of children: 3   • Years of education: Not on file   • Highest education level: Not on file   Occupational History   • Occupation: retired   Tobacco Use   • Smoking status: Every Day     Packs/day: 0 01     Years: 61 00     Pack years: 0 61     Types: Pipe, Cigarettes     Start date:      Last attempt to quit: 2020     Years since quittin 7   • Smokeless tobacco: Never   • Tobacco comments:     2-3  PIPES PER DAY    Vaping Use   • Vaping Use: Never used   Substance and Sexual Activity   • Alcohol use: Never   • Drug use: Never   • Sexual activity: Not Currently   Other Topics Concern   • Not on file   Social History Narrative    Pt was born in [facility]; moved to [place] as an adult with his wife, and eventually moved to [place], [place]  Social Determinants of Health     Financial Resource Strain: Low Risk    • Difficulty of Paying Living Expenses: Not hard at all   Food Insecurity: Not on file   Transportation Needs: No Transportation Needs   • Lack of Transportation (Medical): No   • Lack of Transportation (Non-Medical):  No   Physical Activity: Not on file   Stress: Not on file   Social Connections: Not on file   Intimate Partner Violence: Not on file   Housing Stability: Not on file       Allergies   Allergen Reactions   • Chloroquine Itching   • Qualaquin [Quinine] Itching         Current Outpatient Medications:   •  acetaminophen (TYLENOL) 500 mg tablet, Take 500 mg by mouth every 6 (six) hours as needed for mild pain  , Disp: , Rfl:   •  albuterol (2 5 mg/3 mL) 0 083 % nebulizer solution, Take 3 mL (2 5 mg total) by nebulization every 6 (six) hours as needed for wheezing or shortness of breath, Disp: 120 mL, Rfl: 1  •  aspirin (ECOTRIN LOW STRENGTH) 81 mg EC tablet, Take 1 tablet by mouth daily, Disp: 30 tablet, Rfl: 0  •  Blood Glucose Monitoring Suppl (OneTouch Verio) w/Device KIT, use as directed, Disp: 1 kit, Rfl: 0  •  chlorhexidine (PERIDEX) 0 12 % solution, RINSE MOUTH WITH 15 ML (1 CAPFUL) FOR 30 SECONDS IN THE MORNING A   (REFER TO PRESCRIPTION NOTES)  , Disp: , Rfl:   •  clindamycin (CLEOCIN) 300 MG capsule, Take 300 mg by mouth every 8 (eight) hours  , Disp: , Rfl:   •  Continuous Blood Gluc Sensor (FreeStyle Eben 14 Day Sensor) MISC, Check blood sugars continuously  Switch every 14 days  , Disp: 6 each, Rfl: 3  •  diltiazem (TIAZAC) 360 MG 24 hr capsule, Take 1 capsule (360 mg total) by mouth daily, Disp: 90 capsule, Rfl: 1  •  DULoxetine (CYMBALTA) 60 mg delayed release capsule, TAKE 1 CAPSULE DAILY, Disp: 90 capsule, Rfl: 3  •  gabapentin (NEURONTIN) 100 mg capsule, Take 2 capsules (200 mg total) by mouth 2 (two) times a day, Disp: 360 capsule, Rfl: 3  •  glucose blood (FREESTYLE LITE) test strip, use 1 TEST STRIP to TEST BLOOD SUGAR twice a day, Disp: 100 strip, Rfl: 0  •  hydrochlorothiazide (HYDRODIURIL) 12 5 mg tablet, Take 1 tablet (12 5 mg total) by mouth daily, Disp: 90 tablet, Rfl: 1  •  ibuprofen (MOTRIN) 600 mg tablet, Take 600 mg by mouth every 6 (six) hours as needed  , Disp: , Rfl:   •  Lancets (OneTouch Delica Plus [id number]) MISC, use 1 LANCET to TEST BLOOD SUGAR one to two times a day, Disp: 100 each, Rfl: 0  •  losartan (COZAAR) 100 MG tablet, TAKE 1 TABLET DAILY, Disp: 90 tablet, Rfl: 3  •  metFORMIN (GLUCOPHAGE) 500 mg tablet, take 1 tablet by mouth daily WITH BREAKFAST, Disp: 90 tablet, Rfl: 3  •  Multiple Vitamin (MULTI-VITAMINS PO), Take by mouth daily, Disp: , Rfl:   •  oxybutynin (DITROPAN-XL) 5 mg 24 hr tablet, take 1 tablet by mouth once daily, Disp: 90 tablet, Rfl: 3  •  polyvinyl alcohol (LIQUIFILM TEARS) 1 4 % ophthalmic solution, Administer 1 drop to both eyes 4 (four) times a day, Disp: 15 mL, Rfl: 0  •  pravastatin (PRAVACHOL) 40 mg tablet, take 1 tablet by mouth once daily, Disp: 90 tablet, Rfl: 1  •  pyridoxine (VITAMIN B6) 50 mg tablet, Take 50 mg by mouth daily, Disp: , Rfl:   •  sildenafil (REVATIO) 20 mg tablet, Take 1 tablet (20 mg total) by mouth if needed (Erectile dysfunction) Take 1-5 tablets as needed for erection on an empty stomach, Disp: 30 tablet, Rfl: 1  •  thiamine 50 MG tablet, Take 1 tablet (50 mg total) by mouth daily, Disp: 30 tablet, Rfl: 3  •  Trelegy Ellipta 100-62 5-25 MCG/INH inhaler, USE 1 INHALATION DAILY (RINSE MOUTH AFTER USE), Disp: 360 blister, Rfl: 3  •  VITAMIN E PO, Take by mouth 268 mg 400 iu, Disp: , Rfl:

## 2023-01-13 DIAGNOSIS — C61 PROSTATE CANCER (HCC): Primary | ICD-10-CM

## 2023-01-21 DIAGNOSIS — I10 ESSENTIAL HYPERTENSION: Chronic | ICD-10-CM

## 2023-01-21 DIAGNOSIS — E78.5 HYPERLIPIDEMIA ASSOCIATED WITH TYPE 2 DIABETES MELLITUS (HCC): ICD-10-CM

## 2023-01-21 DIAGNOSIS — E11.69 HYPERLIPIDEMIA ASSOCIATED WITH TYPE 2 DIABETES MELLITUS (HCC): ICD-10-CM

## 2023-01-21 RX ORDER — PRAVASTATIN SODIUM 40 MG
TABLET ORAL
Qty: 90 TABLET | Refills: 1 | Status: SHIPPED | OUTPATIENT
Start: 2023-01-21

## 2023-01-21 RX ORDER — DILTIAZEM HYDROCHLORIDE 360 MG/1
CAPSULE, EXTENDED RELEASE ORAL
Qty: 90 CAPSULE | Refills: 1 | Status: SHIPPED | OUTPATIENT
Start: 2023-01-21

## 2023-01-27 ENCOUNTER — CONSULT (OUTPATIENT)
Dept: SURGERY | Facility: CLINIC | Age: 83
End: 2023-01-27

## 2023-01-27 VITALS
RESPIRATION RATE: 16 BRPM | HEIGHT: 68 IN | DIASTOLIC BLOOD PRESSURE: 90 MMHG | OXYGEN SATURATION: 98 % | TEMPERATURE: 98 F | WEIGHT: 155 LBS | SYSTOLIC BLOOD PRESSURE: 141 MMHG | HEART RATE: 74 BPM | BODY MASS INDEX: 23.49 KG/M2

## 2023-01-27 DIAGNOSIS — L72.3 SEBACEOUS CYST: ICD-10-CM

## 2023-01-27 NOTE — PROGRESS NOTES
Assessment/Plan:  53-year-old male with sebaceous cyst of back  -Patient states he has a history of multiple sebaceous cysts  -Recently he had some itching on his back and it was noted he had a sebaceous cyst  -Denies any pain or redness, states his only symptom is itching, denies ever having any infection at this area  -On exam there is a sebaceous cyst on the patient's back without signs of infection, small amount of sebum draining from the central pore  -CBC and BMP from 12/9/2022 reviewed  -Hemoglobin A1c from 12/9/2022 reviewed, noted to be 7 6  -Vascular surgery note from 1/10/2023 reviewed  -Primary care physician note from 12/9/2022 reviewed  -Discussed possible excision of the cyst, at this time patient states it is not causing him any issues and he would like to defer any surgery at this time  -Discussed with patient if he has any questions or is concerned that the area is infected to please call the office, also if he would like surgery to remove the cyst he can call the office as well  -Follow-up in office as needed     1  Sebaceous cyst  -     Ambulatory Referral to General Surgery               Subjective:      Patient ID: Shiva Perry During is a [de-identified] y o  male  Triage Notes:    Patient is an 53-year-old male who presents the office for evaluation of sebaceous cyst   Patient states he has a history of sebaceous cysts  States that recently he had some itching on his back and noticed that he had a cyst in this area  Does have a small amount of drainage from the middle of it  Denies any pain  States he has never had an infection in this area  He otherwise is tolerating a diet well and having normal bowel function        The following portions of the patient's history were reviewed and updated as appropriate:   He  has a past medical history of BPH (benign prostatic hyperplasia), Cancer ([facility] ) (2013), Cervical myelopathy ([facility] ) (6/20/2019), Chemical exposure, COPD (chronic obstructive pulmonary disease) ([facility] ), CPAP (continuous positive airway pressure) dependence, DDD (degenerative disc disease), cervical, Diabetes mellitus ([facility] ), Foraminal stenosis of cervical region, Hyperlipidemia, Hypertension, SHIVANI (obstructive sleep apnea), Overactive bladder, and Spinal cord compression ([facility] ) (5/8/2019)  He   Patient Active Problem List    Diagnosis Date Noted   • Leg swelling 01/10/2023   • Sebaceous cyst 01/10/2023   • Peripheral artery disease ([facility] ) 06/06/2022   • Erectile dysfunction due to diseases classified elsewhere 10/11/2019   • Pipe smoker 06/22/2019   • Anxiety 06/22/2019   • Vitamin D insufficiency 06/21/2019   • SHIVANI (obstructive sleep apnea) 06/07/2019   • History of prostate cancer 05/16/2019   • Overactive bladder 05/16/2019   • Lesion of native kidney 04/17/2019   • Type 2 diabetes mellitus with diabetic neuropathy, without long-term current use of insulin ([place] [address] ) 02/27/2019   • Hyperlipidemia associated with type 2 diabetes mellitus ([place] [address] ) 02/27/2019   • Essential hypertension 02/27/2019   • BPH (benign prostatic hyperplasia) 02/27/2019   • Cervical spondylosis 02/27/2019   • COPD (chronic obstructive pulmonary disease) ([place] [address] ) 02/27/2019   • Degenerative cervical spinal stenosis 02/27/2017     He  has a past surgical history that includes Rotator cuff repair (Bilateral); ARTHROSCOPY KNEE (Bilateral); Lung surgery (Left); Colonoscopy; and pr arthrd ant interbody decompress cervical belw c2 (Bilateral, 6/18/2019)  His family history includes No Known Problems in his father and mother  He  reports that he has been smoking pipe and cigarettes  He started smoking about 42 years ago  He has a 0 61 pack-year smoking history  He has never used smokeless tobacco  He reports that he does not drink alcohol and does not use drugs    Current Outpatient Medications on File Prior to Visit   Medication Sig   • acetaminophen (TYLENOL) 500 mg tablet Take 500 mg by mouth every 6 (six) hours as needed for mild pain     • albuterol (2 5 mg/3 mL) 0 083 % nebulizer solution Take 3 mL (2 5 mg total) by nebulization every 6 (six) hours as needed for wheezing or shortness of breath   • aspirin (ECOTRIN LOW STRENGTH) 81 mg EC tablet Take 1 tablet by mouth daily   • Blood Glucose Monitoring Suppl (OneTouch Verio) w/Device KIT use as directed   • chlorhexidine (PERIDEX) 0 12 % solution RINSE MOUTH WITH 15 ML (1 CAPFUL) FOR 30 SECONDS IN THE MORNING A   (REFER TO PRESCRIPTION NOTES)  • clindamycin (CLEOCIN) 300 MG capsule Take 300 mg by mouth every 8 (eight) hours     • Continuous Blood Gluc Sensor (FreeStyle Eben 14 Day Sensor) MISC Check blood sugars continuously  Switch every 14 days     • diltiazem (TIAZAC) 360 MG 24 hr capsule take 1 capsule by mouth once daily   • DULoxetine (CYMBALTA) 60 mg delayed release capsule TAKE 1 CAPSULE DAILY   • gabapentin (NEURONTIN) 100 mg capsule Take 2 capsules (200 mg total) by mouth 2 (two) times a day   • glucose blood (FREESTYLE LITE) test strip use 1 TEST STRIP to TEST BLOOD SUGAR twice a day   • hydrochlorothiazide (HYDRODIURIL) 12 5 mg tablet Take 1 tablet (12 5 mg total) by mouth daily   • ibuprofen (MOTRIN) 600 mg tablet Take 600 mg by mouth every 6 (six) hours as needed     • Lancets (OneTouch Delica Plus [id number]) MISC use 1 LANCET to TEST BLOOD SUGAR one to two times a day   • losartan (COZAAR) 100 MG tablet TAKE 1 TABLET DAILY   • metFORMIN (GLUCOPHAGE) 500 mg tablet take 1 tablet by mouth daily WITH BREAKFAST   • Multiple Vitamin (MULTI-VITAMINS PO) Take by mouth daily   • oxybutynin (DITROPAN-XL) 5 mg 24 hr tablet take 1 tablet by mouth once daily   • polyvinyl alcohol (LIQUIFILM TEARS) 1 4 % ophthalmic solution Administer 1 drop to both eyes 4 (four) times a day   • pravastatin (PRAVACHOL) 40 mg tablet take 1 tablet by mouth once daily   • pyridoxine (VITAMIN B6) 50 mg tablet Take 50 mg by mouth daily   • sildenafil (REVATIO) 20 mg tablet Take 1 tablet (20 mg total) by mouth if needed (Erectile dysfunction) Take 1-5 tablets as needed for erection on an empty stomach   • thiamine 50 MG tablet Take 1 tablet (50 mg total) by mouth daily   • Trelegy Ellipta 100-62 5-25 MCG/INH inhaler USE 1 INHALATION DAILY (RINSE MOUTH AFTER USE)   • VITAMIN E PO Take by mouth 268 mg 400 iu     No current facility-administered medications on file prior to visit  He is allergic to chloroquine and qualaquin [quinine]       Review of Systems   Constitutional: Negative for chills, fatigue and fever  HENT: Negative for congestion, hearing loss, rhinorrhea and sore throat  Eyes: Negative for pain and discharge  Respiratory: Negative for cough, chest tightness and shortness of breath  Cardiovascular: Negative for chest pain and palpitations  Gastrointestinal: Negative for abdominal pain, constipation, diarrhea, nausea and vomiting  Endocrine: Negative for cold intolerance and heat intolerance  Genitourinary: Negative for difficulty urinating and dysuria  Musculoskeletal: Negative for back pain and neck pain  Skin: Negative for color change and rash  Positive sebaceous cyst of back   Allergic/Immunologic: Negative for environmental allergies and food allergies  Neurological: Negative for seizures and headaches  Hematological: Negative for adenopathy  Does not bruise/bleed easily  Psychiatric/Behavioral: Negative for confusion and hallucinations  Objective:      /90   Pulse 74   Temp 98 °F (36 7 °C) (Temporal)   Resp 16   Ht 5' 8" (1 727 m)   Wt 70 3 kg (155 lb)   SpO2 98%   BMI 23 57 kg/m²     Below is the patient's most recent value for Albumin, ALT, AST, BUN, Calcium, Chloride, Cholesterol, CO2, Creatinine, GFR, Glucose, HDL, Hematocrit, Hemoglobin, Hemoglobin A1C, LDL, Magnesium, Phosphorus, Platelets, Potassium, PSA, Sodium, Triglycerides, and WBC     Lab Results   Component Value Date    ALT 40 07/29/2022    AST 16 07/29/2022    BUN 14 12/09/2022    CALCIUM 10 4 (H) 12/09/2022     12/09/2022    CO2 28 12/09/2022    CREATININE 0 84 12/09/2022    HDL 39 (L) 07/29/2022    HCT 45 0 12/09/2022    HGB 14 4 12/09/2022    HGBA1C 7 6 (H) 12/09/2022     12/09/2022    K 4 0 12/09/2022    PSA <0 1 07/29/2022    TRIG 38 07/29/2022    WBC 6 26 12/09/2022     Note: for a comprehensive list of the patient's lab results, access the Results Review activity  Physical Exam  Constitutional:       Appearance: Normal appearance  HENT:      Head: Normocephalic and atraumatic  Nose: Nose normal    Eyes:      General: No scleral icterus  Conjunctiva/sclera: Conjunctivae normal    Cardiovascular:      Rate and Rhythm: Normal rate and regular rhythm  Heart sounds: Normal heart sounds  Pulmonary:      Effort: Pulmonary effort is normal       Breath sounds: Normal breath sounds  Abdominal:      General: There is no distension  Palpations: Abdomen is soft  Tenderness: There is no abdominal tenderness  Musculoskeletal:         General: No signs of injury  Skin:     General: Skin is warm  Coloration: Skin is not jaundiced  Comments: There is a sebaceous cyst in the patient's upper back without erythema or induration, there is a wide pore with some sebaceous material coming through   Neurological:      General: No focal deficit present  Mental Status: He is alert and oriented to person, place, and time     Psychiatric:         Mood and Affect: Mood normal          Behavior: Behavior normal

## 2023-02-09 DIAGNOSIS — E11.40 TYPE 2 DIABETES MELLITUS WITH DIABETIC NEUROPATHY, WITHOUT LONG-TERM CURRENT USE OF INSULIN (HCC): Chronic | ICD-10-CM

## 2023-02-10 ENCOUNTER — HOSPITAL ENCOUNTER (OUTPATIENT)
Dept: CT IMAGING | Facility: HOSPITAL | Age: 83
Discharge: HOME | End: 2023-02-10

## 2023-02-10 DIAGNOSIS — R91.1 LUNG NODULE: ICD-10-CM

## 2023-02-10 DIAGNOSIS — F17.290 OTHER TOBACCO PRODUCT NICOTINE DEPENDENCE, UNCOMPLICATED: ICD-10-CM

## 2023-03-02 ENCOUNTER — OFFICE VISIT (OUTPATIENT)
Dept: PULMONOLOGY | Facility: CLINIC | Age: 83
End: 2023-03-02

## 2023-03-02 ENCOUNTER — TELEPHONE (OUTPATIENT)
Dept: INTERNAL MEDICINE CLINIC | Facility: CLINIC | Age: 83
End: 2023-03-02

## 2023-03-02 VITALS
HEIGHT: 68 IN | SYSTOLIC BLOOD PRESSURE: 124 MMHG | WEIGHT: 155 LBS | BODY MASS INDEX: 23.49 KG/M2 | HEART RATE: 72 BPM | TEMPERATURE: 97.7 F | DIASTOLIC BLOOD PRESSURE: 80 MMHG | OXYGEN SATURATION: 97 %

## 2023-03-02 DIAGNOSIS — G47.33 OSA (OBSTRUCTIVE SLEEP APNEA): ICD-10-CM

## 2023-03-02 DIAGNOSIS — E11.40 TYPE 2 DIABETES MELLITUS WITH DIABETIC NEUROPATHY, WITHOUT LONG-TERM CURRENT USE OF INSULIN (HCC): Chronic | ICD-10-CM

## 2023-03-02 DIAGNOSIS — R93.89 ABNORMAL CT OF THE CHEST: Primary | ICD-10-CM

## 2023-03-02 DIAGNOSIS — Z85.118 HISTORY OF LUNG CANCER: ICD-10-CM

## 2023-03-02 DIAGNOSIS — F17.290 OTHER TOBACCO PRODUCT NICOTINE DEPENDENCE, UNCOMPLICATED: ICD-10-CM

## 2023-03-02 DIAGNOSIS — J41.0 SIMPLE CHRONIC BRONCHITIS (HCC): ICD-10-CM

## 2023-03-02 DIAGNOSIS — J43.2 CENTRILOBULAR EMPHYSEMA (HCC): ICD-10-CM

## 2023-03-02 RX ORDER — FLASH GLUCOSE SENSOR
KIT MISCELLANEOUS
Qty: 6 EACH | Refills: 3 | Status: SHIPPED | OUTPATIENT
Start: 2023-03-02

## 2023-03-02 RX ORDER — FLASH GLUCOSE SENSOR
KIT MISCELLANEOUS
Qty: 6 EACH | Refills: 3 | Status: CANCELLED | OUTPATIENT
Start: 2023-03-02

## 2023-03-03 ENCOUNTER — DOCUMENTATION (OUTPATIENT)
Dept: HEMATOLOGY ONCOLOGY | Facility: CLINIC | Age: 83
End: 2023-03-03

## 2023-03-03 NOTE — PROGRESS NOTES
Chart reviewed in preparation of Thoracic Tumor Conference presentation by Dr Tom Arreola on 3/6/23  Patient has a history of lung cancer status post left upper lobectomy  Recent CT of the chest demonstrating new area of spiculated nodularity in the left upper lobe measuring 16 mm which was not there in the prior CT of the chest done about a year ago

## 2023-03-03 NOTE — PROGRESS NOTES
In-basket message received from Dr Dorie Hutchison to add patient to Thoracic tumor board on 3/6/2023  Chart reviewed and tumor board prep completed

## 2023-03-06 ENCOUNTER — APPOINTMENT (OUTPATIENT)
Dept: LAB | Facility: HOSPITAL | Age: 83
End: 2023-03-06

## 2023-03-06 ENCOUNTER — DOCUMENTATION (OUTPATIENT)
Dept: HEMATOLOGY ONCOLOGY | Facility: CLINIC | Age: 83
End: 2023-03-06

## 2023-03-06 DIAGNOSIS — R91.1 LUNG NODULE: Primary | ICD-10-CM

## 2023-03-06 DIAGNOSIS — C61 PROSTATE CANCER (HCC): ICD-10-CM

## 2023-03-06 LAB — PSA SERPL-MCNC: <0.1 NG/ML (ref 0–4)

## 2023-03-06 NOTE — PROGRESS NOTES
THORACIC ONCOLOGY MULTIDISCIPLINARY CASE REVIEW    DATE:  3/6/2023    PRESENTING DOCTOR:  Dr Jyoti Fried    DIAGNOSIS:   Hx of lung cancer  STAGING:     Jeanell Sever During is a [de-identified] y o  male who was presented at the Thoracic Oncology Multidisciplinary Tumor Conference today  He has a history of lung cancer status post left upper lobectomy  Recent CT of the chest demonstrating new area of spiculated nodularity in the left upper lobe measuring 16 mm which was not there in the prior CT of the chest done about a year ago  PHYSICIAN RECOMMENDED PLAN:  Obtain PET/CT  Referral to IR for biopsy  Imaging reviewed:   2/10/2023 CT chest-New area of relative spiculated nodularity with retraction in the reoriented anterior left upper lobe, measuring 16 mm  Area of superficial skin thickening along the upper sternal soft tissues, for which correlation with direct examination is recommended  1/31/2022 CT chest    Pathology reviewed:  No    PFT's reviewed:  No    Future imaging:  PET/CT    Referrals:  IR    Procedures:  TBD    Team agreed to plan  NCCN guidelines were readily available for review at this discussion    The final treatment plan will be left to the discretion of the patient and the treating physician  DISCLAIMERS:  TO THE TREATING PHYSICIAN:  This conference is a meeting of clinicians from various specialty areas who evaluate and discuss patients for whom a multidisciplinary treatment approach is being considered  Please note that the above opinion was a consensus of the conference attendees and is intended only to assist in quality care of the discussed patient  The responsibility for follow up on the input given during the conference, along with any final decisions regarding plan of care, is that of the patient and the patient's provider  Accordingly, appointments have only been recommended based on this information and have NOT been scheduled unless otherwise noted        TO THE PATIENT:  This summary is a brief record of major aspects of your cancer treatment  You may choose to share a copy with any of your doctors or nurses  However, this is not a detailed or comprehensive record of your care

## 2023-03-07 ENCOUNTER — TELEPHONE (OUTPATIENT)
Dept: UROLOGY | Facility: AMBULATORY SURGERY CENTER | Age: 83
End: 2023-03-07

## 2023-03-07 DIAGNOSIS — N28.1 RENAL CYST: Primary | ICD-10-CM

## 2023-03-07 NOTE — TELEPHONE ENCOUNTER
Patient under care of MAXIMO GRAY    Reason for call: patient scheduled for u/s on 3/18 and order in Epic expires 3/8/23  Hospital radiology department called to request new order to be put in Epic before his appointment next week      Patient can be reached at [phone]

## 2023-03-13 ENCOUNTER — HOSPITAL ENCOUNTER (OUTPATIENT)
Dept: ULTRASOUND IMAGING | Facility: CLINIC | Age: 83
Discharge: HOME | End: 2023-03-13

## 2023-03-13 DIAGNOSIS — N28.1 RENAL CYST: ICD-10-CM

## 2023-03-15 DIAGNOSIS — N32.81 OVERACTIVE BLADDER: Chronic | ICD-10-CM

## 2023-03-15 RX ORDER — OXYBUTYNIN CHLORIDE 5 MG/1
TABLET, EXTENDED RELEASE ORAL
Qty: 90 TABLET | Refills: 3 | Status: SHIPPED | OUTPATIENT
Start: 2023-03-15

## 2023-03-22 NOTE — PROGRESS NOTES
3/24/2023      Chief Complaint   Patient presents with   • Follow-up         Assessment and Plan    [de-identified] y o  male     1  Prostate cancer s/p brachytherapy (2013)  - PSA (3/6/23) undetectable  - PSA in 1 year  - Call with any questions or concerns in the meantime  - All questions answered; patient understands and agrees with plan     2  LUTS  - S/p normal cystoscopy 2019  - Continue Flomax and oxybutynin, denies side effects     3  Erectile Dysfunction  - Continue sildenafil     4  Bosniak 2F right upper pole renal cyst  - US kidney and bladder stable   - US in 1 year      History of Present Illness  Mireille Chandler is a [de-identified] y o  male patient with history of prostate cancer s/p brachytherapy (2013), LUTS, ED, and Bosniak 2F right upper pole cyst here for follow up  PSA remains undetectable at this time  Doing well without new complaints  Patient underwent negative cystoscopy in 2019  Continues to use flomax and oxybutynin therapy with benefit  Denies side effects from medications  US showing stable Bosniak 2F right upper pole renal cysts  Review of Systems   Constitutional: Negative for activity change, appetite change, chills and fever  HENT: Negative for congestion and trouble swallowing  Respiratory: Negative for cough and shortness of breath  Cardiovascular: Negative for chest pain, palpitations and leg swelling  Gastrointestinal: Negative for abdominal pain, constipation, diarrhea, nausea and vomiting  Genitourinary: Negative for difficulty urinating, dysuria, flank pain, frequency, hematuria and urgency  Musculoskeletal: Negative for back pain and gait problem  Skin: Negative for wound  Allergic/Immunologic: Negative for immunocompromised state  Neurological: Negative for dizziness and syncope  Hematological: Does not bruise/bleed easily  Psychiatric/Behavioral: Negative for confusion  All other systems reviewed and are negative        Vitals  Vitals:    03/24/23 1044   BP: 128/82 Pulse: (!) 112   SpO2: 98%   Weight: 69 9 kg (154 lb)   Height: 5' 8" (1 727 m)       Physical Exam  Constitutional:       General: He is not in acute distress  Appearance: Normal appearance  He is not ill-appearing, toxic-appearing or diaphoretic  HENT:      Head: Normocephalic  Nose: No congestion  Eyes:      General: No scleral icterus  Right eye: No discharge  Left eye: No discharge  Conjunctiva/sclera: Conjunctivae normal       Pupils: Pupils are equal, round, and reactive to light  Pulmonary:      Effort: Pulmonary effort is normal    Musculoskeletal:      Cervical back: Normal range of motion  Skin:     General: Skin is warm and dry  Coloration: Skin is not jaundiced or pale  Findings: No bruising, erythema, lesion or rash  Neurological:      General: No focal deficit present  Mental Status: He is alert and oriented to person, place, and time  Mental status is at baseline  Gait: Gait normal    Psychiatric:         Mood and Affect: Mood normal          Behavior: Behavior normal          Thought Content: Thought content normal          Judgment: Judgment normal            Past History  Past Medical History:   Diagnosis Date   • BPH (benign prostatic hyperplasia)    • Cancer ([place] [address] ) 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   • Cervical myelopathy ([facility] ) 6/20/2019   • Chemical exposure     9/11/01- worked in [employer]      • COPD (chronic obstructive pulmonary disease) (H[facility])    • CPAP (continuous positive airway pressure) dependence    • DDD (degenerative disc disease), cervical    • Diabetes mellitus ([facility])    • Foraminal stenosis of cervical region    • Hyperlipidemia    • Hypertension    • SHIVANI (obstructive sleep apnea)    • Overactive bladder    • Spinal cord compression ([place] [address] ) 5/8/2019    Added automatically from request for surgery [id number]     Social History     Socioeconomic History   • Marital status: Legally      Spouse name: None   • Number of children: 3   • Years of education: None   • Highest education level: None   Occupational History   • Occupation: retired   Tobacco Use   • Smoking status: Every Day     Packs/day: 0 01     Years: 61 00     Pack years: 0 61     Types: Pipe, Cigarettes     Start date:      Last attempt to quit: 2020     Years since quittin 9     Passive exposure: Past   • Smokeless tobacco: Never   • Tobacco comments:     2-3  PIPES PER DAY    Vaping Use   • Vaping Use: Never used   Substance and Sexual Activity   • Alcohol use: Never   • Drug use: Never   • Sexual activity: Not Currently   Other Topics Concern   • None   Social History Narrative    Pt was born in [facility]; moved to [facility] as an adult with his wife, and eventually moved to [place], [place]  Social Determinants of Health     Financial Resource Strain: Low Risk    • Difficulty of Paying Living Expenses: Not hard at all   Food Insecurity: Not on file   Transportation Needs: No Transportation Needs   • Lack of Transportation (Medical): No   • Lack of Transportation (Non-Medical):  No   Physical Activity: Not on file   Stress: Not on file   Social Connections: Not on file   Intimate Partner Violence: Not on file   Housing Stability: Not on file     Social History     Tobacco Use   Smoking Status Every Day   • Packs/day: 0 01   • Years: 61 00   • Pack years: 0 61   • Types: Pipe, Cigarettes   • Start date: 0   • Last attempt to quit: 2020   • Years since quittin 9   • Passive exposure: Past   Smokeless Tobacco Never   Tobacco Comments    2-3  PIPES PER DAY      Family History   Problem Relation Age of Onset   • No Known Problems Mother    • No Known Problems Father        The following portions of the patient's history were reviewed and updated as appropriate: allergies, current medications, past medical history, past social history, past surgical history and problem list     Results  No results found for this or any previous visit (from the past 1 hour(s)) ]  Lab Results   Component Value Date    PSA <0 1 03/06/2023    PSA <0 1 07/29/2022    PSA <0 1 03/04/2022    PSA <0 1 11/11/2021     Lab Results   Component Value Date    GLUCOSE 141 (H) 11/02/2017    CALCIUM 10 4 (H) 12/09/2022    K 4 0 12/09/2022    CO2 28 12/09/2022     12/09/2022    BUN 14 12/09/2022    CREATININE 0 84 12/09/2022     Lab Results   Component Value Date    WBC 6 26 12/09/2022    HGB 14 4 12/09/2022    HCT 45 0 12/09/2022    MCV 86 12/09/2022     12/09/2022       Lorena Vanessa PA-C

## 2023-03-24 ENCOUNTER — OFFICE VISIT (OUTPATIENT)
Dept: UROLOGY | Facility: CLINIC | Age: 83
End: 2023-03-24

## 2023-03-24 VITALS
HEART RATE: 112 BPM | HEIGHT: 68 IN | SYSTOLIC BLOOD PRESSURE: 128 MMHG | BODY MASS INDEX: 23.34 KG/M2 | WEIGHT: 154 LBS | DIASTOLIC BLOOD PRESSURE: 82 MMHG | OXYGEN SATURATION: 98 %

## 2023-03-24 DIAGNOSIS — N28.1 RENAL CYST: ICD-10-CM

## 2023-03-24 DIAGNOSIS — C61 PROSTATE CANCER (HCC): Primary | ICD-10-CM

## 2023-03-27 ENCOUNTER — OFFICE VISIT (OUTPATIENT)
Dept: NEUROLOGY | Facility: CLINIC | Age: 83
End: 2023-03-27

## 2023-03-27 VITALS
BODY MASS INDEX: 22.96 KG/M2 | SYSTOLIC BLOOD PRESSURE: 150 MMHG | HEART RATE: 89 BPM | OXYGEN SATURATION: 97 % | HEIGHT: 69 IN | DIASTOLIC BLOOD PRESSURE: 88 MMHG | WEIGHT: 155 LBS

## 2023-03-27 DIAGNOSIS — E11.40 TYPE 2 DIABETES MELLITUS WITH DIABETIC NEUROPATHY, WITHOUT LONG-TERM CURRENT USE OF INSULIN (HCC): Chronic | ICD-10-CM

## 2023-03-27 DIAGNOSIS — M48.062 SPINAL STENOSIS OF LUMBAR REGION WITH NEUROGENIC CLAUDICATION: ICD-10-CM

## 2023-03-27 DIAGNOSIS — M48.02 DEGENERATIVE CERVICAL SPINAL STENOSIS: ICD-10-CM

## 2023-03-27 DIAGNOSIS — I73.9 PERIPHERAL ARTERY DISEASE (HCC): ICD-10-CM

## 2023-03-27 NOTE — PROGRESS NOTES
Mary Kay Chandler is a [de-identified] y o  male  Chief Complaint   Patient presents with   • Peripheral artery disease ([facility] )   • Cervical spondylosis       Assessment:  1  Type 2 diabetes mellitus with diabetic neuropathy, without long-term current use of insulin ([facility] [address] )    2  Spinal stenosis of lumbar region with neurogenic claudication    3  Peripheral artery disease ([facility] [address] )    4  Degenerative cervical spinal stenosis        Plan:  MRI of the lumbar spine  Continue with gabapentin 100 mg twice a day and Cymbalta 60 mg daily which is being prescribed by his family physician  Continue follow-up with his pain specialist and vascular surgeon  Fall and safety precautions  Follow-up in 6 months      Discussion:  Dr Shiraz Guillaume and Gerri's prior notes were reviewed, since prior imaging study results were reviewed his MRA  scan of the brain did not show evidence of any aneurysm, patient has lumbar spinal stenosis with neurogenic claudication I did not see any recent MRI of the lumbar spine have advised him to do that he will call me after the test to discuss the results,    He was recently seen by vascular surgery for his peripheral vascular disease, he is on gabapentin 100 mg twice a day and Cymbalta 60 mg daily for his diabetic peripheral neuropathy which is being managed by his family physician  I have advised him to keep his blood pressure cholesterol and sugar under control  He does have bilateral lower extremity swelling I have advised him to wear compression stockings and continue follow-up with his other physicians I have advised him strongly to quit smoking, patient is not keen to see a neurosurgeon regarding his lumbar spinal stenosis    I have advised him to follow the instructions of his vascular surgeon and his other physicians, to take fall and safety precautions to go to the hospital if has any worsening symptoms and call me otherwise to see me back in 6 months and follow-up with his other "physicians  Subjective:    HPI   53-year-old male with history of cervical radiculopathy status post cervical decompression diabetic polyneuropathy lumbar spinal stenosis with neurogenic claudication, he was seeing my associate Dr Susana Klinefelter and had seen our advanced practitioner Gerri since her last visit tells me that he is about the same he lives by himself he has a caretaker that comes and helps him his numbness and tingling in the legs is well controlled he does have swelling of the lower feet and does have low back pain he does follow-up with a pain specialist and has had epidural and trigger point injections in the past with some relief, denies any bowel and bladder incontinence he is able to do his ADLs he does walk every day for a few minutes, no bowel and bladder incontinence, memory is good sleep is good appetite is good weight has been stable no other complaints  Vitals:    03/27/23 1049   BP: 150/88   BP Location: Left arm   Patient Position: Sitting   Cuff Size: Large   Pulse: 89   SpO2: 97%   Weight: 70 3 kg (155 lb)   Height: 5' 9\" (1 753 m)       Current Medications    Current Outpatient Medications:   •  acetaminophen (TYLENOL) 500 mg tablet, Take 500 mg by mouth every 6 (six) hours as needed for mild pain  , Disp: , Rfl:   •  albuterol (2 5 mg/3 mL) 0 083 % nebulizer solution, Take 3 mL (2 5 mg total) by nebulization every 6 (six) hours as needed for wheezing or shortness of breath, Disp: 120 mL, Rfl: 1  •  aspirin (ECOTRIN LOW STRENGTH) 81 mg EC tablet, Take 1 tablet by mouth daily, Disp: 30 tablet, Rfl: 0  •  Blood Glucose Monitoring Suppl (OneTouch Verio) w/Device KIT, use as directed, Disp: 1 kit, Rfl: 0  •  chlorhexidine (PERIDEX) 0 12 % solution, RINSE MOUTH WITH 15 ML (1 CAPFUL) FOR 30 SECONDS IN THE MORNING A   (REFER TO PRESCRIPTION NOTES)  , Disp: , Rfl:   •  clindamycin (CLEOCIN) 300 MG capsule, Take 300 mg by mouth every 8 (eight) hours  , Disp: , Rfl:   •  Continuous Blood " Gluc Sensor (FreeStyle Eben 14 Day Sensor) MISC, Check blood sugars continuously  Switch every 14 days  , Disp: 6 each, Rfl: 3  •  diltiazem (TIAZAC) 360 MG 24 hr capsule, take 1 capsule by mouth once daily, Disp: 90 capsule, Rfl: 1  •  DULoxetine (CYMBALTA) 60 mg delayed release capsule, TAKE 1 CAPSULE DAILY, Disp: 90 capsule, Rfl: 3  •  gabapentin (NEURONTIN) 100 mg capsule, Take 2 capsules (200 mg total) by mouth 2 (two) times a day, Disp: 360 capsule, Rfl: 3  •  glucose blood (FREESTYLE LITE) test strip, use 1 TEST STRIP to TEST BLOOD SUGAR twice a day, Disp: 100 strip, Rfl: 0  •  hydrochlorothiazide (HYDRODIURIL) 12 5 mg tablet, Take 1 tablet (12 5 mg total) by mouth daily, Disp: 90 tablet, Rfl: 1  •  Lancets (OneTouch Delica Plus [id number]) MISC, use 1 LANCET to TEST BLOOD SUGAR one to two times a day, Disp: 100 each, Rfl: 0  •  losartan (COZAAR) 100 MG tablet, TAKE 1 TABLET DAILY, Disp: 90 tablet, Rfl: 3  •  metFORMIN (GLUCOPHAGE) 500 mg tablet, take 1 tablet by mouth daily WITH BREAKFAST, Disp: 90 tablet, Rfl: 3  •  Multiple Vitamin (MULTI-VITAMINS PO), Take by mouth daily, Disp: , Rfl:   •  oxybutynin (DITROPAN-XL) 5 mg 24 hr tablet, take 1 tablet by mouth once daily, Disp: 90 tablet, Rfl: 3  •  polyvinyl alcohol (LIQUIFILM TEARS) 1 4 % ophthalmic solution, Administer 1 drop to both eyes 4 (four) times a day, Disp: 15 mL, Rfl: 0  •  pravastatin (PRAVACHOL) 40 mg tablet, take 1 tablet by mouth once daily, Disp: 90 tablet, Rfl: 1  •  pyridoxine (VITAMIN B6) 50 mg tablet, Take 50 mg by mouth daily, Disp: , Rfl:   •  sildenafil (REVATIO) 20 mg tablet, Take 1 tablet (20 mg total) by mouth if needed (Erectile dysfunction) Take 1-5 tablets as needed for erection on an empty stomach, Disp: 30 tablet, Rfl: 1  •  Tamsulosin HCl (FLOMAX PO), Take by mouth, Disp: , Rfl:   •  Trelegy Ellipta 100-62 5-25 MCG/INH inhaler, USE 1 INHALATION DAILY (RINSE MOUTH AFTER USE), Disp: 360 blister, Rfl: 3  •  VITAMIN E PO, Take by mouth 268 mg 400 iu, Disp: , Rfl:   •  ibuprofen (MOTRIN) 600 mg tablet, Take 600 mg by mouth every 6 (six) hours as needed   (Patient not taking: Reported on 3/27/2023), Disp: , Rfl:   •  thiamine 50 MG tablet, Take 1 tablet (50 mg total) by mouth daily, Disp: 30 tablet, Rfl: 3      Allergies  Chloroquine and Qualaquin [quinine]    Past Medical History  Past Medical History:   Diagnosis Date   • BPH (benign prostatic hyperplasia)    • Cancer ([place] [address] ) 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   • Cervical myelopathy ([facility] ) 6/20/2019   • Chemical exposure     9/11/01- worked in [employer]  • COPD (chronic obstructive pulmonary disease) (HCC)    • CPAP (continuous positive airway pressure) dependence    • DDD (degenerative disc disease), cervical    • Diabetes mellitus ([place] [address] )    • Foraminal stenosis of cervical region    • Hyperlipidemia    • Hypertension    • SHIVANI (obstructive sleep apnea)    • Overactive bladder    • Spinal cord compression ([place] [address] ) 5/8/2019    Added automatically from request for surgery [id number]         Past Surgical History:  Past Surgical History:   Procedure Laterality Date   • ARTHROSCOPY KNEE Bilateral    • COLONOSCOPY     • LUNG SURGERY Left     scraping of left   • GA ARTHRD ANT INTERBODY DECOMPRESS CERVICAL BELW C2 Bilateral 6/18/2019    Procedure: C3/4  ACDF WITH  C4 HEMICORPECTOMY, C3-4 ANTERIOR INSTRUMENTATION AND FUSION (NEUROMONITORING); Surgeon: Cale Rodriguez MD;  Location: [facility];  Service: Neurosurgery   • ROTATOR CUFF REPAIR Bilateral          Family History:  Family History   Problem Relation Age of Onset   • No Known Problems Mother    • No Known Problems Father        Social History:   reports that he has been smoking pipe and cigarettes  He started smoking about 42 years ago  He has a 0 61 pack-year smoking history  He has been exposed to tobacco smoke  He has never used smokeless tobacco  He reports that he does not drink alcohol and does not use drugs      I have reviewed the past medical history, surgical history, social and family history, current medications, allergies vitals, review of systems, and updated this information as appropriate today  Objective:    Physical Exam    Neurological Exam     GENERAL:  Cooperative in no acute distress  Well-developed and well-nourished     HEAD and NECK   Head is atraumatic normocephalic with no lesions or masses  Neck is supple with full range of motion     CARDIOVASCULAR  Carotid Arteries-no carotid bruits  NEUROLOGIC:  Mental Status-the patient is awake alert and oriented without aphasia or apraxia  Cranial Nerves: Visual fields are full to confrontation  Extraocular movements are full without nystagmus  Pupils are 2-1/2 mm and reactive  Face is symmetrical to light touch  Movements of facial expression move symmetrically  Hearing is normal to finger rub bilaterally  Soft palate lifts symmetrically  Shoulder shrug is symmetrical  Tongue is midline without atrophy  Motor: No drift is noted on arm extension  Strength is full in the upper and lower extremities with normal bulk and tone  Sensory: Please light touch pinprick temperature sensation in stocking distribution cortical function is intact  Coordination: Finger to nose testing is performed accurately  Romberg is negative  Gait reveals a normal base with symmetrical arm swing  Tandem walk is normal   Reflexes:   Brisk and symmetrical in bilateral upper and lower extremities    toes are downgoing to withdrawal  No spine tenderness, bilateral lower extremity edema present    ROS:  Review of Systems   Constitutional: Negative  Negative for appetite change and fever  HENT: Negative  Negative for hearing loss, tinnitus, trouble swallowing and voice change  Eyes: Negative  Negative for photophobia, pain and visual disturbance  Respiratory: Negative  Negative for shortness of breath  Cardiovascular: Negative  Negative for palpitations  Gastrointestinal: Negative  Negative for nausea and vomiting  Endocrine: Negative  Negative for cold intolerance  Genitourinary: Negative  Negative for dysuria, frequency and urgency  Musculoskeletal: Negative  Negative for gait problem, myalgias and neck pain  Skin: Negative  Negative for rash  Allergic/Immunologic: Negative  Neurological: Positive for numbness (Patient states both feet and lowers legs  )  Negative for dizziness, tremors, seizures, syncope, facial asymmetry, speech difficulty, weakness, light-headedness and headaches  Hematological: Negative  Does not bruise/bleed easily  Psychiatric/Behavioral: Negative  Negative for confusion, hallucinations and sleep disturbance

## 2023-03-29 DIAGNOSIS — C61 PROSTATE CANCER (HCC): ICD-10-CM

## 2023-03-29 DIAGNOSIS — N52.9 ERECTILE DYSFUNCTION, UNSPECIFIED ERECTILE DYSFUNCTION TYPE: ICD-10-CM

## 2023-03-29 DIAGNOSIS — R39.9 LOWER URINARY TRACT SYMPTOMS (LUTS): Primary | ICD-10-CM

## 2023-03-29 RX ORDER — SILDENAFIL CITRATE 20 MG/1
20 TABLET ORAL AS NEEDED
Qty: 30 TABLET | Refills: 1 | Status: SHIPPED | OUTPATIENT
Start: 2023-03-29

## 2023-03-29 RX ORDER — TAMSULOSIN HYDROCHLORIDE 0.4 MG/1
0.4 CAPSULE ORAL 2 TIMES WEEKLY
Qty: 90 CAPSULE | Refills: 0 | Status: SHIPPED | OUTPATIENT
Start: 2023-03-30

## 2023-03-29 RX ORDER — SILDENAFIL CITRATE 20 MG/1
20 TABLET ORAL AS NEEDED
Qty: 30 TABLET | Refills: 1 | Status: SHIPPED | OUTPATIENT
Start: 2023-03-29 | End: 2023-03-29 | Stop reason: SDUPTHER

## 2023-03-29 NOTE — TELEPHONE ENCOUNTER
Pt stopped in for Flomax refill  Flomax to be sent to [facility]  Sildenafil to be sent to [facility]

## 2023-04-06 ENCOUNTER — HOSPITAL ENCOUNTER (OUTPATIENT)
Dept: RADIOLOGY | Age: 83
Discharge: HOME | End: 2023-04-06

## 2023-04-06 DIAGNOSIS — R91.1 LUNG NODULE: ICD-10-CM

## 2023-04-06 LAB — GLUCOSE SERPL-MCNC: 134 MG/DL (ref 65–140)

## 2023-04-07 ENCOUNTER — TELEPHONE (OUTPATIENT)
Age: 83
End: 2023-04-07

## 2023-04-07 NOTE — TELEPHONE ENCOUNTER
----- Message from Ayaka Santos MD sent at 4/6/2023  5:24 PM EDT -----  He does have a CT of the chest scheduled for 5/16 can you please move it 3 months from now and a follow-up after that thank you

## 2023-04-07 NOTE — TELEPHONE ENCOUNTER
Pt had appt in May for ct scan but as per Dr Ashley herrera it moved to 3 mths from now, moved appt to 7/10/23 at 1 pm @ [place]   Called pt to advised but he stated he was in the middle of something and will call us back

## 2023-05-19 ENCOUNTER — HOSPITAL ENCOUNTER (OUTPATIENT)
Dept: MRI IMAGING | Facility: CLINIC | Age: 83
Discharge: HOME | End: 2023-05-19
Attending: PSYCHIATRY & NEUROLOGY

## 2023-05-19 DIAGNOSIS — E11.40 TYPE 2 DIABETES MELLITUS WITH DIABETIC NEUROPATHY, WITHOUT LONG-TERM CURRENT USE OF INSULIN (HCC): Chronic | ICD-10-CM

## 2023-05-19 RX ORDER — GABAPENTIN 100 MG/1
CAPSULE ORAL
Qty: 360 CAPSULE | Refills: 3 | Status: SHIPPED | OUTPATIENT
Start: 2023-05-19

## 2023-05-24 DIAGNOSIS — M48.061 SPINAL STENOSIS OF LUMBAR REGION, UNSPECIFIED WHETHER NEUROGENIC CLAUDICATION PRESENT: Primary | ICD-10-CM

## 2023-05-24 NOTE — PROGRESS NOTES
Discussed with patient MRI of the lumbar spine results patient continues to have low back pain advised him to follow-up with neurosurgery and pain specialist referral placed

## 2023-06-05 ENCOUNTER — OFFICE VISIT (OUTPATIENT)
Age: 83
End: 2023-06-05
Payer: MEDICARE

## 2023-06-05 VITALS
OXYGEN SATURATION: 95 % | HEART RATE: 95 BPM | DIASTOLIC BLOOD PRESSURE: 75 MMHG | BODY MASS INDEX: 22.96 KG/M2 | SYSTOLIC BLOOD PRESSURE: 134 MMHG | RESPIRATION RATE: 18 BRPM | WEIGHT: 155 LBS | TEMPERATURE: 98.6 F | HEIGHT: 69 IN

## 2023-06-05 DIAGNOSIS — Z85.118 HISTORY OF LUNG CANCER: ICD-10-CM

## 2023-06-05 DIAGNOSIS — R91.1 LUNG NODULE: ICD-10-CM

## 2023-06-05 DIAGNOSIS — J41.0 SIMPLE CHRONIC BRONCHITIS (HCC): Primary | Chronic | ICD-10-CM

## 2023-06-05 DIAGNOSIS — G47.33 OSA (OBSTRUCTIVE SLEEP APNEA): ICD-10-CM

## 2023-06-05 PROCEDURE — 99213 OFFICE O/P EST LOW 20 MIN: CPT | Performed by: PHYSICIAN ASSISTANT

## 2023-06-05 NOTE — PROGRESS NOTES
"Assessment/Plan:   Diagnoses and all orders for this visit:    Simple chronic bronchitis (HCC)    Lung nodule    SHIVANI (obstructive sleep apnea)    History of lung cancer    Patient is here today for follow-up  Overall remained stable with his breathing  Continues on Trelegy daily, uses albuterol usually once per day though some days he does forget  CT scan done in February showed new area of relative spiculated nodularity in the anterior left upper lobe measuring 16 mm  He did have a PET scan done showed no uptake in this nodule  He is scheduled for repeat CT scan in July  He continues with his CPAP on a nightly basis, reviewed prior compliance which showed he is compliant with the CPAP with a residual AHI of 5  He will continue at the current settings, obtain new supplies every 3 to 6 months  He will follow-up with us in about 4 months or sooner if necessary  Return in about 4 months (around 10/5/2023)  All questions are answered to the patient's satisfaction and understanding  He verbalizes understanding  He is encouraged to call with any further questions or concerns  Portions of the record may have been created with voice recognition software  Occasional wrong word or \"sound a like\" substitutions may have occurred due to the inherent limitations of voice recognition software  Read the chart carefully and recognize, using context, where substitutions have occurred      Electronically Signed by Kristi Wilde PA-C    ______________________________________________________________________    Chief Complaint:   Chief Complaint   Patient presents with   • Follow-up       Patient ID: Bryant Amador is a [de-identified] y o  y o  male has a past medical history of BPH (benign prostatic hyperplasia), Cancer ([address] ) (2013), Cervical myelopathy ([facility] [address] ) (6/20/2019), Chemical exposure, COPD (chronic obstructive pulmonary disease) ([address] ), CPAP (continuous positive airway pressure) dependence, DDD (degenerative disc disease), " cervical, Diabetes mellitus ([facility] [address] ), Foraminal stenosis of cervical region, Hyperlipidemia, Hypertension, SHIVANI (obstructive sleep apnea), Overactive bladder, and Spinal cord compression ([facility] [address] ) (5/8/2019)     6/5/2023  Patient presents today for follow-up visit  Patient is a an 56-year-old male with past medical history of chronic bronchitis/emphysema, lung cancer status post left upper lobectomy, lung nodules, prostate cancer status post radiation, SHIVANI on CPAP, hypertension, diabetes  He is here today for follow-up  He is overall doing well with his breathing, does have some chronic mild dyspnea on exertion but remains active  He is using his Trelegy daily, uses the albuterol usually once per day but some days he does forget  Does not note any worsening of his breathing on the days that he forgets to use the CPAP  He continues with the CPAP on a nightly basis  Review of Systems   Constitutional: Negative  HENT: Negative  Respiratory: Positive for shortness of breath  Cardiovascular: Negative  Gastrointestinal: Negative  Genitourinary: Negative  Musculoskeletal: Negative  Skin: Negative  Allergic/Immunologic: Negative  Neurological: Negative  Psychiatric/Behavioral: Negative  Smoking history: He reports that he has been smoking pipe and cigarettes  He started smoking about 42 years ago  He has a 0 61 pack-year smoking history  He has been exposed to tobacco smoke   He has never used smokeless tobacco     The following portions of the patient's history were reviewed and updated as appropriate: allergies, current medications, past family history, past medical history, past social history, past surgical history and problem list     Immunization History   Administered Date(s) Administered   • COVID-19 PFIZER VACCINE 0 3 ML IM 03/16/2021, 04/07/2021, 12/03/2021   • COVID-19 Pfizer Vac BIVALENT Bi-sucrose 12 Yr+ IM (BOOSTER ONLY) 09/23/2022   • INFLUENZA 10/01/2018, 12/09/2022 • Influenza, high dose seasonal 0 7 mL 09/13/2019, 09/24/2020, 11/11/2021, 12/09/2022   • Pneumococcal Conjugate 13-Valent 08/18/2016   • Pneumococcal Polysaccharide PPV23 03/16/2018   • Zoster 06/23/2014   • Zoster Vaccine Recombinant 11/06/2018, 02/01/2019     Current Outpatient Medications   Medication Sig Dispense Refill   • acetaminophen (TYLENOL) 500 mg tablet Take 500 mg by mouth every 6 (six) hours as needed for mild pain       • albuterol (2 5 mg/3 mL) 0 083 % nebulizer solution Take 3 mL (2 5 mg total) by nebulization every 6 (six) hours as needed for wheezing or shortness of breath 120 mL 1   • aspirin (ECOTRIN LOW STRENGTH) 81 mg EC tablet Take 1 tablet by mouth daily 30 tablet 0   • Blood Glucose Monitoring Suppl (OneTouch Verio) w/Device KIT use as directed 1 kit 0   • chlorhexidine (PERIDEX) 0 12 % solution RINSE MOUTH WITH 15 ML (1 CAPFUL) FOR 30 SECONDS IN THE MORNING A   (REFER TO PRESCRIPTION NOTES)  • clindamycin (CLEOCIN) 300 MG capsule Take 300 mg by mouth every 8 (eight) hours       • Continuous Blood Gluc Sensor (FreeStyle Eben 14 Day Sensor) MISC Check blood sugars continuously  Switch every 14 days   6 each 3   • diltiazem (TIAZAC) 360 MG 24 hr capsule take 1 capsule by mouth once daily 90 capsule 1   • DULoxetine (CYMBALTA) 60 mg delayed release capsule TAKE 1 CAPSULE DAILY 90 capsule 3   • gabapentin (NEURONTIN) 100 mg capsule take 2 capsules by mouth twice a day 360 capsule 3   • glucose blood (FREESTYLE LITE) test strip use 1 TEST STRIP to TEST BLOOD SUGAR twice a day 100 strip 0   • hydrochlorothiazide (HYDRODIURIL) 12 5 mg tablet Take 1 tablet (12 5 mg total) by mouth daily 90 tablet 1   • Lancets (OneTouch Delica Plus [id number]) MISC use 1 LANCET to TEST BLOOD SUGAR one to two times a day 100 each 0   • losartan (COZAAR) 100 MG tablet TAKE 1 TABLET DAILY 90 tablet 3   • metFORMIN (GLUCOPHAGE) 500 mg tablet take 1 tablet by mouth daily WITH BREAKFAST 90 tablet 3   • Multiple "Vitamin (MULTI-VITAMINS PO) Take by mouth daily     • oxybutynin (DITROPAN-XL) 5 mg 24 hr tablet take 1 tablet by mouth once daily 90 tablet 3   • polyvinyl alcohol (LIQUIFILM TEARS) 1 4 % ophthalmic solution Administer 1 drop to both eyes 4 (four) times a day 15 mL 0   • pravastatin (PRAVACHOL) 40 mg tablet take 1 tablet by mouth once daily 90 tablet 1   • pyridoxine (VITAMIN B6) 50 mg tablet Take 50 mg by mouth daily     • sildenafil (REVATIO) 20 mg tablet Take 1 tablet (20 mg total) by mouth if needed (Erectile dysfunction) Take 1-5 tablets as needed for erection on an empty stomach 30 tablet 1   • tamsulosin (FLOMAX) 0 4 mg take 1 capsule by mouth TWICE A WEEK 90 capsule 3   • thiamine 50 MG tablet Take 1 tablet (50 mg total) by mouth daily 30 tablet 3   • Trelegy Ellipta 100-62 5-25 MCG/ACT inhaler USE 1 INHALATION DAILY (RINSE MOUTH AFTER USE) 360 blister 3   • VITAMIN E PO Take by mouth 268 mg 400 iu     • ibuprofen (MOTRIN) 600 mg tablet Take 600 mg by mouth every 6 (six) hours as needed   (Patient not taking: Reported on 3/27/2023)       No current facility-administered medications for this visit  Allergies: Chloroquine and Qualaquin [quinine]    Objective:  Vitals:    06/05/23 1343 06/05/23 1345   BP: 134/75    BP Location: Right arm    Patient Position: Sitting    Cuff Size: Large    Pulse: 95    Resp: 18    Temp: 98 6 °F (37 °C)    SpO2: 95% 95%   Weight: 70 3 kg (155 lb)    Height: 5' 9\" (1 753 m)    Oxygen Therapy  SpO2: 95 %  Oxygen Therapy: None (Room air)    Wt Readings from Last 3 Encounters:   06/05/23 70 3 kg (155 lb)   05/19/23 70 3 kg (155 lb)   03/27/23 70 3 kg (155 lb)     Body mass index is 22 89 kg/m²  Physical Exam  Vitals reviewed  Constitutional:       Appearance: Normal appearance  HENT:      Head: Normocephalic and atraumatic  Mouth/Throat:      Pharynx: Oropharynx is clear     Eyes:      Conjunctiva/sclera: Conjunctivae normal    Cardiovascular:      Rate and Rhythm: " Normal rate and regular rhythm  Pulmonary:      Effort: Pulmonary effort is normal       Breath sounds: Normal breath sounds  No decreased breath sounds, wheezing, rhonchi or rales  Abdominal:      General: Abdomen is flat  There is no distension  Musculoskeletal:         General: Normal range of motion  Cervical back: Normal range of motion  Right lower leg: No edema  Left lower leg: No edema  Skin:     General: Skin is warm and dry  Neurological:      Mental Status: He is alert and oriented to person, place, and time  Psychiatric:         Mood and Affect: Mood normal          Behavior: Behavior normal          Lab Review:   Lab Results   Component Value Date    BUN 14 12/09/2022    CALCIUM 10 4 (H) 12/09/2022     12/09/2022    CO2 28 12/09/2022    CO2 27 11/02/2017    CREATININE 0 84 12/09/2022    GLUCOSE 141 (H) 11/02/2017    K 4 0 12/09/2022     Lab Results   Component Value Date    HCT 45 0 12/09/2022    HGB 14 4 12/09/2022    MCV 86 12/09/2022     12/09/2022    WBC 6 26 12/09/2022       Diagnostics:  I have personally reviewed pertinent reports  and I have personally reviewed pertinent films in PACS  Reviewed prior CT scan  Office Spirometry Results:     ESS:    MRI lumbar spine without contrast    Result Date: 5/23/2023  Narrative: MRI LUMBAR SPINE WITHOUT CONTRAST INDICATION: [id number]: Spinal stenosis, lumbar region with neurogenic claudication  COMPARISON: 12/27/2018 TECHNIQUE:  Multiplanar, multisequence imaging of the lumbar spine was performed    IMAGE QUALITY:  Diagnostic FINDINGS: VERTEBRAL BODIES:  There are 5 lumbar type vertebral bodies  There is no significant spondylolisthesis  There is stable mild chronic loss of height of the L3 vertebral body with a Schmorl's node along the superior endplate  There is a Schmorl's node along the inferior endplate of L4 and there is also mild stable chronic loss of height of the L5 vertebral body   There is Modic type I endplate degenerative change at L4-L5  There is no suspicious marrow signal abnormality identified  SACRUM:  Normal signal within the sacrum  No evidence of insufficiency or stress fracture  DISTAL CORD AND CONUS:  Normal size and signal within the distal cord and conus  There is crowding and redundancy of cauda equina nerve roots  PARASPINAL SOFT TISSUES:  Paraspinal soft tissues are unremarkable  LOWER THORACIC DISC SPACES: Disc degeneration and narrowing at T11-T12 with mild mass effect on the thecal sac  LUMBAR DISC SPACES: L1-L2: Minimal bulge  Facet arthrosis  No significant canal stenosis or foraminal narrowing  L2-L3: Bulging annulus  Facet arthrosis with ligamentum flavum thickening  Mild mass effect on the thecal sac  Mild foraminal narrowing  L3-L4: Bulging annulus  Facet arthrosis with ligamentum flavum thickening  Mild canal stenosis  Moderate bilateral foraminal narrowing  L4-L5: Disc space degeneration and narrowing  Bulging annulus  Facet arthrosis with ligamentum flavum thickening  Severe mass effect on the thecal sac  Findings appear overall similar to the prior exam  Moderate to severe bilateral foraminal narrowing with contact of the exiting nerve roots  L5-S1: Mild bulge  Facet arthrosis  No significant canal stenosis  Moderate left greater than right foraminal narrowing  OTHER FINDINGS:  None  Impression: Multilevel degenerative changes of the lumbar spine, as described above and overall similar to the prior examination  Most notable level is at L4-L5 where multifactorial disease results in overall severe mass effect on the thecal sac with associated crowding and redundancy of the cauda equina nerve roots  Moderate to severe bilateral foraminal narrowing with contact of the exiting nerve roots is present at this level   Workstation performed: [id number]

## 2023-06-07 ENCOUNTER — RA CDI HCC (OUTPATIENT)
Dept: OTHER | Facility: HOSPITAL | Age: 83
End: 2023-06-07

## 2023-06-07 NOTE — PROGRESS NOTES
[facility]  coding opportunities     [id number] [phone] and [id number] [phone]     Chart Reviewed number of suggestions sent to Provider: 2     Patients Insurance     Medicare Insurance: Medicare

## 2023-06-13 ENCOUNTER — OFFICE VISIT (OUTPATIENT)
Age: 83
End: 2023-06-13
Payer: MEDICARE

## 2023-06-13 ENCOUNTER — APPOINTMENT (OUTPATIENT)
Age: 83
End: 2023-06-13
Payer: MEDICARE

## 2023-06-13 VITALS
RESPIRATION RATE: 20 BRPM | SYSTOLIC BLOOD PRESSURE: 130 MMHG | HEART RATE: 86 BPM | DIASTOLIC BLOOD PRESSURE: 80 MMHG | WEIGHT: 155 LBS | OXYGEN SATURATION: 94 % | BODY MASS INDEX: 22.96 KG/M2 | HEIGHT: 69 IN | TEMPERATURE: 96.2 F

## 2023-06-13 DIAGNOSIS — E78.5 HYPERLIPIDEMIA ASSOCIATED WITH TYPE 2 DIABETES MELLITUS (HCC): Chronic | ICD-10-CM

## 2023-06-13 DIAGNOSIS — E11.69 HYPERLIPIDEMIA ASSOCIATED WITH TYPE 2 DIABETES MELLITUS (HCC): Chronic | ICD-10-CM

## 2023-06-13 DIAGNOSIS — E83.52 HYPERCALCEMIA: ICD-10-CM

## 2023-06-13 DIAGNOSIS — E11.40 TYPE 2 DIABETES MELLITUS WITH DIABETIC NEUROPATHY, WITHOUT LONG-TERM CURRENT USE OF INSULIN (HCC): Primary | Chronic | ICD-10-CM

## 2023-06-13 DIAGNOSIS — E11.51 TYPE 2 DIABETES MELLITUS WITH PERIPHERAL ARTERY DISEASE (HCC): ICD-10-CM

## 2023-06-13 LAB
ALBUMIN SERPL BCP-MCNC: 3.6 G/DL (ref 3.5–5)
ANION GAP SERPL CALCULATED.3IONS-SCNC: 1 MMOL/L (ref 4–13)
BUN SERPL-MCNC: 15 MG/DL (ref 5–25)
CALCIUM SERPL-MCNC: 10.4 MG/DL (ref 8.3–10.1)
CHLORIDE SERPL-SCNC: 112 MMOL/L (ref 96–108)
CHOLEST SERPL-MCNC: 90 MG/DL
CO2 SERPL-SCNC: 31 MMOL/L (ref 21–32)
CREAT SERPL-MCNC: 1.06 MG/DL (ref 0.6–1.3)
CREAT UR-MCNC: 97 MG/DL
ERYTHROCYTE [DISTWIDTH] IN BLOOD BY AUTOMATED COUNT: 14.8 % (ref 11.6–15.1)
GFR SERPL CREATININE-BSD FRML MDRD: 65 ML/MIN/1.73SQ M
GLUCOSE P FAST SERPL-MCNC: 236 MG/DL (ref 65–99)
HCT VFR BLD AUTO: 45.3 % (ref 36.5–49.3)
HDLC SERPL-MCNC: 41 MG/DL
HGB BLD-MCNC: 14.1 G/DL (ref 12–17)
LDLC SERPL CALC-MCNC: 36 MG/DL (ref 0–100)
MCH RBC QN AUTO: 27.9 PG (ref 26.8–34.3)
MCHC RBC AUTO-ENTMCNC: 31.1 G/DL (ref 31.4–37.4)
MCV RBC AUTO: 90 FL (ref 82–98)
MICROALBUMIN UR-MCNC: 31.9 MG/L (ref 0–20)
MICROALBUMIN/CREAT 24H UR: 33 MG/G CREATININE (ref 0–30)
PLATELET # BLD AUTO: 176 THOUSANDS/UL (ref 149–390)
PMV BLD AUTO: 12.1 FL (ref 8.9–12.7)
POTASSIUM SERPL-SCNC: 4.8 MMOL/L (ref 3.5–5.3)
PTH-INTACT SERPL-MCNC: 74.1 PG/ML (ref 12–88)
RBC # BLD AUTO: 5.06 MILLION/UL (ref 3.88–5.62)
SODIUM SERPL-SCNC: 144 MMOL/L (ref 135–147)
TRIGL SERPL-MCNC: 64 MG/DL
WBC # BLD AUTO: 5.35 THOUSAND/UL (ref 4.31–10.16)

## 2023-06-13 PROCEDURE — 83970 ASSAY OF PARATHORMONE: CPT

## 2023-06-13 PROCEDURE — 80048 BASIC METABOLIC PNL TOTAL CA: CPT

## 2023-06-13 PROCEDURE — 99214 OFFICE O/P EST MOD 30 MIN: CPT | Performed by: INTERNAL MEDICINE

## 2023-06-13 PROCEDURE — 82570 ASSAY OF URINE CREATININE: CPT

## 2023-06-13 PROCEDURE — 83036 HEMOGLOBIN GLYCOSYLATED A1C: CPT

## 2023-06-13 PROCEDURE — 36415 COLL VENOUS BLD VENIPUNCTURE: CPT

## 2023-06-13 PROCEDURE — 82040 ASSAY OF SERUM ALBUMIN: CPT

## 2023-06-13 PROCEDURE — 85027 COMPLETE CBC AUTOMATED: CPT

## 2023-06-13 PROCEDURE — 82043 UR ALBUMIN QUANTITATIVE: CPT

## 2023-06-13 PROCEDURE — 80061 LIPID PANEL: CPT

## 2023-06-13 NOTE — PATIENT INSTRUCTIONS
Type 2 Diabetes Management for Adults   WHAT YOU NEED TO KNOW:   What do I need to know about type 2 diabetes management? Type 2 diabetes is a disease that affects how your body uses glucose (sugar)  Either your body cannot make enough insulin, or it cannot use the insulin correctly  It is important to keep diabetes controlled to prevent damage to your heart, blood vessels, and other organs  Management will help you feel well and enjoy your daily activities  Your diabetes care team providers can help you make a plan to fit diabetes care into your schedule  Your plan can change over time to fit your needs and your family's needs  What do I need to know about high blood sugar levels? High blood sugar levels may not cause any symptoms  You may feel more thirsty or urinate more often than usual  Over time, high blood sugar levels can damage your nerves, blood vessels, tissues, and organs  The following can increase your blood sugar levels:  Large meals or large amounts of carbohydrates at one time    Less physical activity    Stress    Illness    A lower dose of diabetes medicine or insulin, or a late dose    What do I need to know about low blood sugar levels? Symptoms include feeling shaky, dizzy, irritable, or confused  You can prevent symptoms by keeping your blood sugar levels from going too low  Treat a low blood sugar level right away:      Drink 4 ounces of juice or have 1 tube of glucose gel  Check your blood sugar level again 10 to 15 minutes later  When the level goes back to normal, eat a meal or snack to prevent another decrease  Keep glucose gel, raisins, or hard candy with you at all times to treat a low blood sugar level  Your blood sugar level can get too low if you take diabetes medicine or insulin and do not eat enough food  If you use insulin, check your blood sugar level before you exercise        If your blood sugar level is below 100 mg/dL, eat 4 crackers or 2 ounces of raisins, or drink 4 ounces of juice  Check your level every 30 minutes if you exercise longer than 1 hour  You may need a snack during or after exercise  What can I do to manage my blood sugar levels? Check your blood sugar levels as directed and as needed  Several items are available to use to check your levels  You may need to check by testing a drop of blood in a glucose monitor  You may instead be given a continuous glucose monitoring (CGM) device  The device is worn at all times  The CGM checks your blood sugar level every 5 minutes  It sends results to an electronic device such as a smart phone  A CGM can be used with or without an insulin pump  You and your diabetes care team providers will decide on the best method for you  The goal for blood sugar levels before meals  is between 80 and 130 mg/dL and 2 hours after eating  is lower than 180 mg/dL  Make healthy food choices  Work with a dietitian to develop a meal plan that works for you and your schedule  A dietitian can help you learn how to eat the right amount of carbohydrates during your meals and snacks  Carbohydrates can raise your blood sugar level if you eat too many at one time  Examples of foods that contain carbohydrates are breads, cereals, rice, pasta, and sweets  Eat high-fiber foods as directed  Fiber helps improve blood sugar levels  Fiber also lowers your risk for heart disease and other problems diabetes can cause  Examples of high-fiber foods include vegetables, whole-grain bread, and beans such as smart beans  Your dietitian can tell you how much fiber to have each day  Get regular physical activity  Physical activity can help you get to your target blood sugar level goal and manage your weight  Get at least 150 minutes of moderate to vigorous aerobic physical activity each week  Do not miss more than 2 days in a row  Do not sit longer than 30 minutes at a time   Your healthcare provider can help you create an activity plan  The plan can include the best activities for you and can help you build your strength and endurance  Maintain a healthy weight  Ask your team what a healthy weight is for you  A healthy weight can help you control diabetes and prevent heart disease  Ask your team to help you create a weight loss plan, if needed  Weight loss can help make a difference in managing diabetes  Your team will help you set a weight-loss goal, such as 10 to 15 pounds, or 5% of your extra weight  Together you and your team can set manageable weight loss goals  Take your diabetes medicine or insulin as directed  You may need diabetes medicine, insulin, or both to help control your blood sugar levels  Your provider will teach you how and when to take your diabetes medicine or insulin  You will also be taught about side effects oral diabetes medicine can cause  Insulin may be injected or given through a pump or pen  You and your providers will decide on the best method for you: An insulin pump  is an implanted device that gives your insulin 24 hours a day  An insulin pump prevents the need for multiple insulin injections in a day  An insulin pen  is a device prefilled with the right amount of insulin  You and your family members will be taught how to draw up and give insulin  if this is the best method for you  Your providers will also teach you how to dispose of needles and syringes  You will learn how much insulin you need  and when to give it  You will be taught when not to give insulin  You will also be taught what to do if your blood sugar level drops too low  This may happen if you take insulin and do not eat the right amount of carbohydrates  What else can I do to manage type 2 diabetes? Wear medical alert identification  Wear medical alert jewelry or carry a card that says you have diabetes  Ask your provider where to get these items  Do not smoke  Nicotine and other chemicals in cigarettes and cigars can cause lung and blood vessel damage  It also makes it more difficult to manage your diabetes  Ask your provider for information if you currently smoke and need help to quit  Do not use e-cigarettes or smokeless tobacco in place of cigarettes or to help you quit  They still contain nicotine  Check your feet each day for cuts, scratches, calluses, or other wounds  Look for redness and swelling, and feel for warmth  Wear shoes that fit well  Check your shoes for rocks or other objects that can hurt your feet  Do not walk barefoot or wear shoes without socks  Wear cotton socks to help keep your feet dry  Ask about vaccines you may need  You have a higher risk for serious illness if you get the flu, pneumonia, COVID-19, or hepatitis  Ask your provider if you should get vaccines to prevent these or other diseases, and when to get the vaccines  Talk to your provider if you become stressed about diabetes care  Sometimes being able to fit diabetes care into your life can cause increased stress  The stress can cause you not to take care of yourself properly  Your care team providers can help by offering tips about self-care  Your providers may suggest you talk to a mental health provider who can listen and offer help with self-care issues  Have your A1c checked as directed  Your provider may check your A1c every 3 months, or 2 times each year if your diabetes is controlled  An A1c test shows the average amount of sugar in your blood over the past 2 to 3 months  Your provider will tell you what your A1c level should be  Have screening tests as directed  Your provider may recommend screening for complications of diabetes and other conditions that may develop   Some screenings may begin right away and some may happen within the first 5 years of diagnosis:    Examples of diabetes complications  include kidney problems, high cholesterol, high blood pressure, blood vessel problems, eye problems, and sleep apnea  You may be screened for a low vitamin B level  if you take oral diabetes medicine for a long time  Women of childbearing years may be screened  for polycystic ovarian syndrome (PCOS)  Have someone call your local emergency number (911 in the [address] [facility]) if:   You cannot be woken  You have signs of diabetic ketoacidosis:     confusion, fatigue    vomiting    rapid heartbeat    fruity smelling breath    extreme thirst    dry mouth and skin    You have any of the following signs of a heart attack:      Squeezing, pressure, or pain in your chest    You may  also have any of the following:     Discomfort or pain in your back, neck, jaw, stomach, or arm    Shortness of breath    Nausea or vomiting    Lightheadedness or a sudden cold sweat    You have any of the following signs of a stroke:      Numbness or drooping on one side of your face     Weakness in an arm or leg    Confusion or difficulty speaking    Dizziness, a severe headache, or vision loss    When should I call my doctor or diabetes care team provider? You have a sore or wound that will not heal     You have a change in the amount you urinate  Your blood sugar levels are higher than your target goals  You often have lower blood sugar levels than your target goals  Your skin is red, dry, warm, or swollen  You have trouble coping with diabetes, or you feel anxious or depressed  You have questions or concerns about your condition or care  CARE AGREEMENT:   You have the right to help plan your care  Learn about your health condition and how it may be treated  Discuss treatment options with your healthcare providers to decide what care you want to receive  You always have the right to refuse treatment  The above information is an  only  It is not intended as medical advice for individual conditions or treatments   Talk to your doctor, nurse or pharmacist before following any medical regimen to see if it is safe and effective for you  © Copyright [employer] 2022 Information is for End User's use only and may not be sold, redistributed or otherwise used for commercial purposes

## 2023-06-13 NOTE — PROGRESS NOTES
"Name: Loly Naidu During      : 1940      MRN: [id number]  Encounter Provider: Lianet Ware DO  Encounter Date: 2023   Encounter department: [address] [place] [address]     1  Type 2 diabetes mellitus with diabetic neuropathy, without long-term current use of insulin ([facility] )  2  Type 2 diabetes mellitus with peripheral artery disease ([facility] )  3  Hyperlipidemia associated with type 2 diabetes mellitus ([facility] )    Most recent A1c was 7 6 % on 2022  Check updated labs  Continue medications as prescribed  Regular walking/exercise recommended  He declines to stop consuming tobacco  Check updated labs  Follow-up with podiatry and eye doctor  Continue baby aspirin, losartan, metformin, and pravastatin  - Albumin / creatinine urine ratio; Future  - Hemoglobin A1C; Future  - CBC and Platelet; Future  - Lipid Panel with Direct LDL reflex; Future    4  Hypercalcemia    Has been stable  Last PTH level came down to normal  Check updated labs  - Basic metabolic panel; Future  - PTH, intact; Future  - Albumin; Future        Return in about 26 weeks (around 2023) for Follow-up  Audra Trejo presents for follow-up  Overall health is stable and feeling well for his age  Gets stubborn swelling in his legs from time to time but nothing that is significant or bothers him  No problems with shortness of breath  Continues to smoke cigars  Review of Systems   Constitutional: Negative for chills, fatigue and fever  Respiratory: Negative  Cardiovascular: Positive for leg swelling  Negative for chest pain and palpitations  Gastrointestinal: Negative  Objective     /80 (BP Location: Left arm, Patient Position: Sitting, Cuff Size: Standard)   Pulse 86   Temp (!) 96 2 °F (35 7 °C) (Tympanic)   Resp 20   Ht 5' 9\" (1 753 m)   Wt 70 3 kg (155 lb)   SpO2 94%   BMI 22 89 kg/m²     Physical Exam  Constitutional:       General: He is not in acute distress       " Appearance: He is well-developed  He is obese  He is not diaphoretic  Neck:      Thyroid: No thyromegaly  Vascular: No JVD  Cardiovascular:      Rate and Rhythm: Normal rate and regular rhythm  Heart sounds: Normal heart sounds  No murmur heard  Pulmonary:      Effort: Pulmonary effort is normal  No respiratory distress  Breath sounds: Normal breath sounds  No wheezing or rales  Abdominal:      General: Bowel sounds are normal  There is no distension  Palpations: Abdomen is soft  There is no mass  Tenderness: There is no abdominal tenderness  There is no guarding or rebound  Musculoskeletal:      Right lower leg: Edema (minimal) present  Left lower leg: Edema (minimal) present  Neurological:      Mental Status: He is alert         Veronica Serrato, DO

## 2023-06-14 ENCOUNTER — TELEPHONE (OUTPATIENT)
Age: 83
End: 2023-06-14

## 2023-06-14 LAB
EST. AVERAGE GLUCOSE BLD GHB EST-MCNC: 177 MG/DL
HBA1C MFR BLD: 7.8 %

## 2023-06-14 NOTE — TELEPHONE ENCOUNTER
As per jame I am able to leave a voice mail with detail message of results and providers message and if further question he can call us back

## 2023-06-15 DIAGNOSIS — I10 ESSENTIAL HYPERTENSION: ICD-10-CM

## 2023-06-15 RX ORDER — LOSARTAN POTASSIUM 100 MG/1
100 TABLET ORAL DAILY
Qty: 90 TABLET | Refills: 3 | Status: SHIPPED | OUTPATIENT
Start: 2023-06-15

## 2023-06-16 DIAGNOSIS — E11.40 TYPE 2 DIABETES MELLITUS WITH DIABETIC NEUROPATHY, WITHOUT LONG-TERM CURRENT USE OF INSULIN (HCC): Chronic | ICD-10-CM

## 2023-06-16 RX ORDER — FLASH GLUCOSE SENSOR
KIT MISCELLANEOUS
Qty: 6 EACH | Refills: 3 | Status: SHIPPED | OUTPATIENT
Start: 2023-06-16

## 2023-06-16 NOTE — TELEPHONE ENCOUNTER
RX  REFILL    NEEDS  14 DAY  QUEENIE PAINTER I  DIDN'T  SEE  IT  ON  HIS  RX  LIST     [address]

## 2023-06-27 NOTE — PROGRESS NOTES
Pain Medicine Follow-Up Note    Assessment:  1  Spinal stenosis of lumbar region with neurogenic claudication        Plan:  Orders Placed This Encounter   Procedures   • FL spine and pain procedure     Standing Status:   Future     Standing Expiration Date:   6/28/2027     Order Specific Question:   Reason for Exam:     Answer:   L5-S1 LESI     Order Specific Question:   Anticoagulant hold needed? Answer:   No   • Ambulatory referral to Physical Therapy     Standing Status:   Future     Standing Expiration Date:   6/28/2024     Referral Priority:   Routine     Referral Type:   Physical Therapy     Referral Reason:   Specialty Services Required     Requested Specialty:   Physical Therapy     Number of Visits Requested:   1     Expiration Date:   6/27/2024       No orders of the defined types were placed in this encounter  My impressions and treatment recommendations were discussed in detail with the patient who verbalized understanding and had no further questions  This is an [de-identified]year old male with lumbar spinal stenosis with neurogenic claudication who returns to our office with similar symptoms as previous  He has undergoen L3-4 LESI x2 with about 60% improvement  We discussed repeating the injection at L5-S1 to try obtain additional benefit  I will also refer him to aquatic therapy for spinal decompression  I again discussed with him the overall cause of his symptoms  He again has severe spinal stenosis at the L4-5 level with neurogenic claudication  I did advise consultation with spine surgery which he is declining  I did make him aware of red flag symptoms to be mindful of including bowel bladder incontinence or saddle anesthesia  He does not have these symptoms at this time      [place] [employer] Drug Monitoring Program report was reviewed and was appropriate     Complete risks and benefits including bleeding, infection, tissue reaction, nerve injury and allergic reaction were discussed  The approach was demonstrated using models and literature was provided  Verbal and written consent was obtained  Discharge instructions were provided  I personally saw and examined the patient and I agree with the above discussed plan of care  History of Present Illness:    Arnaldo Chandler is a [de-identified] y o  male who presents to [facility] for interval re-evaluation of the above stated pain complaints  The patient has a past medical and chronic pain history as outlined in the assessment section  He was last seen on 6/23/2021 for lumbar epidural steroid injection with 60% improvement  Returns today with similar symptoms  Pain is dull/aching, sharp in nature  It is constant and reports pain to bilateral buttocks and the coccygeal region  He again has severe central canal stenosis         Other than as stated above, the patient denies any interval changes in medications, medical condition, mental condition, symptoms, or allergies since the last office visit  Review of Systems:    Review of Systems   Respiratory: Negative for shortness of breath  Cardiovascular: Negative for chest pain  Gastrointestinal: Negative for constipation, diarrhea, nausea and vomiting  Musculoskeletal: Positive for back pain and gait problem  Negative for arthralgias, joint swelling and myalgias  Skin: Negative for rash  Neurological: Negative for dizziness, seizures and weakness  All other systems reviewed and are negative  Past Medical History:   Diagnosis Date   • BPH (benign prostatic hyperplasia)    • Cancer ([facility] ) 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   • Cervical myelopathy ([facility] ) 6/20/2019   • Chemical exposure     9/11/01- worked in [employer]      • COPD (chronic obstructive pulmonary disease) ([facility]    • CPAP (continuous positive airway pressure) dependence    • DDD (degenerative disc disease), cervical    • Diabetes mellitus ([facility] )    • Foraminal stenosis of cervical region    • Hyperlipidemia    • Hypertension    • SHIVANI (obstructive sleep apnea)    • Overactive bladder    • Spinal cord compression ([facility] ) 5/8/2019    Added automatically from request for surgery [id number]       Past Surgical History:   Procedure Laterality Date   • ARTHROSCOPY KNEE Bilateral    • COLONOSCOPY     • LUNG SURGERY Left     scraping of left   • SD ARTHRD ANT INTERBODY DECOMPRESS CERVICAL BELW C2 Bilateral 6/18/2019    Procedure: C3/4  ACDF WITH  C4 HEMICORPECTOMY, C3-4 ANTERIOR INSTRUMENTATION AND FUSION (NEUROMONITORING);   Surgeon: Mallory Pate MD;  Location: [facility];  Service: Neurosurgery   • ROTATOR CUFF REPAIR Bilateral        Family History   Problem Relation Age of Onset   • No Known Problems Mother    • No Known Problems Father        Social History     Occupational History   • Occupation: retired   Tobacco Use   • Smoking status: Every Day     Packs/day: 0 01     Years: 61 00     Total pack years: 0 61     Types: Pipe, Cigarettes     Start date: 1981     Last attempt to quit: 4/4/2020     Years since quitting: 3 2     Passive exposure: Past   • Smokeless tobacco: Never   • Tobacco comments:     2-3  PIPES PER DAY    Vaping Use   • Vaping Use: Never used   Substance and Sexual Activity   • Alcohol use: Never   • Drug use: Never   • Sexual activity: Not Currently         Current Outpatient Medications:   •  acetaminophen (TYLENOL) 500 mg tablet, Take 500 mg by mouth every 6 (six) hours as needed for mild pain  , Disp: , Rfl:   •  albuterol (2 5 mg/3 mL) 0 083 % nebulizer solution, Take 3 mL (2 5 mg total) by nebulization every 6 (six) hours as needed for wheezing or shortness of breath, Disp: 120 mL, Rfl: 1  •  aspirin (ECOTRIN LOW STRENGTH) 81 mg EC tablet, Take 1 tablet by mouth daily, Disp: 30 tablet, Rfl: 0  •  Blood Glucose Monitoring Suppl (OneTouch Verio) w/Device KIT, use as directed, Disp: 1 kit, Rfl: 0  •  chlorhexidine (PERIDEX) 0 12 % solution, RINSE MOUTH WITH 15 ML (1 CAPFUL) FOR 30 SECONDS IN THE MORNING A   (REFER TO PRESCRIPTION NOTES)  , Disp: , Rfl:   •  clindamycin (CLEOCIN) 300 MG capsule, Take 300 mg by mouth every 8 (eight) hours  , Disp: , Rfl:   •  Continuous Blood Gluc Sensor (FreeStyle Eben 14 Day Sensor) MISC, Check blood sugars continuously  Switch every 14 days  , Disp: 6 each, Rfl: 3  •  diltiazem (TIAZAC) 360 MG 24 hr capsule, take 1 capsule by mouth once daily, Disp: 90 capsule, Rfl: 1  •  DULoxetine (CYMBALTA) 60 mg delayed release capsule, TAKE 1 CAPSULE DAILY, Disp: 90 capsule, Rfl: 3  •  gabapentin (NEURONTIN) 100 mg capsule, take 2 capsules by mouth twice a day, Disp: 360 capsule, Rfl: 3  •  glucose blood (FREESTYLE LITE) test strip, use 1 TEST STRIP to TEST BLOOD SUGAR twice a day, Disp: 100 strip, Rfl: 0  •  hydrochlorothiazide (HYDRODIURIL) 12 5 mg tablet, Take 1 tablet (12 5 mg total) by mouth daily, Disp: 90 tablet, Rfl: 1  •  losartan (COZAAR) 100 MG tablet, Take 1 tablet (100 mg total) by mouth daily, Disp: 90 tablet, Rfl: 3  •  metFORMIN (GLUCOPHAGE) 500 mg tablet, take 1 tablet by mouth daily WITH BREAKFAST, Disp: 90 tablet, Rfl: 3  •  Multiple Vitamin (MULTI-VITAMINS PO), Take by mouth daily, Disp: , Rfl:   •  oxybutynin (DITROPAN-XL) 5 mg 24 hr tablet, take 1 tablet by mouth once daily, Disp: 90 tablet, Rfl: 3  •  polyvinyl alcohol (LIQUIFILM TEARS) 1 4 % ophthalmic solution, Administer 1 drop to both eyes 4 (four) times a day, Disp: 15 mL, Rfl: 0  •  pravastatin (PRAVACHOL) 40 mg tablet, take 1 tablet by mouth once daily, Disp: 90 tablet, Rfl: 1  •  pyridoxine (VITAMIN B6) 50 mg tablet, Take 50 mg by mouth daily, Disp: , Rfl:   •  sildenafil (REVATIO) 20 mg tablet, Take 1 tablet (20 mg total) by mouth if needed (Erectile dysfunction) Take 1-5 tablets as needed for erection on an empty stomach, Disp: 30 tablet, Rfl: 1  •  tamsulosin (FLOMAX) 0 4 mg, take 1 capsule by mouth TWICE A WEEK, Disp: 90 capsule, Rfl: 3  •  thiamine 50 MG tablet, Take 1 tablet (50 mg total) by mouth daily, Disp: 30 tablet, Rfl: 3  •  Trelegy Ellipta 100-62 5-25 MCG/ACT inhaler, USE 1 INHALATION DAILY (RINSE MOUTH AFTER USE), Disp: 360 blister, Rfl: 3  •  VITAMIN E PO, Take by mouth 268 mg 400 iu, Disp: , Rfl:   •  ibuprofen (MOTRIN) 600 mg tablet, Take 600 mg by mouth every 6 (six) hours as needed, Disp: , Rfl:   •  Lancets (OneTouch Delica Plus [id number]) MISC, use 1 LANCET to TEST BLOOD SUGAR one to two times a day, Disp: 100 each, Rfl: 0    Allergies   Allergen Reactions   • Chloroquine Itching   • Qualaquin [Quinine] Itching       Physical Exam:    /84 (BP Location: Left arm, Patient Position: Sitting, Cuff Size: Standard)   Pulse 67   Wt 69 7 kg (153 lb 9 6 oz)   BMI 22 68 kg/m²     Constitutional:normal, well developed, well nourished, alert, in no distress and non-toxic and no overt pain behavior    Eyes:anicteric  HEENT:grossly intact  Neck:supple, symmetric, trachea midline and no masses   Pulmonary:even and unlabored  Cardiovascular:No edema or pitting edema present  Skin:Normal without rashes or lesions and well hydrated  Psychiatric:Mood and affect appropriate  Neurologic:Cranial Nerves II-XII grossly intact  Musculoskeletal:normal      Imaging  FL spine and pain procedure    (Results Pending)         Orders Placed This Encounter   Procedures   • FL spine and pain procedure   • Ambulatory referral to Physical Therapy

## 2023-06-27 NOTE — H&P (VIEW-ONLY)
Pain Medicine Follow-Up Note    Assessment:  1. Spinal stenosis of lumbar region with neurogenic claudication        Plan:  Orders Placed This Encounter   Procedures   • FL spine and pain procedure     Standing Status:   Future     Standing Expiration Date:   6/28/2027     Order Specific Question:   Reason for Exam:     Answer:   L5-S1 LESI     Order Specific Question:   Anticoagulant hold needed? Answer:   No   • Ambulatory referral to Physical Therapy     Standing Status:   Future     Standing Expiration Date:   6/28/2024     Referral Priority:   Routine     Referral Type:   Physical Therapy     Referral Reason:   Specialty Services Required     Requested Specialty:   Physical Therapy     Number of Visits Requested:   1     Expiration Date:   6/27/2024       No orders of the defined types were placed in this encounter. My impressions and treatment recommendations were discussed in detail with the patient who verbalized understanding and had no further questions. This is an 82year old male with lumbar spinal stenosis with neurogenic claudication who returns to our office with similar symptoms as previous. He has undergoen L3-4 LESI x2 with about 60% improvement. We discussed repeating the injection at L5-S1 to try obtain additional benefit. I will also refer him to aquatic therapy for spinal decompression. I again discussed with him the overall cause of his symptoms. He again has severe spinal stenosis at the L4-5 level with neurogenic claudication. I did advise consultation with spine surgery which he is declining. I did make him aware of red flag symptoms to be mindful of including bowel bladder incontinence or saddle anesthesia. He does not have these symptoms at this time.     [place] Prescription Drug Monitoring Program report was reviewed and was appropriate     Complete risks and benefits including bleeding, infection, tissue reaction, nerve injury and allergic reaction were discussed. The approach was demonstrated using models and literature was provided. Verbal and written consent was obtained. Discharge instructions were provided. I personally saw and examined the patient and I agree with the above discussed plan of care. History of Present Illness:    Maribell Chandler is a 82 y.o. male who presents to [address] for interval re-evaluation of the above stated pain complaints. The patient has a past medical and chronic pain history as outlined in the assessment section. He was last seen on 6/23/2021 for lumbar epidural steroid injection with 60% improvement. Returns today with similar symptoms. Pain is dull/aching, sharp in nature. It is constant and reports pain to bilateral buttocks and the coccygeal region. He again has severe central canal stenosis. .      Other than as stated above, the patient denies any interval changes in medications, medical condition, mental condition, symptoms, or allergies since the last office visit. Review of Systems:    Review of Systems   Respiratory: Negative for shortness of breath. Cardiovascular: Negative for chest pain. Gastrointestinal: Negative for constipation, diarrhea, nausea and vomiting. Musculoskeletal: Positive for back pain and gait problem. Negative for arthralgias, joint swelling and myalgias. Skin: Negative for rash. Neurological: Negative for dizziness, seizures and weakness. All other systems reviewed and are negative. Past Medical History:   Diagnosis Date   • BPH (benign prostatic hyperplasia)    • Cancer ([address]) 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   • Cervical myelopathy ([address]) 6/20/2019   • Chemical exposure     9/11/01- worked in [employer].     • COPD (chronic obstructive pulmonary disease) (HCC)    • CPAP (continuous positive airway pressure) dependence    • DDD (degenerative disc disease), cervical    • Diabetes mellitus ([address])    • Foraminal stenosis of cervical region    • Hyperlipidemia    • Hypertension    • SHIVANI (obstructive sleep apnea)    • Overactive bladder    • Spinal cord compression ([address]) 5/8/2019    Added automatically from request for surgery [id number]       Past Surgical History:   Procedure Laterality Date   • ARTHROSCOPY KNEE Bilateral    • COLONOSCOPY     • LUNG SURGERY Left     scraping of left   • TX ARTHRD ANT INTERBODY DECOMPRESS CERVICAL BELW C2 Bilateral 6/18/2019    Procedure: C3/4  ACDF WITH  C4 HEMICORPECTOMY, C3-4 ANTERIOR INSTRUMENTATION AND FUSION (NEUROMONITORING);   Surgeon: Nimo Jones MD;  Location: [facility];  Service: Neurosurgery   • ROTATOR CUFF REPAIR Bilateral        Family History   Problem Relation Age of Onset   • No Known Problems Mother    • No Known Problems Father        Social History     Occupational History   • Occupation: retired   Tobacco Use   • Smoking status: Every Day     Packs/day: 0.01     Years: 61.00     Total pack years: 0.61     Types: Pipe, Cigarettes     Start date: 1981     Last attempt to quit: 4/4/2020     Years since quitting: 3.2     Passive exposure: Past   • Smokeless tobacco: Never   • Tobacco comments:     2-3  PIPES PER DAY    Vaping Use   • Vaping Use: Never used   Substance and Sexual Activity   • Alcohol use: Never   • Drug use: Never   • Sexual activity: Not Currently         Current Outpatient Medications:   •  acetaminophen (TYLENOL) 500 mg tablet, Take 500 mg by mouth every 6 (six) hours as needed for mild pain  , Disp: , Rfl:   •  albuterol (2.5 mg/3 mL) 0.083 % nebulizer solution, Take 3 mL (2.5 mg total) by nebulization every 6 (six) hours as needed for wheezing or shortness of breath, Disp: 120 mL, Rfl: 1  •  aspirin (ECOTRIN LOW STRENGTH) 81 mg EC tablet, Take 1 tablet by mouth daily, Disp: 30 tablet, Rfl: 0  •  Blood Glucose Monitoring Suppl (OneTouch Verio) w/Device KIT, use as directed, Disp: 1 kit, Rfl: 0  •  chlorhexidine (PERIDEX) 0.12 % solution, RINSE MOUTH WITH 15 ML (1 CAPFUL) FOR 30 SECONDS IN THE MORNING A...  (REFER TO PRESCRIPTION NOTES). , Disp: , Rfl:   •  clindamycin (CLEOCIN) 300 MG capsule, Take 300 mg by mouth every 8 (eight) hours  , Disp: , Rfl:   •  Continuous Blood Gluc Sensor (FreeStyle Eben 14 Day Sensor) MISC, Check blood sugars continuously. Switch every 14 days. , Disp: 6 each, Rfl: 3  •  diltiazem (TIAZAC) 360 MG 24 hr capsule, take 1 capsule by mouth once daily, Disp: 90 capsule, Rfl: 1  •  DULoxetine (CYMBALTA) 60 mg delayed release capsule, TAKE 1 CAPSULE DAILY, Disp: 90 capsule, Rfl: 3  •  gabapentin (NEURONTIN) 100 mg capsule, take 2 capsules by mouth twice a day, Disp: 360 capsule, Rfl: 3  •  glucose blood (FREESTYLE LITE) test strip, use 1 TEST STRIP to TEST BLOOD SUGAR twice a day, Disp: 100 strip, Rfl: 0  •  hydrochlorothiazide (HYDRODIURIL) 12.5 mg tablet, Take 1 tablet (12.5 mg total) by mouth daily, Disp: 90 tablet, Rfl: 1  •  losartan (COZAAR) 100 MG tablet, Take 1 tablet (100 mg total) by mouth daily, Disp: 90 tablet, Rfl: 3  •  metFORMIN (GLUCOPHAGE) 500 mg tablet, take 1 tablet by mouth daily WITH BREAKFAST, Disp: 90 tablet, Rfl: 3  •  Multiple Vitamin (MULTI-VITAMINS PO), Take by mouth daily, Disp: , Rfl:   •  oxybutynin (DITROPAN-XL) 5 mg 24 hr tablet, take 1 tablet by mouth once daily, Disp: 90 tablet, Rfl: 3  •  polyvinyl alcohol (LIQUIFILM TEARS) 1.4 % ophthalmic solution, Administer 1 drop to both eyes 4 (four) times a day, Disp: 15 mL, Rfl: 0  •  pravastatin (PRAVACHOL) 40 mg tablet, take 1 tablet by mouth once daily, Disp: 90 tablet, Rfl: 1  •  pyridoxine (VITAMIN B6) 50 mg tablet, Take 50 mg by mouth daily, Disp: , Rfl:   •  sildenafil (REVATIO) 20 mg tablet, Take 1 tablet (20 mg total) by mouth if needed (Erectile dysfunction) Take 1-5 tablets as needed for erection on an empty stomach, Disp: 30 tablet, Rfl: 1  •  tamsulosin (FLOMAX) 0.4 mg, take 1 capsule by mouth TWICE A WEEK, Disp: 90 capsule, Rfl: 3  •  thiamine 50 MG tablet, Take 1 tablet (50 mg total) by mouth daily, Disp: 30 tablet, Rfl: 3  •  Trelegy Ellipta 100-62.5-25 MCG/ACT inhaler, USE 1 INHALATION DAILY (RINSE MOUTH AFTER USE), Disp: 360 blister, Rfl: 3  •  VITAMIN E PO, Take by mouth 268 mg 400 iu, Disp: , Rfl:   •  ibuprofen (MOTRIN) 600 mg tablet, Take 600 mg by mouth every 6 (six) hours as needed, Disp: , Rfl:   •  Lancets (OneTouch Delica Plus [id number]) MISC, use 1 LANCET to TEST BLOOD SUGAR one to two times a day, Disp: 100 each, Rfl: 0    Allergies   Allergen Reactions   • Chloroquine Itching   • Qualaquin [Quinine] Itching       Physical Exam:    /84 (BP Location: Left arm, Patient Position: Sitting, Cuff Size: Standard)   Pulse 67   Wt 69.7 kg (153 lb 9.6 oz)   BMI 22.68 kg/m²     Constitutional:normal, well developed, well nourished, alert, in no distress and non-toxic and no overt pain behavior.   Eyes:anicteric  HEENT:grossly intact  Neck:supple, symmetric, trachea midline and no masses   Pulmonary:even and unlabored  Cardiovascular:No edema or pitting edema present  Skin:Normal without rashes or lesions and well hydrated  Psychiatric:Mood and affect appropriate  Neurologic:Cranial Nerves II-XII grossly intact  Musculoskeletal:normal      Imaging  FL spine and pain procedure    (Results Pending)         Orders Placed This Encounter   Procedures   • FL spine and pain procedure   • Ambulatory referral to Physical Therapy

## 2023-06-28 ENCOUNTER — OFFICE VISIT (OUTPATIENT)
Dept: PAIN MEDICINE | Facility: CLINIC | Age: 83
End: 2023-06-28
Payer: MEDICARE

## 2023-06-28 VITALS
HEART RATE: 67 BPM | BODY MASS INDEX: 22.68 KG/M2 | DIASTOLIC BLOOD PRESSURE: 84 MMHG | SYSTOLIC BLOOD PRESSURE: 161 MMHG | WEIGHT: 153.6 LBS

## 2023-06-28 DIAGNOSIS — M48.062 SPINAL STENOSIS OF LUMBAR REGION WITH NEUROGENIC CLAUDICATION: Primary | ICD-10-CM

## 2023-06-28 PROCEDURE — 99204 OFFICE O/P NEW MOD 45 MIN: CPT | Performed by: STUDENT IN AN ORGANIZED HEALTH CARE EDUCATION/TRAINING PROGRAM

## 2023-07-06 ENCOUNTER — TELEPHONE (OUTPATIENT)
Dept: PAIN MEDICINE | Facility: CLINIC | Age: 83
End: 2023-07-06

## 2023-07-06 DIAGNOSIS — M48.062 SPINAL STENOSIS OF LUMBAR REGION WITH NEUROGENIC CLAUDICATION: Primary | ICD-10-CM

## 2023-07-06 NOTE — TELEPHONE ENCOUNTER
Caller: Greg PT    Doctor: Dr Liv Willams    Reason for call: Pt called in to get a referral for another form of physical therapy , the aqua therapy is too far and the pt does not feel comfortable with the water    Call back#: [phone]

## 2023-07-10 ENCOUNTER — HOSPITAL ENCOUNTER (OUTPATIENT)
Dept: CT IMAGING | Facility: HOSPITAL | Age: 83
Discharge: HOME | End: 2023-07-10
Attending: INTERNAL MEDICINE
Payer: MEDICARE

## 2023-07-10 DIAGNOSIS — R93.89 ABNORMAL CT OF THE CHEST: ICD-10-CM

## 2023-07-10 PROCEDURE — 71250 CT THORAX DX C-: CPT

## 2023-07-10 PROCEDURE — G1004 CDSM NDSC: HCPCS

## 2023-07-13 ENCOUNTER — EVALUATION (OUTPATIENT)
Dept: PHYSICAL THERAPY | Facility: CLINIC | Age: 83
End: 2023-07-13
Payer: MEDICARE

## 2023-07-13 DIAGNOSIS — E78.5 HYPERLIPIDEMIA ASSOCIATED WITH TYPE 2 DIABETES MELLITUS (HCC): ICD-10-CM

## 2023-07-13 DIAGNOSIS — E11.69 HYPERLIPIDEMIA ASSOCIATED WITH TYPE 2 DIABETES MELLITUS (HCC): ICD-10-CM

## 2023-07-13 DIAGNOSIS — M48.062 SPINAL STENOSIS OF LUMBAR REGION WITH NEUROGENIC CLAUDICATION: ICD-10-CM

## 2023-07-13 PROCEDURE — 97161 PT EVAL LOW COMPLEX 20 MIN: CPT

## 2023-07-13 PROCEDURE — 97110 THERAPEUTIC EXERCISES: CPT

## 2023-07-13 RX ORDER — PRAVASTATIN SODIUM 40 MG
TABLET ORAL
Qty: 90 TABLET | Refills: 1 | Status: SHIPPED | OUTPATIENT
Start: 2023-07-13

## 2023-07-13 NOTE — PROGRESS NOTES
PT Evaluation     Today's date: 2023  Patient name: Gerri Herman During  : 1940  MRN: [id number]  Referring provider: Cathy Tsocano MD  Dx:   Encounter Diagnosis     ICD-10-CM    1. Spinal stenosis of lumbar region with neurogenic claudication  M48.062 Ambulatory referral to Physical Therapy          Start Time: 1117  Stop Time: 1146  Total time in clinic (min): 29 minutes    Assessment  Assessment details: Pt is a 82 y.o. male presenting to PT services with c/o LBP secondary to spinal stenosis. Pt has impaired TA activation. Pt responded favorably to repeated lumbar flexion with decreased pain. Pt has minimally impaired BLE strength. PT spent ample time educating pt on importance of HEP compliance, ways of integrating HEP into daily routine for better adherence, and discussing role of physical therapy in care. PT added repeated lumbar flexion and TA activation in sitting to pt's HEP, pt verbalized and demonstrated understanding of proper form. Pt is a good candidate for skilled physical therapy in order to improve endurance, decrease pain, improve balance, and increase functional tolerance. Impairments: abnormal or restricted ROM, activity intolerance, impaired balance, impaired physical strength, lacks appropriate home exercise program and pain with function    Goals  STG (5 weeks):  1. Pt will improve TA activation evidenced by palpable contraction while breathing   2. Pt will improve b/l hip abduction strength to be at least 4/5  3. Pt will report pain at worst 5/10     LTG (10 weeks):  1. Pt will be independent in HEP  2. Pt will improve global BLE strength to be 5/5  3.  Pt will improve TA activation evidenced by palpable contraction with dynamic movement    Plan  Patient would benefit from: skilled physical therapy  Planned modality interventions: biofeedback, cryotherapy, TENS, thermotherapy: hydrocollator packs, traction and unattended electrical stimulation  Planned therapy interventions: IASTM, joint mobilization, kinesiology taping, manual therapy, massage, abdominal trunk stabilization, balance, nerve gliding, neuromuscular re-education, patient education, postural training, strengthening, stretching, therapeutic activities, therapeutic exercise, home exercise program, flexibility and functional ROM exercises  Frequency: 2x week  Duration in weeks: 10  Plan of Care beginning date: 2023  Plan of Care expiration date: 2023  Treatment plan discussed with: patient        Subjective Evaluation    History of Present Illness  Mechanism of injury: Pt reports that he has had back pain for so long. He had a fall in  and for a long time he couldn't even get off the floor. He states that that subsided and eventually came back in about [id number] but it wasn't as severe at that time as it is now. He states that he saw the neurologist who did surgery on his neck. He states that he was taking steroid injections in his back in [facility] before he moved here to [facility]. He states that after a month of doing injections here, they're not lasting as long. He states that his sleep is irregular. Patient Goals  Patient goals for therapy: decreased pain  Patient goal: "decrease pain"  Pain  Current pain ratin  At best pain rating: 3  At worst pain rating: 10  Location: b/l low back, alternating b/l LE  Quality: tight and pressure  Alleviating factors: sitting, Tylenol, Aspirin, let it pass.   Aggravating factors: sitting    Treatments  Previous treatment: injection treatment        Objective   Mechanical Assessment    Cervical      Thoracic      Lumbar    Sitting flexion: repeated movements  Pain level: decreased    Strength/Myotome Testing     Left Hip   Planes of Motion   Flexion: 4+  Abduction: 4-  Adduction: 4+    Right Hip   Planes of Motion   Flexion: 4+  Abduction: 4-  Adduction: 4+    Left Knee   Flexion: 4+  Extension: 5    Right Knee   Flexion: 4+  Extension: 5    Muscle Activation   Patient unable to activate left transverse abdominals, left multifidus, right transverse abdominals and right multifidus. Precautions: spinal stenosis, DMII, COPD, HTN, PAD  Access Code: [email or web address]    POC expires Auth Status Unit limit Start date  Expiration date PT/OT + Visit Limit?    9/22/23 Not required 4 7/13/23 12/31/23 BOMN                                         Date 7/13            Visit # 1            Re-Eval             FOTO             Manuals                                                                 Neuro Re-Ed             TA activation 5"x20 sit                                                                                          Ther Ex             Bike             Repeated lumbar flexion x30                                                                                          Ther Activity                                       Gait Training                                       Modalities

## 2023-07-18 ENCOUNTER — APPOINTMENT (OUTPATIENT)
Dept: PHYSICAL THERAPY | Facility: CLINIC | Age: 83
End: 2023-07-18
Payer: MEDICARE

## 2023-07-20 ENCOUNTER — APPOINTMENT (OUTPATIENT)
Dept: PHYSICAL THERAPY | Facility: CLINIC | Age: 83
End: 2023-07-20
Payer: MEDICARE

## 2023-07-25 ENCOUNTER — OFFICE VISIT (OUTPATIENT)
Dept: PHYSICAL THERAPY | Facility: CLINIC | Age: 83
End: 2023-07-25
Payer: MEDICARE

## 2023-07-25 DIAGNOSIS — M48.062 SPINAL STENOSIS OF LUMBAR REGION WITH NEUROGENIC CLAUDICATION: Primary | ICD-10-CM

## 2023-07-25 DIAGNOSIS — E11.40 TYPE 2 DIABETES MELLITUS WITH DIABETIC NEUROPATHY, WITHOUT LONG-TERM CURRENT USE OF INSULIN (HCC): Chronic | ICD-10-CM

## 2023-07-25 PROCEDURE — 97112 NEUROMUSCULAR REEDUCATION: CPT

## 2023-07-25 PROCEDURE — 97110 THERAPEUTIC EXERCISES: CPT

## 2023-07-25 RX ORDER — DULOXETIN HYDROCHLORIDE 60 MG/1
CAPSULE, DELAYED RELEASE ORAL
Qty: 90 CAPSULE | Refills: 3 | Status: SHIPPED | OUTPATIENT
Start: 2023-07-25

## 2023-07-25 NOTE — PROGRESS NOTES
Daily Note     Today's date: 2023  Patient name: Carmina Williamson During  : 1940  MRN: [id number]  Referring provider: Richi Martinez MD  Dx:   Encounter Diagnosis     ICD-10-CM    1. Spinal stenosis of lumbar region with neurogenic claudication  M48.062                      Subjective: Pt states he has not been feeling well due to a recent cold. He reports he is on medication and would like to just do light exercises today. Patient reports he has pain everyday and he feels better around 4-5 pm.  He reports some compliance with HEP with no significant changes. Objective: See treatment diary below      Assessment:  Poor TA activation with diaphragmatic compensation. Patient requires VC to facilitate proper exercise form. All exercises performed in sitting today due to patient's recent illness. Patient completes charted interventions without c/o pain or discomfort. Pt would benefit from continued PT in order to improve core stabilization and motor control for improved lumbar stability during daily activities. Plan: Continue per plan of care. Precautions: spinal stenosis, DMII, COPD, HTN, PAD  Access Code: [email or web address]    POC expires Auth Status Unit limit Start date  Expiration date PT/OT + Visit Limit?    23 Not required 4 23 BOMN                                         Date            Visit # 1 2           Re-Eval             FOTO             Manuals                                                                 Neuro Re-Ed             TA activation 5"x20 sit 5"x20 sit           PB press  Sit 5"x20           DLS march  N20                                                               Ther Ex             Bike             Repeated lumbar flexion x30 x20           3way PB roll out  5"x10           Seated clamshell  RTB x20                                                               Ther Activity                                       Gait Training Modalities

## 2023-07-27 ENCOUNTER — TELEPHONE (OUTPATIENT)
Dept: PAIN MEDICINE | Facility: CLINIC | Age: 83
End: 2023-07-27

## 2023-07-27 ENCOUNTER — HOSPITAL ENCOUNTER (OUTPATIENT)
Dept: RADIOLOGY | Facility: CLINIC | Age: 83
Discharge: HOME | End: 2023-07-27
Payer: MEDICARE

## 2023-07-27 VITALS
TEMPERATURE: 96 F | DIASTOLIC BLOOD PRESSURE: 96 MMHG | OXYGEN SATURATION: 96 % | HEART RATE: 72 BPM | SYSTOLIC BLOOD PRESSURE: 162 MMHG | RESPIRATION RATE: 20 BRPM

## 2023-07-27 DIAGNOSIS — M48.062 SPINAL STENOSIS OF LUMBAR REGION WITH NEUROGENIC CLAUDICATION: ICD-10-CM

## 2023-07-27 PROCEDURE — 62323 NJX INTERLAMINAR LMBR/SAC: CPT | Performed by: STUDENT IN AN ORGANIZED HEALTH CARE EDUCATION/TRAINING PROGRAM

## 2023-07-27 RX ORDER — METHYLPREDNISOLONE ACETATE 80 MG/ML
80 INJECTION, SUSPENSION INTRA-ARTICULAR; INTRALESIONAL; INTRAMUSCULAR; PARENTERAL; SOFT TISSUE ONCE
Status: COMPLETED | OUTPATIENT
Start: 2023-07-27 | End: 2023-07-27

## 2023-07-27 RX ADMIN — IOHEXOL 1 ML: 300 INJECTION, SOLUTION INTRAVENOUS at 14:38

## 2023-07-27 RX ADMIN — METHYLPREDNISOLONE ACETATE 80 MG: 80 INJECTION, SUSPENSION INTRA-ARTICULAR; INTRALESIONAL; INTRAMUSCULAR; PARENTERAL; SOFT TISSUE at 14:38

## 2023-07-27 NOTE — DISCHARGE INSTR - LAB
Epidural Steroid Injection   WHAT YOU NEED TO KNOW:   An epidural steroid injection (PAULA) is a procedure to inject steroid medicine into the epidural space. The epidural space is between your spinal cord and vertebrae. Steroids reduce inflammation and fluid buildup in your spine that may be causing pain. You may be given pain medicine along with the steroids. ACTIVITY  Do not drive or operate machinery today. No strenuous activity today - bending, lifting, etc.  You may resume normal activites starting tomorrow - start slowly and as tolerated. You may shower today, but no tub baths or hot tubs. You may have numbness for several hours from the local anesthetic. Please use caution and common sense, especially with weight-bearing activities. CARE OF THE INJECTION SITE  If you have soreness or pain, apply ice to the area today (20 minutes on/20 minutes off). Starting tomorrow, you may use warm, moist heat or ice if needed. You may have an increase or change in your discomfort for 36-48 hours after your treatment. Apply ice and continue with any pain medication you have been prescribed. Notify [facility] if you have any of the following: redness, drainage, swelling, headache, stiff neck or fever above 100°F.    SPECIAL INSTRUCTIONS  Our office will contact you in approximately 7 days for a progress report. MEDICATIONS  Continue to take all routine medications. Our office may have instructed you to hold some medications. As no general anesthesia was used in today's procedure, you should not experience any side effects related to anesthesia. If you are diabetic, the steroids used in today's injection may temporarily increase your blood sugar levels after the first few days after your injection. Please keep a close eye on your sugars and alert the doctor who manages your diabetes if your sugars are significantly high from your baseline or you are symptomatic.      If you have a problem specifically related to your procedure, please call our office at [phone]. Problems not related to your procedure should be directed to your primary care physician.

## 2023-07-27 NOTE — INTERVAL H&P NOTE
Update: (This section must be completed if the H&P was completed greater than 24 hrs to procedure or admission)    H&P reviewed. After examining the patient, I find no changed to the H&P since it had been written. Patient re-evaluated.  Accept as history and physical.    Quiana Johnson MD/July 27, 2023/2:25 PM

## 2023-08-01 ENCOUNTER — OFFICE VISIT (OUTPATIENT)
Dept: PHYSICAL THERAPY | Facility: CLINIC | Age: 83
End: 2023-08-01
Payer: MEDICARE

## 2023-08-01 DIAGNOSIS — M48.062 SPINAL STENOSIS OF LUMBAR REGION WITH NEUROGENIC CLAUDICATION: Primary | ICD-10-CM

## 2023-08-01 PROCEDURE — 97112 NEUROMUSCULAR REEDUCATION: CPT

## 2023-08-01 PROCEDURE — 97110 THERAPEUTIC EXERCISES: CPT

## 2023-08-01 NOTE — PROGRESS NOTES
Daily Note     Today's date: 2023  Patient name: Sarabjit Recinso During  : 1940  MRN: [id number]  Referring provider: Sherice Altman MD  Dx:   Encounter Diagnosis     ICD-10-CM    1. Spinal stenosis of lumbar region with neurogenic claudication  M48.062                      Subjective: Pt reports he had an epidural injection on  and he has not had any pain. Objective: See treatment diary below      Assessment: Pt benefits from recumbent bike to improve exercise tolerance and muscular endurance. Pt continues to demonstrate poor core stabilization and TA activation. Requires VC to facilitate proper exercise form and dosage. Plan: Continue per plan of care. Precautions: spinal stenosis, DMII, COPD, HTN, PAD  Access Code: [email or web address]    POC expires Auth Status Unit limit Start date  Expiration date PT/OT + Visit Limit?    23 Not required 4 23 BOMN                                         Date           Visit # 1 2 3          Re-Eval             FOTO             Manuals                                                                 Neuro Re-Ed             TA activation 5"x20 sit 5"x20 sit 5"x20 sit          PB press  Sit 5"x20 Sit 5"x20          DLS march  U54 N41                                                              Ther Ex             Pt education   AF          Bike   5'          Repeated lumbar flexion x30 x20           3way PB roll out  5"x10 5"x10          Seated clamshell  RTB x20 RTB x20                                                              Ther Activity                                       Gait Training                                       Modalities

## 2023-08-03 ENCOUNTER — APPOINTMENT (OUTPATIENT)
Dept: PHYSICAL THERAPY | Facility: CLINIC | Age: 83
End: 2023-08-03
Payer: MEDICARE

## 2023-08-03 ENCOUNTER — TELEPHONE (OUTPATIENT)
Dept: PAIN MEDICINE | Facility: CLINIC | Age: 83
End: 2023-08-03

## 2023-08-04 NOTE — TELEPHONE ENCOUNTER
Caller: Greg During   Doctor/office: Dr Ubaldo Campos   CB#: [phone]    % of improvement: 70-75%  Pain Scale (1-10):  2-3/10

## 2023-08-08 ENCOUNTER — OFFICE VISIT (OUTPATIENT)
Dept: PHYSICAL THERAPY | Facility: CLINIC | Age: 83
End: 2023-08-08
Payer: MEDICARE

## 2023-08-08 DIAGNOSIS — M48.062 SPINAL STENOSIS OF LUMBAR REGION WITH NEUROGENIC CLAUDICATION: Primary | ICD-10-CM

## 2023-08-08 PROCEDURE — 97112 NEUROMUSCULAR REEDUCATION: CPT

## 2023-08-08 PROCEDURE — 97110 THERAPEUTIC EXERCISES: CPT

## 2023-08-08 NOTE — PROGRESS NOTES
Daily Note     Today's date: 2023  Patient name: Yessica Biswas During  : 1940  MRN: [id number]  Referring provider: Laith Callejas MD  Dx:   Encounter Diagnosis     ICD-10-CM    1. Spinal stenosis of lumbar region with neurogenic claudication  M48.062           Start Time: 1015  Stop Time: 1100  Total time in clinic (min): 45 minutes    Subjective: Pt reports that his back has been feeling good and has not had pain. Objective: See treatment diary below      Assessment: Pt benefits from recumbent bike to improve exercise tolerance and muscular endurance. Pt requires cueing for proper TA activation, still has difficulty performing without diaphragmatic compensation. Will continue to progress BLE and core strength. Plan: Continue per plan of care. Precautions: spinal stenosis, DMII, COPD, HTN, PAD  Access Code: [email or web address]    POC expires Auth Status Unit limit Start date  Expiration date PT/OT + Visit Limit?    23 Not required 4 23 BOMN                                         Date          Visit # 1 2 3 4         Re-Eval             FOTO             Manuals                                                                 Neuro Re-Ed             TA activation 5"x20 sit 5"x20 sit 5"x20 sit 5"x20 sit         PB press  Sit 5"x20 Sit 5"x20 Sit 5"x20         DLS march  I10 F13 D55                                                             Ther Ex             Pt education   AF          Bike   5' 6'         Repeated lumbar flexion x30 x20           3way PB roll out  5"x10 5"x10 5"x10         Seated clamshell  RTB x20 RTB x20 GTB x30         STS    NV                                                Ther Activity                                       Gait Training                                       Modalities

## 2023-08-10 ENCOUNTER — OFFICE VISIT (OUTPATIENT)
Dept: PHYSICAL THERAPY | Facility: CLINIC | Age: 83
End: 2023-08-10
Payer: MEDICARE

## 2023-08-10 DIAGNOSIS — M48.062 SPINAL STENOSIS OF LUMBAR REGION WITH NEUROGENIC CLAUDICATION: Primary | ICD-10-CM

## 2023-08-10 PROCEDURE — 97112 NEUROMUSCULAR REEDUCATION: CPT

## 2023-08-10 PROCEDURE — 97110 THERAPEUTIC EXERCISES: CPT

## 2023-08-10 NOTE — PROGRESS NOTES
Daily Note     Today's date: 8/10/2023  Patient name: Matthew Lozoya During  : 1940  MRN: [id number]  Referring provider: Ashia Appiah MD  Dx:   Encounter Diagnosis     ICD-10-CM    1. Spinal stenosis of lumbar region with neurogenic claudication  M48.062                      Subjective: Pt reports his lower back has been feeling with the medicine he was taking. He reports he has been more compliant with overall movement and has been attending the gym regularly. Objective: See treatment diary below      Assessment: Pt benefits from recumbent biking in order to improve muscular endurance. Patient demonstrates R fw trunk lean posture and decreased stride length during charted ambulation. Posture improves following L side glides. Demonstrating decreased strength in posterior chain. Plan: Continue per plan of care. Precautions: spinal stenosis, DMII, COPD, HTN, PAD  Access Code: [email or web address]     POC expires Auth Status Unit limit Start date  Expiration date PT/OT + Visit Limit?    23 Not required 4 23 BOMN                                         Date 7/13 7/25 8/1 8/8 8/10        Visit # 1 2 3 4 5        Re-Eval             FOTO     53/47        Manuals                                                                 Neuro Re-Ed             TA activation 5"x20 sit 5"x20 sit 5"x20 sit 5"x20 sit 5"x20 sit        PB press  Sit 5"x20 Sit 5"x20 Sit 5"x20         DLS march  L58 N26 E06         Walking stance c breaths for TA     x10 c 6" exhale                                               Ther Ex             Pt education   AF  AF        Bike   5' 6' 6'        Repeated lumbar flexion x30 x20           3way PB roll out  5"x10 5"x10 5"x10         Seated clamshell  RTB x20 RTB x20 GTB x30         STS    NV NV        Bridges c BUE flexion     2x10        L side glide     x20                     Ther Activity                                       Gait Training Modalities

## 2023-08-15 ENCOUNTER — OFFICE VISIT (OUTPATIENT)
Dept: PHYSICAL THERAPY | Facility: CLINIC | Age: 83
End: 2023-08-15
Payer: MEDICARE

## 2023-08-15 DIAGNOSIS — M48.062 SPINAL STENOSIS OF LUMBAR REGION WITH NEUROGENIC CLAUDICATION: Primary | ICD-10-CM

## 2023-08-15 PROCEDURE — 97110 THERAPEUTIC EXERCISES: CPT

## 2023-08-15 PROCEDURE — 97112 NEUROMUSCULAR REEDUCATION: CPT

## 2023-08-15 NOTE — PROGRESS NOTES
Daily Note     Today's date: 8/15/2023  Patient name: John Vera During  : 1940  MRN: [id number]  Referring provider: Leno Ortega MD  Dx:   Encounter Diagnosis     ICD-10-CM    1. Spinal stenosis of lumbar region with neurogenic claudication  M48.062           Start Time: 1416  Stop Time: 1500  Total time in clinic (min): 44 minutes    Subjective: Pt reports that he forgot about his low back pain. Objective: See treatment diary below      Assessment: Pt benefits from recumbent biking in order to improve muscular endurance. Pt has impaired core endurance and requires cueing to avoid valsalva maneuver with core stabilization exercises. Pt continues to be fatigued with posterior chain strengthening. Plan: Continue per plan of care. Precautions: spinal stenosis, DMII, COPD, HTN, PAD  Access Code: [email or web address]     POC expires Auth Status Unit limit Start date  Expiration date PT/OT + Visit Limit?    23 Not required 4 23 BOMN                                         Date 7/13 7/25 8/1 8/8 8/10 8/15       Visit # 1 2 3 4 5 6       Re-Eval             FOTO     53/47        Manuals                                                                 Neuro Re-Ed             TA activation 5"x20 sit 5"x20 sit 5"x20 sit 5"x20 sit 5"x20 sit 5"x20 sit       PB press  Sit 5"x20 Sit 5"x20 Sit 5"x20         DLS march  E39 Y37 H94  Supine + TA x10 ea       Walking stance c breaths for TA     x10 c 6" exhale x10 c 6" exhale L foot fwd                                              Ther Ex             Pt education   AF  AF        Bike   5' 6' 6' 6'        Repeated lumbar flexion x30 x20           3way PB roll out  5"x10 5"x10 5"x10         Seated clamshell  RTB x20 RTB x20 GTB x30         STS    NV NV YMB 2x5       Bridges c BUE flexion     2x10 2x10       L side glide     x20 x20        Hip abduction      S/l 2x10       Ther Activity                                       Gait Training Modalities

## 2023-08-17 ENCOUNTER — OFFICE VISIT (OUTPATIENT)
Dept: PHYSICAL THERAPY | Facility: CLINIC | Age: 83
End: 2023-08-17
Payer: MEDICARE

## 2023-08-17 DIAGNOSIS — M48.062 SPINAL STENOSIS OF LUMBAR REGION WITH NEUROGENIC CLAUDICATION: Primary | ICD-10-CM

## 2023-08-17 PROCEDURE — 97112 NEUROMUSCULAR REEDUCATION: CPT

## 2023-08-17 PROCEDURE — 97110 THERAPEUTIC EXERCISES: CPT

## 2023-08-17 NOTE — PROGRESS NOTES
Daily Note     Today's date: 2023  Patient name: Kala Cherry During  : 1940  MRN: [id number]  Referring provider: Kristi French MD  Dx:   Encounter Diagnosis     ICD-10-CM    1. Spinal stenosis of lumbar region with neurogenic claudication  M48.062                      Subjective: Pt reports he walked 6-7 blocks in [place] for approximately 45 minutes yesterday and he felt L LE pain. Patient denies pain in his low back. Objective: See treatment diary below      Assessment: Pt benefits from recumbent bike in order to promote muscular endurance, strength, and exercise tolerance. Patient exhibiting improved posture in standing. Requires CGA to perform functional balance training. Improving core stabilization. Pt still with posterior chain weakness. Plan: Continue per plan of care. Precautions: spinal stenosis, DMII, COPD, HTN, PAD  Access Code: [email or web address]     POC expires Auth Status Unit limit Start date  Expiration date PT/OT + Visit Limit?    23 Not required 4 23 BOMN                                         Date 7/13 7/25 8/1 8/8 8/10 8/15 8/17      Visit # 1 2 3 4 5 6 7      Re-Eval             FOTO     53/47        Manuals                                                                 Neuro Re-Ed             TA activation 5"x20 sit 5"x20 sit 5"x20 sit 5"x20 sit 5"x20 sit 5"x20 sit 5"x20 sit      PB press  Sit 5"x20 Sit 5"x20 Sit 5"x20         DLS march  C11 G01 A68  Supine + TA x10 ea       Walking stance c breaths for TA     x10 c 6" exhale x10 c 6" exhale L foot fwd x10 c 6" exhale L foot fw      Prone glut set       3"x20 c 2 pillows                                Ther Ex             Pt education   AF  AF        Bike   5' 6' 6' 6'  10'      Repeated lumbar flexion x30 x20           3way PB roll out  5"x10 5"x10 5"x10         Seated clamshell  RTB x20 RTB x20 GTB x30         STS    NV NV YMB 2x5       Bridges c BUE flexion     2x10 2x10 3x10      L side glide     x20 x20        Hip abduction      S/l 2x10 S/l 2x10      Prone lying c 2 pillows       2'      Ther Activity                                       Gait Training                                       Modalities

## 2023-08-21 ENCOUNTER — OFFICE VISIT (OUTPATIENT)
Dept: PHYSICAL THERAPY | Facility: CLINIC | Age: 83
End: 2023-08-21
Payer: MEDICARE

## 2023-08-21 ENCOUNTER — HOSPITAL ENCOUNTER (OUTPATIENT)
Dept: NON INVASIVE DIAGNOSTICS | Facility: CLINIC | Age: 83
Discharge: HOME | End: 2023-08-21
Payer: MEDICARE

## 2023-08-21 DIAGNOSIS — I73.9 PERIPHERAL ARTERY DISEASE (HCC): ICD-10-CM

## 2023-08-21 DIAGNOSIS — M48.062 SPINAL STENOSIS OF LUMBAR REGION WITH NEUROGENIC CLAUDICATION: Primary | ICD-10-CM

## 2023-08-21 PROCEDURE — 93923 UPR/LXTR ART STDY 3+ LVLS: CPT

## 2023-08-21 PROCEDURE — 97112 NEUROMUSCULAR REEDUCATION: CPT

## 2023-08-21 PROCEDURE — 97110 THERAPEUTIC EXERCISES: CPT

## 2023-08-21 PROCEDURE — 93925 LOWER EXTREMITY STUDY: CPT

## 2023-08-21 PROCEDURE — 93925 LOWER EXTREMITY STUDY: CPT | Performed by: SURGERY

## 2023-08-21 PROCEDURE — 93923 UPR/LXTR ART STDY 3+ LVLS: CPT | Performed by: SURGERY

## 2023-08-21 NOTE — PROGRESS NOTES
Daily Note     Today's date: 2023  Patient name: Milagro Mcclure During  : 1940  MRN: [id number]  Referring provider: Olivia Garcia MD  Dx:   Encounter Diagnosis     ICD-10-CM    1. Spinal stenosis of lumbar region with neurogenic claudication  M48.062                      Subjective: Patient reports he still has difficulty with his balance. Objective: See treatment diary below      Assessment: Requires CGA/close supervision in order to perform staggered stance NRE. Patient demonstrating difficulty with hip extension strengthening. Patient requires VC to improve coordination for multi-extremity exercises. Patient completes charted interventions without c/o pain or discomfort. Posture continues to improve. Plan: Continue per plan of care. Precautions: spinal stenosis, DMII, COPD, HTN, PAD  Access Code: [email or web address]     POC expires Auth Status Unit limit Start date  Expiration date PT/OT + Visit Limit?    23 Not required 4 23 BOMN                                         Date 7/13 7/25 8/1 8/8 8/10 8/15 8/17 8/21     Visit # 1 2 3 4 5 6 7 8     Re-Eval             FOTO     53/47        Manuals                                                                 Neuro Re-Ed             TA activation 5"x20 sit 5"x20 sit 5"x20 sit 5"x20 sit 5"x20 sit 5"x20 sit 5"x20 sit      PB press  Sit 5"x20 Sit 5"x20 Sit 5"x20         DLS march  N03 D82 O57  Supine + TA x10 ea       Walking stance c breaths for TA     x10 c 6" exhale x10 c 6" exhale L foot fwd x10 c 6" exhale L foot fw RTB 2x10     Prone glut set       3"x20 c 2 pillows 3"x20 c 2 pillows     paloff press        GTB x20     TA STS             Ther Ex             Pt education   AF  AF        Bike   5' 6' 6' 6'  10' 10'     Repeated lumbar flexion x30 x20           3way PB roll out  5"x10 5"x10 5"x10         Seated clamshell  RTB x20 RTB x20 GTB x30         STS    NV NV YMB 2x5       Bridges c BUE flexion     2x10 2x10 3x10 3x10     L side glide     x20 x20        Hip abduction      S/l 2x10 S/l 2x10      Prone lying c 2 pillows       2' 2'     Prone hip ext        2x5     Ther Activity                                       Gait Training                                       Modalities

## 2023-08-24 ENCOUNTER — OFFICE VISIT (OUTPATIENT)
Dept: PHYSICAL THERAPY | Facility: CLINIC | Age: 83
End: 2023-08-24
Payer: MEDICARE

## 2023-08-24 DIAGNOSIS — M48.062 SPINAL STENOSIS OF LUMBAR REGION WITH NEUROGENIC CLAUDICATION: Primary | ICD-10-CM

## 2023-08-24 PROCEDURE — 97110 THERAPEUTIC EXERCISES: CPT

## 2023-08-24 PROCEDURE — 97112 NEUROMUSCULAR REEDUCATION: CPT

## 2023-08-24 NOTE — PROGRESS NOTES
Daily Note     Today's date: 2023  Patient name: Brandie Donovan During  : 1940  MRN: [id number]  Referring provider: Donna Carey MD  Dx:   Encounter Diagnosis     ICD-10-CM    1. Spinal stenosis of lumbar region with neurogenic claudication  M48.062                      Subjective: Patient states he is feeling good upon arrival to session. Objective: See treatment diary below      Assessment: Following recumbent bike for muscular endurance, patient experienced "heaviness" in L LE, which subsided with seated rest.  Patient was then able to perform charted interventions with minimal c/o pain or discomfort. Patient performed stretching activities with relief post session. Improving core stabilization. Decreased functional endurance and strength present. Plan: Continue per plan of care. Precautions: spinal stenosis, DMII, COPD, HTN, PAD  Access Code: [email or web address]     POC expires Auth Status Unit limit Start date  Expiration date PT/OT + Visit Limit?    23 Not required 4 23 BOMN                                         Date 7/13 7/25 8/1 8/8 8/10 8/15 8/17 8/21 8/24    Visit # 1 2 3 4 5 6 7 8 9    Re-Eval             FOTO     53/47        Manuals                                                                 Neuro Re-Ed             TA activation 5"x20 sit 5"x20 sit 5"x20 sit 5"x20 sit 5"x20 sit 5"x20 sit 5"x20 sit      PB press  Sit 5"x20 Sit 5"x20 Sit 5"x20         DLS march  I71 L09 G19  Supine + TA x10 ea       Walking stance c breaths for TA     x10 c 6" exhale x10 c 6" exhale L foot fwd x10 c 6" exhale L foot fw RTB 2x10 RTB 2x10    Prone glut set       3"x20 c 2 pillows 3"x20 c 2 pillows 3"x20 c 2 pillows    paloff press        GTB x20 GTB x20    TA STS         2x8 lo mat    Modified dead bug         2x10    Ther Ex             Pt education   AF  AF        Bike   5' 6' 6' 6'  10' 10' 10'    Repeated lumbar flexion x30 x20           3way PB roll out  5"x10 5"x10 5"x10 Seated clamshell  RTB x20 RTB x20 GTB x30         STS    NV NV YMB 2x5       Bridges c BUE flexion     2x10 2x10 3x10 3x10 3x10    L side glide     x20 x20        Hip abduction      S/l 2x10 S/l 2x10      Prone lying c 2 pillows       2' 2' 2'    Prone hip ext        2x5 2x8    Hip flexor stretch b/l         1' x2    Ther Activity                                       Gait Training                                       Modalities

## 2023-08-29 ENCOUNTER — OFFICE VISIT (OUTPATIENT)
Dept: PHYSICAL THERAPY | Facility: CLINIC | Age: 83
End: 2023-08-29
Payer: MEDICARE

## 2023-08-29 DIAGNOSIS — M48.062 SPINAL STENOSIS OF LUMBAR REGION WITH NEUROGENIC CLAUDICATION: Primary | ICD-10-CM

## 2023-08-29 PROCEDURE — 97110 THERAPEUTIC EXERCISES: CPT

## 2023-08-29 PROCEDURE — 97112 NEUROMUSCULAR REEDUCATION: CPT

## 2023-08-29 NOTE — PROGRESS NOTES
Daily Note     Today's date: 2023  Patient name: Zita Ortega During  : 1940  MRN: [id number]  Referring provider: Francisco Wellington MD  Dx:   Encounter Diagnosis     ICD-10-CM    1. Spinal stenosis of lumbar region with neurogenic claudication  M48.062                      Subjective: Patient offers no new complaints. Objective: See treatment diary below      Assessment: Tolerated treatment well. Able to complete charted program with minimal discomfort. He was able to progress reps as charted below. He is challenged with prone hip ext, VC/TC  to decrease trunk rot bilaterally. 2 times he transferred from table to standing and required PTA assistance to steady as he was unable to control fwd propulsion. Decreased trunk stability noted in staggered stance, CS and occasional CGA to steady. Patient demonstrated fatigue post treatment and would benefit from continued PT      Plan: Progress treatment as tolerated. Precautions: spinal stenosis, DMII, COPD, HTN, PAD  Access Code: [email or web address]     POC expires Auth Status Unit limit Start date  Expiration date PT/OT + Visit Limit?    23 Not required 4 23 BOMN                                         Date 7/13 7/25 8/1 8/8 8/10 8/15 8/17 8/21 8/24 8/29   Visit # 1 2 3 4 5 6 7 8 9 10   Re-Eval             FOTO     53/47        Manuals                                                                 Neuro Re-Ed             TA activation 5"x20 sit 5"x20 sit 5"x20 sit 5"x20 sit 5"x20 sit 5"x20 sit 5"x20 sit      PB press  Sit 5"x20 Sit 5"x20 Sit 5"x20         DLS march  V55 C97 J82  Supine + TA x10 ea       Walking stance c breaths for TA     x10 c 6" exhale x10 c 6" exhale L foot fwd x10 c 6" exhale L foot fw RTB 2x10 RTB 2x10 RTB 2x10    Prone glut set       3"x20 c 2 pillows 3"x20 c 2 pillows 3"x20 c 2 pillows 3"x20 c 2 pillows   paloff press        GTB x20 GTB x20 GTB 20x   TA STS         2x8 lo mat lowered hi-lo  2x10   Modified dead bug 2x10 NV   Ther Ex             Pt education   AF  AF        Bike   5' 6' 6' 6'  10' 10' 10' 10'   Repeated lumbar flexion x30 x20           3way PB roll out  5"x10 5"x10 5"x10         Seated clamshell  RTB x20 RTB x20 GTB x30         STS    NV NV YMB 2x5       Bridges c BUE flexion     2x10 2x10 3x10 3x10 3x10 3x10    L side glide     x20 x20        Hip abduction      S/l 2x10 S/l 2x10      Prone lying c 2 pillows       2' 2' 2' 2'    Prone hip ext        2x5 2x8 2x10   Hip flexor stretch b/l         1' x2 Supine w/ strap +RF 1'x2    Ther Activity                                       Gait Training                                       Modalities

## 2023-08-31 ENCOUNTER — OFFICE VISIT (OUTPATIENT)
Dept: PHYSICAL THERAPY | Facility: CLINIC | Age: 83
End: 2023-08-31
Payer: MEDICARE

## 2023-08-31 DIAGNOSIS — M48.062 SPINAL STENOSIS OF LUMBAR REGION WITH NEUROGENIC CLAUDICATION: Primary | ICD-10-CM

## 2023-08-31 PROCEDURE — 97110 THERAPEUTIC EXERCISES: CPT

## 2023-08-31 PROCEDURE — 97112 NEUROMUSCULAR REEDUCATION: CPT

## 2023-08-31 NOTE — PROGRESS NOTES
Daily Note     Today's date: 2023  Patient name: Alfredo Serrano During  : 1940  MRN: [id number]  Referring provider: Kiersten Camargo MD  Dx:   Encounter Diagnosis     ICD-10-CM    1. Spinal stenosis of lumbar region with neurogenic claudication  M48.062           Start Time: 1146  Stop Time: 2403  Total time in clinic (min): 45 minutes    Subjective: Pt reports that he is doing much better since receiving the injections and participating in PT. He states that he woke up very early today so he is a bit out of it. Objective: See treatment diary below      Assessment: Pt continues with balance deficits and LOB while in stride stance requiring CGA to maintain balance. Pt has improved ROM with prone hip extension and has improved muscular endurance noted throughout the session. Will continue to progress balance activities and core stabilization as able. Plan: Progress treatment as tolerated. Precautions: spinal stenosis, DMII, COPD, HTN, PAD  Access Code: [email or web address]     POC expires Auth Status Unit limit Start date  Expiration date PT/OT + Visit Limit?    23 Not required 4 23 BOMN                                         Date 8/31 7/25 8/1 8/8 8/10 8/15 8/17 8/21 8/24 8/29   Visit # 11 2 3 4 5 6 7 8 9 10   Re-Eval             FOTO     53/47        Manuals                                                                 Neuro Re-Ed             TA activation  5"x20 sit 5"x20 sit 5"x20 sit 5"x20 sit 5"x20 sit 5"x20 sit      PB press  Sit 5"x20 Sit 5"x20 Sit 5"x20         DLS march  F65 D89 V28  Supine + TA x10 ea       Walking stance c breaths for TA RTB 2x10    x10 c 6" exhale x10 c 6" exhale L foot fwd x10 c 6" exhale L foot fw RTB 2x10 RTB 2x10 RTB 2x10    Prone glut set       3"x20 c 2 pillows 3"x20 c 2 pillows 3"x20 c 2 pillows 3"x20 c 2 pillows   paloff press        GTB x20 GTB x20 GTB 20x   TA STS Gray mat 1x10    YMB 1x10        2x8 lo mat lowered hi-lo  2x10   Modified dead bug         2x10 NV   Ther Ex             Pt education   AF  AF        Bike 10'  5' 6' 6' 6'  10' 10' 10' 10'   Repeated lumbar flexion  x20           3way PB roll out  5"x10 5"x10 5"x10         Seated clamshell  RTB x20 RTB x20 GTB x30         STS    NV NV YMB 2x5       Bridges c BUE flexion 2x10    2x10 2x10 3x10 3x10 3x10 3x10    L side glide     x20 x20        Hip abduction      S/l 2x10 S/l 2x10      Prone lying c 2 pillows 1 pillow 2'       2' 2' 2' 2'    Prone hip ext 2x8       2x5 2x8 2x10   Hip flexor stretch b/l supine c strap 1' x2         1' x2 Supine w/ strap +RF 1'x2    Seated chops YMB 1x10 ea            Ther Activity                                       Gait Training                                       Modalities

## 2023-09-05 ENCOUNTER — OFFICE VISIT (OUTPATIENT)
Dept: PHYSICAL THERAPY | Facility: CLINIC | Age: 83
End: 2023-09-05
Payer: MEDICARE

## 2023-09-05 DIAGNOSIS — M48.062 SPINAL STENOSIS OF LUMBAR REGION WITH NEUROGENIC CLAUDICATION: Primary | ICD-10-CM

## 2023-09-05 PROCEDURE — 97112 NEUROMUSCULAR REEDUCATION: CPT

## 2023-09-05 PROCEDURE — 97110 THERAPEUTIC EXERCISES: CPT

## 2023-09-05 NOTE — PROGRESS NOTES
Daily Note     Today's date: 2023  Patient name: Abilio Brands During  : 1940  MRN: [id number]  Referring provider: Virginia Tee MD  Dx:   Encounter Diagnosis     ICD-10-CM    1. Spinal stenosis of lumbar region with neurogenic claudication  M48.062                      Subjective: Pt reports he continues to improve overall and denies LBP upon arrival.      Objective: See treatment diary below      Assessment: Pt is becoming more comfortable in prone, requiring less time to adjust.  Transfers are improving (bed mobility, STS). Requires less rest breaks due to improvement in overall endurance. Patient is requiring close supervision to perform standing core stabilization. Still challenged with balance and stability. Pt would benefit from continued PT in order to address core stability and balance for improved safety and function during daily activities. Plan: Continue per plan of care. Precautions: spinal stenosis, DMII, COPD, HTN, PAD  Access Code: [email or web address]     POC expires Auth Status Unit limit Start date  Expiration date PT/OT + Visit Limit?    23 Not required 4 23 BOMN                                         Date 8/31 9/5 8/1 8/8 8/10 8/15 8/17 8/21 8/24 8/29   Visit # 11 12 3 4 5 6 7 8 9 10   Re-Eval             FOTO     53/47        Manuals                                                                 Neuro Re-Ed             TA activation   5"x20 sit 5"x20 sit 5"x20 sit 5"x20 sit 5"x20 sit      PB press   Sit 5"x20 Sit 5"x20         DLS  O68  Supine + TA x10 ea       Walking stance c breaths for TA RTB 2x10 GTB 2x10 c LPD   x10 c 6" exhale x10 c 6" exhale L foot fwd x10 c 6" exhale L foot fw RTB 2x10 RTB 2x10 RTB 2x10    Prone glut set       3"x20 c 2 pillows 3"x20 c 2 pillows 3"x20 c 2 pillows 3"x20 c 2 pillows   paloff press  GTB x20      GTB x20 GTB x20 GTB 20x   TA STS Gray mat 1x10    YMB 1x10 Chair 2x10       2x8 lo mat lowered hi-lo  2x10   Modified dead bug  Stand at step 2x10 ea       2x10 NV   Ther Ex             Pt education   AF  AF        Bike 10' 10' 5' 6' 6' 6'  10' 10' 10' 10'   Repeated lumbar flexion             3way PB roll out   5"x10 5"x10         Seated clamshell   RTB x20 GTB x30         Bridges c BUE flexion 2x10    2x10 2x10 3x10 3x10 3x10 3x10    L side glide     x20 x20        Hip abduction      S/l 2x10 S/l 2x10      Prone lying c 2 pillows 1 pillow 2'  1 pillow     2' 2' 2' 2'    Prone hip ext 2x8 2x10      2x5 2x8 2x10   Hip flexor stretch b/l supine c strap 1' x2  Supine c strap 1' x2       1' x2 Supine w/ strap +RF 1'x2    Seated chops YMB 1x10 ea YMB x10           Ther Activity                                       Gait Training                                       Modalities

## 2023-09-06 DIAGNOSIS — I10 ESSENTIAL HYPERTENSION: Chronic | ICD-10-CM

## 2023-09-06 RX ORDER — DILTIAZEM HYDROCHLORIDE 360 MG/1
CAPSULE, EXTENDED RELEASE ORAL
Qty: 90 CAPSULE | Refills: 3 | Status: SHIPPED | OUTPATIENT
Start: 2023-09-06

## 2023-09-07 ENCOUNTER — OFFICE VISIT (OUTPATIENT)
Dept: PHYSICAL THERAPY | Facility: CLINIC | Age: 83
End: 2023-09-07
Payer: MEDICARE

## 2023-09-07 DIAGNOSIS — M48.062 SPINAL STENOSIS OF LUMBAR REGION WITH NEUROGENIC CLAUDICATION: Primary | ICD-10-CM

## 2023-09-07 PROCEDURE — 97110 THERAPEUTIC EXERCISES: CPT

## 2023-09-07 PROCEDURE — 97112 NEUROMUSCULAR REEDUCATION: CPT

## 2023-09-07 NOTE — PROGRESS NOTES
Daily Note     Today's date: 2023  Patient name: Areli Silvestre During  : 1940  MRN: [id number]  Referring provider: Rafael Heller MD  Dx:   Encounter Diagnosis     ICD-10-CM    1. Spinal stenosis of lumbar region with neurogenic claudication  M48.062           Start Time: 1503  Stop Time: 2654  Total time in clinic (min): 42 minutes    Subjective: Pt reports that he is feeling better. Objective: See treatment diary below      Assessment: Pt continues to make improvements in regards to endurance and strength. Pt continues to lack balance and proprioception with dynamic activities. Will continue to challenge dynamic balance in coming sessions. Pt would benefit from continued PT in order to address core stability and balance for improved safety and function during daily activities. Plan: Continue per plan of care. Precautions: spinal stenosis, DMII, COPD, HTN, PAD  Access Code: [email or web address]     POC expires Auth Status Unit limit Start date  Expiration date PT/OT + Visit Limit?    23 Not required 4 23 BOMN                                         Date 8/31 9/5 9/7 8/8 8/10 8/15 8/17 8/21 8/24 8/29   Visit # 11 12 13 4 5 6 7 8 9 10   Re-Eval             FOTO     53/47        Manuals                                                                 Neuro Re-Ed             TA activation    5"x20 sit 5"x20 sit 5"x20 sit 5"x20 sit      PB press    Sit 5"x20         DLS march    H41  Supine + TA x10 ea       Walking stance c breaths for TA RTB 2x10 GTB 2x10 c LPD GTB 2x10 c LPD  x10 c 6" exhale x10 c 6" exhale L foot fwd x10 c 6" exhale L foot fw RTB 2x10 RTB 2x10 RTB 2x10    Prone glut set       3"x20 c 2 pillows 3"x20 c 2 pillows 3"x20 c 2 pillows 3"x20 c 2 pillows   paloff press  GTB x20 GTB x20     GTB x20 GTB x20 GTB 20x   TA STS Gray mat 1x10    YMB 1x10 Chair 2x10 Chair YMB 2x10      2x8 lo mat lowered hi-lo  2x10   Modified dead bug  Stand at step 2x10 ea       2x10 NV   TA overhead lift   YMB 2x10          Ther Ex             Pt education     AF        Bike 10' 10' 6' 6' 6' 6'  10' 10' 10' 10'   Repeated lumbar flexion             3way PB roll out    5"x10         Seated clamshell    GTB x30         Bridges c BUE flexion 2x10  RTB x30  2x10 2x10 3x10 3x10 3x10 3x10    L side glide     x20 x20        Hip abduction      S/l 2x10 S/l 2x10      Prone lying c 2 pillows 1 pillow 2'  1 pillow 1 pillow 2'    2' 2' 2' 2'    Prone hip ext 2x8 2x10      2x5 2x8 2x10   Hip flexor stretch b/l supine c strap 1' x2  Supine c strap 1' x2 Supine c strap 1'x2      1' x2 Supine w/ strap +RF 1'x2    Seated chops YMB 1x10 ea YMB x10 YMB 2x10 ea          Ther Activity                                       Gait Training                                       Modalities

## 2023-09-12 ENCOUNTER — OFFICE VISIT (OUTPATIENT)
Dept: PHYSICAL THERAPY | Facility: CLINIC | Age: 83
End: 2023-09-12
Payer: MEDICARE

## 2023-09-12 DIAGNOSIS — M48.062 SPINAL STENOSIS OF LUMBAR REGION WITH NEUROGENIC CLAUDICATION: Primary | ICD-10-CM

## 2023-09-12 PROCEDURE — 97110 THERAPEUTIC EXERCISES: CPT

## 2023-09-12 PROCEDURE — 97112 NEUROMUSCULAR REEDUCATION: CPT

## 2023-09-12 NOTE — PROGRESS NOTES
Daily Note     Today's date: 2023  Patient name: Kylie Layne During  : 1940  MRN: [id number]  Referring provider: Noble Severin, MD  Dx:   Encounter Diagnosis     ICD-10-CM    1. Spinal stenosis of lumbar region with neurogenic claudication  M48.062                      Subjective: Pt states his low back pain has been creeping up on him. He denies any known cause. Pt reports his knees have also been bothering him. Objective: See treatment diary below      Assessment: Pt demonstrates decreased hamstring flexibility (L>R). Continues to have balance deficits and decreased motor control. Requires VC to facilitate proper exercise form and dosage. No change in low back pain post session. Plan: Continue per plan of care. Precautions: spinal stenosis, DMII, COPD, HTN, PAD  Access Code: [email or web address]     POC expires Auth Status Unit limit Start date  Expiration date PT/OT + Visit Limit?    23 Not required 4 23 BOMN                                         Date 8/31 9/5 9/7 9/12 8/10 8/15 8/17 8/21 8/24 8/29   Visit # 11 12 13 14 5 6 7 8 9 10   Re-Eval             FOTO     53/47        Manuals                                                                 Neuro Re-Ed             TA activation     5"x20 sit 5"x20 sit 5"x20 sit      PB press             DLS march      Supine + TA x10 ea       Walking stance c breaths for TA RTB 2x10 GTB 2x10 c LPD GTB 2x10 c LPD  x10 c 6" exhale x10 c 6" exhale L foot fwd x10 c 6" exhale L foot fw RTB 2x10 RTB 2x10 RTB 2x10    Prone glut set       3"x20 c 2 pillows 3"x20 c 2 pillows 3"x20 c 2 pillows 3"x20 c 2 pillows   paloff press  GTB x20 GTB x20 BTB x20    GTB x20 GTB x20 GTB 20x   TA STS Gray mat 1x10    YMB 1x10 Chair 2x10 Chair YMB 2x10 Chair BMB 2x10     2x8 lo mat lowered hi-lo  2x10   Modified dead bug  Stand at step 2x10 ea  Stand at step 2x10     2x10 NV   TA overhead lift   YMB 2x10          Ther Ex             Pt education     AF Bike 10' 10' 6' 10' 6' 6'  10' 10' 10' 10'   Repeated lumbar flexion             3way PB roll out             Seated clamshell             Bridges c BUE flexion 2x10  RTB x30  2x10 2x10 3x10 3x10 3x10 3x10    L side glide     x20 x20        Hip abduction      S/l 2x10 S/l 2x10      Prone lying c 2 pillows 1 pillow 2'  1 pillow 1 pillow 2'    2' 2' 2' 2'    Prone hip ext 2x8 2x10      2x5 2x8 2x10   Hip flexor stretch b/l supine c strap 1' x2  Supine c strap 1' x2 Supine c strap 1'x2 Supine c strap 1'x2     1' x2 Supine w/ strap +RF 1'x2    Seated chops YMB 1x10 ea YMB x10 YMB 2x10 ea          Supine HS stretch    30"x3         Ther Activity                                       Gait Training                                       Modalities

## 2023-09-14 ENCOUNTER — OFFICE VISIT (OUTPATIENT)
Dept: PHYSICAL THERAPY | Facility: CLINIC | Age: 83
End: 2023-09-14
Payer: MEDICARE

## 2023-09-14 DIAGNOSIS — M48.062 SPINAL STENOSIS OF LUMBAR REGION WITH NEUROGENIC CLAUDICATION: Primary | ICD-10-CM

## 2023-09-14 PROCEDURE — 97112 NEUROMUSCULAR REEDUCATION: CPT

## 2023-09-14 PROCEDURE — 97110 THERAPEUTIC EXERCISES: CPT

## 2023-09-14 NOTE — PROGRESS NOTES
Daily Note     Today's date: 2023  Patient name: Ethan Thomas During  : 1940  MRN: [id number]  Referring provider: Mario Becerra MD  Dx:   Encounter Diagnosis     ICD-10-CM    1. Spinal stenosis of lumbar region with neurogenic claudication  M48.062           Start Time: 1147  Stop Time: 6790  Total time in clinic (min): 45 minutes    Subjective: Pt reports that his back pain is coming back. He states that he does his exercises and still thinks that it is returning. Objective: See treatment diary below      Assessment: Pt continues to respond favorably to repeated lumbar flexion with decreased low back pain. PT encouraged pt to perform seated lumbar flexion everyday, multiple times per day, pt verbalized understanding. Will monitor prolonged response NV. Plan: Continue per plan of care. Precautions: spinal stenosis, DMII, COPD, HTN, PAD  Access Code: [email or web address]     POC expires Auth Status Unit limit Start date  Expiration date PT/OT + Visit Limit?    23 Not required 4 23 BOMN                                         Date 8/31 9/5 9/7 9/12 9/14 8/15 8/17 8/21 8/24 8/29   Visit # 11 12 13 14 15 6 7 8 9 10   Re-Eval             FOTO             Manuals                                                                 Neuro Re-Ed             TA activation      5"x20 sit 5"x20 sit      PB press             DLS march      Supine + TA x10 ea       Walking stance c breaths for TA RTB 2x10 GTB 2x10 c LPD GTB 2x10 c LPD   x10 c 6" exhale L foot fwd x10 c 6" exhale L foot fw RTB 2x10 RTB 2x10 RTB 2x10    Prone glut set       3"x20 c 2 pillows 3"x20 c 2 pillows 3"x20 c 2 pillows 3"x20 c 2 pillows   paloff press  GTB x20 GTB x20 BTB x20    GTB x20 GTB x20 GTB 20x   TA STS Gray mat 1x10    YMB 1x10 Chair 2x10 Chair YMB 2x10 Chair BMB 2x10 Gray mat BMB 2x10    2x8 lo mat lowered hi-lo  2x10   Modified dead bug  Stand at step 2x10 ea  Stand at step 2x10 Stand at step 2x10    2x10 NV   TA overhead lift   YMB 2x10  BMB 2x10        SLS     Foam 10" ea        Ther Ex             Pt education             Bike 10' 10' 6' 10' 10' 6'  10' 10' 10' 10'   Repeated lumbar flexion     2x30        3way PB roll out             Seated clamshell             Bridges c BUE flexion 2x10  RTB x30   2x10 3x10 3x10 3x10 3x10    L side glide      x20        Hip abduction      S/l 2x10 S/l 2x10      Prone lying c 2 pillows 1 pillow 2'  1 pillow 1 pillow 2'    2' 2' 2' 2'    Prone hip ext 2x8 2x10      2x5 2x8 2x10   Hip flexor stretch b/l supine c strap 1' x2  Supine c strap 1' x2 Supine c strap 1'x2 Supine c strap 1'x2     1' x2 Supine w/ strap +RF 1'x2    Seated chops YMB 1x10 ea YMB x10 YMB 2x10 ea          Supine HS stretch    30"x3 Sit 30"x3 ea        Mini squats     2x10        HR     x20        Ther Activity                                       Gait Training                                       Modalities

## 2023-09-19 ENCOUNTER — OFFICE VISIT (OUTPATIENT)
Dept: PHYSICAL THERAPY | Facility: CLINIC | Age: 83
End: 2023-09-19
Payer: MEDICARE

## 2023-09-19 DIAGNOSIS — M48.062 SPINAL STENOSIS OF LUMBAR REGION WITH NEUROGENIC CLAUDICATION: Primary | ICD-10-CM

## 2023-09-19 PROCEDURE — 97110 THERAPEUTIC EXERCISES: CPT

## 2023-09-19 PROCEDURE — 97112 NEUROMUSCULAR REEDUCATION: CPT

## 2023-09-19 NOTE — PROGRESS NOTES
Daily Note     Today's date: 2023  Patient name: Shefali Hernandez During  : 1940  MRN: [id number]  Referring provider: Clifford Bojorquez MD  Dx:   Encounter Diagnosis     ICD-10-CM    1. Spinal stenosis of lumbar region with neurogenic claudication  M48.062           Start Time: 1148  Stop Time: 1232  Total time in clinic (min): 44 minutes    Subjective: Pt states that when he does his exercises he feels good, but he needs to continue with his exercise. Objective: See treatment diary below      Assessment: Pt has improved endurance in comparison to previous sessions. Pt continues with balance deficits. Pt continues with decreased pain with repeated lumbar flexion. Will continue with core strengthening. Plan: Continue per plan of care. Precautions: spinal stenosis, DMII, COPD, HTN, PAD  Access Code: [email or web address]     POC expires Auth Status Unit limit Start date  Expiration date PT/OT + Visit Limit?    23 Not required 4 23 BOMN                                         Date    Visit # 11 12 13 14 15 16 7 8 9 10   Re-Eval             FOTO             Manuals                                                                 Neuro Re-Ed             TA activation       5"x20 sit      PB press             DLS  stance c breaths for TA RTB 2x10 GTB 2x10 c LPD GTB 2x10 c LPD    x10 c 6" exhale L foot fw RTB 2x10 RTB 2x10 RTB 2x10    Prone glut set       3"x20 c 2 pillows 3"x20 c 2 pillows 3"x20 c 2 pillows 3"x20 c 2 pillows   paloff press  GTB x20 GTB x20 BTB x20    GTB x20 GTB x20 GTB 20x   TA STS Gray mat 1x10    YMB 1x10 Chair 2x10 Chair YMB 2x10 Chair BMB 2x10 Gray mat BMB 2x10 Chair BMB x20   2x8 lo mat lowered hi-lo  2x10   Modified dead bug  Stand at step 2x10 ea  Stand at step 2x10 Stand at step 2x10 Stand at 8" step x20   2x10 NV   TA overhead lift   YMB 2x10  BMB 2x10 BMB x20       SLS     Foam 10" ea        BioDex LOS      Static x3       Ther Ex             Pt education             Bike 10' 10' 6' 10' 10' 8' 10' 10' 10' 10'   Repeated lumbar flexion     2x30 2x30       3way PB roll out             Seated clamshell             Bridges c BUE flexion 2x10  RTB x30    3x10 3x10 3x10 3x10    L side glide             Hip abduction       S/l 2x10      Prone lying c 2 pillows 1 pillow 2'  1 pillow 1 pillow 2'    2' 2' 2' 2'    Prone hip ext 2x8 2x10      2x5 2x8 2x10   Hip flexor stretch b/l supine c strap 1' x2  Supine c strap 1' x2 Supine c strap 1'x2 Supine c strap 1'x2     1' x2 Supine w/ strap +RF 1'x2    Seated chops YMB 1x10 ea YMB x10 YMB 2x10 ea          Supine HS stretch    30"x3 Sit 30"x3 ea        Mini squats     2x10 2x10       HR     x20 x20 + TR       Leg press      125# x20       Ther Activity                                       Gait Training                                       Modalities

## 2023-09-21 ENCOUNTER — EVALUATION (OUTPATIENT)
Dept: PHYSICAL THERAPY | Facility: CLINIC | Age: 83
End: 2023-09-21
Payer: MEDICARE

## 2023-09-21 DIAGNOSIS — M48.062 SPINAL STENOSIS OF LUMBAR REGION WITH NEUROGENIC CLAUDICATION: Primary | ICD-10-CM

## 2023-09-21 PROCEDURE — 97110 THERAPEUTIC EXERCISES: CPT

## 2023-09-21 NOTE — PROGRESS NOTES
PT Discharge    Today's date: 2023  Patient name: Anisa Berrios During  : 1940  MRN: [id number]  Referring provider: Tori Gill MD  Dx:   Encounter Diagnosis     ICD-10-CM    1. Spinal stenosis of lumbar region with neurogenic claudication  M48.062           Start Time: 1147  Stop Time: 1227  Total time in clinic (min): 40 minutes    Assessment  Assessment details: Pt is a 82 y.o. male presenting to PT services with c/o LBP secondary to spinal stenosis. Pt has been participating in PT for 10 weeks and has made significant improvement in regards to functional ability, balance, BLE strength, core activation, and decreased pain. PT and pt have discussed and agreed that pt has met maximum benefit of skilled physical therapy and will continue to benefit from independent performance of HEP. PT updated and reviewed HEP, pt verbalized understanding. Pt was informed that if he has any questions or concerns he is welcome to contact PT. Pt is discharged from skilled physical therapy at this time. Goals  STG (5 weeks):  1. Pt will improve TA activation evidenced by palpable contraction while breathing GOAL MET  2. Pt will improve b/l hip abduction strength to be at least 4/5 GOAL MET  3. Pt will report pain at worst 5/10 GOAL MET    LTG (10 weeks):  1. Pt will be independent in HEP GOAL MET  2. Pt will improve global BLE strength to be 5/5 GOAL MET  3. Pt will improve TA activation evidenced by palpable contraction with dynamic movement GOAL MET    Plan  Therapy options: independent HEP. Planned therapy interventions: home exercise program  Treatment plan discussed with: patient        Subjective Evaluation    History of Present Illness  Mechanism of injury: Pt reports that he is about 70-75% better since beginning PT. He states that he will never get to 100% because he can't be cured. He states that he will continue with his exercises by himself.    Patient Goals  Patient goals for therapy: decreased pain  Patient goal: "decrease pain"  Pain  Current pain ratin  At best pain ratin  At worst pain ratin  Location: R LBP, b/l shins and feet   Quality: dull ache  Alleviating factors: let it pass. Exacerbated by: sleeping on his L side on the couch that is sunken in   Progression: improved    Treatments  Previous treatment: injection treatment        Objective   Mechanical Assessment    Cervical      Thoracic      Lumbar    Sitting flexion: repeated movements  Pain level: decreased    Strength/Myotome Testing     Left Hip   Planes of Motion   Flexion: 5  Abduction: 5  Adduction: 5    Right Hip   Planes of Motion   Flexion: 5  Abduction: 5  Adduction: 5    Left Knee   Flexion: 5  Extension: 5    Right Knee   Flexion: 5  Extension: 5    Muscle Activation   Patient able to activate left transverse abdominals, left multifidus, right transverse abdominals and right multifidus. Precautions: spinal stenosis, DMII, COPD, HTN, PAD  Access Code: [email or web address]    POC expires Auth Status Unit limit Start date  Expiration date PT/OT + Visit Limit?    23 Not required 4 23 BOMN                                         Date    Visit # 11 12 13 14 15 16 17 8 9 10   Re-Eval             FOTO             Manuals                                                                 Neuro Re-Ed             TA activation             PB press             DLS march             Walking stance c breaths for TA RTB 2x10 GTB 2x10 c LPD GTB 2x10 c LPD     RTB 2x10 RTB 2x10 RTB 2x10    Prone glut set        3"x20 c 2 pillows 3"x20 c 2 pillows 3"x20 c 2 pillows   paloff press  GTB x20 GTB x20 BTB x20    GTB x20 GTB x20 GTB 20x   TA STS Gray mat 1x10    YMB 1x10 Chair 2x10 Chair YMB 2x10 Chair BMB 2x10 Gray mat BMB 2x10 Chair BMB x20   2x8 lo mat lowered hi-lo  2x10   Modified dead bug  Stand at step 2x10 ea  Stand at step 2x10 Stand at step 2x10 Stand at 8" step x20 2x10 NV   TA overhead lift   YMB 2x10  BMB 2x10 BMB x20       SLS     Foam 10" ea        BioDex LOS      Static x3       Ther Ex             Pt education       SC - HEP review and update, POC      Bike 8' 10' 6' 10' 10' 8' 10'  10' 10' 10'   Repeated lumbar flexion     2x30 2x30       3way PB roll out             Seated clamshell             Bridges c BUE flexion 2x10  RTB x30     3x10 3x10 3x10    L side glide             Hip abduction             Prone lying c 2 pillows 1 pillow 2'  1 pillow 1 pillow 2'     2' 2' 2'    Prone hip ext 2x8 2x10      2x5 2x8 2x10   Hip flexor stretch b/l supine c strap 1' x2  Supine c strap 1' x2 Supine c strap 1'x2 Supine c strap 1'x2     1' x2 Supine w/ strap +RF 1'x2    Seated chops YMB 1x10 ea YMB x10 YMB 2x10 ea          Supine HS stretch    30"x3 Sit 30"x3 ea        Mini squats     2x10 2x10       HR     x20 x20 + TR       Leg press      125# x20       Ther Activity                                       Gait Training                                       Modalities

## 2023-09-25 ENCOUNTER — OFFICE VISIT (OUTPATIENT)
Age: 83
End: 2023-09-25
Payer: MEDICARE

## 2023-09-25 VITALS
HEIGHT: 69 IN | HEART RATE: 85 BPM | DIASTOLIC BLOOD PRESSURE: 84 MMHG | SYSTOLIC BLOOD PRESSURE: 124 MMHG | BODY MASS INDEX: 22.07 KG/M2 | WEIGHT: 149 LBS | OXYGEN SATURATION: 97 % | TEMPERATURE: 97.7 F

## 2023-09-25 DIAGNOSIS — R91.1 LUNG NODULE: ICD-10-CM

## 2023-09-25 DIAGNOSIS — R93.89 ABNORMAL CT OF THE CHEST: Primary | ICD-10-CM

## 2023-09-25 DIAGNOSIS — J41.0 SIMPLE CHRONIC BRONCHITIS (HCC): ICD-10-CM

## 2023-09-25 DIAGNOSIS — G47.33 OSA (OBSTRUCTIVE SLEEP APNEA): ICD-10-CM

## 2023-09-25 DIAGNOSIS — Z85.118 HISTORY OF LUNG CANCER: ICD-10-CM

## 2023-09-25 PROCEDURE — 99214 OFFICE O/P EST MOD 30 MIN: CPT | Performed by: INTERNAL MEDICINE

## 2023-09-25 NOTE — PROGRESS NOTES
Assessment/Plan:   Diagnoses and all orders for this visit:    Abnormal CT of the chest  -     CT chest without contrast; Future    Simple chronic bronchitis (HCC)    Lung nodule    History of lung cancer    SHIVANI (obstructive sleep apnea)        CTA done in February 2023 showed a new area of relative spiculated nodularity in the left upper lobe 16 mm for which she had a PET scan done which did not show any uptake he is here for a follow-up with a 3 months follow-up after that. Reviewed his recent CT of the chest from 7/10/2023, the left upper lobe lung nodule has been stable from the recent PET scan given the size and his prior history of lung cancer would repeat CT in 6 months from then  Ordered the CT chest for 6 months and will follow-up after that. PFTs spirometry demonstrating mild obstructive airflow defect lung volumes are normal DLCO is severely decreased at 37%  He will continue with his Trelegy 1 puff daily and discussed with the patient that if he still has a phlegm he will continue with his all nebulizer that he does have to be used once or twice a day help with airway clearance understands and verbalizes  Vaccinations up-to-date  Follow-up in 5 to 6 months or as needed earlier as needed. Return in about 5 months (around 2/25/2024). All questions are answered to the patient's satisfaction and understanding. He verbalizes understanding. He is encouraged to call with any further questions or concerns. Portions of the record may have been created with voice recognition software. Occasional wrong word or "sound a like" substitutions may have occurred due to the inherent limitations of voice recognition software. Read the chart carefully and recognize, using context, where substitutions have occurred.     Electronically Signed by Rosa Aguirre MD    ______________________________________________________________________    Chief Complaint:   Chief Complaint   Patient presents with   • Follow-up   • Sleep Apnea       Patient ID: Sen Mcgraw is a 82 y.o. y.o. male has a past medical history of BPH (benign prostatic hyperplasia), Cancer ([address]) (2013), Cervical myelopathy ([address]) (6/20/2019), Chemical exposure, COPD (chronic obstructive pulmonary disease) ([address]), CPAP (continuous positive airway pressure) dependence, DDD (degenerative disc disease), cervical, Diabetes mellitus ([address]), Foraminal stenosis of cervical region, Hyperlipidemia, Hypertension, SHIVANI (obstructive sleep apnea), Overactive bladder, and Spinal cord compression ([address]) (5/8/2019).    9/25/2023  Patient presents today for follow-up visit. Patient is a an 72-year-old male with past medical history of chronic bronchitis/emphysema, lung cancer status post left upper lobectomy, lung nodules, prostate cancer status post radiation, SHIVANI on CPAP, hypertension, diabetes. He is here today for follow-up. He is overall doing well with his breathing, does have some chronic mild dyspnea on exertion but remains active. He is using his Trelegy daily, uses the albuterol usually once per day, not using his nebulizer currently he states. Review of Systems   Constitutional: Negative. HENT: Negative. Eyes: Negative. Respiratory: Positive for cough and shortness of breath. Cardiovascular: Negative. Gastrointestinal: Negative. Endocrine: Negative. Genitourinary: Negative. Musculoskeletal: Negative. Allergic/Immunologic: Negative. Neurological: Negative. Hematological: Negative. Psychiatric/Behavioral: Negative. Smoking history: He reports that he has been smoking pipe and cigarettes. He started smoking about 61 years ago. He has a 0.19 pack-year smoking history. He has been exposed to tobacco smoke.  He has never used smokeless tobacco.    The following portions of the patient's history were reviewed and updated as appropriate: allergies, current medications, past family history, past medical history, past social history, past surgical history and problem list.    Immunization History   Administered Date(s) Administered   • COVID-19 PFIZER VACCINE 0.3 ML IM 03/16/2021, 04/07/2021, 12/03/2021   • COVID-19 Pfizer Vac BIVALENT Bi-sucrose 12 Yr+ IM 09/23/2022   • INFLUENZA 10/01/2018, 12/09/2022   • Influenza, high dose seasonal 0.7 mL 09/13/2019, 09/24/2020, 11/11/2021, 12/09/2022   • Pneumococcal Conjugate 13-Valent 08/18/2016   • Pneumococcal Polysaccharide PPV23 03/16/2018   • Zoster 06/23/2014   • Zoster Vaccine Recombinant 11/06/2018, 02/01/2019     Current Outpatient Medications   Medication Sig Dispense Refill   • acetaminophen (TYLENOL) 500 mg tablet Take 500 mg by mouth every 6 (six) hours as needed for mild pain       • albuterol (2.5 mg/3 mL) 0.083 % nebulizer solution Take 3 mL (2.5 mg total) by nebulization every 6 (six) hours as needed for wheezing or shortness of breath 120 mL 1   • aspirin (ECOTRIN LOW STRENGTH) 81 mg EC tablet Take 1 tablet by mouth daily 30 tablet 0   • Blood Glucose Monitoring Suppl (OneTouch Verio) w/Device KIT use as directed 1 kit 0   • chlorhexidine (PERIDEX) 0.12 % solution RINSE MOUTH WITH 15 ML (1 CAPFUL) FOR 30 SECONDS IN THE MORNING A...  (REFER TO PRESCRIPTION NOTES). • clindamycin (CLEOCIN) 300 MG capsule Take 300 mg by mouth every 8 (eight) hours       • Continuous Blood Gluc Sensor (FreeStyle Eben 14 Day Sensor) MISC Check blood sugars continuously. Switch every 14 days.  6 each 3   • diltiazem (TIAZAC) 360 MG 24 hr capsule take 1 capsule by mouth once daily 90 capsule 3   • DULoxetine (CYMBALTA) 60 mg delayed release capsule TAKE 1 CAPSULE DAILY 90 capsule 3   • gabapentin (NEURONTIN) 100 mg capsule take 2 capsules by mouth twice a day 360 capsule 3   • glucose blood (FREESTYLE LITE) test strip use 1 TEST STRIP to TEST BLOOD SUGAR twice a day 100 strip 0   • hydrochlorothiazide (HYDRODIURIL) 12.5 mg tablet Take 1 tablet (12.5 mg total) by mouth daily 90 tablet 1   • losartan (COZAAR) 100 MG tablet Take 1 tablet (100 mg total) by mouth daily 90 tablet 3   • metFORMIN (GLUCOPHAGE) 500 mg tablet take 1 tablet by mouth daily WITH BREAKFAST 90 tablet 3   • Multiple Vitamin (MULTI-VITAMINS PO) Take by mouth daily     • oxybutynin (DITROPAN-XL) 5 mg 24 hr tablet take 1 tablet by mouth once daily 90 tablet 3   • polyvinyl alcohol (LIQUIFILM TEARS) 1.4 % ophthalmic solution Administer 1 drop to both eyes 4 (four) times a day 15 mL 0   • pravastatin (PRAVACHOL) 40 mg tablet take 1 tablet by mouth once daily 90 tablet 1   • pyridoxine (VITAMIN B6) 50 mg tablet Take 50 mg by mouth daily     • sildenafil (REVATIO) 20 mg tablet Take 1 tablet (20 mg total) by mouth if needed (Erectile dysfunction) Take 1-5 tablets as needed for erection on an empty stomach 30 tablet 1   • tamsulosin (FLOMAX) 0.4 mg take 1 capsule by mouth TWICE A WEEK 90 capsule 3   • thiamine 50 MG tablet Take 1 tablet (50 mg total) by mouth daily 30 tablet 3   • Trelegy Ellipta 100-62.5-25 MCG/ACT inhaler USE 1 INHALATION DAILY (RINSE MOUTH AFTER USE) 360 blister 3   • VITAMIN E PO Take by mouth 268 mg 400 iu     • ibuprofen (MOTRIN) 600 mg tablet Take 600 mg by mouth every 6 (six) hours as needed     • Lancets (OneTouch Delica Plus [id number]) MISC use 1 LANCET to TEST BLOOD SUGAR one to two times a day 100 each 0     No current facility-administered medications for this visit. Allergies: Chloroquine and Qualaquin [quinine]    Objective:  Vitals:    09/25/23 1131   BP: 124/84   Pulse: 85   Temp: 97.7 °F (36.5 °C)   SpO2: 97%   Weight: 67.6 kg (149 lb)   Height: 5' 9" (1.753 m)   Oxygen Therapy  SpO2: 97 %  . Wt Readings from Last 3 Encounters:   09/25/23 67.6 kg (149 lb)   06/28/23 69.7 kg (153 lb 9.6 oz)   06/13/23 70.3 kg (155 lb)     Body mass index is 22 kg/m². Physical Exam  Vitals and nursing note reviewed. Constitutional:       Appearance: He is well-developed. HENT:      Head: Normocephalic and atraumatic.    Eyes: Conjunctiva/sclera: Conjunctivae normal.      Pupils: Pupils are equal, round, and reactive to light. Neck:      Thyroid: No thyromegaly. Vascular: No JVD. Cardiovascular:      Rate and Rhythm: Normal rate and regular rhythm. Heart sounds: Normal heart sounds. No murmur heard. No friction rub. No gallop. Pulmonary:      Effort: Pulmonary effort is normal. No respiratory distress. Breath sounds: Normal breath sounds. No wheezing or rales. Chest:      Chest wall: No tenderness. Musculoskeletal:         General: No tenderness or deformity. Normal range of motion. Cervical back: Normal range of motion and neck supple. Lymphadenopathy:      Cervical: No cervical adenopathy. Skin:     General: Skin is warm and dry. Neurological:      Mental Status: He is alert and oriented to person, place, and time.          Diagnostics:  I have personally reviewed pertinent films in PACS, CT chest from 7/10/2023  ESS: Total score: 10

## 2023-10-02 ENCOUNTER — TELEPHONE (OUTPATIENT)
Age: 83
End: 2023-10-02

## 2023-10-02 DIAGNOSIS — I10 ESSENTIAL HYPERTENSION: ICD-10-CM

## 2023-10-02 RX ORDER — LOSARTAN POTASSIUM 100 MG/1
100 TABLET ORAL DAILY
Qty: 90 TABLET | Refills: 3 | Status: SHIPPED | OUTPATIENT
Start: 2023-10-02

## 2023-10-02 NOTE — TELEPHONE ENCOUNTER
Patient left voice mail message - Good morning. My name is Neena Pedroza. I need a message to be transmitted to Doctor [facility]. It's regarding the refill of my medication, a form or something. I've applications from my pharmacist [facility] to [facility][1] for his signature. Can you please contact [facility][1] to approve of my medication and send it back to [facility].

## 2023-10-10 DIAGNOSIS — I10 ESSENTIAL HYPERTENSION: ICD-10-CM

## 2023-10-10 RX ORDER — LOSARTAN POTASSIUM 100 MG/1
100 TABLET ORAL DAILY
Qty: 90 TABLET | Refills: 3 | Status: SHIPPED | OUTPATIENT
Start: 2023-10-10 | End: 2023-10-10 | Stop reason: SDUPTHER

## 2023-10-10 RX ORDER — LOSARTAN POTASSIUM 100 MG/1
100 TABLET ORAL DAILY
Qty: 30 TABLET | Refills: 0 | Status: SHIPPED | OUTPATIENT
Start: 2023-10-10

## 2023-10-10 NOTE — TELEPHONE ENCOUNTER
PATIENT   SAID   [facility]  [facility]  FOR  LOSARTAN   DIDN'T  HAVE  DIRECTIONS  ON  THE  RX   WHEN  IT  WAS  SENT  TO [facility]   NEEDS  IT  RESENT  TO  [facility]

## 2023-10-10 NOTE — TELEPHONE ENCOUNTER
[id number] Dr Pierre Bajwa sending in a 30 day supply for losartan- until he gets his medication in the mail

## 2023-10-18 ENCOUNTER — OFFICE VISIT (OUTPATIENT)
Dept: NEUROLOGY | Facility: CLINIC | Age: 83
End: 2023-10-18
Payer: MEDICARE

## 2023-10-18 VITALS
BODY MASS INDEX: 22 KG/M2 | SYSTOLIC BLOOD PRESSURE: 130 MMHG | OXYGEN SATURATION: 99 % | HEIGHT: 69 IN | HEART RATE: 80 BPM | DIASTOLIC BLOOD PRESSURE: 74 MMHG

## 2023-10-18 DIAGNOSIS — I73.9 PERIPHERAL ARTERY DISEASE (HCC): ICD-10-CM

## 2023-10-18 DIAGNOSIS — M48.062 SPINAL STENOSIS OF LUMBAR REGION WITH NEUROGENIC CLAUDICATION: ICD-10-CM

## 2023-10-18 DIAGNOSIS — I10 ESSENTIAL HYPERTENSION: Chronic | ICD-10-CM

## 2023-10-18 DIAGNOSIS — E78.5 HYPERLIPIDEMIA ASSOCIATED WITH TYPE 2 DIABETES MELLITUS: Chronic | ICD-10-CM

## 2023-10-18 DIAGNOSIS — E11.40 TYPE 2 DIABETES MELLITUS WITH DIABETIC NEUROPATHY, WITHOUT LONG-TERM CURRENT USE OF INSULIN (HCC): Primary | Chronic | ICD-10-CM

## 2023-10-18 DIAGNOSIS — E11.69 HYPERLIPIDEMIA ASSOCIATED WITH TYPE 2 DIABETES MELLITUS: Chronic | ICD-10-CM

## 2023-10-18 DIAGNOSIS — M47.812 CERVICAL SPONDYLOSIS: ICD-10-CM

## 2023-10-18 DIAGNOSIS — G47.33 OSA (OBSTRUCTIVE SLEEP APNEA): ICD-10-CM

## 2023-10-18 PROCEDURE — 99214 OFFICE O/P EST MOD 30 MIN: CPT | Performed by: PSYCHIATRY & NEUROLOGY

## 2023-10-18 NOTE — PROGRESS NOTES
Brijesh Chandler is a 82 y.o. male. Chief Complaint   Patient presents with    Cervical spondylosis    diabetic polyneuropathy    spinal stenosis       Assessment:  1. Type 2 diabetes mellitus with diabetic neuropathy, without long-term current use of insulin ([address])    2. Spinal stenosis of lumbar region with neurogenic claudication    3. Cervical spondylosis    4. Essential hypertension    5. Peripheral artery disease ([address])    6. SHIVANI (obstructive sleep apnea)    7. Hyperlipidemia associated with type 2 diabetes mellitus          Plan:  Decrease gabapentin to 100 mg twice a day. Patient advised to better control his blood sugars his last hemoglobin A1c was 7.6 he is in follow-up with the family physician regarding that,  He is in follow-up with his vascular surgeon and with the pain specialist.  Would recommend patient to be seen by orthopedic spine surgeon regarding the spinal stenosis with neurogenic claudication. His diabetic neuropathy symptoms seems to be under control he is also on Cymbalta in addition to the Neurontin. Patient was advised to take fall and safety precautions and he feels off balance can use a cane. He was advised to continue with home exercise program and avoid strenuous activities. Keep his blood pressure cholesterol and sugar under control. Patient strongly advised to quit smoking. Follow-up with his sleep specialist regarding his sleep apnea. To go to the hospital if has any worsening symptoms and call me otherwise to see me back in 6 months or sooner if needed and follow-up with his other physicians.           Subjective:    HPI   Patient is here in follow-up for his history of cervical pain and lumbar spinal stenosis and diabetic polyneuropathy, since his last visit he tells me that overall he is doing good he has seen the pain specialist and has had spinal injections with some relief he has mild neck discomfort in the morning when he gets up when he does his home exercise program his symptoms go away he still has low back issues on and off with some radiation into the legs he is able to walk slowly for about half a mile denies any bowel and bladder incontinence he is able to do his ADLs he lives by himself he does walk every day, he continues to smoke pipes 4-5 a day his sleep is good his numbness and tingling in the feet is much better, appetite is good weight has been stable mood is good he has not had any falls memory is good, no dizziness sometimes he feels that he has some issues with his balance but he has not had any falls, no other complaints. Vitals:    10/18/23 0840   BP: 130/74   BP Location: Left arm   Patient Position: Sitting   Cuff Size: Large   Pulse: 80   SpO2: 99%   Height: 5' 9" (1.753 m)       Current Medications    Current Outpatient Medications:     acetaminophen (TYLENOL) 500 mg tablet, Take 500 mg by mouth every 6 (six) hours as needed for mild pain  , Disp: , Rfl:     albuterol (2.5 mg/3 mL) 0.083 % nebulizer solution, Take 3 mL (2.5 mg total) by nebulization every 6 (six) hours as needed for wheezing or shortness of breath, Disp: 120 mL, Rfl: 1    aspirin (ECOTRIN LOW STRENGTH) 81 mg EC tablet, Take 1 tablet by mouth daily, Disp: 30 tablet, Rfl: 0    Blood Glucose Monitoring Suppl (OneTouch Verio) w/Device KIT, use as directed, Disp: 1 kit, Rfl: 0    chlorhexidine (PERIDEX) 0.12 % solution, RINSE MOUTH WITH 15 ML (1 CAPFUL) FOR 30 SECONDS IN THE MORNING A...  (REFER TO PRESCRIPTION NOTES). , Disp: , Rfl:     clindamycin (CLEOCIN) 300 MG capsule, Take 300 mg by mouth every 8 (eight) hours  , Disp: , Rfl:     Continuous Blood Gluc Sensor (FreeStyle Eben 14 Day Sensor) MISC, Check blood sugars continuously. Switch every 14 days. , Disp: 6 each, Rfl: 3    diltiazem (TIAZAC) 360 MG 24 hr capsule, take 1 capsule by mouth once daily, Disp: 90 capsule, Rfl: 3    DULoxetine (CYMBALTA) 60 mg delayed release capsule, TAKE 1 CAPSULE DAILY, Disp: 90 capsule, Rfl: 3 gabapentin (NEURONTIN) 100 mg capsule, take 2 capsules by mouth twice a day, Disp: 360 capsule, Rfl: 3    glucose blood (FREESTYLE LITE) test strip, use 1 TEST STRIP to TEST BLOOD SUGAR twice a day, Disp: 100 strip, Rfl: 0    hydrochlorothiazide (HYDRODIURIL) 12.5 mg tablet, Take 1 tablet (12.5 mg total) by mouth daily, Disp: 90 tablet, Rfl: 1    losartan (COZAAR) 100 MG tablet, Take 1 tablet (100 mg total) by mouth daily, Disp: 30 tablet, Rfl: 0    metFORMIN (GLUCOPHAGE) 500 mg tablet, take 1 tablet by mouth daily WITH BREAKFAST, Disp: 90 tablet, Rfl: 3    Multiple Vitamin (MULTI-VITAMINS PO), Take by mouth daily, Disp: , Rfl:     oxybutynin (DITROPAN-XL) 5 mg 24 hr tablet, take 1 tablet by mouth once daily, Disp: 90 tablet, Rfl: 3    polyvinyl alcohol (LIQUIFILM TEARS) 1.4 % ophthalmic solution, Administer 1 drop to both eyes 4 (four) times a day, Disp: 15 mL, Rfl: 0    pravastatin (PRAVACHOL) 40 mg tablet, take 1 tablet by mouth once daily, Disp: 90 tablet, Rfl: 1    pyridoxine (VITAMIN B6) 50 mg tablet, Take 50 mg by mouth daily, Disp: , Rfl:     sildenafil (REVATIO) 20 mg tablet, Take 1 tablet (20 mg total) by mouth if needed (Erectile dysfunction) Take 1-5 tablets as needed for erection on an empty stomach, Disp: 30 tablet, Rfl: 1    tamsulosin (FLOMAX) 0.4 mg, take 1 capsule by mouth TWICE A WEEK, Disp: 90 capsule, Rfl: 3    thiamine 50 MG tablet, Take 1 tablet (50 mg total) by mouth daily, Disp: 30 tablet, Rfl: 3    Trelegy Ellipta 100-62.5-25 MCG/ACT inhaler, USE 1 INHALATION DAILY (RINSE MOUTH AFTER USE), Disp: 360 blister, Rfl: 3    VITAMIN E PO, Take by mouth 268 mg 400 iu, Disp: , Rfl:     ibuprofen (MOTRIN) 600 mg tablet, Take 600 mg by mouth every 6 (six) hours as needed, Disp: , Rfl:     Lancets (OneTouch Delica Plus [id number]) MISC, use 1 LANCET to TEST BLOOD SUGAR one to two times a day, Disp: 100 each, Rfl: 0      Allergies  Chloroquine and Qualaquin [quinine]    Past Medical History  Past Medical History:   Diagnosis Date    BPH (benign prostatic hyperplasia)     Cancer ([address]) 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    Cervical myelopathy ([address]) 6/20/2019    Chemical exposure     9/11/01- worked in [employer]. COPD (chronic obstructive pulmonary disease) ([facility])     CPAP (continuous positive airway pressure) dependence     DDD (degenerative disc disease), cervical     Diabetes mellitus ([facility])     Foraminal stenosis of cervical region     Hyperlipidemia     Hypertension     SHIVANI (obstructive sleep apnea)     Overactive bladder     Spinal cord compression ([address]) 5/8/2019    Added automatically from request for surgery [id number]         Past Surgical History:  Past Surgical History:   Procedure Laterality Date    ARTHROSCOPY KNEE Bilateral     COLONOSCOPY      LUNG SURGERY Left     scraping of left    HI ARTHRD ANT INTERBODY DECOMPRESS CERVICAL BELW C2 Bilateral 6/18/2019    Procedure: C3/4  ACDF WITH  C4 HEMICORPECTOMY, C3-4 ANTERIOR INSTRUMENTATION AND FUSION (NEUROMONITORING); Surgeon: Kala Werner MD;  Location: [facility];  Service: Neurosurgery    ROTATOR CUFF REPAIR Bilateral          Family History:  Family History   Problem Relation Age of Onset    No Known Problems Mother     No Known Problems Father        Social History:   reports that he has been smoking pipe and cigarettes. He started smoking about 61 years ago. He has a 0.19 pack-year smoking history. He has been exposed to tobacco smoke. He has never used smokeless tobacco. He reports that he does not drink alcohol and does not use drugs. I have reviewed the past medical history, surgical history, social and family history, current medications, allergies vitals, review of systems, and updated this information as appropriate today. Objective:    Physical Exam    Neurological Exam     GENERAL:  Cooperative in no acute distress. Well-developed and well-nourished     HEAD and NECK   Head is atraumatic normocephalic with no lesions or masses.  Neck is supple with full range of motion     CARDIOVASCULAR  Carotid Arteries-no carotid bruits. NEUROLOGIC:  Mental Status-the patient is awake alert and oriented without aphasia or apraxia  Cranial Nerves: Visual fields are full to confrontation. Visual acuity is 20/20 with hand-held chart ,extraocular movements are full without nystagmus. Pupils are 2-1/2 mm and reactive. Face is symmetrical to light touch. Movements of facial expression move symmetrically. Hearing is normal to finger rub bilaterally. Soft palate lifts symmetrically. Shoulder shrug is symmetrical. Tongue is midline without atrophy. Motor: No drift is noted on arm extension. Strength is full in the upper and lower extremities with normal bulk and tone. Sensory: Decreased light touch pinprick temperature sensation in glove and stocking distribution cortical function is intact. Coordination: Finger to nose testing is performed accurately. Romberg is negative. Gait reveals a normal base with symmetrical arm swing. Reflexes:     1+ and symmetrical toes are downgoing  Paraspinal muscle tenderness in the lumbar area    ROS:  Review of Systems   Constitutional:  Negative for appetite change, fatigue and fever. HENT: Negative. Negative for hearing loss, tinnitus, trouble swallowing and voice change. Eyes: Negative. Negative for photophobia, pain and visual disturbance. Respiratory: Negative. Negative for shortness of breath. Cardiovascular: Negative. Negative for palpitations. Gastrointestinal: Negative. Negative for nausea and vomiting. Endocrine: Negative. Negative for cold intolerance. Genitourinary: Negative. Negative for dysuria, frequency and urgency. Musculoskeletal:  Negative for back pain, gait problem, myalgias and neck pain. Skin: Negative. Negative for rash. Allergic/Immunologic: Negative. Neurological: Negative.   Negative for dizziness, tremors, seizures, syncope, facial asymmetry, speech difficulty, weakness, light-headedness, numbness and headaches. Hematological: Negative. Does not bruise/bleed easily. Psychiatric/Behavioral: Negative. Negative for confusion, hallucinations and sleep disturbance.

## 2023-11-06 NOTE — PROGRESS NOTES
Assessment/Plan:    Peripheral artery disease (HCC)  -     VAS lower limb arterial duplex, complete bilateral; Future  -     VAS PAWAN with exercise study; Future  -Functionally limited  -Mild, non-lifestyle limiting calf claudication; no ischemic rest pain or tissue loss  -Reportedly prior interventions at other facility     -TONYA 8/21/23: R 1.01/117/80; >75% distal SFA, L 0.65/53/33; occlusion dSFA  -TONYA   8/2/22: R 1.08/121/112; 50-75% distal SFA, L 0.94/113/73; > 75% distal SFA    Plan:  -B SFA disease with some calf claudication which is not lifestyle limiting at this time  -TONYA shows some progression of disease  -Continue with aspirin 81 and pravastatin 40  -Recommend tighter glucose control  -Maintain good blood pressure and cholesterol control  -Avoid foot wounds; wear protective footwear; check feet daily and call if any changes  -Check LE arterial duplex in 1 year  -Follow up in 3 months with exercise PAWAN      Additional diagnoses:  Spinal stenosis of lumbar region with neurogenic claudication  Leg swelling  Essential hypertension    Hyperlipidemia associated with type 2 diabetes mellitus   Type 2 diabetes mellitus with diabetic neuropathy, without long-term current use of insulin (HCC)      Subjective:      Patient ID: Julia Chandler is a 82 y.o. male. Patient presents today to review TONYA done 8/21/23. Functionally limited due to b/l leg weakness and SOB. Also states that his legs "easily tire" with walking. HPI   Mr. Julia Chandler is a 82 y.o. male with hypertension, hyperlipidemia, longstanding diabetes mellitus with neuropathy in the hands and feet (gabapentin by neuro), PAD who presents for evaluation of bilateral leg pain and left lower extremity edema. He experiences bilateral leg weakness when he gets up and out of bed 1st thing in the morning which takes a couple hours to work out and he can walk better.   He also complains bitterly of abnormal sensations to the bottom of the feet which is likely due to diabetic neuropathy. He has no ischemic rest pain or wounds. He does describe prior arterial procedures performed to the legs about 7 years ago but has no details. He has also had lower extremity edema (mostly on the L) for at least 3 years ('20) with discoloration at the toes. He wears compression stockings but they are uncomfortable. Echo 5/14/19: EF 60%. [facility]. Grade 1 DD.         11/7/23: Patient returns for vascular follow-up. He tells me that he had been going to physical therapy where he is doing stretching and strengthening. Overall after PT, he feels like he can walk better for a couple of days. He complains that it is "uncomfortable" when he is walking. He describes leg pains, calf tightness affecting his ability to walk. However, he has a gym in his complex and he is exercising for 30 minutes a day either on a bike, elliptical or treadmill. He walks a slow treadmill 2.5 miles an hour for 30 minutes. During this time he may have some calf discomfort but he does not have to stop. He finds the elliptical to be the hardest exercise because the legs get tired more quickly. He describes chronic foot pain which he relates to his back. No new or ischemic foot pain. No wounds. He is chronically on aspirin and pravastatin. We reviewed recent labs and testing. Patient is concerned that when he was living in [place], he has had procedures to the legs in the past.  It is not clear if his current symptoms are related to PAD. Nor is not clear if prior interventions have helped him walk better. Recommended that he continue to exercise every day. He should try to build up to 45 minutes of walking at least 4 days weekly and monitor his symptoms.   We will plan to see him back in about 3 months after exercise PAWAN for reevaluation.      -knee pain and calf tightness during the day  -Uncomfortable walking  -Doing better after PT  -30 minutes of aerobic exercise; either slow Td 2.5 mph for 30 minutes or bike or Elliptical        LDL 36  A1c 7.8      The following portions of the patient's history were reviewed and updated as appropriate: allergies, current medications, past family history, past medical history, past social history, past surgical history, and problem list.    Review of Systems   Constitutional: Negative. HENT: Negative. Eyes: Negative. Respiratory:  Positive for shortness of breath. Cardiovascular: Negative. Gastrointestinal: Negative. Endocrine: Negative. Genitourinary: Negative. Musculoskeletal:         Leg pain   Skin: Negative. Allergic/Immunologic: Negative. Neurological:  Positive for weakness. Hematological: Negative. Psychiatric/Behavioral: Negative. Objective:    /72 (BP Location: Left arm, Patient Position: Sitting)   Pulse 72   Ht 5' 9" (1.753 m)   Wt 68.9 kg (152 lb)   BMI 22.45 kg/m²       2+ femoral pulses    No pulses; B PT/AT/DP signals    No open wounds     Physical Exam  Vitals and nursing note reviewed. Constitutional:       Appearance: He is well-developed. HENT:      Head: Normocephalic and atraumatic. Eyes:      Pupils: Pupils are equal, round, and reactive to light. Neck:      Thyroid: No thyromegaly. Vascular: No JVD. Trachea: Trachea normal.   Cardiovascular:      Rate and Rhythm: Normal rate and regular rhythm. Pulses:           Carotid pulses are 2+ on the right side and 2+ on the left side. Radial pulses are 2+ on the right side and 2+ on the left side. Femoral pulses are 2+ on the right side and 2+ on the left side. Dorsalis pedis pulses are detected w/ Doppler on the right side and detected w/ Doppler on the left side. Posterior tibial pulses are detected w/ Doppler on the right side and detected w/ Doppler on the left side. Heart sounds: Normal heart sounds, S1 normal and S2 normal. No murmur heard. No friction rub. No gallop. Pulmonary:      Effort: Pulmonary effort is normal. No accessory muscle usage or respiratory distress. Breath sounds: Normal breath sounds. No wheezing or rales. Abdominal:      General: Bowel sounds are normal. There is no distension. Palpations: Abdomen is soft. Tenderness: There is no abdominal tenderness. Musculoskeletal:         General: No deformity. Normal range of motion. Cervical back: Neck supple. Right lower le+ Edema present. Left lower le+ Edema present. Skin:     General: Skin is warm and dry. Findings: No lesion or rash. Nails: There is no clubbing. Neurological:      Mental Status: He is alert and oriented to person, place, and time. Comments: Grossly normal    Psychiatric:         Behavior: Behavior is cooperative. TONYA 23  CLINICAL:  Indications:  Yearly surveillance for progression of disease. Patient complains  of leg pain with walking. Operative History:  SFA artherectomy  Risk Factors  The patient has history of HTN, Diabetes (NIDDM (oral meds)), Hyperlipidemia  and PAD. Clinical  Right Pressure:  165/ mm Hg, Left Pressure:  168/ mm Hg.      FINDINGS:     Segment                Right                   Left                                            Impression  PSV (cm/s)  Impression  PSV (cm/s)    Common Femoral Artery                     195                     167    Prox Profunda                             169                     190    Prox SFA                                  132                     136    Mid SFA                                    86                      42    Dist SFA               >75%               510  Occluded             0    Proximal Pop                               64                      21    Distal Pop                                 62                      37    Tibioperoneal                              80                      30    Dist Post Tibial [address] [place] [address]. Tibial                          62                      30             CONCLUSION:     Impression:  RIGHT LOWER LIMB:  There is a >75% stenosis in the distal superficial femoral artery. Ankle/Brachial index: 1.01 which is in the normal disease category (Prior 1.08)  PVR/ PPG tracings are slightly dampened. Metatarsal pressure of 117mmHg  Great toe pressure of 80mmHg, within the healing range     LEFT LOWER LIMB:  There is an occlusion of the distal superficial femoral artery. Ankle/Brachial index: 0.65 which is in the moderate disease category (Prior  0.94)  PVR/ PPG tracings are dampened. Metatarsal pressure of 53mmHg  Great toe pressure of 33mmHg, within the healing range     Compared to previous study on 08/02/2022, there is progression of disease in  the bilaterally. I have reviewed and made appropriate changes to the review of systems input by the medical assistant.     Vitals:    11/07/23 1008   BP: 136/72   BP Location: Left arm   Patient Position: Sitting   Pulse: 72   Weight: 68.9 kg (152 lb)   Height: 5' 9" (1.753 m)       Patient Active Problem List   Diagnosis    Type 2 diabetes mellitus with diabetic neuropathy, without long-term current use of insulin (HCC)    Hyperlipidemia associated with type 2 diabetes mellitus     Essential hypertension    BPH (benign prostatic hyperplasia)    Degenerative cervical spinal stenosis    Cervical spondylosis    COPD (chronic obstructive pulmonary disease) (HCC)    History of prostate cancer    Overactive bladder    Lesion of native kidney    SHIVANI (obstructive sleep apnea)    Vitamin D insufficiency    Pipe smoker    Anxiety    Erectile dysfunction due to diseases classified elsewhere    Peripheral artery disease (HCC)    Leg swelling    Sebaceous cyst    Spinal stenosis of lumbar region with neurogenic claudication    Abnormal CT of the chest       Past Surgical History:   Procedure Laterality Date ARTHROSCOPY KNEE Bilateral     COLONOSCOPY      LUNG SURGERY Left     scraping of left    VT ARTHRD ANT INTERBODY DECOMPRESS CERVICAL BELW C2 Bilateral 2019    Procedure: C3/4  ACDF WITH  C4 HEMICORPECTOMY, C3-4 ANTERIOR INSTRUMENTATION AND FUSION (NEUROMONITORING); Surgeon: Irena Miller MD;  Location: [facility];  Service: Neurosurgery    ROTATOR CUFF REPAIR Bilateral        Family History   Problem Relation Age of Onset    No Known Problems Mother     No Known Problems Father        Social History     Socioeconomic History    Marital status: Unknown     Spouse name: Not on file    Number of children: 3    Years of education: Not on file    Highest education level: Not on file   Occupational History    Occupation: retired   Tobacco Use    Smoking status: Every Day     Packs/day: 0.01     Years: 19.00     Total pack years: 0.19     Types: Pipe, Cigarettes     Start date:      Last attempt to quit:      Years since quittin.8     Passive exposure: Past    Smokeless tobacco: Never    Tobacco comments:     2-3  PIPES PER DAY    Vaping Use    Vaping Use: Never used   Substance and Sexual Activity    Alcohol use: Never    Drug use: Never    Sexual activity: Not Currently   Other Topics Concern    Not on file   Social History Narrative    Pt was born in [place], [place]; moved to [facility] as an adult with his wife, and eventually moved to [place], [place]. Social Determinants of Health     Financial Resource Strain: Low Risk  (2022)    Overall Financial Resource Strain (CARDIA)     Difficulty of Paying Living Expenses: Not hard at all   Food Insecurity: Not on file   Transportation Needs: No Transportation Needs (2022)    PRAPARE - Transportation     Lack of Transportation (Medical): No     Lack of Transportation (Non-Medical):  No   Physical Activity: Inactive (2021)    Exercise Vital Sign     Days of Exercise per Week: 0 days     Minutes of Exercise per Session: 0 min   Stress: Stress Concern Present (2/25/2021)    [facility]     Feeling of Stress : Rather much   Social Connections: Not on file   Intimate Partner Violence: Not on file   Housing Stability: Not on file       Allergies   Allergen Reactions    Chloroquine Itching    Qualaquin [Quinine] Itching         Current Outpatient Medications:     acetaminophen (TYLENOL) 500 mg tablet, Take 500 mg by mouth every 6 (six) hours as needed for mild pain  , Disp: , Rfl:     albuterol (2.5 mg/3 mL) 0.083 % nebulizer solution, Take 3 mL (2.5 mg total) by nebulization every 6 (six) hours as needed for wheezing or shortness of breath, Disp: 120 mL, Rfl: 1    aspirin (ECOTRIN LOW STRENGTH) 81 mg EC tablet, Take 1 tablet by mouth daily, Disp: 30 tablet, Rfl: 0    Blood Glucose Monitoring Suppl (OneTouch Verio) w/Device KIT, use as directed, Disp: 1 kit, Rfl: 0    chlorhexidine (PERIDEX) 0.12 % solution, RINSE MOUTH WITH 15 ML (1 CAPFUL) FOR 30 SECONDS IN THE MORNING A...  (REFER TO PRESCRIPTION NOTES). , Disp: , Rfl:     clindamycin (CLEOCIN) 300 MG capsule, Take 300 mg by mouth every 8 (eight) hours  , Disp: , Rfl:     Continuous Blood Gluc Sensor (FreeStyle Eben 14 Day Sensor) MISC, Check blood sugars continuously. Switch every 14 days. , Disp: 6 each, Rfl: 3    diltiazem (TIAZAC) 360 MG 24 hr capsule, take 1 capsule by mouth once daily, Disp: 90 capsule, Rfl: 3    DULoxetine (CYMBALTA) 60 mg delayed release capsule, TAKE 1 CAPSULE DAILY, Disp: 90 capsule, Rfl: 3    gabapentin (NEURONTIN) 100 mg capsule, take 2 capsules by mouth twice a day, Disp: 360 capsule, Rfl: 3    glucose blood (FREESTYLE LITE) test strip, use 1 TEST STRIP to TEST BLOOD SUGAR twice a day, Disp: 100 strip, Rfl: 0    losartan (COZAAR) 100 MG tablet, Take 1 tablet (100 mg total) by mouth daily, Disp: 30 tablet, Rfl: 0    metFORMIN (GLUCOPHAGE) 500 mg tablet, take 1 tablet by mouth daily WITH BREAKFAST, Disp: 90 tablet, Rfl: 3    Multiple Vitamin (MULTI-VITAMINS PO), Take by mouth daily, Disp: , Rfl:     oxybutynin (DITROPAN-XL) 5 mg 24 hr tablet, take 1 tablet by mouth once daily, Disp: 90 tablet, Rfl: 3    polyvinyl alcohol (LIQUIFILM TEARS) 1.4 % ophthalmic solution, Administer 1 drop to both eyes 4 (four) times a day, Disp: 15 mL, Rfl: 0    pravastatin (PRAVACHOL) 40 mg tablet, take 1 tablet by mouth once daily, Disp: 90 tablet, Rfl: 1    pyridoxine (VITAMIN B6) 50 mg tablet, Take 50 mg by mouth daily, Disp: , Rfl:     sildenafil (REVATIO) 20 mg tablet, Take 1 tablet (20 mg total) by mouth if needed (Erectile dysfunction) Take 1-5 tablets as needed for erection on an empty stomach, Disp: 30 tablet, Rfl: 1    tamsulosin (FLOMAX) 0.4 mg, take 1 capsule by mouth TWICE A WEEK, Disp: 90 capsule, Rfl: 3    thiamine 50 MG tablet, Take 1 tablet (50 mg total) by mouth daily, Disp: 30 tablet, Rfl: 3    Trelegy Ellipta 100-62.5-25 MCG/ACT inhaler, USE 1 INHALATION DAILY (RINSE MOUTH AFTER USE), Disp: 360 blister, Rfl: 3    VITAMIN E PO, Take by mouth 268 mg 400 iu, Disp: , Rfl:     hydrochlorothiazide (HYDRODIURIL) 12.5 mg tablet, Take 1 tablet (12.5 mg total) by mouth daily, Disp: 90 tablet, Rfl: 1    ibuprofen (MOTRIN) 600 mg tablet, Take 600 mg by mouth every 6 (six) hours as needed, Disp: , Rfl:     Lancets (OneTouch Delica Plus [id number]) MISC, use 1 LANCET to TEST BLOOD SUGAR one to two times a day, Disp: 100 each, Rfl: 0

## 2023-11-07 ENCOUNTER — OFFICE VISIT (OUTPATIENT)
Dept: VASCULAR SURGERY | Facility: CLINIC | Age: 83
End: 2023-11-07
Payer: MEDICARE

## 2023-11-07 VITALS
DIASTOLIC BLOOD PRESSURE: 72 MMHG | SYSTOLIC BLOOD PRESSURE: 136 MMHG | BODY MASS INDEX: 22.51 KG/M2 | HEIGHT: 69 IN | HEART RATE: 72 BPM | WEIGHT: 152 LBS

## 2023-11-07 DIAGNOSIS — I10 ESSENTIAL HYPERTENSION: Chronic | ICD-10-CM

## 2023-11-07 DIAGNOSIS — E11.69 HYPERLIPIDEMIA ASSOCIATED WITH TYPE 2 DIABETES MELLITUS: Chronic | ICD-10-CM

## 2023-11-07 DIAGNOSIS — E78.5 HYPERLIPIDEMIA ASSOCIATED WITH TYPE 2 DIABETES MELLITUS: Chronic | ICD-10-CM

## 2023-11-07 DIAGNOSIS — M48.062 SPINAL STENOSIS OF LUMBAR REGION WITH NEUROGENIC CLAUDICATION: ICD-10-CM

## 2023-11-07 DIAGNOSIS — E11.40 TYPE 2 DIABETES MELLITUS WITH DIABETIC NEUROPATHY, WITHOUT LONG-TERM CURRENT USE OF INSULIN (HCC): Chronic | ICD-10-CM

## 2023-11-07 DIAGNOSIS — M79.89 LEG SWELLING: ICD-10-CM

## 2023-11-07 DIAGNOSIS — I73.9 PERIPHERAL ARTERY DISEASE (HCC): Primary | ICD-10-CM

## 2023-11-07 PROCEDURE — 99214 OFFICE O/P EST MOD 30 MIN: CPT | Performed by: PHYSICIAN ASSISTANT

## 2023-11-07 NOTE — PATIENT INSTRUCTIONS
PAD  Recommend regular walking on a treadmill for 30 to 45 minutes at least 4 days weekly. Take note of how your legs feel and if you are having a lot of calf discomfort.   You can stop as often as you need to when you are walking.    -Continue with aspirin 81 and pravastatin 40  -Recommend tighter glucose control  -Maintain good blood pressure and cholesterol control  -Avoid foot wounds; wear protective footwear; check feet daily and call if any changes  -Check LE arterial duplex in 1 year  -check exercise PAWAN and office visit          Leg swelling  -Can try 15-20mmHg compression stocking to help with leg swelling

## 2023-11-15 ENCOUNTER — TELEPHONE (OUTPATIENT)
Dept: NEUROLOGY | Facility: CLINIC | Age: 83
End: 2023-11-15

## 2023-11-15 DIAGNOSIS — G62.9 NEUROPATHY: Primary | ICD-10-CM

## 2023-11-15 RX ORDER — GABAPENTIN 100 MG/1
100 CAPSULE ORAL 2 TIMES DAILY
Qty: 60 CAPSULE | Refills: 5 | Status: SHIPPED | OUTPATIENT
Start: 2023-11-15 | End: 2023-11-24 | Stop reason: SDUPTHER

## 2023-11-15 NOTE — TELEPHONE ENCOUNTER
Patient is currently taking Gabapentin 100mg 2 capsules by mouth 2x a day. Patient states Dr. Pedro Restrepo has reduced his dose to 100mg Once in the AM and once in the evening. Patient states the pharmacy does not have this current script.  Please assist.

## 2023-11-24 ENCOUNTER — TELEPHONE (OUTPATIENT)
Dept: NEUROLOGY | Facility: CLINIC | Age: 83
End: 2023-11-24

## 2023-11-24 DIAGNOSIS — G62.9 NEUROPATHY: ICD-10-CM

## 2023-11-24 RX ORDER — GABAPENTIN 100 MG/1
100 CAPSULE ORAL 2 TIMES DAILY
Qty: 180 CAPSULE | Refills: 0 | Status: SHIPPED | OUTPATIENT
Start: 2023-11-24

## 2023-11-24 NOTE — TELEPHONE ENCOUNTER
received vm from 11/21 at 2:52pm-Good afternoon, my name is Chay Do during and the reason I'm calling is to have a dr Bearl Burkitt or one of his nurses send in the prescription to [facility] pharmacy on my behalf. And I need to talk to someone. Thank you.  Doc.  [phone]

## 2023-11-24 NOTE — TELEPHONE ENCOUNTER
Patient came into the office requesting that his prescription for Gabapentin 100 mg be changed to a 90 day supply. He continues to use [facility] on [facility]. Thank you.

## 2023-12-12 ENCOUNTER — APPOINTMENT (OUTPATIENT)
Age: 83
End: 2023-12-12
Payer: MEDICARE

## 2023-12-12 ENCOUNTER — OFFICE VISIT (OUTPATIENT)
Age: 83
End: 2023-12-12
Payer: MEDICARE

## 2023-12-12 VITALS
SYSTOLIC BLOOD PRESSURE: 134 MMHG | OXYGEN SATURATION: 98 % | WEIGHT: 150.4 LBS | HEART RATE: 88 BPM | BODY MASS INDEX: 22.28 KG/M2 | RESPIRATION RATE: 18 BRPM | DIASTOLIC BLOOD PRESSURE: 80 MMHG | HEIGHT: 69 IN | TEMPERATURE: 96.8 F

## 2023-12-12 DIAGNOSIS — M54.16 LUMBAR RADICULOPATHY: ICD-10-CM

## 2023-12-12 DIAGNOSIS — E78.5 HYPERLIPIDEMIA ASSOCIATED WITH TYPE 2 DIABETES MELLITUS: Chronic | ICD-10-CM

## 2023-12-12 DIAGNOSIS — Z00.00 MEDICARE ANNUAL WELLNESS VISIT, SUBSEQUENT: ICD-10-CM

## 2023-12-12 DIAGNOSIS — E11.69 HYPERLIPIDEMIA ASSOCIATED WITH TYPE 2 DIABETES MELLITUS: Chronic | ICD-10-CM

## 2023-12-12 DIAGNOSIS — Z23 ENCOUNTER FOR IMMUNIZATION: ICD-10-CM

## 2023-12-12 DIAGNOSIS — E11.40 TYPE 2 DIABETES MELLITUS WITH DIABETIC NEUROPATHY, WITHOUT LONG-TERM CURRENT USE OF INSULIN (HCC): Primary | Chronic | ICD-10-CM

## 2023-12-12 DIAGNOSIS — I10 ESSENTIAL HYPERTENSION: Chronic | ICD-10-CM

## 2023-12-12 PROBLEM — N28.1 ACQUIRED CYST OF KIDNEY: Status: ACTIVE | Noted: 2019-04-17

## 2023-12-12 LAB
ALBUMIN SERPL BCP-MCNC: 4.2 G/DL (ref 3.5–5)
ALP SERPL-CCNC: 78 U/L (ref 34–104)
ALT SERPL W P-5'-P-CCNC: 33 U/L (ref 7–52)
ANION GAP SERPL CALCULATED.3IONS-SCNC: 5 MMOL/L
AST SERPL W P-5'-P-CCNC: 23 U/L (ref 13–39)
BILIRUB SERPL-MCNC: 0.48 MG/DL (ref 0.2–1)
BUN SERPL-MCNC: 14 MG/DL (ref 5–25)
CALCIUM SERPL-MCNC: 10.3 MG/DL (ref 8.4–10.2)
CHLORIDE SERPL-SCNC: 108 MMOL/L (ref 96–108)
CHOLEST SERPL-MCNC: 97 MG/DL
CO2 SERPL-SCNC: 32 MMOL/L (ref 21–32)
CREAT SERPL-MCNC: 0.89 MG/DL (ref 0.6–1.3)
GFR SERPL CREATININE-BSD FRML MDRD: 80 ML/MIN/1.73SQ M
GLUCOSE P FAST SERPL-MCNC: 142 MG/DL (ref 65–99)
HDLC SERPL-MCNC: 46 MG/DL
LDLC SERPL CALC-MCNC: 43 MG/DL (ref 0–100)
POTASSIUM SERPL-SCNC: 5.1 MMOL/L (ref 3.5–5.3)
PROT SERPL-MCNC: 6 G/DL (ref 6.4–8.4)
SODIUM SERPL-SCNC: 145 MMOL/L (ref 135–147)
TRIGL SERPL-MCNC: 38 MG/DL

## 2023-12-12 PROCEDURE — 83036 HEMOGLOBIN GLYCOSYLATED A1C: CPT

## 2023-12-12 PROCEDURE — G0008 ADMIN INFLUENZA VIRUS VAC: HCPCS | Performed by: INTERNAL MEDICINE

## 2023-12-12 PROCEDURE — 36415 COLL VENOUS BLD VENIPUNCTURE: CPT

## 2023-12-12 PROCEDURE — 80053 COMPREHEN METABOLIC PANEL: CPT

## 2023-12-12 PROCEDURE — 99214 OFFICE O/P EST MOD 30 MIN: CPT | Performed by: INTERNAL MEDICINE

## 2023-12-12 PROCEDURE — 80061 LIPID PANEL: CPT

## 2023-12-12 PROCEDURE — G0439 PPPS, SUBSEQ VISIT: HCPCS | Performed by: INTERNAL MEDICINE

## 2023-12-12 PROCEDURE — 90662 IIV NO PRSV INCREASED AG IM: CPT | Performed by: INTERNAL MEDICINE

## 2023-12-12 NOTE — PATIENT INSTRUCTIONS
Medicare Preventive Visit Patient Instructions  Thank you for completing your Welcome to Medicare Visit or Medicare Annual Wellness Visit today. Your next wellness visit will be due in one year (12/12/2024). The screening/preventive services that you may require over the next 5-10 years are detailed below. Some tests may not apply to you based off risk factors and/or age. Screening tests ordered at today's visit but not completed yet may show as past due. Also, please note that scanned in results may not display below. Preventive Screenings:  Service Recommendations Previous Testing/Comments   Colorectal Cancer Screening  Colonoscopy    Fecal Occult Blood Test (FOBT)/Fecal Immunochemical Test (FIT)  Fecal DNA/Cologuard Test  Flexible Sigmoidoscopy Age: 43-73 years old   Colonoscopy: every 10 years (May be performed more frequently if at higher risk)  OR  FOBT/FIT: every 1 year  OR  Cologuard: every 3 years  OR  Sigmoidoscopy: every 5 years  Screening may be recommended earlier than age 39 if at higher risk for colorectal cancer. Also, an individualized decision between you and your healthcare provider will decide whether screening between the ages of 77-80 would be appropriate.  Colonoscopy: Not on file  FOBT/FIT: Not on file  Cologuard: Not on file  Sigmoidoscopy: Not on file    Screening Not Indicated     Prostate Cancer Screening Individualized decision between patient and health care provider in men between ages of 53-66   Medicare will cover every 12 months beginning on the day after your 50th birthday PSA: <0.1 ng/mL     History Prostate Cancer  Screening Not Indicated     Hepatitis C Screening Once for adults born between 1945 and 1965  More frequently in patients at high risk for Hepatitis C Hep C Antibody: 06/24/2021    Screening Current   Diabetes Screening 1-2 times per year if you're at risk for diabetes or have pre-diabetes Fasting glucose: 236 mg/dL (6/13/2023)  A1C: 7.8 % (6/13/2023)  Screening Not Indicated  History Diabetes   Cholesterol Screening Once every 5 years if you don't have a lipid disorder. May order more often based on risk factors. Lipid panel: 06/13/2023  Screening Not Indicated  History Lipid Disorder      Other Preventive Screenings Covered by Medicare:  Abdominal Aortic Aneurysm (AAA) Screening: covered once if your at risk. You're considered to be at risk if you have a family history of AAA or a male between the age of 70-76 who smoking at least 100 cigarettes in your lifetime. Lung Cancer Screening: covers low dose CT scan once per year if you meet all of the following conditions: (1) Age 48-67; (2) No signs or symptoms of lung cancer; (3) Current smoker or have quit smoking within the last 15 years; (4) You have a tobacco smoking history of at least 20 pack years (packs per day x number of years you smoked); (5) You get a written order from a healthcare provider. Glaucoma Screening: covered annually if you're considered high risk: (1) You have diabetes OR (2) Family history of glaucoma OR (3)  aged 48 and older OR (3)  American aged 72 and older  Osteoporosis Screening: covered every 2 years if you meet one of the following conditions: (1) Have a vertebral abnormality; (2) On glucocorticoid therapy for more than 3 months; (3) Have primary hyperparathyroidism; (4) On osteoporosis medications and need to assess response to drug therapy. HIV Screening: covered annually if you're between the age of 14-79. Also covered annually if you are younger than 13 and older than 72 with risk factors for HIV infection. For pregnant patients, it is covered up to 3 times per pregnancy.     Immunizations:  Immunization Recommendations   Influenza Vaccine Annual influenza vaccination during flu season is recommended for all persons aged >= 6 months who do not have contraindications   Pneumococcal Vaccine   * Pneumococcal conjugate vaccine = PCV13 (Prevnar 13), PCV15 (Vaxneuvance), PCV20 (Prevnar 20)  * Pneumococcal polysaccharide vaccine = PPSV23 (Pneumovax) Adults 05-09 yo with certain risk factors or if 69+ yo  If never received any pneumonia vaccine: recommend Prevnar 20 (PCV20)  Give PCV20 if previously received 1 dose of PCV13 or PPSV23   Hepatitis B Vaccine 3 dose series if at intermediate or high risk (ex: diabetes, end stage renal disease, liver disease)   Respiratory syncytial virus (RSV) Vaccine - COVERED BY MEDICARE PART D  * RSVPreF3 (Arexvy) CDC recommends that adults 61years of age and older may receive a single dose of RSV vaccine using shared clinical decision-making (SCDM)   Tetanus (Td) Vaccine - COST NOT COVERED BY MEDICARE PART B Following completion of primary series, a booster dose should be given every 10 years to maintain immunity against tetanus. Td may also be given as tetanus wound prophylaxis. Tdap Vaccine - COST NOT COVERED BY MEDICARE PART B Recommended at least once for all adults. For pregnant patients, recommended with each pregnancy. Shingles Vaccine (Shingrix) - COST NOT COVERED BY MEDICARE PART B  2 shot series recommended in those 19 years and older who have or will have weakened immune systems or those 50 years and older     Health Maintenance Due:      Topic Date Due    Hepatitis C Screening  Completed     Immunizations Due:      Topic Date Due    Hepatitis A Vaccine (1 of 2 - Risk 2-dose series) Never done    Hepatitis B Vaccine (1 of 3 - Risk 3-dose series) Never done    Influenza Vaccine (1) 09/01/2023     Advance Directives   What are advance directives? Advance directives are legal documents that state your wishes and plans for medical care. These plans are made ahead of time in case you lose your ability to make decisions for yourself. Advance directives can apply to any medical decision, such as the treatments you want, and if you want to donate organs. What are the types of advance directives?   There are many types of advance directives, and each state has rules about how to use them. You may choose a combination of any of the following:  Living will: This is a written record of the treatment you want. You can also choose which treatments you do not want, which to limit, and which to stop at a certain time. This includes surgery, medicine, IV fluid, and tube feedings. Durable power of  for healthcare [facility]): This is a written record that states who you want to make healthcare choices for you when you are unable to make them for yourself. This person, called a proxy, is usually a family member or a friend. You may choose more than 1 proxy. Do not resuscitate (DNR) order:  A DNR order is used in case your heart stops beating or you stop breathing. It is a request not to have certain forms of treatment, such as CPR. A DNR order may be included in other types of advance directives. Medical directive: This covers the care that you want if you are in a coma, near death, or unable to make decisions for yourself. You can list the treatments you want for each condition. Treatment may include pain medicine, surgery, blood transfusions, dialysis, IV or tube feedings, and a ventilator (breathing machine). Values history: This document has questions about your views, beliefs, and how you feel and think about life. This information can help others choose the care that you would choose. Why are advance directives important? An advance directive helps you control your care. Although spoken wishes may be used, it is better to have your wishes written down. Spoken wishes can be misunderstood, or not followed. Treatments may be given even if you do not want them. An advance directive may make it easier for your family to make difficult choices about your care. Cigarette Smoking and Your Health   Risks to your health if you smoke:  Nicotine and other chemicals found in tobacco damage every cell in your body.  Even if you are a light smoker, you have an increased risk for cancer, heart disease, and lung disease. If you are pregnant or have diabetes, smoking increases your risk for complications. Benefits to your health if you stop smoking: You decrease respiratory symptoms such as coughing, wheezing, and shortness of breath. You reduce your risk for cancers of the lung, mouth, throat, kidney, bladder, pancreas, stomach, and cervix. If you already have cancer, you increase the benefits of chemotherapy. You also reduce your risk for cancer returning or a second cancer from developing. You reduce your risk for heart disease, blood clots, heart attack, and stroke. You reduce your risk for lung infections, and diseases such as pneumonia, asthma, chronic bronchitis, and emphysema. Your circulation improves. More oxygen can be delivered to your body. If you have diabetes, you lower your risk for complications, such as kidney, artery, and eye diseases. You also lower your risk for nerve damage. Nerve damage can lead to amputations, poor vision, and blindness. You improve your body's ability to heal and to fight infections. For more information and support to stop smoking:   Smokefree. gov  Phone: 2- 613 - 449-4334  Web Address: www.[employer]. [employer] [address] Information is for End User's use only and may not be sold, redistributed or otherwise used for commercial purposes.  All illustrations and images included in CareNotes® are the copyrighted property of A.D.A.M., Inc. or  [employer]

## 2023-12-13 ENCOUNTER — TELEPHONE (OUTPATIENT)
Age: 83
End: 2023-12-13

## 2023-12-13 DIAGNOSIS — E11.40 TYPE 2 DIABETES MELLITUS WITH DIABETIC NEUROPATHY, WITHOUT LONG-TERM CURRENT USE OF INSULIN (HCC): Chronic | ICD-10-CM

## 2023-12-13 LAB
EST. AVERAGE GLUCOSE BLD GHB EST-MCNC: 203 MG/DL
HBA1C MFR BLD: 8.7 %

## 2023-12-13 RX ORDER — GLIMEPIRIDE 2 MG/1
2 TABLET ORAL
Qty: 90 TABLET | Refills: 1 | Status: SHIPPED | OUTPATIENT
Start: 2023-12-13 | End: 2024-06-10

## 2023-12-13 NOTE — TELEPHONE ENCOUNTER
----- Message from Patricia Perez DO sent at 12/13/2023  1:24 PM EST -----  Spoke to patient and will adjust medications. Sent Rx's to pharmacy. He will cut down on sugar intake. Has been drinking 6-7 coffees with sugar per day.

## 2023-12-27 ENCOUNTER — TELEPHONE (OUTPATIENT)
Age: 83
End: 2023-12-27

## 2024-01-05 DIAGNOSIS — E78.5 HYPERLIPIDEMIA ASSOCIATED WITH TYPE 2 DIABETES MELLITUS: ICD-10-CM

## 2024-01-05 DIAGNOSIS — E11.69 HYPERLIPIDEMIA ASSOCIATED WITH TYPE 2 DIABETES MELLITUS: ICD-10-CM

## 2024-01-05 RX ORDER — PRAVASTATIN SODIUM 40 MG
TABLET ORAL
Qty: 90 TABLET | Refills: 1 | Status: SHIPPED | OUTPATIENT
Start: 2024-01-05

## 2024-01-08 ENCOUNTER — APPOINTMENT (OUTPATIENT)
Dept: RADIOLOGY | Facility: CLINIC | Age: 84
End: 2024-01-08
Payer: MEDICARE

## 2024-01-08 ENCOUNTER — OFFICE VISIT (OUTPATIENT)
Dept: OBGYN CLINIC | Facility: CLINIC | Age: 84
End: 2024-01-08
Payer: MEDICARE

## 2024-01-08 VITALS
HEIGHT: 69 IN | HEART RATE: 103 BPM | DIASTOLIC BLOOD PRESSURE: 86 MMHG | WEIGHT: 148 LBS | BODY MASS INDEX: 21.92 KG/M2 | SYSTOLIC BLOOD PRESSURE: 119 MMHG

## 2024-01-08 DIAGNOSIS — M54.41 LOW BACK PAIN WITH BILATERAL SCIATICA, UNSPECIFIED BACK PAIN LATERALITY, UNSPECIFIED CHRONICITY: ICD-10-CM

## 2024-01-08 DIAGNOSIS — M54.2 CERVICAL PAIN: ICD-10-CM

## 2024-01-08 DIAGNOSIS — M54.41 LOW BACK PAIN WITH BILATERAL SCIATICA, UNSPECIFIED BACK PAIN LATERALITY, UNSPECIFIED CHRONICITY: Primary | ICD-10-CM

## 2024-01-08 DIAGNOSIS — M54.42 LOW BACK PAIN WITH BILATERAL SCIATICA, UNSPECIFIED BACK PAIN LATERALITY, UNSPECIFIED CHRONICITY: ICD-10-CM

## 2024-01-08 DIAGNOSIS — M48.062 SPINAL STENOSIS OF LUMBAR REGION WITH NEUROGENIC CLAUDICATION: ICD-10-CM

## 2024-01-08 DIAGNOSIS — M54.42 LOW BACK PAIN WITH BILATERAL SCIATICA, UNSPECIFIED BACK PAIN LATERALITY, UNSPECIFIED CHRONICITY: Primary | ICD-10-CM

## 2024-01-08 DIAGNOSIS — G95.9 MYELOPATHY (HCC): ICD-10-CM

## 2024-01-08 PROCEDURE — 72110 X-RAY EXAM L-2 SPINE 4/>VWS: CPT

## 2024-01-08 PROCEDURE — 99204 OFFICE O/P NEW MOD 45 MIN: CPT | Performed by: ORTHOPAEDIC SURGERY

## 2024-01-08 NOTE — PROGRESS NOTES
"[facility] ORTHOPEDIC SPINE SURGERY  DR.AMIR AUREA MD  [address] [facility] [address] [phone]    HISTORY OF PRESENT ILLNESS:    Steven Chandler is a 83 y.o. male who presents for initial evaluation of cervical and lumbar spine.  He is here from Neurology, .  He has longstanding history of symptoms with no injury.  Today he complains of cervical pain with bilateral hand diminished sensation and low back pain with leg diminished sensation.  He mentions abnormal gait he describes as \"zig-zagging\".  Exercise alleviates while regular day to day tasks aggravates.  He has done physical therapy several times with short lived benefit.  He has had lumbar injections in past with limited benefit.  He has had C3-C4 ACDF with , 6/18/2019.      ALLERGIES:   Allergies   Allergen Reactions    Chloroquine Itching    Qualaquin [Quinine] Itching       MEDICATIONS:    Current Outpatient Medications:     acetaminophen (TYLENOL) 500 mg tablet, Take 500 mg by mouth every 6 (six) hours as needed for mild pain  , Disp: , Rfl:     albuterol (2.5 mg/3 mL) 0.083 % nebulizer solution, Take 3 mL (2.5 mg total) by nebulization every 6 (six) hours as needed for wheezing or shortness of breath, Disp: 120 mL, Rfl: 1    aspirin (ECOTRIN LOW STRENGTH) 81 mg EC tablet, Take 1 tablet by mouth daily, Disp: 30 tablet, Rfl: 0    Blood Glucose Monitoring Suppl (OneTouch Verio) w/Device KIT, use as directed, Disp: 1 kit, Rfl: 0    chlorhexidine (PERIDEX) 0.12 % solution, RINSE MOUTH WITH 15 ML (1 CAPFUL) FOR 30 SECONDS IN THE MORNING A...  (REFER TO PRESCRIPTION NOTES)., Disp: , Rfl:     clindamycin (CLEOCIN) 300 MG capsule, Take 300 mg by mouth every 8 (eight) hours  , Disp: , Rfl:     Continuous Blood Gluc Sensor (FreeStyle Eben 14 Day Sensor) MISC, Check blood sugars continuously. Switch every 14 days., Disp: 6 each, Rfl: 3    diltiazem (TIAZAC) 360 MG 24 hr capsule, take 1 capsule by mouth once daily, Disp: 90 capsule, " Rfl: 3    DULoxetine (CYMBALTA) 60 mg delayed release capsule, TAKE 1 CAPSULE DAILY, Disp: 90 capsule, Rfl: 3    gabapentin (Neurontin) 100 mg capsule, Take 1 capsule (100 mg total) by mouth 2 (two) times a day, Disp: 180 capsule, Rfl: 0    glimepiride (AMARYL) 2 mg tablet, Take 1 tablet (2 mg total) by mouth daily with breakfast, Disp: 90 tablet, Rfl: 1    glucose blood (FREESTYLE LITE) test strip, use 1 TEST STRIP to TEST BLOOD SUGAR twice a day, Disp: 100 strip, Rfl: 0    hydrochlorothiazide (HYDRODIURIL) 12.5 mg tablet, Take 1 tablet (12.5 mg total) by mouth daily, Disp: 90 tablet, Rfl: 1    Lancets (OneTouch Delica Plus [id number]) MISC, use 1 LANCET to TEST BLOOD SUGAR one to two times a day, Disp: 100 each, Rfl: 0    losartan (COZAAR) 100 MG tablet, Take 1 tablet (100 mg total) by mouth daily, Disp: 30 tablet, Rfl: 0    metFORMIN (GLUCOPHAGE) 500 mg tablet, Take 1 tablet (500 mg total) by mouth 2 (two) times a day with meals, Disp: 180 tablet, Rfl: 3    Multiple Vitamin (MULTI-VITAMINS PO), Take by mouth daily, Disp: , Rfl:     oxybutynin (DITROPAN-XL) 5 mg 24 hr tablet, take 1 tablet by mouth once daily, Disp: 90 tablet, Rfl: 3    polyvinyl alcohol (LIQUIFILM TEARS) 1.4 % ophthalmic solution, Administer 1 drop to both eyes 4 (four) times a day, Disp: 15 mL, Rfl: 0    pravastatin (PRAVACHOL) 40 mg tablet, take 1 tablet by mouth once daily, Disp: 90 tablet, Rfl: 1    pyridoxine (VITAMIN B6) 50 mg tablet, Take 50 mg by mouth daily, Disp: , Rfl:     sildenafil (REVATIO) 20 mg tablet, Take 1 tablet (20 mg total) by mouth if needed (Erectile dysfunction) Take 1-5 tablets as needed for erection on an empty stomach, Disp: 30 tablet, Rfl: 1    tamsulosin (FLOMAX) 0.4 mg, take 1 capsule by mouth TWICE A WEEK, Disp: 90 capsule, Rfl: 3    thiamine 50 MG tablet, Take 1 tablet (50 mg total) by mouth daily, Disp: 30 tablet, Rfl: 3    Trelegy Ellipta 100-62.5-25 MCG/ACT inhaler, USE 1 INHALATION DAILY (RINSE MOUTH AFTER  USE), Disp: 360 blister, Rfl: 3    VITAMIN E PO, Take by mouth 268 mg 400 iu, Disp: , Rfl:     ibuprofen (MOTRIN) 600 mg tablet, Take 600 mg by mouth every 6 (six) hours as needed (Patient not taking: Reported on 2023), Disp: , Rfl:      PAST MEDICAL HISTORY:   Past Medical History:   Diagnosis Date    BPH (benign prostatic hyperplasia)     Cancer ([facility])     lung- prostate    Cervical myelopathy ([facility]) 2019    Chemical exposure     01- worked in [employer].     COPD (chronic obstructive pulmonary disease) ([facility])     CPAP (continuous positive airway pressure) dependence     DDD (degenerative disc disease), cervical     Diabetes mellitus ([facility])     Foraminal stenosis of cervical region     Hyperlipidemia     Hypertension     SHIVANI (obstructive sleep apnea)     Overactive bladder     Spinal cord compression ([facility]) 2019    Added automatically from request for surgery [id number]       PAST SURGICAL HISTORY:  Past Surgical History:   Procedure Laterality Date    ARTHROSCOPY KNEE Bilateral     COLONOSCOPY      LUNG SURGERY Left     scraping of left    [place] [facility] ANT INTERBODY DECOMPRESS CERVICAL BELW C2 Bilateral 2019    Procedure: C3/4  ACDF WITH  C4 HEMICORPECTOMY, C3-4 ANTERIOR INSTRUMENTATION AND FUSION (NEUROMONITORING);  Surgeon: Lucila Corral MD;  Location: [facility];  Service: Neurosurgery    ROTATOR CUFF REPAIR Bilateral        SOCIAL HISTORY:  Social History     Tobacco Use   Smoking Status Every Day    Current packs/day: 0.00    Average packs/day: (0.2 ttl pk-yrs)    Types: Pipe, Cigarettes    Start date:     Last attempt to quit: 1981    Years since quittin.0    Passive exposure: Past   Smokeless Tobacco Never   Tobacco Comments    2-3  PIPES PER DAY           PHYSICAL EXAM:  83 y.o. male sitting comfortably on exam chair in  apparent distress.   Unsteady gait  Bilateral rdial release signs  Negative hoffmans bilaterally  3+ left UE reflexes  2+ right UE reflexes  2+ patellar reflexes  Absent  reflexes at achilles      RADIOGRAPHIC STUDIES:  Lumbar MRI of 5/19/2023: Moderate multilevel lumbar degenerative disc disease.  Moderate to severe multilevel facet arthrosis.  Moderate lateral stenosis at L2-3 and to great extent at L3-4.  Severe lateral recess and moderate to severe central stenosis at L4-5.  Mild lateral stenosis at L5-S1.  No evidence of acute fracture.    Lumbar x-ray of 1/82024: Moderate to severe multilevel lumbar degenerative disc disease.  No evidence of spondylolisthesis.  Multiple superior endplate compression, L3 and L5.      ASSESSMENT:  1. Low back pain with bilateral sciatica, unspecified back pain laterality, unspecified chronicity  -     XR spine lumbar minimum 4 views non injury; Future; Expected date: 01/08/2024  -     Ambulatory referral to Physical Therapy; Future    2. Spinal stenosis of lumbar region with neurogenic claudication    3. Cervical pain  -     MRI cervical spine wo contrast; Future; Expected date: 01/08/2024    4. Myelopathy (HCC)  -     MRI cervical spine wo contrast; Future; Expected date: 01/08/2024        PLAN:  83 y.o. male presents for evaluation of his low back pain.  Has been suffering from multilevel lumbar degenerative disc and spinal stenosis at multiple injections.  He is also has had physical therapy in the past.  He has been on the care of neurology and in the past has undergone a cervical fusion for cervical myelopathy.      There are 2 separate issues at this time.  The first issue is that he has had prior cervical spinal cord compression and myelopathy.  I did review the prior MRI study.  The MRI scan clearly shows evidence of myelomalacia at C3-4.  He had anterior cervical discectomy and fusion by Dr. Corral.  Very large interbody implant anterior plates was used.  Postoperative x-rays did show evidence of superior endplate collapse and subsidence at C3-4.  Has not had any updated x-rays.  On physical examination he clearly shows signs of  myelopathy including ambulatory dysfunction, shuffling gait and hyperreflexia in bilateral upper extremities.  It is [place] essential for us to obtain an MRI study of his cervical spine to rule out progressive myelopathy and confirmed that the spinal cord is fully decompressed.      As long as there is no evidence of spinal cord compression then no further treatment is indicated respect to the C3-C4 ACDF with  in 6/2019.    Respect to his lumbar spine, he appears to be suffering neurologic claudication associate with spinal stenosis.  He has tried physical therapy and injections.  He has not tried aqua therapy.  Given his age, it is important for us to try a short course of aqua therapy to see if he can get his symptoms under control.  If his symptoms can be controlled with aqua therapy then surgery should be avoided.  He is a candidate for lumbar laminectomy and decompression.  Based on review of the MRI and x-rays, he is not in need of any fusion given the absence of spinal listhesis and instability.    I went and order the MRI study of the cervical spine.  I will see him back following the MRI study.  I also went ahead and placed an order for aqua therapy.  He is going to try to find a place near home.    Follow up for cervical MRI review          Scribe Attestation      I,:  Vinod Gillespie am acting as a scribe while in the presence of the attending physician.:       I,:  Ludmila Mendes MD personally performed the services described in this documentation    as scribed in my presence.:

## 2024-01-11 ENCOUNTER — TELEPHONE (OUTPATIENT)
Dept: OBGYN CLINIC | Facility: HOSPITAL | Age: 84
End: 2024-01-11

## 2024-01-11 NOTE — TELEPHONE ENCOUNTER
Caller: Patient    Doctor/Office: Cheli Lopez    CB#: [phone]      What needs to be faxed: PT order for Aqua Therapy    ATTN to: [facility]    Fax#: [phone]      Documents were successfully e-faxed

## 2024-01-12 NOTE — TELEPHONE ENCOUNTER
Caller: Patient     Doctor/Office: Cheli Lopez     #: [phone]     Patient is stating the [facility] did not receive script, please refax to fax listed below.  What needs to be faxed: PT order for Aqua Therapy     [facility]     Fax#: [phone]

## 2024-01-18 NOTE — TELEPHONE ENCOUNTER
Caller: Cheryl PT    Doctor: Vaughn    Reason for call: Never received referral for Aqua Therapy. Please fax to [phone]    Call back#: [phone]

## 2024-01-19 ENCOUNTER — TELEPHONE (OUTPATIENT)
Age: 84
End: 2024-01-19

## 2024-01-19 NOTE — TELEPHONE ENCOUNTER
Caller: [facility]    Doctor: Vaughn    Reason for call: Called to request we send a copy of patient's PT script.    Fax#: [phone]    Call back#: [phone]

## 2024-02-09 ENCOUNTER — HOSPITAL ENCOUNTER (OUTPATIENT)
Dept: MRI IMAGING | Facility: HOSPITAL | Age: 84
End: 2024-02-09
Attending: ORTHOPAEDIC SURGERY
Payer: MEDICARE

## 2024-02-09 ENCOUNTER — HOSPITAL ENCOUNTER (OUTPATIENT)
Dept: CT IMAGING | Facility: HOSPITAL | Age: 84
End: 2024-02-09
Attending: INTERNAL MEDICINE
Payer: MEDICARE

## 2024-02-09 ENCOUNTER — HOSPITAL ENCOUNTER (OUTPATIENT)
Dept: VASCULAR ULTRASOUND | Facility: HOSPITAL | Age: 84
Discharge: HOME | End: 2024-02-09
Payer: MEDICARE

## 2024-02-09 DIAGNOSIS — M54.2 CERVICAL PAIN: ICD-10-CM

## 2024-02-09 DIAGNOSIS — G95.9 MYELOPATHY (HCC): ICD-10-CM

## 2024-02-09 DIAGNOSIS — R93.89 ABNORMAL CT OF THE CHEST: ICD-10-CM

## 2024-02-09 DIAGNOSIS — I73.9 PERIPHERAL ARTERY DISEASE (HCC): ICD-10-CM

## 2024-02-09 PROCEDURE — 93924 LWR XTR VASC STDY BILAT: CPT

## 2024-02-09 PROCEDURE — G1004 CDSM NDSC: HCPCS

## 2024-02-09 PROCEDURE — 72141 MRI NECK SPINE W/O DYE: CPT

## 2024-02-09 PROCEDURE — 71250 CT THORAX DX C-: CPT

## 2024-02-11 PROCEDURE — 93924 LWR XTR VASC STDY BILAT: CPT | Performed by: SURGERY

## 2024-02-24 DIAGNOSIS — N32.81 OVERACTIVE BLADDER: Chronic | ICD-10-CM

## 2024-02-24 RX ORDER — OXYBUTYNIN CHLORIDE 5 MG/1
TABLET, EXTENDED RELEASE ORAL
Qty: 90 TABLET | Refills: 3 | Status: SHIPPED | OUTPATIENT
Start: 2024-02-24

## 2024-03-04 ENCOUNTER — OFFICE VISIT (OUTPATIENT)
Age: 84
End: 2024-03-04
Payer: MEDICARE

## 2024-03-04 VITALS
BODY MASS INDEX: 23.25 KG/M2 | OXYGEN SATURATION: 97 % | SYSTOLIC BLOOD PRESSURE: 114 MMHG | WEIGHT: 157 LBS | TEMPERATURE: 98 F | HEART RATE: 85 BPM | DIASTOLIC BLOOD PRESSURE: 80 MMHG | HEIGHT: 69 IN

## 2024-03-04 DIAGNOSIS — R93.89 ABNORMAL CT OF THE CHEST: Primary | ICD-10-CM

## 2024-03-04 DIAGNOSIS — G47.33 OSA (OBSTRUCTIVE SLEEP APNEA): ICD-10-CM

## 2024-03-04 DIAGNOSIS — Z85.118 HISTORY OF LUNG CANCER: ICD-10-CM

## 2024-03-04 DIAGNOSIS — F17.290 OTHER TOBACCO PRODUCT NICOTINE DEPENDENCE, UNCOMPLICATED: ICD-10-CM

## 2024-03-04 DIAGNOSIS — J41.0 SIMPLE CHRONIC BRONCHITIS (HCC): ICD-10-CM

## 2024-03-04 DIAGNOSIS — J43.2 CENTRILOBULAR EMPHYSEMA (HCC): ICD-10-CM

## 2024-03-04 PROCEDURE — 99214 OFFICE O/P EST MOD 30 MIN: CPT | Performed by: INTERNAL MEDICINE

## 2024-03-04 RX ORDER — MINOCYCLINE HYDROCHLORIDE 1 MG/1
POWDER ORAL
COMMUNITY
Start: 2024-01-16

## 2024-03-04 RX ORDER — ALBUTEROL SULFATE 2.5 MG/3ML
2.5 SOLUTION RESPIRATORY (INHALATION) EVERY 6 HOURS PRN
Qty: 360 ML | Refills: 1 | Status: SHIPPED | OUTPATIENT
Start: 2024-03-04

## 2024-03-04 NOTE — PROGRESS NOTES
"Assessment/Plan:   Diagnoses and all orders for this visit:    Abnormal CT of the chest  -     CT chest without contrast; Future    History of lung cancer  -     CT chest without contrast; Future    Simple chronic bronchitis (HCC)  -     albuterol (2.5 mg/3 mL) 0.083 % nebulizer solution; Take 3 mL (2.5 mg total) by nebulization every 6 (six) hours as needed for wheezing or shortness of breath    Centrilobular emphysema (HCC)    SHIVANI (obstructive sleep apnea)    Other tobacco product nicotine dependence, uncomplicated    Other orders  -     Arestin 1 MG [facility]        CT angiogram February 2023 showing a new area of relative spiculated nodularity in the left upper lobe 16 mm for which she had a PET scan done which did not show any uptake 3 months follow-up with a repeat CT of the chest in July 2023 showing stable left upper lobe lung nodule and given his prior history of lung cancer I ordered for his 6 months follow-up  Repeat CT of the chest demonstrates stable opacity in the left upper lobe likely scarring from the prior surgery as well  PFTs demonstrating mild obstructive airflow defect with lung volumes are normal DLCO is severely decreased  Discussed with the patient to continue with Trelegy 1 puff daily  Continue to use the nebulizer 4 times daily as needed.  Vaccinations up-to-date  Obstructive sleep apnea on CPAP continue with current settings  Cleaning of the supplies and change of PAP supplies discussed  Follow-up in 6 months or earlier as needed  No follow-ups on file.  All questions are answered to the patient's satisfaction and understanding.  He verbalizes understanding.  He is encouraged to call with any further questions or concerns.    Portions of the record may have been created with voice recognition software.  Occasional wrong word or \"sound a like\" substitutions may have occurred due to the inherent limitations of voice recognition software.  Read the chart carefully and recognize, using context, " where substitutions have occurred.    Electronically Signed by Kisha Schreiber MD    ______________________________________________________________________    Chief Complaint:   Chief Complaint   Patient presents with    Cough    Shortness of Breath       Patient ID: Steven is a 83 y.o. y.o. male has a past medical history of BPH (benign prostatic hyperplasia), Cancer ([facility]) (2013), Cervical myelopathy ([facility]) (6/20/2019), Chemical exposure, COPD (chronic obstructive pulmonary disease) ([facility]), CPAP (continuous positive airway pressure) dependence, DDD (degenerative disc disease), cervical, Diabetes mellitus (HCC), Foraminal stenosis of cervical region, Hyperlipidemia, Hypertension, SHIVANI (obstructive sleep apnea), Overactive bladder, and Spinal cord compression ([facility]) (5/8/2019).    3/4/2024  Patient presents today for follow-up visit.  Patient is a an 83-year-old male with past medical history of chronic bronchitis/emphysema, lung cancer status post left upper lobectomy, lung nodules, prostate cancer status post radiation, SHIVANI on CPAP, hypertension, diabetes.    He is here today for follow-up.  He is overall doing well with his breathing, does have some chronic mild dyspnea on exertion but remains active.  He is using his Trelegy daily, uses the albuterol usually once per day, not using his nebulizer currently he states.        Review of Systems   Constitutional: Negative.    HENT: Negative.     Eyes: Negative.    Respiratory: Negative.     Cardiovascular: Negative.    Gastrointestinal: Negative.    Endocrine: Negative.    Genitourinary: Negative.    Musculoskeletal: Negative.    Allergic/Immunologic: Negative.    Neurological: Negative.    Hematological: Negative.    Psychiatric/Behavioral: Negative.         Smoking history: He reports that he has been smoking pipe and cigarettes. He started smoking about 62 years ago. He has a 0.2 pack-year smoking history. He has been exposed to tobacco smoke. He has never used smokeless  tobacco.    The following portions of the patient's history were reviewed and updated as appropriate: allergies, current medications, past family history, past medical history, past social history, past surgical history, and problem list.    Immunization History   Administered Date(s) Administered    COVID-19 PFIZER VACCINE 0.3 ML IM 03/16/2021, 04/07/2021, 12/03/2021    COVID-19 Pfizer Vac BIVALENT Bi-sucrose 12 Yr+ IM 09/23/2022    COVID-19 Pfizer mRNA vacc PF bi-sucrose 12 yr and older (Comirnaty) 10/27/2023    INFLUENZA 10/01/2018, 12/09/2022    Influenza, high dose seasonal 0.7 mL 09/13/2019, 09/24/2020, 11/11/2021, 12/09/2022, 12/12/2023    Pneumococcal Conjugate 13-Valent 08/18/2016    Pneumococcal Polysaccharide PPV23 03/16/2018    Zoster 06/23/2014    Zoster Vaccine Recombinant 11/06/2018, 02/01/2019     Current Outpatient Medications   Medication Sig Dispense Refill    acetaminophen (TYLENOL) 500 mg tablet Take 500 mg by mouth every 6 (six) hours as needed for mild pain        Arestin 1 MG MISC       aspirin (ECOTRIN LOW STRENGTH) 81 mg EC tablet Take 1 tablet by mouth daily 30 tablet 0    Blood Glucose Monitoring Suppl (OneTouch Verio) w/Device KIT use as directed 1 kit 0    chlorhexidine (PERIDEX) 0.12 % solution RINSE MOUTH WITH 15 ML (1 CAPFUL) FOR 30 SECONDS IN THE MORNING A...  (REFER TO PRESCRIPTION NOTES).      clindamycin (CLEOCIN) 300 MG capsule Take 300 mg by mouth every 8 (eight) hours        Continuous Blood Gluc Sensor (FreeStyle Eben 14 Day Sensor) MISC Check blood sugars continuously. Switch every 14 days. 6 each 3    diltiazem (TIAZAC) 360 MG 24 hr capsule take 1 capsule by mouth once daily 90 capsule 3    DULoxetine (CYMBALTA) 60 mg delayed release capsule TAKE 1 CAPSULE DAILY 90 capsule 3    gabapentin (Neurontin) 100 mg capsule Take 1 capsule (100 mg total) by mouth 2 (two) times a day 180 capsule 0    glimepiride (AMARYL) 2 mg tablet Take 1 tablet (2 mg total) by mouth daily with  breakfast 90 tablet 1    glucose blood (FREESTYLE LITE) test strip use 1 TEST STRIP to TEST BLOOD SUGAR twice a day 100 strip 0    hydrochlorothiazide (HYDRODIURIL) 12.5 mg tablet Take 1 tablet (12.5 mg total) by mouth daily 90 tablet 1    Lancets (OneTouch Delica Plus [id number]) MISC use 1 LANCET to TEST BLOOD SUGAR one to two times a day 100 each 0    losartan (COZAAR) 100 MG tablet Take 1 tablet (100 mg total) by mouth daily 30 tablet 0    metFORMIN (GLUCOPHAGE) 500 mg tablet Take 1 tablet (500 mg total) by mouth 2 (two) times a day with meals 180 tablet 3    Multiple Vitamin (MULTI-VITAMINS PO) Take by mouth daily      oxybutynin (DITROPAN-XL) 5 mg 24 hr tablet take 1 tablet by mouth once daily 90 tablet 3    polyvinyl alcohol (LIQUIFILM TEARS) 1.4 % ophthalmic solution Administer 1 drop to both eyes 4 (four) times a day 15 mL 0    pravastatin (PRAVACHOL) 40 mg tablet take 1 tablet by mouth once daily 90 tablet 1    pyridoxine (VITAMIN B6) 50 mg tablet Take 50 mg by mouth daily      sildenafil (REVATIO) 20 mg tablet Take 1 tablet (20 mg total) by mouth if needed (Erectile dysfunction) Take 1-5 tablets as needed for erection on an empty stomach 30 tablet 1    tamsulosin (FLOMAX) 0.4 mg take 1 capsule by mouth TWICE A WEEK 90 capsule 3    thiamine 50 MG tablet Take 1 tablet (50 mg total) by mouth daily 30 tablet 3    Trelegy Ellipta 100-62.5-25 MCG/ACT inhaler USE 1 INHALATION DAILY (RINSE MOUTH AFTER USE) 360 blister 3    VITAMIN E PO Take by mouth 268 mg 400 iu      albuterol (2.5 mg/3 mL) 0.083 % nebulizer solution Take 3 mL (2.5 mg total) by nebulization every 6 (six) hours as needed for wheezing or shortness of breath (Patient not taking: Reported on 3/4/2024) 120 mL 1    ibuprofen (MOTRIN) 600 mg tablet Take 600 mg by mouth every 6 (six) hours as needed (Patient not taking: Reported on 12/12/2023)       No current facility-administered medications for this visit.     Allergies: Chloroquine and Qualaquin  "[quinine]    Objective:  Vitals:    03/04/24 1339   BP: 114/80   Pulse: 85   Temp: 98 °F (36.7 °C)   SpO2: 97%   Weight: 71.2 kg (157 lb)   Height: 5' 9\" (1.753 m)   Oxygen Therapy  SpO2: 97 %  .  Wt Readings from Last 3 Encounters:   03/04/24 71.2 kg (157 lb)   01/08/24 67.1 kg (148 lb)   12/12/23 68.2 kg (150 lb 6.4 oz)     Body mass index is 23.18 kg/m².    Physical Exam  Vitals and nursing note reviewed.   Constitutional:       Appearance: He is well-developed.   HENT:      Head: Normocephalic and atraumatic.   Eyes:      Conjunctiva/sclera: Conjunctivae normal.      Pupils: Pupils are equal, round, and reactive to light.   Neck:      Thyroid: No thyromegaly.      Vascular: No JVD.   Cardiovascular:      Rate and Rhythm: Normal rate and regular rhythm.      Heart sounds: Normal heart sounds. No murmur heard.     No friction rub. No gallop.   Pulmonary:      Effort: Pulmonary effort is normal. No respiratory distress.      Breath sounds: Normal breath sounds. No wheezing or rales.   Chest:      Chest wall: No tenderness.   Musculoskeletal:         General: No tenderness or deformity. Normal range of motion.      Cervical back: Normal range of motion and neck supple.   Lymphadenopathy:      Cervical: No cervical adenopathy.   Skin:     General: Skin is warm and dry.   Neurological:      Mental Status: He is alert and oriented to person, place, and time.         Lab Review:   Appointment on 12/12/2023   Component Date Value    Hemoglobin A1C 12/12/2023 8.7 (H)     EAG 12/12/2023 203     Sodium 12/12/2023 145     Potassium 12/12/2023 5.1     Chloride 12/12/2023 108     CO2 12/12/2023 32     ANION GAP 12/12/2023 5     BUN 12/12/2023 14     Creatinine 12/12/2023 0.89     Glucose, Fasting 12/12/2023 142 (H)     Calcium 12/12/2023 10.3 (H)     AST 12/12/2023 23     ALT 12/12/2023 33     Alkaline Phosphatase 12/12/2023 78     Total Protein 12/12/2023 6.0 (L)     Albumin 12/12/2023 4.2     Total Bilirubin 12/12/2023 0.48  "    eGFR 12/12/2023 80     Cholesterol 12/12/2023 97     Triglycerides 12/12/2023 38     HDL, Direct 12/12/2023 46     LDL Calculated 12/12/2023 43        Diagnostics:  I have personally reviewed pertinent films in PACS  CT of chest performed on 2/9/2024 without contrast revealed stable left upper lobe opacity.  ESS: Total score: 4  CT chest without contrast    Result Date: 2/19/2024  Narrative: CT CHEST WITHOUT IV CONTRAST INDICATION: R93.89: Abnormal findings on diagnostic imaging of other specified body structures. COMPARISON: 7/10/2023 and 1/31/2022 TECHNIQUE: CT examination of the chest was performed without intravenous contrast. Multiplanar 2D reformatted images were created from the source data. This examination, like all CT scans performed in the [facility], was performed utilizing techniques to minimize radiation dose exposure, including the use of iterative reconstruction and automated exposure control. Radiation dose length product (DLP) for this visit: 241 mGy-cm FINDINGS: LUNGS: Similar advanced emphysema, left greater than right. Opacity in the left upper lung anteriorly (series 3-76) has a linear appearance on the coronal and sagittal sequences and is not significant changed. This represents scarring. No suspicious pulmonary nodules or masses. No pulmonary consolidation. Minimal fibrosis in the right costophrenic angles is similar. No suspicious airway filling defects. Minimal scattered airway secretions. Unchanged left upper lobectomy. PLEURA: No pleural effusion or pneumothorax. No pleural masses. HEART/GREAT VESSELS: Overall heart size is again mildly enlarged. Coronary artery calcifications. No thoracic aortic aneurysm. MEDIASTINUM AND JO: No mediastinal or hilar adenopathy. Circumferential thickening of the proximal esophagus is not significantly changed from January 2022. CHEST WALL AND LOWER NECK: Ovoid mass on the skin surface of the left paramidline chest at the level of the  sternoclavicular joint inferiorly measures 2.0 x 1.0 cm. This previously measured 2.0 x 0.8 cm. In January 2022 the patient had a focus of skin ulceration in this location. Recommend clinical correlation for this superficial lesion. Similar-appearing lesion in the left paramidline posterior chest wall measures 1.4 x 0.9 cm. This has not increased in size from February 2021. Similar extensive gynecomastia. Unchanged right posterior chest wall lipoma in the deep subcutaneous fat. VISUALIZED STRUCTURES IN THE UPPER ABDOMEN: Bilateral renal cysts are similar to prior exam. Right upper pole cyst remains hyperdense. Small sliding-type hiatal hernia is similar. OSSEOUS STRUCTURES: No acute fracture or destructive osseous lesion. Similar mild thoracic spondylosis. Unchanged L1 bone island associated with a hemangioma..     Impression: Left upper lung opacity has not changed from prior exam and represents a benign focus of scarring. Left paramidline anterior chest wall skin lesion is slightly more prominent. This likely represents an epidermal inclusion cyst. Recommend clinical correlation for this superficial lesion. Similar lesion in the left paramidline back has not changed. Otherwise no significant interval change with chronic findings as above to include marked gynecomastia and advanced emphysema. Workstation performed: [id number]

## 2024-03-18 DIAGNOSIS — G62.9 NEUROPATHY: ICD-10-CM

## 2024-03-18 RX ORDER — GABAPENTIN 100 MG/1
100 CAPSULE ORAL 2 TIMES DAILY
Qty: 180 CAPSULE | Refills: 0 | Status: SHIPPED | OUTPATIENT
Start: 2024-03-18

## 2024-03-22 ENCOUNTER — TELEPHONE (OUTPATIENT)
Dept: UROLOGY | Facility: CLINIC | Age: 84
End: 2024-03-22

## 2024-03-22 NOTE — TELEPHONE ENCOUNTER
Called and spoke with pt. Reminded pt to get his PSA done prior to his appt with Lorena for Monday. PT will not go today to the lab. Informed pt that lab at the [facility] is open on Saturday from 6:30am-12pm. Pt will go tomorrow to get PSA done.

## 2024-03-23 ENCOUNTER — APPOINTMENT (OUTPATIENT)
Age: 84
End: 2024-03-23
Payer: MEDICARE

## 2024-03-23 DIAGNOSIS — C61 PROSTATE CANCER (HCC): ICD-10-CM

## 2024-03-23 LAB — PSA SERPL-MCNC: 0.02 NG/ML (ref 0–4)

## 2024-03-23 PROCEDURE — 84153 ASSAY OF PSA TOTAL: CPT

## 2024-03-23 PROCEDURE — 36415 COLL VENOUS BLD VENIPUNCTURE: CPT

## 2024-03-25 ENCOUNTER — OFFICE VISIT (OUTPATIENT)
Dept: UROLOGY | Facility: CLINIC | Age: 84
End: 2024-03-25
Payer: MEDICARE

## 2024-03-25 VITALS
HEART RATE: 68 BPM | BODY MASS INDEX: 22.63 KG/M2 | TEMPERATURE: 97.7 F | HEIGHT: 69 IN | SYSTOLIC BLOOD PRESSURE: 138 MMHG | DIASTOLIC BLOOD PRESSURE: 86 MMHG | WEIGHT: 152.8 LBS | OXYGEN SATURATION: 98 %

## 2024-03-25 DIAGNOSIS — C61 PROSTATE CANCER (HCC): ICD-10-CM

## 2024-03-25 DIAGNOSIS — N52.9 ERECTILE DYSFUNCTION, UNSPECIFIED ERECTILE DYSFUNCTION TYPE: Primary | ICD-10-CM

## 2024-03-25 PROCEDURE — 99213 OFFICE O/P EST LOW 20 MIN: CPT | Performed by: PHYSICIAN ASSISTANT

## 2024-03-25 RX ORDER — SILDENAFIL CITRATE 20 MG/1
20 TABLET ORAL AS NEEDED
Qty: 30 TABLET | Refills: 1 | Status: SHIPPED | OUTPATIENT
Start: 2024-03-25

## 2024-03-25 NOTE — PROGRESS NOTES
3/25/2024      Chief Complaint   Patient presents with    Follow-up     Elevated PSA         Assessment and Plan    83 y.o. male     1. Prostate cancer s/p brachytherapy (2013)   - PSA (3/23/24) 0.02  - PSA in 1 year    2. LUTS  - S/p normal cystoscopy 2019  - Continue Flomax and oxybutynin, denies side effects     3. Erectile Dysfunction  - Continue sildenafil     4. Bosniak 2F right upper pole renal cyst  - US kidney and bladder not done. Will have this done in the near future  - Follow up in 1 year with additional US prior to visit  - Call with any questions or concerns in the meantime  - All questions answered; patient understands and agrees with plan       History of Present Illness  Steven Chandler is a 83 y.o. male patient with history of prostate cancer s/p brachytherapy (2013), LUTS, ED, and Bosniak 2F right upper pole cyst here for follow up.      PSA remains undetectable at this time. Doing well without new complaints. Patient underwent negative cystoscopy in 2019. Continues to use flomax and oxybutynin therapy with benefit. Denies side effects from medications. US showing stable Bosniak 2F right upper pole renal cysts. Did not have US prior to visit today.     Review of Systems   Constitutional:  Negative for activity change, appetite change, chills and fever.   HENT:  Negative for congestion and trouble swallowing.    Respiratory:  Negative for cough and shortness of breath.    Cardiovascular:  Negative for chest pain, palpitations and leg swelling.   Gastrointestinal:  Negative for abdominal pain, constipation, diarrhea, nausea and vomiting.   Genitourinary:  Negative for difficulty urinating, dysuria, flank pain, frequency, hematuria and urgency.   Musculoskeletal:  Negative for back pain and gait problem.   Skin:  Negative for wound.   Allergic/Immunologic: Negative for immunocompromised state.   Neurological:  Negative for dizziness and syncope.   Hematological:  Does not bruise/bleed easily.  "  Psychiatric/Behavioral:  Negative for confusion.    All other systems reviewed and are negative.      Vitals  Vitals:    03/25/24 1318   BP: 138/86   Pulse: 68   Temp: 97.7 °F (36.5 °C)   SpO2: 98%   Weight: 69.3 kg (152 lb 12.8 oz)   Height: 5' 9\" (1.753 m)       Physical Exam  Constitutional:       General: He is not in acute distress.     Appearance: Normal appearance. He is not ill-appearing, toxic-appearing or diaphoretic.   HENT:      Head: Normocephalic.      Nose: No congestion.   Eyes:      General: No scleral icterus.        Right eye: No discharge.         Left eye: No discharge.      Conjunctiva/sclera: Conjunctivae normal.      Pupils: Pupils are equal, round, and reactive to light.   Pulmonary:      Effort: Pulmonary effort is normal.   Musculoskeletal:      Cervical back: Normal range of motion.   Skin:     General: Skin is warm and dry.      Coloration: Skin is not jaundiced or pale.      Findings: No bruising, erythema, lesion or rash.   Neurological:      General: No focal deficit present.      Mental Status: He is alert and oriented to person, place, and time. Mental status is at baseline.      Gait: Gait normal.   Psychiatric:         Mood and Affect: Mood normal.         Behavior: Behavior normal.         Thought Content: Thought content normal.         Judgment: Judgment normal.           Past History  Past Medical History:   Diagnosis Date    BPH (benign prostatic hyperplasia)     Cancer ([facility]) 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    Cervical myelopathy (HCC) 6/20/2019    Chemical exposure     9/11/01- worked in [facility] [employer], [employer].     COPD (chronic obstructive pulmonary disease) ([facility])     CPAP (continuous positive airway pressure) dependence     DDD (degenerative disc disease), cervical     Diabetes mellitus ([facility])     Foraminal stenosis of cervical region     Hyperlipidemia     Hypertension     SHIVANI (obstructive sleep apnea)     Overactive bladder     Spinal cord compression (HCC) 5/8/2019    Added automatically from " request for surgery [id number]     Social History     Socioeconomic History    Marital status: Unknown     Spouse name: None    Number of children: 3    Years of education: None    Highest education level: None   Occupational History    Occupation: retired   Tobacco Use    Smoking status: Every Day     Current packs/day: 0.00     Average packs/day: (0.2 ttl pk-yrs)     Types: Pipe, Cigarettes     Start date:      Last attempt to quit:      Years since quittin.2     Passive exposure: Past    Smokeless tobacco: Never    Tobacco comments:     2-3  PIPES PER DAY    Vaping Use    Vaping status: Never Used   Substance and Sexual Activity    Alcohol use: Never    Drug use: Never    Sexual activity: Not Currently   Other Topics Concern    None   Social History Narrative    Pt was born in [facility], [facility]; moved to [place] as an adult with his wife, and eventually moved to [place], [place].      Social Determinants of Health     Financial Resource Strain: Low Risk  (2023)    Overall Financial Resource Strain (CARDIA)     Difficulty of Paying Living Expenses: Not very hard   Food Insecurity: Not on file   Transportation Needs: No Transportation Needs (2023)    PRAPARE - Transportation     Lack of Transportation (Medical): No     Lack of Transportation (Non-Medical): No   Physical Activity: Inactive (2021)    Exercise Vital Sign     Days of Exercise per Week: 0 days     Minutes of Exercise per Session: 0 min   Stress: Stress Concern Present (2021)    [place] [place] [employer] - Occupational Stress Questionnaire     Feeling of Stress : Rather much   Social Connections: Not on file   Intimate Partner Violence: Not on file   Housing Stability: Not on file     Social History     Tobacco Use   Smoking Status Every Day    Current packs/day: 0.00    Average packs/day: (0.2 ttl pk-yrs)    Types: Pipe, Cigarettes    Start date:     Last attempt to quit:     Years since quittin.2     Passive exposure: Past   Smokeless Tobacco Never   Tobacco Comments    2-3  PIPES PER DAY      Family History   Problem Relation Age of Onset    No Known Problems Mother     No Known Problems Father        The following portions of the patient's history were reviewed and updated as appropriate: allergies, current medications, past medical history, past social history, past surgical history and problem list.    Results  No results found for this or any previous visit (from the past 1 hour(s)).]  Lab Results   Component Value Date    PSA 0.02 03/23/2024    PSA <0.1 03/06/2023    PSA <0.1 07/29/2022    PSA <0.1 03/04/2022     Lab Results   Component Value Date    GLUCOSE 141 (H) 11/02/2017    CALCIUM 10.3 (H) 12/12/2023    K 5.1 12/12/2023    CO2 32 12/12/2023     12/12/2023    BUN 14 12/12/2023    CREATININE 0.89 12/12/2023     Lab Results   Component Value Date    WBC 5.35 06/13/2023    HGB 14.1 06/13/2023    HCT 45.3 06/13/2023    MCV 90 06/13/2023     06/13/2023       Lorena Vanessa PA-C

## 2024-04-01 NOTE — PROGRESS NOTES
"Assessment/Plan:    Peripheral artery disease (HCC)  -     VAS lower limb arterial duplex, complete bilateral; Future  -     VAS PAWAN with exercise study; Future  -Functionally limited  -Reportedly prior interventions at other facility    -No longer walker since he has been doing PT  -Feels \"disabled\" due to leg weakness/pain; no ischemic rest pain or tissue loss  -Still smoking pipes    -PAWAN 2/9/24:    R 0.97/133/92; L 0.68/94/40, 5 minutes of toe raises >> R 0.84 and L 0.57    -TONYA 8/21/23: R 1.01/117/80; >75% distal SFA, L 0.65/53/33; occlusion dSFA  -TONYA   8/2/22: R 1.08/121/112; 50-75% distal SFA, L 0.94/113/73; > 75% distal SFA    Discussion: We reviewed his recent vascular testing.There has a been progression of vascular disease particularly on the left. Exercise PAWAN's shows about 15% decrease after toe raises. Patient does not recall symptoms at time of the test.  Currently, he complains of low back pain. He is no longer walking enough to get claudication (?between back pain and motivation to walk).     Patient questions why he does not get offered intervention for bilateral SFA disease.  Explained to him that his limiting back and thigh discomfort is not in-line with distribution of his vascular disease.  Further, his calf claudication is only \"once in a while\" at this point. I do not think that extensive SFA interventions will help him walk better. We also discussed that continued tobacco use may lead to worsening of his vascular condition and decreased success of any vascular procedures.     -B SFA disease with some calf claudication but this is not his limiting factor preventing him from walking  -Follow up with other pain management regarding back (Pt states that no spinal surgery will be offered to him)  -Recommend resuming walking program trying to build up to 30 minutes at least 4 days per week  -Continue with aspirin 81 and pravastatin 40  -Maintain good blood pressure, cholesterol and diabetes " "control (A1c increased)  -Discussed the importance of good footcare and monitoring  -Avoid foot wounds; wear protective footwear; check feet daily and call if any changes  -Follow-up in about 6 months for reevaluation (this can be with Dr. Nj)       Diagnoses and all orders for this visit:    Peripheral artery disease (HCC)    Hyperlipidemia associated with type 2 diabetes mellitus  (HCC)    Type 2 diabetes mellitus with diabetic neuropathy, without long-term current use of insulin (HCC)      Tobacco use is a significant patient-modifiable risk factor for this patient’s vascular disease with multiple vascular comorbidities, and a significant risk factor for failure of and complications from any endovascular or surgical interventions.    I explained to the patient the effects of smoking including peripheral artery disease, coronary artery disease, cerebrovascular disease as well as cancer and chronic obstructive pulmonary disease. I asked the patient to stop smoking immediately. It is never too late to quit, and many studies show significant health benefits as well as economical savings after smoking cessation. I offered to the patient nicotine replacement therapy as well as referral to the smoking cessation program and access to the quit line [employer] or ambulatory referral to our network smoking cessation program.    Based on our conversation, this patient does not appear ready to quit  and declined my offer of nicotine replacement or tobacco cessation medications    The patient did not set a quit date. I will continue to follow up on this issue at our next scheduled visit. I spent approximately 5 minutes on tobacco cessation counseling with this patient.      Subjective:      Patient ID: Steven Chandler is a 83 y.o. male.  Patient presents today to review PAWAN done 2/9/24. Functionally limited, denies claudication at his level of activity. Reports leg discomfort \"all day long\".     HPI   Mr. Steven Chandler " "is a 83 y.o. male with hypertension, hyperlipidemia, longstanding diabetes mellitus with neuropathy in the hands and feet (gabapentin by neuro), PAD who presents for evaluation of bilateral leg pain and left lower extremity edema.        He experiences bilateral leg weakness when he gets up and out of bed 1st thing in the morning which takes a couple hours to work out and he can walk better.  He also complains bitterly of abnormal sensations to the bottom of the feet which is likely due to diabetic neuropathy.  He has no ischemic rest pain or wounds.  He does describe prior arterial procedures performed to the legs about 7 years ago but has no details.        He has also had lower extremity edema (mostly on the L) for at least 3 years ('20) with discoloration at the toes.  He wears compression stockings but they are uncomfortable.       Echo 5/14/19: EF 60%. [facility]. Grade 1 DD.       11/7/23: Patient returns for vascular follow-up.  He tells me that he had been going to physical therapy where he is doing stretching and strengthening.  Overall after PT, he feels like he can walk better for a couple of days.     He complains that it is \"uncomfortable\" when he is walking.  He describes leg pains, calf tightness affecting his ability to walk.  However, he has a gym in his complex and he is exercising for 30 minutes a day either on a bike, elliptical or treadmill.  He walks a slow treadmill 2.5 miles an hour for 30 minutes.  During this time he may have some calf discomfort but he does not have to stop.  He finds the elliptical to be the hardest exercise because the legs get tired more quickly.     He describes chronic foot pain which he relates to his back. No new or ischemic foot pain.  No wounds.     He is chronically on aspirin and pravastatin.     We reviewed recent labs and testing.      Patient is concerned that when he was living in [facility], he has had procedures to the legs in the past.  It is not clear if " "his current symptoms are related to PAD. Nor is not clear if prior interventions have helped him walk better.     Recommended that he continue to exercise every day.  He should try to build up to 45 minutes of walking at least 4 days weekly and monitor his symptoms.  We will plan to see him back in about 3 months after exercise PAWAN for reevaluation.    -knee pain and calf tightness during the day  -Uncomfortable walking  -Doing better after PT  -30 minutes of aerobic exercise; either slow Td 2.5 mph for 30 minutes or bike or Elliptical     LDL 36  A1c 7.8    4/2/24:  Patient states \"I am not living\" referring to limitations in walking. He has low back problems with years with L 3 compression fracture which is only \"now\" bothering him. He tells me that he was seen by pain management and neurology who sent him for aqua therapy. He feels that therapy and aqua therapy have helped and he enjoyed the aqua but doesn't keep up on therapy once the course ends. When he isn't in therapy he doesn't want to keep up with exercise. He stopped his walking a while back since he was going to PT. He also has SOB on exertion. Occasional calf claudication but he doesn't seen to walk enough to be troubled by this. No ischemic rest pain or tissue loss. Smoked cigarettes years ago. Now smoking pipe and not ready to quit.     We reviewed his recent vascular testing. Patient questions why he does not get offered intervention for bilateral SFA disease.  Explained to him that the back and thigh discomfort that he gets is not in-line with distribution of his vascular disease.  Further, his calf claudication is only occasional at this point. I do not think that extensive SFA interventions will help him walk better. We also discussed that continued tobacco use may lead to worsening of his vascular condition and decreased success of any vascular procedures.     A1c 8.7  LDL 43      PAWAN 2/9/24:    [facility] " REPORT  CLINICAL:  Indications:  Patient presents with pain in the  bilateral lower extremities after walking 5  blocks.  Denies any open wounds or ulcers at this time.  Operative History:  SFA atherectomy  Risk Factors  The patient has history of HTN, Diabetes (NIDDM (oral meds)), Hyperlipidemia  and PAD.  Clinical  Right Pressure:  170/ mm Hg, Left Pressure:  170/ mm Hg.     FINDINGS:     Segment       Right           Left                          Pressure   PAWAN  Pressure   PAWAN    Ankle              165  0.97                    Ant. Tibial        162  0.95        93  0.55    Post. Tibial       165  0.97       116  0.68    Metatarsal         133  0.78        94  0.55    Great Toe           92  0.54        40  0.24             CONCLUSION:     Impression  RIGHT LOWER LIMB PRE EXERCISE  Ankle/Brachial index: 0.97 , which is in the  normal range. (prior: 1.01)  Metatarsal pressure of 133 mmHg. Great toe pressure of 92 mmHg, within healing  range.  PVR/ PPG tracings are slighty dampened.     LEFT LOWER LIMB PRE EXERCISE:  Ankle/Brachial index:   0.68  , which is in the moderate range. (prior: 0.65)  Metatarsal pressure of 94 mmHg. Great toe pressure of 40 mmHg, within healing  range.  PVR/ PPG tracings are  dampened.     POST EXERCISE ANKLE/BRACHIAL INDEX:  After 5 minutes of Toe Raises, there is a decrease in the PAWAN consistent with  significant arterial disease.  On the right, the Post exercise PAWAN's were found to be:  1min:  0.84 which is in the   moderate disease   range.  On the Left, the Post exercise PAWAN's were found to be:  1min:  0.57  which is in the   severe disease  range.      The following portions of the patient's history were reviewed and updated as appropriate: allergies, current medications, past family history, past medical history, past social history, past surgical history, and problem list.    Review of Systems   Constitutional: Negative.    HENT: Negative.     Eyes: Negative.    Respiratory:   "Positive for cough and shortness of breath.    Cardiovascular: Negative.    Gastrointestinal: Negative.    Endocrine: Negative.    Genitourinary: Negative.    Musculoskeletal:  Positive for back pain and gait problem.        Leg pain   Skin: Negative.    Allergic/Immunologic: Negative.    Hematological: Negative.    Psychiatric/Behavioral: Negative.           Objective:    /86 (BP Location: Right arm, Patient Position: Sitting)   Pulse 78   Ht 5' 9\" (1.753 m)   Wt 69.9 kg (154 lb)   BMI 22.74 kg/m²          Physical Exam  Vitals and nursing note reviewed.   Constitutional:       Appearance: He is well-developed.   HENT:      Head: Normocephalic and atraumatic.   Eyes:      Pupils: Pupils are equal, round, and reactive to light.   Neck:      Thyroid: No thyromegaly.      Vascular: No JVD.      Trachea: Trachea normal.   Cardiovascular:      Rate and Rhythm: Normal rate and regular rhythm.      Pulses:           Carotid pulses are 2+ on the right side and 2+ on the left side.       Radial pulses are 2+ on the right side and 2+ on the left side.        Dorsalis pedis pulses are 2+ on the right side and 0 on the left side.        Posterior tibial pulses are 0 on the left side.      Heart sounds: Normal heart sounds, S1 normal and S2 normal. No murmur heard.     No friction rub. No gallop.   Pulmonary:      Effort: Pulmonary effort is normal. No accessory muscle usage or respiratory distress.      Breath sounds: Normal breath sounds. No wheezing or rales.   Abdominal:      General: Bowel sounds are normal. There is no distension.      Palpations: Abdomen is soft.      Tenderness: There is no abdominal tenderness.   Musculoskeletal:         General: No deformity. Normal range of motion.      Cervical back: Neck supple.      Right lower le+ Edema present.      Left lower le+ Edema present.   Skin:     General: Skin is warm and dry.      Findings: No lesion or rash.      Nails: There is no clubbing. " "  Neurological:      Mental Status: He is alert and oriented to person, place, and time.      Comments: Grossly normal    Psychiatric:         Behavior: Behavior is cooperative.          I have reviewed and made appropriate changes to the review of systems input by the medical assistant.    Vitals:    04/02/24 1356   BP: 146/86   BP Location: Right arm   Patient Position: Sitting   Pulse: 78   Weight: 69.9 kg (154 lb)   Height: 5' 9\" (1.753 m)       Patient Active Problem List   Diagnosis    Type 2 diabetes mellitus with diabetic neuropathy, without long-term current use of insulin (HCC)    Hyperlipidemia associated with type 2 diabetes mellitus  ([facility])    Essential hypertension    BPH (benign prostatic hyperplasia)    Degenerative cervical spinal stenosis    Cervical spondylosis    COPD (chronic obstructive pulmonary disease) ([facility])    History of prostate cancer    Overactive bladder    Acquired cyst of kidney    SHIVANI (obstructive sleep apnea)    Vitamin D insufficiency    Pipe smoker    Anxiety    Erectile dysfunction due to diseases classified elsewhere    Peripheral artery disease ([facility])    Leg swelling    Sebaceous cyst    Spinal stenosis of lumbar region with neurogenic claudication       Past Surgical History:   Procedure Laterality Date    ARTHROSCOPY KNEE Bilateral     COLONOSCOPY      LUNG SURGERY Left     scraping of left    TX ARTHRD ANT INTERBODY DECOMPRESS CERVICAL BELW C2 Bilateral 6/18/2019    Procedure: C3/4  ACDF WITH  C4 HEMICORPECTOMY, C3-4 ANTERIOR INSTRUMENTATION AND FUSION (NEUROMONITORING);  Surgeon: Lucila Corral MD;  Location: [facility];  Service: Neurosurgery    ROTATOR CUFF REPAIR Bilateral        Family History   Problem Relation Age of Onset    No Known Problems Mother     No Known Problems Father        Social History     Socioeconomic History    Marital status: Unknown     Spouse name: Not on file    Number of children: 3    Years of education: Not on file    Highest education level: Not " on file   Occupational History    Occupation: retired   Tobacco Use    Smoking status: Every Day     Current packs/day: 0.00     Average packs/day: (0.2 ttl pk-yrs)     Types: Pipe, Cigarettes     Start date:      Last attempt to quit: 1981     Years since quittin.2     Passive exposure: Past    Smokeless tobacco: Never    Tobacco comments:     2-3  PIPES PER DAY    Vaping Use    Vaping status: Never Used   Substance and Sexual Activity    Alcohol use: Never    Drug use: Never    Sexual activity: Not Currently   Other Topics Concern    Not on file   Social History Narrative    Pt was born in [facility]; moved to [facility] as an adult with his wife, and eventually moved to [place], [place].      Social Determinants of Health     Financial Resource Strain: Low Risk  (2023)    Overall Financial Resource Strain (CARDIA)     Difficulty of Paying Living Expenses: Not very hard   Food Insecurity: Not on file   Transportation Needs: No Transportation Needs (2023)    PRAPARE - Transportation     Lack of Transportation (Medical): No     Lack of Transportation (Non-Medical): No   Physical Activity: Inactive (2021)    Exercise Vital Sign     Days of Exercise per Week: 0 days     Minutes of Exercise per Session: 0 min   Stress: Stress Concern Present (2021)    [employer] [place] [employer] - Occupational Stress Questionnaire     Feeling of Stress : Rather much   Social Connections: Not on file   Intimate Partner Violence: Not on file   Housing Stability: Not on file       Allergies   Allergen Reactions    Chloroquine Itching    Qualaquin [Quinine] Itching         Current Outpatient Medications:     acetaminophen (TYLENOL) 500 mg tablet, Take 500 mg by mouth every 6 (six) hours as needed for mild pain  , Disp: , Rfl:     albuterol (2.5 mg/3 mL) 0.083 % nebulizer solution, Take 3 mL (2.5 mg total) by nebulization every 6 (six) hours as needed for wheezing or shortness of breath, Disp: 360  mL, Rfl: 1    Arestin 1 MG MISC, , Disp: , Rfl:     aspirin (ECOTRIN LOW STRENGTH) 81 mg EC tablet, Take 1 tablet by mouth daily, Disp: 30 tablet, Rfl: 0    Blood Glucose Monitoring Suppl (OneTouch Verio) w/Device KIT, use as directed, Disp: 1 kit, Rfl: 0    chlorhexidine (PERIDEX) 0.12 % solution, RINSE MOUTH WITH 15 ML (1 CAPFUL) FOR 30 SECONDS IN THE MORNING A...  (REFER TO PRESCRIPTION NOTES)., Disp: , Rfl:     clindamycin (CLEOCIN) 300 MG capsule, Take 300 mg by mouth every 8 (eight) hours  , Disp: , Rfl:     Continuous Blood Gluc Sensor (FreeStyle Eben 14 Day Sensor) MISC, Check blood sugars continuously. Switch every 14 days., Disp: 6 each, Rfl: 3    diltiazem (TIAZAC) 360 MG 24 hr capsule, take 1 capsule by mouth once daily, Disp: 90 capsule, Rfl: 3    DULoxetine (CYMBALTA) 60 mg delayed release capsule, TAKE 1 CAPSULE DAILY, Disp: 90 capsule, Rfl: 3    gabapentin (Neurontin) 100 mg capsule, Take 1 capsule (100 mg total) by mouth 2 (two) times a day, Disp: 180 capsule, Rfl: 0    glimepiride (AMARYL) 2 mg tablet, Take 1 tablet (2 mg total) by mouth daily with breakfast, Disp: 90 tablet, Rfl: 1    glucose blood (FREESTYLE LITE) test strip, use 1 TEST STRIP to TEST BLOOD SUGAR twice a day, Disp: 100 strip, Rfl: 0    losartan (COZAAR) 100 MG tablet, Take 1 tablet (100 mg total) by mouth daily, Disp: 30 tablet, Rfl: 0    metFORMIN (GLUCOPHAGE) 500 mg tablet, Take 1 tablet (500 mg total) by mouth 2 (two) times a day with meals, Disp: 180 tablet, Rfl: 3    Multiple Vitamin (MULTI-VITAMINS PO), Take by mouth daily, Disp: , Rfl:     oxybutynin (DITROPAN-XL) 5 mg 24 hr tablet, take 1 tablet by mouth once daily, Disp: 90 tablet, Rfl: 3    polyvinyl alcohol (LIQUIFILM TEARS) 1.4 % ophthalmic solution, Administer 1 drop to both eyes 4 (four) times a day, Disp: 15 mL, Rfl: 0    pravastatin (PRAVACHOL) 40 mg tablet, take 1 tablet by mouth once daily, Disp: 90 tablet, Rfl: 1    pyridoxine (VITAMIN B6) 50 mg tablet, Take 50  mg by mouth daily, Disp: , Rfl:     sildenafil (REVATIO) 20 mg tablet, Take 1 tablet (20 mg total) by mouth if needed (Erectile dysfunction) Take 1-5 tablets as needed for erection on an empty stomach, Disp: 30 tablet, Rfl: 1    tamsulosin (FLOMAX) 0.4 mg, take 1 capsule by mouth TWICE A WEEK, Disp: 90 capsule, Rfl: 3    thiamine 50 MG tablet, Take 1 tablet (50 mg total) by mouth daily, Disp: 30 tablet, Rfl: 3    Trelegy Ellipta 100-62.5-25 MCG/ACT inhaler, USE 1 INHALATION DAILY (RINSE MOUTH AFTER USE), Disp: 360 blister, Rfl: 3    VITAMIN E PO, Take by mouth 268 mg 400 iu, Disp: , Rfl:     hydrochlorothiazide (HYDRODIURIL) 12.5 mg tablet, Take 1 tablet (12.5 mg total) by mouth daily, Disp: 90 tablet, Rfl: 1    ibuprofen (MOTRIN) 600 mg tablet, Take 600 mg by mouth every 6 (six) hours as needed, Disp: , Rfl:     Lancets (OneTouch Delica Plus [id number]) MISC, use 1 LANCET to TEST BLOOD SUGAR one to two times a day, Disp: 100 each, Rfl: 0

## 2024-04-02 ENCOUNTER — OFFICE VISIT (OUTPATIENT)
Dept: VASCULAR SURGERY | Facility: CLINIC | Age: 84
End: 2024-04-02
Payer: MEDICARE

## 2024-04-02 VITALS
HEART RATE: 78 BPM | SYSTOLIC BLOOD PRESSURE: 146 MMHG | DIASTOLIC BLOOD PRESSURE: 86 MMHG | WEIGHT: 154 LBS | HEIGHT: 69 IN | BODY MASS INDEX: 22.81 KG/M2

## 2024-04-02 DIAGNOSIS — E11.40 TYPE 2 DIABETES MELLITUS WITH DIABETIC NEUROPATHY, WITHOUT LONG-TERM CURRENT USE OF INSULIN (HCC): Chronic | ICD-10-CM

## 2024-04-02 DIAGNOSIS — I73.9 PERIPHERAL ARTERY DISEASE (HCC): Primary | ICD-10-CM

## 2024-04-02 DIAGNOSIS — E78.5 HYPERLIPIDEMIA ASSOCIATED WITH TYPE 2 DIABETES MELLITUS  (HCC): Chronic | ICD-10-CM

## 2024-04-02 DIAGNOSIS — E11.69 HYPERLIPIDEMIA ASSOCIATED WITH TYPE 2 DIABETES MELLITUS  (HCC): Chronic | ICD-10-CM

## 2024-04-02 PROCEDURE — 99214 OFFICE O/P EST MOD 30 MIN: CPT | Performed by: PHYSICIAN ASSISTANT

## 2024-04-09 ENCOUNTER — HOSPITAL ENCOUNTER (OUTPATIENT)
Dept: ULTRASOUND IMAGING | Facility: CLINIC | Age: 84
Discharge: HOME | End: 2024-04-09
Payer: MEDICARE

## 2024-04-09 DIAGNOSIS — N52.9 ERECTILE DYSFUNCTION, UNSPECIFIED ERECTILE DYSFUNCTION TYPE: ICD-10-CM

## 2024-04-09 PROCEDURE — 76775 US EXAM ABDO BACK WALL LIM: CPT

## 2024-04-24 ENCOUNTER — OFFICE VISIT (OUTPATIENT)
Dept: NEUROLOGY | Facility: CLINIC | Age: 84
End: 2024-04-24
Payer: COMMERCIAL

## 2024-04-24 VITALS
BODY MASS INDEX: 23.05 KG/M2 | SYSTOLIC BLOOD PRESSURE: 150 MMHG | HEIGHT: 69 IN | OXYGEN SATURATION: 97 % | HEART RATE: 73 BPM | WEIGHT: 155.6 LBS | DIASTOLIC BLOOD PRESSURE: 78 MMHG

## 2024-04-24 DIAGNOSIS — G47.33 OSA (OBSTRUCTIVE SLEEP APNEA): ICD-10-CM

## 2024-04-24 DIAGNOSIS — R26.89 BALANCE PROBLEM: Primary | ICD-10-CM

## 2024-04-24 DIAGNOSIS — M48.02 DEGENERATIVE CERVICAL SPINAL STENOSIS: ICD-10-CM

## 2024-04-24 DIAGNOSIS — M48.062 SPINAL STENOSIS OF LUMBAR REGION WITH NEUROGENIC CLAUDICATION: ICD-10-CM

## 2024-04-24 DIAGNOSIS — E11.40 TYPE 2 DIABETES MELLITUS WITH DIABETIC NEUROPATHY, WITHOUT LONG-TERM CURRENT USE OF INSULIN (HCC): Chronic | ICD-10-CM

## 2024-04-24 DIAGNOSIS — I10 ESSENTIAL HYPERTENSION: Chronic | ICD-10-CM

## 2024-04-24 PROCEDURE — 99214 OFFICE O/P EST MOD 30 MIN: CPT | Performed by: PSYCHIATRY & NEUROLOGY

## 2024-04-24 NOTE — PROGRESS NOTES
Steven Chandler is a 83 y.o. male.   Chief Complaint   Patient presents with    Cervical spondylosis    diabetic polyneuropathy    spinal stenosis       Assessment:    1. Balance problem    2. Degenerative cervical spinal stenosis    3. Spinal stenosis of lumbar region with neurogenic claudication    4. Type 2 diabetes mellitus with diabetic neuropathy, without long-term current use of insulin (HCC)    5. Essential hypertension    6. SHIVANI (obstructive sleep apnea)       Plan:  Patient was advised to continue with gabapentin 100 mg twice a day, he was seen by Dr. Mendes orthopedic spine surgeon and was advised to go for aqua therapy which the patient did finish.  His balance issues could be multifactorial given his cervical spondylosis lumbar spinal stenosis neuropathy but given his age we need to rule out a central pathology, advised the patient to have an MRI scan of the brain and to call me after that to discuss the results, he was advised to keep his blood pressure cholesterol and sugar under control to take fall and safety precautions.  His diabetic neuropathy symptoms seems to be controlled on Cymbalta and Neurontin this is being managed by his family physician.  He was strongly encouraged to quit smoking.  Continue follow-up with his other physicians including his sleep specialist regarding his sleep apnea and cardiologist.  To go to the hospital if has any worsening symptoms and call me otherwise to see me back in 6 months or sooner if needed and follow-up with the other physicians.          Subjective:    HPI   Patient is here in follow-up for his history of cervical lumbar spinal stenosis and diabetic polyneuropathy, and since last visit he tells me that he has been doing reasonably okay he did see the orthopedic spine surgeon) and was advised to go for aqua therapy, he did feel some relief but still having neck and back issues his neuropathy symptoms seems to be under control he has been having some balance  "issues and has been walking unsteady, denies any lightheadedness, he has not had any falls no bowel and bladder incontinence, no other complaints.    Vitals:    04/24/24 1308   BP: 150/78   BP Location: Left arm   Patient Position: Sitting   Cuff Size: Large   Pulse: 73   SpO2: 97%   Height: 5' 9\" (1.753 m)       Current Medications    Current Outpatient Medications:     albuterol (2.5 mg/3 mL) 0.083 % nebulizer solution, Take 3 mL (2.5 mg total) by nebulization every 6 (six) hours as needed for wheezing or shortness of breath, Disp: 360 mL, Rfl: 1    Arestin 1 MG MISC, 1 each in the morning, Disp: , Rfl:     aspirin (ECOTRIN LOW STRENGTH) 81 mg EC tablet, Take 1 tablet by mouth daily, Disp: 30 tablet, Rfl: 0    Blood Glucose Monitoring Suppl (OneTouch Verio) w/Device KIT, use as directed, Disp: 1 kit, Rfl: 0    chlorhexidine (PERIDEX) 0.12 % solution, RINSE MOUTH WITH 15 ML (1 CAPFUL) FOR 30 SECONDS IN THE MORNING A...  (REFER TO PRESCRIPTION NOTES)., Disp: , Rfl:     clindamycin (CLEOCIN) 300 MG capsule, Take 300 mg by mouth every 8 (eight) hours  , Disp: , Rfl:     Continuous Blood Gluc Sensor (FreeStyle Eben 14 Day Sensor) MISC, Check blood sugars continuously. Switch every 14 days., Disp: 6 each, Rfl: 3    diltiazem (TIAZAC) 360 MG 24 hr capsule, take 1 capsule by mouth once daily, Disp: 90 capsule, Rfl: 3    DULoxetine (CYMBALTA) 60 mg delayed release capsule, TAKE 1 CAPSULE DAILY, Disp: 90 capsule, Rfl: 3    gabapentin (Neurontin) 100 mg capsule, Take 1 capsule (100 mg total) by mouth 2 (two) times a day, Disp: 180 capsule, Rfl: 0    glucose blood (FREESTYLE LITE) test strip, use 1 TEST STRIP to TEST BLOOD SUGAR twice a day, Disp: 100 strip, Rfl: 0    losartan (COZAAR) 100 MG tablet, Take 1 tablet (100 mg total) by mouth daily, Disp: 30 tablet, Rfl: 0    metFORMIN (GLUCOPHAGE) 500 mg tablet, Take 1 tablet (500 mg total) by mouth 2 (two) times a day with meals, Disp: 180 tablet, Rfl: 3    Multiple Vitamin " (MULTI-VITAMINS PO), Take by mouth daily, Disp: , Rfl:     oxybutynin (DITROPAN-XL) 5 mg 24 hr tablet, take 1 tablet by mouth once daily, Disp: 90 tablet, Rfl: 3    polyvinyl alcohol (LIQUIFILM TEARS) 1.4 % ophthalmic solution, Administer 1 drop to both eyes 4 (four) times a day, Disp: 15 mL, Rfl: 0    pravastatin (PRAVACHOL) 40 mg tablet, take 1 tablet by mouth once daily, Disp: 90 tablet, Rfl: 1    pyridoxine (VITAMIN B6) 50 mg tablet, Take 50 mg by mouth daily, Disp: , Rfl:     sildenafil (REVATIO) 20 mg tablet, Take 1 tablet (20 mg total) by mouth if needed (Erectile dysfunction) Take 1-5 tablets as needed for erection on an empty stomach, Disp: 30 tablet, Rfl: 1    tamsulosin (FLOMAX) 0.4 mg, take 1 capsule by mouth TWICE A WEEK, Disp: 90 capsule, Rfl: 3    thiamine 50 MG tablet, Take 1 tablet (50 mg total) by mouth daily, Disp: 30 tablet, Rfl: 3    Trelegy Ellipta 100-62.5-25 MCG/ACT inhaler, USE 1 INHALATION DAILY (RINSE MOUTH AFTER USE), Disp: 360 blister, Rfl: 3    VITAMIN E PO, Take by mouth 268 mg 400 iu, Disp: , Rfl:     acetaminophen (TYLENOL) 500 mg tablet, Take 500 mg by mouth every 6 (six) hours as needed for mild pain   (Patient not taking: Reported on 4/24/2024), Disp: , Rfl:     glimepiride (AMARYL) 2 mg tablet, Take 1 tablet (2 mg total) by mouth daily with breakfast, Disp: 90 tablet, Rfl: 1    hydrochlorothiazide (HYDRODIURIL) 12.5 mg tablet, Take 1 tablet (12.5 mg total) by mouth daily, Disp: 90 tablet, Rfl: 1    ibuprofen (MOTRIN) 600 mg tablet, Take 600 mg by mouth every 6 (six) hours as needed, Disp: , Rfl:     Lancets (OneTouch Delica Plus [id number]) MISC, use 1 LANCET to TEST BLOOD SUGAR one to two times a day, Disp: 100 each, Rfl: 0      Allergies  Chloroquine and Qualaquin [quinine]    Past Medical History  Past Medical History:   Diagnosis Date    BPH (benign prostatic hyperplasia)     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     COPD (chronic obstructive pulmonary disease) ([facility])     CPAP (continuous positive airway pressure) dependence     DDD (degenerative disc disease), cervical     Diabetes mellitus (HCC)     Foraminal stenosis of cervical region     Hyperlipidemia     Hypertension     SHIVANI (obstructive sleep apnea)     Overactive bladder     Spinal cord compression (HCC) 5/8/2019    Added automatically from request for surgery [id number]         Past Surgical History:  Past Surgical History:   Procedure Laterality Date    ARTHROSCOPY KNEE Bilateral     COLONOSCOPY      LUNG SURGERY Left     scraping of left    TN ARTHRD ANT INTERBODY DECOMPRESS CERVICAL BELW C2 Bilateral 6/18/2019    Procedure: C3/4  ACDF WITH  C4 HEMICORPECTOMY, C3-4 ANTERIOR INSTRUMENTATION AND FUSION (NEUROMONITORING);  Surgeon: Lucila Corral MD;  [facility];  Service: Neurosurgery    ROTATOR CUFF REPAIR Bilateral          Family History:  Family History   Problem Relation Age of Onset    No Known Problems Mother     No Known Problems Father        Social History:   reports that he has been smoking pipe and cigarettes. He started smoking about 62 years ago. He has a 0.2 pack-year smoking history. He has been exposed to tobacco smoke. He has never used smokeless tobacco. He reports that he does not drink alcohol and does not use drugs.    I have reviewed the past medical history, surgical history, social and family history, current medications, allergies vitals, review of systems, and updated this information as appropriate today.   Objective:    Physical Exam    Neurological Exam     GENERAL:  Cooperative in no acute distress. Well-developed and well-nourished     HEAD and NECK   Head is atraumatic normocephalic with no lesions or masses. Neck is supple with full range of motion     CARDIOVASCULAR  Carotid Arteries-no carotid bruits.     NEUROLOGIC:  Mental Status-the patient is awake alert and oriented without aphasia or  apraxia  Cranial Nerves: Visual fields are full to confrontation.   Extraocular movements are full without nystagmus. Pupils are 2-1/2 mm and reactive. Face is symmetrical to light touch. Movements of facial expression move symmetrically. Hearing is normal to finger rub bilaterally. Soft palate lifts symmetrically. Shoulder shrug is symmetrical. Tongue is midline without atrophy.  Motor: No drift is noted on arm extension. Strength is full in the upper and lower extremities with normal bulk and tone.  Sensory: Decreased light touch pinprick temperature sensation in a glove and stocking distribution cortical function is intact.  Coordination: Finger to nose testing is performed accurately. Romberg is negative. Gait reveals a normal base with symmetrical arm swing  Reflexes are 1+ and symmetrical    ROS:  Review of Systems   Constitutional:  Negative for appetite change, fatigue and fever.   HENT: Negative.  Negative for hearing loss, tinnitus, trouble swallowing and voice change.    Eyes: Negative.  Negative for photophobia, pain and visual disturbance.   Respiratory: Negative.  Negative for shortness of breath.    Cardiovascular: Negative.  Negative for palpitations.   Gastrointestinal: Negative.  Negative for nausea and vomiting.   Endocrine: Negative.  Negative for cold intolerance.   Genitourinary: Negative.  Negative for dysuria, frequency and urgency.   Musculoskeletal:  Negative for back pain, gait problem, myalgias, neck pain and neck stiffness.   Skin: Negative.  Negative for rash.   Allergic/Immunologic: Negative.    Neurological: Negative.  Negative for dizziness, tremors, seizures, syncope, facial asymmetry, speech difficulty, weakness, light-headedness, numbness and headaches.   Hematological: Negative.  Does not bruise/bleed easily.   Psychiatric/Behavioral: Negative.  Negative for confusion, hallucinations and sleep disturbance.

## 2024-05-03 ENCOUNTER — HOSPITAL ENCOUNTER (OUTPATIENT)
Dept: MRI IMAGING | Facility: CLINIC | Age: 84
Discharge: HOME | End: 2024-05-03
Payer: MEDICARE

## 2024-05-03 DIAGNOSIS — R26.89 BALANCE PROBLEM: ICD-10-CM

## 2024-05-03 PROCEDURE — 70551 MRI BRAIN STEM W/O DYE: CPT

## 2024-06-05 DIAGNOSIS — N52.9 ERECTILE DYSFUNCTION, UNSPECIFIED ERECTILE DYSFUNCTION TYPE: ICD-10-CM

## 2024-06-05 RX ORDER — SILDENAFIL CITRATE 20 MG/1
20 TABLET ORAL AS NEEDED
Qty: 30 TABLET | Refills: 1 | Status: SHIPPED | OUTPATIENT
Start: 2024-06-05

## 2024-06-05 NOTE — TELEPHONE ENCOUNTER
Refill must be reviewed and completed by the office or provider. The refill is unable to be approved or denied by the medication management team.    FAILED B/P PLEASE REVIEW

## 2024-06-13 DIAGNOSIS — G62.9 NEUROPATHY: ICD-10-CM

## 2024-06-13 RX ORDER — GABAPENTIN 100 MG/1
100 CAPSULE ORAL 2 TIMES DAILY
Qty: 180 CAPSULE | Refills: 1 | Status: SHIPPED | OUTPATIENT
Start: 2024-06-13

## 2024-06-17 DIAGNOSIS — E11.40 TYPE 2 DIABETES MELLITUS WITH DIABETIC NEUROPATHY, WITHOUT LONG-TERM CURRENT USE OF INSULIN (HCC): Chronic | ICD-10-CM

## 2024-06-17 RX ORDER — FLASH GLUCOSE SENSOR
KIT MISCELLANEOUS
Qty: 6 EACH | Refills: 3 | Status: SHIPPED | OUTPATIENT
Start: 2024-06-17 | End: 2024-06-20 | Stop reason: SDUPTHER

## 2024-06-18 ENCOUNTER — OFFICE VISIT (OUTPATIENT)
Age: 84
End: 2024-06-18
Payer: MEDICARE

## 2024-06-18 ENCOUNTER — APPOINTMENT (OUTPATIENT)
Age: 84
End: 2024-06-18
Payer: MEDICARE

## 2024-06-18 VITALS
HEIGHT: 69 IN | SYSTOLIC BLOOD PRESSURE: 152 MMHG | DIASTOLIC BLOOD PRESSURE: 78 MMHG | TEMPERATURE: 96.8 F | BODY MASS INDEX: 22.66 KG/M2 | HEART RATE: 69 BPM | WEIGHT: 153 LBS | RESPIRATION RATE: 16 BRPM | OXYGEN SATURATION: 98 %

## 2024-06-18 DIAGNOSIS — R53.83 OTHER FATIGUE: Primary | ICD-10-CM

## 2024-06-18 DIAGNOSIS — G47.33 OSA (OBSTRUCTIVE SLEEP APNEA): ICD-10-CM

## 2024-06-18 DIAGNOSIS — E11.40 TYPE 2 DIABETES MELLITUS WITH DIABETIC NEUROPATHY, WITHOUT LONG-TERM CURRENT USE OF INSULIN (HCC): Chronic | ICD-10-CM

## 2024-06-18 DIAGNOSIS — E55.9 VITAMIN D INSUFFICIENCY: ICD-10-CM

## 2024-06-18 DIAGNOSIS — R53.83 OTHER FATIGUE: ICD-10-CM

## 2024-06-18 LAB
25(OH)D3 SERPL-MCNC: 38.2 NG/ML (ref 30–100)
ALBUMIN SERPL BCP-MCNC: 4.3 G/DL (ref 3.5–5)
ALP SERPL-CCNC: 74 U/L (ref 34–104)
ALT SERPL W P-5'-P-CCNC: 21 U/L (ref 7–52)
AMORPH URATE CRY URNS QL MICRO: ABNORMAL
ANION GAP SERPL CALCULATED.3IONS-SCNC: 9 MMOL/L (ref 4–13)
AST SERPL W P-5'-P-CCNC: 16 U/L (ref 13–39)
BACTERIA UR QL AUTO: ABNORMAL /HPF
BASOPHILS # BLD AUTO: 0.01 THOUSANDS/ÂΜL (ref 0–0.1)
BASOPHILS NFR BLD AUTO: 0 % (ref 0–1)
BILIRUB SERPL-MCNC: 0.48 MG/DL (ref 0.2–1)
BILIRUB UR QL STRIP: NEGATIVE
BUN SERPL-MCNC: 15 MG/DL (ref 5–25)
CALCIUM SERPL-MCNC: 10.8 MG/DL (ref 8.4–10.2)
CHLORIDE SERPL-SCNC: 106 MMOL/L (ref 96–108)
CLARITY UR: ABNORMAL
CO2 SERPL-SCNC: 30 MMOL/L (ref 21–32)
COLOR UR: YELLOW
CREAT SERPL-MCNC: 0.87 MG/DL (ref 0.6–1.3)
CREAT UR-MCNC: 125.4 MG/DL
EOSINOPHIL # BLD AUTO: 0.05 THOUSAND/ÂΜL (ref 0–0.61)
EOSINOPHIL NFR BLD AUTO: 1 % (ref 0–6)
ERYTHROCYTE [DISTWIDTH] IN BLOOD BY AUTOMATED COUNT: 14 % (ref 11.6–15.1)
GFR SERPL CREATININE-BSD FRML MDRD: 80 ML/MIN/1.73SQ M
GLUCOSE SERPL-MCNC: 145 MG/DL (ref 65–140)
GLUCOSE UR STRIP-MCNC: ABNORMAL MG/DL
HCT VFR BLD AUTO: 46.8 % (ref 36.5–49.3)
HGB BLD-MCNC: 15 G/DL (ref 12–17)
HGB UR QL STRIP.AUTO: NEGATIVE
HYALINE CASTS #/AREA URNS LPF: ABNORMAL /LPF
IMM GRANULOCYTES # BLD AUTO: 0.01 THOUSAND/UL (ref 0–0.2)
IMM GRANULOCYTES NFR BLD AUTO: 0 % (ref 0–2)
KETONES UR STRIP-MCNC: NEGATIVE MG/DL
LEUKOCYTE ESTERASE UR QL STRIP: ABNORMAL
LYMPHOCYTES # BLD AUTO: 2.28 THOUSANDS/ÂΜL (ref 0.6–4.47)
LYMPHOCYTES NFR BLD AUTO: 35 % (ref 14–44)
MCH RBC QN AUTO: 28.3 PG (ref 26.8–34.3)
MCHC RBC AUTO-ENTMCNC: 32.1 G/DL (ref 31.4–37.4)
MCV RBC AUTO: 88 FL (ref 82–98)
MICROALBUMIN UR-MCNC: 35.2 MG/L
MICROALBUMIN/CREAT 24H UR: 28 MG/G CREATININE (ref 0–30)
MONOCYTES # BLD AUTO: 0.51 THOUSAND/ÂΜL (ref 0.17–1.22)
MONOCYTES NFR BLD AUTO: 8 % (ref 4–12)
MUCOUS THREADS UR QL AUTO: ABNORMAL
NEUTROPHILS # BLD AUTO: 3.65 THOUSANDS/ÂΜL (ref 1.85–7.62)
NEUTS SEG NFR BLD AUTO: 56 % (ref 43–75)
NITRITE UR QL STRIP: POSITIVE
NON-SQ EPI CELLS URNS QL MICRO: ABNORMAL /HPF
NRBC BLD AUTO-RTO: 0 /100 WBCS
PH UR STRIP.AUTO: 7.5 [PH]
PLATELET # BLD AUTO: 190 THOUSANDS/UL (ref 149–390)
PMV BLD AUTO: 11.9 FL (ref 8.9–12.7)
POTASSIUM SERPL-SCNC: 4.8 MMOL/L (ref 3.5–5.3)
PROT SERPL-MCNC: 6.5 G/DL (ref 6.4–8.4)
PROT UR STRIP-MCNC: ABNORMAL MG/DL
RBC # BLD AUTO: 5.3 MILLION/UL (ref 3.88–5.62)
RBC #/AREA URNS AUTO: ABNORMAL /HPF
SODIUM SERPL-SCNC: 145 MMOL/L (ref 135–147)
SP GR UR STRIP.AUTO: 1.02 (ref 1–1.03)
TSH SERPL DL<=0.05 MIU/L-ACNC: 0.77 UIU/ML (ref 0.45–4.5)
UROBILINOGEN UR STRIP-ACNC: <2 MG/DL
WBC # BLD AUTO: 6.51 THOUSAND/UL (ref 4.31–10.16)
WBC #/AREA URNS AUTO: ABNORMAL /HPF

## 2024-06-18 PROCEDURE — 80053 COMPREHEN METABOLIC PANEL: CPT

## 2024-06-18 PROCEDURE — 82306 VITAMIN D 25 HYDROXY: CPT

## 2024-06-18 PROCEDURE — 85025 COMPLETE CBC W/AUTO DIFF WBC: CPT

## 2024-06-18 PROCEDURE — 81001 URINALYSIS AUTO W/SCOPE: CPT

## 2024-06-18 PROCEDURE — 99214 OFFICE O/P EST MOD 30 MIN: CPT

## 2024-06-18 PROCEDURE — 82570 ASSAY OF URINE CREATININE: CPT

## 2024-06-18 PROCEDURE — 36415 COLL VENOUS BLD VENIPUNCTURE: CPT

## 2024-06-18 PROCEDURE — 84443 ASSAY THYROID STIM HORMONE: CPT

## 2024-06-18 PROCEDURE — 82043 UR ALBUMIN QUANTITATIVE: CPT

## 2024-06-18 PROCEDURE — 83036 HEMOGLOBIN GLYCOSYLATED A1C: CPT

## 2024-06-18 NOTE — PROGRESS NOTES
Ambulatory Visit  Name: Steven MARTINO During      : 1940      MRN: [id number]  Encounter Provider: Darryl Aburto PA-C  Encounter Date: 2024   Encounter department: [facility] [place]    Assessment & Plan   1. Other fatigue  Assessment & Plan:  Discussed patient with check labs and see if anything shows up.  Explained that I suspect this is most likely due to lifestyle.    Recommended focusing on rehydration to about 8 to 10 cups of water daily as he drinks less than 1 currently.  He should also increase activity by going for walks or joining a gym.  Orders:  -     TSH, 3rd generation with Free T4 reflex; Future  -     Urinalysis with microscopic; Future  2. Type 2 diabetes mellitus with diabetic neuropathy, without long-term current use of insulin ([facility])  Assessment & Plan:    Lab Results   Component Value Date    HGBA1C 8.7 (H) 2023   POCT A1C 7.5 today. Continue current diet changes and plan.  Has follow up appt     Orders:  -     POCT hemoglobin A1c  -     Comprehensive metabolic panel; Future  -     CBC and differential; Future  -     Albumin / creatinine urine ratio; Future  -     Urinalysis with microscopic; Future  3. SHIVANI (obstructive sleep apnea)  Comments:  Controlled, says he uses his CPAP nightly and follows with pulmonology  4. Vitamin D insufficiency  -     Vitamin D 25 hydroxy; Future       History of Present Illness     Lazy, frequently tired. Sleeps every 2 hours takes a nap.   Going on about 3 weeks.   Uses CPAP nightly   No changes in urine or bowel.   No alcohol use  6 cups coffee a day. Less than a glass of water per day.   Says activity is nothing. No walks   No URI symptoms or sick contacts.   No chest pain or SOB with exertion          Review of Systems   Constitutional:  Negative for activity change, diaphoresis and fever.   HENT: Negative.  Negative for congestion and ear pain.    Eyes: Negative.    Respiratory: Negative.  Negative for cough,  chest tightness and shortness of breath.    Cardiovascular:  Negative for chest pain, palpitations and leg swelling.   Gastrointestinal: Negative.    Endocrine: Negative for polydipsia, polyphagia and polyuria.   Genitourinary:  Negative for dysuria, flank pain, frequency, hematuria and urgency.   Musculoskeletal:  Negative for back pain, joint swelling, neck pain and neck stiffness.   Skin: Negative.    Neurological:  Negative for dizziness, weakness, light-headedness and headaches.   Hematological:  Negative for adenopathy.   Psychiatric/Behavioral:  Negative for confusion and sleep disturbance. The patient is not nervous/anxious.      Pertinent Medical History         Medical History Reviewed by provider this encounter:  Tobacco  Allergies  Meds  Problems  Med Hx  Surg Hx  Fam Hx       Past Medical History   Past Medical History:   Diagnosis Date    BPH (benign prostatic hyperplasia)     Cancer ([facility]) 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    Cervical myelopathy ([facility]) 6/20/2019    Chemical exposure     9/11/01- worked in [employer].     COPD (chronic obstructive pulmonary disease) ([facility])     CPAP (continuous positive airway pressure) dependence     DDD (degenerative disc disease), cervical     Diabetes mellitus ([facility])     Foraminal stenosis of cervical region     Hyperlipidemia     Hypertension     SIHVANI (obstructive sleep apnea)     Overactive bladder     Spinal cord compression ([facility]) 5/8/2019    Added automatically from request for surgery [id number]     Past Surgical History:   Procedure Laterality Date    ARTHROSCOPY KNEE Bilateral     COLONOSCOPY      LUNG SURGERY Left     scraping of left    OR ARTHRD ANT INTERBODY DECOMPRESS CERVICAL BELW C2 Bilateral 6/18/2019    Procedure: C3/4  ACDF WITH  C4 HEMICORPECTOMY, C3-4 ANTERIOR INSTRUMENTATION AND FUSION (NEUROMONITORING);  Surgeon: Lucila Corral MD;  [facility];  Service: Neurosurgery    ROTATOR CUFF REPAIR Bilateral      Family History   Problem Relation Age of Onset    No  Known Problems Mother     No Known Problems Father      Current Outpatient Medications on File Prior to Visit   Medication Sig Dispense Refill    acetaminophen (TYLENOL) 500 mg tablet Take 500 mg by mouth every 6 (six) hours as needed for mild pain      albuterol (2.5 mg/3 mL) 0.083 % nebulizer solution Take 3 mL (2.5 mg total) by nebulization every 6 (six) hours as needed for wheezing or shortness of breath 360 mL 1    Arestin 1 MG MISC 1 each in the morning      aspirin (ECOTRIN LOW STRENGTH) 81 mg EC tablet Take 1 tablet by mouth daily 30 tablet 0    Blood Glucose Monitoring Suppl (OneTouch Verio) w/Device KIT use as directed 1 kit 0    chlorhexidine (PERIDEX) 0.12 % solution RINSE MOUTH WITH 15 ML (1 CAPFUL) FOR 30 SECONDS IN THE MORNING A...  (REFER TO PRESCRIPTION NOTES).      clindamycin (CLEOCIN) 300 MG capsule Take 300 mg by mouth every 8 (eight) hours        Continuous Glucose Sensor (FreeStyle Eben 14 Day Sensor) MISC Check blood sugars continuously. Switch every 14 days. 6 each 3    diltiazem (TIAZAC) 360 MG 24 hr capsule take 1 capsule by mouth once daily 90 capsule 3    DULoxetine (CYMBALTA) 60 mg delayed release capsule TAKE 1 CAPSULE DAILY 90 capsule 3    gabapentin (NEURONTIN) 100 mg capsule TAKE 1 CAPSULE BY MOUTH TWICE A  capsule 1    glucose blood (FREESTYLE LITE) test strip use 1 TEST STRIP to TEST BLOOD SUGAR twice a day 100 strip 0    hydrochlorothiazide (HYDRODIURIL) 12.5 mg tablet Take 1 tablet (12.5 mg total) by mouth daily 90 tablet 1    losartan (COZAAR) 100 MG tablet Take 1 tablet (100 mg total) by mouth daily 30 tablet 0    metFORMIN (GLUCOPHAGE) 500 mg tablet Take 1 tablet (500 mg total) by mouth 2 (two) times a day with meals 180 tablet 3    Multiple Vitamin (MULTI-VITAMINS PO) Take by mouth daily      oxybutynin (DITROPAN-XL) 5 mg 24 hr tablet take 1 tablet by mouth once daily 90 tablet 3    polyvinyl alcohol (LIQUIFILM TEARS) 1.4 % ophthalmic solution Administer 1 drop to  both eyes 4 (four) times a day 15 mL 0    pravastatin (PRAVACHOL) 40 mg tablet take 1 tablet by mouth once daily 90 tablet 1    pyridoxine (VITAMIN B6) 50 mg tablet Take 50 mg by mouth daily      sildenafil (REVATIO) 20 mg tablet Take 1 tablet (20 mg total) by mouth if needed (Erectile dysfunction) Take 1-5 tablets as needed for erection on an empty stomach 30 tablet 1    tamsulosin (FLOMAX) 0.4 mg take 1 capsule by mouth TWICE A WEEK 90 capsule 3    thiamine 50 MG tablet Take 1 tablet (50 mg total) by mouth daily 30 tablet 3    Trelegy Ellipta 100-62.5-25 MCG/ACT inhaler USE 1 INHALATION DAILY (RINSE MOUTH AFTER USE) 360 blister 3    VITAMIN E PO Take by mouth 268 mg 400 iu      glimepiride (AMARYL) 2 mg tablet Take 1 tablet (2 mg total) by mouth daily with breakfast 90 tablet 1    ibuprofen (MOTRIN) 600 mg tablet Take 600 mg by mouth every 6 (six) hours as needed      Lancets (OneTouch Delica Plus [id number]) MISC use 1 LANCET to TEST BLOOD SUGAR one to two times a day 100 each 0     No current facility-administered medications on file prior to visit.     Allergies   Allergen Reactions    Chloroquine Itching    Qualaquin [Quinine] Itching      Current Outpatient Medications on File Prior to Visit   Medication Sig Dispense Refill    acetaminophen (TYLENOL) 500 mg tablet Take 500 mg by mouth every 6 (six) hours as needed for mild pain      albuterol (2.5 mg/3 mL) 0.083 % nebulizer solution Take 3 mL (2.5 mg total) by nebulization every 6 (six) hours as needed for wheezing or shortness of breath 360 mL 1    Arestin 1 MG MISC 1 each in the morning      aspirin (ECOTRIN LOW STRENGTH) 81 mg EC tablet Take 1 tablet by mouth daily 30 tablet 0    Blood Glucose Monitoring Suppl (OneTouch Verio) w/Device KIT use as directed 1 kit 0    chlorhexidine (PERIDEX) 0.12 % solution RINSE MOUTH WITH 15 ML (1 CAPFUL) FOR 30 SECONDS IN THE MORNING A...  (REFER TO PRESCRIPTION NOTES).      clindamycin (CLEOCIN) 300 MG capsule Take 300 mg  by mouth every 8 (eight) hours        Continuous Glucose Sensor (FreeStyle Eben 14 Day Sensor) MISC Check blood sugars continuously. Switch every 14 days. 6 each 3    diltiazem (TIAZAC) 360 MG 24 hr capsule take 1 capsule by mouth once daily 90 capsule 3    DULoxetine (CYMBALTA) 60 mg delayed release capsule TAKE 1 CAPSULE DAILY 90 capsule 3    gabapentin (NEURONTIN) 100 mg capsule TAKE 1 CAPSULE BY MOUTH TWICE A  capsule 1    glucose blood (FREESTYLE LITE) test strip use 1 TEST STRIP to TEST BLOOD SUGAR twice a day 100 strip 0    hydrochlorothiazide (HYDRODIURIL) 12.5 mg tablet Take 1 tablet (12.5 mg total) by mouth daily 90 tablet 1    losartan (COZAAR) 100 MG tablet Take 1 tablet (100 mg total) by mouth daily 30 tablet 0    metFORMIN (GLUCOPHAGE) 500 mg tablet Take 1 tablet (500 mg total) by mouth 2 (two) times a day with meals 180 tablet 3    Multiple Vitamin (MULTI-VITAMINS PO) Take by mouth daily      oxybutynin (DITROPAN-XL) 5 mg 24 hr tablet take 1 tablet by mouth once daily 90 tablet 3    polyvinyl alcohol (LIQUIFILM TEARS) 1.4 % ophthalmic solution Administer 1 drop to both eyes 4 (four) times a day 15 mL 0    pravastatin (PRAVACHOL) 40 mg tablet take 1 tablet by mouth once daily 90 tablet 1    pyridoxine (VITAMIN B6) 50 mg tablet Take 50 mg by mouth daily      sildenafil (REVATIO) 20 mg tablet Take 1 tablet (20 mg total) by mouth if needed (Erectile dysfunction) Take 1-5 tablets as needed for erection on an empty stomach 30 tablet 1    tamsulosin (FLOMAX) 0.4 mg take 1 capsule by mouth TWICE A WEEK 90 capsule 3    thiamine 50 MG tablet Take 1 tablet (50 mg total) by mouth daily 30 tablet 3    Trelegy Ellipta 100-62.5-25 MCG/ACT inhaler USE 1 INHALATION DAILY (RINSE MOUTH AFTER USE) 360 blister 3    VITAMIN E PO Take by mouth 268 mg 400 iu      glimepiride (AMARYL) 2 mg tablet Take 1 tablet (2 mg total) by mouth daily with breakfast 90 tablet 1    ibuprofen (MOTRIN) 600 mg tablet Take 600 mg by  "mouth every 6 (six) hours as needed      Lancets (OneTouch Delica Plus [id number]) MISC use 1 LANCET to TEST BLOOD SUGAR one to two times a day 100 each 0     No current facility-administered medications on file prior to visit.      Social History     Tobacco Use    Smoking status: Every Day     Current packs/day: 0.00     Average packs/day: (0.2 ttl pk-yrs)     Types: Pipe, Cigarettes     Start date:      Last attempt to quit:      Years since quittin.4     Passive exposure: Past    Smokeless tobacco: Never    Tobacco comments:     2-3  PIPES PER DAY    Vaping Use    Vaping status: Never Used   Substance and Sexual Activity    Alcohol use: Never    Drug use: Never    Sexual activity: Not Currently     Objective     /78 (BP Location: Left arm, Patient Position: Sitting, Cuff Size: Standard)   Pulse 69   Temp (!) 96.8 °F (36 °C) (Tympanic)   Resp 16   Ht 5' 9\" (1.753 m)   Wt 69.4 kg (153 lb)   SpO2 98%   BMI 22.59 kg/m²     Physical Exam  Vitals and nursing note reviewed.   Constitutional:       General: He is not in acute distress.     Appearance: Normal appearance. He is normal weight. He is not ill-appearing, toxic-appearing or diaphoretic.   HENT:      Head: Normocephalic and atraumatic.      Right Ear: External ear normal.      Left Ear: External ear normal.      Nose: Nose normal. No congestion or rhinorrhea.      Mouth/Throat:      Mouth: Mucous membranes are moist.      Pharynx: Oropharynx is clear. No oropharyngeal exudate.   Eyes:      General: No scleral icterus.        Right eye: No discharge.         Left eye: No discharge.      Extraocular Movements: Extraocular movements intact.      Conjunctiva/sclera: Conjunctivae normal.   Neck:      Vascular: No carotid bruit.   Cardiovascular:      Rate and Rhythm: Normal rate and regular rhythm.      Heart sounds: No murmur heard.  Pulmonary:      Effort: Pulmonary effort is normal. No respiratory distress.      Breath sounds: Normal " breath sounds. No wheezing or rales.   Abdominal:      General: Abdomen is flat. Bowel sounds are normal.      Palpations: There is no mass.      Tenderness: There is no abdominal tenderness. There is no rebound.   Musculoskeletal:      Cervical back: Normal range of motion and neck supple. No tenderness.      Right lower leg: No edema.      Left lower leg: No edema.   Lymphadenopathy:      Cervical: No cervical adenopathy.   Skin:     General: Skin is warm and dry.      Findings: No rash.   Neurological:      Mental Status: He is alert. Mental status is at baseline.      Sensory: No sensory deficit.      Motor: No weakness.   Psychiatric:         Mood and Affect: Mood normal.         Behavior: Behavior normal.         Thought Content: Thought content normal.         Judgment: Judgment normal.       Administrative Statements

## 2024-06-18 NOTE — ASSESSMENT & PLAN NOTE
Lab Results   Component Value Date    HGBA1C 8.7 (H) 12/12/2023   POCT A1C 7.5 today. Continue current diet changes and plan.  Has follow up appt 7/2

## 2024-06-18 NOTE — ASSESSMENT & PLAN NOTE
Discussed patient with check labs and see if anything shows up.  Explained that I suspect this is most likely due to lifestyle.    Recommended focusing on rehydration to about 8 to 10 cups of water daily as he drinks less than 1 currently.  He should also increase activity by going for walks or joining a gym.

## 2024-06-19 DIAGNOSIS — E11.40 TYPE 2 DIABETES MELLITUS WITH DIABETIC NEUROPATHY, WITHOUT LONG-TERM CURRENT USE OF INSULIN (HCC): Chronic | ICD-10-CM

## 2024-06-19 RX ORDER — DULOXETIN HYDROCHLORIDE 60 MG/1
CAPSULE, DELAYED RELEASE ORAL
Qty: 90 CAPSULE | Refills: 3 | Status: SHIPPED | OUTPATIENT
Start: 2024-06-19

## 2024-06-20 ENCOUNTER — TELEPHONE (OUTPATIENT)
Age: 84
End: 2024-06-20

## 2024-06-20 DIAGNOSIS — N39.0 URINARY TRACT INFECTION WITHOUT HEMATURIA, SITE UNSPECIFIED: ICD-10-CM

## 2024-06-20 DIAGNOSIS — N39.0 URINARY TRACT INFECTION WITHOUT HEMATURIA, SITE UNSPECIFIED: Primary | ICD-10-CM

## 2024-06-20 DIAGNOSIS — E11.40 TYPE 2 DIABETES MELLITUS WITH DIABETIC NEUROPATHY, WITHOUT LONG-TERM CURRENT USE OF INSULIN (HCC): Chronic | ICD-10-CM

## 2024-06-20 RX ORDER — FLASH GLUCOSE SENSOR
KIT MISCELLANEOUS
Qty: 6 EACH | Refills: 3 | Status: SHIPPED | OUTPATIENT
Start: 2024-06-20

## 2024-06-20 RX ORDER — CEPHALEXIN 500 MG/1
500 CAPSULE ORAL 2 TIMES DAILY
Qty: 14 CAPSULE | Refills: 0 | Status: SHIPPED | OUTPATIENT
Start: 2024-06-20 | End: 2024-06-27

## 2024-06-20 RX ORDER — CEPHALEXIN 500 MG/1
500 CAPSULE ORAL 2 TIMES DAILY
Qty: 14 CAPSULE | Refills: 0 | Status: SHIPPED | OUTPATIENT
Start: 2024-06-20 | End: 2024-06-20 | Stop reason: SDUPTHER

## 2024-06-20 NOTE — TELEPHONE ENCOUNTER
PA for FreeStyle Eben 14 Day Sensor    Submitted via    []CMM-KEY   [[email or web address]-Case ID # [id number]   []Faxed to plan   []Other website   []Phone call Case ID #     Office notes sent, clinical questions answered. Awaiting determination    Turnaround time for your insurance to make a decision on your Prior Authorization can take 7-21 business days.

## 2024-06-20 NOTE — TELEPHONE ENCOUNTER
----- Message from Darryl Aburto PA-C sent at 6/20/2024  3:59 PM EDT -----  Please let patient know labs were unremarkable and stable except urine test showed signs of a urinary tract infection.  I will send an antibiotic to the pharmacy.  And order a repeat urine test in about 2 weeks

## 2024-06-25 ENCOUNTER — RA CDI HCC (OUTPATIENT)
Dept: OTHER | Facility: HOSPITAL | Age: 84
End: 2024-06-25

## 2024-06-25 NOTE — TELEPHONE ENCOUNTER
PA for FreeStyle Eben 14 Day Sensor  Denied    Reason:(Screenshot if applicable)        Message sent to office clinical pool Yes    Denial letter scanned into Media Yes    Appeal started No (Provider will need to decide if appeal is warranted and send clinical documentation to PA team for initiation.)    **Please follow up with your patient regarding denial and next steps**

## 2024-07-02 ENCOUNTER — OFFICE VISIT (OUTPATIENT)
Age: 84
End: 2024-07-02
Payer: MEDICARE

## 2024-07-02 VITALS
OXYGEN SATURATION: 95 % | SYSTOLIC BLOOD PRESSURE: 124 MMHG | BODY MASS INDEX: 22.22 KG/M2 | HEART RATE: 86 BPM | TEMPERATURE: 97.2 F | RESPIRATION RATE: 18 BRPM | DIASTOLIC BLOOD PRESSURE: 76 MMHG | HEIGHT: 69 IN | WEIGHT: 150 LBS

## 2024-07-02 DIAGNOSIS — E78.5 HYPERLIPIDEMIA ASSOCIATED WITH TYPE 2 DIABETES MELLITUS  (HCC): Chronic | ICD-10-CM

## 2024-07-02 DIAGNOSIS — E11.40 TYPE 2 DIABETES MELLITUS WITH DIABETIC NEUROPATHY, WITHOUT LONG-TERM CURRENT USE OF INSULIN (HCC): Primary | Chronic | ICD-10-CM

## 2024-07-02 DIAGNOSIS — I10 ESSENTIAL HYPERTENSION: Chronic | ICD-10-CM

## 2024-07-02 DIAGNOSIS — E11.69 HYPERLIPIDEMIA ASSOCIATED WITH TYPE 2 DIABETES MELLITUS  (HCC): Chronic | ICD-10-CM

## 2024-07-02 PROBLEM — E11.51 TYPE 2 DIABETES MELLITUS WITH PERIPHERAL ARTERY DISEASE (HCC): Chronic | Status: ACTIVE | Noted: 2022-06-06

## 2024-07-02 PROBLEM — M79.89 LEG SWELLING: Status: RESOLVED | Noted: 2023-01-10 | Resolved: 2024-07-02

## 2024-07-02 PROCEDURE — G2211 COMPLEX E/M VISIT ADD ON: HCPCS | Performed by: INTERNAL MEDICINE

## 2024-07-02 PROCEDURE — 99214 OFFICE O/P EST MOD 30 MIN: CPT | Performed by: INTERNAL MEDICINE

## 2024-07-02 RX ORDER — GLIMEPIRIDE 2 MG/1
2 TABLET ORAL
Qty: 100 TABLET | Refills: 3 | Status: SHIPPED | OUTPATIENT
Start: 2024-07-02

## 2024-07-02 RX ORDER — GLIMEPIRIDE 2 MG/1
2 TABLET ORAL
Qty: 100 TABLET | Refills: 3 | Status: SHIPPED | OUTPATIENT
Start: 2024-07-02 | End: 2024-07-02

## 2024-07-02 NOTE — ASSESSMENT & PLAN NOTE
Lab Results   Component Value Date    LDLCALC 43 12/12/2023      LDL is excellent. Continue statin.

## 2024-07-02 NOTE — PROGRESS NOTES
"  Assessment & Plan  Type 2 diabetes mellitus with diabetic neuropathy, without long-term current use of insulin (HCC)    Lab Results   Component Value Date    HGBA1C 7.5 (A) 06/18/2024     Sugars improved since last visit. Continue current medications and repeat in 4-5 months.    Orders:    glimepiride (AMARYL) 2 mg tablet; Take 1 tablet (2 mg total) by mouth daily with breakfast    Hyperlipidemia associated with type 2 diabetes mellitus  (HCC)    Lab Results   Component Value Date    LDLCALC 43 12/12/2023      LDL is excellent. Continue statin.    Essential hypertension    Blood pressure is controlled. Continue current anti-HTN regimen.      Depression Screening and Follow-up Plan: Patient was screened for depression during today's encounter. They screened negative with a PHQ-2 score of 1.    Tobacco Cessation Counseling: Tobacco cessation counseling was provided. The patient is sincerely urged to quit consumption of tobacco. He is not ready to quit tobacco.         History of Present Illness     History of Present Illness  The patient is an 84-year-old who presents for follow-up of diabetes.    Continues to smoke and follows with vascular for PAD. Recommended to do walking program for now and continue aspirin/statin.    Denies any hypoglycemic episodes.     Review of Systems   Constitutional: Negative.    Respiratory: Negative.     Cardiovascular:  Negative for chest pain and palpitations.   Gastrointestinal: Negative.    Musculoskeletal:  Positive for arthralgias.     Objective     /76 (BP Location: Left arm, Patient Position: Sitting, Cuff Size: Standard)   Pulse 86   Temp (!) 97.2 °F (36.2 °C) (Tympanic)   Resp 18   Ht 5' 9\" (1.753 m)   Wt 68 kg (150 lb)   SpO2 95%   BMI 22.15 kg/m²     Physical Exam  Constitutional:       General: He is not in acute distress.     Appearance: He is not ill-appearing.   Cardiovascular:      Rate and Rhythm: Normal rate and regular rhythm.      Heart sounds: No " murmur heard.  Pulmonary:      Effort: Pulmonary effort is normal. No respiratory distress.      Breath sounds: No wheezing.   Abdominal:      General: Bowel sounds are normal. There is no distension.      Tenderness: There is no abdominal tenderness.   Musculoskeletal:      Right lower leg: No edema.      Left lower leg: No edema.   Neurological:      Mental Status: He is alert.       Zi [facility], DO

## 2024-07-02 NOTE — ASSESSMENT & PLAN NOTE
Lab Results   Component Value Date    HGBA1C 7.5 (A) 06/18/2024     Sugars improved since last visit. Continue current medications and repeat in 4-5 months.    Orders:    glimepiride (AMARYL) 2 mg tablet; Take 1 tablet (2 mg total) by mouth daily with breakfast

## 2024-07-08 DIAGNOSIS — E11.69 HYPERLIPIDEMIA ASSOCIATED WITH TYPE 2 DIABETES MELLITUS  (HCC): ICD-10-CM

## 2024-07-08 DIAGNOSIS — J43.2 CENTRILOBULAR EMPHYSEMA (HCC): ICD-10-CM

## 2024-07-08 DIAGNOSIS — E78.5 HYPERLIPIDEMIA ASSOCIATED WITH TYPE 2 DIABETES MELLITUS  (HCC): ICD-10-CM

## 2024-07-08 RX ORDER — FLUTICASONE FUROATE, UMECLIDINIUM BROMIDE AND VILANTEROL TRIFENATATE 100; 62.5; 25 UG/1; UG/1; UG/1
POWDER RESPIRATORY (INHALATION)
Qty: 360 BLISTER | Refills: 1 | Status: SHIPPED | OUTPATIENT
Start: 2024-07-08

## 2024-07-08 RX ORDER — PRAVASTATIN SODIUM 40 MG
40 TABLET ORAL DAILY
Qty: 90 TABLET | Refills: 0 | Status: SHIPPED | OUTPATIENT
Start: 2024-07-08

## 2024-07-08 NOTE — TELEPHONE ENCOUNTER
Reason for call:   [x] Refill   [] Prior Auth  [] Other:     Office:   [x] PCP/Provider -  Zi Allen DO   [] Specialty/Provider -     Medication: pravastatin 40 mg / 1 tab daily      Quantity: 100 tabs    Pharmacy: [facility] [id number]      Does the patient have enough for 3 days?   [x] Yes   [] No - Send as HP to POD

## 2024-07-18 NOTE — DISCHARGE INSTRUCTIONS
Epidural Steroid Injection   WHAT YOU NEED TO KNOW:   An epidural steroid injection (PAULA) is a procedure to inject steroid medicine into the epidural space  The epidural space is between your spinal cord and vertebrae  Steroids reduce inflammation and fluid buildup in your spine that may be causing pain  You may be given pain medicine along with the steroids  ACTIVITY  · Do not drive or operate machinery today  · No strenuous activity today - bending, lifting, etc   · You may resume normal activites starting tomorrow - start slowly and as tolerated  · You may shower today, but no tub baths or hot tubs  · You may have numbness for several hours from the local anesthetic  Please use caution and common sense, especially with weight-bearing activities  CARE OF THE INJECTION SITE  · If you have soreness or pain, apply ice to the area today (20 minutes on/20 minutes off)  · Starting tomorrow, you may use warm, moist heat or ice if needed  · You may have an increase or change in your discomfort for 36-48 hours after your treatment  · Apply ice and continue with any pain medication you have been prescribed  · Notify [facility] if you have any of the following: redness, drainage, swelling, headache, stiff neck or fever above 100°F     SPECIAL INSTRUCTIONS  · Our office will contact you in approximately 7 days for a progress report  MEDICATIONS  · Continue to take all routine medications  · Our office may have instructed you to hold some medications  As no general anesthesia was used in today's procedure, you should not experience any side effects related to anesthesia  If you have a problem specifically related to your procedure, please call our office at [phone]  Problems not related to your procedure should be directed to your primary care physician  Subjective   History was provided by the parents.  Gayla Alexis is a 15 m.o. female who is brought in for this 15 month well child visit.    Current Issues:  Current concerns ? Mild vulvar erythema. Occurs frequently    Hearing or vision concerns? No       Review of Nutrition, Elimination, and Sleep:  Current diet:  Balanced. Eats everything    Difficulties with feeding? No   Current stooling frequency 1-2 times daily   Sleep Patterns appropriate. No problems. Sleeps in Crib in separate room.       Social Screening:  Current child-care arrangements Home with parent   Parental coping and self-care Doing well. No Concerns   Any interval changes in the family, social environment? No   Appropriate parent child-interaction observed today Yes       Food Security In the last 12 months  Have parents or caregivers worried that their food would run out before having money to buy more ? NO   Have the parents or caregivers run out of food, or did they have difficulty purchasing more?:                           NO       Safety and Environmental Screening:            Age appropriate car seat, rear facing in the back seat of the vehicle YES   Hot water in the home is < 120F YES   Working smoke and carbon monoxide detectors YES   Second hand smoke exposure NO   Firearms in the home NO   Risk factors for lead toxicity NO   Risk factors for anemia NO   Primary Water Sources has adequate Flouride YES     Development  Social/emotional Shows toys, claps, shows affection   Language 3+ words, follows simple directions, points when wants something   Cognitive Mimics use of object like cup or phone, stacks 2 blocks     Physical Takes independent steps, feeds self        Objective   Growth parameters are noted and are appropriate for age.   General:   alert and oriented, in no acute distress   Skin:   normal   Head:   normal fontanelles, normal appearance, normal palate, and supple neck   Eyes:   sclerae white, pupils equal and  reactive, red reflex normal bilaterally   Ears:   normal bilaterally   Mouth:   normal   Lungs:   clear to auscultation bilaterally   Heart:   regular rate and rhythm, S1, S2 normal, no murmur, click, rub or gallop   Abdomen:   soft, non-tender; bowel sounds normal; no masses, no organomegaly   Screening DDH:   leg length symmetrical   :   normal female   Femoral pulses:   present bilaterally   Extremities:   extremities normal, warm and well-perfused; no cyanosis, clubbing, or edema   Neuro:   alert, moves all extremities spontaneously, gait normal, sits without support, no head lag     Assessment/Plan   Healthy 15 m.o. female infant.  1. Anticipatory guidance discussed. Gave handout on well-child issues at this age.  2. Growth is normal. Topical nystatin for diaper rash. Home skin care reviewed  3. Development: appropriate for age  4. Immunizations today: DTaP #4 and Hib # 4  5. Follow up in 3 months for next well child exam or sooner with concerns.

## 2024-08-23 ENCOUNTER — HOSPITAL ENCOUNTER (OUTPATIENT)
Dept: NON INVASIVE DIAGNOSTICS | Facility: CLINIC | Age: 84
Discharge: HOME | End: 2024-08-23
Payer: MEDICARE

## 2024-08-23 DIAGNOSIS — I73.9 PERIPHERAL ARTERY DISEASE (HCC): ICD-10-CM

## 2024-08-23 PROCEDURE — 93925 LOWER EXTREMITY STUDY: CPT | Performed by: SURGERY

## 2024-08-23 PROCEDURE — 93922 UPR/L XTREMITY ART 2 LEVELS: CPT | Performed by: SURGERY

## 2024-08-23 PROCEDURE — 93923 UPR/LXTR ART STDY 3+ LVLS: CPT

## 2024-08-23 PROCEDURE — 93925 LOWER EXTREMITY STUDY: CPT

## 2024-08-27 ENCOUNTER — OFFICE VISIT (OUTPATIENT)
Age: 84
End: 2024-08-27
Payer: MEDICARE

## 2024-08-27 VITALS
SYSTOLIC BLOOD PRESSURE: 126 MMHG | OXYGEN SATURATION: 97 % | HEART RATE: 91 BPM | BODY MASS INDEX: 22.22 KG/M2 | DIASTOLIC BLOOD PRESSURE: 80 MMHG | TEMPERATURE: 97.7 F | HEIGHT: 69 IN | WEIGHT: 150 LBS

## 2024-08-27 DIAGNOSIS — R91.1 LUNG NODULE: ICD-10-CM

## 2024-08-27 DIAGNOSIS — G47.33 OSA (OBSTRUCTIVE SLEEP APNEA): ICD-10-CM

## 2024-08-27 DIAGNOSIS — Z85.118 HISTORY OF LUNG CANCER: ICD-10-CM

## 2024-08-27 DIAGNOSIS — J43.2 CENTRILOBULAR EMPHYSEMA (HCC): Primary | Chronic | ICD-10-CM

## 2024-08-27 PROCEDURE — G2211 COMPLEX E/M VISIT ADD ON: HCPCS | Performed by: PHYSICIAN ASSISTANT

## 2024-08-27 PROCEDURE — 99213 OFFICE O/P EST LOW 20 MIN: CPT | Performed by: PHYSICIAN ASSISTANT

## 2024-08-27 NOTE — PROGRESS NOTES
"Assessment/Plan:   Diagnoses and all orders for this visit:    Centrilobular emphysema (HCC)    SHIVANI (obstructive sleep apnea)    Lung nodule    History of lung cancer        Patient is here today for follow up.  He is overall doing well with his breathing, does have some mild chronic dyspnea on exertion.  He is using the Trelegy daily.  He uses the albuterol once in the morning, did tell him he can use it up to 4 times per day if needed.  Recent CT scan done in February shows VIVIANA opacity consistent with scarring. Follow up CT scan for Feb 2025 has been ordered.   He continues with Trelegy daily, albuterol 4 times per day as needed and  He is using CPAP at night for SHIVANI.    He will follow-up with us in 6 months after his CT scan or sooner if necessary.    Return in about 6 months (around 2/27/2025).  All questions are answered to the patient's satisfaction and understanding.  He verbalizes understanding.  He is encouraged to call with any further questions or concerns.    Portions of the record may have been created with voice recognition software.  Occasional wrong word or \"sound a like\" substitutions may have occurred due to the inherent limitations of voice recognition software.  Read the chart carefully and recognize, using context, where substitutions have occurred.    Electronically Signed by Fred Maldonado PA-C    ______________________________________________________________________    Chief Complaint:   Chief Complaint   Patient presents with    Follow-up    Sleep Apnea       Patient ID: Steven is a 84 y.o. y.o. male has a past medical history of BPH (benign prostatic hyperplasia), Cancer (HCC) (2013), Cervical myelopathy (HCC) (6/20/2019), Chemical exposure, COPD (chronic obstructive pulmonary disease) (HCC), CPAP (continuous positive airway pressure) dependence, DDD (degenerative disc disease), cervical, Diabetes mellitus (HCC), Foraminal stenosis of cervical region, Hyperlipidemia, Hypertension, SHIVANI " (obstructive sleep apnea), Overactive bladder, and Spinal cord compression (HCC) (5/8/2019).    8/27/2024  Patient presents today for follow-up visit.  Patient is a an 84-year-old male with past medical history of chronic bronchitis/emphysema, lung cancer status post left upper lobectomy, lung nodules, prostate cancer status post radiation, SHIVANI on CPAP, hypertension, diabetes.    He is here today for follow up.  He is overall stable with his breathing, does have some mild chronic dyspnea on exertion.  He is using his Trelegy daily, also uses albuterol once in the morning.  Does not typically use it the rest of the day.  He continues to smoke a pipe and is not interested in quitting.          Review of Systems   Constitutional: Negative.    HENT: Negative.     Respiratory:  Positive for cough and shortness of breath.    Cardiovascular: Negative.    Gastrointestinal: Negative.    Genitourinary: Negative.    Musculoskeletal: Negative.    Skin: Negative.    Allergic/Immunologic: Negative.    Neurological: Negative.    Psychiatric/Behavioral: Negative.         Smoking history: He reports that he has been smoking pipe and cigarettes. He started smoking about 62 years ago. He has a 0.2 pack-year smoking history. He has been exposed to tobacco smoke. He has never used smokeless tobacco.    The following portions of the patient's history were reviewed and updated as appropriate: allergies, current medications, past family history, past medical history, past social history, past surgical history, and problem list.    Immunization History   Administered Date(s) Administered    COVID-19 PFIZER VACCINE 0.3 ML IM 03/16/2021, 04/07/2021, 12/03/2021    COVID-19 Pfizer Vac BIVALENT Bi-sucrose 12 Yr+ IM 09/23/2022    COVID-19 Pfizer mRNA vacc PF bi-sucrose 12 yr and older (Comirnaty) 10/27/2023    INFLUENZA 10/01/2018, 12/09/2022    Influenza, high dose seasonal 0.7 mL 09/13/2019, 09/24/2020, 11/11/2021, 12/09/2022, 12/12/2023     Pneumococcal Conjugate 13-Valent 08/18/2016    Pneumococcal Polysaccharide PPV23 03/16/2018    Zoster 06/23/2014    Zoster Vaccine Recombinant 11/06/2018, 02/01/2019     Current Outpatient Medications   Medication Sig Dispense Refill    acetaminophen (TYLENOL) 500 mg tablet Take 500 mg by mouth every 6 (six) hours as needed for mild pain      albuterol (2.5 mg/3 mL) 0.083 % nebulizer solution Take 3 mL (2.5 mg total) by nebulization every 6 (six) hours as needed for wheezing or shortness of breath 360 mL 1    aspirin (ECOTRIN LOW STRENGTH) 81 mg EC tablet Take 1 tablet by mouth daily 30 tablet 0    Blood Glucose Monitoring Suppl (OneTouch Verio) w/Device KIT use as directed 1 kit 0    chlorhexidine (PERIDEX) 0.12 % solution RINSE MOUTH WITH 15 ML (1 CAPFUL) FOR 30 SECONDS IN THE MORNING A...  (REFER TO PRESCRIPTION NOTES).      clindamycin (CLEOCIN) 300 MG capsule Take 300 mg by mouth every 8 (eight) hours        Continuous Glucose Sensor (FreeStyle Eben 14 Day Sensor) MISC Check blood sugars continuously. Switch every 14 days. 6 each 3    diltiazem (TIAZAC) 360 MG 24 hr capsule take 1 capsule by mouth once daily 90 capsule 3    DULoxetine (CYMBALTA) 60 mg delayed release capsule TAKE 1 CAPSULE DAILY 90 capsule 3    fluticasone-umeclidinium-vilanterol (Trelegy Ellipta) 100-62.5-25 mcg/actuation inhaler USE 1 INHALATION DAILY (RINSE MOUTH AFTER USE) 360 blister 1    gabapentin (NEURONTIN) 100 mg capsule TAKE 1 CAPSULE BY MOUTH TWICE A  capsule 1    glimepiride (AMARYL) 2 mg tablet Take 1 tablet (2 mg total) by mouth daily with breakfast 100 tablet 3    glucose blood (FREESTYLE LITE) test strip use 1 TEST STRIP to TEST BLOOD SUGAR twice a day 100 strip 0    hydrochlorothiazide (HYDRODIURIL) 12.5 mg tablet Take 1 tablet (12.5 mg total) by mouth daily 90 tablet 1    ibuprofen (MOTRIN) 600 mg tablet Take 600 mg by mouth every 6 (six) hours as needed      Lancets (OneTouch Delica Plus [id number]) MISC use 1 LANCET  "to TEST BLOOD SUGAR one to two times a day 100 each 0    losartan (COZAAR) 100 MG tablet Take 1 tablet (100 mg total) by mouth daily 30 tablet 0    metFORMIN (GLUCOPHAGE) 500 mg tablet Take 1 tablet (500 mg total) by mouth 2 (two) times a day with meals 180 tablet 3    Multiple Vitamin (MULTI-VITAMINS PO) Take by mouth daily      oxybutynin (DITROPAN-XL) 5 mg 24 hr tablet take 1 tablet by mouth once daily 90 tablet 3    polyvinyl alcohol (LIQUIFILM TEARS) 1.4 % ophthalmic solution Administer 1 drop to both eyes 4 (four) times a day 15 mL 0    pravastatin (PRAVACHOL) 40 mg tablet Take 1 tablet (40 mg total) by mouth daily 90 tablet 0    pyridoxine (VITAMIN B6) 50 mg tablet Take 50 mg by mouth daily      sildenafil (REVATIO) 20 mg tablet Take 1 tablet (20 mg total) by mouth if needed (Erectile dysfunction) Take 1-5 tablets as needed for erection on an empty stomach 30 tablet 1    tamsulosin (FLOMAX) 0.4 mg take 1 capsule by mouth TWICE A WEEK 90 capsule 3    thiamine 50 MG tablet Take 1 tablet (50 mg total) by mouth daily 30 tablet 3    VITAMIN E PO Take by mouth 268 mg 400 iu       No current facility-administered medications for this visit.     Allergies: Chloroquine and Qualaquin [quinine]    Objective:  Vitals:    08/27/24 1321   BP: 126/80   Pulse: 91   Temp: 97.7 °F (36.5 °C)   SpO2: 97%   Weight: 68 kg (150 lb)   Height: 5' 9\" (1.753 m)   Oxygen Therapy  SpO2: 97 %  .  Wt Readings from Last 3 Encounters:   08/27/24 68 kg (150 lb)   07/02/24 68 kg (150 lb)   06/18/24 69.4 kg (153 lb)     Body mass index is 22.15 kg/m².    Physical Exam  Constitutional:       General: He is not in acute distress.     Appearance: Normal appearance. He is well-developed. He is not ill-appearing.   HENT:      Head: Normocephalic and atraumatic.      Mouth/Throat:      Pharynx: Oropharynx is clear.   Eyes:      Pupils: Pupils are equal, round, and reactive to light.   Cardiovascular:      Rate and Rhythm: Normal rate and regular " rhythm.   Pulmonary:      Effort: Pulmonary effort is normal. No respiratory distress.      Breath sounds: Normal breath sounds. No decreased breath sounds, wheezing, rhonchi or rales.   Abdominal:      General: Abdomen is flat. There is no distension.   Musculoskeletal:         General: Normal range of motion.      Cervical back: Normal range of motion.      Right lower leg: No edema.      Left lower leg: No edema.   Skin:     General: Skin is warm and dry.      Findings: No rash.   Neurological:      Mental Status: He is alert and oriented to person, place, and time.   Psychiatric:         Mood and Affect: Mood normal.         Behavior: Behavior normal.         Lab Review:   Lab Results   Component Value Date    K 4.8 06/18/2024     06/18/2024    CO2 30 06/18/2024    CO2 27 11/02/2017    BUN 15 06/18/2024    CREATININE 0.87 06/18/2024    GLUCOSE 141 (H) 11/02/2017    CALCIUM 10.8 (H) 06/18/2024     Lab Results   Component Value Date    WBC 6.51 06/18/2024    HGB 15.0 06/18/2024    HCT 46.8 06/18/2024    MCV 88 06/18/2024     06/18/2024       Diagnostics:  I have personally reviewed pertinent reports.   and I have personally reviewed pertinent films in PACS  Reviewed previous CT scan  Office Spirometry Results:     ESS: Total score: 9

## 2024-09-09 DIAGNOSIS — I10 ESSENTIAL HYPERTENSION: Chronic | ICD-10-CM

## 2024-09-09 DIAGNOSIS — R39.9 LOWER URINARY TRACT SYMPTOMS (LUTS): ICD-10-CM

## 2024-09-09 RX ORDER — TAMSULOSIN HYDROCHLORIDE 0.4 MG/1
0.4 CAPSULE ORAL 2 TIMES WEEKLY
Qty: 90 CAPSULE | Refills: 1 | Status: SHIPPED | OUTPATIENT
Start: 2024-09-09

## 2024-09-09 RX ORDER — DILTIAZEM HYDROCHLORIDE 360 MG/1
360 CAPSULE, EXTENDED RELEASE ORAL DAILY
Qty: 90 CAPSULE | Refills: 3 | Status: SHIPPED | OUTPATIENT
Start: 2024-09-09

## 2024-09-09 NOTE — TELEPHONE ENCOUNTER
Reason for call:   [x] Refill   [] Prior Auth  [] Other:     Office:   [] PCP/Provider -   [x] Specialty/Provider -Lorena Vanessa/[facility] [place]        Medication: Flomax    Dose/Frequency: 0.4 mg     Quantity: #90    Pharmacy: [facility] [address] [place] [address]    Does the patient have enough for 3 days?   [x] Yes   [] No - Send as HP to POD

## 2024-09-24 DIAGNOSIS — I10 ESSENTIAL HYPERTENSION: ICD-10-CM

## 2024-09-24 RX ORDER — LOSARTAN POTASSIUM 100 MG/1
100 TABLET ORAL DAILY
Qty: 90 TABLET | Refills: 3 | Status: SHIPPED | OUTPATIENT
Start: 2024-09-24

## 2024-10-12 DIAGNOSIS — E11.69 HYPERLIPIDEMIA ASSOCIATED WITH TYPE 2 DIABETES MELLITUS  (HCC): ICD-10-CM

## 2024-10-12 DIAGNOSIS — E78.5 HYPERLIPIDEMIA ASSOCIATED WITH TYPE 2 DIABETES MELLITUS  (HCC): ICD-10-CM

## 2024-10-14 RX ORDER — PRAVASTATIN SODIUM 40 MG
40 TABLET ORAL DAILY
Qty: 100 TABLET | Refills: 3 | Status: SHIPPED | OUTPATIENT
Start: 2024-10-14

## 2024-10-14 NOTE — PROGRESS NOTES
Assessment/Plan:      There are no diagnoses linked to this encounter.      Subjective:     Patient ID: Steven Chandler is a 84 y.o. male.    Patient presents today to review TONYA done 8/23/24. Functionally limited.     HPI    Review of Systems   Constitutional: Negative.    HENT: Negative.     Eyes: Negative.    Respiratory: Negative.     Cardiovascular: Negative.    Gastrointestinal: Negative.    Endocrine: Negative.    Genitourinary: Negative.    Musculoskeletal:  Positive for gait problem.   Skin: Negative.    Allergic/Immunologic: Negative.    Neurological:  Positive for weakness.   Hematological: Negative.    Psychiatric/Behavioral: Negative.           Objective:     Physical Exam

## 2024-10-15 ENCOUNTER — OFFICE VISIT (OUTPATIENT)
Dept: VASCULAR SURGERY | Facility: CLINIC | Age: 84
End: 2024-10-15
Payer: MEDICARE

## 2024-10-15 VITALS
WEIGHT: 152 LBS | DIASTOLIC BLOOD PRESSURE: 86 MMHG | SYSTOLIC BLOOD PRESSURE: 150 MMHG | HEIGHT: 69 IN | HEART RATE: 78 BPM | BODY MASS INDEX: 22.51 KG/M2

## 2024-10-15 DIAGNOSIS — E11.69 HYPERLIPIDEMIA ASSOCIATED WITH TYPE 2 DIABETES MELLITUS  (HCC): Chronic | ICD-10-CM

## 2024-10-15 DIAGNOSIS — E78.5 HYPERLIPIDEMIA ASSOCIATED WITH TYPE 2 DIABETES MELLITUS  (HCC): Chronic | ICD-10-CM

## 2024-10-15 DIAGNOSIS — I70.213 ATHEROSCLEROSIS OF NATIVE ARTERIES OF EXTREMITIES WITH INTERMITTENT CLAUDICATION, BILATERAL LEGS (HCC): Primary | ICD-10-CM

## 2024-10-15 DIAGNOSIS — E11.40 TYPE 2 DIABETES MELLITUS WITH DIABETIC NEUROPATHY, WITHOUT LONG-TERM CURRENT USE OF INSULIN (HCC): Chronic | ICD-10-CM

## 2024-10-15 PROCEDURE — 99213 OFFICE O/P EST LOW 20 MIN: CPT | Performed by: SURGERY

## 2024-10-15 NOTE — ASSESSMENT & PLAN NOTE
Lab Results   Component Value Date    HGBA1C 7.5 (A) 06/18/2024     LDL is 43, manage with pravachol

## 2024-10-15 NOTE — ASSESSMENT & PLAN NOTE
Combination of spinal stenosis, severe neuropathy and long standing PAD.  He can walk about 1/4 mile.  He can walk during shopping and he takes a break.  It has remain stable over past few years.  Left leg is worse than right.  Both legs feel numb like they are cement.      Arterial duplex reviewed in detail, left SFA occlusion, right SFA high grade stenosis.  PAWAN is preserved on right and reduced on left.    Continue with medical management with aspirin 81mg , pravachol and losartan.    Walk as much as you can and push yourself beyond ponit of leg pain.  Orders:    VAS ARTERIAL DUPLEX- LOWER LIMB BILATERAL; Future

## 2024-10-15 NOTE — PATIENT INSTRUCTIONS
"Continue with medical management with aspirin 81mg , pravachol and losartan.    Walk as much as you can and push yourself beyond ponit of leg pain.       Both leg arterial blockages are stable over past few years.    Patient Education     Foot care for people with diabetes   The Basics   Written by the doctors and editors at [facility]   Why is foot care important if I have diabetes? -- Diabetes can cause nerve damage if your blood sugar is high for a long time. The medical term for this is \"diabetic neuropathy.\"  If you have problems with the nerves in your feet, you might not be able to feel pain in your foot. Normally, people feel pain when they get a cut or a blister on their foot. The pain tells them that they need to treat their cut so it can heal. But people with nerve damage might not feel any pain when their feet get hurt. They might not even know that they have a cut, so they might not treat it. Problems that aren't treated right away can get much worse. For example, an untreated cut can get infected and turn into an open sore.  High blood sugar can also damage blood vessels and decrease blood flow to your feet. This can weaken your skin and make wounds take longer to heal. You are also more likely to get an infection if you have high blood sugar.  How do I take care of my feet? -- Taking good care of your feet can help prevent foot problems. You should:   Wash your feet every day with soap and warm water. Pat your feet dry, and be sure to dry the skin between your toes.   Keep your feet moisturized. Put lotion on the tops and bottoms of your feet, but not between your toes.   Check your feet every day (figure 1). Look for cuts, blisters, redness, or swelling. Use a mirror, or ask someone to help you check the bottoms of your feet. Check all parts of the foot, especially between the toes. Look for broken skin, ulcers, blisters, or redness.   Trim your toenails straight across when needed (figure 2). Do not " cut the corners of your toenails. File rough edges. Do not cut your cuticles. Ask for help if you cannot see well or have problems reaching your feet.   Ask your doctor or nurse to check your feet at each visit. Take your shoes and socks off for these checks.   See a foot care provider (such as a podiatrist) if you have an ingrown toenail, corn, or callus. Do not try to remove corns and calluses yourself.  How do I protect my feet from injury? -- There are several ways to protect your feet. You can:   Wear shoes and socks at all times, even at home. Do not walk barefoot. Wear swim shoes if you go to the beach or a swimming pool.   Choose shoes that fit well. They should not be not too tight or too loose. Your shoes should have plenty of room for your toes (figure 3). Your doctor might give you a prescription for special shoes. Check to see if they are covered by your insurance.   Check your shoes each time before you put them on to make sure that the lining is smooth. Also check to make sure that there is nothing inside the shoes before putting them on.   Do not wear shoes that expose any part of the foot, like sandals, thongs, or clogs.   Wear cotton socks that fit loosely. Do not wear shoes without socks.   Protect your feet from heat and cold. Test bath water before putting your feet in it to make sure that it is not too hot. Do not walk barefoot on hot ground. Take extra care when going outside in the cold and wear warm socks.  What else should I know? -- You can lower your risk for foot problems by keeping your blood sugar levels as close to your goal as possible. Other things you can do include:   Move your ankles and toes often to help with blood flow. You can wear a support stocking to help with swelling.   Walk often. Regular walking helps blood flow.   If you smoke, try to quit. Your doctor or nurse can help. Smoking causes poor blood flow to your feet and can damage your nerves.  When should I call the  doctor? -- Call your doctor or nurse for advice if you have:   A fever of 100.4°F (38°C) or higher, chills, or a wound that will not heal   Swelling, redness, warmth around a wound, a foul smell coming from a wound, or yellowish, greenish, or bloody discharge   Sores or blisters on your feet that hurt more or less than you would expect   Numbness or tingling in your foot or leg   Corns, calluses, blisters, or new sores on your foot   Very dry, scaly, or cracked skin on your feet   Changes in the way your foot joints or arch look  All topics are updated as new evidence becomes available and our peer review process is complete.  This topic retrieved from [employer] on: Mar 13, 2024.  Topic 364193 Version 2.0  Release: 32.2.4 - C32.71  © 2024 UpToDate, Inc. and/or its affiliates. All rights reserved.  figure 1: Foot check for people with diabetes     People with diabetes should check both of their feet every day. It is important to check your feet all over, including in between your toes. If you can't see the bottom of your foot, use a mirror or ask another person to check for you. Let your doctor or nurse know if you find any:  Redness   Cuts or cracks in the skin   Blisters   Swelling   Graphic 67775 Version 3.0  figure 2: Trim your toenails     Trim your toenails straight across and smooth them with a nail file.  Graphic 62523 Version 2.0  figure 3: Correct shoe shape     Choose shoes that fit the right way and are not too tight or too loose. Your shoes should have plenty of room for your toes.  Graphic 52263 Version 2.0  Consumer Information Use and Disclaimer   Disclaimer: This generalized information is a limited summary of diagnosis, treatment, and/or medication information. It is not meant to be comprehensive and should be used as a tool to help the user understand and/or assess potential diagnostic and treatment options. It does NOT include all information about conditions, treatments, medications, side effects,  or risks that may apply to a specific patient. It is not intended to be medical advice or a substitute for the medical advice, diagnosis, or treatment of a health care provider based on the health care provider's examination and assessment of a patient's specific and unique circumstances. Patients must speak with a health care provider for complete information about their health, medical questions, and treatment options, including any risks or benefits regarding use of medications. This information does not endorse any treatments or medications as safe, effective, or approved for treating a specific patient. UpToDate, Inc. and its affiliates disclaim any warranty or liability relating to this information or the use thereof.The use of this information is governed by the Terms of Use, available at https://www.woltersFeedouwer.com/en/know/clinical-effectiveness-terms. 2024© UpToDate, Inc. and its affiliates and/or licensors. All rights reserved.  Copyright   © 2024 UpToDate, Inc. and/or its affiliates. All rights reserved.

## 2024-10-15 NOTE — ASSESSMENT & PLAN NOTE
Lab Results   Component Value Date    HGBA1C 7.5 (A) 06/18/2024   Improved from last time from 8.7 to 7.5  He is on gabapentin and cymbalta

## 2024-10-15 NOTE — PROGRESS NOTES
Ambulatory Visit  Name: Steven MARTINO During      : 1940      MRN: [id number]  Encounter Provider: Licha Nj MD  Encounter Date: 10/15/2024   Enc[facility]: [facility]    Assessment & Plan  Type 2 diabetes mellitus with diabetic neuropathy, without long-term current use of insulin (HCC)    Lab Results   Component Value Date    HGBA1C 7.5 (A) 2024   Improved from last time from 8.7 to 7.5  He is on gabapentin and cymbalta         Atherosclerosis of native arteries of extremities with intermittent claudication, bilateral legs (HCC)  Combination of spinal stenosis, severe neuropathy and long standing PAD.  He can walk about 1/4 mile.  He can walk during shopping and he takes a break.  It has remain stable over past few years.  Left leg is worse than right.  Both legs feel numb like they are cement.      Arterial duplex reviewed in detail, left SFA occlusion, right SFA high grade stenosis.  PAWAN is preserved on right and reduced on left.    Continue with medical management with aspirin 81mg , pravachol and losartan.    Walk as much as you can and push yourself beyond ponit of leg pain.  Orders:    VAS ARTERIAL DUPLEX- LOWER LIMB BILATERAL; Future    Hyperlipidemia associated with type 2 diabetes mellitus  (HCC)    Lab Results   Component Value Date    HGBA1C 7.5 (A) 2024     LDL is 43, manage with pravachol           History of Present Illness       Patient presents today to review TONYA done 24. Functionally limited. Chronic low back pain and leg pain with walking.     Steven Chandler is a 84 y.o. male who presents for leg pain during walking    History obtained from : patient  Review of Systems   Constitutional: Negative.    HENT: Negative.     Eyes: Negative.    Respiratory: Negative.     Cardiovascular: Negative.    Gastrointestinal: Negative.    Endocrine: Negative.    Genitourinary: Negative.    Musculoskeletal:  Positive for back pain.        Leg pain    Skin: Negative.    Allergic/Immunologic: Negative.    Neurological: Negative.    Hematological: Negative.    Psychiatric/Behavioral: Negative.     I have reviewed the ROS as entered and made changes as necessary.    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     COPD (chronic obstructive pulmonary disease) (HCC)     CPAP (continuous positive airway pressure) dependence     DDD (degenerative disc disease), cervical     Diabetes mellitus (HCC)     Foraminal stenosis of cervical region     Hyperlipidemia     Hypertension     SHIVANI (obstructive sleep apnea)     Overactive bladder     Spinal cord compression (HCC) 5/8/2019    Added automatically from request for surgery [id number]     Past Surgical History:   Procedure Laterality Date    ARTHROSCOPY KNEE Bilateral     COLONOSCOPY      LUNG SURGERY Left     scraping of left    KY ARTHRD ANT INTERBODY DECOMPRESS CERVICAL BELW C2 Bilateral 6/18/2019    Procedure: C3/4  ACDF WITH  C4 HEMICORPECTOMY, C3-4 ANTERIOR INSTRUMENTATION AND FUSION (NEUROMONITORING);  Surgeon: Lucila Corral MD;  Location: [facility];  Service: Neurosurgery    ROTATOR CUFF REPAIR Bilateral      Family History   Problem Relation Age of Onset    No Known Problems Mother     No Known Problems Father      Current Outpatient Medications on File Prior to Visit   Medication Sig Dispense Refill    acetaminophen (TYLENOL) 500 mg tablet Take 500 mg by mouth every 6 (six) hours as needed for mild pain      albuterol (2.5 mg/3 mL) 0.083 % nebulizer solution Take 3 mL (2.5 mg total) by nebulization every 6 (six) hours as needed for wheezing or shortness of breath 360 mL 1    aspirin (ECOTRIN LOW STRENGTH) 81 mg EC tablet Take 1 tablet by mouth daily 30 tablet 0    Blood Glucose Monitoring Suppl (OneTouch Verio) w/Device KIT use as  directed 1 kit 0    chlorhexidine (PERIDEX) 0.12 % solution RINSE MOUTH WITH 15 ML (1 CAPFUL) FOR 30 SECONDS IN THE MORNING A...  (REFER TO PRESCRIPTION NOTES).      clindamycin (CLEOCIN) 300 MG capsule Take 300 mg by mouth every 8 (eight) hours        Continuous Glucose Sensor (FreeStyle Eben 14 Day Sensor) MISC Check blood sugars continuously. Switch every 14 days. 6 each 3    DULoxetine (CYMBALTA) 60 mg delayed release capsule TAKE 1 CAPSULE DAILY 90 capsule 3    fluticasone-umeclidinium-vilanterol (Trelegy Ellipta) 100-62.5-25 mcg/actuation inhaler USE 1 INHALATION DAILY (RINSE MOUTH AFTER USE) 360 blister 1    gabapentin (NEURONTIN) 100 mg capsule TAKE 1 CAPSULE BY MOUTH TWICE A  capsule 1    glimepiride (AMARYL) 2 mg tablet Take 1 tablet (2 mg total) by mouth daily with breakfast 100 tablet 3    glucose blood (FREESTYLE LITE) test strip use 1 TEST STRIP to TEST BLOOD SUGAR twice a day 100 strip 0    ibuprofen (MOTRIN) 600 mg tablet Take 600 mg by mouth every 6 (six) hours as needed      Lancets (OneTouch Delica Plus [id number]) MISC use 1 LANCET to TEST BLOOD SUGAR one to two times a day 100 each 0    losartan (COZAAR) 100 MG tablet TAKE 1 TABLET DAILY 90 tablet 3    metFORMIN (GLUCOPHAGE) 500 mg tablet Take 1 tablet (500 mg total) by mouth 2 (two) times a day with meals 180 tablet 3    Multiple Vitamin (MULTI-VITAMINS PO) Take by mouth daily      oxybutynin (DITROPAN-XL) 5 mg 24 hr tablet take 1 tablet by mouth once daily 90 tablet 3    polyvinyl alcohol (LIQUIFILM TEARS) 1.4 % ophthalmic solution Administer 1 drop to both eyes 4 (four) times a day 15 mL 0    pravastatin (PRAVACHOL) 40 mg tablet Take 1 tablet (40 mg total) by mouth daily 100 tablet 3    pyridoxine (VITAMIN B6) 50 mg tablet Take 50 mg by mouth daily      sildenafil (REVATIO) 20 mg tablet Take 1 tablet (20 mg total) by mouth if needed (Erectile dysfunction) Take 1-5 tablets as needed for erection on an empty stomach 30 tablet 1     "tamsulosin (FLOMAX) 0.4 mg Take 1 capsule (0.4 mg total) by mouth 2 (two) times a week 90 capsule 1    Tiadylt  MG 24 hr capsule TAKE ONE CAPSULE BY MOUTH DAILY 90 capsule 3    VITAMIN E PO Take by mouth 268 mg 400 iu      hydrochlorothiazide (HYDRODIURIL) 12.5 mg tablet Take 1 tablet (12.5 mg total) by mouth daily 90 tablet 1    thiamine 50 MG tablet Take 1 tablet (50 mg total) by mouth daily (Patient not taking: Reported on 10/15/2024) 30 tablet 3     No current facility-administered medications on file prior to visit.     Allergies   Allergen Reactions    Chloroquine Itching    Qualaquin [Quinine] Itching          Objective     /86 (BP Location: Left arm, Patient Position: Sitting)   Pulse 78   Ht 5' 9\" (1.753 m)   Wt 68.9 kg (152 lb)   BMI 22.45 kg/m²     Physical Exam  Vitals and nursing note reviewed.   Constitutional:       Appearance: Normal appearance.   HENT:      Head: Normocephalic and atraumatic.   Cardiovascular:      Rate and Rhythm: Normal rate and regular rhythm.      Comments: Triphasic right DP and PT    Biphasic left DP and PT  Pulmonary:      Breath sounds: Normal breath sounds.   Musculoskeletal:      Right lower leg: No edema.      Left lower leg: No edema.   Skin:     General: Skin is warm.   Neurological:      Mental Status: He is alert.         "

## 2024-10-16 ENCOUNTER — TELEPHONE (OUTPATIENT)
Age: 84
End: 2024-10-16

## 2024-10-29 ENCOUNTER — OFFICE VISIT (OUTPATIENT)
Dept: NEUROLOGY | Facility: CLINIC | Age: 84
End: 2024-10-29
Payer: MEDICARE

## 2024-10-29 VITALS
HEART RATE: 84 BPM | SYSTOLIC BLOOD PRESSURE: 160 MMHG | OXYGEN SATURATION: 97 % | DIASTOLIC BLOOD PRESSURE: 80 MMHG | HEIGHT: 69 IN | BODY MASS INDEX: 22.45 KG/M2

## 2024-10-29 DIAGNOSIS — R26.89 BALANCE PROBLEM: ICD-10-CM

## 2024-10-29 DIAGNOSIS — G62.9 NEUROPATHY: ICD-10-CM

## 2024-10-29 DIAGNOSIS — E11.40 TYPE 2 DIABETES MELLITUS WITH DIABETIC NEUROPATHY, WITHOUT LONG-TERM CURRENT USE OF INSULIN (HCC): Chronic | ICD-10-CM

## 2024-10-29 DIAGNOSIS — M48.02 DEGENERATIVE CERVICAL SPINAL STENOSIS: ICD-10-CM

## 2024-10-29 DIAGNOSIS — M48.062 SPINAL STENOSIS OF LUMBAR REGION WITH NEUROGENIC CLAUDICATION: Primary | ICD-10-CM

## 2024-10-29 PROCEDURE — 99214 OFFICE O/P EST MOD 30 MIN: CPT | Performed by: PSYCHIATRY & NEUROLOGY

## 2024-10-29 RX ORDER — GABAPENTIN 100 MG/1
100 CAPSULE ORAL 3 TIMES DAILY
Qty: 270 CAPSULE | Refills: 1 | Status: SHIPPED | OUTPATIENT
Start: 2024-10-29

## 2024-10-29 NOTE — PROGRESS NOTES
Neurology Ambulatory Visit  Name: Steven MARTINO During       : 1940       MRN: [id number]   Encounter Provider: Elton Leal MD   Encounter Date: 10/29/2024  Encounter department: [facility]      Steven MARTINO During is a 84 y.o. male.       Assessment & Plan  Spinal stenosis of lumbar region with neurogenic claudication    Orders:    Ambulatory referral to Spine & Pain Management; Future    Ambulatory Referral to Orthopedic Surgery; Future  Patient does have spinal stenosis with neurogenic claudication and also has history of peripheral vascular disease, he did see Dr. Mendes orthopedic spine surgeon in January and was advised to follow-up with him which patient has not yet done I am going to have the patient go back to see him since he continues to have low back issues, meanwhile he was also advised to increase Neurontin to 100 mg 3 times a day and follow-up with the pain specialist to see if he would benefit from any type of epidural injections.    He was strongly encouraged to quit smoking and to continue with his home exercise program, to keep his blood pressure, cholesterol, sugar under control.    Peripheral vascular disease management as per vascular surgeon.  Degenerative cervical spinal stenosis       His neck pain seems to be under control  Balance problem       Could be multifactorial given his history of cervical and lumbar stenosis and neuropathy, patient was advised to take fall and safety precautions  Type 2 diabetes mellitus with diabetic neuropathy, without long-term current use of insulin ([facility])    Lab Results   Component Value Date    HGBA1C 7.5 (A) 2024            Patient was advised to keep his blood pressure, cholesterol, sugar under control keep hemoglobin A1c around 6, he was advised to increase his Neurontin to 100 mg 3 times a day, he was strongly encouraged to quit smoking, follow-up with the sleep specialist regarding his sleep apnea and with his  other physicians to go to the hospital if has any worsening symptoms and call me otherwise to see me back in 6 months or sooner if needed and follow-up with the other physicians    Subjective:  Chief Complaint   Patient presents with    Cervical spondylosis    diabetic neuropathy    spinal stenosis       HISTORY OF PRESENT ILLNESS    Patient is here in follow-up for his history of cervical and lumbar spinal stenosis and diabetic polyneuropathy, since his last visit he tells me that his neck pain is under control but he has been having low back pain at least 7 on 10 it is on and off and it does radiate into the legs he is able to maximum both for one fourth of a block and then he has to take rest he has seen orthopedic spine surgeon in the past, and was advised to go for aqua therapy he did feel some relief, denies any bowel and bladder incontinence, he still has some unsteady gait, he has not had any falls, no focal weakness he continues to smoke, appetite is good, weight has been stable no other complaints.             Past Medical History:    Past Medical History:   Diagnosis Date    BPH (benign prostatic hyperplasia)     Cancer ([facility]) 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    Cervical myelopathy ([facility]) 6/20/2019    Chemical exposure     9/11/01- worked in [employer].     COPD (chronic obstructive pulmonary disease) ([facility])     CPAP (continuous positive airway pressure) dependence     DDD (degenerative disc disease), cervical     Diabetes mellitus ([facility])     Foraminal stenosis of cervical region     Hyperlipidemia     Hypertension     SHIVANI (obstructive sleep apnea)     Overactive bladder     Spinal cord compression ([facility]) 5/8/2019    Added automatically from request for surgery [id number]     Past Surgical History:   Procedure Laterality Date    ARTHROSCOPY KNEE Bilateral     COLONOSCOPY      LUNG SURGERY Left     scraping of left    DC ARTHRD ANT INTERBODY DECOMPRESS CERVICAL BELW C2 Bilateral 6/18/2019    Procedure: C3/4  ACDF WITH  C4  HEMICORPECTOMY, C3-4 ANTERIOR INSTRUMENTATION AND FUSION (NEUROMONITORING);  Surgeon: Lucila Corral MD;  [facility];  Service: Neurosurgery    ROTATOR CUFF REPAIR Bilateral        Family History:  Family History   Problem Relation Age of Onset    No Known Problems Mother     No Known Problems Father        Social History:  Social History     Tobacco Use    Smoking status: Every Day     Types: Pipe     Passive exposure: Past    Smokeless tobacco: Never    Tobacco comments:     2-3  PIPES PER DAY    Vaping Use    Vaping status: Never Used   Substance Use Topics    Alcohol use: Never    Drug use: Never      Living situation:    Work:      Allergies:  Allergies   Allergen Reactions    Chloroquine Itching    Qualaquin [Quinine] Itching       Medications:    Current Outpatient Medications:     acetaminophen (TYLENOL) 500 mg tablet, Take 500 mg by mouth every 6 (six) hours as needed for mild pain, Disp: , Rfl:     albuterol (2.5 mg/3 mL) 0.083 % nebulizer solution, Take 3 mL (2.5 mg total) by nebulization every 6 (six) hours as needed for wheezing or shortness of breath, Disp: 360 mL, Rfl: 1    aspirin (ECOTRIN LOW STRENGTH) 81 mg EC tablet, Take 1 tablet by mouth daily, Disp: 30 tablet, Rfl: 0    Blood Glucose Monitoring Suppl (OneTouch Verio) w/Device KIT, use as directed, Disp: 1 kit, Rfl: 0    chlorhexidine (PERIDEX) 0.12 % solution, RINSE MOUTH WITH 15 ML (1 CAPFUL) FOR 30 SECONDS IN THE MORNING A...  (REFER TO PRESCRIPTION NOTES)., Disp: , Rfl:     clindamycin (CLEOCIN) 300 MG capsule, Take 300 mg by mouth every 8 (eight) hours  , Disp: , Rfl:     Continuous Glucose Sensor (FreeStyle Eben 14 Day Sensor) MISC, Check blood sugars continuously. Switch every 14 days., Disp: 6 each, Rfl: 3    DULoxetine (CYMBALTA) 60 mg delayed release capsule, TAKE 1 CAPSULE DAILY, Disp: 90 capsule, Rfl: 3    fluticasone-umeclidinium-vilanterol (Trelegy Ellipta) 100-62.5-25 mcg/actuation inhaler, USE 1 INHALATION DAILY (RINSE  MOUTH AFTER USE), Disp: 360 blister, Rfl: 1    gabapentin (NEURONTIN) 100 mg capsule, TAKE 1 CAPSULE BY MOUTH TWICE A DAY, Disp: 180 capsule, Rfl: 1    glimepiride (AMARYL) 2 mg tablet, Take 1 tablet (2 mg total) by mouth daily with breakfast, Disp: 100 tablet, Rfl: 3    glucose blood (FREESTYLE LITE) test strip, use 1 TEST STRIP to TEST BLOOD SUGAR twice a day, Disp: 100 strip, Rfl: 0    hydrochlorothiazide (HYDRODIURIL) 12.5 mg tablet, Take 1 tablet (12.5 mg total) by mouth daily, Disp: 90 tablet, Rfl: 1    Lancets (OneTouch Delica Plus [id number]) MISC, use 1 LANCET to TEST BLOOD SUGAR one to two times a day, Disp: 100 each, Rfl: 0    losartan (COZAAR) 100 MG tablet, TAKE 1 TABLET DAILY, Disp: 90 tablet, Rfl: 3    metFORMIN (GLUCOPHAGE) 500 mg tablet, Take 1 tablet (500 mg total) by mouth 2 (two) times a day with meals, Disp: 180 tablet, Rfl: 3    Multiple Vitamin (MULTI-VITAMINS PO), Take by mouth daily, Disp: , Rfl:     oxybutynin (DITROPAN-XL) 5 mg 24 hr tablet, take 1 tablet by mouth once daily, Disp: 90 tablet, Rfl: 3    polyvinyl alcohol (LIQUIFILM TEARS) 1.4 % ophthalmic solution, Administer 1 drop to both eyes 4 (four) times a day, Disp: 15 mL, Rfl: 0    pravastatin (PRAVACHOL) 40 mg tablet, Take 1 tablet (40 mg total) by mouth daily, Disp: 100 tablet, Rfl: 3    pyridoxine (VITAMIN B6) 50 mg tablet, Take 50 mg by mouth daily, Disp: , Rfl:     sildenafil (REVATIO) 20 mg tablet, Take 1 tablet (20 mg total) by mouth if needed (Erectile dysfunction) Take 1-5 tablets as needed for erection on an empty stomach, Disp: 30 tablet, Rfl: 1    tamsulosin (FLOMAX) 0.4 mg, Take 1 capsule (0.4 mg total) by mouth 2 (two) times a week, Disp: 90 capsule, Rfl: 1    thiamine 50 MG tablet, Take 1 tablet (50 mg total) by mouth daily, Disp: 30 tablet, Rfl: 3    Tiadylt  MG 24 hr capsule, TAKE ONE CAPSULE BY MOUTH DAILY, Disp: 90 capsule, Rfl: 3    VITAMIN E PO, Take by mouth 268 mg 400 iu, Disp: , Rfl:     ibuprofen  "(MOTRIN) 600 mg tablet, Take 600 mg by mouth every 6 (six) hours as needed (Patient not taking: Reported on 10/29/2024), Disp: , Rfl:     Objective:  /80 (BP Location: Left arm, Patient Position: Sitting, Cuff Size: Large)   Pulse 84   Ht 5' 9\" (1.753 m)   SpO2 97%   BMI 22.45 kg/m²      Labs  I have reviewed pertinent labs:  CBC:   Lab Results   Component Value Date    WBC 6.51 06/18/2024    RBC 5.30 06/18/2024    HGB 15.0 06/18/2024    HCT 46.8 06/18/2024    MCV 88 06/18/2024     06/18/2024    MCH 28.3 06/18/2024    MCHC 32.1 06/18/2024    RDW 14.0 06/18/2024    MPV 11.9 06/18/2024    NEUTROABS 3.65 06/18/2024     CMP:   Lab Results   Component Value Date    SODIUM 145 06/18/2024    K 4.8 06/18/2024     06/18/2024    CO2 30 06/18/2024    AGAP 9 06/18/2024    BUN 15 06/18/2024    CREATININE 0.87 06/18/2024    GLUC 145 (H) 06/18/2024    GLUF 142 (H) 12/12/2023    CALCIUM 10.8 (H) 06/18/2024    AST 16 06/18/2024    ALT 21 06/18/2024    ALKPHOS 74 06/18/2024    TP 6.5 06/18/2024    ALB 4.3 06/18/2024    TBILI 0.48 06/18/2024    EGFR 80 06/18/2024     Vitamin B12   Lab Results   Component Value Date    LKIMUNFU42 1,727 (H) 11/02/2017     Folate   Lab Results   Component Value Date    FOLATE >20.0 (H) 11/02/2017              General Exam  GENERAL APPEARANCE:  No distress, alert, interactive and cooperative.  CARDIOVASCULAR: Warm and well perfused  LUNGS: normal work of breathing on room air  EXTREMITIES: no peripheral edema     Neurologic Exam  MENTAL STATE:  Orientation was normal to time, place and person without aphasia or apraxia. Recent and remote memory was intact.  Attention span and concentration were normal. Language testing was normal for comprehension, repetition, expression, and naming.     CRANIAL NERVES:  CN 2       visual fields full to confrontation.  CN 3, 4, 6  Pupils round, 4 mm in diameter, equally reactive to light. Lids symmetric; no ptosis. EOMs normal alignment, full range. " No nystagmus.  CN 5  Facial sensation intact bilaterally.  CN 7  Normal and symmetric facial strength.   CN 8  Hearing intact to finger rub bilaterally.  CN 9  Palate elevates symmetrically.  CN 11  Normal strength of shoulder shrug and neck turning.  CN 12  Tongue midline, with normal bulk and strength.     MOTOR:  Motor exam was normal. Muscle bulk and tone were normal in both upper and lower extremities. Muscle strength was 5/5 in distal and proximal muscles in both upper and lower extremities. No fasciculations, tremor or other abnormal movements were present.     REFLEXES:  Slightly brisk in both upper and lower extremities   SENSORY:  decreased to light touch pinprick temperature sensation in a stocking distribution cortical function is intact.    COORDINATION:   Coordination exam was normal. In both upper extremities, finger-nose-finger was intact without dysmetria or overshoot.      GAIT:  Gait was normal. Station was stable with a normal base. Gait was stable with a normal arm swing and speed.  With a slight forward flexion.  Paraspinal muscle tenderness in the lumbar area.    ROS:  Review of Systems   Constitutional:  Negative for appetite change, fatigue and fever.   HENT: Negative.  Negative for hearing loss, tinnitus, trouble swallowing and voice change.    Eyes: Negative.  Negative for photophobia, pain and visual disturbance.   Respiratory: Negative.  Negative for shortness of breath.    Cardiovascular: Negative.  Negative for palpitations.   Gastrointestinal: Negative.  Negative for nausea and vomiting.   Endocrine: Negative.  Negative for cold intolerance.   Genitourinary: Negative.  Negative for dysuria, frequency and urgency.   Musculoskeletal:  Positive for back pain. Negative for gait problem, myalgias, neck pain and neck stiffness.   Skin: Negative.  Negative for rash.   Allergic/Immunologic: Negative.    Neurological: Negative.  Negative for dizziness, tremors, seizures, syncope, facial  asymmetry, speech difficulty, weakness, light-headedness, numbness and headaches.   Hematological: Negative.  Does not bruise/bleed easily.   Psychiatric/Behavioral: Negative.  Negative for confusion, hallucinations and sleep disturbance.        I have reviewed the past medical history, surgical history, social and family history, current medications, allergies vitals, review of systems, and updated this information as appropriate today.    Administrative Statements   The total amount of time spent with the patient and on chart review and documentation was  25minutes. Issues addressed during this clinic visit included overall management, medication counseling or monitoring, and counseling and coordination of care.         Elton Leal MD

## 2024-10-29 NOTE — ASSESSMENT & PLAN NOTE
Could be multifactorial given his history of cervical and lumbar stenosis and neuropathy, patient was advised to take fall and safety precautions

## 2024-10-29 NOTE — ASSESSMENT & PLAN NOTE
Orders:    Ambulatory referral to Spine & Pain Management; Future    Ambulatory Referral to Orthopedic Surgery; Future  Patient does have spinal stenosis with neurogenic claudication and also has history of peripheral vascular disease, he did see Dr. Mendes orthopedic spine surgeon in January and was advised to follow-up with him which patient has not yet done I am going to have the patient go back to see him since he continues to have low back issues, meanwhile he was also advised to increase Neurontin to 100 mg 3 times a day and follow-up with the pain specialist to see if he would benefit from any type of epidural injections.    He was strongly encouraged to quit smoking and to continue with his home exercise program, to keep his blood pressure, cholesterol, sugar under control.    Peripheral vascular disease management as per vascular surgeon.

## 2024-10-30 ENCOUNTER — APPOINTMENT (OUTPATIENT)
Age: 84
End: 2024-10-30
Payer: MEDICARE

## 2024-10-30 DIAGNOSIS — C61 PROSTATE CANCER (HCC): ICD-10-CM

## 2024-10-30 DIAGNOSIS — E11.69 HYPERLIPIDEMIA ASSOCIATED WITH TYPE 2 DIABETES MELLITUS  (HCC): Chronic | ICD-10-CM

## 2024-10-30 DIAGNOSIS — E78.5 HYPERLIPIDEMIA ASSOCIATED WITH TYPE 2 DIABETES MELLITUS  (HCC): Chronic | ICD-10-CM

## 2024-10-30 LAB
ANION GAP SERPL CALCULATED.3IONS-SCNC: 7 MMOL/L (ref 4–13)
BUN SERPL-MCNC: 16 MG/DL (ref 5–25)
CALCIUM SERPL-MCNC: 9.9 MG/DL (ref 8.4–10.2)
CHLORIDE SERPL-SCNC: 106 MMOL/L (ref 96–108)
CHOLEST SERPL-MCNC: 87 MG/DL
CO2 SERPL-SCNC: 30 MMOL/L (ref 21–32)
CREAT SERPL-MCNC: 0.8 MG/DL (ref 0.6–1.3)
GFR SERPL CREATININE-BSD FRML MDRD: 83 ML/MIN/1.73SQ M
GLUCOSE P FAST SERPL-MCNC: 153 MG/DL (ref 65–99)
HDLC SERPL-MCNC: 40 MG/DL
LDLC SERPL CALC-MCNC: 40 MG/DL (ref 0–100)
POTASSIUM SERPL-SCNC: 3.9 MMOL/L (ref 3.5–5.3)
PSA SERPL-MCNC: 0.02 NG/ML (ref 0–4)
SODIUM SERPL-SCNC: 143 MMOL/L (ref 135–147)
TRIGL SERPL-MCNC: 34 MG/DL

## 2024-10-30 PROCEDURE — 80048 BASIC METABOLIC PNL TOTAL CA: CPT

## 2024-10-30 PROCEDURE — 36415 COLL VENOUS BLD VENIPUNCTURE: CPT

## 2024-10-30 PROCEDURE — 84153 ASSAY OF PSA TOTAL: CPT

## 2024-10-30 PROCEDURE — 83036 HEMOGLOBIN GLYCOSYLATED A1C: CPT

## 2024-10-30 PROCEDURE — 80061 LIPID PANEL: CPT

## 2024-10-31 LAB
EST. AVERAGE GLUCOSE BLD GHB EST-MCNC: 180 MG/DL
HBA1C MFR BLD: 7.9 %

## 2024-12-02 ENCOUNTER — OFFICE VISIT (OUTPATIENT)
Dept: PAIN MEDICINE | Facility: CLINIC | Age: 84
End: 2024-12-02
Payer: MEDICARE

## 2024-12-02 ENCOUNTER — TELEPHONE (OUTPATIENT)
Age: 84
End: 2024-12-02

## 2024-12-02 VITALS
BODY MASS INDEX: 22.51 KG/M2 | SYSTOLIC BLOOD PRESSURE: 149 MMHG | DIASTOLIC BLOOD PRESSURE: 84 MMHG | HEIGHT: 69 IN | WEIGHT: 152 LBS | HEART RATE: 88 BPM

## 2024-12-02 DIAGNOSIS — G62.9 NEUROPATHY: Primary | ICD-10-CM

## 2024-12-02 DIAGNOSIS — M54.42 CHRONIC BILATERAL LOW BACK PAIN WITH BILATERAL SCIATICA: ICD-10-CM

## 2024-12-02 DIAGNOSIS — M54.41 CHRONIC BILATERAL LOW BACK PAIN WITH BILATERAL SCIATICA: ICD-10-CM

## 2024-12-02 DIAGNOSIS — M48.062 SPINAL STENOSIS OF LUMBAR REGION WITH NEUROGENIC CLAUDICATION: ICD-10-CM

## 2024-12-02 DIAGNOSIS — G89.29 CHRONIC BILATERAL LOW BACK PAIN WITH BILATERAL SCIATICA: ICD-10-CM

## 2024-12-02 PROCEDURE — 99214 OFFICE O/P EST MOD 30 MIN: CPT

## 2024-12-02 RX ORDER — PREGABALIN 25 MG/1
CAPSULE ORAL
Qty: 81 CAPSULE | Refills: 0 | Status: SHIPPED | OUTPATIENT
Start: 2024-12-02 | End: 2025-01-01

## 2024-12-02 NOTE — PROGRESS NOTES
Pain Medicine Follow-Up Note    Assessment:  1. Neuropathy    2. Spinal stenosis of lumbar region with neurogenic claudication    3. Chronic bilateral low back pain with bilateral sciatica        Plan:  New Medications Ordered This Visit   Medications    pregabalin (LYRICA) 25 mg capsule     Sig: Take 1 capsule (25 mg total) by mouth every evening for 3 days, THEN 1 capsule (25 mg total) 2 (two) times a day for 3 days, THEN 1 capsule (25 mg total) 3 (three) times a day for 24 days. Replacing gabapentin with pregabalin.     Dispense:  81 capsule     Refill:  0       My impressions and treatment recommendations were discussed in detail with the patient who verbalized understanding and had no further questions.      Patient returns to the office to discuss his worsening spinal stenosis with neurogenic claudication.  Patient recently saw his neurologist who recommended that he follow-up with pain management to discuss a repeat epidural injection.  However patient reports that the epidural injections only provide him a few weeks of pain relief therefore he is unsure if he would like to have an additional epidural injection.  Patient reports he is frustrated because he cannot walk for any significant amount of time without needing to sit down and also feels off balance.  Patient continues to use gabapentin 100 mg 3 times daily but he is unsure if it is really doing much of anything.  I recommend the patient trial pregabalin 25 mg 3 times daily.  Patient educated on how to titrate off gabapentin and onto pregabalin, patient understands that pregabalin is replacing the gabapentin and that he needs to take it consistently.  Also discussed that he may be a candidate for an additional procedure known as the mild procedure however upon review patient's A1c would need to be less than 7.  Patient understands he may contact the office for an additional epidural injection should he decide to move forward.    Follow-up is planned in  4 weeks time or sooner as warranted.  Discharge instructions were provided. I personally saw and examined the patient and I agree with the above discussed plan of care.    History of Present Illness:    Steven Chandler is a 84 y.o. male who presents to [facility] for interval re-evaluation of the above stated pain complaints. The patient has a past medical and chronic pain history as outlined in the assessment section. He was last seen on 7/27/2023.    At today's visit patient states that their pain symptoms are the same with a pain score of 7/10 on the verbal numeric pain scale.  The patient's pain is worse in the morning and in the evening.  The patient's pain is constant in nature.  And the quality of the patient's pain is described as cramping.  The patient's pain is located in the bilateral low back and bilateral lower extremities.  Patient states the amount of pain relief he is now obtaining from his current pain relievers is not enough to make a difference in his life.  Patient is currently using gabapentin and Tylenol.    Other than as stated above, the patient denies any interval changes in medications, medical condition, mental condition, symptoms, or allergies since the last office visit.       Review of Systems:    Review of Systems   Respiratory:  Positive for shortness of breath.    Cardiovascular:  Negative for chest pain.   Gastrointestinal:  Negative for constipation, diarrhea, nausea and vomiting.   Musculoskeletal:  Positive for arthralgias, gait problem and myalgias. Negative for joint swelling.        DROM  Swelling foot    Skin:  Negative for rash.   Neurological:  Positive for dizziness. Negative for seizures and weakness.   All other systems reviewed and are negative.        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     COPD (chronic obstructive pulmonary disease) ([facility])     CPAP (continuous positive airway pressure) dependence     DDD (degenerative disc disease), cervical     Diabetes mellitus ([facility])     Foraminal stenosis of cervical region     Hyperlipidemia     Hypertension     SHIVANI (obstructive sleep apnea)     Overactive bladder     Spinal cord compression ([facility]) 5/8/2019    Added automatically from request for surgery [id number]       Past Surgical History:   Procedure Laterality Date    ARTHROSCOPY KNEE Bilateral     COLONOSCOPY      LUNG SURGERY Left     scraping of left    NH ARTHRD ANT INTERBODY DECOMPRESS CERVICAL BELW C2 Bilateral 6/18/2019    Procedure: C3/4  ACDF WITH  C4 HEMICORPECTOMY, C3-4 ANTERIOR INSTRUMENTATION AND FUSION (NEUROMONITORING);  Surgeon: Lucila Corral MD;  [facility];  Service: Neurosurgery    ROTATOR CUFF REPAIR Bilateral        Family History   Problem Relation Age of Onset    No Known Problems Mother     No Known Problems Father        Social History     Occupational History    Occupation: retired   Tobacco Use    Smoking status: Every Day     Types: Pipe     Passive exposure: Past    Smokeless tobacco: Never    Tobacco comments:     2-3  PIPES PER DAY    Vaping Use    Vaping status: Never Used   Substance and Sexual Activity    Alcohol use: Never    Drug use: Never    Sexual activity: Not Currently         Current Outpatient Medications:     acetaminophen (TYLENOL) 500 mg tablet, Take 500 mg by mouth every 6 (six) hours as needed for mild pain, Disp: , Rfl:     albuterol (2.5 mg/3 mL) 0.083 % nebulizer solution, Take 3 mL (2.5 mg total) by nebulization every 6 (six) hours as needed for wheezing or shortness of breath, Disp: 360 mL, Rfl: 1    aspirin (ECOTRIN LOW STRENGTH) 81 mg EC tablet, Take 1 tablet by mouth daily, Disp: 30 tablet, Rfl: 0    Blood Glucose Monitoring Suppl (OneTouch Verio) w/Device KIT, use as directed, Disp: 1 kit, Rfl: 0    chlorhexidine (PERIDEX) 0.12 %  solution, RINSE MOUTH WITH 15 ML (1 CAPFUL) FOR 30 SECONDS IN THE MORNING A...  (REFER TO PRESCRIPTION NOTES)., Disp: , Rfl:     Continuous Glucose Sensor (FreeStyle Eben 14 Day Sensor) MISC, Check blood sugars continuously. Switch every 14 days., Disp: 6 each, Rfl: 3    DULoxetine (CYMBALTA) 60 mg delayed release capsule, TAKE 1 CAPSULE DAILY, Disp: 90 capsule, Rfl: 3    fluticasone-umeclidinium-vilanterol (Trelegy Ellipta) 100-62.5-25 mcg/actuation inhaler, USE 1 INHALATION DAILY (RINSE MOUTH AFTER USE), Disp: 360 blister, Rfl: 1    gabapentin (NEURONTIN) 100 mg capsule, Take 1 capsule (100 mg total) by mouth 3 (three) times a day, Disp: 270 capsule, Rfl: 1    glimepiride (AMARYL) 2 mg tablet, Take 1 tablet (2 mg total) by mouth daily with breakfast, Disp: 100 tablet, Rfl: 3    glucose blood (FREESTYLE LITE) test strip, use 1 TEST STRIP to TEST BLOOD SUGAR twice a day, Disp: 100 strip, Rfl: 0    hydrochlorothiazide (HYDRODIURIL) 12.5 mg tablet, Take 1 tablet (12.5 mg total) by mouth daily, Disp: 90 tablet, Rfl: 1    ibuprofen (MOTRIN) 600 mg tablet, Take 600 mg by mouth every 6 (six) hours as needed, Disp: , Rfl:     Lancets (OneTouch Delica Plus [id number]) MISC, use 1 LANCET to TEST BLOOD SUGAR one to two times a day, Disp: 100 each, Rfl: 0    losartan (COZAAR) 100 MG tablet, TAKE 1 TABLET DAILY, Disp: 90 tablet, Rfl: 3    metFORMIN (GLUCOPHAGE) 500 mg tablet, Take 1 tablet (500 mg total) by mouth 2 (two) times a day with meals, Disp: 180 tablet, Rfl: 3    Multiple Vitamin (MULTI-VITAMINS PO), Take by mouth daily, Disp: , Rfl:     oxybutynin (DITROPAN-XL) 5 mg 24 hr tablet, take 1 tablet by mouth once daily, Disp: 90 tablet, Rfl: 3    polyvinyl alcohol (LIQUIFILM TEARS) 1.4 % ophthalmic solution, Administer 1 drop to both eyes 4 (four) times a day, Disp: 15 mL, Rfl: 0    pravastatin (PRAVACHOL) 40 mg tablet, Take 1 tablet (40 mg total) by mouth daily, Disp: 100 tablet, Rfl: 3    pregabalin (LYRICA) 25 mg  "capsule, Take 1 capsule (25 mg total) by mouth every evening for 3 days, THEN 1 capsule (25 mg total) 2 (two) times a day for 3 days, THEN 1 capsule (25 mg total) 3 (three) times a day for 24 days. Replacing gabapentin with pregabalin., Disp: 81 capsule, Rfl: 0    pyridoxine (VITAMIN B6) 50 mg tablet, Take 50 mg by mouth daily, Disp: , Rfl:     sildenafil (REVATIO) 20 mg tablet, Take 1 tablet (20 mg total) by mouth if needed (Erectile dysfunction) Take 1-5 tablets as needed for erection on an empty stomach, Disp: 30 tablet, Rfl: 1    tamsulosin (FLOMAX) 0.4 mg, Take 1 capsule (0.4 mg total) by mouth 2 (two) times a week, Disp: 90 capsule, Rfl: 1    thiamine 50 MG tablet, Take 1 tablet (50 mg total) by mouth daily, Disp: 30 tablet, Rfl: 3    Tiadylt  MG 24 hr capsule, TAKE ONE CAPSULE BY MOUTH DAILY, Disp: 90 capsule, Rfl: 3    VITAMIN E PO, Take by mouth 268 mg 400 iu, Disp: , Rfl:     clindamycin (CLEOCIN) 300 MG capsule, Take 300 mg by mouth every 8 (eight) hours  , Disp: , Rfl:     Allergies   Allergen Reactions    Chloroquine Itching    Qualaquin [Quinine] Itching       Physical Exam:    /84 (Patient Position: Sitting, Cuff Size: Standard)   Pulse 88   Ht 5' 9\" (1.753 m)   Wt 68.9 kg (152 lb)   BMI 22.45 kg/m²     Constitutional:normal, well developed, well nourished, alert, in no distress and non-toxic and no overt pain behavior.  Eyes:anicteric  HEENT:grossly intact  Neck:supple, symmetric, trachea midline and no masses   Pulmonary:even and unlabored  Cardiovascular:No edema or pitting edema present  Skin:Normal without rashes or lesions and well hydrated  Psychiatric:Mood and affect appropriate  Neurologic:Cranial Nerves II-XII grossly intact  Musculoskeletal:antalgic, shuffling, and stooped posture        This document was created using speech voice recognition software.   Grammatical errors, random word insertions, pronoun errors, and incomplete sentences are an occasional consequence of " this system due to software limitations, ambient noise, and hardware issues.   Any formal questions or concerns about content, text, or information contained within the body of this dictation should be directly addressed to the provider for clarification.

## 2024-12-02 NOTE — TELEPHONE ENCOUNTER
[facility] DOES NOT COVER THIS MEDICATION    PT'S PRIMARY INSURANCE DOES NOT REQUIRE AUTH FOR PREGABALIN

## 2024-12-17 ENCOUNTER — RA CDI HCC (OUTPATIENT)
Dept: OTHER | Facility: HOSPITAL | Age: 84
End: 2024-12-17

## 2024-12-17 NOTE — PROGRESS NOTES
HCC coding opportunities          Chart Reviewed number of suggestions sent to Provider: 3   J44.9, E11.51 and E11.59  Patients Insurance

## 2024-12-23 ENCOUNTER — OFFICE VISIT (OUTPATIENT)
Age: 84
End: 2024-12-23
Payer: MEDICARE

## 2024-12-23 VITALS
SYSTOLIC BLOOD PRESSURE: 130 MMHG | BODY MASS INDEX: 22.81 KG/M2 | WEIGHT: 154 LBS | RESPIRATION RATE: 20 BRPM | DIASTOLIC BLOOD PRESSURE: 60 MMHG | TEMPERATURE: 97.5 F | OXYGEN SATURATION: 98 % | HEIGHT: 69 IN | HEART RATE: 95 BPM

## 2024-12-23 DIAGNOSIS — E11.51 TYPE 2 DIABETES MELLITUS WITH PERIPHERAL ARTERY DISEASE (HCC): Chronic | ICD-10-CM

## 2024-12-23 DIAGNOSIS — I10 ESSENTIAL HYPERTENSION: Chronic | ICD-10-CM

## 2024-12-23 DIAGNOSIS — E11.69 HYPERLIPIDEMIA ASSOCIATED WITH TYPE 2 DIABETES MELLITUS  (HCC): Primary | Chronic | ICD-10-CM

## 2024-12-23 DIAGNOSIS — Z00.00 MEDICARE ANNUAL WELLNESS VISIT, SUBSEQUENT: ICD-10-CM

## 2024-12-23 DIAGNOSIS — E78.5 HYPERLIPIDEMIA ASSOCIATED WITH TYPE 2 DIABETES MELLITUS  (HCC): Primary | Chronic | ICD-10-CM

## 2024-12-23 PROCEDURE — G0439 PPPS, SUBSEQ VISIT: HCPCS | Performed by: INTERNAL MEDICINE

## 2024-12-23 PROCEDURE — 99214 OFFICE O/P EST MOD 30 MIN: CPT | Performed by: INTERNAL MEDICINE

## 2024-12-23 NOTE — PATIENT INSTRUCTIONS
Medicare Preventive Visit Patient Instructions  Thank you for completing your Welcome to Medicare Visit or Medicare Annual Wellness Visit today. Your next wellness visit will be due in one year (12/24/2025).  The screening/preventive services that you may require over the next 5-10 years are detailed below. Some tests may not apply to you based off risk factors and/or age. Screening tests ordered at today's visit but not completed yet may show as past due. Also, please note that scanned in results may not display below.  Preventive Screenings:  Service Recommendations Previous Testing/Comments   Colorectal Cancer Screening  Colonoscopy    Fecal Occult Blood Test (FOBT)/Fecal Immunochemical Test (FIT)  Fecal DNA/Cologuard Test  Flexible Sigmoidoscopy Age: 45-75 years old   Colonoscopy: every 10 years (May be performed more frequently if at higher risk)  OR  FOBT/FIT: every 1 year  OR  Cologuard: every 3 years  OR  Sigmoidoscopy: every 5 years  Screening may be recommended earlier than age 45 if at higher risk for colorectal cancer. Also, an individualized decision between you and your healthcare provider will decide whether screening between the ages of 76-85 would be appropriate. Colonoscopy: Not on file  FOBT/FIT: Not on file  Cologuard: Not on file  Sigmoidoscopy: Not on file    Screening Not Indicated     Prostate Cancer Screening Individualized decision between patient and health care provider in men between ages of 55-69   Medicare will cover every 12 months beginning on the day after your 50th birthday PSA: 0.023 ng/mL     History Prostate Cancer  Screening Not Indicated     Hepatitis C Screening Once for adults born between 1945 and 1965  More frequently in patients at high risk for Hepatitis C Hep C Antibody: 06/24/2021    Screening Current   Diabetes Screening 1-2 times per year if you're at risk for diabetes or have pre-diabetes Fasting glucose: 153 mg/dL (10/30/2024)  A1C: 7.9 % (10/30/2024)  Screening  Not Indicated  History Diabetes   Cholesterol Screening Once every 5 years if you don't have a lipid disorder. May order more often based on risk factors. Lipid panel: 10/30/2024  Screening Not Indicated  History Lipid Disorder      Other Preventive Screenings Covered by Medicare:  Abdominal Aortic Aneurysm (AAA) Screening: covered once if your at risk. You're considered to be at risk if you have a family history of AAA or a male between the age of 65-75 who smoking at least 100 cigarettes in your lifetime.  Lung Cancer Screening: covers low dose CT scan once per year if you meet all of the following conditions: (1) Age 55-77; (2) No signs or symptoms of lung cancer; (3) Current smoker or have quit smoking within the last 15 years; (4) You have a tobacco smoking history of at least 20 pack years (packs per day x number of years you smoked); (5) You get a written order from a healthcare provider.  Glaucoma Screening: covered annually if you're considered high risk: (1) You have diabetes OR (2) Family history of glaucoma OR (3)  aged 50 and older OR (4)  American aged 65 and older  Osteoporosis Screening: covered every 2 years if you meet one of the following conditions: (1) Have a vertebral abnormality; (2) On glucocorticoid therapy for more than 3 months; (3) Have primary hyperparathyroidism; (4) On osteoporosis medications and need to assess response to drug therapy.  HIV Screening: covered annually if you're between the age of 15-65. Also covered annually if you are younger than 15 and older than 65 with risk factors for HIV infection. For pregnant patients, it is covered up to 3 times per pregnancy.    Immunizations:  Immunization Recommendations   Influenza Vaccine Annual influenza vaccination during flu season is recommended for all persons aged >= 6 months who do not have contraindications   Pneumococcal Vaccine   * Pneumococcal conjugate vaccine = PCV13 (Prevnar 13), PCV15  (Vaxneuvance), PCV20 (Prevnar 20)  * Pneumococcal polysaccharide vaccine = PPSV23 (Pneumovax) Adults 19-63 yo with certain risk factors or if 65+ yo  If never received any pneumonia vaccine: recommend Prevnar 20 (PCV20)  Give PCV20 if previously received 1 dose of PCV13 or PPSV23   Hepatitis B Vaccine 3 dose series if at intermediate or high risk (ex: diabetes, end stage renal disease, liver disease)   Respiratory syncytial virus (RSV) Vaccine - COVERED BY MEDICARE PART D  * RSVPreF3 (Arexvy) CDC recommends that adults 60 years of age and older may receive a single dose of RSV vaccine using shared clinical decision-making (SCDM)   Tetanus (Td) Vaccine - COST NOT COVERED BY MEDICARE PART B Following completion of primary series, a booster dose should be given every 10 years to maintain immunity against tetanus. Td may also be given as tetanus wound prophylaxis.   Tdap Vaccine - COST NOT COVERED BY MEDICARE PART B Recommended at least once for all adults. For pregnant patients, recommended with each pregnancy.   Shingles Vaccine (Shingrix) - COST NOT COVERED BY MEDICARE PART B  2 shot series recommended in those 19 years and older who have or will have weakened immune systems or those 50 years and older     Health Maintenance Due:      Topic Date Due    Hepatitis C Screening  Completed     Immunizations Due:      Topic Date Due    Hepatitis A Vaccine (1 of 2 - Risk 2-dose series) Never done    Influenza Vaccine (1) 09/01/2024    COVID-19 Vaccine (6 - 2024-25 season) 09/01/2024     Advance Directives   What are advance directives?  Advance directives are legal documents that state your wishes and plans for medical care. These plans are made ahead of time in case you lose your ability to make decisions for yourself. Advance directives can apply to any medical decision, such as the treatments you want, and if you want to donate organs.   What are the types of advance directives?  There are many types of advance  directives, and each state has rules about how to use them. You may choose a combination of any of the following:  Living will:  This is a written record of the treatment you want. You can also choose which treatments you do not want, which to limit, and which to stop at a certain time. This includes surgery, medicine, IV fluid, and tube feedings.   Durable power of  for healthcare (DPAHC):  This is a written record that states who you want to make healthcare choices for you when you are unable to make them for yourself. This person, called a proxy, is usually a family member or a friend. You may choose more than 1 proxy.  Do not resuscitate (DNR) order:  A DNR order is used in case your heart stops beating or you stop breathing. It is a request not to have certain forms of treatment, such as CPR. A DNR order may be included in other types of advance directives.  Medical directive:  This covers the care that you want if you are in a coma, near death, or unable to make decisions for yourself. You can list the treatments you want for each condition. Treatment may include pain medicine, surgery, blood transfusions, dialysis, IV or tube feedings, and a ventilator (breathing machine).  Values history:  This document has questions about your views, beliefs, and how you feel and think about life. This information can help others choose the care that you would choose.  Why are advance directives important?  An advance directive helps you control your care. Although spoken wishes may be used, it is better to have your wishes written down. Spoken wishes can be misunderstood, or not followed. Treatments may be given even if you do not want them. An advance directive may make it easier for your family to make difficult choices about your care.   Cigarette Smoking and Your Health   Risks to your health if you smoke:  Nicotine and other chemicals found in tobacco damage every cell in your body. Even if you are a light  smoker, you have an increased risk for cancer, heart disease, and lung disease. If you are pregnant or have diabetes, smoking increases your risk for complications.   Benefits to your health if you stop smoking:   You decrease respiratory symptoms such as coughing, wheezing, and shortness of breath.   You reduce your risk for cancers of the lung, mouth, throat, kidney, bladder, pancreas, stomach, and cervix. If you already have cancer, you increase the benefits of chemotherapy. You also reduce your risk for cancer returning or a second cancer from developing.   You reduce your risk for heart disease, blood clots, heart attack, and stroke.   You reduce your risk for lung infections, and diseases such as pneumonia, asthma, chronic bronchitis, and emphysema.  Your circulation improves. More oxygen can be delivered to your body. If you have diabetes, you lower your risk for complications, such as kidney, artery, and eye diseases. You also lower your risk for nerve damage. Nerve damage can lead to amputations, poor vision, and blindness.  You improve your body's ability to heal and to fight infections.  For more information and support to stop smoking:   Smokefree.gov  Phone: 7- 699 - 386-2017  Web Address: www.smokefree.gov     © Copyright [employer] 2018 Information is for End User's use only and may not be sold, redistributed or otherwise used for commercial purposes. All illustrations and images included in CareNotes® are the copyrighted property of [employer].D.A[employer]M., Inc. or [employer]

## 2024-12-23 NOTE — ASSESSMENT & PLAN NOTE
Lab Results   Component Value Date    HGBA1C 7.9 (H) 10/30/2024     Try to cut back on sweet intake. Continue utilizing CGM. Continue current medications as prescribed. Continue statin for HLD.    Orders:  •  CBC; Future  •  Lipid Panel with Direct LDL reflex; Future  •  Comprehensive metabolic panel; Future  •  Hemoglobin A1C; Future  •  Albumin / creatinine urine ratio; Future

## 2024-12-23 NOTE — PROGRESS NOTES
Name: Steven MARTINO During      : 1940      MRN: [id number]  Encounter Provider: Zi Allen DO  Encounter Date: 2024   Encounter department: [facility] [place]    Assessment & Plan  Hyperlipidemia associated with type 2 diabetes mellitus  (HCC)    Lab Results   Component Value Date    HGBA1C 7.9 (H) 10/30/2024     Try to cut back on sweet intake. Continue utilizing CGM. Continue current medications as prescribed. Continue statin for HLD.    Orders:  •  CBC; Future  •  Lipid Panel with Direct LDL reflex; Future  •  Comprehensive metabolic panel; Future  •  Hemoglobin A1C; Future  •  Albumin / creatinine urine ratio; Future    Type 2 diabetes mellitus with peripheral artery disease (HCC)    Last arterial duplex reviewed. Tobacco cessation advised. Follow up with vascular surgery.       Essential hypertension    BP is stable and controlled. Continue current anti-HTN therapy.    Medicare annual wellness visit, subsequent        Tobacco Cessation Counseling: Tobacco cessation counseling was provided. The patient is sincerely urged to quit consumption of tobacco. He is not ready to quit tobacco.       Preventive health issues were discussed with patient, and age appropriate screening tests were ordered as noted in patient's After Visit Summary. Personalized health advice and appropriate referrals for health education or preventive services given if needed, as noted in patient's After Visit Summary.    History of Present Illness     Steven presents for follow up and medicare wellness visit. Has a mild cold for the past week.       Patient Care Team:  Zi Allen DO as PCP - General (Internal Medicine)  Luis Gilliland MD as Consulting Physician (Neurology)  Vikas Meehan MD (Urology)    Review of Systems   Constitutional: Negative.  Negative for chills, fatigue and fever.   HENT:  Positive for rhinorrhea.    Respiratory:  Positive for cough. Negative for choking, shortness of breath  and wheezing.    Cardiovascular: Negative.    Gastrointestinal: Negative.    Musculoskeletal:  Positive for arthralgias and back pain.   Neurological:  Positive for numbness.     Medical History Reviewed by provider this encounter:  Tobacco  Allergies  Meds  Problems  Med Hx  Surg Hx  Fam Hx       Annual Wellness Visit Questionnaire   Steven is here for his Subsequent Wellness visit. Last Medicare Wellness visit information reviewed, patient interviewed and updates made to the record today.      Health Risk Assessment:   Patient rates overall health as fair. Patient feels that their physical health rating is same. Patient is satisfied with their life. Eyesight was rated as slightly worse. Hearing was rated as same. Patient feels that their emotional and mental health rating is same. Patients states they are never, rarely angry. Patient states they are always unusually tired/fatigued. Pain experienced in the last 7 days has been none. Patient states that he has experienced no weight loss or gain in last 6 months.     Fall Risk Screening:   In the past year, patient has experienced: no history of falling in past year      Home Safety:  Patient does not have trouble with stairs inside or outside of their home. Patient has working smoke alarms and has working carbon monoxide detector. Home safety hazards include: none.     Nutrition:   Current diet is Regular.     Medications:   Patient is currently taking over-the-counter supplements. OTC medications include: see medication list. Patient is able to manage medications.     Activities of Daily Living (ADLs)/Instrumental Activities of Daily Living (IADLs):   Walk and transfer into and out of bed and chair?: Yes  Dress and groom yourself?: Yes    Bathe or shower yourself?: Yes    Feed yourself? Yes  Do your laundry/housekeeping?: No  Manage your money, pay your bills and track your expenses?: Yes  Make your own meals?: No    Do your own shopping?: Yes    ADL  comments: Patient has a nurse aid who helps him out with cooking, shopping and cleaning    Previous Hospitalizations:   Any hospitalizations or ED visits within the last 12 months?: No      Advance Care Planning:   Living will: No    Durable POA for healthcare: No    Advanced directive: No    ACP document given: Yes      Cognitive Screening:   Provider or family/friend/caregiver concerned regarding cognition?: No    PREVENTIVE SCREENINGS      Cardiovascular Screening:    General: Screening Not Indicated and History Lipid Disorder      Diabetes Screening:     General: Screening Not Indicated and History Diabetes      Colorectal Cancer Screening:     General: Screening Not Indicated      Prostate Cancer Screening:    General: History Prostate Cancer and Screening Not Indicated      Osteoporosis Screening:    General: Screening Not Indicated      Abdominal Aortic Aneurysm (AAA) Screening:    Risk factors include: tobacco use        General: Screening Not Indicated      Lung Cancer Screening:     General: Screening Not Indicated and History Lung Cancer      Hepatitis C Screening:    General: Screening Current    Screening, Brief Intervention, and Referral to Treatment (SBIRT)    Screening  Typical number of drinks in a day: 0  Typical number of drinks in a week: 0  Interpretation: Low risk drinking behavior.    AUDIT-C Screenin) How often did you have a drink containing alcohol in the past year? never  2) How many drinks did you have on a typical day when you were drinking in the past year? 0  3) How often did you have 6 or more drinks on one occasion in the past year? never    AUDIT-C Score: 0  Interpretation: Score 0-3 (male): Negative screen for alcohol misuse    Single Item Drug Screening:  How often have you used an illegal drug (including marijuana) or a prescription medication for non-medical reasons in the past year? never    Single Item Drug Screen Score: 0  Interpretation: Negative screen for possible  "drug use disorder    Brief Intervention  Alcohol & drug use screenings were reviewed. No concerns regarding substance use disorder identified.     Other Counseling Topics:   Car/seat belt/driving safety, skin self-exam, sunscreen and regular weightbearing exercise.     Social Drivers of Health     Financial Resource Strain: Low Risk  (12/12/2023)    Overall Financial Resource Strain (CARDIA)    • Difficulty of Paying Living Expenses: Not very hard   Food Insecurity: No Food Insecurity (12/23/2024)    Hunger Vital Sign    • Worried About Running Out of Food in the Last Year: Never true    • Ran Out of Food in the Last Year: Never true   Transportation Needs: No Transportation Needs (12/23/2024)    PRAPARE - Transportation    • Lack of Transportation (Medical): No    • Lack of Transportation (Non-Medical): No   Housing Stability: Low Risk  (12/23/2024)    Housing Stability Vital Sign    • Unable to Pay for Housing in the Last Year: No    • Number of Times Moved in the Last Year: 0    • Homeless in the Last Year: No   Utilities: Not At Risk (12/23/2024)    [facility] Utilities    • Threatened with loss of utilities: No     Objective   /60   Pulse 95   Temp 97.5 °F (36.4 °C) (Tympanic)   Resp 20   Ht 5' 9\" (1.753 m)   Wt 69.9 kg (154 lb)   SpO2 98%   BMI 22.74 kg/m²     Physical Exam  Constitutional:       General: He is not in acute distress.     Appearance: He is not ill-appearing.   Cardiovascular:      Rate and Rhythm: Normal rate and regular rhythm.      Heart sounds: No murmur heard.  Pulmonary:      Effort: Pulmonary effort is normal. No respiratory distress.      Breath sounds: No wheezing.   Abdominal:      General: Bowel sounds are normal. There is no distension.      Tenderness: There is no abdominal tenderness.   Musculoskeletal:      Right lower leg: No edema.      Left lower leg: No edema.   Neurological:      Mental Status: He is alert.       Zi Allen, DO   "

## 2024-12-27 NOTE — PROGRESS NOTES
Pain Medicine Follow-Up Note    Assessment:  1. Neuropathy    2. Spinal stenosis of lumbar region with neurogenic claudication    3. Chronic bilateral low back pain with bilateral sciatica        Plan:    New Medications Ordered This Visit   Medications   • pregabalin (LYRICA) 25 mg capsule     Sig: Take 1 capsule (25 mg total) by mouth daily with breakfast AND 2 capsules (50 mg total) daily at bedtime.     Dispense:  270 capsule     Refill:  0       My impressions and treatment recommendations were discussed in detail with the patient who verbalized understanding and had no further questions.      Patient returns to the office stating overall he feels his pain is very similar after initiating pregabalin 25 mg 3 times daily.  However patient states that he does find that he is able to tolerate the pain more which leads me to feel that the pain is duller.  Patient also reports no edema where when he was using gabapentin he had swelling in his right leg.  I recommend that the patient continue to take pregabalin 25 mg however I advised him to take 25 mg in the morning and 50 mg at bedtime since he does report daytime drowsiness.  Patient verbalized understanding.    Also discussed with the patient the MILD procedure patient at this time does not wish to have any type of surgical procedure.    [place] Prescription Drug Monitoring Program report was reviewed and was appropriate     Follow-up is planned in 3 months time or sooner as warranted.  Discharge instructions were provided. I personally saw and examined the patient and I agree with the above discussed plan of care.    History of Present Illness:    Steven Chandler is a 84 y.o. male who presents to [facility] for interval re-evaluation of the above stated pain complaints. The patient has a past medical and chronic pain history as outlined in the assessment section. He was last seen on 12/2/2024.    At today's visit patient states that  their pain symptoms are the same with a pain score of 7/10 on the verbal numeric pain scale.  The patient's pain is worse in the morning and at night.  The patient's pain is constant in nature.  And the quality of the patient's pain is described as pressure-like.  The patient's pain is located in the bilateral low back that radiates down his bilateral legs.  Patient states the amount of pain relief he is obtaining from his current pain relievers which consist of pregabalin is enough to make a difference in his life by reducing his pain symptoms down to a 3.  Patient states that the medication does cause some drowsiness, he states that if he is in a comfortable position he will fall asleep but if he is engaged doing something it does not cause him to fall asleep.    Other than as stated above, the patient denies any interval changes in medications, medical condition, mental condition, symptoms, or allergies since the last office visit.         Review of Systems:    Review of Systems   Respiratory:  Negative for shortness of breath.    Cardiovascular:  Negative for chest pain.   Gastrointestinal:  Negative for constipation, diarrhea, nausea and vomiting.   Musculoskeletal:  Negative for arthralgias, gait problem, joint swelling and myalgias.        Swelling hip to foot  Pain in Extremity    Skin:  Negative for rash.   Neurological:  Negative for dizziness, seizures and weakness.   All other systems reviewed and are negative.        Past Medical History:   Diagnosis Date   • BPH (benign prostatic hyperplasia)    • Cancer ([facility]) 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   • Cervical myelopathy ([facility]) 6/20/2019   • Chemical exposure     9/11/01- worked in [place].    • COPD (chronic obstructive pulmonary disease) (Formerly [facility])    • CPAP (continuous positive airway pressure) dependence    • DDD (degenerative disc disease), cervical    • Diabetes mellitus ([facility])    • Foraminal stenosis of cervical region    • Hyperlipidemia    • Hypertension    • SHIVANI  (obstructive sleep apnea)    • Overactive bladder    • Spinal cord compression (HCC) 5/8/2019    Added automatically from request for surgery [id number]       Past Surgical History:   Procedure Laterality Date   • ARTHROSCOPY KNEE Bilateral    • COLONOSCOPY     • LUNG SURGERY Left     scraping of left   • MN ARTHRD ANT INTERBODY DECOMPRESS CERVICAL BELW C2 Bilateral 6/18/2019    Procedure: C3/4  ACDF WITH  C4 HEMICORPECTOMY, C3-4 ANTERIOR INSTRUMENTATION AND FUSION (NEUROMONITORING);  Surgeon: Lucila Corral MD;  Location: [facility];  Service: Neurosurgery   • ROTATOR CUFF REPAIR Bilateral        Family History   Problem Relation Age of Onset   • No Known Problems Mother    • No Known Problems Father        Social History     Occupational History   • Occupation: retired   Tobacco Use   • Smoking status: Every Day     Types: Pipe     Passive exposure: Past   • Smokeless tobacco: Never   • Tobacco comments:     2-3  PIPES PER DAY    Vaping Use   • Vaping status: Never Used   Substance and Sexual Activity   • Alcohol use: Never   • Drug use: Never   • Sexual activity: Not Currently         Current Outpatient Medications:   •  acetaminophen (TYLENOL) 500 mg tablet, Take 500 mg by mouth every 6 (six) hours as needed for mild pain, Disp: , Rfl:   •  albuterol (2.5 mg/3 mL) 0.083 % nebulizer solution, Take 3 mL (2.5 mg total) by nebulization every 6 (six) hours as needed for wheezing or shortness of breath, Disp: 360 mL, Rfl: 1  •  aspirin (ECOTRIN LOW STRENGTH) 81 mg EC tablet, Take 1 tablet by mouth daily, Disp: 30 tablet, Rfl: 0  •  Blood Glucose Monitoring Suppl (OneTouch Verio) w/Device KIT, use as directed, Disp: 1 kit, Rfl: 0  •  chlorhexidine (PERIDEX) 0.12 % solution, RINSE MOUTH WITH 15 ML (1 CAPFUL) FOR 30 SECONDS IN THE MORNING A...  (REFER TO PRESCRIPTION NOTES)., Disp: , Rfl:   •  clindamycin (CLEOCIN) 300 MG capsule, Take 300 mg by mouth every 8 (eight) hours  , Disp: , Rfl:   •  Continuous Glucose Sensor  (FreeStyle Eben 14 Day Sensor) MISC, Check blood sugars continuously. Switch every 14 days., Disp: 6 each, Rfl: 3  •  DULoxetine (CYMBALTA) 60 mg delayed release capsule, TAKE 1 CAPSULE DAILY, Disp: 90 capsule, Rfl: 3  •  fluticasone-umeclidinium-vilanterol (Trelegy Ellipta) 100-62.5-25 mcg/actuation inhaler, USE 1 INHALATION DAILY (RINSE MOUTH AFTER USE), Disp: 360 blister, Rfl: 1  •  glimepiride (AMARYL) 2 mg tablet, Take 1 tablet (2 mg total) by mouth daily with breakfast, Disp: 100 tablet, Rfl: 3  •  glucose blood (FREESTYLE LITE) test strip, use 1 TEST STRIP to TEST BLOOD SUGAR twice a day, Disp: 100 strip, Rfl: 0  •  hydrochlorothiazide (HYDRODIURIL) 12.5 mg tablet, Take 1 tablet (12.5 mg total) by mouth daily, Disp: 90 tablet, Rfl: 1  •  ibuprofen (MOTRIN) 600 mg tablet, Take 600 mg by mouth every 6 (six) hours as needed, Disp: , Rfl:   •  Lancets (OneTouch Delica Plus [id number]) MISC, use 1 LANCET to TEST BLOOD SUGAR one to two times a day, Disp: 100 each, Rfl: 0  •  losartan (COZAAR) 100 MG tablet, TAKE 1 TABLET DAILY, Disp: 90 tablet, Rfl: 3  •  metFORMIN (GLUCOPHAGE) 500 mg tablet, Take 1 tablet (500 mg total) by mouth 2 (two) times a day with meals, Disp: 180 tablet, Rfl: 3  •  Multiple Vitamin (MULTI-VITAMINS PO), Take by mouth daily, Disp: , Rfl:   •  oxybutynin (DITROPAN-XL) 5 mg 24 hr tablet, take 1 tablet by mouth once daily, Disp: 90 tablet, Rfl: 3  •  polyvinyl alcohol (LIQUIFILM TEARS) 1.4 % ophthalmic solution, Administer 1 drop to both eyes 4 (four) times a day, Disp: 15 mL, Rfl: 0  •  pravastatin (PRAVACHOL) 40 mg tablet, Take 1 tablet (40 mg total) by mouth daily, Disp: 100 tablet, Rfl: 3  •  pregabalin (LYRICA) 25 mg capsule, Take 1 capsule (25 mg total) by mouth daily with breakfast AND 2 capsules (50 mg total) daily at bedtime., Disp: 270 capsule, Rfl: 0  •  pyridoxine (VITAMIN B6) 50 mg tablet, Take 50 mg by mouth daily, Disp: , Rfl:   •  sildenafil (REVATIO) 20 mg tablet, Take 1 tablet  "(20 mg total) by mouth if needed (Erectile dysfunction) Take 1-5 tablets as needed for erection on an empty stomach, Disp: 30 tablet, Rfl: 1  •  tamsulosin (FLOMAX) 0.4 mg, Take 1 capsule (0.4 mg total) by mouth 2 (two) times a week, Disp: 90 capsule, Rfl: 1  •  thiamine 50 MG tablet, Take 1 tablet (50 mg total) by mouth daily, Disp: 30 tablet, Rfl: 3  •  Tiadylt  MG 24 hr capsule, TAKE ONE CAPSULE BY MOUTH DAILY, Disp: 90 capsule, Rfl: 3  •  VITAMIN E PO, Take by mouth 268 mg 400 iu, Disp: , Rfl:     Allergies   Allergen Reactions   • Chloroquine Itching   • Qualaquin [Quinine] Itching       Physical Exam:    Ht 5' 9\" (1.753 m)   Wt 69.9 kg (154 lb)   BMI 22.74 kg/m²     Constitutional:normal, well developed, well nourished, alert, in no distress and non-toxic and no overt pain behavior.  Eyes:anicteric  HEENT:grossly intact  Neck:supple, symmetric, trachea midline and no masses   Pulmonary:even and unlabored  Cardiovascular:No edema or pitting edema present  Skin:Normal without rashes or lesions and well hydrated  Psychiatric:Mood and affect appropriate  Neurologic:Cranial Nerves II-XII grossly intact  Musculoskeletal:antalgic gait      This document was created using speech voice recognition software.   Grammatical errors, random word insertions, pronoun errors, and incomplete sentences are an occasional consequence of this system due to software limitations, ambient noise, and hardware issues.   Any formal questions or concerns about content, text, or information contained within the body of this dictation should be directly addressed to the provider for clarification.    "

## 2024-12-31 ENCOUNTER — OFFICE VISIT (OUTPATIENT)
Dept: PAIN MEDICINE | Facility: CLINIC | Age: 84
End: 2024-12-31
Payer: MEDICARE

## 2024-12-31 ENCOUNTER — TELEPHONE (OUTPATIENT)
Age: 84
End: 2024-12-31

## 2024-12-31 VITALS — BODY MASS INDEX: 22.81 KG/M2 | WEIGHT: 154 LBS | HEIGHT: 69 IN

## 2024-12-31 DIAGNOSIS — M54.41 CHRONIC BILATERAL LOW BACK PAIN WITH BILATERAL SCIATICA: ICD-10-CM

## 2024-12-31 DIAGNOSIS — G89.29 CHRONIC BILATERAL LOW BACK PAIN WITH BILATERAL SCIATICA: ICD-10-CM

## 2024-12-31 DIAGNOSIS — M54.42 CHRONIC BILATERAL LOW BACK PAIN WITH BILATERAL SCIATICA: ICD-10-CM

## 2024-12-31 DIAGNOSIS — M48.062 SPINAL STENOSIS OF LUMBAR REGION WITH NEUROGENIC CLAUDICATION: ICD-10-CM

## 2024-12-31 DIAGNOSIS — G62.9 NEUROPATHY: Primary | ICD-10-CM

## 2024-12-31 PROCEDURE — 99214 OFFICE O/P EST MOD 30 MIN: CPT

## 2024-12-31 RX ORDER — PREGABALIN 25 MG/1
CAPSULE ORAL
Qty: 270 CAPSULE | Refills: 0 | Status: SHIPPED | OUTPATIENT
Start: 2024-12-31

## 2024-12-31 NOTE — TELEPHONE ENCOUNTER
PT'S PRIMARY INSURANCE DOES NOT REQUIRE AUTH FOR PREGABALIN      [employer] DOES NOT COVER THIS MEDICATION

## 2025-01-03 DIAGNOSIS — E11.40 TYPE 2 DIABETES MELLITUS WITH DIABETIC NEUROPATHY, WITHOUT LONG-TERM CURRENT USE OF INSULIN (HCC): Chronic | ICD-10-CM

## 2025-02-14 ENCOUNTER — HOSPITAL ENCOUNTER (OUTPATIENT)
Dept: CT IMAGING | Facility: CLINIC | Age: 85
Discharge: HOME | End: 2025-02-14
Payer: MEDICARE

## 2025-02-14 DIAGNOSIS — Z85.118 HISTORY OF LUNG CANCER: ICD-10-CM

## 2025-02-14 DIAGNOSIS — R93.89 ABNORMAL CT OF THE CHEST: ICD-10-CM

## 2025-02-14 PROCEDURE — 71250 CT THORAX DX C-: CPT

## 2025-02-19 ENCOUNTER — OFFICE VISIT (OUTPATIENT)
Age: 85
End: 2025-02-19
Payer: MEDICARE

## 2025-02-19 VITALS
TEMPERATURE: 98.7 F | DIASTOLIC BLOOD PRESSURE: 78 MMHG | SYSTOLIC BLOOD PRESSURE: 132 MMHG | BODY MASS INDEX: 21.77 KG/M2 | OXYGEN SATURATION: 96 % | WEIGHT: 147 LBS | HEART RATE: 86 BPM | RESPIRATION RATE: 16 BRPM | HEIGHT: 69 IN

## 2025-02-19 DIAGNOSIS — J20.9 ACUTE TRACHEOBRONCHITIS: Primary | ICD-10-CM

## 2025-02-19 DIAGNOSIS — J43.2 CENTRILOBULAR EMPHYSEMA (HCC): Chronic | ICD-10-CM

## 2025-02-19 DIAGNOSIS — Z85.118 HISTORY OF LUNG CANCER: ICD-10-CM

## 2025-02-19 DIAGNOSIS — G47.33 OSA (OBSTRUCTIVE SLEEP APNEA): ICD-10-CM

## 2025-02-19 PROCEDURE — G2211 COMPLEX E/M VISIT ADD ON: HCPCS | Performed by: PHYSICIAN ASSISTANT

## 2025-02-19 PROCEDURE — 99214 OFFICE O/P EST MOD 30 MIN: CPT | Performed by: PHYSICIAN ASSISTANT

## 2025-02-19 RX ORDER — AMOXICILLIN 500 MG/1
500 CAPSULE ORAL EVERY 8 HOURS SCHEDULED
Qty: 15 CAPSULE | Refills: 0 | Status: SHIPPED | OUTPATIENT
Start: 2025-02-19 | End: 2025-02-24

## 2025-02-19 NOTE — ASSESSMENT & PLAN NOTE
Recent CT scan of the chest shows severe emphysema without findings of recurrent or metastatic disease in the chest, no suspicious pulmonary nodules  Can continue with annual CT scan

## 2025-02-19 NOTE — PROGRESS NOTES
Follow-up  Visit - Pulmonary Medicine   Name: Steven MARTINO During      : 1940      MRN: [id number]  Encounter Provider: Fred Maldonado PA-C  Encounter Date: 2025   Encounter department: [facility] PULMONARY [facility]  :  Assessment & Plan  Acute tracheobronchitis  Recent increased cough, CT scan done last week does not show any pneumonia, stable findings.  He has noted some slight improvement after starting amoxicillin that he had leftover from a dentist.  Will send additional amoxicillin to the pharmacy to complete a course of antibiotics  Orders:    amoxicillin (AMOXIL) 500 mg capsule; Take 1 capsule (500 mg total) by mouth every 8 (eight) hours for 5 days    Centrilobular emphysema (HCC)  Continue Trelegy daily, albuterol 4 times per day as needed         SHIVANI (obstructive sleep apnea)  Continue CPAP at night.  Reviewed compliance with monitor percent usage, mask is fitting well.  Residual AHI is 5.  He is benefiting from use of CPAP, will continue at the current settings, obtain new supplies every 3 to 6 months.       History of lung cancer  Recent CT scan of the chest shows severe emphysema without findings of recurrent or metastatic disease in the chest, no suspicious pulmonary nodules  Can continue with annual CT scan         Return in about 6 months (around 2025).    History of Present Illness   Steven Chandler is a 84 y.o. male with past medical history of chronic bronchitis/emphysema, lung cancer status post left upper lobectomy, lung nodules, prostate cancer status post radiation, SHIVANI on CPAP, hypertension, diabetes who presents for routine follow-up.  He is overall stable with his breathing, over the past couple of weeks has noted an increased cough.  He did take a couple of leftover amoxicillin that he had and has noted some improvement in the symptoms.  He is using his Trelegy daily with albuterol 4 times per day as needed.    Review of Systems   Constitutional:  Negative.    HENT: Negative.     Respiratory:  Positive for cough.    Cardiovascular: Negative.    Gastrointestinal: Negative.    Genitourinary: Negative.    Musculoskeletal: Negative.    Skin: Negative.    Allergic/Immunologic: Negative.    Neurological: Negative.    Psychiatric/Behavioral: Negative.         Aside from what is mentioned in the HPI, ROS is otherwise negative    Current Outpatient Medications on File Prior to Visit   Medication Sig Dispense Refill    acetaminophen (TYLENOL) 500 mg tablet Take 500 mg by mouth every 6 (six) hours as needed for mild pain      albuterol (2.5 mg/3 mL) 0.083 % nebulizer solution Take 3 mL (2.5 mg total) by nebulization every 6 (six) hours as needed for wheezing or shortness of breath 360 mL 1    aspirin (ECOTRIN LOW STRENGTH) 81 mg EC tablet Take 1 tablet by mouth daily 30 tablet 0    Blood Glucose Monitoring Suppl (OneTouch Verio) w/Device KIT use as directed 1 kit 0    chlorhexidine (PERIDEX) 0.12 % solution RINSE MOUTH WITH 15 ML (1 CAPFUL) FOR 30 SECONDS IN THE MORNING A...  (REFER TO PRESCRIPTION NOTES).      clindamycin (CLEOCIN) 300 MG capsule Take 300 mg by mouth every 8 (eight) hours        Continuous Glucose Sensor (FreeStyle Eben 14 Day Sensor) MISC Check blood sugars continuously. Switch every 14 days. 6 each 3    DULoxetine (CYMBALTA) 60 mg delayed release capsule TAKE 1 CAPSULE DAILY 90 capsule 3    fluticasone-umeclidinium-vilanterol (Trelegy Ellipta) 100-62.5-25 mcg/actuation inhaler USE 1 INHALATION DAILY (RINSE MOUTH AFTER USE) 360 blister 1    glimepiride (AMARYL) 2 mg tablet Take 1 tablet (2 mg total) by mouth daily with breakfast 100 tablet 3    glucose blood (FREESTYLE LITE) test strip use 1 TEST STRIP to TEST BLOOD SUGAR twice a day 100 strip 0    hydrochlorothiazide (HYDRODIURIL) 12.5 mg tablet Take 1 tablet (12.5 mg total) by mouth daily 90 tablet 1    ibuprofen (MOTRIN) 600 mg tablet Take 600 mg by mouth every 6 (six) hours as needed      Lancets  "(OneTouch Delica Plus [id number]) MISC use 1 LANCET to TEST BLOOD SUGAR one to two times a day 100 each 0    losartan (COZAAR) 100 MG tablet TAKE 1 TABLET DAILY 90 tablet 3    metFORMIN (GLUCOPHAGE) 500 mg tablet TAKE ONE TABLET BY MOUTH TWICE DAILY WITH MEALS 180 tablet 3    Multiple Vitamin (MULTI-VITAMINS PO) Take by mouth daily      oxybutynin (DITROPAN-XL) 5 mg 24 hr tablet take 1 tablet by mouth once daily 90 tablet 3    polyvinyl alcohol (LIQUIFILM TEARS) 1.4 % ophthalmic solution Administer 1 drop to both eyes 4 (four) times a day 15 mL 0    pravastatin (PRAVACHOL) 40 mg tablet Take 1 tablet (40 mg total) by mouth daily 100 tablet 3    pregabalin (LYRICA) 25 mg capsule Take 1 capsule (25 mg total) by mouth daily with breakfast AND 2 capsules (50 mg total) daily at bedtime. 270 capsule 0    pyridoxine (VITAMIN B6) 50 mg tablet Take 50 mg by mouth daily      sildenafil (REVATIO) 20 mg tablet Take 1 tablet (20 mg total) by mouth if needed (Erectile dysfunction) Take 1-5 tablets as needed for erection on an empty stomach 30 tablet 1    tamsulosin (FLOMAX) 0.4 mg Take 1 capsule (0.4 mg total) by mouth 2 (two) times a week 90 capsule 1    thiamine 50 MG tablet Take 1 tablet (50 mg total) by mouth daily 30 tablet 3    Tiadylt  MG 24 hr capsule TAKE ONE CAPSULE BY MOUTH DAILY 90 capsule 3    VITAMIN E PO Take by mouth 268 mg 400 iu       No current facility-administered medications on file prior to visit.         Medical History Reviewed by provider this encounter:     .    Objective   /78 (BP Location: Right arm, Patient Position: Sitting, Cuff Size: Large)   Pulse 86   Temp 98.7 °F (37.1 °C) (Oral)   Resp 16   Ht 5' 9\" (1.753 m)   Wt 66.7 kg (147 lb)   SpO2 96%   BMI 21.71 kg/m²     Physical Exam  Vitals reviewed.   Constitutional:       General: He is not in acute distress.     Appearance: Normal appearance. He is well-developed. He is not ill-appearing.   HENT:      Head: Normocephalic and " "atraumatic.      Mouth/Throat:      Pharynx: Oropharynx is clear.   Eyes:      Pupils: Pupils are equal, round, and reactive to light.   Cardiovascular:      Rate and Rhythm: Normal rate and regular rhythm.   Pulmonary:      Effort: Pulmonary effort is normal. No respiratory distress.      Breath sounds: Normal breath sounds. No decreased breath sounds, wheezing, rhonchi or rales.   Abdominal:      General: Abdomen is flat. There is no distension.   Musculoskeletal:         General: Normal range of motion.      Cervical back: Normal range of motion.      Right lower leg: No edema.      Left lower leg: No edema.   Skin:     General: Skin is warm and dry.      Findings: No rash.   Neurological:      Mental Status: He is alert and oriented to person, place, and time.   Psychiatric:         Mood and Affect: Mood normal.         Behavior: Behavior normal.           Diagnostic Data:  Labs: I personally reviewed the most recent laboratory data pertinent to today's visit.      Radiology results:  Radiology Results Review: I have reviewed the following images/report studies in PACS: CT chest without contrast  Result Date: 2/19/2025  Severe emphysema without findings of recurrent or metastatic disease in the chest. No suspicious pulmonary nodules. Resident: Janes Jackson I, the attending radiologist, have reviewed the images and agree with the final report above. Workstation performed: [id number]          PFT/spirometry results:  No results found for: \"FEV1\", \"FVC\", \"VOW1XVK\", \"TLC\", \"DLCO\"   PFT in 2023 showed mild obstruction with no appreciable response to the bronchodilator, normal lung volumes, severely decreased DLCO    Oximetry testing:  N/a    Other studies:      Fred Maldonado PA-C      "

## 2025-02-19 NOTE — ASSESSMENT & PLAN NOTE
Continue CPAP at night.  Reviewed compliance with monitor percent usage, mask is fitting well.  Residual AHI is 5.  He is benefiting from use of CPAP, will continue at the current settings, obtain new supplies every 3 to 6 months.

## 2025-02-21 ENCOUNTER — OFFICE VISIT (OUTPATIENT)
Age: 85
End: 2025-02-21
Payer: MEDICARE

## 2025-02-21 VITALS
BODY MASS INDEX: 21.66 KG/M2 | HEART RATE: 104 BPM | OXYGEN SATURATION: 97 % | TEMPERATURE: 95.2 F | WEIGHT: 146.2 LBS | HEIGHT: 69 IN

## 2025-02-21 DIAGNOSIS — T78.40XA ALLERGIC REACTION, INITIAL ENCOUNTER: Primary | ICD-10-CM

## 2025-02-21 DIAGNOSIS — E11.40 TYPE 2 DIABETES MELLITUS WITH DIABETIC NEUROPATHY, WITHOUT LONG-TERM CURRENT USE OF INSULIN (HCC): Chronic | ICD-10-CM

## 2025-02-21 PROBLEM — C61 PROSTATE CANCER (HCC): Status: ACTIVE | Noted: 2025-02-21

## 2025-02-21 PROCEDURE — G2211 COMPLEX E/M VISIT ADD ON: HCPCS

## 2025-02-21 PROCEDURE — 99214 OFFICE O/P EST MOD 30 MIN: CPT

## 2025-02-21 RX ORDER — PREDNISONE 20 MG/1
40 TABLET ORAL DAILY
Qty: 10 TABLET | Refills: 0 | Status: SHIPPED | OUTPATIENT
Start: 2025-02-21 | End: 2025-02-26

## 2025-02-21 NOTE — ASSESSMENT & PLAN NOTE
Lab Results   Component Value Date    HGBA1C 7.9 (H) 10/30/2024   Discontinue GCM   Continue to monitor with A1c every 3-4 months, has labs ordered for 6/2025  Continue current medications and lifestyle modifications, is not on insulin   Reassured he doesn't need to continue the free style aida

## 2025-02-21 NOTE — PROGRESS NOTES
Name: Steven MARTINO During      : 1940      MRN: [id number]  Encounter Provider: Darryl Aburto PA-C  Encounter Date: 2025   Encounter department: [facility]  :  Assessment & Plan  Allergic reaction, initial encounter  CGM was removed from the left arm today and discarded in the sharps container   Begin steroid for allergic reaction likely from the free style aida   Discontinue CGM   Tylenol 500mg every 4-6 hours for pain for 5-7 days   Red flag symptoms reviewed that warrant immediate evaluation   Orders:    predniSONE 20 mg tablet; Take 2 tablets (40 mg total) by mouth daily for 5 days    Type 2 diabetes mellitus with diabetic neuropathy, without long-term current use of insulin ([facility])    Lab Results   Component Value Date    HGBA1C 7.9 (H) 10/30/2024   Discontinue GCM   Continue to monitor with A1c every 3-4 months, has labs ordered for 2025  Continue current medications and lifestyle modifications, is not on insulin   Reassured he doesn't need to continue the free style aida                 History of Present Illness   Patient has swelling and rash on both arms after changing the cgm about 10am this morning. Arms are painful and have blisters, has never had this before   Followed proper cleaning precautions when changing   Denies lip/tongue swelling, sob, chest pain, lightheadedness.      Review of Systems   Constitutional:  Negative for activity change, diaphoresis, fatigue and fever.   HENT: Negative.     Eyes: Negative.    Respiratory: Negative.  Negative for cough, chest tightness and shortness of breath.    Cardiovascular:  Negative for chest pain, palpitations and leg swelling.   Gastrointestinal: Negative.    Endocrine: Negative.  Negative for polydipsia, polyphagia and polyuria.   Genitourinary: Negative.  Negative for dysuria, flank pain, frequency, hematuria and urgency.   Musculoskeletal: Negative.  Negative for back pain, joint swelling, neck pain and  "neck stiffness.   Skin:  Positive for color change and rash.   Neurological: Negative.  Negative for dizziness, weakness, light-headedness and headaches.   Hematological:  Negative for adenopathy.   Psychiatric/Behavioral: Negative.  Negative for confusion and sleep disturbance. The patient is not nervous/anxious.        Objective   Pulse 104   Temp (!) 95.2 °F (35.1 °C)   Ht 5' 9\" (1.753 m)   Wt 66.3 kg (146 lb 3.2 oz)   SpO2 97%   BMI 21.59 kg/m²      Physical Exam  Vitals and nursing note reviewed.   Constitutional:       General: He is not in acute distress.     Appearance: He is normal weight. He is not ill-appearing, toxic-appearing or diaphoretic.   HENT:      Head: Normocephalic and atraumatic.      Right Ear: External ear normal.      Left Ear: External ear normal.      Nose: Nose normal.      Mouth/Throat:      Mouth: Mucous membranes are moist. No oral lesions or angioedema.      Tongue: No lesions. Tongue does not deviate from midline.      Pharynx: No pharyngeal swelling, oropharyngeal exudate, posterior oropharyngeal erythema or uvula swelling.   Eyes:      General: No scleral icterus.        Right eye: No discharge.         Left eye: No discharge.      Extraocular Movements: Extraocular movements intact.      Conjunctiva/sclera: Conjunctivae normal.   Cardiovascular:      Rate and Rhythm: Normal rate and regular rhythm.   Pulmonary:      Effort: Pulmonary effort is normal. No respiratory distress.      Breath sounds: Normal breath sounds. No wheezing or rales.   Musculoskeletal:      Cervical back: Normal range of motion and neck supple.      Right lower leg: No edema.      Left lower leg: No edema.   Skin:     Findings: Rash (image below, epidermis pealed off with cgm removal, clear fluid discharge) present.   Neurological:      Mental Status: He is alert. Mental status is at baseline.   Psychiatric:         Mood and Affect: Mood normal.         Behavior: Behavior normal.         Thought " Content: Thought content normal.         Judgment: Judgment normal.

## 2025-02-25 NOTE — TELEPHONE ENCOUNTER
PATENT IS DUE FOR DISCHARGE FROM HOME HEALTH PHYSICAL THERAPY NEXT WEEK WITH ALL GOALS PLANNING TO BE MET     PATIENT HAS PROGRESSED WELL  JUST WANTED YOU TO KNOW normal/soft/nontender/nondistended/normal active bowel sounds negative

## 2025-02-26 ENCOUNTER — RESULTS FOLLOW-UP (OUTPATIENT)
Age: 85
End: 2025-02-26

## 2025-03-03 ENCOUNTER — HOSPITAL ENCOUNTER (OUTPATIENT)
Dept: ULTRASOUND IMAGING | Facility: HOSPITAL | Age: 85
Discharge: HOME | End: 2025-03-03
Payer: MEDICARE

## 2025-03-03 DIAGNOSIS — N52.9 ERECTILE DYSFUNCTION, UNSPECIFIED ERECTILE DYSFUNCTION TYPE: ICD-10-CM

## 2025-03-03 PROCEDURE — 76775 US EXAM ABDO BACK WALL LIM: CPT

## 2025-03-07 ENCOUNTER — NURSE TRIAGE (OUTPATIENT)
Age: 85
End: 2025-03-07

## 2025-03-07 NOTE — TELEPHONE ENCOUNTER
Regarding: SOB/COUGH  ----- Message from Luann GOSS sent at 3/7/2025 12:40 PM EST -----  Patient calling stating he saw the doctor on 2/19 he was given an antibiotic for his symptoms, but the medication has not helped him.  Currently patient is complaining of shortness of breath, junky cough with brownish colored phlegm

## 2025-03-07 NOTE — TELEPHONE ENCOUNTER
"FOLLOW UP: Scheduled urgent visit on 3/11 with Dr. Schreiber    REASON FOR CONVERSATION: Cough    SYMPTOMS: Productive cough    OTHER: Shortness of breath    DISPOSITION: See Within 3 Days in Office        Been going on for 7 weeks no pain, but the cough is continuing even after taking the medication. He feels like something is still stuck in his throat. The coughing spasm requires him to force and then causes shortness of breath. Coughing up light brown phlegm, said runny nose dried up 2-3 days ago      He states the amoxicillin did help him. Taking Trelegy and albuterol as needed. He has been using vicks nothing else for the cough. He states that he finished Mucinex and Tussinex and nothing is working after this 7 weeks      Pt scheduled for urgent visit on Monday. Asking if something can be sent to the [facility].           Reason for Disposition   Cough has been present for > 3 weeks    Answer Assessment - Initial Assessment Questions  1. ONSET: \"When did the cough begin?\"       PT states it's been going for 7 weeks  2. SEVERITY: \"How bad is the cough today?\"       Same as last time  3. SPUTUM: \"Describe the color of your sputum\" (e.g., none, dry cough; clear, white, yellow, green)      Phlegm comes out says very light brown  4. HEMOPTYSIS: \"Are you coughing up any blood?\" If Yes, ask: \"How much?\" (e.g., flecks, streaks, tablespoons, etc.)      None  5. DIFFICULTY BREATHING: \"Are you having difficulty breathing?\" If Yes, ask: \"How bad is it?\" (e.g., mild, moderate, severe)       Shortness of  breath mild to moderate  6. FEVER: \"Do you have a fever?\" If Yes, ask: \"What is your temperature, how was it measured, and when did it start?\"      none  7. CARDIAC HISTORY: \"Do you have any history of heart disease?\" (e.g., heart attack, congestive heart failure)       See chart  8. LUNG HISTORY: \"Do you have any history of lung disease?\"  (e.g., pulmonary embolus, asthma, emphysema)      See chart   9. PE RISK FACTORS: " "\"Do you have a history of blood clots?\" (or: recent major surgery, recent prolonged travel, bedridden)      See chart  10. OTHER SYMPTOMS: \"Do you have any other symptoms?\" (e.g., runny nose, wheezing, chest pain)        None    Protocols used: Cough-Adult-OH    "

## 2025-03-11 ENCOUNTER — OFFICE VISIT (OUTPATIENT)
Age: 85
End: 2025-03-11
Payer: MEDICARE

## 2025-03-11 VITALS
BODY MASS INDEX: 22.07 KG/M2 | SYSTOLIC BLOOD PRESSURE: 140 MMHG | HEART RATE: 77 BPM | OXYGEN SATURATION: 97 % | WEIGHT: 149 LBS | DIASTOLIC BLOOD PRESSURE: 76 MMHG | TEMPERATURE: 98 F | HEIGHT: 69 IN

## 2025-03-11 DIAGNOSIS — G47.33 OSA (OBSTRUCTIVE SLEEP APNEA): ICD-10-CM

## 2025-03-11 DIAGNOSIS — Z85.118 HISTORY OF LUNG CANCER: ICD-10-CM

## 2025-03-11 DIAGNOSIS — J42 CHRONIC BRONCHITIS, UNSPECIFIED CHRONIC BRONCHITIS TYPE (HCC): Primary | ICD-10-CM

## 2025-03-11 PROCEDURE — 99214 OFFICE O/P EST MOD 30 MIN: CPT | Performed by: INTERNAL MEDICINE

## 2025-03-11 RX ORDER — CEFDINIR 300 MG/1
300 CAPSULE ORAL EVERY 12 HOURS SCHEDULED
Qty: 14 CAPSULE | Refills: 0 | Status: SHIPPED | OUTPATIENT
Start: 2025-03-11 | End: 2025-03-18

## 2025-03-11 RX ORDER — PREDNISONE 20 MG/1
40 TABLET ORAL DAILY
Qty: 10 TABLET | Refills: 0 | Status: SHIPPED | OUTPATIENT
Start: 2025-03-11 | End: 2025-03-16

## 2025-03-11 RX ORDER — CEFDINIR 300 MG/1
300 CAPSULE ORAL EVERY 12 HOURS SCHEDULED
Qty: 14 CAPSULE | Refills: 0 | Status: SHIPPED | OUTPATIENT
Start: 2025-03-11 | End: 2025-03-11 | Stop reason: SDUPTHER

## 2025-03-11 RX ORDER — PREDNISONE 20 MG/1
40 TABLET ORAL DAILY
Qty: 10 TABLET | Refills: 0 | Status: SHIPPED | OUTPATIENT
Start: 2025-03-11 | End: 2025-03-11 | Stop reason: SDUPTHER

## 2025-03-11 NOTE — ASSESSMENT & PLAN NOTE
Prior history of lung cancer status post left upper lobectomy and recent CT of the chest demonstrating stable left upper lobe 16 mm opacity likely scarring also negative on the PET scan recently done and go back for annual CT chest  The recent CT of the chest done in February 2025 has been stable without any evidence of recurrence no pneumonia

## 2025-03-11 NOTE — ASSESSMENT & PLAN NOTE
Orders:    predniSONE 20 mg tablet; Take 2 tablets (40 mg total) by mouth daily for 5 days    cefdinir (OMNICEF) 300 mg capsule; Take 1 capsule (300 mg total) by mouth every 12 (twelve) hours for 7 days  Chronic bronchitis complaining of cough for the past several weeks with green phlegm  Will send in Omnicef and prednisone for 5 days  Continue with Trelegy 1 puff daily  Continue with albuterol via the nebulizer 4 times daily as needed  Call if any worsening symptoms or going to the ER

## 2025-03-11 NOTE — PROGRESS NOTES
Follow-up  Visit - Pulmonary Medicine   Name: Steven MARTINO During      : 1940      MRN: [id number]  Encounter Provider: Kisha Schreiber MD  Encounter Date: 3/11/2025   Encounter department: [facility] PUL[facility]  :  Assessment & Plan  Chronic bronchitis, unspecified chronic bronchitis type (HCC)    Orders:    predniSONE 20 mg tablet; Take 2 tablets (40 mg total) by mouth daily for 5 days    cefdinir (OMNICEF) 300 mg capsule; Take 1 capsule (300 mg total) by mouth every 12 (twelve) hours for 7 days  Chronic bronchitis complaining of cough for the past several weeks with green phlegm  Will send in Omnicef and prednisone for 5 days  Continue with Trelegy 1 puff daily  Continue with albuterol via the nebulizer 4 times daily as needed  Call if any worsening symptoms or going to the ER  History of lung cancer  Prior history of lung cancer status post left upper lobectomy and recent CT of the chest demonstrating stable left upper lobe 16 mm opacity likely scarring also negative on the PET scan recently done and go back for annual CT chest  The recent CT of the chest done in 2025 has been stable without any evidence of recurrence no pneumonia       SHIVANI (obstructive sleep apnea)  Obstructive sleep apnea on CPAP continue with the current settings  Cleaning of the supplies and change of PAP supplies discussed         No follow-ups on file.    History of Present Illness   Steven Chandler is a 84 y.o. male who presents for follow-up patient with past medical history of chronic bronchitis/emphysema lung cancer status post left upper lobectomy and history of prostate cancer status postradiation SHIVANI on CPAP    Review of Systems   Constitutional:  Positive for fatigue.   HENT: Negative.     Eyes: Negative.    Respiratory:  Positive for cough, shortness of breath and wheezing.    Cardiovascular: Negative.    Gastrointestinal: Negative.    Endocrine: Negative.    Genitourinary: Negative.   "  Allergic/Immunologic: Negative.    Neurological: Negative.    Psychiatric/Behavioral: Negative.         Aside from what is mentioned in the HPI, ROS is otherwise negative    Medical History Reviewed by provider this encounter:     .     Medical History Reviewed by provider this encounter:     .    Objective   /76   Pulse 77   Temp 98 °F (36.7 °C)   Ht 5' 9\" (1.753 m)   Wt 67.6 kg (149 lb)   SpO2 97%   BMI 22.00 kg/m²     Physical Exam  Vitals and nursing note reviewed.   Constitutional:       Appearance: He is well-developed.   HENT:      Head: Normocephalic and atraumatic.   Eyes:      Conjunctiva/sclera: Conjunctivae normal.      Pupils: Pupils are equal, round, and reactive to light.   Neck:      Thyroid: No thyromegaly.      Vascular: No JVD.   Cardiovascular:      Rate and Rhythm: Normal rate and regular rhythm.      Heart sounds: Normal heart sounds. No murmur heard.     No friction rub. No gallop.   Pulmonary:      Effort: Pulmonary effort is normal. No respiratory distress.      Breath sounds: Wheezing present. No rales.   Chest:      Chest wall: No tenderness.   Musculoskeletal:         General: No tenderness or deformity. Normal range of motion.      Cervical back: Normal range of motion and neck supple.   Lymphadenopathy:      Cervical: No cervical adenopathy.   Skin:     General: Skin is warm and dry.   Neurological:      Mental Status: He is alert and oriented to person, place, and time.           Diagnostic Data:  Labs: I personally reviewed the most recent laboratory data pertinent to today's visit.      Radiology results:  Radiology Results Review: I personally reviewed the following image studies in PACS and associated radiology reports: CT chest. My interpretation of the radiology images/reports is: 2/14/2025.      PFT/spirometry results:  PFTs from 2023 demonstrating spirometry with mild obstructive airflow limitation with no appreciable response to the bronchodilator the lung " volumes are normal the DLCO is severely decreased            Kisha Schreiber MD

## 2025-03-21 ENCOUNTER — APPOINTMENT (OUTPATIENT)
Age: 85
End: 2025-03-21
Payer: MEDICARE

## 2025-03-21 DIAGNOSIS — E13.49 OTHER DIABETIC NEUROLOGICAL COMPLICATION ASSOCIATED WITH OTHER SPECIFIED DIABETES MELLITUS (HCC): ICD-10-CM

## 2025-03-21 LAB — VIT B12 SERPL-MCNC: 1322 PG/ML (ref 180–914)

## 2025-03-21 PROCEDURE — 82607 VITAMIN B-12: CPT

## 2025-03-21 PROCEDURE — 36415 COLL VENOUS BLD VENIPUNCTURE: CPT

## 2025-03-25 ENCOUNTER — OFFICE VISIT (OUTPATIENT)
Dept: UROLOGY | Facility: CLINIC | Age: 85
End: 2025-03-25
Payer: MEDICARE

## 2025-03-25 VITALS
SYSTOLIC BLOOD PRESSURE: 132 MMHG | BODY MASS INDEX: 21.77 KG/M2 | TEMPERATURE: 97.5 F | DIASTOLIC BLOOD PRESSURE: 76 MMHG | WEIGHT: 147 LBS | RESPIRATION RATE: 18 BRPM | HEIGHT: 69 IN | OXYGEN SATURATION: 96 % | HEART RATE: 86 BPM

## 2025-03-25 DIAGNOSIS — C61 PROSTATE CANCER (HCC): ICD-10-CM

## 2025-03-25 DIAGNOSIS — N40.1 BENIGN PROSTATIC HYPERPLASIA WITH LOWER URINARY TRACT SYMPTOMS, SYMPTOM DETAILS UNSPECIFIED: Primary | ICD-10-CM

## 2025-03-25 DIAGNOSIS — G95.9 MYELOPATHY (HCC): ICD-10-CM

## 2025-03-25 DIAGNOSIS — N52.9 ERECTILE DYSFUNCTION, UNSPECIFIED ERECTILE DYSFUNCTION TYPE: ICD-10-CM

## 2025-03-25 DIAGNOSIS — N28.1 RENAL CYST: ICD-10-CM

## 2025-03-25 PROCEDURE — 99213 OFFICE O/P EST LOW 20 MIN: CPT | Performed by: PHYSICIAN ASSISTANT

## 2025-03-25 RX ORDER — TROSPIUM CHLORIDE 20 MG/1
20 TABLET, FILM COATED ORAL 2 TIMES DAILY
Qty: 180 TABLET | Refills: 3 | Status: SHIPPED | OUTPATIENT
Start: 2025-03-25

## 2025-03-25 NOTE — ASSESSMENT & PLAN NOTE
S/p normal cystoscopy 2019  Continue Flomax, denies side effects  Trial of trospium   Orders:    trospium chloride (SANCTURA) 20 mg tablet; Take 1 tablet (20 mg total) by mouth 2 (two) times a day

## 2025-03-25 NOTE — PROGRESS NOTES
Pain Medicine Follow-Up Note    Assessment:  1. Spinal stenosis of lumbar region with neurogenic claudication    2. Neuropathy    3. Chronic bilateral low back pain, unspecified whether sciatica present        Plan:    New Medications Ordered This Visit   Medications   • pregabalin (LYRICA) 25 mg capsule     Sig: Take 1 capsule (25 mg total) by mouth daily with breakfast AND 2 capsules (50 mg total) daily at bedtime.     Dispense:  270 capsule     Refill:  0       My impressions and treatment recommendations were discussed in detail with the patient who verbalized understanding and had no further questions.      Patient returns to the office stating that his overall pain has improved greatly and he is able to do his activities of daily living more easily after being prescribed pregabalin 25 mg patient takes 1 capsule in the morning and 2 capsules at night.  Patient is currently reporting 70% reduction of his pain without any side effects, therefore recommend the patient continue pregabalin 25 mg capsule.    [place] Prescription Drug Monitoring Program report was reviewed and was appropriate     Follow-up is planned in 3 months time or sooner as warranted.  Discharge instructions were provided. I personally saw and examined the patient and I agree with the above discussed plan of care.    History of Present Illness:    Steven Chandler is a 84 y.o. male who presents to [facility] for interval re-evaluation of the above stated pain complaints. The patient has a past medical and chronic pain history as outlined in the assessment section. He was last seen on 12/31/2024.    At today's visit patient states that their pain symptoms are better with a pain score of 3/10 on the verbal numeric pain scale.  The patient's pain is worse at no particular time.  The patient's pain is occasional in nature.  And the quality of the patient's pain is described as pressure-like.  The patient's pain is  located in the bilateral low back.  Patient states he Brenda pain relief he is obtaining from his current pain relievers which consist of pregabalin 25 mg capsule patient takes 1 capsule in the morning and 2 capsules at night is enough to make a difference in his life by reducing his pain symptoms by 70%.    Other than as stated above, the patient denies any interval changes in medications, medical condition, mental condition, symptoms, or allergies since the last office visit.         Review of Systems:    Review of Systems   Respiratory:  Positive for shortness of breath.    Cardiovascular:  Negative for chest pain.   Gastrointestinal:  Negative for constipation, diarrhea, nausea and vomiting.   Musculoskeletal:  Positive for gait problem. Negative for arthralgias, joint swelling and myalgias.        Swelling ankle   Skin:  Negative for rash.   Neurological:  Negative for dizziness, seizures and weakness.   All other systems reviewed and are negative.        Past Medical History:   Diagnosis Date   • Arthritis    • BPH (benign prostatic hyperplasia)    • Cancer ([facility]) 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   • Cervical myelopathy (Formerly [facility]) 06/20/2019   • Chemical exposure     9/11/01- worked in [place].    • COPD (chronic obstructive pulmonary disease) (Formerly [facility])    • CPAP (continuous positive airway pressure) dependence    • DDD (degenerative disc disease), cervical    • Depression    • Diabetes mellitus ([facility])    • Foraminal stenosis of cervical region    • Hyperlipidemia    • Hypertension    • SHIVANI (obstructive sleep apnea)    • Osteoarthritis    • Overactive bladder    • Spinal cord compression ([facility]) 05/08/2019    Added automatically from request for surgery [id number]       Past Surgical History:   Procedure Laterality Date   • ARTHROSCOPY KNEE Bilateral    • COLONOSCOPY     • LUNG SURGERY Left     scraping of left   • NECK SURGERY     • ME ARTHRD ANT INTERBODY DECOMPRESS CERVICAL BELW C2 Bilateral 06/18/2019    Procedure: C3/4  ACDF WITH  C4  HEMICORPECTOMY, C3-4 ANTERIOR INSTRUMENTATION AND FUSION (NEUROMONITORING);  Surgeon: Lucila Corral MD;  Location: [facility];  Service: Neurosurgery   • ROTATOR CUFF REPAIR Bilateral        Family History   Problem Relation Age of Onset   • No Known Problems Mother    • No Known Problems Father    • Hypertension Paternal Grandfather        Social History     Occupational History   • Occupation: retired   Tobacco Use   • Smoking status: Every Day     Types: Pipe     Passive exposure: Past   • Smokeless tobacco: Never   • Tobacco comments:     2-3  PIPES PER DAY    Vaping Use   • Vaping status: Never Used   Substance and Sexual Activity   • Alcohol use: Never   • Drug use: Never   • Sexual activity: Not Currently         Current Outpatient Medications:   •  acetaminophen (TYLENOL) 500 mg tablet, Take 500 mg by mouth every 6 (six) hours as needed for mild pain, Disp: , Rfl:   •  albuterol (2.5 mg/3 mL) 0.083 % nebulizer solution, Take 3 mL (2.5 mg total) by nebulization every 6 (six) hours as needed for wheezing or shortness of breath, Disp: 360 mL, Rfl: 1  •  aspirin (ECOTRIN LOW STRENGTH) 81 mg EC tablet, Take 1 tablet by mouth daily, Disp: 30 tablet, Rfl: 0  •  chlorhexidine (PERIDEX) 0.12 % solution, RINSE MOUTH WITH 15 ML (1 CAPFUL) FOR 30 SECONDS IN THE MORNING A...  (REFER TO PRESCRIPTION NOTES)., Disp: , Rfl:   •  clindamycin (CLEOCIN) 300 MG capsule, Take 300 mg by mouth every 8 (eight) hours  , Disp: , Rfl:   •  DULoxetine (CYMBALTA) 60 mg delayed release capsule, TAKE 1 CAPSULE DAILY, Disp: 90 capsule, Rfl: 3  •  fluticasone-umeclidinium-vilanterol (Trelegy Ellipta) 100-62.5-25 mcg/actuation inhaler, USE 1 INHALATION DAILY (RINSE MOUTH AFTER USE), Disp: 360 blister, Rfl: 1  •  glimepiride (AMARYL) 2 mg tablet, Take 1 tablet (2 mg total) by mouth daily with breakfast, Disp: 100 tablet, Rfl: 3  •  hydrochlorothiazide (HYDRODIURIL) 12.5 mg tablet, Take 1 tablet (12.5 mg total) by mouth daily, Disp: 90 tablet,  "Rfl: 1  •  ibuprofen (MOTRIN) 600 mg tablet, Take 600 mg by mouth every 6 (six) hours as needed, Disp: , Rfl:   •  losartan (COZAAR) 100 MG tablet, TAKE 1 TABLET DAILY, Disp: 90 tablet, Rfl: 3  •  metFORMIN (GLUCOPHAGE) 500 mg tablet, TAKE ONE TABLET BY MOUTH TWICE DAILY WITH MEALS, Disp: 180 tablet, Rfl: 3  •  Multiple Vitamin (MULTI-VITAMINS PO), Take by mouth daily, Disp: , Rfl:   •  polyvinyl alcohol (LIQUIFILM TEARS) 1.4 % ophthalmic solution, Administer 1 drop to both eyes 4 (four) times a day, Disp: 15 mL, Rfl: 0  •  pravastatin (PRAVACHOL) 40 mg tablet, Take 1 tablet (40 mg total) by mouth daily, Disp: 100 tablet, Rfl: 3  •  pregabalin (LYRICA) 25 mg capsule, Take 1 capsule (25 mg total) by mouth daily with breakfast AND 2 capsules (50 mg total) daily at bedtime., Disp: 270 capsule, Rfl: 0  •  pyridoxine (VITAMIN B6) 50 mg tablet, Take 50 mg by mouth daily, Disp: , Rfl:   •  sildenafil (REVATIO) 20 mg tablet, Take 1 tablet (20 mg total) by mouth if needed (Erectile dysfunction) Take 1-5 tablets as needed for erection on an empty stomach, Disp: 30 tablet, Rfl: 1  •  tamsulosin (FLOMAX) 0.4 mg, Take 1 capsule (0.4 mg total) by mouth 2 (two) times a week, Disp: 90 capsule, Rfl: 1  •  thiamine 50 MG tablet, Take 1 tablet (50 mg total) by mouth daily, Disp: 30 tablet, Rfl: 3  •  Tiadylt  MG 24 hr capsule, TAKE ONE CAPSULE BY MOUTH DAILY, Disp: 90 capsule, Rfl: 3  •  trospium chloride (SANCTURA) 20 mg tablet, Take 1 tablet (20 mg total) by mouth 2 (two) times a day, Disp: 180 tablet, Rfl: 3  •  VITAMIN E PO, Take by mouth 268 mg 400 iu, Disp: , Rfl:     Allergies   Allergen Reactions   • Chloroquine Itching   • Qualaquin [Quinine] Itching       Physical Exam:    Ht 5' 9\" (1.753 m)   Wt 66.4 kg (146 lb 6.4 oz)   BMI 21.62 kg/m²     Constitutional:normal, well developed, well nourished, alert, in no distress and non-toxic and no overt pain behavior.  Eyes:anicteric  HEENT:grossly intact  Neck:supple, " symmetric, trachea midline and no masses   Pulmonary:even and unlabored  Cardiovascular:No edema or pitting edema present  Skin:Normal without rashes or lesions and well hydrated  Psychiatric:Mood and affect appropriate  Neurologic:Cranial Nerves II-XII grossly intact  Musculoskeletal:antalgic and stooped posture      This document was created using speech voice recognition software.   Grammatical errors, random word insertions, pronoun errors, and incomplete sentences are an occasional consequence of this system due to software limitations, ambient noise, and hardware issues.   Any formal questions or concerns about content, text, or information contained within the body of this dictation should be directly addressed to the provider for clarification.

## 2025-03-25 NOTE — ASSESSMENT & PLAN NOTE
Prostate cancer s/p brachytherapy (2013)   PSA 10/30/24 - 0.023  PSA in 1 year  Orders:    PSA Total, Diagnostic; Future

## 2025-03-25 NOTE — PROGRESS NOTES
Name: Steven Chandler      : 1940      MRN: [id number]  Encounter Provider: Lorena Vanessa PA-C  Encounter Date: 3/25/2025   Encounter department: [facility] UROLOGY [place]  :  Assessment & Plan  Prostate cancer (HCC)  Prostate cancer s/p brachytherapy ()   PSA 10/30/24 - 0.023  PSA in 1 year  Orders:    PSA Total, Diagnostic; Future    Benign prostatic hyperplasia with lower urinary tract symptoms, symptom details unspecified   S/p normal cystoscopy   Continue Flomax, denies side effects  Trial of trospium   Orders:    trospium chloride (SANCTURA) 20 mg tablet; Take 1 tablet (20 mg total) by mouth 2 (two) times a day    Myelopathy (HCC)  Follow with PCP       Erectile dysfunction, unspecified erectile dysfunction type  Continue sildenafil prn       Renal cyst  Bosniak 2F right upper pole renal cyst   US kidney and bladder 3/3/25 - Bilateral renal cysts without suspicious features. No significant interval change.   US in 1 year  Orders:    US kidney and bladder; Future        History of Present Illness   Steven Chandler is a 84 y.o. male who presents for follow up.    PSA remains undetectable at this time. Doing well without new complaints. Patient underwent negative cystoscopy in 2019. Continues to use flomax and oxybutynin therapy with benefit. Would like to trial different OAB medications. Denies side effects from medications. US showing stable Bosniak 2F right upper pole renal cysts. Denies new symptoms at this time.     Review of Systems   Constitutional:  Negative for activity change, appetite change, chills and fever.   HENT:  Negative for congestion and trouble swallowing.    Respiratory:  Negative for cough and shortness of breath.    Cardiovascular:  Negative for chest pain, palpitations and leg swelling.   Gastrointestinal:  Negative for abdominal pain, constipation, diarrhea, nausea and vomiting.   Genitourinary:  Negative for difficulty urinating, dysuria, flank pain,  "frequency, hematuria and urgency.   Musculoskeletal:  Negative for back pain and gait problem.   Skin:  Negative for wound.   Allergic/Immunologic: Negative for immunocompromised state.   Neurological:  Negative for dizziness and syncope.   Hematological:  Does not bruise/bleed easily.   Psychiatric/Behavioral:  Negative for confusion.    All other systems reviewed and are negative.         Objective   /76 (BP Location: Left arm, Patient Position: Sitting, Cuff Size: Standard)   Pulse 86   Temp 97.5 °F (36.4 °C) (Tympanic)   Resp 18   Ht 5' 9\" (1.753 m)   Wt 66.7 kg (147 lb)   SpO2 96%   BMI 21.71 kg/m²     Physical Exam  Constitutional:       Appearance: Normal appearance.   HENT:      Head: Normocephalic.      Nose: Nose normal.      Mouth/Throat:      Pharynx: Oropharynx is clear.   Eyes:      Extraocular Movements: Extraocular movements intact.      Pupils: Pupils are equal, round, and reactive to light.   Pulmonary:      Effort: Pulmonary effort is normal.   Musculoskeletal:         General: Normal range of motion.      Cervical back: Normal range of motion.   Skin:     General: Skin is warm.   Neurological:      General: No focal deficit present.      Mental Status: He is alert and oriented to person, place, and time. Mental status is at baseline.   Psychiatric:         Mood and Affect: Mood normal.         Behavior: Behavior normal.         Thought Content: Thought content normal.         Judgment: Judgment normal.           Results   Lab Results   Component Value Date    PSA 0.023 10/30/2024    PSA 0.02 03/23/2024    PSA <0.1 03/06/2023     Lab Results   Component Value Date    GLUCOSE 141 (H) 11/02/2017    CALCIUM 9.9 10/30/2024    K 3.9 10/30/2024    CO2 30 10/30/2024     10/30/2024    BUN 16 10/30/2024    CREATININE 0.80 10/30/2024     Lab Results   Component Value Date    WBC 6.51 06/18/2024    HGB 15.0 06/18/2024    HCT 46.8 06/18/2024    MCV 88 06/18/2024     06/18/2024 "       Office Urine Dip  No results found for this or any previous visit (from the past hour).

## 2025-03-26 ENCOUNTER — OFFICE VISIT (OUTPATIENT)
Dept: OBGYN CLINIC | Facility: CLINIC | Age: 85
End: 2025-03-26
Payer: MEDICARE

## 2025-03-26 ENCOUNTER — APPOINTMENT (OUTPATIENT)
Dept: RADIOLOGY | Facility: CLINIC | Age: 85
End: 2025-03-26
Payer: MEDICARE

## 2025-03-26 VITALS — WEIGHT: 147 LBS | BODY MASS INDEX: 21.77 KG/M2 | HEIGHT: 69 IN

## 2025-03-26 DIAGNOSIS — M54.41 LOW BACK PAIN WITH BILATERAL SCIATICA, UNSPECIFIED BACK PAIN LATERALITY, UNSPECIFIED CHRONICITY: Primary | ICD-10-CM

## 2025-03-26 DIAGNOSIS — M54.42 LOW BACK PAIN WITH BILATERAL SCIATICA, UNSPECIFIED BACK PAIN LATERALITY, UNSPECIFIED CHRONICITY: Primary | ICD-10-CM

## 2025-03-26 DIAGNOSIS — M48.062 SPINAL STENOSIS OF LUMBAR REGION WITH NEUROGENIC CLAUDICATION: ICD-10-CM

## 2025-03-26 PROCEDURE — 99214 OFFICE O/P EST MOD 30 MIN: CPT | Performed by: ORTHOPAEDIC SURGERY

## 2025-03-26 PROCEDURE — 72110 X-RAY EXAM L-2 SPINE 4/>VWS: CPT

## 2025-03-26 NOTE — ASSESSMENT & PLAN NOTE
Orders:    XR spine lumbar minimum 4 views non injury    Ambulatory Referral to Orthopedic Surgery

## 2025-03-26 NOTE — PROGRESS NOTES
"Name: Steven MARTINO During      : 1940       MRN: [id number]   Encounter Provider: Ludmila Mendes MD   Encounter Date: 25  Encounter department: [facility] ORTHOPEDIC SPINE SURGERY      History of Present Illness    Steven Chandler is a 84 y.o. male who presents for follow up evaluation of lumbar spine. The patient was referred to me by Dr. Leal, neurology. The patient was last seen by me on 2024 when he was referred to aqua therapy.  He tried aqua therapy about a year ago with no relief in symptoms. He recently saw Spine and Pain management who changed his medications patient taking lyrica. Overall, he is doing well with the change in medication. He had increased pain while in x-ray, but is doing better now at rest.      Diabetic: Yes   Nicotine use: Yes   BMI: Estimated body mass index is 21.71 kg/m² as calculated from the following:    Height as of this encounter: 5' 9\" (1.753 m).    Weight as of this encounter: 66.7 kg (147 lb).  Blood thinners: None      ALLERGIES:   Allergies   Allergen Reactions    Chloroquine Itching    Qualaquin [Quinine] Itching       MEDICATIONS:    Current Outpatient Medications:     acetaminophen (TYLENOL) 500 mg tablet, Take 500 mg by mouth every 6 (six) hours as needed for mild pain, Disp: , Rfl:     albuterol (2.5 mg/3 mL) 0.083 % nebulizer solution, Take 3 mL (2.5 mg total) by nebulization every 6 (six) hours as needed for wheezing or shortness of breath, Disp: 360 mL, Rfl: 1    aspirin (ECOTRIN LOW STRENGTH) 81 mg EC tablet, Take 1 tablet by mouth daily, Disp: 30 tablet, Rfl: 0    chlorhexidine (PERIDEX) 0.12 % solution, RINSE MOUTH WITH 15 ML (1 CAPFUL) FOR 30 SECONDS IN THE MORNING A...  (REFER TO PRESCRIPTION NOTES)., Disp: , Rfl:     clindamycin (CLEOCIN) 300 MG capsule, Take 300 mg by mouth every 8 (eight) hours  , Disp: , Rfl:     DULoxetine (CYMBALTA) 60 mg delayed release capsule, TAKE 1 CAPSULE DAILY, Disp: " 90 capsule, Rfl: 3    fluticasone-umeclidinium-vilanterol (Trelegy Ellipta) 100-62.5-25 mcg/actuation inhaler, USE 1 INHALATION DAILY (RINSE MOUTH AFTER USE), Disp: 360 blister, Rfl: 1    glimepiride (AMARYL) 2 mg tablet, Take 1 tablet (2 mg total) by mouth daily with breakfast, Disp: 100 tablet, Rfl: 3    hydrochlorothiazide (HYDRODIURIL) 12.5 mg tablet, Take 1 tablet (12.5 mg total) by mouth daily, Disp: 90 tablet, Rfl: 1    ibuprofen (MOTRIN) 600 mg tablet, Take 600 mg by mouth every 6 (six) hours as needed, Disp: , Rfl:     losartan (COZAAR) 100 MG tablet, TAKE 1 TABLET DAILY, Disp: 90 tablet, Rfl: 3    metFORMIN (GLUCOPHAGE) 500 mg tablet, TAKE ONE TABLET BY MOUTH TWICE DAILY WITH MEALS, Disp: 180 tablet, Rfl: 3    Multiple Vitamin (MULTI-VITAMINS PO), Take by mouth daily, Disp: , Rfl:     polyvinyl alcohol (LIQUIFILM TEARS) 1.4 % ophthalmic solution, Administer 1 drop to both eyes 4 (four) times a day, Disp: 15 mL, Rfl: 0    pravastatin (PRAVACHOL) 40 mg tablet, Take 1 tablet (40 mg total) by mouth daily, Disp: 100 tablet, Rfl: 3    pregabalin (LYRICA) 25 mg capsule, Take 1 capsule (25 mg total) by mouth daily with breakfast AND 2 capsules (50 mg total) daily at bedtime., Disp: 270 capsule, Rfl: 0    pyridoxine (VITAMIN B6) 50 mg tablet, Take 50 mg by mouth daily, Disp: , Rfl:     sildenafil (REVATIO) 20 mg tablet, Take 1 tablet (20 mg total) by mouth if needed (Erectile dysfunction) Take 1-5 tablets as needed for erection on an empty stomach, Disp: 30 tablet, Rfl: 1    tamsulosin (FLOMAX) 0.4 mg, Take 1 capsule (0.4 mg total) by mouth 2 (two) times a week, Disp: 90 capsule, Rfl: 1    thiamine 50 MG tablet, Take 1 tablet (50 mg total) by mouth daily, Disp: 30 tablet, Rfl: 3    Tiadylt  MG 24 hr capsule, TAKE ONE CAPSULE BY MOUTH DAILY, Disp: 90 capsule, Rfl: 3    trospium chloride (SANCTURA) 20 mg tablet, Take 1 tablet (20 mg total) by mouth 2 (two) times a day, Disp: 180 tablet, Rfl: 3    VITAMIN E PO,  Take by mouth 268 mg 400 iu, Disp: , Rfl:      PAST MEDICAL HISTORY:   Past Medical History:   Diagnosis Date    Arthritis     BPH (benign prostatic hyperplasia)     Cancer ([facility]) 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    Cervical myelopathy (HCC) 06/20/2019    Chemical exposure     9/11/01- worked in [employer].     COPD (chronic obstructive pulmonary disease) (HCC)     CPAP (continuous positive airway pressure) dependence     DDD (degenerative disc disease), cervical     Depression     Diabetes mellitus (HCC)     Foraminal stenosis of cervical region     Hyperlipidemia     Hypertension     SHIVANI (obstructive sleep apnea)     Osteoarthritis     Overactive bladder     Spinal cord compression (HCC) 05/08/2019    Added automatically from request for surgery [id number]       PAST SURGICAL HISTORY:  Past Surgical History:   Procedure Laterality Date    ARTHROSCOPY KNEE Bilateral     COLONOSCOPY      LUNG SURGERY Left     scraping of left    NECK SURGERY      MI ARTHRD ANT INTERBODY DECOMPRESS CERVICAL BELW C2 Bilateral 06/18/2019    Procedure: C3/4  ACDF WITH  C4 HEMICORPECTOMY, C3-4 ANTERIOR INSTRUMENTATION AND FUSION (NEUROMONITORING);  Surgeon: Lucila Corral MD;  Location: [facility];  Service: Neurosurgery    ROTATOR CUFF REPAIR Bilateral        SOCIAL HISTORY:  Social History     Tobacco Use   Smoking Status Every Day    Types: Pipe    Passive exposure: Past   Smokeless Tobacco Never   Tobacco Comments    2-3  PIPES PER DAY         Physical Exam    84 y.o. male sitting comfortably on exam chair in no apparent distress.   Unsteady gait  Patient ambulates with unsteady gait, leaning forward.   Normal sagittal and coronal balance.    able to walk on heels/toes.  able to balance on one leg.   No TTP over cervical, thoracic, or lumbar spine.     Sensation intact to light touch left L2 through S1 dermatomes.   Sensation intact to light touch right L2 through S1 dermatomes   The patient is well perfused distally.       RADIOGRAPHIC STUDIES:  Lumbar MRI  of 5/19/2023: Moderate multilevel lumbar degenerative disc disease.  Moderate to severe multilevel facet arthrosis.  Moderate lateral stenosis at L2-3 and to great extent at L3-4.  Severe lateral recess and moderate to severe central stenosis at L4-5.  Mild lateral stenosis at L5-S1.  No evidence of acute fracture.  Lumbar x-ray of 1/82024: Mild multilevel lumbar degenerative disc disease.  Degeneration most severe at L4-5 where moderate degenerative changes are present.  There is no observed instability.  Multilevel facet hypertrophy is present.    SPINE AND PAIN PROCEDURES:   Lumbar interlaminar midline epidural steroid injection under fluoroscopic guidance at the L3-4 level. 4/26/2022  Lumbar interlaminar midline epidural steroid injection under fluoroscopic guidance at the L3-4 level, 6/23/2022  Lumbar interlaminar midline epidural steroid injection at L5-S1, 7/27/2023        Assessment & Plan  Low back pain with bilateral sciatica, unspecified back pain laterality, unspecified chronicity    Orders:    XR spine lumbar minimum 4 views non injury    Spinal stenosis of lumbar region with neurogenic claudication    Orders:    XR spine lumbar minimum 4 views non injury    Ambulatory Referral to Orthopedic Surgery           PLAN:  84 y.o. male with lumbar spinal stenosis.  I last examined the patient in January 2024.  Since then he has been treated conservatively.  He is has not had any physical therapy.  He is not not interested in pool therapy and scared the water.    The patient's x-rays were reviewed today.  These do show evidence of degenerative changes however they are not significant for 84-year-old.  His symptoms appear to be associated with severe facet arthrosis hypertrophy and spinal stenosis.    The natural history of spinal stenosis was discussed with the patient today. The best plan of care for the patient is to continue following with spine and pain management. He was encouraged to do aqua therapy, but   the patient declined.  We did discuss role of exercise and land-based physical therapy we also discussed the role of injections.    I did explain to him that his spinal stenosis significant enough that if he gets to the point where he he is not functional, surgery can be discussed.  I explained to him that at the age of 84, there is increased risk with any type of surgery.  Lumbar decompression is a reasonable option if he gets to the point where he cannot walk and stand for up any period of time.  He is not interested in surgery at this time.    He is doing well overall today, and at this time, surgery is not indicated. The patient is still functional in his day to day activity.     I will see the patient back in 4-6 months for reevaluation.      Scribe Attestation      I,:  Madeline Ruffin am acting as a scribe while in the presence of the attending physician.:       I,:  Ludmila Mendes MD personally performed the services described in this documentation    as scribed in my presence.:

## 2025-03-27 ENCOUNTER — OFFICE VISIT (OUTPATIENT)
Dept: PAIN MEDICINE | Facility: CLINIC | Age: 85
End: 2025-03-27
Payer: MEDICARE

## 2025-03-27 VITALS — HEIGHT: 69 IN | BODY MASS INDEX: 21.68 KG/M2 | WEIGHT: 146.4 LBS

## 2025-03-27 DIAGNOSIS — G89.29 CHRONIC BILATERAL LOW BACK PAIN, UNSPECIFIED WHETHER SCIATICA PRESENT: ICD-10-CM

## 2025-03-27 DIAGNOSIS — M54.50 CHRONIC BILATERAL LOW BACK PAIN, UNSPECIFIED WHETHER SCIATICA PRESENT: ICD-10-CM

## 2025-03-27 DIAGNOSIS — M48.062 SPINAL STENOSIS OF LUMBAR REGION WITH NEUROGENIC CLAUDICATION: Primary | ICD-10-CM

## 2025-03-27 DIAGNOSIS — G62.9 NEUROPATHY: ICD-10-CM

## 2025-03-27 PROCEDURE — 99214 OFFICE O/P EST MOD 30 MIN: CPT

## 2025-03-27 RX ORDER — PREGABALIN 25 MG/1
CAPSULE ORAL
Qty: 270 CAPSULE | Refills: 0 | Status: SHIPPED | OUTPATIENT
Start: 2025-03-27

## 2025-04-03 ENCOUNTER — OFFICE VISIT (OUTPATIENT)
Age: 85
End: 2025-04-03
Payer: MEDICARE

## 2025-04-03 VITALS
SYSTOLIC BLOOD PRESSURE: 134 MMHG | DIASTOLIC BLOOD PRESSURE: 80 MMHG | HEART RATE: 87 BPM | WEIGHT: 143.8 LBS | OXYGEN SATURATION: 93 % | TEMPERATURE: 97.6 F | BODY MASS INDEX: 21.3 KG/M2 | HEIGHT: 69 IN

## 2025-04-03 DIAGNOSIS — G47.33 OSA (OBSTRUCTIVE SLEEP APNEA): ICD-10-CM

## 2025-04-03 DIAGNOSIS — Z85.118 HISTORY OF LUNG CANCER: ICD-10-CM

## 2025-04-03 DIAGNOSIS — J41.0 SIMPLE CHRONIC BRONCHITIS (HCC): Primary | ICD-10-CM

## 2025-04-03 PROCEDURE — 99214 OFFICE O/P EST MOD 30 MIN: CPT | Performed by: INTERNAL MEDICINE

## 2025-04-03 RX ORDER — PREDNISONE 20 MG/1
40 TABLET ORAL DAILY
Qty: 10 TABLET | Refills: 0 | Status: SHIPPED | OUTPATIENT
Start: 2025-04-03 | End: 2025-04-08

## 2025-04-03 NOTE — ASSESSMENT & PLAN NOTE
Chronic simple bronchitis with extensive smoking history still actively smoking does not want to quit recent exacerbation requiring antibiotics Omnicef as well as prednisone tapering down dose which she has completed and he states his better with his breathing but not back to his baseline yet still with occasional wheezing currently.  Discussed regarding smoking cessation which he does not want to quit  Continue with albuterol via the nebulizer 4 times daily as needed  Continue with Trelegy 1 puff daily  Will give prednisone 40 mg daily for 5 days    Orders:    predniSONE 20 mg tablet; Take 2 tablets (40 mg total) by mouth daily for 5 days

## 2025-04-03 NOTE — PROGRESS NOTES
Follow-up  Visit - Pulmonary Medicine   Name: Steven MARTINO During      : 1940      MRN: [id number]  Encounter Provider: Kisha Schreiber MD  Encounter Date: 4/3/2025   Encounter department: [facility] P[facility]  :  Assessment & Plan  Simple chronic bronchitis (HCC)  Chronic simple bronchitis with extensive smoking history still actively smoking does not want to quit recent exacerbation requiring antibiotics Omnicef as well as prednisone tapering down dose which she has completed and he states his better with his breathing but not back to his baseline yet still with occasional wheezing currently.  Discussed regarding smoking cessation which he does not want to quit  Continue with albuterol via the nebulizer 4 times daily as needed  Continue with Trelegy 1 puff daily  Will give prednisone 40 mg daily for 5 days    Orders:    predniSONE 20 mg tablet; Take 2 tablets (40 mg total) by mouth daily for 5 days    SHIVANI (obstructive sleep apnea)  Obstructive sleep apnea on CPAP continue with current settings cleaning of the supplies and change of PAP supplies discussed       History of lung cancer  History of lung cancer prior history of lung cancer status post left upper lobectomy and recent CT of the chest demonstrating stable left upper lobe 16 mm opacity likely scarring also negative on the PET scan recently done and will go back to annual CT chest recent CT to the chest done 2025 has been stable without any evidence of recurrence or pneumonia         No follow-ups on file.    History of Present Illness   Steven MARTINO During is a 84 y.o. male who presents for follow-up recent exacerbation after the antibiotic and the prednisone he states he does feel better but still not completely back to his normal baseline    Review of Systems   Constitutional:  Positive for fatigue.   HENT: Negative.     Eyes: Negative.    Respiratory:  Positive for cough and shortness of breath.    Cardiovascular:  "Negative.    Gastrointestinal: Negative.    Endocrine: Negative.    Genitourinary: Negative.    Musculoskeletal: Negative.    Allergic/Immunologic: Negative.    Neurological: Negative.    Psychiatric/Behavioral: Negative.         Aside from what is mentioned in the HPI, ROS is otherwise negative    Medical History Reviewed by provider this encounter:  Meds  Problems     .     Medical History Reviewed by provider this encounter:     .    Objective   /80   Pulse 87   Temp 97.6 °F (36.4 °C)   Ht 5' 9\" (1.753 m)   Wt 65.2 kg (143 lb 12.8 oz)   SpO2 93%   BMI 21.24 kg/m²     Physical Exam  Constitutional:       Appearance: He is well-developed.   HENT:      Head: Normocephalic and atraumatic.   Eyes:      Pupils: Pupils are equal, round, and reactive to light.   Cardiovascular:      Rate and Rhythm: Normal rate and regular rhythm.   Pulmonary:      Effort: Pulmonary effort is normal.      Breath sounds: Wheezing (occ expiratory rhonchi) present. No rales.   Chest:      Chest wall: No tenderness.   Musculoskeletal:         General: Normal range of motion.      Cervical back: Normal range of motion and neck supple.   Skin:     General: Skin is warm and dry.   Neurological:      Mental Status: He is alert and oriented to person, place, and time.   Psychiatric:         Behavior: Behavior normal.           Diagnostic Data:  Labs: I personally reviewed the most recent laboratory data pertinent to today's visit.      Radiology results:  Radiology Results Review: I personally reviewed the following image studies in PACS and associated radiology reports: CT chest. My interpretation of the radiology images/reports is: 2/14/2025.  Severe emphysematous changes no evidence of any metastatic lesions or recurrence in the lung      PFT/spirometry results:  PFTs in 2023 demonstrating spirometry with mild obstructive airflow limitation with no appreciable response to the bronchodilator the lung volumes are normal and the " DLCO is severely decreased    Kisha Schreiber MD

## 2025-04-03 NOTE — ASSESSMENT & PLAN NOTE
Obstructive sleep apnea on CPAP continue with current settings cleaning of the supplies and change of PAP supplies discussed

## 2025-04-03 NOTE — ASSESSMENT & PLAN NOTE
History of lung cancer prior history of lung cancer status post left upper lobectomy and recent CT of the chest demonstrating stable left upper lobe 16 mm opacity likely scarring also negative on the PET scan recently done and will go back to annual CT chest recent CT to the chest done February 14, 2025 has been stable without any evidence of recurrence or pneumonia

## 2025-05-28 ENCOUNTER — PROCEDURE VISIT (OUTPATIENT)
Dept: UROLOGY | Facility: CLINIC | Age: 85
End: 2025-05-28
Payer: MEDICARE

## 2025-05-28 ENCOUNTER — TELEPHONE (OUTPATIENT)
Age: 85
End: 2025-05-28

## 2025-05-28 VITALS
WEIGHT: 142 LBS | HEART RATE: 93 BPM | OXYGEN SATURATION: 98 % | DIASTOLIC BLOOD PRESSURE: 82 MMHG | BODY MASS INDEX: 21.03 KG/M2 | HEIGHT: 69 IN | TEMPERATURE: 98.4 F | SYSTOLIC BLOOD PRESSURE: 142 MMHG

## 2025-05-28 DIAGNOSIS — R33.9 URINARY RETENTION: Primary | ICD-10-CM

## 2025-05-28 DIAGNOSIS — R39.9 LOWER URINARY TRACT SYMPTOMS (LUTS): ICD-10-CM

## 2025-05-28 LAB — POST-VOID RESIDUAL VOLUME, ML POC: 196 ML

## 2025-05-28 PROCEDURE — 51798 US URINE CAPACITY MEASURE: CPT

## 2025-05-28 RX ORDER — OXYBUTYNIN CHLORIDE 5 MG/1
5 TABLET, EXTENDED RELEASE ORAL DAILY
Qty: 90 TABLET | Refills: 3 | Status: SHIPPED | OUTPATIENT
Start: 2025-05-28

## 2025-05-28 NOTE — PROGRESS NOTES
"5/28/2025  Steven MARTINO During is a 84 y.o. male  [id number]    Diagnosis:      Patient presents for follow up post void residual managed by our office    Plan:          Assessment:      Vitals:    05/28/25 1324   BP: 142/82   BP Location: Left arm   Patient Position: Sitting   Cuff Size: Standard   Pulse: 93   Temp: 98.4 °F (36.9 °C)   TempSrc: Temporal   SpO2: 98%   Weight: 64.4 kg (142 lb)   Height: 5' 9\" (1.753 m)         The patient called in earlier today stating that he has been experiencing burning, frequency, and urgency. I added him into the nurse schedule today for a spot PVR. Post void residual measured via PVR to be 196 mL. Patient reports that he has been feeling this way since starting the trospium. I stepped out and spoke to Lorena GONZALEZ. Her recommendation was to stop the trospium and to continue on the Flomax. The patient asked if he would be able to go back on the oxybutynin. Lorena was agreeable with this plan. Patient was agreeable with this plan. I reviewed adequate hydration and ER precautions.     Recent Results (from the past 6 hours)   POCT Measure PVR    Collection Time: 05/28/25  1:29 PM   Result Value Ref Range    POST-VOID RESIDUAL VOLUME, ML  mL         Andreina Zamorano RN     "

## 2025-05-28 NOTE — TELEPHONE ENCOUNTER
Patient called requested to be a seen tomorrow for frequency, urgency and only going a few drops at a time. Said he doesn't have any other side effect. I offered him an appointment for next week but he said he has an appointment scheduled for an u/s. I asked him if he wanted to come in after his u/s and he said he would just like to speak to Lorena to see what her recommendation is because he can't even go to [employer] or leave the house because of the frequency and urgency. Said that he might need his meds adjusted.    CB: [phone]

## 2025-05-30 ENCOUNTER — TELEPHONE (OUTPATIENT)
Age: 85
End: 2025-05-30

## 2025-05-30 NOTE — TELEPHONE ENCOUNTER
Pt called wanting to let Lorena Vanessa PA-C he appreciates her and that she's wonderful and caring.

## 2025-06-03 ENCOUNTER — HOSPITAL ENCOUNTER (OUTPATIENT)
Dept: ULTRASOUND IMAGING | Facility: HOSPITAL | Age: 85
Discharge: HOME | End: 2025-06-03
Attending: PHYSICIAN ASSISTANT
Payer: MEDICARE

## 2025-06-03 DIAGNOSIS — N28.1 RENAL CYST: ICD-10-CM

## 2025-06-03 PROCEDURE — 76775 US EXAM ABDO BACK WALL LIM: CPT

## 2025-06-06 NOTE — PROGRESS NOTES
HCC coding opportunities          Chart Reviewed number of suggestions sent to Provider: 3     Patients Insurance   E11.51, E11.59 and J44.9  Medicare Insurance: Medicare           Parents

## 2025-06-10 ENCOUNTER — OFFICE VISIT (OUTPATIENT)
Age: 85
End: 2025-06-10
Payer: MEDICARE

## 2025-06-10 VITALS
OXYGEN SATURATION: 97 % | WEIGHT: 145.4 LBS | HEART RATE: 73 BPM | BODY MASS INDEX: 21.53 KG/M2 | DIASTOLIC BLOOD PRESSURE: 76 MMHG | TEMPERATURE: 96.8 F | HEIGHT: 69 IN | RESPIRATION RATE: 18 BRPM | SYSTOLIC BLOOD PRESSURE: 124 MMHG

## 2025-06-10 DIAGNOSIS — N40.1 BPH WITH OBSTRUCTION/LOWER URINARY TRACT SYMPTOMS: Primary | ICD-10-CM

## 2025-06-10 DIAGNOSIS — N13.8 BPH WITH OBSTRUCTION/LOWER URINARY TRACT SYMPTOMS: Primary | ICD-10-CM

## 2025-06-10 DIAGNOSIS — E11.51 TYPE 2 DIABETES MELLITUS WITH PERIPHERAL ARTERY DISEASE (HCC): Chronic | ICD-10-CM

## 2025-06-10 PROCEDURE — 99214 OFFICE O/P EST MOD 30 MIN: CPT | Performed by: INTERNAL MEDICINE

## 2025-06-10 PROCEDURE — G2211 COMPLEX E/M VISIT ADD ON: HCPCS | Performed by: INTERNAL MEDICINE

## 2025-06-10 RX ORDER — LANCETS 33 GAUGE
EACH MISCELLANEOUS
Qty: 100 EACH | Refills: 3 | Status: SHIPPED | OUTPATIENT
Start: 2025-06-10

## 2025-06-10 RX ORDER — BLOOD SUGAR DIAGNOSTIC
STRIP MISCELLANEOUS
Qty: 100 EACH | Refills: 3 | Status: SHIPPED | OUTPATIENT
Start: 2025-06-10

## 2025-06-10 RX ORDER — MINOCYCLINE HYDROCHLORIDE 1 MG/1
POWDER ORAL
COMMUNITY
Start: 2025-05-06

## 2025-06-10 RX ORDER — FINASTERIDE 5 MG/1
5 TABLET, FILM COATED ORAL DAILY
Qty: 90 TABLET | Refills: 3 | Status: SHIPPED | OUTPATIENT
Start: 2025-06-10

## 2025-06-10 RX ORDER — BLOOD-GLUCOSE METER
KIT MISCELLANEOUS
Qty: 1 KIT | Refills: 0 | Status: SHIPPED | OUTPATIENT
Start: 2025-06-10

## 2025-06-10 NOTE — PROGRESS NOTES
Name: Steven MARTINO During      : 1940      MRN: [id number]  Encounter Provider: Zi Allen DO  Encounter Date: 6/10/2025   Encounter department: [facility] PRIMARY C[facility] [place]    :  Assessment & Plan  BPH with obstruction/lower urinary tract symptoms    His bigger symptoms are related to his bladder and not stomach issues. It is affecting his daily life. US in the past showed enlarged prostate that protrudes into bladder base. He is on flomax and oxybutynin. Recommend adding on finasteride and following with urology in at least 4 months. His recent PVR was elevated in the 190s. He had a recent US kidney and bladder which is still pending read by the radiologist.    Orders:  •  finasteride (PROSCAR) 5 mg tablet; Take 1 tablet (5 mg total) by mouth daily    Type 2 diabetes mellitus with peripheral artery disease (HCC)    Lab Results   Component Value Date    HGBA1C 7.9 (H) 10/30/2024     Did not tolerate CGM due to skin reaction. Will send in blood glucose meter for him to watch his sugars and make dietary adjustments.    Orders:  •  Blood Glucose Monitoring Suppl (OneTouch Verio Reflect) w/Device KIT; Check blood sugars once daily. Please substitute with appropriate alternative as covered by patient's insurance. Dx: E11.65  •  glucose blood (OneTouch Verio) test strip; Check blood sugars once daily. Please substitute with appropriate alternative as covered by patient's insurance. Dx: E11.65  •  OneTouch Delica Lancets 33G MISC; Check blood sugars once daily. Please substitute with appropriate alternative as covered by patient's insurance. Dx: E11.65         History of Present Illness     History of Present Illness  The patient presents for evaluation of urinary issues, sleep disturbance, and blood glucose monitoring.    He reports a history of urinary issues dating back to the 1980s, with a recent exacerbation over the past 2.5 weeks. He experiences frequent urination, necessitating 12 to 16 bathroom  "visits per day, often with incomplete bladder emptying. Additionally, he reports occasional incontinence, particularly when unable to reach the bathroom in time. He has been on Flomax for approximately 15 years and has recently resumed oxybutynin, although he is uncertain of its efficacy. He underwent testing for bladder and blood issues last week. He has a history of prostate cancer, diagnosed in the 1980s, which was treated with radiation therapy.    He has discontinued the use of his continuous glucose monitor, leaving him without a means to monitor his blood sugar levels.    He reports poor sleep quality, characterized by frequent awakenings at night and excessive daytime sleepiness. This issue has been progressively worsening over the past few years. He also reports a decrease in his energy levels.    He has been experiencing gastrointestinal symptoms, including gas and occasional diarrhea.    SOCIAL HISTORY  The patient admits to smoking.     Review of Systems - see HPI    Objective   /76 (BP Location: Left arm, Patient Position: Sitting, Cuff Size: Standard)   Pulse 73   Temp (!) 96.8 °F (36 °C) (Tympanic)   Resp 18   Ht 5' 9\" (1.753 m)   Wt 66 kg (145 lb 6.4 oz)   SpO2 97%   BMI 21.47 kg/m²     Physical Exam  Constitutional:       General: He is not in acute distress.     Appearance: He is not ill-appearing.     Cardiovascular:      Rate and Rhythm: Normal rate and regular rhythm.      Heart sounds: No murmur heard.  Pulmonary:      Effort: Pulmonary effort is normal. No respiratory distress.      Breath sounds: No wheezing.   Abdominal:      General: Bowel sounds are normal. There is no distension.      Tenderness: There is no abdominal tenderness.     Musculoskeletal:      Right lower leg: No edema.      Left lower leg: No edema.     Neurological:      Mental Status: He is alert.       Zi Nothstein, DO   "

## 2025-06-10 NOTE — ASSESSMENT & PLAN NOTE
Lab Results   Component Value Date    HGBA1C 7.9 (H) 10/30/2024     Did not tolerate CGM due to skin reaction. Will send in blood glucose meter for him to watch his sugars and make dietary adjustments.    Orders:  •  Blood Glucose Monitoring Suppl (OneTouch Verio Reflect) w/Device KIT; Check blood sugars once daily. Please substitute with appropriate alternative as covered by patient's insurance. Dx: E11.65  •  glucose blood (OneTouch Verio) test strip; Check blood sugars once daily. Please substitute with appropriate alternative as covered by patient's insurance. Dx: E11.65  •  OneTouch Delica Lancets 33G MISC; Check blood sugars once daily. Please substitute with appropriate alternative as covered by patient's insurance. Dx: E11.65

## 2025-06-16 ENCOUNTER — TELEPHONE (OUTPATIENT)
Age: 85
End: 2025-06-16

## 2025-06-17 DIAGNOSIS — E11.40 TYPE 2 DIABETES MELLITUS WITH DIABETIC NEUROPATHY, WITHOUT LONG-TERM CURRENT USE OF INSULIN (HCC): Chronic | ICD-10-CM

## 2025-06-17 RX ORDER — DULOXETIN HYDROCHLORIDE 60 MG/1
60 CAPSULE, DELAYED RELEASE ORAL DAILY
Qty: 90 CAPSULE | Refills: 3 | Status: SHIPPED | OUTPATIENT
Start: 2025-06-17

## 2025-06-23 ENCOUNTER — RA CDI HCC (OUTPATIENT)
Dept: OTHER | Facility: HOSPITAL | Age: 85
End: 2025-06-23

## 2025-06-23 NOTE — PROGRESS NOTES
HCC coding opportunities          Chart Reviewed number of suggestions sent to Provider: 3     Patients Insurance   J44.9, E11.59 and E11.69  Medicare Insurance: Medicare

## 2025-06-24 ENCOUNTER — OFFICE VISIT (OUTPATIENT)
Dept: NEUROLOGY | Facility: CLINIC | Age: 85
End: 2025-06-24
Payer: MEDICARE

## 2025-06-24 VITALS
WEIGHT: 142.4 LBS | OXYGEN SATURATION: 95 % | HEIGHT: 69 IN | BODY MASS INDEX: 21.09 KG/M2 | SYSTOLIC BLOOD PRESSURE: 120 MMHG | DIASTOLIC BLOOD PRESSURE: 70 MMHG | HEART RATE: 107 BPM

## 2025-06-24 DIAGNOSIS — M48.062 SPINAL STENOSIS OF LUMBAR REGION WITH NEUROGENIC CLAUDICATION: ICD-10-CM

## 2025-06-24 DIAGNOSIS — M47.812 CERVICAL SPONDYLOSIS: ICD-10-CM

## 2025-06-24 DIAGNOSIS — E11.40 TYPE 2 DIABETES MELLITUS WITH DIABETIC NEUROPATHY, WITHOUT LONG-TERM CURRENT USE OF INSULIN (HCC): Chronic | ICD-10-CM

## 2025-06-24 DIAGNOSIS — R26.89 BALANCE PROBLEM: Primary | ICD-10-CM

## 2025-06-24 PROCEDURE — 99214 OFFICE O/P EST MOD 30 MIN: CPT | Performed by: PSYCHIATRY & NEUROLOGY

## 2025-06-24 NOTE — PROGRESS NOTES
Neurology Ambulatory Visit  Name: Steven MARTINO During       : 1940       MRN: [id number]   Encounter Provider: Elton Leal MD   Encounter Date: 2025  Encounter department: [facility]      Steven MARTINO During is a 84 y.o. male.       Assessment & Plan  Balance problem    Orders:    Ambulatory Referral to Physical Therapy; Future  Patient's balance problem could be multifactorial given his neck and low back issues and neuropathy, he has not had any falls but I advised the patient to go for physical therapy and also use a cane to ambulate , he tells me he does have a cane at home MRI of the brain did not show evidence of any acute pathology,  Cervical spondylosis       Patient's neck pain is under control  Type 2 diabetes mellitus with diabetic neuropathy, without long-term current use of insulin (Formerly [facility])    Lab Results   Component Value Date    HGBA1C 7.9 (H) 10/30/2024          Management as per family physician he was advised to keep his hemoglobin A1c less than 6.0  Spinal stenosis of lumbar region with neurogenic claudication         Currently tells me that low back pain is under control he is in follow-up with the pain specialist and is on Lyrica, he was advised to take fall and safety precautions avoid smoking he has seen a spine surgeon in the past and was advised to go for aqua therapy, he was advised to follow-up with his vascular surgeon regarding his history of peripheral vascular disease ,to go to the hospital if has any worsening symptoms and call me otherwise to see me back in 6 months or sooner if needed and follow-up with the other physicians.    Subjective:  Chief Complaint   Patient presents with    Spinal stenosis of lumbar region with neurogenic claudicati    diabetic neuropathy       HISTORY OF PRESENT ILLNESS    Patient is here in follow-up for his history of neck and low back pain and diabetic polyneuropathy and balance issues since his last visit he tells me  "that his leg pain is under control, his low back pain is under control he does follow-up with a pain specialist and has been on Lyrica that seems to be helping him he is able to walk a couple of blocks without any issues he has seen a spine surgeon in the past and was advised to go for aqua therapy, he denies any bowel and bladder incontinence he still feels that he has unsteady gait but no dizziness he has not had any falls no focal weakness he continues to smoke his appetite is good weight has been stable he does follow-up with a urologist regarding his history of prostate CA and recently had a renal ultrasound, no other complaints.        Prior Work-up:   MRI: Brain without contrast from 5/3/2024 was reported as no acute infarction, mild cerebral and pontine chronic microangiopathic changes.       Past Medical History:    Past Medical History[1]  Past Surgical History[2]    Family History:  Family History[3]    Social History:  Social History[4]   Living situation:    Work:      Allergies:  Allergies[5]    Medications:  Current Medications[6]    Objective:  /70 (BP Location: Left arm, Patient Position: Sitting, Cuff Size: Large)   Pulse (!) 107   Ht 5' 9\" (1.753 m)   Wt 64.6 kg (142 lb 6.4 oz)   SpO2 95%   BMI 21.03 kg/m²      Labs  I have reviewed pertinent labs:  CBC:   Lab Results   Component Value Date    WBC 6.51 06/18/2024    RBC 5.30 06/18/2024    HGB 15.0 06/18/2024    HCT 46.8 06/18/2024    MCV 88 06/18/2024     06/18/2024    MCH 28.3 06/18/2024    MCHC 32.1 06/18/2024    RDW 14.0 06/18/2024    MPV 11.9 06/18/2024    NEUTROABS 3.65 06/18/2024     CMP:   Lab Results   Component Value Date    SODIUM 143 10/30/2024    K 3.9 10/30/2024     10/30/2024    CO2 30 10/30/2024    AGAP 7 10/30/2024    BUN 16 10/30/2024    CREATININE 0.80 10/30/2024    GLUC 145 (H) 06/18/2024    GLUF 153 (H) 10/30/2024    CALCIUM 9.9 10/30/2024    AST 16 06/18/2024    ALT 21 06/18/2024    ALKPHOS 74 " 06/18/2024    TP 6.5 06/18/2024    ALB 4.3 06/18/2024    TBILI 0.48 06/18/2024    EGFR 83 10/30/2024     Thyroid Studies:   Lab Results   Component Value Date    NCI0QLNUHOPF 0.769 06/18/2024     Vitamin D Level   Lab Results   Component Value Date    UOJF03RXMKMK 38.2 06/18/2024     Vitamin B12   Lab Results   Component Value Date    UKYIHBXV45 1,322 (H) 03/21/2025     Folate   Lab Results   Component Value Date    FOLATE >20.0 (H) 11/02/2017              General Exam  GENERAL APPEARANCE:  No distress, alert, interactive and cooperative.  CARDIOVASCULAR: Warm and well perfused  LUNGS: normal work of breathing on room air  EXTREMITIES: no peripheral edema     Neurologic Exam  MENTAL STATE:  Orientation was normal to time, place and person without aphasia or apraxia. Recent and remote memory was intact.  Attention span and concentration were normal. Language testing was normal for comprehension, repetition, expression, and naming.     CRANIAL NERVES:  CN 2       visual fields full to confrontation.  CN 3, 4, 6  Pupils round, 4 mm in diameter, equally reactive to light. Lids symmetric; no ptosis. EOMs normal alignment, full range. No nystagmus.  CN 5  Facial sensation intact bilaterally.  CN 7  Normal and symmetric facial strength.   CN 8  Hearing intact to finger rub bilaterally.  CN 9  Palate elevates symmetrically.  CN 11  Normal strength of shoulder shrug and neck turning.  CN 12  Tongue midline, with normal bulk and strength.     MOTOR:  Motor exam was normal. Muscle bulk and tone were normal in both upper and lower extremities. Muscle strength was 5/5 in distal and proximal muscles in both upper and lower extremities. No fasciculations, tremor or other abnormal movements were present.     REFLEXES:  Slightly brisk in both upper and lower extremities     SENSORY:  Decreased to light touch pinprick temperature sensation in a stocking distribution. Cortical function is intact.    COORDINATION:   Coordination exam  was normal. In both upper extremities, finger-nose-finger was intact without dysmetria or overshoot.      GAIT:  Gait was normal.  Ambulates with a slightly forward flexion.  Paraspinal muscle tenderness in the lumbar area.    ROS:  Review of Systems   Constitutional:  Negative for appetite change, fatigue and fever.   HENT: Negative.  Negative for hearing loss, tinnitus, trouble swallowing and voice change.    Eyes: Negative.  Negative for photophobia, pain and visual disturbance.   Respiratory: Negative.  Negative for shortness of breath.    Cardiovascular: Negative.  Negative for palpitations.   Gastrointestinal: Negative.  Negative for nausea and vomiting.   Endocrine: Negative.  Negative for cold intolerance.   Genitourinary: Negative.  Negative for dysuria, frequency and urgency.   Musculoskeletal:  Negative for back pain, gait problem, myalgias, neck pain and neck stiffness.   Skin: Negative.  Negative for rash.   Allergic/Immunologic: Negative.    Neurological:  Negative for dizziness, tremors, seizures, syncope, facial asymmetry, speech difficulty, weakness, light-headedness, numbness and headaches.   Hematological: Negative.  Does not bruise/bleed easily.   Psychiatric/Behavioral: Negative.  Negative for confusion, hallucinations and sleep disturbance.        I have reviewed the past medical history, surgical history, social and family history, current medications, allergies vitals, review of systems, and updated this information as appropriate today.    Administrative Statements   The total amount of time spent with the patient and on chart review and documentation was minutes. Issues addressed during this clinic visit included overall management, medication counseling or monitoring, and counseling and coordination of care.         Elton Leal MD                [1]   Past Medical History:  Diagnosis Date    Arthritis     BPH (benign prostatic hyperplasia)     Cancer (HCC) 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     Cervical myelopathy (HCC) 06/20/2019    Chemical exposure     9/11/01- worked in [employer].     COPD (chronic obstructive pulmonary disease) (HCC)     CPAP (continuous positive airway pressure) dependence     DDD (degenerative disc disease), cervical     Depression     Diabetes mellitus (HCC)     Foraminal stenosis of cervical region     Hyperlipidemia     Hypertension     SHIVANI (obstructive sleep apnea)     Osteoarthritis     Overactive bladder     Spinal cord compression (HCC) 05/08/2019    Added automatically from request for surgery [id number]   [2]   Past Surgical History:  Procedure Laterality Date    ARTHROSCOPY KNEE Bilateral     COLONOSCOPY      LUNG SURGERY Left     scraping of left    NECK SURGERY      OR ARTHRD ANT INTERBODY DECOMPRESS CERVICAL BELW C2 Bilateral 06/18/2019    Procedure: C3/4  ACDF WITH  C4 HEMICORPECTOMY, C3-4 ANTERIOR INSTRUMENTATION AND FUSION (NEUROMONITORING);  Surgeon: Lucila Corral MD;  Location: [facility];  Service: Neurosurgery    ROTATOR CUFF REPAIR Bilateral    [3]   Family History  Problem Relation Name Age of Onset    No Known Problems Mother      No Known Problems Father      Hypertension Paternal Grandfather Chong Michaud During    [4]   Social History  Tobacco Use    Smoking status: Every Day     Types: Pipe     Passive exposure: Past    Smokeless tobacco: Never    Tobacco comments:     2-3  PIPES PER DAY    Vaping Use    Vaping status: Never Used   Substance Use Topics    Alcohol use: Never    Drug use: Never   [5]   Allergies  Allergen Reactions    Chloroquine Itching    Qualaquin [Quinine] Itching   [6]   Current Outpatient Medications:     acetaminophen (TYLENOL) 500 mg tablet, Take 500 mg by mouth every 6 (six) hours as needed for mild pain, Disp: , Rfl:     albuterol (2.5 mg/3 mL) 0.083 % nebulizer solution, Take 3 mL (2.5 mg total) by nebulization every 6 (six) hours as needed for wheezing or shortness of breath, Disp: 360 mL, Rfl: 1    Arestin 1 MG MISC, , Disp: , Rfl:      aspirin (ECOTRIN LOW STRENGTH) 81 mg EC tablet, Take 1 tablet by mouth daily, Disp: 30 tablet, Rfl: 0    Blood Glucose Monitoring Suppl (OneTouch Verio Reflect) w/Device KIT, Check blood sugars once daily. Please substitute with appropriate alternative as covered by patient's insurance. Dx: E11.65, Disp: 1 kit, Rfl: 0    chlorhexidine (PERIDEX) 0.12 % solution, , Disp: , Rfl:     clindamycin (CLEOCIN) 300 MG capsule, Take 300 mg by mouth every 8 (eight) hours, Disp: , Rfl:     DULoxetine (CYMBALTA) 60 mg delayed release capsule, TAKE 1 CAPSULE DAILY, Disp: 90 capsule, Rfl: 3    finasteride (PROSCAR) 5 mg tablet, Take 1 tablet (5 mg total) by mouth daily, Disp: 90 tablet, Rfl: 3    fluticasone-umeclidinium-vilanterol (Trelegy Ellipta) 100-62.5-25 mcg/actuation inhaler, USE 1 INHALATION DAILY (RINSE MOUTH AFTER USE), Disp: 360 blister, Rfl: 1    glimepiride (AMARYL) 2 mg tablet, Take 1 tablet (2 mg total) by mouth daily with breakfast, Disp: 100 tablet, Rfl: 3    glucose blood (OneTouch Verio) test strip, Check blood sugars once daily. Please substitute with appropriate alternative as covered by patient's insurance. Dx: E11.65, Disp: 100 each, Rfl: 3    hydrochlorothiazide (HYDRODIURIL) 12.5 mg tablet, Take 1 tablet (12.5 mg total) by mouth daily, Disp: 90 tablet, Rfl: 1    ibuprofen (MOTRIN) 600 mg tablet, Take 600 mg by mouth every 6 (six) hours as needed, Disp: , Rfl:     losartan (COZAAR) 100 MG tablet, TAKE 1 TABLET DAILY, Disp: 90 tablet, Rfl: 3    metFORMIN (GLUCOPHAGE) 500 mg tablet, TAKE ONE TABLET BY MOUTH TWICE DAILY WITH MEALS, Disp: 180 tablet, Rfl: 3    Multiple Vitamin (MULTI-VITAMINS PO), Take by mouth in the morning., Disp: , Rfl:     OneTouch Delica Lancets 33G MISC, Check blood sugars once daily. Please substitute with appropriate alternative as covered by patient's insurance. Dx: E11.65, Disp: 100 each, Rfl: 3    oxybutynin (DITROPAN-XL) 5 mg 24 hr tablet, Take 1 tablet (5 mg total) by mouth daily,  Disp: 90 tablet, Rfl: 3    polyvinyl alcohol (LIQUIFILM TEARS) 1.4 % ophthalmic solution, Administer 1 drop to both eyes 4 (four) times a day, Disp: 15 mL, Rfl: 0    pravastatin (PRAVACHOL) 40 mg tablet, Take 1 tablet (40 mg total) by mouth daily, Disp: 100 tablet, Rfl: 3    pregabalin (LYRICA) 25 mg capsule, Take 1 capsule (25 mg total) by mouth daily with breakfast AND 2 capsules (50 mg total) daily at bedtime., Disp: 270 capsule, Rfl: 0    pyridoxine (VITAMIN B6) 50 mg tablet, Take 50 mg by mouth in the morning., Disp: , Rfl:     sildenafil (REVATIO) 20 mg tablet, Take 1 tablet (20 mg total) by mouth if needed (Erectile dysfunction) Take 1-5 tablets as needed for erection on an empty stomach, Disp: 30 tablet, Rfl: 1    tamsulosin (FLOMAX) 0.4 mg, Take 1 capsule (0.4 mg total) by mouth 2 (two) times a week, Disp: 90 capsule, Rfl: 1    thiamine 50 MG tablet, Take 1 tablet (50 mg total) by mouth daily, Disp: 30 tablet, Rfl: 3    Tiadylt  MG 24 hr capsule, TAKE ONE CAPSULE BY MOUTH DAILY, Disp: 90 capsule, Rfl: 3    VITAMIN E PO, Take by mouth 268 mg 400 iu, Disp: , Rfl:

## 2025-06-24 NOTE — ASSESSMENT & PLAN NOTE
Orders:    Ambulatory Referral to Physical Therapy; Future  Patient's balance problem could be multifactorial given his neck and low back issues and neuropathy, he has not had any falls but I advised the patient to go for physical therapy and also use a cane to ambulate , he tells me he does have a cane at home MRI of the brain did not show evidence of any acute pathology,

## 2025-06-24 NOTE — ASSESSMENT & PLAN NOTE
Lab Results   Component Value Date    HGBA1C 7.9 (H) 10/30/2024          Management as per family physician he was advised to keep his hemoglobin A1c less than 6.0

## 2025-06-25 NOTE — PROGRESS NOTES
Name: Steven MARTINO During      : 1940      MRN: [id number]  Encounter Provider: SONJA Lane  Encounter Date: 2025   Encounter department: [facility] [place]  :  Assessment & Plan  Spinal stenosis of lumbar region with neurogenic claudication  Neuropathy  Lumbar spondylosis  Diarrhea, unspecified type    Orders:  •  pregabalin (LYRICA) 25 mg capsule; Take 1 capsule (25 mg total) by mouth daily with breakfast AND 2 capsules (50 mg total) daily at bedtime.      Patient returns to the office stating that the pregabalin has been a life changer.  He states that it has significantly improved his quality of life by allowing him to get out of bed much easier and be active throughout the day.  Today the patient's main concern is bowel and bladder incontinence.  Patient states that he recently started buying depends due to having accidents.  He also states now he feels he is unable to do certain activities due to being fearful of having an accident.  He states he feels like he needs to urinate frequently and when he goes to the bathroom he always chooses to sit down because he never knows if he is going to have any gas or bowel movement on expectantly.  He states that he has 5-6 loose bowel movements daily and that the stool color is gray.  Patient states that this has been happening for about a month now.  He is also concerned about needing to urinate more than 15 times a day.  Advised the patient of my scope of practice, there is a small possibility that the diarrhea could be an adverse reaction to the pregabalin.  I advised the patient that he may reduce the pregabalin to see if the bowel movements change.  Patient to trial taking pregabalin 25 mg twice daily for at least 3 weeks if at that time his bowel movements have not changed if his pain is worse he may revert back to the previous dosing of 25 mg during the day and 50 mg at night.  Patient states he has an appointment with  "his primary care provider tomorrow.  And will discuss other possibilities regarding his bowel and bladder incontinence.    My impressions and treatment recommendations were discussed in detail with the patient who verbalized understanding and had no further questions.  Discharge instructions were provided. I personally saw and examined the patient and I agree with the above discussed plan of care.    History of Present Illness {?Quick Links Encounters * My Last Note * Last Note in Specialty * Snapshot * Since Last Visit * History :[id number]}    Steven Chandler is a 84 y.o. male who presents for a follow up office visit in regards to Back Pain (B/L lower back pain into buttocks and legs. Pt states pain is constant, dull-achy and stiff pain. Pt states the Lyrica is helping for his legs and neuropathy.).  Patient is currently using pregabalin 25 mg in the morning and 50 mg at bedtime patient denies any significant side effects using this medication however has complaints of diarrhea today.  I will reduce the patient down to pregabalin 25 mg twice daily temporarily to see if the diarrhea improves.    Review of Systems   Respiratory:  Negative for shortness of breath.    Cardiovascular:  Negative for chest pain.   Gastrointestinal:  Negative for constipation, diarrhea, nausea and vomiting.   Musculoskeletal:  Positive for arthralgias, back pain, gait problem and myalgias. Negative for joint swelling.   Skin:  Negative for rash.   Neurological:  Negative for dizziness, seizures and weakness.   All other systems reviewed and are negative.      Medical History Reviewed by provider this encounter:  Tobacco  Allergies  Meds  Problems  Med Hx  Surg Hx  Fam Hx     .     Objective {?Quick Links Trend Vitals * Enter New Vitals * Results Review * Timeline (Adult) * Labs * Imaging * Cardiology * Procedures * Lung Cancer Screening * Surgical eConsent :[id number]}  Ht 5' 9\" (1.753 m)   Wt 64.4 kg (142 lb)   BMI 20.97 kg/m²      Pain " Score:   5    Constitutional:normal, well developed, well nourished, alert, in no distress and non-toxic and no overt pain behavior.  Eyes:anicteric  HEENT:grossly intact  Neck:supple, symmetric, trachea midline and no masses   Pulmonary:even and unlabored  Cardiovascular:No edema or pitting edema present  Skin:Normal without rashes or lesions and well hydrated  Psychiatric:Mood and affect appropriate  Neurologic:Cranial Nerves II-XII grossly intact  Musculoskeletal: antalgic gait, shuffling gait, and stooped posture    This document was created using speech voice recognition software.   Grammatical errors, random word insertions, pronoun errors, and incomplete sentences are an occasional consequence of this system due to software limitations, ambient noise, and hardware issues.   Any formal questions or concerns about content, text, or information contained within the body of this dictation should be directly addressed to the provider for clarification.

## 2025-06-26 ENCOUNTER — OFFICE VISIT (OUTPATIENT)
Dept: PAIN MEDICINE | Facility: CLINIC | Age: 85
End: 2025-06-26
Payer: MEDICARE

## 2025-06-26 VITALS — WEIGHT: 142 LBS | HEIGHT: 69 IN | BODY MASS INDEX: 21.03 KG/M2

## 2025-06-26 DIAGNOSIS — R19.7 DIARRHEA, UNSPECIFIED TYPE: ICD-10-CM

## 2025-06-26 DIAGNOSIS — G62.9 NEUROPATHY: ICD-10-CM

## 2025-06-26 DIAGNOSIS — M48.062 SPINAL STENOSIS OF LUMBAR REGION WITH NEUROGENIC CLAUDICATION: Primary | ICD-10-CM

## 2025-06-26 DIAGNOSIS — M47.816 LUMBAR SPONDYLOSIS: ICD-10-CM

## 2025-06-26 PROCEDURE — 99214 OFFICE O/P EST MOD 30 MIN: CPT

## 2025-06-26 RX ORDER — PREGABALIN 25 MG/1
CAPSULE ORAL
Qty: 270 CAPSULE | Refills: 0 | Status: SHIPPED | OUTPATIENT
Start: 2025-06-26

## 2025-06-26 NOTE — PATIENT INSTRUCTIONS
Patient Education     Pregabalin (pre RANIE a abril)   Brand Names: US Lyrica; Lyrica CR   Brand Names: [place] ACH-Pregabalin; AG-Pregabalin; APO-Pregabalin; Auro-Pregabalin; DOM-Pregabalin; JAMP-Pregabalin; Lyrica; M-Pregabalin; Mar-Pregabalin; MINT-Pregabalin; TORI-Pregabalin; NRA-Pregabalin; PMS-Pregabalin; LORNE-Pregabalin; SANDOZ Pregabalin; TARO-Pregabalin; TEVA-Pregabalin   What is this drug used for?   It is used to help control certain kinds of seizures.  It is used to treat painful nerve diseases.  It is used to treat fibromyalgia.  It may be given to you for other reasons. Talk with the doctor.  What do I need to tell my doctor BEFORE I take this drug?   If you are allergic to this drug; any part of this drug; or any other drugs, foods, or substances. Tell your doctor about the allergy and what signs you had.  If you have kidney disease.  If you are breast-feeding. Do not breast-feed while you take this drug.  This is not a list of all drugs or health problems that interact with this drug.  Tell your doctor and pharmacist about all of your drugs (prescription or OTC, natural products, vitamins) and health problems. You must check to make sure that it is safe for you to take this drug with all of your drugs and health problems. Do not start, stop, or change the dose of any drug without checking with your doctor.  What are some things I need to know or do while I take this drug?   Tell all of your health care providers that you take this drug. This includes your doctors, nurses, pharmacists, and dentists.  Avoid driving and doing other tasks or actions that call for you to be alert or have clear eyesight until you see how this drug affects you.  If seizures are different or worse after starting this drug, talk with the doctor.  Do not stop taking this drug all of a sudden without calling your doctor. You may have a greater risk of side effects. If you need to stop this drug, you will want to slowly stop it as  ordered by your doctor.  Avoid drinking alcohol while taking this drug.  Talk with your doctor before you use marijuana, other forms of cannabis, or prescription or OTC drugs that may slow your actions.  A very bad reaction called angioedema has happened with this drug. Sometimes, this may be life-threatening. Signs may include swelling of the hands, face, lips, eyes, tongue, or throat; trouble breathing; trouble swallowing; or unusual hoarseness. Get medical help right away if you have any of these signs.  Severe breathing problems have happened with this drug in people taking certain other drugs (like opioid pain drugs). This has also happened in people who already have lung or breathing problems. The risk may also be greater in people who are older than 65. Sometimes, breathing problems have been deadly. If you have questions, talk with the doctor.  If you are 65 or older, use this drug with care. You could have more side effects.  Talk with your doctor if you plan to father a child. This drug made male animals less fertile and caused sperm changes. Birth defects also happened in the young of male animals treated with this drug. It is not known if these problems happen in humans.  Tell your doctor if you are pregnant or plan on getting pregnant. You will need to talk about the benefits and risks of using this drug while you are pregnant.  What are some side effects that I need to call my doctor about right away?   WARNING/CAUTION: Even though it may be rare, some people may have very bad and sometimes deadly side effects when taking a drug. Tell your doctor or get medical help right away if you have any of the following signs or symptoms that may be related to a very bad side effect:  Signs of an allergic reaction, like rash; hives; itching; red, swollen, blistered, or peeling skin with or without fever; wheezing; tightness in the chest or throat; trouble breathing, swallowing, or talking; unusual hoarseness; or  swelling of the mouth, face, lips, tongue, or throat.  Change in eyesight.  Muscle pain or weakness.  Change in balance.  Feeling confused.  Shakiness.  Trouble breathing, slow breathing, or shallow breathing.  Blue or gray color of the skin, lips, nail beds, fingers, or toes.  Memory problems or loss.  Shortness of breath, a big weight gain, or swelling in the arms or legs.  Fast or abnormal heartbeat.  Fever, chills, or sore throat.  Skin sores.  Any skin change.  Trouble speaking.  Trouble sleeping.  Trouble walking.  Feeling high (easy laughing and feeling good).  Twitching.  Get medical help right away if you feel very sleepy, very dizzy, or if you pass out. Caregivers or others need to get medical help right away if the patient does not respond, does not answer or react like normal, or will not wake up.  Like other drugs that may be used for seizures, this drug may rarely raise the risk of suicidal thoughts or actions. The risk may be higher in people who have had suicidal thoughts or actions in the past. Call the doctor right away about any new or worse signs like depression; feeling nervous, restless, or grouchy; panic attacks; or other changes in mood or behavior. Call the doctor right away if any suicidal thoughts or actions occur.  Low platelet counts have rarely happened with this drug. This may lead to a higher chance of bleeding. Call your doctor right away if you have any unexplained bruising or bleeding.  What are some other side effects of this drug?   All drugs may cause side effects. However, many people have no side effects or only have minor side effects. Call your doctor or get medical help if any of these side effects or any other side effects bother you or do not go away:  Feeling dizzy, sleepy, tired, or weak.  Weight gain.  Not able to focus.  Headache.  Dry mouth.  Constipation.  Increased appetite.  Upset stomach.  Joint pain.  Nose or throat irritation.  These are not all of the side  effects that may occur. If you have questions about side effects, call your doctor. Call your doctor for medical advice about side effects.  You may report side effects to your national health agency.  You may report side effects to the FDA at 1-623.524.6479. You may also report side effects at https://www.fda.gov/medwatch.  How is this drug best taken?   Use this drug as ordered by your doctor. Read all information given to you. Follow all instructions closely.  Extended-release tablets:   Take after the evening meal if taking once daily.  Swallow whole. Do not chew, break, or crush.  Keep taking this drug as you have been told by your doctor or other health care provider, even if you feel well.  Capsules and oral solution:   Take with or without food.  Keep taking this drug as you have been told by your doctor or other health care provider, even if you feel well.  Oral solution:   Measure liquid doses carefully. Use the measuring device that comes with this drug. If there is none, ask the pharmacist for a device to measure this drug.  What do I do if I miss a dose?   Extended-release tablets:   Take a missed dose just before bedtime after eating a snack or after the next day's morning meal.  If you miss taking the missed dose after the next day's morning meal, skip the missed dose and go back to your normal time.  Do not take 2 doses at the same time or extra doses.  Capsules and oral solution:   Take a missed dose as soon as you think about it.  If it is close to the time for your next dose, skip the missed dose and go back to your normal time.  Do not take 2 doses at the same time or extra doses.  How do I store and/or throw out this drug?   Store in the original container at room temperature.  Store in a dry place. Do not store in a bathroom.  Store this drug in a safe place where children cannot see or reach it, and where other people cannot get to it. A locked box or area may help keep this drug safe. Keep  all drugs away from pets.  Throw away unused or  drugs. Do not flush down a toilet or pour down a drain unless you are told to do so. Check with your pharmacist if you have questions about the best way to throw out drugs. There may be drug take-back programs in your area.  General drug facts   If your symptoms or health problems do not get better or if they become worse, call your doctor.  Do not share your drugs with others and do not take anyone else's drugs.  Some drugs may have another patient information leaflet. If you have any questions about this drug, please talk with your doctor, nurse, pharmacist, or other health care provider.  This drug comes with an extra patient fact sheet called a Medication Guide. Read it with care. Read it again each time this drug is refilled. If you have any questions about this drug, please talk with the doctor, pharmacist, or other health care provider.  If you think there has been an overdose, call your poison control center or get medical care right away. Be ready to tell or show what was taken, how much, and when it happened.  Consumer Information Use and Disclaimer   This generalized information is a limited summary of diagnosis, treatment, and/or medication information. It is not meant to be comprehensive and should be used as a tool to help the user understand and/or assess potential diagnostic and treatment options. It does NOT include all information about conditions, treatments, medications, side effects, or risks that may apply to a specific patient. It is not intended to be medical advice or a substitute for the medical advice, diagnosis, or treatment of a health care provider based on the health care provider's examination and assessment of a patient's specific and unique circumstances. Patients must speak with a health care provider for complete information about their health, medical questions, and treatment options, including any risks or benefits  regarding use of medications. This information does not endorse any treatments or medications as safe, effective, or approved for treating a specific patient. [employer] and its affiliates disclaim any warranty or liability relating to this information or the use thereof. The use of this information is governed by the Terms of Use, available at https://www.[employer].com/en/know/clinical-effectiveness-terms.  Last Reviewed Date   2023-12-21  Copyright   © 2024 [employer] and its affiliates and/or licensors. All rights reserved.

## 2025-06-26 NOTE — ASSESSMENT & PLAN NOTE
Orders:  •  pregabalin (LYRICA) 25 mg capsule; Take 1 capsule (25 mg total) by mouth daily with breakfast AND 2 capsules (50 mg total) daily at bedtime.

## 2025-06-27 ENCOUNTER — APPOINTMENT (OUTPATIENT)
Age: 85
End: 2025-06-27
Payer: MEDICARE

## 2025-06-27 ENCOUNTER — OFFICE VISIT (OUTPATIENT)
Age: 85
End: 2025-06-27
Payer: MEDICARE

## 2025-06-27 VITALS
BODY MASS INDEX: 21.24 KG/M2 | OXYGEN SATURATION: 98 % | HEIGHT: 69 IN | HEART RATE: 94 BPM | RESPIRATION RATE: 18 BRPM | DIASTOLIC BLOOD PRESSURE: 74 MMHG | WEIGHT: 143.4 LBS | SYSTOLIC BLOOD PRESSURE: 126 MMHG | TEMPERATURE: 97.2 F

## 2025-06-27 DIAGNOSIS — I10 ESSENTIAL HYPERTENSION: Chronic | ICD-10-CM

## 2025-06-27 DIAGNOSIS — R19.4 INCREASED BOWEL FREQUENCY: ICD-10-CM

## 2025-06-27 DIAGNOSIS — E11.40 TYPE 2 DIABETES MELLITUS WITH DIABETIC NEUROPATHY, WITHOUT LONG-TERM CURRENT USE OF INSULIN (HCC): Primary | ICD-10-CM

## 2025-06-27 DIAGNOSIS — E11.69 HYPERLIPIDEMIA ASSOCIATED WITH TYPE 2 DIABETES MELLITUS  (HCC): Chronic | ICD-10-CM

## 2025-06-27 DIAGNOSIS — E78.5 HYPERLIPIDEMIA ASSOCIATED WITH TYPE 2 DIABETES MELLITUS  (HCC): Chronic | ICD-10-CM

## 2025-06-27 PROBLEM — R53.83 OTHER FATIGUE: Status: RESOLVED | Noted: 2024-06-18 | Resolved: 2025-06-27

## 2025-06-27 PROBLEM — G95.9 MYELOPATHY (HCC): Status: RESOLVED | Noted: 2019-06-20 | Resolved: 2025-06-27

## 2025-06-27 LAB
ALBUMIN SERPL BCG-MCNC: 4 G/DL (ref 3.5–5)
ALP SERPL-CCNC: 90 U/L (ref 34–104)
ALT SERPL W P-5'-P-CCNC: 19 U/L (ref 7–52)
ANION GAP SERPL CALCULATED.3IONS-SCNC: 4 MMOL/L (ref 4–13)
AST SERPL W P-5'-P-CCNC: 14 U/L (ref 13–39)
BILIRUB SERPL-MCNC: 0.47 MG/DL (ref 0.2–1)
BUN SERPL-MCNC: 28 MG/DL (ref 5–25)
CALCIUM SERPL-MCNC: 10.3 MG/DL (ref 8.4–10.2)
CHLORIDE SERPL-SCNC: 112 MMOL/L (ref 96–108)
CHOLEST SERPL-MCNC: 77 MG/DL (ref ?–200)
CO2 SERPL-SCNC: 31 MMOL/L (ref 21–32)
CREAT SERPL-MCNC: 0.71 MG/DL (ref 0.6–1.3)
CREAT UR-MCNC: 100.8 MG/DL
ERYTHROCYTE [DISTWIDTH] IN BLOOD BY AUTOMATED COUNT: 14.2 % (ref 11.6–15.1)
GFR SERPL CREATININE-BSD FRML MDRD: 87 ML/MIN/1.73SQ M
GLUCOSE SERPL-MCNC: 197 MG/DL (ref 65–140)
HCT VFR BLD AUTO: 46.2 % (ref 36.5–49.3)
HDLC SERPL-MCNC: 33 MG/DL
HGB BLD-MCNC: 14.8 G/DL (ref 12–17)
LDLC SERPL CALC-MCNC: 36 MG/DL (ref 0–100)
MCH RBC QN AUTO: 28.1 PG (ref 26.8–34.3)
MCHC RBC AUTO-ENTMCNC: 32 G/DL (ref 31.4–37.4)
MCV RBC AUTO: 88 FL (ref 82–98)
MICROALBUMIN UR-MCNC: 77.4 MG/L
MICROALBUMIN/CREAT 24H UR: 77 MG/G CREATININE (ref 0–30)
PLATELET # BLD AUTO: 178 THOUSANDS/UL (ref 149–390)
PMV BLD AUTO: 12.1 FL (ref 8.9–12.7)
POTASSIUM SERPL-SCNC: 4.5 MMOL/L (ref 3.5–5.3)
PROT SERPL-MCNC: 6.3 G/DL (ref 6.4–8.4)
RBC # BLD AUTO: 5.27 MILLION/UL (ref 3.88–5.62)
SODIUM SERPL-SCNC: 147 MMOL/L (ref 135–147)
TRIGL SERPL-MCNC: 41 MG/DL (ref ?–150)
WBC # BLD AUTO: 5.74 THOUSAND/UL (ref 4.31–10.16)

## 2025-06-27 PROCEDURE — 36415 COLL VENOUS BLD VENIPUNCTURE: CPT

## 2025-06-27 PROCEDURE — 83036 HEMOGLOBIN GLYCOSYLATED A1C: CPT

## 2025-06-27 PROCEDURE — 80053 COMPREHEN METABOLIC PANEL: CPT

## 2025-06-27 PROCEDURE — 82043 UR ALBUMIN QUANTITATIVE: CPT

## 2025-06-27 PROCEDURE — 99214 OFFICE O/P EST MOD 30 MIN: CPT | Performed by: INTERNAL MEDICINE

## 2025-06-27 PROCEDURE — 85027 COMPLETE CBC AUTOMATED: CPT

## 2025-06-27 PROCEDURE — G2211 COMPLEX E/M VISIT ADD ON: HCPCS | Performed by: INTERNAL MEDICINE

## 2025-06-27 PROCEDURE — 82570 ASSAY OF URINE CREATININE: CPT

## 2025-06-27 PROCEDURE — 80061 LIPID PANEL: CPT

## 2025-06-27 RX ORDER — CHOLESTYRAMINE 4 G/9G
1 POWDER, FOR SUSPENSION ORAL
Qty: 270 PACKET | Refills: 3 | Status: SHIPPED | OUTPATIENT
Start: 2025-06-27

## 2025-06-27 NOTE — PROGRESS NOTES
Name: tSeven MARTINO During      : 1940      MRN: [id number]  Encounter Provider: Zi Allen DO  Encounter Date: 2025   Encounter department: [facility] PRIMARY [facility]    :  Assessment & Plan  Type 2 diabetes mellitus with diabetic neuropathy, without long-term current use of insulin ([facility])    Lab Results   Component Value Date    HGBA1C 7.9 (H) 10/30/2024     He is due to check updated labs. He will get them done today. Continue medication as prescribed. If A1c remains high, may consider medication adjustment. Continue pregabalin for neuropathy.    Increased bowel frequency    Patient struggles with multiple bowel and bladder movements per day. Bladder issues are due to BPH. Has a lot of anxiety over it and feels worried to leave his house. Trial of cholestyramine is recommended. If continues to have issues, will refer him to GI.    Orders:  •  cholestyramine (QUESTRAN) 4 g packet; Take 1 packet (4 g total) by mouth 3 (three) times a day with meals    Essential hypertension    Stable and controlled. Continue current anti-HTN therapy.       History of Present Illness     History of Present Illness  The patient presents for evaluation of frequent bowel movements and urination.    He reports experiencing frequent bowel movements, approximately 6 times daily. Additionally, he mentions a sensation of needing to urinate, which is often accompanied by uncertainty about whether the urge is for urination or defecation until he sits down. He has been using diapers due to these issues. He retains his gallbladder. Anxiety seems to exacerbate his symptoms, leading to an increased need to rush to the bathroom. However, he has noticed a decrease in this symptom recently. He recalls a time when he was able to hold his urine for extended periods without difficulty.    His urinary frequency is high, with up to 15 episodes per day. He has been using diapers due to these issues. He notes that anxiety seems to  "exacerbate his symptoms, leading to an increased need to rush to the bathroom. However, he has noticed a decrease in this symptom recently. He recalls a time when he was able to hold his urine for extended periods without difficulty.    He has been recommended physical therapy by his neurologist, which he has scheduled.     Review of Systems   Constitutional:  Negative for diaphoresis and fever.   Respiratory: Negative.     Cardiovascular: Negative.    Gastrointestinal:  Negative for abdominal pain, constipation, nausea and vomiting.        +bowel frequency +gas/flatulence   Genitourinary:  Positive for urgency.   Musculoskeletal:  Positive for back pain.   Neurological:  Positive for numbness.     Objective   /74 (BP Location: Left arm, Patient Position: Sitting, Cuff Size: Large)   Pulse 94   Temp (!) 97.2 °F (36.2 °C) (Tympanic)   Resp 18   Ht 5' 9\" (1.753 m)   Wt 65 kg (143 lb 6.4 oz)   SpO2 98%   BMI 21.18 kg/m²     Physical Exam  Constitutional:       General: He is not in acute distress.     Appearance: He is not ill-appearing.     Cardiovascular:      Rate and Rhythm: Normal rate and regular rhythm.      Heart sounds: No murmur heard.  Pulmonary:      Effort: Pulmonary effort is normal. No respiratory distress.      Breath sounds: No wheezing.   Abdominal:      General: Bowel sounds are normal. There is no distension.      Tenderness: There is no abdominal tenderness.     Musculoskeletal:      Right lower leg: No edema.      Left lower leg: No edema.     Neurological:      Mental Status: He is alert.       Zi Allen, DO   "

## 2025-06-27 NOTE — ASSESSMENT & PLAN NOTE
Lab Results   Component Value Date    HGBA1C 7.9 (H) 10/30/2024     He is due to check updated labs. He will get them done today. Continue medication as prescribed. If A1c remains high, may consider medication adjustment. Continue pregabalin for neuropathy.

## 2025-06-28 LAB
EST. AVERAGE GLUCOSE BLD GHB EST-MCNC: 180 MG/DL
HBA1C MFR BLD: 7.9 %

## 2025-06-30 ENCOUNTER — RESULTS FOLLOW-UP (OUTPATIENT)
Age: 85
End: 2025-06-30

## 2025-06-30 NOTE — TELEPHONE ENCOUNTER
----- Message from Zi Allen DO sent at 6/30/2025  7:14 AM EDT -----  Labs stable overall. A1c was 7.9%  ----- Message -----  From: Lab, Background User  Sent: 6/27/2025   7:34 PM EDT  To: Zi Allen DO

## 2025-07-02 ENCOUNTER — OFFICE VISIT (OUTPATIENT)
Age: 85
End: 2025-07-02
Payer: MEDICARE

## 2025-07-02 VITALS
DIASTOLIC BLOOD PRESSURE: 74 MMHG | OXYGEN SATURATION: 94 % | WEIGHT: 146 LBS | BODY MASS INDEX: 21.62 KG/M2 | HEART RATE: 72 BPM | TEMPERATURE: 98.6 F | RESPIRATION RATE: 16 BRPM | SYSTOLIC BLOOD PRESSURE: 128 MMHG | HEIGHT: 69 IN

## 2025-07-02 DIAGNOSIS — J43.2 CENTRILOBULAR EMPHYSEMA (HCC): ICD-10-CM

## 2025-07-02 DIAGNOSIS — Z85.118 HISTORY OF LUNG CANCER: ICD-10-CM

## 2025-07-02 DIAGNOSIS — G47.33 OSA (OBSTRUCTIVE SLEEP APNEA): ICD-10-CM

## 2025-07-02 DIAGNOSIS — F17.290 OTHER TOBACCO PRODUCT NICOTINE DEPENDENCE, UNCOMPLICATED: ICD-10-CM

## 2025-07-02 DIAGNOSIS — J41.0 SIMPLE CHRONIC BRONCHITIS (HCC): Primary | ICD-10-CM

## 2025-07-02 PROCEDURE — 99214 OFFICE O/P EST MOD 30 MIN: CPT

## 2025-07-02 RX ORDER — FLUTICASONE FUROATE, UMECLIDINIUM BROMIDE AND VILANTEROL TRIFENATATE 100; 62.5; 25 UG/1; UG/1; UG/1
POWDER RESPIRATORY (INHALATION)
Qty: 180 BLISTER | Refills: 3 | Status: SHIPPED | OUTPATIENT
Start: 2025-07-02

## 2025-07-02 NOTE — ASSESSMENT & PLAN NOTE
- Prior lung cancer history with left upper lobectomy  -Last CT chest from 2/2025 with no evidence of recurrence  -Repeat CT chest 2/2026 for annual surveillance.    Orders:    CT chest without contrast; Future

## 2025-07-02 NOTE — ASSESSMENT & PLAN NOTE
- Mild obstruction on PFTs in 2023 with FEV1 75%  -Severe emphysema on CT imaging  -Symptoms currently stable with no recent exacerbations  -Continue Trelegy 100 daily, albuterol HFA/nebs every 6 hours as needed

## 2025-07-02 NOTE — ASSESSMENT & PLAN NOTE
- Severe SHIVANI with AHI 29.5 on prior diagnostic sleep study in 2020   -Patient reports wearing CPAP nightly.  DME sent compliance report from 12/18/2024 - 3/17/2025 which showed 100% compliance with an average nightly use of 5 hours and 16 minutes and a residual AHI of 4.4.  -Continue wearing CPAP nightly

## 2025-07-02 NOTE — PROGRESS NOTES
Follow-up  Visit - Pulmonary Medicine   Name: Steven MARTINO During      : 1940      MRN: [id number]  Encounter Provider: SONJA Rangel  Encounter Date: 2025   Encounter department: [facility]  :  Assessment & Plan  Simple chronic bronchitis (HCC)  - Mild obstruction on PFTs in  with FEV1 75%  -Severe emphysema on CT imaging  -Symptoms currently stable with no recent exacerbations  -Continue Trelegy 100 daily, albuterol HFA/nebs every 6 hours as needed    SHIVANI (obstructive sleep apnea)  - Severe SHIVANI with AHI 29.5 on prior diagnostic sleep study in    -Patient reports wearing CPAP nightly.  DME sent compliance report from 2024 - 3/17/2025 which showed 100% compliance with an average nightly use of 5 hours and 16 minutes and a residual AHI of 4.4.  -Continue wearing CPAP nightly       History of lung cancer  - Prior lung cancer history with left upper lobectomy  -Last CT chest from 2025 with no evidence of recurrence  -Repeat CT chest 2026 for annual surveillance.    Orders:    CT chest without contrast; Future    Other tobacco product nicotine dependence, uncomplicated  - Continues to smoke 4 pipes per day  -Encouraged smoking cessation, however not interested in quitting at this time         No follow-ups on file.    History of Present Illness   Steven Chandler is a 85 y.o. male with history of chronic bronchitis, tobacco abuse, lung cancer s/p left upper lobectomy, and SHIVANI on CPAP who is here today for a follow-up visit.  Last seen in the office 3 months ago.  Maintained on Trelegy, albuterol HFA/nebs as needed, and CPAP nightly.  Denies any exacerbations or respiratory infections since his last office visit.  Symptoms are stable.  Still has shortness of breath with exertion and occasional thick white mucus.  No wheezing, tightness, or chest pain.  Using his Trelegy daily as prescribed and albuterol on average once in the afternoon.   "Continues to smoke 4 pipes per day, not interested in quitting.  He is following with a neurologist for balance issues, will be starting physical therapy soon.      Review of Systems    Aside from what is mentioned in the HPI, ROS is otherwise negative         Medical History Reviewed by provider this encounter:     .    Objective   /74 (BP Location: Right arm, Patient Position: Sitting, Cuff Size: Large)   Pulse 72   Temp 98.6 °F (37 °C) (Oral)   Resp 16   Ht 5' 9\" (1.753 m)   Wt 66.2 kg (146 lb)   SpO2 94%   BMI 21.56 kg/m²     Physical Exam  Vitals and nursing note reviewed.   Constitutional:       General: He is not in acute distress.     Appearance: Normal appearance. He is well-developed.     Cardiovascular:      Rate and Rhythm: Normal rate and regular rhythm.      Heart sounds: Normal heart sounds. No murmur heard.  Pulmonary:      Effort: Pulmonary effort is normal. No respiratory distress.      Breath sounds: Normal breath sounds. No decreased breath sounds, wheezing, rhonchi or rales.     Musculoskeletal:         General: No swelling.      Right lower leg: No edema.      Left lower leg: No edema.     Psychiatric:         Mood and Affect: Mood and affect normal.         Behavior: Behavior normal. Behavior is cooperative.           Diagnostic Data:  Labs: I personally reviewed the most recent laboratory data pertinent to today's visit.      Radiology results:  Radiology Results Review: I have reviewed radiology reports from 2/14/2025 including: CT chest.      PFT/spirometry results: Reviewed study from 4/13/2023      Oximetry testing:      Other studies:      SONJA Rangel      "

## 2025-07-23 ENCOUNTER — TELEPHONE (OUTPATIENT)
Dept: PAIN MEDICINE | Facility: CLINIC | Age: 85
End: 2025-07-23

## 2025-07-29 ENCOUNTER — OFFICE VISIT (OUTPATIENT)
Dept: PAIN MEDICINE | Facility: CLINIC | Age: 85
End: 2025-07-29
Payer: MEDICARE

## 2025-07-29 VITALS — WEIGHT: 144.8 LBS | HEIGHT: 69 IN | BODY MASS INDEX: 21.45 KG/M2

## 2025-07-29 DIAGNOSIS — G62.9 NEUROPATHY: ICD-10-CM

## 2025-07-29 DIAGNOSIS — M47.816 LUMBAR SPONDYLOSIS: ICD-10-CM

## 2025-07-29 DIAGNOSIS — M48.062 SPINAL STENOSIS OF LUMBAR REGION WITH NEUROGENIC CLAUDICATION: Primary | ICD-10-CM

## 2025-07-29 PROCEDURE — 99214 OFFICE O/P EST MOD 30 MIN: CPT

## 2025-08-04 ENCOUNTER — EVALUATION (OUTPATIENT)
Age: 85
End: 2025-08-04
Attending: PSYCHIATRY & NEUROLOGY
Payer: MEDICARE

## 2025-08-04 DIAGNOSIS — R26.89 BALANCE PROBLEM: Primary | ICD-10-CM

## 2025-08-04 PROCEDURE — 97162 PT EVAL MOD COMPLEX 30 MIN: CPT

## 2025-08-07 ENCOUNTER — OFFICE VISIT (OUTPATIENT)
Age: 85
End: 2025-08-07
Attending: PSYCHIATRY & NEUROLOGY
Payer: MEDICARE

## 2025-08-07 DIAGNOSIS — R26.89 BALANCE PROBLEM: Primary | ICD-10-CM

## 2025-08-07 PROCEDURE — 97112 NEUROMUSCULAR REEDUCATION: CPT

## 2025-08-07 PROCEDURE — 97530 THERAPEUTIC ACTIVITIES: CPT

## 2025-08-11 ENCOUNTER — OFFICE VISIT (OUTPATIENT)
Age: 85
End: 2025-08-11
Attending: PSYCHIATRY & NEUROLOGY
Payer: MEDICARE

## 2025-08-14 ENCOUNTER — OFFICE VISIT (OUTPATIENT)
Age: 85
End: 2025-08-14
Attending: PSYCHIATRY & NEUROLOGY
Payer: MEDICARE

## 2025-08-15 ENCOUNTER — HOSPITAL ENCOUNTER (OUTPATIENT)
Dept: NON INVASIVE DIAGNOSTICS | Facility: CLINIC | Age: 85
Discharge: HOME | End: 2025-08-15
Payer: MEDICARE

## 2025-08-18 ENCOUNTER — OFFICE VISIT (OUTPATIENT)
Age: 85
End: 2025-08-18
Attending: PSYCHIATRY & NEUROLOGY
Payer: MEDICARE

## 2025-08-18 DIAGNOSIS — R26.89 BALANCE PROBLEM: Primary | ICD-10-CM

## 2025-08-18 PROCEDURE — 97112 NEUROMUSCULAR REEDUCATION: CPT | Performed by: PHYSICAL THERAPIST

## 2025-08-21 ENCOUNTER — OFFICE VISIT (OUTPATIENT)
Age: 85
End: 2025-08-21
Attending: PSYCHIATRY & NEUROLOGY
Payer: MEDICARE

## 2025-08-21 DIAGNOSIS — R26.89 BALANCE PROBLEM: Primary | ICD-10-CM

## 2025-08-21 PROCEDURE — 97112 NEUROMUSCULAR REEDUCATION: CPT

## (undated) DEVICE — DRAPE EQUIPMENT RF WAND

## (undated) DEVICE — 3M™ STERI-STRIP™ COMPOUND BENZOIN TINCTURE 40 BAGS/CARTON 4 CARTONS/CASE C1544: Brand: 3M™ STERI-STRIP™

## (undated) DEVICE — LIGHT HANDLE COVER SLEEVE DISP BLUE STELLAR

## (undated) DEVICE — GLOVE INDICATOR PI UNDERGLOVE SZ 8 BLUE

## (undated) DEVICE — UMBILICAL TAPE: Brand: DEROYAL

## (undated) DEVICE — IV CATH 14 G X 3 1/4 IN

## (undated) DEVICE — INTENDED FOR TISSUE SEPARATION, AND OTHER PROCEDURES THAT REQUIRE A SHARP SURGICAL BLADE TO PUNCTURE OR CUT.: Brand: BARD-PARKER ® CARBON RIB-BACK BLADES

## (undated) DEVICE — JP PERF DRN SIL FLT 7MM FULL: Brand: CARDINAL HEALTH

## (undated) DEVICE — NEEDLE SPINAL18G X 3.5 IN QUINCKE

## (undated) DEVICE — BETHLEHEM UNIVERSAL SPINE, KIT: Brand: CARDINAL HEALTH

## (undated) DEVICE — GLOVE SRG BIOGEL 8

## (undated) DEVICE — DISPOSABLE EQUIPMENT COVER: Brand: SMALL TOWEL DRAPE

## (undated) DEVICE — FLOSEAL HEMOSTATIC MATRIX, 5 ML: Brand: FLOSEAL

## (undated) DEVICE — ELECTRODE BLADE MOD E-Z CLEAN  2.75IN 7CM -0012AM

## (undated) DEVICE — TELFA NON-ADHERENT ABSORBENT DRESSING: Brand: TELFA

## (undated) DEVICE — SPONGE PVP SCRUB WING STERILE

## (undated) DEVICE — DISTRACTION SCREW 12MM DISP

## (undated) DEVICE — INTENDED FOR TISSUE SEPARATION, AND OTHER PROCEDURES THAT REQUIRE A SHARP SURGICAL BLADE TO PUNCTURE OR CUT.: Brand: BARD-PARKER SAFETY BLADES SIZE 15, STERILE

## (undated) DEVICE — PREP SURGICAL PURPREP 26ML

## (undated) DEVICE — TOOL T12MH25 LEGEND 12CM 2.5MM MH: Brand: MIDAS REX ™

## (undated) DEVICE — 3M™ STERI-STRIP™ REINFORCED ADHESIVE SKIN CLOSURES, R1547, 1/2 IN X 4 IN (12 MM X 100 MM), 6 STRIPS/ENVELOPE: Brand: 3M™ STERI-STRIP™

## (undated) DEVICE — GAUZE SPONGES,16 PLY: Brand: CURITY

## (undated) DEVICE — DRAPE MICROSCOPE OPMI PENTERO

## (undated) DEVICE — ROSEBUD DISSECTORS: Brand: DEROYAL

## (undated) DEVICE — SNAP KOVER: Brand: UNBRANDED

## (undated) DEVICE — MAGNETIC INSTRUMENT PAD 16" X 20"; LARGE; DISPOSABLE: Brand: CARDINAL HEALTH

## (undated) DEVICE — JACKSON-PRATT 100CC BULB RESERVOIR: Brand: CARDINAL HEALTH

## (undated) DEVICE — PROXIMATE SKIN STAPLERS (35 WIDE) CONTAINS 35 STAINLESS STEEL STAPLES (FIXED HEAD): Brand: PROXIMATE

## (undated) DEVICE — 3M™ STERI-STRIP™ REINFORCED ADHESIVE SKIN CLOSURES, R1542, 1/4 IN X 1-1/2 IN (6 MM X 38 MM), 6 STRIPS/ENVELOPE: Brand: 3M™ STERI-STRIP™

## (undated) DEVICE — SPECIMEN CONTAINER STERILE PEEL PACK

## (undated) DEVICE — 3M™ TEGADERM™ TRANSPARENT FILM DRESSING FRAME STYLE, 1626W, 4 IN X 4-3/4 IN (10 CM X 12 CM), 50/CT 4CT/CASE: Brand: 3M™ TEGADERM™

## (undated) DEVICE — MINOR PROCEDURE DRAPE: Brand: CONVERTORS

## (undated) DEVICE — SUT VICRYL PLUS 3-0 RB-1 CR/8 18 IN VCP713D